# Patient Record
Sex: MALE | Race: WHITE | NOT HISPANIC OR LATINO | Employment: OTHER | RURAL
[De-identification: names, ages, dates, MRNs, and addresses within clinical notes are randomized per-mention and may not be internally consistent; named-entity substitution may affect disease eponyms.]

---

## 2018-07-20 ENCOUNTER — HISTORICAL (OUTPATIENT)
Dept: ADMINISTRATIVE | Facility: HOSPITAL | Age: 48
End: 2018-07-20

## 2018-07-23 LAB
LAB AP CLINICAL INFORMATION: NORMAL
LAB AP DIAGNOSIS - HISTORICAL: NORMAL
LAB AP GROSS PATHOLOGY - HISTORICAL: NORMAL
LAB AP SPECIMEN SUBMITTED - HISTORICAL: NORMAL

## 2020-11-17 ENCOUNTER — HISTORICAL (OUTPATIENT)
Dept: ADMINISTRATIVE | Facility: HOSPITAL | Age: 50
End: 2020-11-17

## 2020-11-17 LAB — SARS-COV+SARS-COV-2 AG RESP QL IA.RAPID: NEGATIVE

## 2020-11-20 ENCOUNTER — HISTORICAL (OUTPATIENT)
Dept: ADMINISTRATIVE | Facility: HOSPITAL | Age: 50
End: 2020-11-20

## 2020-11-20 LAB — SARS-COV+SARS-COV-2 AG RESP QL IA.RAPID: NEGATIVE

## 2020-11-24 ENCOUNTER — HISTORICAL (OUTPATIENT)
Dept: ADMINISTRATIVE | Facility: HOSPITAL | Age: 50
End: 2020-11-24

## 2020-11-24 LAB — SARS-COV+SARS-COV-2 AG RESP QL IA.RAPID: NEGATIVE

## 2020-11-27 ENCOUNTER — HISTORICAL (OUTPATIENT)
Dept: ADMINISTRATIVE | Facility: HOSPITAL | Age: 50
End: 2020-11-27

## 2020-11-27 LAB — SARS-COV+SARS-COV-2 AG RESP QL IA.RAPID: NEGATIVE

## 2020-12-01 ENCOUNTER — HISTORICAL (OUTPATIENT)
Dept: ADMINISTRATIVE | Facility: HOSPITAL | Age: 50
End: 2020-12-01

## 2020-12-01 LAB — SARS-COV+SARS-COV-2 AG RESP QL IA.RAPID: NEGATIVE

## 2020-12-04 ENCOUNTER — HISTORICAL (OUTPATIENT)
Dept: ADMINISTRATIVE | Facility: HOSPITAL | Age: 50
End: 2020-12-04

## 2020-12-04 LAB — SARS-COV+SARS-COV-2 AG RESP QL IA.RAPID: NEGATIVE

## 2020-12-08 ENCOUNTER — HISTORICAL (OUTPATIENT)
Dept: ADMINISTRATIVE | Facility: HOSPITAL | Age: 50
End: 2020-12-08

## 2020-12-08 LAB — SARS-COV+SARS-COV-2 AG RESP QL IA.RAPID: NEGATIVE

## 2020-12-11 ENCOUNTER — HISTORICAL (OUTPATIENT)
Dept: ADMINISTRATIVE | Facility: HOSPITAL | Age: 50
End: 2020-12-11

## 2020-12-11 LAB — SARS-COV+SARS-COV-2 AG RESP QL IA.RAPID: NEGATIVE

## 2020-12-15 ENCOUNTER — HISTORICAL (OUTPATIENT)
Dept: ADMINISTRATIVE | Facility: HOSPITAL | Age: 50
End: 2020-12-15

## 2020-12-15 LAB — SARS-COV+SARS-COV-2 AG RESP QL IA.RAPID: NEGATIVE

## 2020-12-18 ENCOUNTER — HISTORICAL (OUTPATIENT)
Dept: ADMINISTRATIVE | Facility: HOSPITAL | Age: 50
End: 2020-12-18

## 2020-12-18 LAB — SARS-COV+SARS-COV-2 AG RESP QL IA.RAPID: NEGATIVE

## 2020-12-22 ENCOUNTER — HISTORICAL (OUTPATIENT)
Dept: ADMINISTRATIVE | Facility: HOSPITAL | Age: 50
End: 2020-12-22

## 2020-12-22 LAB — SARS-COV+SARS-COV-2 AG RESP QL IA.RAPID: NEGATIVE

## 2020-12-24 ENCOUNTER — HISTORICAL (OUTPATIENT)
Dept: ADMINISTRATIVE | Facility: HOSPITAL | Age: 50
End: 2020-12-24

## 2020-12-24 LAB — SARS-COV+SARS-COV-2 AG RESP QL IA.RAPID: NEGATIVE

## 2020-12-29 ENCOUNTER — HISTORICAL (OUTPATIENT)
Dept: ADMINISTRATIVE | Facility: HOSPITAL | Age: 50
End: 2020-12-29

## 2020-12-29 LAB — SARS-COV+SARS-COV-2 AG RESP QL IA.RAPID: NEGATIVE

## 2020-12-31 ENCOUNTER — HISTORICAL (OUTPATIENT)
Dept: ADMINISTRATIVE | Facility: HOSPITAL | Age: 50
End: 2020-12-31

## 2020-12-31 LAB — SARS-COV+SARS-COV-2 AG RESP QL IA.RAPID: NEGATIVE

## 2021-01-05 ENCOUNTER — HISTORICAL (OUTPATIENT)
Dept: ADMINISTRATIVE | Facility: HOSPITAL | Age: 51
End: 2021-01-05

## 2021-01-05 LAB — SARS-COV+SARS-COV-2 AG RESP QL IA.RAPID: NEGATIVE

## 2021-01-07 ENCOUNTER — HISTORICAL (OUTPATIENT)
Dept: ADMINISTRATIVE | Facility: HOSPITAL | Age: 51
End: 2021-01-07

## 2021-01-07 LAB — SARS-COV+SARS-COV-2 AG RESP QL IA.RAPID: NEGATIVE

## 2021-01-12 ENCOUNTER — HISTORICAL (OUTPATIENT)
Dept: ADMINISTRATIVE | Facility: HOSPITAL | Age: 51
End: 2021-01-12

## 2021-01-12 LAB — SARS-COV+SARS-COV-2 AG RESP QL IA.RAPID: NEGATIVE

## 2021-01-14 ENCOUNTER — HISTORICAL (OUTPATIENT)
Dept: ADMINISTRATIVE | Facility: HOSPITAL | Age: 51
End: 2021-01-14

## 2021-01-14 LAB — SARS-COV+SARS-COV-2 AG RESP QL IA.RAPID: NEGATIVE

## 2021-01-19 ENCOUNTER — HISTORICAL (OUTPATIENT)
Dept: ADMINISTRATIVE | Facility: HOSPITAL | Age: 51
End: 2021-01-19

## 2021-01-19 LAB — SARS-COV+SARS-COV-2 AG RESP QL IA.RAPID: NEGATIVE

## 2021-01-21 ENCOUNTER — HISTORICAL (OUTPATIENT)
Dept: ADMINISTRATIVE | Facility: HOSPITAL | Age: 51
End: 2021-01-21

## 2021-01-21 LAB — SARS-COV+SARS-COV-2 AG RESP QL IA.RAPID: NEGATIVE

## 2021-01-26 ENCOUNTER — HISTORICAL (OUTPATIENT)
Dept: ADMINISTRATIVE | Facility: HOSPITAL | Age: 51
End: 2021-01-26

## 2021-01-26 LAB — SARS-COV+SARS-COV-2 AG RESP QL IA.RAPID: NEGATIVE

## 2021-01-28 ENCOUNTER — HISTORICAL (OUTPATIENT)
Dept: ADMINISTRATIVE | Facility: HOSPITAL | Age: 51
End: 2021-01-28

## 2021-01-28 LAB — SARS-COV+SARS-COV-2 AG RESP QL IA.RAPID: NEGATIVE

## 2021-02-02 ENCOUNTER — HISTORICAL (OUTPATIENT)
Dept: ADMINISTRATIVE | Facility: HOSPITAL | Age: 51
End: 2021-02-02

## 2021-02-02 LAB — SARS-COV+SARS-COV-2 AG RESP QL IA.RAPID: NEGATIVE

## 2021-02-04 ENCOUNTER — HISTORICAL (OUTPATIENT)
Dept: ADMINISTRATIVE | Facility: HOSPITAL | Age: 51
End: 2021-02-04

## 2021-02-04 LAB — SARS-COV+SARS-COV-2 AG RESP QL IA.RAPID: NEGATIVE

## 2021-02-09 ENCOUNTER — HISTORICAL (OUTPATIENT)
Dept: ADMINISTRATIVE | Facility: HOSPITAL | Age: 51
End: 2021-02-09

## 2021-02-09 LAB — SARS-COV+SARS-COV-2 AG RESP QL IA.RAPID: NEGATIVE

## 2021-02-11 ENCOUNTER — HISTORICAL (OUTPATIENT)
Dept: ADMINISTRATIVE | Facility: HOSPITAL | Age: 51
End: 2021-02-11

## 2021-02-12 LAB — SARS-COV+SARS-COV-2 AG RESP QL IA.RAPID: NEGATIVE

## 2021-02-17 ENCOUNTER — HISTORICAL (OUTPATIENT)
Dept: ADMINISTRATIVE | Facility: HOSPITAL | Age: 51
End: 2021-02-17

## 2021-02-17 LAB — SARS-COV+SARS-COV-2 AG RESP QL IA.RAPID: NEGATIVE

## 2021-02-19 ENCOUNTER — HISTORICAL (OUTPATIENT)
Dept: ADMINISTRATIVE | Facility: HOSPITAL | Age: 51
End: 2021-02-19

## 2021-02-19 LAB — SARS-COV+SARS-COV-2 AG RESP QL IA.RAPID: NEGATIVE

## 2021-02-23 ENCOUNTER — HISTORICAL (OUTPATIENT)
Dept: ADMINISTRATIVE | Facility: HOSPITAL | Age: 51
End: 2021-02-23

## 2021-02-23 LAB — SARS-COV+SARS-COV-2 AG RESP QL IA.RAPID: NEGATIVE

## 2021-02-25 ENCOUNTER — HISTORICAL (OUTPATIENT)
Dept: ADMINISTRATIVE | Facility: HOSPITAL | Age: 51
End: 2021-02-25

## 2021-02-26 LAB — SARS-COV+SARS-COV-2 AG RESP QL IA.RAPID: NEGATIVE

## 2021-06-14 ENCOUNTER — OFFICE VISIT (OUTPATIENT)
Dept: INTERNAL MEDICINE | Facility: CLINIC | Age: 51
End: 2021-06-14
Payer: MEDICAID

## 2021-06-14 VITALS
HEIGHT: 72 IN | OXYGEN SATURATION: 99 % | WEIGHT: 208 LBS | BODY MASS INDEX: 28.17 KG/M2 | RESPIRATION RATE: 18 BRPM | HEART RATE: 87 BPM | SYSTOLIC BLOOD PRESSURE: 152 MMHG | DIASTOLIC BLOOD PRESSURE: 94 MMHG

## 2021-06-14 DIAGNOSIS — I10 HYPERTENSION, UNSPECIFIED TYPE: Primary | Chronic | ICD-10-CM

## 2021-06-14 DIAGNOSIS — K21.9 GASTROESOPHAGEAL REFLUX DISEASE, UNSPECIFIED WHETHER ESOPHAGITIS PRESENT: Chronic | ICD-10-CM

## 2021-06-14 DIAGNOSIS — Z86.73 HISTORY OF CVA (CEREBROVASCULAR ACCIDENT): Chronic | ICD-10-CM

## 2021-06-14 DIAGNOSIS — Z95.1 HX OF CABG: Chronic | ICD-10-CM

## 2021-06-14 PROCEDURE — 99214 OFFICE O/P EST MOD 30 MIN: CPT | Mod: PBBFAC | Performed by: INTERNAL MEDICINE

## 2021-06-14 PROCEDURE — 99214 PR OFFICE/OUTPT VISIT, EST, LEVL IV, 30-39 MIN: ICD-10-PCS | Mod: S$PBB,,, | Performed by: INTERNAL MEDICINE

## 2021-06-14 PROCEDURE — 99214 OFFICE O/P EST MOD 30 MIN: CPT | Mod: S$PBB,,, | Performed by: INTERNAL MEDICINE

## 2021-06-14 RX ORDER — ASPIRIN 81 MG/1
81 TABLET ORAL DAILY
Qty: 30 TABLET | Refills: 2 | Status: SHIPPED | OUTPATIENT
Start: 2021-06-14 | End: 2021-10-27 | Stop reason: SDUPTHER

## 2021-06-14 RX ORDER — PANTOPRAZOLE SODIUM 40 MG/1
40 TABLET, DELAYED RELEASE ORAL DAILY
Qty: 30 TABLET | Refills: 3 | Status: SHIPPED | OUTPATIENT
Start: 2021-06-14 | End: 2021-10-04 | Stop reason: SDUPTHER

## 2021-06-14 RX ORDER — AMLODIPINE BESYLATE 10 MG/1
10 TABLET ORAL DAILY
Qty: 30 TABLET | Refills: 2 | Status: SHIPPED | OUTPATIENT
Start: 2021-06-14 | End: 2021-10-04 | Stop reason: SDUPTHER

## 2021-06-14 RX ORDER — ATORVASTATIN CALCIUM 40 MG/1
40 TABLET, FILM COATED ORAL NIGHTLY
Qty: 30 TABLET | Refills: 2 | Status: SHIPPED | OUTPATIENT
Start: 2021-06-14 | End: 2021-10-04 | Stop reason: SDUPTHER

## 2021-06-16 DIAGNOSIS — I10 HYPERTENSION, UNSPECIFIED TYPE: Primary | ICD-10-CM

## 2021-07-26 ENCOUNTER — OFFICE VISIT (OUTPATIENT)
Dept: FAMILY MEDICINE | Facility: CLINIC | Age: 51
End: 2021-07-26
Payer: MEDICAID

## 2021-07-26 VITALS
HEART RATE: 92 BPM | RESPIRATION RATE: 19 BRPM | OXYGEN SATURATION: 98 % | HEIGHT: 73 IN | BODY MASS INDEX: 27.3 KG/M2 | SYSTOLIC BLOOD PRESSURE: 137 MMHG | DIASTOLIC BLOOD PRESSURE: 78 MMHG | WEIGHT: 206 LBS

## 2021-07-26 DIAGNOSIS — I63.9 CEREBROVASCULAR ACCIDENT (CVA), UNSPECIFIED MECHANISM: Primary | ICD-10-CM

## 2021-07-26 DIAGNOSIS — I10 ESSENTIAL HYPERTENSION: ICD-10-CM

## 2021-07-26 PROCEDURE — 99212 PR OFFICE/OUTPT VISIT, EST, LEVL II, 10-19 MIN: ICD-10-PCS | Mod: ,,, | Performed by: INTERNAL MEDICINE

## 2021-07-26 PROCEDURE — 99212 OFFICE O/P EST SF 10 MIN: CPT | Mod: ,,, | Performed by: INTERNAL MEDICINE

## 2021-09-17 ENCOUNTER — HOSPITAL ENCOUNTER (EMERGENCY)
Facility: HOSPITAL | Age: 51
Discharge: HOME | End: 2021-09-17
Attending: EMERGENCY MEDICINE
Payer: MEDICAID

## 2021-09-17 VITALS
OXYGEN SATURATION: 100 % | WEIGHT: 206.56 LBS | HEART RATE: 111 BPM | DIASTOLIC BLOOD PRESSURE: 88 MMHG | SYSTOLIC BLOOD PRESSURE: 158 MMHG | RESPIRATION RATE: 15 BRPM | BODY MASS INDEX: 27.37 KG/M2 | HEIGHT: 73 IN | TEMPERATURE: 98 F

## 2021-09-17 DIAGNOSIS — R07.9 CHEST PAIN: ICD-10-CM

## 2021-09-17 DIAGNOSIS — F10.20 ALCOHOLISM: ICD-10-CM

## 2021-09-17 DIAGNOSIS — E87.6 HYPOKALEMIA: ICD-10-CM

## 2021-09-17 DIAGNOSIS — M79.89 LEG SWELLING: ICD-10-CM

## 2021-09-17 DIAGNOSIS — R07.89 CHEST WALL PAIN: Primary | ICD-10-CM

## 2021-09-17 LAB
ALBUMIN SERPL BCP-MCNC: 3.6 G/DL (ref 3.5–5)
ALBUMIN/GLOB SERPL: 0.9 {RATIO}
ALP SERPL-CCNC: 226 U/L (ref 45–115)
ALT SERPL W P-5'-P-CCNC: 34 U/L (ref 16–61)
AMPHET UR QL SCN: NEGATIVE
ANION GAP SERPL CALCULATED.3IONS-SCNC: 14 MMOL/L (ref 7–16)
ANISOCYTOSIS BLD QL SMEAR: NORMAL
APTT PPP: 30.1 SECONDS (ref 25.2–37.3)
AST SERPL W P-5'-P-CCNC: 23 U/L (ref 15–37)
BARBITURATES UR QL SCN: NEGATIVE
BASOPHILS # BLD AUTO: 0.06 K/UL (ref 0–0.2)
BASOPHILS NFR BLD AUTO: 1 % (ref 0–1)
BENZODIAZ METAB UR QL SCN: NEGATIVE
BILIRUB SERPL-MCNC: 0.6 MG/DL (ref 0–1.2)
BUN SERPL-MCNC: 10 MG/DL (ref 7–18)
BUN/CREAT SERPL: 14 (ref 6–20)
CALCIUM SERPL-MCNC: 8.3 MG/DL (ref 8.5–10.1)
CANNABINOIDS UR QL SCN: NEGATIVE
CHLORIDE SERPL-SCNC: 100 MMOL/L (ref 98–107)
CO2 SERPL-SCNC: 27 MMOL/L (ref 21–32)
COCAINE UR QL SCN: NEGATIVE
CREAT SERPL-MCNC: 0.74 MG/DL (ref 0.7–1.3)
D DIMER PPP FEU-MCNC: 0.76 ΜG/ML (ref 0–0.47)
DIFFERENTIAL METHOD BLD: ABNORMAL
EOSINOPHIL # BLD AUTO: 0.05 K/UL (ref 0–0.5)
EOSINOPHIL NFR BLD AUTO: 0.9 % (ref 1–4)
ERYTHROCYTE [DISTWIDTH] IN BLOOD BY AUTOMATED COUNT: 17.6 % (ref 11.5–14.5)
ETHANOL SERPL-MCNC: 131 MG/DL
GLOBULIN SER-MCNC: 4.2 G/DL (ref 2–4)
GLUCOSE SERPL-MCNC: 160 MG/DL (ref 74–106)
HCT VFR BLD AUTO: 39.9 % (ref 40–54)
HGB BLD-MCNC: 12.5 G/DL (ref 13.5–18)
IMM GRANULOCYTES # BLD AUTO: 0.02 K/UL (ref 0–0.04)
IMM GRANULOCYTES NFR BLD: 0.3 % (ref 0–0.4)
INR BLD: 1.09 (ref 0.9–1.1)
LIPASE SERPL-CCNC: 69 U/L (ref 73–393)
LYMPHOCYTES # BLD AUTO: 1.87 K/UL (ref 1–4.8)
LYMPHOCYTES NFR BLD AUTO: 32.5 % (ref 27–41)
MAGNESIUM SERPL-MCNC: 1.8 MG/DL (ref 1.7–2.3)
MCH RBC QN AUTO: 23.2 PG (ref 27–31)
MCHC RBC AUTO-ENTMCNC: 31.3 G/DL (ref 32–36)
MCV RBC AUTO: 74.2 FL (ref 80–96)
MICROCYTES BLD QL SMEAR: NORMAL
MONOCYTES # BLD AUTO: 0.41 K/UL (ref 0–0.8)
MONOCYTES NFR BLD AUTO: 7.1 % (ref 2–6)
MPC BLD CALC-MCNC: 8.7 FL (ref 9.4–12.4)
NEUTROPHILS # BLD AUTO: 3.35 K/UL (ref 1.8–7.7)
NEUTROPHILS NFR BLD AUTO: 58.2 % (ref 53–65)
NRBC # BLD AUTO: 0 X10E3/UL
NRBC, AUTO (.00): 0 %
NT-PROBNP SERPL-MCNC: 303 PG/ML (ref 1–125)
OPIATES UR QL SCN: POSITIVE
OVALOCYTES BLD QL SMEAR: NORMAL
PCP UR QL SCN: NEGATIVE
PLATELET # BLD AUTO: 304 K/UL (ref 150–400)
PLATELET MORPHOLOGY: NORMAL
POLYCHROMASIA BLD QL SMEAR: NORMAL
POTASSIUM SERPL-SCNC: 2.8 MMOL/L (ref 3.5–5.1)
PROT SERPL-MCNC: 7.8 G/DL (ref 6.4–8.2)
PROTHROMBIN TIME: 14.1 SECONDS (ref 11.7–14.7)
RBC # BLD AUTO: 5.38 M/UL (ref 4.6–6.2)
SODIUM SERPL-SCNC: 138 MMOL/L (ref 136–145)
TROPONIN I SERPL-MCNC: <0.017 NG/ML
TROPONIN I SERPL-MCNC: <0.017 NG/ML
WBC # BLD AUTO: 5.76 K/UL (ref 4.5–11)

## 2021-09-17 PROCEDURE — 93010 ELECTROCARDIOGRAM REPORT: CPT | Mod: ,,, | Performed by: STUDENT IN AN ORGANIZED HEALTH CARE EDUCATION/TRAINING PROGRAM

## 2021-09-17 PROCEDURE — 25000003 PHARM REV CODE 250: Performed by: EMERGENCY MEDICINE

## 2021-09-17 PROCEDURE — 96376 TX/PRO/DX INJ SAME DRUG ADON: CPT

## 2021-09-17 PROCEDURE — 96361 HYDRATE IV INFUSION ADD-ON: CPT

## 2021-09-17 PROCEDURE — 84484 ASSAY OF TROPONIN QUANT: CPT | Performed by: EMERGENCY MEDICINE

## 2021-09-17 PROCEDURE — 93005 ELECTROCARDIOGRAM TRACING: CPT

## 2021-09-17 PROCEDURE — 93010 EKG 12-LEAD: ICD-10-PCS | Mod: ,,, | Performed by: STUDENT IN AN ORGANIZED HEALTH CARE EDUCATION/TRAINING PROGRAM

## 2021-09-17 PROCEDURE — 83880 ASSAY OF NATRIURETIC PEPTIDE: CPT | Performed by: EMERGENCY MEDICINE

## 2021-09-17 PROCEDURE — 80053 COMPREHEN METABOLIC PANEL: CPT | Performed by: EMERGENCY MEDICINE

## 2021-09-17 PROCEDURE — 99284 PR EMERGENCY DEPT VISIT,LEVEL IV: ICD-10-PCS | Mod: ,,, | Performed by: EMERGENCY MEDICINE

## 2021-09-17 PROCEDURE — 25000242 PHARM REV CODE 250 ALT 637 W/ HCPCS: Performed by: EMERGENCY MEDICINE

## 2021-09-17 PROCEDURE — 99285 EMERGENCY DEPT VISIT HI MDM: CPT | Mod: 25

## 2021-09-17 PROCEDURE — 82077 ASSAY SPEC XCP UR&BREATH IA: CPT | Performed by: EMERGENCY MEDICINE

## 2021-09-17 PROCEDURE — 99284 EMERGENCY DEPT VISIT MOD MDM: CPT | Mod: ,,, | Performed by: EMERGENCY MEDICINE

## 2021-09-17 PROCEDURE — 85378 FIBRIN DEGRADE SEMIQUANT: CPT | Performed by: EMERGENCY MEDICINE

## 2021-09-17 PROCEDURE — 85610 PROTHROMBIN TIME: CPT | Performed by: EMERGENCY MEDICINE

## 2021-09-17 PROCEDURE — 96366 THER/PROPH/DIAG IV INF ADDON: CPT

## 2021-09-17 PROCEDURE — 25500020 PHARM REV CODE 255: Performed by: EMERGENCY MEDICINE

## 2021-09-17 PROCEDURE — 80307 DRUG TEST PRSMV CHEM ANLYZR: CPT | Performed by: EMERGENCY MEDICINE

## 2021-09-17 PROCEDURE — 85730 THROMBOPLASTIN TIME PARTIAL: CPT | Performed by: EMERGENCY MEDICINE

## 2021-09-17 PROCEDURE — 83690 ASSAY OF LIPASE: CPT | Performed by: EMERGENCY MEDICINE

## 2021-09-17 PROCEDURE — 63600175 PHARM REV CODE 636 W HCPCS: Performed by: EMERGENCY MEDICINE

## 2021-09-17 PROCEDURE — 36415 COLL VENOUS BLD VENIPUNCTURE: CPT | Performed by: EMERGENCY MEDICINE

## 2021-09-17 PROCEDURE — 96375 TX/PRO/DX INJ NEW DRUG ADDON: CPT | Mod: 59

## 2021-09-17 PROCEDURE — 96365 THER/PROPH/DIAG IV INF INIT: CPT

## 2021-09-17 PROCEDURE — 83735 ASSAY OF MAGNESIUM: CPT | Performed by: EMERGENCY MEDICINE

## 2021-09-17 PROCEDURE — 85025 COMPLETE CBC W/AUTO DIFF WBC: CPT | Performed by: EMERGENCY MEDICINE

## 2021-09-17 RX ORDER — POTASSIUM CHLORIDE 20 MEQ/1
40 TABLET, EXTENDED RELEASE ORAL ONCE
Status: COMPLETED | OUTPATIENT
Start: 2021-09-17 | End: 2021-09-17

## 2021-09-17 RX ORDER — MORPHINE SULFATE 4 MG/ML
4 INJECTION, SOLUTION INTRAMUSCULAR; INTRAVENOUS
Status: COMPLETED | OUTPATIENT
Start: 2021-09-17 | End: 2021-09-17

## 2021-09-17 RX ORDER — POTASSIUM CHLORIDE 7.45 MG/ML
10 INJECTION INTRAVENOUS ONCE
Status: COMPLETED | OUTPATIENT
Start: 2021-09-17 | End: 2021-09-17

## 2021-09-17 RX ORDER — POTASSIUM CHLORIDE 20 MEQ/1
20 TABLET, EXTENDED RELEASE ORAL 2 TIMES DAILY
Qty: 20 TABLET | Refills: 0 | Status: SHIPPED | OUTPATIENT
Start: 2021-09-17 | End: 2023-01-26 | Stop reason: SDUPTHER

## 2021-09-17 RX ORDER — ONDANSETRON 4 MG/1
4 TABLET, ORALLY DISINTEGRATING ORAL EVERY 6 HOURS PRN
Qty: 20 TABLET | Refills: 0 | Status: ON HOLD | OUTPATIENT
Start: 2021-09-17 | End: 2022-01-19 | Stop reason: HOSPADM

## 2021-09-17 RX ORDER — NITROGLYCERIN 0.4 MG/1
0.4 TABLET SUBLINGUAL EVERY 5 MIN PRN
Status: DISCONTINUED | OUTPATIENT
Start: 2021-09-17 | End: 2021-09-17 | Stop reason: HOSPADM

## 2021-09-17 RX ORDER — ONDANSETRON 2 MG/ML
4 INJECTION INTRAMUSCULAR; INTRAVENOUS
Status: COMPLETED | OUTPATIENT
Start: 2021-09-17 | End: 2021-09-17

## 2021-09-17 RX ADMIN — NITROGLYCERIN 0.4 MG: 0.4 TABLET SUBLINGUAL at 09:09

## 2021-09-17 RX ADMIN — MORPHINE SULFATE 4 MG: 4 INJECTION INTRAVENOUS at 11:09

## 2021-09-17 RX ADMIN — SODIUM CHLORIDE 1000 ML: 9 INJECTION, SOLUTION INTRAVENOUS at 09:09

## 2021-09-17 RX ADMIN — POTASSIUM CHLORIDE 40 MEQ: 1500 TABLET, EXTENDED RELEASE ORAL at 11:09

## 2021-09-17 RX ADMIN — IOPAMIDOL 100 ML: 755 INJECTION, SOLUTION INTRAVENOUS at 11:09

## 2021-09-17 RX ADMIN — POTASSIUM CHLORIDE 10 MEQ: 7.46 INJECTION, SOLUTION INTRAVENOUS at 11:09

## 2021-09-17 RX ADMIN — ONDANSETRON 4 MG: 2 INJECTION INTRAMUSCULAR; INTRAVENOUS at 09:09

## 2021-09-17 RX ADMIN — SODIUM CHLORIDE 1000 ML: 9 INJECTION, SOLUTION INTRAVENOUS at 12:09

## 2021-09-17 RX ADMIN — MORPHINE SULFATE 4 MG: 4 INJECTION INTRAVENOUS at 09:09

## 2021-10-04 RX ORDER — ATORVASTATIN CALCIUM 40 MG/1
40 TABLET, FILM COATED ORAL NIGHTLY
Qty: 30 TABLET | Refills: 0 | Status: SHIPPED | OUTPATIENT
Start: 2021-10-04 | End: 2021-10-27 | Stop reason: SDUPTHER

## 2021-10-04 RX ORDER — AMLODIPINE BESYLATE 10 MG/1
10 TABLET ORAL DAILY
Qty: 30 TABLET | Refills: 0 | Status: SHIPPED | OUTPATIENT
Start: 2021-10-04 | End: 2021-10-04 | Stop reason: SDUPTHER

## 2021-10-05 RX ORDER — PANTOPRAZOLE SODIUM 40 MG/1
40 TABLET, DELAYED RELEASE ORAL DAILY
Qty: 30 TABLET | Refills: 0 | Status: SHIPPED | OUTPATIENT
Start: 2021-10-05 | End: 2021-10-27 | Stop reason: SDUPTHER

## 2021-10-05 RX ORDER — AMLODIPINE BESYLATE 10 MG/1
10 TABLET ORAL DAILY
Qty: 30 TABLET | Refills: 0 | Status: SHIPPED | OUTPATIENT
Start: 2021-10-05 | End: 2021-10-27 | Stop reason: SDUPTHER

## 2021-10-27 ENCOUNTER — OFFICE VISIT (OUTPATIENT)
Dept: FAMILY MEDICINE | Facility: CLINIC | Age: 51
End: 2021-10-27
Payer: MEDICAID

## 2021-10-27 VITALS
HEIGHT: 73 IN | SYSTOLIC BLOOD PRESSURE: 150 MMHG | HEART RATE: 108 BPM | RESPIRATION RATE: 18 BRPM | DIASTOLIC BLOOD PRESSURE: 88 MMHG | OXYGEN SATURATION: 100 % | WEIGHT: 202.63 LBS | BODY MASS INDEX: 26.85 KG/M2

## 2021-10-27 DIAGNOSIS — R53.1 RIGHT SIDED WEAKNESS: ICD-10-CM

## 2021-10-27 DIAGNOSIS — I10 PRIMARY HYPERTENSION: Chronic | ICD-10-CM

## 2021-10-27 DIAGNOSIS — G47.00 INSOMNIA, UNSPECIFIED TYPE: Primary | ICD-10-CM

## 2021-10-27 DIAGNOSIS — K21.9 GASTROESOPHAGEAL REFLUX DISEASE, UNSPECIFIED WHETHER ESOPHAGITIS PRESENT: Chronic | ICD-10-CM

## 2021-10-27 DIAGNOSIS — Z86.73 HISTORY OF CVA (CEREBROVASCULAR ACCIDENT): Chronic | ICD-10-CM

## 2021-10-27 PROCEDURE — 99214 OFFICE O/P EST MOD 30 MIN: CPT | Mod: ,,, | Performed by: INTERNAL MEDICINE

## 2021-10-27 PROCEDURE — 99214 PR OFFICE/OUTPT VISIT, EST, LEVL IV, 30-39 MIN: ICD-10-PCS | Mod: ,,, | Performed by: INTERNAL MEDICINE

## 2021-10-27 RX ORDER — ASPIRIN 81 MG/1
81 TABLET ORAL DAILY
Qty: 90 TABLET | Refills: 0 | Status: SHIPPED | OUTPATIENT
Start: 2021-10-27 | End: 2021-12-07 | Stop reason: SDUPTHER

## 2021-10-27 RX ORDER — PANTOPRAZOLE SODIUM 40 MG/1
40 TABLET, DELAYED RELEASE ORAL DAILY
Qty: 90 TABLET | Refills: 0 | Status: SHIPPED | OUTPATIENT
Start: 2021-10-27 | End: 2021-12-07 | Stop reason: SDUPTHER

## 2021-10-27 RX ORDER — AMLODIPINE BESYLATE 10 MG/1
10 TABLET ORAL DAILY
Qty: 90 TABLET | Refills: 0 | Status: SHIPPED | OUTPATIENT
Start: 2021-10-27 | End: 2021-12-07 | Stop reason: SDUPTHER

## 2021-10-27 RX ORDER — ATORVASTATIN CALCIUM 40 MG/1
40 TABLET, FILM COATED ORAL NIGHTLY
Qty: 90 TABLET | Refills: 0 | Status: SHIPPED | OUTPATIENT
Start: 2021-10-27 | End: 2021-12-07 | Stop reason: SDUPTHER

## 2021-10-27 RX ORDER — AMITRIPTYLINE HYDROCHLORIDE 25 MG/1
25 TABLET, FILM COATED ORAL NIGHTLY PRN
Qty: 20 TABLET | Refills: 2 | Status: SHIPPED | OUTPATIENT
Start: 2021-10-27 | End: 2021-12-07 | Stop reason: SDUPTHER

## 2021-11-02 ENCOUNTER — CLINICAL SUPPORT (OUTPATIENT)
Dept: REHABILITATION | Facility: HOSPITAL | Age: 51
End: 2021-11-02
Payer: MEDICAID

## 2021-11-02 DIAGNOSIS — R53.1 RIGHT SIDED WEAKNESS: ICD-10-CM

## 2021-11-02 PROCEDURE — 97161 PT EVAL LOW COMPLEX 20 MIN: CPT

## 2021-11-17 ENCOUNTER — CLINICAL SUPPORT (OUTPATIENT)
Dept: REHABILITATION | Facility: HOSPITAL | Age: 51
End: 2021-11-17
Payer: MEDICAID

## 2021-11-17 DIAGNOSIS — R53.1 RIGHT SIDED WEAKNESS: ICD-10-CM

## 2021-11-17 PROCEDURE — 97110 THERAPEUTIC EXERCISES: CPT

## 2021-12-02 ENCOUNTER — CLINICAL SUPPORT (OUTPATIENT)
Dept: REHABILITATION | Facility: HOSPITAL | Age: 51
End: 2021-12-02
Payer: MEDICAID

## 2021-12-02 DIAGNOSIS — R53.1 RIGHT SIDED WEAKNESS: Primary | ICD-10-CM

## 2021-12-02 PROCEDURE — 97110 THERAPEUTIC EXERCISES: CPT

## 2021-12-07 ENCOUNTER — OFFICE VISIT (OUTPATIENT)
Dept: FAMILY MEDICINE | Facility: CLINIC | Age: 51
End: 2021-12-07
Payer: MEDICAID

## 2021-12-07 ENCOUNTER — CLINICAL SUPPORT (OUTPATIENT)
Dept: REHABILITATION | Facility: HOSPITAL | Age: 51
End: 2021-12-07
Payer: MEDICAID

## 2021-12-07 VITALS
HEIGHT: 73 IN | RESPIRATION RATE: 18 BRPM | OXYGEN SATURATION: 99 % | WEIGHT: 202.81 LBS | SYSTOLIC BLOOD PRESSURE: 144 MMHG | DIASTOLIC BLOOD PRESSURE: 83 MMHG | HEART RATE: 86 BPM | BODY MASS INDEX: 26.88 KG/M2

## 2021-12-07 DIAGNOSIS — Z86.73 HISTORY OF CVA (CEREBROVASCULAR ACCIDENT): Primary | Chronic | ICD-10-CM

## 2021-12-07 DIAGNOSIS — I10 PRIMARY HYPERTENSION: Chronic | ICD-10-CM

## 2021-12-07 DIAGNOSIS — K21.9 GASTROESOPHAGEAL REFLUX DISEASE, UNSPECIFIED WHETHER ESOPHAGITIS PRESENT: Chronic | ICD-10-CM

## 2021-12-07 DIAGNOSIS — R53.1 RIGHT SIDED WEAKNESS: Primary | ICD-10-CM

## 2021-12-07 PROCEDURE — 97110 THERAPEUTIC EXERCISES: CPT

## 2021-12-07 PROCEDURE — 99214 PR OFFICE/OUTPT VISIT, EST, LEVL IV, 30-39 MIN: ICD-10-PCS | Mod: ,,, | Performed by: INTERNAL MEDICINE

## 2021-12-07 PROCEDURE — 99214 OFFICE O/P EST MOD 30 MIN: CPT | Mod: ,,, | Performed by: INTERNAL MEDICINE

## 2021-12-07 RX ORDER — AMLODIPINE BESYLATE 10 MG/1
10 TABLET ORAL DAILY
Qty: 90 TABLET | Refills: 0 | Status: ON HOLD | OUTPATIENT
Start: 2021-12-07 | End: 2022-01-19 | Stop reason: HOSPADM

## 2021-12-07 RX ORDER — PANTOPRAZOLE SODIUM 40 MG/1
40 TABLET, DELAYED RELEASE ORAL DAILY
Qty: 90 TABLET | Refills: 0 | Status: SHIPPED | OUTPATIENT
Start: 2021-12-07 | End: 2022-09-19 | Stop reason: SDUPTHER

## 2021-12-07 RX ORDER — ASPIRIN 81 MG/1
81 TABLET ORAL DAILY
Qty: 90 TABLET | Refills: 0 | Status: SHIPPED | OUTPATIENT
Start: 2021-12-07 | End: 2022-09-19 | Stop reason: SDUPTHER

## 2021-12-07 RX ORDER — ATORVASTATIN CALCIUM 40 MG/1
40 TABLET, FILM COATED ORAL NIGHTLY
Qty: 90 TABLET | Refills: 0 | Status: SHIPPED | OUTPATIENT
Start: 2021-12-07 | End: 2022-09-19 | Stop reason: SDUPTHER

## 2021-12-07 RX ORDER — AMITRIPTYLINE HYDROCHLORIDE 25 MG/1
25 TABLET, FILM COATED ORAL NIGHTLY PRN
Qty: 20 TABLET | Refills: 2 | Status: SHIPPED | OUTPATIENT
Start: 2021-12-07 | End: 2022-09-19 | Stop reason: SDUPTHER

## 2021-12-09 ENCOUNTER — CLINICAL SUPPORT (OUTPATIENT)
Dept: REHABILITATION | Facility: HOSPITAL | Age: 51
End: 2021-12-09
Payer: MEDICAID

## 2021-12-09 DIAGNOSIS — R53.1 RIGHT SIDED WEAKNESS: Primary | ICD-10-CM

## 2021-12-09 PROCEDURE — 97110 THERAPEUTIC EXERCISES: CPT

## 2021-12-15 ENCOUNTER — CLINICAL SUPPORT (OUTPATIENT)
Dept: REHABILITATION | Facility: HOSPITAL | Age: 51
End: 2021-12-15
Payer: MEDICAID

## 2021-12-15 DIAGNOSIS — R53.1 RIGHT SIDED WEAKNESS: Primary | ICD-10-CM

## 2021-12-15 PROCEDURE — 97110 THERAPEUTIC EXERCISES: CPT

## 2021-12-17 ENCOUNTER — CLINICAL SUPPORT (OUTPATIENT)
Dept: REHABILITATION | Facility: HOSPITAL | Age: 51
End: 2021-12-17
Payer: MEDICAID

## 2021-12-17 DIAGNOSIS — R53.1 RIGHT SIDED WEAKNESS: ICD-10-CM

## 2021-12-17 PROCEDURE — 97110 THERAPEUTIC EXERCISES: CPT

## 2021-12-27 ENCOUNTER — CLINICAL SUPPORT (OUTPATIENT)
Dept: REHABILITATION | Facility: HOSPITAL | Age: 51
End: 2021-12-27
Payer: MEDICAID

## 2021-12-27 DIAGNOSIS — R53.1 RIGHT SIDED WEAKNESS: ICD-10-CM

## 2021-12-27 PROCEDURE — 97110 THERAPEUTIC EXERCISES: CPT

## 2021-12-29 ENCOUNTER — CLINICAL SUPPORT (OUTPATIENT)
Dept: REHABILITATION | Facility: HOSPITAL | Age: 51
End: 2021-12-29
Payer: MEDICAID

## 2021-12-29 DIAGNOSIS — R53.1 RIGHT SIDED WEAKNESS: Primary | ICD-10-CM

## 2021-12-29 PROCEDURE — 97110 THERAPEUTIC EXERCISES: CPT

## 2022-01-01 ENCOUNTER — HOSPITAL ENCOUNTER (INPATIENT)
Facility: HOSPITAL | Age: 52
LOS: 18 days | Discharge: HOME | End: 2022-01-19
Attending: HOSPITALIST | Admitting: FAMILY MEDICINE
Payer: MEDICAID

## 2022-01-01 DIAGNOSIS — S72.001A CLOSED FRACTURE OF RIGHT HIP: ICD-10-CM

## 2022-01-01 DIAGNOSIS — K21.9 GERD (GASTROESOPHAGEAL REFLUX DISEASE): ICD-10-CM

## 2022-01-01 DIAGNOSIS — S72.009A HIP FRACTURE: ICD-10-CM

## 2022-01-01 DIAGNOSIS — R07.9 CHEST PAIN, UNSPECIFIED TYPE: ICD-10-CM

## 2022-01-01 DIAGNOSIS — Z95.1 HX OF CABG: ICD-10-CM

## 2022-01-01 DIAGNOSIS — R07.9 CHEST PAIN: ICD-10-CM

## 2022-01-01 DIAGNOSIS — F10.10 ETOH ABUSE: ICD-10-CM
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
## 2022-01-01 DIAGNOSIS — Z01.818 PRE-OPERATIVE CLEARANCE: ICD-10-CM

## 2022-01-01 DIAGNOSIS — S72.001A CLOSED FRACTURE OF RIGHT HIP, INITIAL ENCOUNTER: ICD-10-CM

## 2022-01-01 DIAGNOSIS — R53.1 RIGHT SIDED WEAKNESS: ICD-10-CM

## 2022-01-01 DIAGNOSIS — I10 HYPERTENSION: ICD-10-CM

## 2022-01-01 DIAGNOSIS — R94.31 EKG ABNORMALITIES: ICD-10-CM

## 2022-01-01 DIAGNOSIS — S72.001A CLOSED DISPLACED FRACTURE OF RIGHT FEMORAL NECK: Primary | ICD-10-CM

## 2022-01-01 LAB
ALBUMIN SERPL BCP-MCNC: 3.9 G/DL (ref 3.5–5)
ALBUMIN/GLOB SERPL: 0.9 {RATIO}
ALP SERPL-CCNC: 162 U/L (ref 45–115)
ALT SERPL W P-5'-P-CCNC: 25 U/L (ref 16–61)
AMPHET UR QL SCN: NEGATIVE
ANION GAP SERPL CALCULATED.3IONS-SCNC: 16 MMOL/L (ref 7–16)
APTT PPP: 25.6 SECONDS (ref 25.2–37.3)
AST SERPL W P-5'-P-CCNC: 28 U/L (ref 15–37)
BARBITURATES UR QL SCN: NEGATIVE
BASOPHILS # BLD AUTO: 0.04 K/UL (ref 0–0.2)
BASOPHILS NFR BLD AUTO: 0.4 % (ref 0–1)
BENZODIAZ METAB UR QL SCN: NEGATIVE
BILIRUB SERPL-MCNC: 0.2 MG/DL (ref 0–1.2)
BILIRUB UR QL STRIP: NEGATIVE
BUN SERPL-MCNC: 8 MG/DL (ref 7–18)
BUN/CREAT SERPL: 9 (ref 6–20)
CALCIUM SERPL-MCNC: 8.5 MG/DL (ref 8.5–10.1)
CANNABINOIDS UR QL SCN: NEGATIVE
CHLORIDE SERPL-SCNC: 104 MMOL/L (ref 98–107)
CLARITY UR: CLEAR
CO2 SERPL-SCNC: 24 MMOL/L (ref 21–32)
COCAINE UR QL SCN: NEGATIVE
COLOR UR: ABNORMAL
CREAT SERPL-MCNC: 0.86 MG/DL (ref 0.7–1.3)
DIFFERENTIAL METHOD BLD: ABNORMAL
EOSINOPHIL # BLD AUTO: 0.05 K/UL (ref 0–0.5)
EOSINOPHIL NFR BLD AUTO: 0.5 % (ref 1–4)
ERYTHROCYTE [DISTWIDTH] IN BLOOD BY AUTOMATED COUNT: 17.7 % (ref 11.5–14.5)
ETHANOL SERPL-MCNC: 188 MG/DL
GLOBULIN SER-MCNC: 4.3 G/DL (ref 2–4)
GLUCOSE SERPL-MCNC: 157 MG/DL (ref 74–106)
GLUCOSE UR STRIP-MCNC: 100 MG/DL
HCT VFR BLD AUTO: 38 % (ref 40–54)
HGB BLD-MCNC: 11.9 G/DL (ref 13.5–18)
IMM GRANULOCYTES # BLD AUTO: 0.04 K/UL (ref 0–0.04)
IMM GRANULOCYTES NFR BLD: 0.4 % (ref 0–0.4)
INR BLD: 1.04 (ref 0.9–1.1)
KETONES UR STRIP-SCNC: ABNORMAL MG/DL
LEUKOCYTE ESTERASE UR QL STRIP: NEGATIVE
LYMPHOCYTES # BLD AUTO: 1.43 K/UL (ref 1–4.8)
LYMPHOCYTES NFR BLD AUTO: 14.4 % (ref 27–41)
MAGNESIUM SERPL-MCNC: 2.2 MG/DL (ref 1.7–2.3)
MCH RBC QN AUTO: 23.6 PG (ref 27–31)
MCHC RBC AUTO-ENTMCNC: 31.3 G/DL (ref 32–36)
MCV RBC AUTO: 75.2 FL (ref 80–96)
MONOCYTES # BLD AUTO: 0.46 K/UL (ref 0–0.8)
MONOCYTES NFR BLD AUTO: 4.6 % (ref 2–6)
MPC BLD CALC-MCNC: 9.2 FL (ref 9.4–12.4)
NEUTROPHILS # BLD AUTO: 7.88 K/UL (ref 1.8–7.7)
NEUTROPHILS NFR BLD AUTO: 79.7 % (ref 53–65)
NITRITE UR QL STRIP: NEGATIVE
NRBC # BLD AUTO: 0 X10E3/UL
NRBC, AUTO (.00): 0 %
OPIATES UR QL SCN: POSITIVE
PCP UR QL SCN: NEGATIVE
PH UR STRIP: 6 PH UNITS
PLATELET # BLD AUTO: 283 K/UL (ref 150–400)
POTASSIUM SERPL-SCNC: 4.3 MMOL/L (ref 3.5–5.1)
PROT SERPL-MCNC: 8.2 G/DL (ref 6.4–8.2)
PROT UR QL STRIP: NEGATIVE
PROTHROMBIN TIME: 13.6 SECONDS (ref 11.7–14.7)
RBC # BLD AUTO: 5.05 M/UL (ref 4.6–6.2)
RBC # UR STRIP: NEGATIVE /UL
SARS-COV-2 RDRP RESP QL NAA+PROBE: NEGATIVE
SODIUM SERPL-SCNC: 140 MMOL/L (ref 136–145)
SP GR UR STRIP: >=1.03
UROBILINOGEN UR STRIP-ACNC: 0.2 MG/DL
WBC # BLD AUTO: 9.9 K/UL (ref 4.5–11)

## 2022-01-01 PROCEDURE — 93010 ELECTROCARDIOGRAM REPORT: CPT | Mod: ,,, | Performed by: HOSPITALIST

## 2022-01-01 PROCEDURE — 96372 THER/PROPH/DIAG INJ SC/IM: CPT

## 2022-01-01 PROCEDURE — 36415 COLL VENOUS BLD VENIPUNCTURE: CPT | Performed by: NURSE PRACTITIONER

## 2022-01-01 PROCEDURE — 93010 EKG 12-LEAD: ICD-10-PCS | Mod: ,,, | Performed by: HOSPITALIST

## 2022-01-01 PROCEDURE — 93005 ELECTROCARDIOGRAM TRACING: CPT

## 2022-01-01 PROCEDURE — 80053 COMPREHEN METABOLIC PANEL: CPT | Performed by: NURSE PRACTITIONER

## 2022-01-01 PROCEDURE — 99223 1ST HOSP IP/OBS HIGH 75: CPT | Mod: ,,, | Performed by: HOSPITALIST

## 2022-01-01 PROCEDURE — 85610 PROTHROMBIN TIME: CPT | Performed by: NURSE PRACTITIONER

## 2022-01-01 PROCEDURE — 99223 PR INITIAL HOSPITAL CARE,LEVL III: ICD-10-PCS | Mod: ,,, | Performed by: HOSPITALIST

## 2022-01-01 PROCEDURE — 99285 EMERGENCY DEPT VISIT HI MDM: CPT | Mod: ,,, | Performed by: NURSE PRACTITIONER

## 2022-01-01 PROCEDURE — 87635 SARS-COV-2 COVID-19 AMP PRB: CPT | Performed by: HOSPITALIST

## 2022-01-01 PROCEDURE — 63600175 PHARM REV CODE 636 W HCPCS: Performed by: NURSE PRACTITIONER

## 2022-01-01 PROCEDURE — 25000003 PHARM REV CODE 250: Performed by: NURSE PRACTITIONER

## 2022-01-01 PROCEDURE — 96361 HYDRATE IV INFUSION ADD-ON: CPT

## 2022-01-01 PROCEDURE — 85730 THROMBOPLASTIN TIME PARTIAL: CPT | Performed by: NURSE PRACTITIONER

## 2022-01-01 PROCEDURE — 96374 THER/PROPH/DIAG INJ IV PUSH: CPT

## 2022-01-01 PROCEDURE — 99285 EMERGENCY DEPT VISIT HI MDM: CPT | Mod: 25

## 2022-01-01 PROCEDURE — 96375 TX/PRO/DX INJ NEW DRUG ADDON: CPT

## 2022-01-01 PROCEDURE — 81003 URINALYSIS AUTO W/O SCOPE: CPT | Mod: 59 | Performed by: NURSE PRACTITIONER

## 2022-01-01 PROCEDURE — 82077 ASSAY SPEC XCP UR&BREATH IA: CPT | Performed by: NURSE PRACTITIONER

## 2022-01-01 PROCEDURE — 83735 ASSAY OF MAGNESIUM: CPT | Performed by: NURSE PRACTITIONER

## 2022-01-01 PROCEDURE — 99285 PR EMERGENCY DEPT VISIT,LEVEL V: ICD-10-PCS | Mod: ,,, | Performed by: NURSE PRACTITIONER

## 2022-01-01 PROCEDURE — 85025 COMPLETE CBC W/AUTO DIFF WBC: CPT | Performed by: NURSE PRACTITIONER

## 2022-01-01 PROCEDURE — 11000001 HC ACUTE MED/SURG PRIVATE ROOM

## 2022-01-01 PROCEDURE — 80307 DRUG TEST PRSMV CHEM ANLYZR: CPT | Performed by: NURSE PRACTITIONER

## 2022-01-01 RX ORDER — ONDANSETRON 2 MG/ML
4 INJECTION INTRAMUSCULAR; INTRAVENOUS
Status: COMPLETED | OUTPATIENT
Start: 2022-01-01 | End: 2022-01-01

## 2022-01-01 RX ORDER — NALOXONE HCL 0.4 MG/ML
0.02 VIAL (ML) INJECTION
Status: DISCONTINUED | OUTPATIENT
Start: 2022-01-01 | End: 2022-01-19 | Stop reason: HOSPADM

## 2022-01-01 RX ORDER — AMLODIPINE BESYLATE 10 MG/1
10 TABLET ORAL DAILY
Status: DISCONTINUED | OUTPATIENT
Start: 2022-01-02 | End: 2022-01-07

## 2022-01-01 RX ORDER — ASPIRIN 81 MG/1
81 TABLET ORAL DAILY
Status: DISCONTINUED | OUTPATIENT
Start: 2022-01-02 | End: 2022-01-19 | Stop reason: HOSPADM

## 2022-01-01 RX ORDER — SIMETHICONE 80 MG
1 TABLET,CHEWABLE ORAL 4 TIMES DAILY PRN
Status: DISCONTINUED | OUTPATIENT
Start: 2022-01-01 | End: 2022-01-19 | Stop reason: HOSPADM

## 2022-01-01 RX ORDER — MORPHINE SULFATE 4 MG/ML
4 INJECTION, SOLUTION INTRAMUSCULAR; INTRAVENOUS EVERY 4 HOURS PRN
Status: DISCONTINUED | OUTPATIENT
Start: 2022-01-01 | End: 2022-01-19 | Stop reason: HOSPADM

## 2022-01-01 RX ORDER — ACETAMINOPHEN 325 MG/1
650 TABLET ORAL EVERY 4 HOURS PRN
Status: DISCONTINUED | OUTPATIENT
Start: 2022-01-01 | End: 2022-01-03

## 2022-01-01 RX ORDER — ONDANSETRON 2 MG/ML
4 INJECTION INTRAMUSCULAR; INTRAVENOUS EVERY 8 HOURS PRN
Status: DISCONTINUED | OUTPATIENT
Start: 2022-01-01 | End: 2022-01-11 | Stop reason: SDUPTHER

## 2022-01-01 RX ORDER — MORPHINE SULFATE 4 MG/ML
4 INJECTION, SOLUTION INTRAMUSCULAR; INTRAVENOUS
Status: COMPLETED | OUTPATIENT
Start: 2022-01-01 | End: 2022-01-01

## 2022-01-01 RX ORDER — GLUCAGON 1 MG
1 KIT INJECTION
Status: DISCONTINUED | OUTPATIENT
Start: 2022-01-01 | End: 2022-01-19 | Stop reason: HOSPADM

## 2022-01-01 RX ORDER — ATORVASTATIN CALCIUM 40 MG/1
40 TABLET, FILM COATED ORAL NIGHTLY
Status: DISCONTINUED | OUTPATIENT
Start: 2022-01-01 | End: 2022-01-19 | Stop reason: HOSPADM

## 2022-01-01 RX ORDER — TALC
6 POWDER (GRAM) TOPICAL NIGHTLY PRN
Status: DISCONTINUED | OUTPATIENT
Start: 2022-01-01 | End: 2022-01-19 | Stop reason: HOSPADM

## 2022-01-01 RX ORDER — LORAZEPAM 0.5 MG/1
0.5 TABLET ORAL 3 TIMES DAILY
Status: DISCONTINUED | OUTPATIENT
Start: 2022-01-01 | End: 2022-01-12

## 2022-01-01 RX ORDER — HYDROCODONE BITARTRATE AND ACETAMINOPHEN 5; 325 MG/1; MG/1
1 TABLET ORAL EVERY 6 HOURS PRN
Status: DISCONTINUED | OUTPATIENT
Start: 2022-01-01 | End: 2022-01-19 | Stop reason: HOSPADM

## 2022-01-01 RX ORDER — SODIUM CHLORIDE 9 MG/ML
INJECTION, SOLUTION INTRAVENOUS CONTINUOUS
Status: DISCONTINUED | OUTPATIENT
Start: 2022-01-01 | End: 2022-01-01

## 2022-01-01 RX ORDER — SODIUM CHLORIDE 0.9 % (FLUSH) 0.9 %
10 SYRINGE (ML) INJECTION EVERY 12 HOURS PRN
Status: DISCONTINUED | OUTPATIENT
Start: 2022-01-01 | End: 2022-01-19 | Stop reason: HOSPADM

## 2022-01-01 RX ORDER — MORPHINE SULFATE 2 MG/ML
2 INJECTION, SOLUTION INTRAMUSCULAR; INTRAVENOUS EVERY 6 HOURS PRN
Status: DISCONTINUED | OUTPATIENT
Start: 2022-01-01 | End: 2022-01-01

## 2022-01-01 RX ORDER — POLYETHYLENE GLYCOL 3350 17 G/17G
17 POWDER, FOR SOLUTION ORAL DAILY
Status: DISCONTINUED | OUTPATIENT
Start: 2022-01-02 | End: 2022-01-19 | Stop reason: HOSPADM

## 2022-01-01 RX ORDER — MAG HYDROX/ALUMINUM HYD/SIMETH 200-200-20
30 SUSPENSION, ORAL (FINAL DOSE FORM) ORAL 4 TIMES DAILY PRN
Status: DISCONTINUED | OUTPATIENT
Start: 2022-01-01 | End: 2022-01-19 | Stop reason: HOSPADM

## 2022-01-01 RX ORDER — HEPARIN SODIUM 5000 [USP'U]/ML
5000 INJECTION, SOLUTION INTRAVENOUS; SUBCUTANEOUS ONCE
Status: COMPLETED | OUTPATIENT
Start: 2022-01-01 | End: 2022-01-01

## 2022-01-01 RX ORDER — LORAZEPAM 2 MG/ML
1 INJECTION INTRAMUSCULAR EVERY 4 HOURS PRN
Status: DISCONTINUED | OUTPATIENT
Start: 2022-01-01 | End: 2022-01-19 | Stop reason: HOSPADM

## 2022-01-01 RX ORDER — PANTOPRAZOLE SODIUM 40 MG/1
40 TABLET, DELAYED RELEASE ORAL DAILY
Status: DISCONTINUED | OUTPATIENT
Start: 2022-01-02 | End: 2022-01-19 | Stop reason: HOSPADM

## 2022-01-01 RX ADMIN — THIAMINE HYDROCHLORIDE: 100 INJECTION, SOLUTION INTRAMUSCULAR; INTRAVENOUS at 05:01

## 2022-01-01 RX ADMIN — HYDROCODONE BITARTRATE AND ACETAMINOPHEN 1 TABLET: 5; 325 TABLET ORAL at 06:01

## 2022-01-01 RX ADMIN — LORAZEPAM 0.5 MG: 0.5 TABLET ORAL at 10:01

## 2022-01-01 RX ADMIN — HEPARIN SODIUM 5000 UNITS: 5000 INJECTION INTRAVENOUS; SUBCUTANEOUS at 08:01

## 2022-01-01 RX ADMIN — SODIUM CHLORIDE 1000 ML: 9 INJECTION, SOLUTION INTRAVENOUS at 04:01

## 2022-01-01 RX ADMIN — ONDANSETRON 4 MG: 2 INJECTION INTRAMUSCULAR; INTRAVENOUS at 04:01

## 2022-01-01 RX ADMIN — ATORVASTATIN CALCIUM 40 MG: 20 TABLET, FILM COATED ORAL at 10:01

## 2022-01-01 RX ADMIN — MORPHINE SULFATE 4 MG: 4 INJECTION INTRAVENOUS at 04:01

## 2022-01-01 NOTE — ED PROVIDER NOTES
Encounter Date: 1/1/2022       History     Chief Complaint   Patient presents with    Fall     Patient presents to ER via EMS with complaint of right hip pain after fall.  Patient was picked up from vehicle he is living in outside of friends home.  Patient was noted to have to liquor bottles at side.  He states fell PTA of ambulance.  Patient has history of CVA with right side deficits and aphasia since that time.  He has difficult time communicating other than yes/ no answer.  He is not cooperative with interview. He is able to use verbal profanity with staff.  He complains of pain to right hip and admits alcohol is reason for fall.  No other injury and denies LOC.     The history is provided by the patient and the EMS personnel. The history is limited by the condition of the patient. No  was used.     Review of patient's allergies indicates:  No Known Allergies  Past Medical History:   Diagnosis Date    GERD (gastroesophageal reflux disease)     Hypertension     Stroke      Past Surgical History:   Procedure Laterality Date    CARDIAC SURGERY       Family History   Problem Relation Age of Onset    Hypertension Mother      Social History     Tobacco Use    Smoking status: Current Every Day Smoker     Packs/day: 0.50     Types: Cigarettes    Smokeless tobacco: Current User     Types: Chew   Substance Use Topics    Alcohol use: Yes     Comment: 1 quart    Drug use: Never     Review of Systems   Musculoskeletal: Positive for arthralgias (right hip pain after fall) and myalgias.   Psychiatric/Behavioral: Positive for agitation.   All other systems reviewed and are negative.      Physical Exam     Initial Vitals [01/01/22 1451]   BP Pulse Resp Temp SpO2   132/69 98 20 97.9 °F (36.6 °C) 99 %      MAP       --         Physical Exam    Nursing note and vitals reviewed.  Constitutional: He appears well-developed and well-nourished.   HENT:   Head: Normocephalic.   Right Ear: External ear  normal.   Left Ear: External ear normal.   Nose: Nose normal.   Mouth/Throat: Oropharynx is clear and moist.   Eyes: Conjunctivae and EOM are normal. Pupils are equal, round, and reactive to light.   Neck: Neck supple.   Normal range of motion.  Cardiovascular: Normal rate, regular rhythm, normal heart sounds and intact distal pulses.   Pulmonary/Chest: Breath sounds normal.   Abdominal: Abdomen is soft. Bowel sounds are normal.   Musculoskeletal:         General: Tenderness (right pelvic tenderness upon exam) present.      Cervical back: Normal range of motion and neck supple.      Comments: right ankle externally rotated.      Neurological: He is alert and oriented to person, place, and time. He has normal strength. He displays atrophy. Gait abnormal. GCS eye subscore is 4. GCS verbal subscore is 5. GCS motor subscore is 6.   Patient has right side paralysis and aphasia from previous CVA.    Skin: Skin is warm and dry. Capillary refill takes less than 2 seconds.   Psychiatric: Judgment normal. His speech is slurred. Cognition and memory are normal.         Medical Screening Exam   See Full Note    ED Course   Procedures  Labs Reviewed   CBC WITH DIFFERENTIAL - Abnormal; Notable for the following components:       Result Value    Hemoglobin 11.9 (*)     Hematocrit 38.0 (*)     MCV 75.2 (*)     MCH 23.6 (*)     MCHC 31.3 (*)     RDW 17.7 (*)     MPV 9.2 (*)     Neutrophils % 79.7 (*)     Lymphocytes % 14.4 (*)     Eosinophils % 0.5 (*)     Neutrophils, Abs 7.88 (*)     All other components within normal limits   CBC W/ AUTO DIFFERENTIAL    Narrative:     The following orders were created for panel order CBC auto differential.  Procedure                               Abnormality         Status                     ---------                               -----------         ------                     CBC with Differential[[id number]]        Abnormal            Final result                 Please view results for these  tests on the individual orders.   COMPREHENSIVE METABOLIC PANEL   URINALYSIS, REFLEX TO URINE CULTURE   DRUG SCREEN, URINE (BEAKER)   APTT   PROTIME-INR   EXTRA TUBES    Narrative:     The following orders were created for panel order EXTRA TUBES.  Procedure                               Abnormality         Status                     ---------                               -----------         ------                     Light Blue Top Hold[[id number]]                                                         Light Green Top Hold[[id number]]                             In process                 Gold Top Hold[[id number]]                                    In process                   Please view results for these tests on the individual orders.   LIGHT BLUE TOP HOLD   LIGHT GREEN TOP HOLD   GOLD TOP HOLD   MAGNESIUM          Imaging Results          X-Ray Chest AP Portable (In process)  Result time 01/01/22 16:26:45               X-Ray Hip with Pelvis when performed, 2 or 3 views Right (Final result)  Result time 01/01/22 15:56:27    Final result by Karin Freitas MD (01/01/22 15:56:27)                 Impression:      Right femoral neck fracture superimposed on chronic changes      Electronically signed by: Karin Freitas  Date:    01/01/2022  Time:    15:56             Narrative:    EXAMINATION:  XR HIP WITH PELVIS WHEN PERFORMED, 2 OR 3  VIEWS RIGHT    CLINICAL HISTORY:  Unspecified fall, initial encounter    FINDINGS:  There is diffuse demineralization and vascular calcifications.  There are mild degenerative changes in both hips    There is a right hip fracture with superolateral displacement of the distal fragment.                                 Medications   sodium chloride 0.9% bolus 1,000 mL (has no administration in time range)   morphine injection 4 mg (has no administration in time range)   ondansetron injection 4 mg (has no administration in time range)           APC / Resident Notes:   1600 Spoke with Dr Chua  regarding hip fracture, recommend admission to medicine and he will consult.   1605 Spoke with Dr Anaya in consult for admission, will admit to service of Dr Mckeon.               Clinical Impression:   Final diagnoses:  [W19.XXXA] Fall  [S72.001A] Closed displaced fracture of right femoral neck (Primary)          ED Disposition Condition    Admit               ISADORA Gomez  01/01/22 5882

## 2022-01-01 NOTE — ED TRIAGE NOTES
EMS reports pt fell at home & c/o right hip pain. Pt is paralyzed on the right side s/p old CVA. Pt will not answer questions but will cuss at staff.

## 2022-01-02 ENCOUNTER — ANESTHESIA (OUTPATIENT)
Dept: SURGERY | Facility: HOSPITAL | Age: 52
End: 2022-01-02
Payer: MEDICAID

## 2022-01-02 ENCOUNTER — ANESTHESIA EVENT (OUTPATIENT)
Dept: SURGERY | Facility: HOSPITAL | Age: 52
End: 2022-01-02
Payer: MEDICAID

## 2022-01-02 LAB
ALBUMIN SERPL BCP-MCNC: 2.9 G/DL (ref 3.5–5)
ALBUMIN SERPL BCP-MCNC: 3.2 G/DL (ref 3.5–5)
ALBUMIN/GLOB SERPL: 0.9 {RATIO}
ALP SERPL-CCNC: 133 U/L (ref 45–115)
ALP SERPL-CCNC: 140 U/L (ref 45–115)
ALT SERPL W P-5'-P-CCNC: 14 U/L (ref 16–61)
ALT SERPL W P-5'-P-CCNC: 14 U/L (ref 16–61)
ANION GAP SERPL CALCULATED.3IONS-SCNC: 14 MMOL/L (ref 7–16)
ANION GAP SERPL CALCULATED.3IONS-SCNC: 15 MMOL/L (ref 7–16)
ANISOCYTOSIS BLD QL SMEAR: ABNORMAL
AST SERPL W P-5'-P-CCNC: 17 U/L (ref 15–37)
AST SERPL W P-5'-P-CCNC: 30 U/L (ref 15–37)
BASOPHILS # BLD AUTO: 0.02 K/UL (ref 0–0.2)
BASOPHILS # BLD AUTO: 0.02 K/UL (ref 0–0.2)
BASOPHILS NFR BLD AUTO: 0.2 % (ref 0–1)
BASOPHILS NFR BLD AUTO: 0.3 % (ref 0–1)
BILIRUB DIRECT SERPL-MCNC: 0.1 MG/DL (ref 0–0.2)
BILIRUB SERPL-MCNC: 0.6 MG/DL (ref 0–1.2)
BILIRUB SERPL-MCNC: 0.8 MG/DL (ref 0–1.2)
BUN SERPL-MCNC: 10 MG/DL (ref 7–18)
BUN SERPL-MCNC: 9 MG/DL (ref 7–18)
BUN/CREAT SERPL: 12 (ref 6–20)
BUN/CREAT SERPL: 16 (ref 6–20)
CALCIUM SERPL-MCNC: 8.2 MG/DL (ref 8.5–10.1)
CALCIUM SERPL-MCNC: 8.8 MG/DL (ref 8.5–10.1)
CHLORIDE SERPL-SCNC: 106 MMOL/L (ref 98–107)
CHLORIDE SERPL-SCNC: 106 MMOL/L (ref 98–107)
CO2 SERPL-SCNC: 22 MMOL/L (ref 21–32)
CO2 SERPL-SCNC: 22 MMOL/L (ref 21–32)
CREAT SERPL-MCNC: 0.62 MG/DL (ref 0.7–1.3)
CREAT SERPL-MCNC: 0.75 MG/DL (ref 0.7–1.3)
DIFFERENTIAL METHOD BLD: ABNORMAL
DIFFERENTIAL METHOD BLD: ABNORMAL
EOSINOPHIL # BLD AUTO: 0.04 K/UL (ref 0–0.5)
EOSINOPHIL # BLD AUTO: 0.07 K/UL (ref 0–0.5)
EOSINOPHIL NFR BLD AUTO: 0.6 % (ref 1–4)
EOSINOPHIL NFR BLD AUTO: 0.8 % (ref 1–4)
ERYTHROCYTE [DISTWIDTH] IN BLOOD BY AUTOMATED COUNT: 17.2 % (ref 11.5–14.5)
ERYTHROCYTE [DISTWIDTH] IN BLOOD BY AUTOMATED COUNT: 17.7 % (ref 11.5–14.5)
EST. AVERAGE GLUCOSE BLD GHB EST-MCNC: 134 MG/DL
FERRITIN SERPL-MCNC: 15 NG/ML (ref 26–388)
FOLATE SERPL-MCNC: >20 NG/ML (ref 3.1–17.5)
GLOBULIN SER-MCNC: 3.7 G/DL (ref 2–4)
GLUCOSE SERPL-MCNC: 122 MG/DL (ref 74–106)
GLUCOSE SERPL-MCNC: 127 MG/DL (ref 74–106)
HBA1C MFR BLD HPLC: 6.6 % (ref 4.5–6.6)
HCT VFR BLD AUTO: 31.7 % (ref 40–54)
HCT VFR BLD AUTO: 34.2 % (ref 40–54)
HGB BLD-MCNC: 10.5 G/DL (ref 13.5–18)
HGB BLD-MCNC: 10.6 G/DL (ref 13.5–18)
IMM GRANULOCYTES # BLD AUTO: 0.02 K/UL (ref 0–0.04)
IMM GRANULOCYTES # BLD AUTO: 0.03 K/UL (ref 0–0.04)
IMM GRANULOCYTES NFR BLD: 0.3 % (ref 0–0.4)
IMM GRANULOCYTES NFR BLD: 0.3 % (ref 0–0.4)
IRON SATN MFR SERPL: 10 % (ref 14–50)
IRON SERPL-MCNC: 50 ΜG/DL (ref 65–175)
LYMPHOCYTES # BLD AUTO: 1.19 K/UL (ref 1–4.8)
LYMPHOCYTES # BLD AUTO: 1.86 K/UL (ref 1–4.8)
LYMPHOCYTES NFR BLD AUTO: 13.8 % (ref 27–41)
LYMPHOCYTES NFR BLD AUTO: 26.1 % (ref 27–41)
MAGNESIUM SERPL-MCNC: 2.1 MG/DL (ref 1.7–2.3)
MCH RBC QN AUTO: 23.5 PG (ref 27–31)
MCH RBC QN AUTO: 23.7 PG (ref 27–31)
MCHC RBC AUTO-ENTMCNC: 31 G/DL (ref 32–36)
MCHC RBC AUTO-ENTMCNC: 33.1 G/DL (ref 32–36)
MCV RBC AUTO: 71.1 FL (ref 80–96)
MCV RBC AUTO: 76.3 FL (ref 80–96)
MICROCYTES BLD QL SMEAR: ABNORMAL
MONOCYTES # BLD AUTO: 0.58 K/UL (ref 0–0.8)
MONOCYTES # BLD AUTO: 0.65 K/UL (ref 0–0.8)
MONOCYTES NFR BLD AUTO: 7.5 % (ref 2–6)
MONOCYTES NFR BLD AUTO: 8.1 % (ref 2–6)
MPC BLD CALC-MCNC: 9.5 FL (ref 9.4–12.4)
MPC BLD CALC-MCNC: 9.9 FL (ref 9.4–12.4)
NEUTROPHILS # BLD AUTO: 4.62 K/UL (ref 1.8–7.7)
NEUTROPHILS # BLD AUTO: 6.65 K/UL (ref 1.8–7.7)
NEUTROPHILS NFR BLD AUTO: 64.6 % (ref 53–65)
NEUTROPHILS NFR BLD AUTO: 77.4 % (ref 53–65)
NRBC # BLD AUTO: 0 X10E3/UL
NRBC # BLD AUTO: 0 X10E3/UL
NRBC, AUTO (.00): 0 %
NRBC, AUTO (.00): 0 %
OVALOCYTES BLD QL SMEAR: ABNORMAL
PLATELET # BLD AUTO: 216 K/UL (ref 150–400)
PLATELET # BLD AUTO: 224 K/UL (ref 150–400)
PLATELET MORPHOLOGY: ABNORMAL
POLYCHROMASIA BLD QL SMEAR: ABNORMAL
POTASSIUM SERPL-SCNC: 3.5 MMOL/L (ref 3.5–5.1)
POTASSIUM SERPL-SCNC: 3.9 MMOL/L (ref 3.5–5.1)
PROT SERPL-MCNC: 6.7 G/DL (ref 6.4–8.2)
PROT SERPL-MCNC: 6.9 G/DL (ref 6.4–8.2)
RBC # BLD AUTO: 4.46 M/UL (ref 4.6–6.2)
RBC # BLD AUTO: 4.48 M/UL (ref 4.6–6.2)
SODIUM SERPL-SCNC: 138 MMOL/L (ref 136–145)
SODIUM SERPL-SCNC: 139 MMOL/L (ref 136–145)
T4 SERPL-MCNC: 9.7 ΜG/DL (ref 4.5–12.1)
TIBC SERPL-MCNC: 490 ΜG/DL (ref 250–450)
TSH SERPL DL<=0.005 MIU/L-ACNC: 1.05 UIU/ML (ref 0.36–3.74)
VIT B12 SERPL-MCNC: 392 PG/ML (ref 193–986)
WBC # BLD AUTO: 7.14 K/UL (ref 4.5–11)
WBC # BLD AUTO: 8.61 K/UL (ref 4.5–11)

## 2022-01-02 PROCEDURE — 85025 COMPLETE CBC W/AUTO DIFF WBC: CPT | Performed by: NURSE PRACTITIONER

## 2022-01-02 PROCEDURE — 36000710: Performed by: ORTHOPAEDIC SURGERY

## 2022-01-02 PROCEDURE — C1776 JOINT DEVICE (IMPLANTABLE): HCPCS | Performed by: ORTHOPAEDIC SURGERY

## 2022-01-02 PROCEDURE — 82728 ASSAY OF FERRITIN: CPT | Performed by: HOSPITALIST

## 2022-01-02 PROCEDURE — 80048 BASIC METABOLIC PNL TOTAL CA: CPT | Mod: XB | Performed by: ORTHOPAEDIC SURGERY

## 2022-01-02 PROCEDURE — 25000003 PHARM REV CODE 250: Performed by: NURSE ANESTHETIST, CERTIFIED REGISTERED

## 2022-01-02 PROCEDURE — D9220A PRA ANESTHESIA: ICD-10-PCS | Mod: ANES,,, | Performed by: ANESTHESIOLOGY

## 2022-01-02 PROCEDURE — 99232 SBSQ HOSP IP/OBS MODERATE 35: CPT | Mod: ,,, | Performed by: HOSPITALIST

## 2022-01-02 PROCEDURE — D9220A PRA ANESTHESIA: ICD-10-PCS | Mod: CRNA,,, | Performed by: NURSE ANESTHETIST, CERTIFIED REGISTERED

## 2022-01-02 PROCEDURE — 82310 ASSAY OF CALCIUM: CPT | Performed by: ORTHOPAEDIC SURGERY

## 2022-01-02 PROCEDURE — 37000009 HC ANESTHESIA EA ADD 15 MINS: Performed by: ORTHOPAEDIC SURGERY

## 2022-01-02 PROCEDURE — 25000003 PHARM REV CODE 250: Performed by: ORTHOPAEDIC SURGERY

## 2022-01-02 PROCEDURE — 63600175 PHARM REV CODE 636 W HCPCS: Performed by: NURSE ANESTHETIST, CERTIFIED REGISTERED

## 2022-01-02 PROCEDURE — 88305 TISSUE EXAM BY PATHOLOGIST: CPT | Mod: 26,,, | Performed by: PATHOLOGY

## 2022-01-02 PROCEDURE — 25000003 PHARM REV CODE 250: Performed by: HOSPITALIST

## 2022-01-02 PROCEDURE — 82746 ASSAY OF FOLIC ACID SERUM: CPT | Performed by: HOSPITALIST

## 2022-01-02 PROCEDURE — 36415 COLL VENOUS BLD VENIPUNCTURE: CPT | Performed by: ORTHOPAEDIC SURGERY

## 2022-01-02 PROCEDURE — 84436 ASSAY OF TOTAL THYROXINE: CPT | Performed by: HOSPITALIST

## 2022-01-02 PROCEDURE — 27000165 HC TUBE, ETT CUFFED: Performed by: ANESTHESIOLOGY

## 2022-01-02 PROCEDURE — 94761 N-INVAS EAR/PLS OXIMETRY MLT: CPT

## 2022-01-02 PROCEDURE — 88311 SURGICAL PATHOLOGY: ICD-10-PCS | Mod: 26,,, | Performed by: PATHOLOGY

## 2022-01-02 PROCEDURE — 88305 TISSUE EXAM BY PATHOLOGIST: CPT | Mod: SUR | Performed by: ORTHOPAEDIC SURGERY

## 2022-01-02 PROCEDURE — 27000260 *HC AIRWAY ORAL: Performed by: ANESTHESIOLOGY

## 2022-01-02 PROCEDURE — 63600175 PHARM REV CODE 636 W HCPCS: Performed by: ORTHOPAEDIC SURGERY

## 2022-01-02 PROCEDURE — 63600175 PHARM REV CODE 636 W HCPCS: Performed by: HOSPITALIST

## 2022-01-02 PROCEDURE — 25000003 PHARM REV CODE 250: Performed by: NURSE PRACTITIONER

## 2022-01-02 PROCEDURE — 82248 BILIRUBIN DIRECT: CPT | Performed by: HOSPITALIST

## 2022-01-02 PROCEDURE — 27000655: Performed by: ANESTHESIOLOGY

## 2022-01-02 PROCEDURE — 84443 ASSAY THYROID STIM HORMONE: CPT | Performed by: HOSPITALIST

## 2022-01-02 PROCEDURE — 11000001 HC ACUTE MED/SURG PRIVATE ROOM

## 2022-01-02 PROCEDURE — 83540 ASSAY OF IRON: CPT | Performed by: HOSPITALIST

## 2022-01-02 PROCEDURE — 99232 PR SUBSEQUENT HOSPITAL CARE,LEVL II: ICD-10-PCS | Mod: ,,, | Performed by: HOSPITALIST

## 2022-01-02 PROCEDURE — 80053 COMPREHEN METABOLIC PANEL: CPT | Performed by: NURSE PRACTITIONER

## 2022-01-02 PROCEDURE — 88311 DECALCIFY TISSUE: CPT | Mod: 26,,, | Performed by: PATHOLOGY

## 2022-01-02 PROCEDURE — 88305 SURGICAL PATHOLOGY: ICD-10-PCS | Mod: 26,,, | Performed by: PATHOLOGY

## 2022-01-02 PROCEDURE — D9220A PRA ANESTHESIA: Mod: CRNA,,, | Performed by: NURSE ANESTHETIST, CERTIFIED REGISTERED

## 2022-01-02 PROCEDURE — 36415 COLL VENOUS BLD VENIPUNCTURE: CPT | Performed by: NURSE PRACTITIONER

## 2022-01-02 PROCEDURE — 83036 HEMOGLOBIN GLYCOSYLATED A1C: CPT | Performed by: HOSPITALIST

## 2022-01-02 PROCEDURE — 36000711: Performed by: ORTHOPAEDIC SURGERY

## 2022-01-02 PROCEDURE — 37000008 HC ANESTHESIA 1ST 15 MINUTES: Performed by: ORTHOPAEDIC SURGERY

## 2022-01-02 PROCEDURE — 71000033 HC RECOVERY, INTIAL HOUR: Performed by: ORTHOPAEDIC SURGERY

## 2022-01-02 PROCEDURE — D9220A PRA ANESTHESIA: Mod: ANES,,, | Performed by: ANESTHESIOLOGY

## 2022-01-02 PROCEDURE — 27201423 OPTIME MED/SURG SUP & DEVICES STERILE SUPPLY: Performed by: ORTHOPAEDIC SURGERY

## 2022-01-02 PROCEDURE — 83735 ASSAY OF MAGNESIUM: CPT | Performed by: NURSE PRACTITIONER

## 2022-01-02 PROCEDURE — 85025 COMPLETE CBC W/AUTO DIFF WBC: CPT | Performed by: ORTHOPAEDIC SURGERY

## 2022-01-02 DEVICE — IMPLANTABLE DEVICE: Type: IMPLANTABLE DEVICE | Site: HIP | Status: FUNCTIONAL

## 2022-01-02 RX ORDER — PHENYLEPHRINE HYDROCHLORIDE 10 MG/ML
INJECTION INTRAVENOUS
Status: DISCONTINUED | OUTPATIENT
Start: 2022-01-02 | End: 2022-01-02

## 2022-01-02 RX ORDER — MIDAZOLAM HYDROCHLORIDE 1 MG/ML
INJECTION INTRAMUSCULAR; INTRAVENOUS
Status: DISCONTINUED | OUTPATIENT
Start: 2022-01-02 | End: 2022-01-02

## 2022-01-02 RX ORDER — ONDANSETRON 2 MG/ML
4 INJECTION INTRAMUSCULAR; INTRAVENOUS EVERY 8 HOURS PRN
Status: DISCONTINUED | OUTPATIENT
Start: 2022-01-02 | End: 2022-01-19 | Stop reason: HOSPADM

## 2022-01-02 RX ORDER — ROCURONIUM BROMIDE 10 MG/ML
INJECTION, SOLUTION INTRAVENOUS
Status: DISCONTINUED | OUTPATIENT
Start: 2022-01-02 | End: 2022-01-02

## 2022-01-02 RX ORDER — BISACODYL 10 MG
10 SUPPOSITORY, RECTAL RECTAL DAILY PRN
Status: DISCONTINUED | OUTPATIENT
Start: 2022-01-02 | End: 2022-01-19 | Stop reason: HOSPADM

## 2022-01-02 RX ORDER — CEFAZOLIN SODIUM 2 G/50ML
2 SOLUTION INTRAVENOUS
Status: COMPLETED | OUTPATIENT
Start: 2022-01-02 | End: 2022-01-03

## 2022-01-02 RX ORDER — DIPHENHYDRAMINE HYDROCHLORIDE 50 MG/ML
25 INJECTION INTRAMUSCULAR; INTRAVENOUS EVERY 6 HOURS PRN
Status: DISCONTINUED | OUTPATIENT
Start: 2022-01-02 | End: 2022-01-02 | Stop reason: HOSPADM

## 2022-01-02 RX ORDER — HYDROCODONE BITARTRATE AND ACETAMINOPHEN 5; 325 MG/1; MG/1
1 TABLET ORAL EVERY 4 HOURS PRN
Status: DISCONTINUED | OUTPATIENT
Start: 2022-01-02 | End: 2022-01-19 | Stop reason: HOSPADM

## 2022-01-02 RX ORDER — PROPOFOL 10 MG/ML
VIAL (ML) INTRAVENOUS
Status: DISCONTINUED | OUTPATIENT
Start: 2022-01-02 | End: 2022-01-02

## 2022-01-02 RX ORDER — FENTANYL CITRATE 50 UG/ML
INJECTION, SOLUTION INTRAMUSCULAR; INTRAVENOUS
Status: DISCONTINUED | OUTPATIENT
Start: 2022-01-02 | End: 2022-01-02

## 2022-01-02 RX ORDER — LIDOCAINE HYDROCHLORIDE 20 MG/ML
INJECTION, SOLUTION EPIDURAL; INFILTRATION; INTRACAUDAL; PERINEURAL
Status: DISCONTINUED | OUTPATIENT
Start: 2022-01-02 | End: 2022-01-02

## 2022-01-02 RX ORDER — MORPHINE SULFATE 4 MG/ML
4 INJECTION, SOLUTION INTRAMUSCULAR; INTRAVENOUS EVERY 4 HOURS PRN
Status: DISCONTINUED | OUTPATIENT
Start: 2022-01-02 | End: 2022-01-19 | Stop reason: HOSPADM

## 2022-01-02 RX ORDER — MEPERIDINE HYDROCHLORIDE 25 MG/ML
25 INJECTION INTRAMUSCULAR; INTRAVENOUS; SUBCUTANEOUS EVERY 10 MIN PRN
Status: DISCONTINUED | OUTPATIENT
Start: 2022-01-02 | End: 2022-01-02 | Stop reason: HOSPADM

## 2022-01-02 RX ORDER — MUPIROCIN 20 MG/G
1 OINTMENT TOPICAL 2 TIMES DAILY
Status: DISPENSED | OUTPATIENT
Start: 2022-01-02 | End: 2022-01-07

## 2022-01-02 RX ORDER — TRANEXAMIC ACID 100 MG/ML
INJECTION, SOLUTION INTRAVENOUS
Status: DISCONTINUED | OUTPATIENT
Start: 2022-01-02 | End: 2022-01-02

## 2022-01-02 RX ORDER — HYDROMORPHONE HYDROCHLORIDE 2 MG/ML
0.5 INJECTION, SOLUTION INTRAMUSCULAR; INTRAVENOUS; SUBCUTANEOUS EVERY 5 MIN PRN
Status: DISCONTINUED | OUTPATIENT
Start: 2022-01-02 | End: 2022-01-02 | Stop reason: HOSPADM

## 2022-01-02 RX ORDER — ONDANSETRON 2 MG/ML
4 INJECTION INTRAMUSCULAR; INTRAVENOUS DAILY PRN
Status: DISCONTINUED | OUTPATIENT
Start: 2022-01-02 | End: 2022-01-02 | Stop reason: HOSPADM

## 2022-01-02 RX ORDER — CALCIUM CHLORIDE INJECTION 100 MG/ML
INJECTION, SOLUTION INTRAVENOUS
Status: DISCONTINUED | OUTPATIENT
Start: 2022-01-02 | End: 2022-01-02

## 2022-01-02 RX ORDER — NEOSTIGMINE METHYLSULFATE 1 MG/ML
INJECTION, SOLUTION INTRAVENOUS
Status: DISCONTINUED | OUTPATIENT
Start: 2022-01-02 | End: 2022-01-02

## 2022-01-02 RX ORDER — MORPHINE SULFATE 10 MG/ML
4 INJECTION INTRAMUSCULAR; INTRAVENOUS; SUBCUTANEOUS EVERY 5 MIN PRN
Status: DISCONTINUED | OUTPATIENT
Start: 2022-01-02 | End: 2022-01-02 | Stop reason: HOSPADM

## 2022-01-02 RX ORDER — GLYCOPYRROLATE 0.2 MG/ML
INJECTION INTRAMUSCULAR; INTRAVENOUS
Status: DISCONTINUED | OUTPATIENT
Start: 2022-01-02 | End: 2022-01-02

## 2022-01-02 RX ORDER — CEFAZOLIN SODIUM 1 G/3ML
INJECTION, POWDER, FOR SOLUTION INTRAMUSCULAR; INTRAVENOUS
Status: DISCONTINUED | OUTPATIENT
Start: 2022-01-02 | End: 2022-01-02

## 2022-01-02 RX ORDER — CHOLECALCIFEROL (VITAMIN D3) 25 MCG
1000 TABLET ORAL DAILY
Status: DISCONTINUED | OUTPATIENT
Start: 2022-01-02 | End: 2022-01-19 | Stop reason: HOSPADM

## 2022-01-02 RX ORDER — MEPERIDINE HYDROCHLORIDE 25 MG/ML
INJECTION INTRAMUSCULAR; INTRAVENOUS; SUBCUTANEOUS
Status: DISCONTINUED | OUTPATIENT
Start: 2022-01-02 | End: 2022-01-02

## 2022-01-02 RX ORDER — SODIUM CHLORIDE 9 MG/ML
75 INJECTION, SOLUTION INTRAVENOUS CONTINUOUS
Status: DISCONTINUED | OUTPATIENT
Start: 2022-01-02 | End: 2022-01-05

## 2022-01-02 RX ADMIN — ROCURONIUM BROMIDE 30 MG: 10 INJECTION, SOLUTION INTRAVENOUS at 10:01

## 2022-01-02 RX ADMIN — ROCURONIUM BROMIDE 50 MG: 10 INJECTION, SOLUTION INTRAVENOUS at 09:01

## 2022-01-02 RX ADMIN — MORPHINE SULFATE 4 MG: 4 INJECTION, SOLUTION INTRAMUSCULAR; INTRAVENOUS at 12:01

## 2022-01-02 RX ADMIN — MORPHINE SULFATE 4 MG: 4 INJECTION, SOLUTION INTRAMUSCULAR; INTRAVENOUS at 07:01

## 2022-01-02 RX ADMIN — FENTANYL CITRATE 100 MCG: 50 INJECTION INTRAMUSCULAR; INTRAVENOUS at 09:01

## 2022-01-02 RX ADMIN — CEFAZOLIN 2 G: 1 INJECTION, POWDER, FOR SOLUTION INTRAMUSCULAR; INTRAVENOUS; PARENTERAL at 09:01

## 2022-01-02 RX ADMIN — VANCOMYCIN HYDROCHLORIDE 1000 MG: 1 INJECTION, POWDER, LYOPHILIZED, FOR SOLUTION INTRAVENOUS at 10:01

## 2022-01-02 RX ADMIN — GLYCOPYRROLATE 0.4 MG: 0.2 INJECTION INTRAMUSCULAR; INTRAVENOUS at 11:01

## 2022-01-02 RX ADMIN — LORAZEPAM 0.5 MG: 0.5 TABLET ORAL at 04:01

## 2022-01-02 RX ADMIN — ATORVASTATIN CALCIUM 40 MG: 20 TABLET, FILM COATED ORAL at 08:01

## 2022-01-02 RX ADMIN — THERA TABS 1 TABLET: TAB at 04:01

## 2022-01-02 RX ADMIN — SODIUM CHLORIDE 75 ML/HR: 9 INJECTION, SOLUTION INTRAVENOUS at 12:01

## 2022-01-02 RX ADMIN — PHENYLEPHRINE HYDROCHLORIDE 200 MCG: 10 INJECTION INTRAVENOUS at 09:01

## 2022-01-02 RX ADMIN — MEPERIDINE HYDROCHLORIDE 25 MG: 25 INJECTION INTRAMUSCULAR; INTRAVENOUS; SUBCUTANEOUS at 11:01

## 2022-01-02 RX ADMIN — NEOSTIGMINE METHYLSULFATE 3 MG: 1 INJECTION INTRAVENOUS at 11:01

## 2022-01-02 RX ADMIN — LIDOCAINE HYDROCHLORIDE 50 MG: 20 INJECTION, SOLUTION INTRAVENOUS at 09:01

## 2022-01-02 RX ADMIN — MIDAZOLAM 2 MG: 1 INJECTION INTRAMUSCULAR; INTRAVENOUS at 09:01

## 2022-01-02 RX ADMIN — PHENYLEPHRINE HYDROCHLORIDE 100 MCG: 10 INJECTION INTRAVENOUS at 11:01

## 2022-01-02 RX ADMIN — LORAZEPAM 0.5 MG: 0.5 TABLET ORAL at 08:01

## 2022-01-02 RX ADMIN — SODIUM CHLORIDE 75 ML/HR: 9 INJECTION, SOLUTION INTRAVENOUS at 08:01

## 2022-01-02 RX ADMIN — MORPHINE SULFATE 4 MG: 4 INJECTION, SOLUTION INTRAMUSCULAR; INTRAVENOUS at 06:01

## 2022-01-02 RX ADMIN — PHENYLEPHRINE HYDROCHLORIDE 200 MCG: 10 INJECTION INTRAVENOUS at 10:01

## 2022-01-02 RX ADMIN — CALCIUM CHLORIDE 0.5 G: 100 INJECTION INTRAVENOUS; INTRAVENTRICULAR at 10:01

## 2022-01-02 RX ADMIN — TRANEXAMIC ACID 1000 MG: 100 INJECTION, SOLUTION INTRAVENOUS at 10:01

## 2022-01-02 RX ADMIN — SODIUM CHLORIDE, POTASSIUM CHLORIDE, SODIUM LACTATE AND CALCIUM CHLORIDE: 600; 310; 30; 20 INJECTION, SOLUTION INTRAVENOUS at 09:01

## 2022-01-02 RX ADMIN — Medication 1000 UNITS: at 04:01

## 2022-01-02 RX ADMIN — CEFAZOLIN SODIUM 2 G: 10 INJECTION, POWDER, FOR SOLUTION INTRAVENOUS at 08:01

## 2022-01-02 RX ADMIN — PROPOFOL 175 MG: 10 INJECTION, EMULSION INTRAVENOUS at 09:01

## 2022-01-02 NOTE — HPI
Patient is a 51-year-old male has a history of a stroke affecting his right side with paralysis right lower extremity sustained a fall yesterday with a displaced femoral neck fracture on right needing endoprosthesis of the right hip.  Right lower extremity he does have palpable dorsalis pedis pulse he says he has some sensation proximal right leg he does not have active motor function of the foot with the lower extremity.  Does have some spasticity on attempted motion of the foot.  Tender palpation of the greater trochanter the hip pain on attempted motion of the hip.  Right lower extremity shortened externally rotated compared to left.  X-rays show displaced femoral neck fracture on the right.  Impression displaced femoral neck fracture on the right  Planned endoprosthesis right hip.

## 2022-01-02 NOTE — ANESTHESIA PREPROCEDURE EVALUATION
"                                                                                                             01/02/2022  Karin Carrera is a 51 y.o., male.    Anesthesia Evaluation    I have reviewed the Patient Summary Reports.   I have reviewed the NPO Status.   I have reviewed the Medications.     Review of Systems         Anesthesia Plan  Type of Anesthesia, risks & benefits discussed:  Anesthesia Type:  general    Patient's Preference:   Plan Factors:          Intra-op Monitoring Plan: standard ASA monitors  Intra-op Monitoring Plan Comments:   Post Op Pain Control Plan: IV/PO Opioids PRN  Post Op Pain Control Plan Comments:     Induction:   IV  Beta Blocker:         Informed Consent: Patient understands risks and agrees with Anesthesia plan.  Questions answered. Anesthesia consent signed with patient.  ASA Score: 3     Day of Surgery Review of History & Physical:            Ready For Surgery From Anesthesia Perspective.     NPO greater than 8 hours  No known anesthetic complications  NKDA    Hct 32  Ca 8.2  9/17/21 EKG: ; Left anterior fascicular block   Anteroseptal infarct - age undetermined     Hypertension GERD (gastroesophageal reflux disease)   Stroke    CAD  H/o CABG  ETOH abuse  Pt has communication difficulties secondary to his CVA . . . Can answer some questions orally but does better writing answers on paper  Anemia  Hypocalcemia  Surgical clearance from Dr. Mamadou Mckeon is on the chart: "When etoh clears OK with me to proceed in am as suri in see no medical benefit in delaying this urgent surgery. Should be reasonable surgery candidate."    Airway exam deferred (COVID precautions)        "

## 2022-01-02 NOTE — PLAN OF CARE
Problem: Adult Inpatient Plan of Care  Goal: Plan of Care Review  Outcome: Ongoing, Progressing  Goal: Patient-Specific Goal (Individualized)  Outcome: Ongoing, Progressing  Goal: Absence of Hospital-Acquired Illness or Injury  Outcome: Ongoing, Progressing  Goal: Optimal Comfort and Wellbeing  Outcome: Ongoing, Progressing  Goal: Readiness for Transition of Care  Outcome: Ongoing, Progressing     Problem: Skin Injury Risk Increased  Goal: Skin Health and Integrity  Outcome: Ongoing, Progressing

## 2022-01-02 NOTE — ASSESSMENT & PLAN NOTE
- chronic, stable  - hx of CVA with R sided hemiparesis   -patient is right-handed  - continue statin

## 2022-01-02 NOTE — CONSULTS
[facility] - Orthopedic  Orthopedics  Consult Note    Patient Name: Karin Carrera  MRN: [id number]  Admission Date: 1/1/2022  Hospital Length of Stay: 1 days  Attending Provider: Mamadou Mckeon MD  Primary Care Provider: Walker Smith MD    Patient information was obtained from patient, past medical records and ER records.     Consults  Subjective:     Principal Problem:Closed fracture of right hip    Chief Complaint:   Chief Complaint   Patient presents with    Fall        HPI: Patient is a 51-year-old male has a history of a stroke affecting his right side with paralysis right lower extremity sustained a fall yesterday with a displaced femoral neck fracture on right needing endoprosthesis of the right hip.  Right lower extremity he does have palpable dorsalis pedis pulse he says he has some sensation proximal right leg he does not have active motor function of the foot with the lower extremity.  Does have some spasticity on attempted motion of the foot.  Tender palpation of the greater trochanter the hip pain on attempted motion of the hip.  Right lower extremity shortened externally rotated compared to left.  X-rays show displaced femoral neck fracture on the right.  Impression displaced femoral neck fracture on the right  Planned endoprosthesis right hip.      No new subjective & objective note has been filed under this hospital service since the last note was generated.    Assessment/Plan:     No notes have been filed under this hospital service.  Service: Orthopedic Surgery      Thank you for your consult. Endoprosthesis right hip    Ramses Chua MD  Orthopedics  [facility] - Orthopedic

## 2022-01-02 NOTE — HPI
Mr Carrera is a 50 yo male who presented to the ED via EMS for c/o right leg pain after a fall. Per EMS and ED staff, he was picked up from his vehicle outside of a friend's home. He states that he lives in his vehicle. It was noted that there were numerous liquor bottles in the vehicle and around him. He was combative with EMS and has been uncooperative with much of his interview during his stay in the ED. He uses profanity easily but is aphasic during much of the ROS and interview. He will use his cell phone to type out some answers. He is a poor historian due to this and his obvious drunken state. He denies any chest pain, shortness of breath, abdominal pain, F/C, N/V/D, or changes in vision. He has right sided hemiparesis as a results of his previous CVA- however still drives. Spoke with his brother, Silver, who provided no new information- he has not seen his brother since the day before yesterday. He does state that his brother does not live with him because of the alcohol use and other private reasons. Upon workup, it was noted that he had a right femoral neck fracture. His ETOH level was 188. He is being admitted to hospitalist services with a consult to orthopaedics for surgical intervention.     He has a PMH of CABG, CVA, HTN, GERD, ETOH abuse, and nicotine abuse. It is unclear when his CABG or CVA was; unable to find dates in previous records. It is unknown if he has had covid or been vaccinated. He follows with Dr Smith for primary care. He does not follow with anyone for cardiology. Per patient, he wishes to be a full code.     Brother- Silver- [phone]

## 2022-01-02 NOTE — ASSESSMENT & PLAN NOTE
- chronic, stable  - continue current meds  - cardiac monitoring   - EKG with SR with incomplete RBBB

## 2022-01-02 NOTE — SUBJECTIVE & OBJECTIVE
Past Medical History:   Diagnosis Date    GERD (gastroesophageal reflux disease)     Hypertension     Stroke        Past Surgical History:   Procedure Laterality Date    CARDIAC SURGERY         Review of patient's allergies indicates:  No Known Allergies    No current facility-administered medications on file prior to encounter.     Current Outpatient Medications on File Prior to Encounter   Medication Sig    amitriptyline (ELAVIL) 25 MG tablet Take 1 tablet (25 mg total) by mouth nightly as needed for Insomnia.    amLODIPine (NORVASC) 10 MG tablet Take 1 tablet (10 mg total) by mouth once daily.    aspirin (ECOTRIN) 81 MG EC tablet Take 1 tablet (81 mg total) by mouth once daily.    atorvastatin (LIPITOR) 40 MG tablet Take 1 tablet (40 mg total) by mouth every evening.    ondansetron (ZOFRAN-ODT) 4 MG TbDL Take 1 tablet (4 mg total) by mouth every 6 (six) hours as needed (nausea).    pantoprazole (PROTONIX) 40 MG tablet Take 1 tablet (40 mg total) by mouth once daily.    potassium chloride SA (K-DUR,KLOR-CON) 20 MEQ tablet Take 1 tablet (20 mEq total) by mouth 2 (two) times daily.     Family History     Problem Relation (Age of Onset)    Hypertension Mother        Tobacco Use    Smoking status: Current Every Day Smoker     Packs/day: 0.50     Types: Cigarettes    Smokeless tobacco: Current User     Types: Chew   Substance and Sexual Activity    Alcohol use: Yes     Comment: 1 quart    Drug use: Never    Sexual activity: Not on file     Review of Systems   Unable to perform ROS: Other (unable to obtain thorough ROS d/t hx of CVA and ETOH level )   Respiratory: Negative for cough, shortness of breath and wheezing.    Cardiovascular: Negative for chest pain.   Gastrointestinal: Negative for abdominal pain, constipation, diarrhea, nausea and vomiting.   Musculoskeletal: Positive for arthralgias, gait problem and myalgias.     Objective:     Vital Signs (Most Recent):  Temp: 97.9 °F (36.6 °C) (01/01/22  1451)  Pulse: 96 (01/01/22 1721)  Resp: 17 (01/01/22 1721)  BP: 134/70 (01/01/22 1721)  SpO2: 97 % (01/01/22 1721) Vital Signs (24h Range):  Temp:  [97.9 °F (36.6 °C)] 97.9 °F (36.6 °C)  Pulse:  [92-99] 96  Resp:  [15-20] 17  SpO2:  [95 %-99 %] 97 %  BP: (124-134)/(65-72) 134/70     Weight: 92 kg (202 lb 12.8 oz)  Body mass index is 26.76 kg/m².    Physical Exam  Vitals and nursing note reviewed.   Constitutional:       General: He is awake.      Appearance: He is normal weight.      Comments: Appears intoxicated; disheveled appearance     HENT:      Head: Normocephalic.      Mouth/Throat:      Mouth: Mucous membranes are moist.   Eyes:      General: Lids are normal.   Cardiovascular:      Rate and Rhythm: Normal rate and regular rhythm.      Pulses: Normal pulses.           Radial pulses are 2+ on the right side and 2+ on the left side.        Popliteal pulses are 2+ on the right side and 2+ on the left side.      Heart sounds: Normal heart sounds.      Comments: EKG with SR with incomplete RBBB  Pulmonary:      Effort: No respiratory distress.      Breath sounds: Normal breath sounds. No wheezing.   Abdominal:      General: Bowel sounds are normal. There is no distension.      Palpations: Abdomen is soft.      Tenderness: There is no abdominal tenderness.   Musculoskeletal:         General: Tenderness, deformity and signs of injury present. Normal range of motion.      Cervical back: Neck supple.      Right lower leg: No edema.      Left lower leg: No edema.        Legs:       Comments: R leg noted to be shortened and slightly rotated; tender to palpation  R sided hemiparesis noted    Skin:     General: Skin is warm and dry.      Capillary Refill: Capillary refill takes 2 to 3 seconds.      Comments: midsternal healed CABG scar    Neurological:      Mental Status: He is alert.      GCS: GCS eye subscore is 4. GCS verbal subscore is 2. GCS motor subscore is 5.      Sensory: Sensation is intact.      Motor: Motor  function is intact.      Comments: R sided hemiparesis   Intoxicated   Psychiatric:         Speech: Speech normal.             Significant Labs:   All pertinent labs within the past 24 hours have been reviewed.  Recent Lab Results       01/01/22  1614        Albumin/Globulin Ratio 0.9       Albumin 3.9       Alcohol, Serum 188       Alkaline Phosphatase 162       ALT 25       Anion Gap 16       aPTT 25.6       AST 28       Baso # 0.04       Basophil % 0.4       BILIRUBIN TOTAL 0.2       BUN 8       BUN/CREAT RATIO 9       Calcium 8.5       Chloride 104       CO2 24       Creatinine 0.86       Differential Type Auto       eGFR if non African American 100       Eos # 0.05       Eosinophil % 0.5       Globulin, Total 4.3       Glucose 157       Hematocrit 38.0       Hemoglobin 11.9       Immature Grans (Abs) 0.04       Immature Granulocytes 0.4       INR 1.04       Lymph # 1.43       Lymph % 14.4       Magnesium 2.2       MCH 23.6       MCHC 31.3       MCV 75.2       Mono # 0.46       Mono % 4.6       MPV 9.2       Neutrophils, Abs 7.88       Neutrophils Relative 79.7       nRBC 0.0       NUCLEATED RBC ABSOLUTE 0.00       Platelets 283       Potassium 4.3       PROTEIN TOTAL 8.2       Protime 13.6       RBC 5.05       RDW 17.7       Sodium 140       WBC 9.90             Significant Imaging: I have reviewed all pertinent imaging results/findings within the past 24 hours.   Imaging Results          X-Ray Chest AP Portable (Final result)  Result time 01/01/22 16:26:42    Final result by Karin Freitas MD (01/01/22 16:26:42)                 Impression:      Cardiomegaly and chronic vascular congestion      Electronically signed by: Karin Freitas  Date:    01/01/2022  Time:    16:26             Narrative:    EXAMINATION:  XR CHEST AP PORTABLE    CLINICAL HISTORY:  right hip fracture;    FINDINGS:  Heart is mildly enlarged with mild chronic upper lobe vascular prominence    Lung markings unchanged without consolidation                                X-Ray Hip with Pelvis when performed, 2 or 3 views Right (Final result)  Result time 01/01/22 15:56:27    Final result by Karin Freitas MD (01/01/22 15:56:27)                 Impression:      Right femoral neck fracture superimposed on chronic changes      Electronically signed by: Karin Freitas  Date:    01/01/2022  Time:    15:56             Narrative:    EXAMINATION:  XR HIP WITH PELVIS WHEN PERFORMED, 2 OR 3  VIEWS RIGHT    CLINICAL HISTORY:  Unspecified fall, initial encounter    FINDINGS:  There is diffuse demineralization and vascular calcifications.  There are mild degenerative changes in both hips    There is a right hip fracture with superolateral displacement of the distal fragment.

## 2022-01-02 NOTE — ANESTHESIA POSTPROCEDURE EVALUATION
Anesthesia Post Evaluation    Patient: Karin Carrera    Procedure(s) Performed: Procedure(s) (LRB):  HEMIARTHROPLASTY, HIP (Right)    Final Anesthesia Type: general      Patient location during evaluation: PACU  Post-procedure vital signs: reviewed and stable  Pain management: adequate  Airway patency: patent  ROSEMARIE mitigation strategies: Extubation and recovery carried out in lateral, semiupright, or other nonsupine position  PONV status at discharge: No PONV  Anesthetic complications: no      Cardiovascular status: hemodynamically stable  Respiratory status: unassisted  Hydration status: euvolemic  Follow-up not needed.          Vitals Value Taken Time   /68 01/02/22 1220   Temp 37 °C (98.6 °F) 01/02/22 1245   Pulse 94 01/02/22 1245   Resp 18 01/02/22 1245   SpO2 97 % 01/02/22 1245         Event Time   Out of Recovery 01/02/2022 12:21:10         Pain/Dane Score: Pain Rating Prior to Med Admin: 10 (1/2/2022  6:27 AM)  Pain Rating Post Med Admin: 2 (1/2/2022  6:57 AM)  Dane Score: 9 (1/2/2022 12:20 PM)

## 2022-01-02 NOTE — HOSPITAL COURSE
01/02 - patient seems in less pain but obviously still sore.  He is calm and cooperative.  No new issues reported.  Seen preoperatively.  As previous addressed okay with me to proceed as planned; no medical reason to delay.  Watch for alcohol withdrawals postoperatively even though patient denies daily alcohol use.  Look into placement needs.  Dr. Goodman to assume care patient for hospitalist in the a.m..  1/3: NAEO; some pain at R hip; PT/OT to see; SS to assist with placement; continue on DT prevention- pt did initially state he drank daily and is now saying he drinks every other day  1/4: NAEO; no s/s of withdrawals; asking for snuff; PT following- having some issues with spasticity-- baclofen started; continue DT prevention; SS following for SWB   1/6: Echo EF 20%. Awaiting placement. Pt refuse NH.  1/7:Norvasc stopped.Lisinopril 2.5 mg.Lopressor 12.5 mg bid.Cardiologist appt new pt at [facility].  1/11: 1/11 po day #8. Continues to work with PT- pt agreeable to snf for and rehab.SS following    1/12: post op day #9; continue to work well with PT- SS with referrals to numerous places- awaiting placement  1/13: NAEO; post op day 10; continues to work with DARSHAN garber to evaluate for placement   1/14: DANIELEO; post day 11; awaiting placement-- numerous referrals made by SS; continues with PT   1/15: pt refusing labs and VS; caden [facility] for [facility] yesterday-- denied; SS making other referrals-- placement is an ongoing issue   1/16: NAEO; continue to attempt to find placement; continue working with PT   1/17: continue working with PT; continue attempting to find placement   1/18: NAEO; attempted acute to acute transfer to [facility]-- pt refused to go-- states he just wants to go home/ back to his van; continue therapy-- as pt is not to the point of d/c without assistance

## 2022-01-02 NOTE — TRANSFER OF CARE
"Anesthesia Transfer of Care Note    Patient: Karin Carrera    Procedure(s) Performed: Procedure(s) (LRB):  HEMIARTHROPLASTY, HIP (Right)    Patient location: PACU    Transport from OR: Transported from OR on room air with adequate spontaneous ventilation    Post pain: adequate analgesia    Post assessment: no apparent anesthetic complications    Post vital signs: stable    Level of consciousness: responds to stimulation    Nausea/Vomiting: no nausea/vomiting    Complications: none    Transfer of care protocol was followed      Last vitals:   Visit Vitals  /65 (BP Location: Right arm, Patient Position: Lying)   Pulse 86   Temp 37.4 °C (99.3 °F) (Oral)   Resp (!) 32   Ht 6' 1" (1.854 m)   Wt 89.9 kg (198 lb 3.1 oz)   SpO2 96%   BMI 26.15 kg/m²     "

## 2022-01-02 NOTE — SUBJECTIVE & OBJECTIVE
Interval History:     Review of Systems   Constitutional: Negative for appetite change, fatigue and fever.   HENT: Negative for congestion, hearing loss and trouble swallowing.    Respiratory: Negative for chest tightness, shortness of breath and wheezing.    Cardiovascular: Negative for chest pain and palpitations.   Gastrointestinal: Negative for abdominal pain, constipation and nausea.   Genitourinary: Negative for difficulty urinating and dysuria.   Musculoskeletal: Positive for gait problem. Negative for back pain and neck stiffness.   Skin: Negative for pallor and rash.   Neurological: Positive for speech difficulty and weakness. Negative for dizziness and headaches.        Patient has right-sided hemiparesis and expressive aphasia from previous stroke  He is right-handed   Psychiatric/Behavioral: Negative for confusion and suicidal ideas.     Objective:     Vital Signs (Most Recent):  Temp: 99.3 °F (37.4 °C) (01/02/22 1204)  Pulse: 92 (01/02/22 1220)  Resp: 19 (01/02/22 1205)  BP: (!) 141/68 (01/02/22 1220)  SpO2: 98 % (01/02/22 1220) Vital Signs (24h Range):  Temp:  [97.8 °F (36.6 °C)-101.4 °F (38.6 °C)] 99.3 °F (37.4 °C)  Pulse:  [] 92  Resp:  [15-32] 19  SpO2:  [89 %-99 %] 98 %  BP: (105-147)/(49-75) 141/68     Weight: 89.9 kg (198 lb 3.1 oz)  Body mass index is 26.15 kg/m².    Intake/Output Summary (Last 24 hours) at 1/2/2022 1408  Last data filed at 1/2/2022 1108  Gross per 24 hour   Intake 650 ml   Output --   Net 650 ml      Physical Exam  Vitals reviewed.   Constitutional:       General: He is not in acute distress.  Eyes:      Pupils: Pupils are equal, round, and reactive to light.   Cardiovascular:      Rate and Rhythm: Normal rate and regular rhythm.      Pulses: Normal pulses.   Pulmonary:      Effort: Pulmonary effort is normal. No respiratory distress.      Breath sounds: Normal breath sounds. No wheezing.   Abdominal:      General: Bowel sounds are normal. There is no distension.       Tenderness: There is no abdominal tenderness.   Skin:     General: Skin is warm.   Neurological:      Mental Status: He is alert, oriented to person, place, and time and easily aroused. Mental status is at baseline.      Motor: Weakness present.      Comments: Dense right-sided hemiparesis and expressive aphasia in this right-handed patient with old CVA   Psychiatric:         Mood and Affect: Mood normal.         Behavior: Behavior normal.         Significant Labs:   All pertinent labs within the past 24 hours have been reviewed.  BMP:   Recent Labs   Lab 01/02/22  0337 01/02/22  0337 01/02/22  1217   *   < > 127*      < > 138   K 3.5   < > 3.9      < > 106   CO2 22   < > 22   BUN 10   < > 9   CREATININE 0.62*   < > 0.75   CALCIUM 8.2*   < > 8.8   MG 2.1  --   --     < > = values in this interval not displayed.     CBC:   Recent Labs   Lab 01/01/22  1614 01/02/22  0337 01/02/22  1326   WBC 9.90 7.14 8.61   HGB 11.9* 10.5* 10.6*   HCT 38.0* 31.7* 34.2*    224 216     CMP:   Recent Labs   Lab 01/01/22  1614 01/02/22  0337 01/02/22  1217    139 138   K 4.3 3.5 3.9    106 106   CO2 24 22 22   * 122* 127*   BUN 8 10 9   CREATININE 0.86 0.62* 0.75   CALCIUM 8.5 8.2* 8.8   PROT 8.2 6.9  --    ALBUMIN 3.9 3.2*  --    BILITOT 0.2 0.6  --    ALKPHOS 162* 140*  --    AST 28 17  --    ALT 25 14*  --    ANIONGAP 16 15 14   EGFRNONAA 100 145 117     Imaging Results          X-Ray Chest AP Portable (Final result)  Result time 01/01/22 16:26:42    Final result by Karin Freitas MD (01/01/22 16:26:42)                 Impression:      Cardiomegaly and chronic vascular congestion      Electronically signed by: Karin Freitas  Date:    01/01/2022  Time:    16:26             Narrative:    EXAMINATION:  XR CHEST AP PORTABLE    CLINICAL HISTORY:  right hip fracture;    FINDINGS:  Heart is mildly enlarged with mild chronic upper lobe vascular prominence    Lung markings unchanged without  consolidation                               X-Ray Hip with Pelvis when performed, 2 or 3 views Right (Final result)  Result time 01/01/22 15:56:27    Final result by Karin Freitas MD (01/01/22 15:56:27)                 Impression:      Right femoral neck fracture superimposed on chronic changes      Electronically signed by: Karin Freitas  Date:    01/01/2022  Time:    15:56             Narrative:    EXAMINATION:  XR HIP WITH PELVIS WHEN PERFORMED, 2 OR 3  VIEWS RIGHT    CLINICAL HISTORY:  Unspecified fall, initial encounter    FINDINGS:  There is diffuse demineralization and vascular calcifications.  There are mild degenerative changes in both hips    There is a right hip fracture with superolateral displacement of the distal fragment.                              Intake/Output - Last 3 Shifts       12/31 0700  01/01 0659 01/01 0700  01/02 0659 01/02 0700  01/03 0659    IV Piggyback   650    Total Intake(mL/kg)   650 (7.2)    Net   +650

## 2022-01-02 NOTE — ASSESSMENT & PLAN NOTE
- acute  - R hip xr with femoral neck fracture superimposed on chronic changes  - ortho consult-- plans for surgical intervention tomorrow  - EKG with SR with incomplete R BBB  - CXR with cardiomegaly and chronic vascular congestion  - plans for surgical intervention tomorrow  - PT/OT/SS consulted for post op assistance  - heparin SQ x 1 dose tonight   - AC after surgery per surgery preference   - ALMEIDA score of 0.9% ALISA risk-- risk of MI or cardiac arrest intra op or up to 30 days post op  - RCRI score of 2 points; class III risk; 10.1% 30 day risk of death, MI, or cardiac arrest     Perioperative Risk Assessment:  Patient is at intermediate risk for perioperative cardiopulmonary complications.  This based on lack of thorough history, hx of CVA and CABG. However risk are stable and see no medical benefit in delaying surgery from medical standpoint.    Surgery 01/02

## 2022-01-02 NOTE — ANESTHESIA PROCEDURE NOTES
Intubation    Date/Time: 1/2/2022 9:38 AM  Performed by: Melissa Starr CRNA  Authorized by: Papa Dawn MD     Intubation:     Induction:  Intravenous    Intubated:  Postinduction    Mask Ventilation:  Easy with oral airway    Attempts:  1    Attempted By:  CRNA    Method of Intubation:  Direct    Blade:  Dane 3    Laryngeal View Grade: Grade I - full view of cords      Difficult Airway Encountered?: No      Complications:  None    Airway Device:  Oral endotracheal tube    Airway Device Size:  7.0    Style/Cuff Inflation:  Cuffed (inflated to minimal occlusive pressure)    Inflation Amount (mL):  6    Tube secured:  21    Secured at:  The lips    Placement Verified By:  Capnometry    Complicating Factors:  None    Findings Post-Intubation:  BS equal bilateral and atraumatic/condition of teeth unchanged

## 2022-01-02 NOTE — OP NOTE
[facility] - Orthopedic Periop Services  General Surgery  Operative Note    SUMMARY     Date of Procedure: 1/2/2022     Procedure: Procedure(s) (LRB):  HEMIARTHROPLASTY, HIP (Right)       Surgeon(s) and Role:     * Ramses Chua MD - Primary    Assisting Surgeon: None    Pre-Operative Diagnosis: Closed displaced fracture of right femoral neck [S72.001A]    Post-Operative Diagnosis: Post-Op Diagnosis Codes:     * Closed displaced fracture of right femoral neck [S72.001A]    Anesthesia: Choice    Operative Findings (including complications, if any):  After risks benefits of procedure explained at length patient patient stating understanding risks benefits written informed consent was obtained patient was taken operating room placed in supine position on operative table anesthesia induced per Anesthesia.  Right lower extremity was then positioned in lateral position with the right hip up left lower extremity prominences well padded as well as an axillary roll in place.  At this time right lower extremities prepped draped sterile fashion.  Anterior lateral approach to the hip was performed making a skin incision with 15. Blade for approximately 10-15 cm centered over the greater trochanter.  Dissection down to the fascia marion was performed hemostasis maintained Bovie electrocautery.  The fascia marion was then opened with scissors in line with skin incision.  The gluteus fascia was then taken down off of the greater trochanter anteriorly as well as taking down the vastus lateralis this exposed the femoral neck fracture.  5. Tycron were placed in the capsule which was split in H fashion as well as into the gluteus fascia.  At this time the femoral neck fracture was identified and the edges were freshened with a saw.  The femoral head was removed measured to be 55 mm. No arthritic changes were noted in the hip.  At this time a trial 55 mm bipolar head was chosen to be the correct size after trialing several sizes.  At this  time the canal was broached opening the proximal end of the canal placing hand reamers to size 9. Followed by broach to a size 9. The hip was noted be stable with control of the femur with a size 9 stem.  Trial reduction done with a +5 neck.  This time trial implants removed the size 9 secure fit stem was then impacted into position a +528 mm head with a 55 mm bipolar head was then locked into position and impacted in onto the trunnion of the femoral prosthesis.  Hip was then reduced noted be stable.  Two medium Hemovac drains were placed.  Wound irrigated out copious amounts normal saline with pulsatile lavage.  At this time using 5. Tycron.  The gluteus fascia and the capsule reattached to the greater trochanter.  Fascia marion closed with a running 1. Vicryl.  Vastus lateralis and gluteus fascia were closed prior to this with 1. Running Vicryl.  Subcuticular 2-0 Vicryl followed by skin staples on skin.  Abduction pillow placed.  Patient was awakened taken recovery in good condition.  All counts correct.  No complications.    Description of Technical Procedures:     Significant Surgical Tasks Conducted by the Assistant(s), if Applicable:     Estimated Blood Loss (EBL): * No values recorded between 1/2/2022 10:22 AM and 1/2/2022 11:19 AM *           Implants:   Implant Name Type Inv. Item Serial No.  Lot No. LRB No. Used Action   IMP HEAD UHR UNIV [id number] - [id number] Prosthesis IMP HEAD UHR UNIV [id number]  [employer] ([facility]) [id number] Right 1 Implanted   IMP STEM HIP SECURE  - [id number] Prosthesis IMP STEM HIP SECURE   [employer] ([facility]) [id number] Right 1 Implanted   maykel c-taper lfit head low friction ion treatment  28mm +5 offset     [id number] Right 1 Implanted       Specimens:   Specimen (24h ago, onward)             Start     Ordered    01/02/22 1035  Surgical Pathology  Once        Question Answer Comment   Number of specimens 1    Source(s):  Femoral Head, Right    Clinical History: right femoral neck fracture        01/02/22 1034    01/02/22 1034  Surgical Pathology  RELEASE UPON ORDERING         01/02/22 1034                        Condition: Good    Disposition: PACU - hemodynamically stable.    Attestation: I was present and scrubbed for the entire procedure.

## 2022-01-02 NOTE — H&P
[facility] Emergency Department  Hospital Medicine  History & Physical    Patient Name: Karin Carrera  MRN: [id number]  Patient Class: IP- Inpatient  Admission Date: 1/1/2022  Attending Physician: Mamadou Mckeon MD   Primary Care Provider: Walker Smith MD         Patient information was obtained from patient, past medical records and ER records.     Subjective:     Principal Problem:Closed fracture of right hip    Chief Complaint:   Chief Complaint   Patient presents with    Fall        HPI: Mr Carrera is a 52 yo male who presented to the ED via EMS for c/o right leg pain after a fall. Per EMS and ED staff, he was picked up from his vehicle outside of a friend's home. He states that he lives in his vehicle. It was noted that there were numerous liquor bottles in the vehicle and around him. He was combative with EMS and has been uncooperative with much of his interview during his stay in the ED. He uses profanity easily but is aphasic during much of the ROS and interview. He will use his cell phone to type out some answers. He is a poor historian due to this and his obvious drunken state. He denies any chest pain, shortness of breath, abdominal pain, F/C, N/V/D, or changes in vision. He has right sided hemiparesis as a results of his previous CVA- however still drives. Spoke with his brother, Silver, who provided no new information- he has not seen his brother since the day before yesterday. He does state that his brother does not live with him because of the alcohol use and other private reasons. Upon workup, it was noted that he had a right femoral neck fracture. His ETOH level was 188. He is being admitted to hospitalist services with a consult to orthopaedics for surgical intervention.     He has a PMH of CABG, CVA, HTN, GERD, ETOH abuse, and nicotine abuse. It is unclear when his CABG or CVA was; unable to find dates in previous records. It is unknown if he has had covid or been vaccinated. He  follows with Dr Smith for primary care. He does not follow with anyone for cardiology. Per patient, he wishes to be a full code.     Brother- Silver- [phone]       Past Medical History:   Diagnosis Date    GERD (gastroesophageal reflux disease)     Hypertension     Stroke        Past Surgical History:   Procedure Laterality Date    CARDIAC SURGERY         Review of patient's allergies indicates:  No Known Allergies    No current facility-administered medications on file prior to encounter.     Current Outpatient Medications on File Prior to Encounter   Medication Sig    amitriptyline (ELAVIL) 25 MG tablet Take 1 tablet (25 mg total) by mouth nightly as needed for Insomnia.    amLODIPine (NORVASC) 10 MG tablet Take 1 tablet (10 mg total) by mouth once daily.    aspirin (ECOTRIN) 81 MG EC tablet Take 1 tablet (81 mg total) by mouth once daily.    atorvastatin (LIPITOR) 40 MG tablet Take 1 tablet (40 mg total) by mouth every evening.    ondansetron (ZOFRAN-ODT) 4 MG TbDL Take 1 tablet (4 mg total) by mouth every 6 (six) hours as needed (nausea).    pantoprazole (PROTONIX) 40 MG tablet Take 1 tablet (40 mg total) by mouth once daily.    potassium chloride SA (K-DUR,KLOR-CON) 20 MEQ tablet Take 1 tablet (20 mEq total) by mouth 2 (two) times daily.     Family History     Problem Relation (Age of Onset)    Hypertension Mother        Tobacco Use    Smoking status: Current Every Day Smoker     Packs/day: 0.50     Types: Cigarettes    Smokeless tobacco: Current User     Types: Chew   Substance and Sexual Activity    Alcohol use: Yes     Comment: 1 quart    Drug use: Never    Sexual activity: Not on file     Review of Systems   Unable to perform ROS: Other (unable to obtain thorough ROS d/t hx of CVA and ETOH level )   Respiratory: Negative for cough, shortness of breath and wheezing.    Cardiovascular: Negative for chest pain.   Gastrointestinal: Negative for abdominal pain, constipation, diarrhea, nausea  and vomiting.   Musculoskeletal: Positive for arthralgias, gait problem and myalgias.     Objective:     Vital Signs (Most Recent):  Temp: 97.9 °F (36.6 °C) (01/01/22 1451)  Pulse: 96 (01/01/22 1721)  Resp: 17 (01/01/22 1721)  BP: 134/70 (01/01/22 1721)  SpO2: 97 % (01/01/22 1721) Vital Signs (24h Range):  Temp:  [97.9 °F (36.6 °C)] 97.9 °F (36.6 °C)  Pulse:  [92-99] 96  Resp:  [15-20] 17  SpO2:  [95 %-99 %] 97 %  BP: (124-134)/(65-72) 134/70     Weight: 92 kg (202 lb 12.8 oz)  Body mass index is 26.76 kg/m².    Physical Exam  Vitals and nursing note reviewed.   Constitutional:       General: He is awake.      Appearance: He is normal weight.      Comments: Appears intoxicated; disheveled appearance     HENT:      Head: Normocephalic.      Mouth/Throat:      Mouth: Mucous membranes are moist.   Eyes:      General: Lids are normal.   Cardiovascular:      Rate and Rhythm: Normal rate and regular rhythm.      Pulses: Normal pulses.           Radial pulses are 2+ on the right side and 2+ on the left side.        Popliteal pulses are 2+ on the right side and 2+ on the left side.      Heart sounds: Normal heart sounds.      Comments: EKG with SR with incomplete RBBB  Pulmonary:      Effort: No respiratory distress.      Breath sounds: Normal breath sounds. No wheezing.   Abdominal:      General: Bowel sounds are normal. There is no distension.      Palpations: Abdomen is soft.      Tenderness: There is no abdominal tenderness.   Musculoskeletal:         General: Tenderness, deformity and signs of injury present. Normal range of motion.      Cervical back: Neck supple.      Right lower leg: No edema.      Left lower leg: No edema.        Legs:       Comments: R leg noted to be shortened and slightly rotated; tender to palpation  R sided hemiparesis noted    Skin:     General: Skin is warm and dry.      Capillary Refill: Capillary refill takes 2 to 3 seconds.      Comments: midsternal healed CABG scar    Neurological:       Mental Status: He is alert.      GCS: GCS eye subscore is 4. GCS verbal subscore is 2. GCS motor subscore is 5.      Sensory: Sensation is intact.      Motor: Motor function is intact.      Comments: R sided hemiparesis   Intoxicated   Psychiatric:         Speech: Speech normal.             Significant Labs:   All pertinent labs within the past 24 hours have been reviewed.  Recent Lab Results       01/01/22  1614        Albumin/Globulin Ratio 0.9       Albumin 3.9       Alcohol, Serum 188       Alkaline Phosphatase 162       ALT 25       Anion Gap 16       aPTT 25.6       AST 28       Baso # 0.04       Basophil % 0.4       BILIRUBIN TOTAL 0.2       BUN 8       BUN/CREAT RATIO 9       Calcium 8.5       Chloride 104       CO2 24       Creatinine 0.86       Differential Type Auto       eGFR if non African American 100       Eos # 0.05       Eosinophil % 0.5       Globulin, Total 4.3       Glucose 157       Hematocrit 38.0       Hemoglobin 11.9       Immature Grans (Abs) 0.04       Immature Granulocytes 0.4       INR 1.04       Lymph # 1.43       Lymph % 14.4       Magnesium 2.2       MCH 23.6       MCHC 31.3       MCV 75.2       Mono # 0.46       Mono % 4.6       MPV 9.2       Neutrophils, Abs 7.88       Neutrophils Relative 79.7       nRBC 0.0       NUCLEATED RBC ABSOLUTE 0.00       Platelets 283       Potassium 4.3       PROTEIN TOTAL 8.2       Protime 13.6       RBC 5.05       RDW 17.7       Sodium 140       WBC 9.90             Significant Imaging: I have reviewed all pertinent imaging results/findings within the past 24 hours.   Imaging Results          X-Ray Chest AP Portable (Final result)  Result time 01/01/22 16:26:42    Final result by Karin Freitas MD (01/01/22 16:26:42)                 Impression:      Cardiomegaly and chronic vascular congestion      Electronically signed by: Karin Freitas  Date:    01/01/2022  Time:    16:26             Narrative:    EXAMINATION:  XR CHEST AP PORTABLE    CLINICAL  HISTORY:  right hip fracture;    FINDINGS:  Heart is mildly enlarged with mild chronic upper lobe vascular prominence    Lung markings unchanged without consolidation                               X-Ray Hip with Pelvis when performed, 2 or 3 views Right (Final result)  Result time 01/01/22 15:56:27    Final result by Karin Freitas MD (01/01/22 15:56:27)                 Impression:      Right femoral neck fracture superimposed on chronic changes      Electronically signed by: Karin Freitas  Date:    01/01/2022  Time:    15:56             Narrative:    EXAMINATION:  XR HIP WITH PELVIS WHEN PERFORMED, 2 OR 3  VIEWS RIGHT    CLINICAL HISTORY:  Unspecified fall, initial encounter    FINDINGS:  There is diffuse demineralization and vascular calcifications.  There are mild degenerative changes in both hips    There is a right hip fracture with superolateral displacement of the distal fragment.                                  Assessment/Plan:     * Closed fracture of right hip  - acute  - R hip xr with femoral neck fracture superimposed on chronic changes  - ortho consult-- plans for surgical intervention tomorrow  - EKG with SR with incomplete R BBB  - CXR with cardiomegaly and chronic vascular congestion  - plans for surgical intervention tomorrow  - PT/OT/SS consulted for post op assistance  - heparin SQ x 1 dose tonight   - AC after surgery per surgery preference   - ALMEIDA score of 0.9% ALISA risk-- risk of MI or cardiac arrest intra op or up to 30 days post op  - RCRI score of 2 points; class III risk; 10.1% 30 day risk of death, MI, or cardiac arrest     Perioperative Risk Assessment:  Patient is at intermediate risk for perioperative cardiopulmonary complications.  This based on lack of thorough history, hx of CVA and CABG. However risk are stable and see no medical benefit in delaying surgery from medical standpoint.          ETOH abuse  - chronic  - unclear amount pt does partake in daily   - drinks liquor instead of  beer  - monitor for DTs   - banana bag daily- MVI, thiamine, and folic acid  - ativan PO scheduled to defer DTs   - ativan IV for breakthrough symptoms         Hx of CABG  - chronic, stable  - continue current meds  - cardiac monitoring   - EKG with SR with incomplete RBBB      GERD (gastroesophageal reflux disease)  - chronic, stable  - continue current meds       Hypertension  - chronic, stable  - continue current home meds  - monitor BP trend  - adjust meds as needed       Right sided weakness  - chronic, stable  - hx of CVA with R sided hemiparesis   - continue statin           VTE Risk Mitigation (From admission, onward)         Ordered     heparin (porcine) injection 5,000 Units  Once         01/01/22 1821     IP VTE LOW RISK PATIENT  Once         01/01/22 1707     Place sequential compression device  Until discontinued         01/01/22 1707                   ISADORA Link  Department of Hospital Medicine   [facility] - Emergency Department

## 2022-01-02 NOTE — ASSESSMENT & PLAN NOTE
- chronic  - unclear amount pt does partake in daily   - drinks liquor instead of beer  - monitor for DTs   - banana bag daily- MVI, thiamine, and folic acid  - ativan PO scheduled to defer DTs   - ativan IV for breakthrough symptoms

## 2022-01-02 NOTE — ASSESSMENT & PLAN NOTE
- acute  - R hip xr with femoral neck fracture superimposed on chronic changes  - ortho consult-- plans for surgical intervention tomorrow  - EKG with SR with incomplete R BBB  - CXR with cardiomegaly and chronic vascular congestion  - plans for surgical intervention tomorrow  - PT/OT/SS consulted for post op assistance  - heparin SQ x 1 dose tonight   - AC after surgery per surgery preference   - ALMEIDA score of 0.9% ALISA risk-- risk of MI or cardiac arrest intra op or up to 30 days post op  - RCRI score of 2 points; class III risk; 10.1% 30 day risk of death, MI, or cardiac arrest     Perioperative Risk Assessment:  Patient is at intermediate risk for perioperative cardiopulmonary complications.  This based on lack of thorough history, hx of CVA and CABG. However risk are stable and see no medical benefit in delaying surgery from medical standpoint.

## 2022-01-03 LAB
ALBUMIN SERPL BCP-MCNC: 2.5 G/DL (ref 3.5–5)
ALBUMIN/GLOB SERPL: 0.7 {RATIO}
ALP SERPL-CCNC: 120 U/L (ref 45–115)
ALT SERPL W P-5'-P-CCNC: 12 U/L (ref 16–61)
ANION GAP SERPL CALCULATED.3IONS-SCNC: 7 MMOL/L (ref 7–16)
AST SERPL W P-5'-P-CCNC: 19 U/L (ref 15–37)
BASOPHILS # BLD AUTO: 0.02 K/UL (ref 0–0.2)
BASOPHILS NFR BLD AUTO: 0.3 % (ref 0–1)
BILIRUB SERPL-MCNC: 0.6 MG/DL (ref 0–1.2)
BUN SERPL-MCNC: 6 MG/DL (ref 7–18)
BUN/CREAT SERPL: 11 (ref 6–20)
CALCIUM SERPL-MCNC: 8.3 MG/DL (ref 8.5–10.1)
CHLORIDE SERPL-SCNC: 105 MMOL/L (ref 98–107)
CO2 SERPL-SCNC: 26 MMOL/L (ref 21–32)
CREAT SERPL-MCNC: 0.54 MG/DL (ref 0.7–1.3)
DIFFERENTIAL METHOD BLD: ABNORMAL
EOSINOPHIL # BLD AUTO: 0.05 K/UL (ref 0–0.5)
EOSINOPHIL NFR BLD AUTO: 0.8 % (ref 1–4)
ERYTHROCYTE [DISTWIDTH] IN BLOOD BY AUTOMATED COUNT: 17.2 % (ref 11.5–14.5)
GLOBULIN SER-MCNC: 3.8 G/DL (ref 2–4)
GLUCOSE SERPL-MCNC: 148 MG/DL (ref 74–106)
HCT VFR BLD AUTO: 32.7 % (ref 40–54)
HGB BLD-MCNC: 10 G/DL (ref 13.5–18)
IMM GRANULOCYTES # BLD AUTO: 0.02 K/UL (ref 0–0.04)
IMM GRANULOCYTES NFR BLD: 0.3 % (ref 0–0.4)
LYMPHOCYTES # BLD AUTO: 1.12 K/UL (ref 1–4.8)
LYMPHOCYTES NFR BLD AUTO: 17.3 % (ref 27–41)
MAGNESIUM SERPL-MCNC: 1.9 MG/DL (ref 1.7–2.3)
MCH RBC QN AUTO: 23.3 PG (ref 27–31)
MCHC RBC AUTO-ENTMCNC: 30.6 G/DL (ref 32–36)
MCV RBC AUTO: 76 FL (ref 80–96)
MONOCYTES # BLD AUTO: 0.64 K/UL (ref 0–0.8)
MONOCYTES NFR BLD AUTO: 9.9 % (ref 2–6)
MPC BLD CALC-MCNC: 9.6 FL (ref 9.4–12.4)
NEUTROPHILS # BLD AUTO: 4.61 K/UL (ref 1.8–7.7)
NEUTROPHILS NFR BLD AUTO: 71.4 % (ref 53–65)
NRBC # BLD AUTO: 0 X10E3/UL
NRBC, AUTO (.00): 0 %
PLATELET # BLD AUTO: 167 K/UL (ref 150–400)
POTASSIUM SERPL-SCNC: 3.2 MMOL/L (ref 3.5–5.1)
PROT SERPL-MCNC: 6.3 G/DL (ref 6.4–8.2)
RBC # BLD AUTO: 4.3 M/UL (ref 4.6–6.2)
SODIUM SERPL-SCNC: 135 MMOL/L (ref 136–145)
WBC # BLD AUTO: 6.46 K/UL (ref 4.5–11)

## 2022-01-03 PROCEDURE — 80053 COMPREHEN METABOLIC PANEL: CPT | Performed by: NURSE PRACTITIONER

## 2022-01-03 PROCEDURE — 36415 COLL VENOUS BLD VENIPUNCTURE: CPT | Performed by: NURSE PRACTITIONER

## 2022-01-03 PROCEDURE — 99900035 HC TECH TIME PER 15 MIN (STAT)

## 2022-01-03 PROCEDURE — 97530 THERAPEUTIC ACTIVITIES: CPT

## 2022-01-03 PROCEDURE — 99232 PR SUBSEQUENT HOSPITAL CARE,LEVL II: ICD-10-PCS | Mod: ,,, | Performed by: HOSPITALIST

## 2022-01-03 PROCEDURE — 97165 OT EVAL LOW COMPLEX 30 MIN: CPT

## 2022-01-03 PROCEDURE — 63600175 PHARM REV CODE 636 W HCPCS: Performed by: NURSE PRACTITIONER

## 2022-01-03 PROCEDURE — 25000003 PHARM REV CODE 250: Performed by: ORTHOPAEDIC SURGERY

## 2022-01-03 PROCEDURE — S4991 NICOTINE PATCH NONLEGEND: HCPCS | Performed by: HOSPITALIST

## 2022-01-03 PROCEDURE — 25000003 PHARM REV CODE 250: Performed by: HOSPITALIST

## 2022-01-03 PROCEDURE — 83735 ASSAY OF MAGNESIUM: CPT | Performed by: NURSE PRACTITIONER

## 2022-01-03 PROCEDURE — 11000001 HC ACUTE MED/SURG PRIVATE ROOM

## 2022-01-03 PROCEDURE — 25000003 PHARM REV CODE 250: Performed by: NURSE PRACTITIONER

## 2022-01-03 PROCEDURE — 97162 PT EVAL MOD COMPLEX 30 MIN: CPT

## 2022-01-03 PROCEDURE — 99232 SBSQ HOSP IP/OBS MODERATE 35: CPT | Mod: ,,, | Performed by: HOSPITALIST

## 2022-01-03 PROCEDURE — 85025 COMPLETE CBC W/AUTO DIFF WBC: CPT | Performed by: NURSE PRACTITIONER

## 2022-01-03 PROCEDURE — 63600175 PHARM REV CODE 636 W HCPCS: Performed by: HOSPITALIST

## 2022-01-03 PROCEDURE — 63600175 PHARM REV CODE 636 W HCPCS: Performed by: ORTHOPAEDIC SURGERY

## 2022-01-03 RX ORDER — IBUPROFEN 200 MG
1 TABLET ORAL DAILY
Status: DISCONTINUED | OUTPATIENT
Start: 2022-01-03 | End: 2022-01-19 | Stop reason: HOSPADM

## 2022-01-03 RX ORDER — ACETAMINOPHEN 325 MG/1
650 TABLET ORAL EVERY 4 HOURS PRN
Status: DISCONTINUED | OUTPATIENT
Start: 2022-01-03 | End: 2022-01-19 | Stop reason: HOSPADM

## 2022-01-03 RX ADMIN — AMLODIPINE BESYLATE 10 MG: 10 TABLET ORAL at 10:01

## 2022-01-03 RX ADMIN — ACETAMINOPHEN 650 MG: 325 TABLET ORAL at 01:01

## 2022-01-03 RX ADMIN — MORPHINE SULFATE 4 MG: 4 INJECTION, SOLUTION INTRAMUSCULAR; INTRAVENOUS at 01:01

## 2022-01-03 RX ADMIN — ATORVASTATIN CALCIUM 40 MG: 20 TABLET, FILM COATED ORAL at 10:01

## 2022-01-03 RX ADMIN — APIXABAN 2.5 MG: 2.5 TABLET, FILM COATED ORAL at 10:01

## 2022-01-03 RX ADMIN — CEFAZOLIN SODIUM 2 G: 10 INJECTION, POWDER, FOR SOLUTION INTRAVENOUS at 03:01

## 2022-01-03 RX ADMIN — THERA TABS 1 TABLET: TAB at 10:01

## 2022-01-03 RX ADMIN — NICOTINE 1 PATCH: 14 PATCH, EXTENDED RELEASE TRANSDERMAL at 03:01

## 2022-01-03 RX ADMIN — MUPIROCIN 1 G: 20 OINTMENT TOPICAL at 10:01

## 2022-01-03 RX ADMIN — LORAZEPAM 0.5 MG: 0.5 TABLET ORAL at 10:01

## 2022-01-03 RX ADMIN — LORAZEPAM 0.5 MG: 0.5 TABLET ORAL at 03:01

## 2022-01-03 RX ADMIN — PANTOPRAZOLE SODIUM 40 MG: 40 TABLET, DELAYED RELEASE ORAL at 10:01

## 2022-01-03 RX ADMIN — HYDROCODONE BITARTRATE AND ACETAMINOPHEN 1 TABLET: 5; 325 TABLET ORAL at 10:01

## 2022-01-03 RX ADMIN — SODIUM CHLORIDE 75 ML/HR: 9 INJECTION, SOLUTION INTRAVENOUS at 07:01

## 2022-01-03 RX ADMIN — POLYETHYLENE GLYCOL 3350 17 G: 17 POWDER, FOR SOLUTION ORAL at 10:01

## 2022-01-03 RX ADMIN — THIAMINE HYDROCHLORIDE: 100 INJECTION, SOLUTION INTRAMUSCULAR; INTRAVENOUS at 10:01

## 2022-01-03 RX ADMIN — Medication 1000 UNITS: at 10:01

## 2022-01-03 RX ADMIN — ASPIRIN 81 MG: 81 TABLET, COATED ORAL at 10:01

## 2022-01-03 NOTE — ASSESSMENT & PLAN NOTE
- acute  - R hip xr with femoral neck fracture superimposed on chronic changes  - ortho consult-- plans for surgical intervention tomorrow  - EKG with SR with incomplete R BBB  - CXR with cardiomegaly and chronic vascular congestion  - plans for surgical intervention tomorrow  - PT/OT/SS consulted for post op assistance  - heparin SQ x 1 dose tonight   - AC after surgery per surgery preference   - ALMEIDA score of 0.9% ALISA risk-- risk of MI or cardiac arrest intra op or up to 30 days post op  - RCRI score of 2 points; class III risk; 10.1% 30 day risk of death, MI, or cardiac arrest     Perioperative Risk Assessment:  Patient is at intermediate risk for perioperative cardiopulmonary complications.  This based on lack of thorough history, hx of CVA and CABG. However risk are stable and see no medical benefit in delaying surgery from medical standpoint.    Surgery 01/02    1/3  - stable post op  - hemovac in place  - PT/OT to see  - SS following for SWB assistance

## 2022-01-03 NOTE — PLAN OF CARE
Problem: Adult Inpatient Plan of Care  Goal: Plan of Care Review  Outcome: Ongoing, Progressing  Goal: Patient-Specific Goal (Individualized)  Outcome: Ongoing, Progressing  Goal: Absence of Hospital-Acquired Illness or Injury  Outcome: Ongoing, Progressing  Goal: Optimal Comfort and Wellbeing  Outcome: Ongoing, Progressing  Goal: Readiness for Transition of Care  Outcome: Ongoing, Progressing     Problem: Skin Injury Risk Increased  Goal: Skin Health and Integrity  Outcome: Ongoing, Progressing     Problem: Infection  Goal: Absence of Infection Signs and Symptoms  Outcome: Ongoing, Progressing

## 2022-01-03 NOTE — PROGRESS NOTES
[facility]  [facility] Medicine  Progress Note    Patient Name: Karin Carrera  MRN: [id number]  Patient Class: IP- Inpatient   Admission Date: 1/1/2022  Length of Stay: 2 days  Attending Physician: Sarahi Goodman MD  Primary Care Provider: Walker Smith MD        Subjective:     Principal Problem:Closed fracture of right hip        HPI:  Mr Carrera is a 52 yo male who presented to the ED via EMS for c/o right leg pain after a fall. Per EMS and ED staff, he was picked up from his vehicle outside of a friend's home. He states that he lives in his vehicle. It was noted that there were numerous liquor bottles in the vehicle and around him. He was combative with EMS and has been uncooperative with much of his interview during his stay in the ED. He uses profanity easily but is aphasic during much of the ROS and interview. He will use his cell phone to type out some answers. He is a poor historian due to this and his obvious drunken state. He denies any chest pain, shortness of breath, abdominal pain, F/C, N/V/D, or changes in vision. He has right sided hemiparesis as a results of his previous CVA- however still drives. Spoke with his brother, Silver, who provided no new information- he has not seen his brother since the day before yesterday. He does state that his brother does not live with him because of the alcohol use and other private reasons. Upon workup, it was noted that he had a right femoral neck fracture. His ETOH level was 188. He is being admitted to hospitalist services with a consult to orthopaedics for surgical intervention.     He has a PMH of CABG, CVA, HTN, GERD, ETOH abuse, and nicotine abuse. It is unclear when his CABG or CVA was; unable to find dates in previous records. It is unknown if he has had covid or been vaccinated. He follows with Dr Smith for primary care. He does not follow with anyone for cardiology. Per patient, he wishes to be a full code.     Brother-  [facility]- [phone]       Overview/Hospital Course:  01/02 - patient seems in less pain but obviously still sore.  He is calm and cooperative.  No new issues reported.  Seen preoperatively.  As previous addressed okay with me to proceed as planned; no medical reason to delay.  Watch for alcohol withdrawals postoperatively even though patient denies daily alcohol use.  Look into placement needs.  Dr. Goodman to assume care patient for hospitalist in the a.m..    1/3: NAEO; some pain at R hip; PT/OT to see; SS to assist with placement; continue on DT prevention- pt did initially state he drank daily and is now saying he drinks every other day      Interval History: Pt resting in bed; eating breakfast. Discussed the need for SWB placement with pt- verbalizes understanding. Does endorse some pain to hip; PT to see today. SS following for placement assistance.     Review of Systems   Respiratory: Negative for chest tightness, shortness of breath and wheezing.    Cardiovascular: Negative for chest pain and palpitations.   Gastrointestinal: Negative for abdominal pain, constipation and nausea.   Musculoskeletal: Positive for gait problem. Negative for back pain and neck stiffness.   Neurological: Positive for speech difficulty and weakness. Negative for dizziness and headaches.        Patient has right-sided hemiparesis and expressive aphasia from previous stroke  He is right-handed   All other systems reviewed and are negative.    Objective:     Vital Signs (Most Recent):  Temp: 98.3 °F (36.8 °C) (01/03/22 0730)  Pulse: (!) 114 (01/03/22 0730)  Resp: 18 (01/03/22 1014)  BP: (!) 155/73 (01/03/22 0730)  SpO2: 99 % (01/03/22 0730) Vital Signs (24h Range):  Temp:  [98 °F (36.7 °C)-101 °F (38.3 °C)] 98.3 °F (36.8 °C)  Pulse:  [] 114  Resp:  [18-32] 18  SpO2:  [91 %-99 %] 99 %  BP: (128-157)/(61-86) 155/73     Weight: 89.9 kg (198 lb 3.1 oz)  Body mass index is 26.15 kg/m².    Intake/Output Summary (Last 24 hours) at  1/3/2022 1154  Last data filed at 1/3/2022 0730  Gross per 24 hour   Intake 1313.75 ml   Output 1920 ml   Net -606.25 ml      Physical Exam  Vitals and nursing note reviewed.   Constitutional:       General: He is not in acute distress.  Eyes:      Pupils: Pupils are equal, round, and reactive to light.   Cardiovascular:      Rate and Rhythm: Normal rate and regular rhythm.      Pulses: Normal pulses.   Pulmonary:      Effort: Pulmonary effort is normal. No respiratory distress.      Breath sounds: Normal breath sounds. No wheezing.   Abdominal:      General: Bowel sounds are normal. There is no distension.      Tenderness: There is no abdominal tenderness.   Musculoskeletal:        Legs:       Comments: hemovac noted to R hip with surgical dressing covering    Skin:     General: Skin is warm.   Neurological:      Mental Status: He is alert, oriented to person, place, and time and easily aroused. Mental status is at baseline.      Motor: Weakness present.      Comments: Dense right-sided hemiparesis and expressive aphasia in this right-handed patient with old CVA   Psychiatric:         Mood and Affect: Mood normal.         Behavior: Behavior normal.      Comments: Baseline mood and behavior for pt; able to communicate some- will use cell phone to text out some things; easily agitated          Significant Labs:   All pertinent labs within the past 24 hours have been reviewed.  Recent Lab Results       01/03/22  0804   01/03/22  0635   01/02/22  1439   01/02/22  1326   01/02/22  1217        Albumin/Globulin Ratio   0.7             Albumin   2.5   2.9           Alkaline Phosphatase   120   133           ALT   12   14           Anion Gap   7       14       AST   19   30           Baso # 0.02       0.02         Basophil % 0.3       0.2         Bilirubin, Direct     0.1           BILIRUBIN TOTAL   0.6   0.8           BUN   6       9       BUN/CREAT RATIO   11       12       Calcium   8.3       8.8       Chloride   105        106       CO2   26       22       Creatinine   0.54       0.75       Differential Type Auto       Auto         eGFR if non    170       117       Eos # 0.05       0.07         Eosinophil % 0.8       0.8         Globulin, Total   3.8             Glucose   148       127       Hematocrit 32.7       34.2         Hemoglobin 10.0       10.6         Immature Grans (Abs) 0.02       0.03         Immature Granulocytes 0.3       0.3         Lymph # 1.12       1.19         Lymph % 17.3       13.8         Magnesium   1.9             MCH 23.3       23.7         MCHC 30.6       31.0         MCV 76.0       76.3         Mono # 0.64       0.65         Mono % 9.9       7.5         MPV 9.6       9.5         Neutrophils, Abs 4.61       6.65         Neutrophils Relative 71.4       77.4         nRBC 0.0       0.0         NUCLEATED RBC ABSOLUTE 0.00       0.00         Platelets 167       216         Potassium   3.2       3.9       PROTEIN TOTAL   6.3   6.7           RBC 4.30       4.48         RDW 17.2       17.7         Sodium   135       138       WBC 6.46       8.61                              Significant Imaging: I have reviewed all pertinent imaging results/findings within the past 24 hours.      Assessment/Plan:      * Closed fracture of right hip  - acute  - R hip xr with femoral neck fracture superimposed on chronic changes  - ortho consult-- plans for surgical intervention tomorrow  - EKG with SR with incomplete R BBB  - CXR with cardiomegaly and chronic vascular congestion  - plans for surgical intervention tomorrow  - PT/OT/SS consulted for post op assistance  - heparin SQ x 1 dose tonight   - AC after surgery per surgery preference   - ALMEIDA score of 0.9% ALISA risk-- risk of MI or cardiac arrest intra op or up to 30 days post op  - RCRI score of 2 points; class III risk; 10.1% 30 day risk of death, MI, or cardiac arrest     Perioperative Risk Assessment:  Patient is at intermediate risk for perioperative  cardiopulmonary complications.  This based on lack of thorough history, hx of CVA and CABG. However risk are stable and see no medical benefit in delaying surgery from medical standpoint.    Surgery 01/02    1/3  - stable post op  - hemovac in place  - PT/OT to see  - SS following for SWB assistance       ETOH abuse  - chronic  - unclear amount pt does partake in daily   - drinks liquor instead of beer  - monitor for DTs   - banana bag daily- MVI, thiamine, and folic acid  - ativan PO scheduled to defer DTs   - ativan IV for breakthrough symptoms         Hx of CABG  - chronic, stable  - continue current meds  - cardiac monitoring   - EKG with SR with incomplete RBBB      GERD (gastroesophageal reflux disease)  - chronic, stable  - continue current meds       Hypertension  - chronic, stable  - continue current home meds  - monitor BP trend  - adjust meds as needed       Right sided weakness  - chronic, stable  - hx of CVA with R sided hemiparesis   -patient is right-handed  - continue statin           VTE Risk Mitigation (From admission, onward)         Ordered     apixaban tablet 2.5 mg  2 times daily         01/02/22 1916     IP VTE HIGH RISK PATIENT  Once         01/02/22 1916     Place SAGE hose  Until discontinued         01/02/22 1916     Place sequential compression device  Until discontinued         01/02/22 1916     Place sequential compression device  Until discontinued         01/01/22 1707                Discharge Planning   DEANNA:      Code Status: Full Code   Is the patient medically ready for discharge?:     Reason for patient still in hospital (select all that apply): Treatment  Discharge Plan A: Rehab ([facility])          ISADORA Link  Department of Hospital Medicine   [facility] - [facility]

## 2022-01-03 NOTE — PLAN OF CARE
Problem: Adult Inpatient Plan of Care  Goal: Plan of Care Review  Outcome: Ongoing, Progressing  Goal: Patient-Specific Goal (Individualized)  Outcome: Ongoing, Progressing  Flowsheets (Taken 1/3/2022 1422)  Anxieties, Fears or Concerns: None  Individualized Care Needs: PT, pain management  Patient-Specific Goals (Include Timeframe): PT, pain control  Goal: Absence of Hospital-Acquired Illness or Injury  Outcome: Ongoing, Progressing  Intervention: Prevent and Manage VTE (Venous Thromboembolism) Risk  Flowsheets (Taken 1/3/2022 1422)  Range of Motion: active ROM (range of motion) encouraged  Intervention: Prevent Infection  Flowsheets (Taken 1/3/2022 1422)  Infection Prevention:   hand hygiene promoted   rest/sleep promoted  Goal: Optimal Comfort and Wellbeing  Outcome: Ongoing, Progressing  Intervention: Monitor Pain and Promote Comfort  Flowsheets (Taken 1/3/2022 1422)  Pain Management Interventions:   relaxation techniques promoted   quiet environment facilitated  Intervention: Provide Person-Centered Care  Flowsheets (Taken 1/3/2022 1422)  Trust Relationship/Rapport:   care explained   choices provided   emotional support provided   empathic listening provided   thoughts/feelings acknowledged   reassurance provided   questions encouraged   questions answered  Goal: Readiness for Transition of Care  Outcome: Ongoing, Progressing     Problem: Skin Injury Risk Increased  Goal: Skin Health and Integrity  Outcome: Ongoing, Progressing  Intervention: Optimize Skin Protection  Flowsheets (Taken 1/3/2022 1422)  Pressure Reduction Devices:   feet on footrest/footstool   heel offloading device utilized  Intervention: Promote and Optimize Oral Intake  Flowsheets (Taken 1/3/2022 1422)  Oral Nutrition Promotion:   calorie-dense foods provided   physical activity promoted     Problem: Infection  Goal: Absence of Infection Signs and Symptoms  Outcome: Ongoing, Progressing  Intervention: Prevent or Manage  Infection  Flowsheets (Taken 1/3/2022 1422)  Fever Reduction/Comfort Measures:   lightweight clothing   lightweight bedding  Infection Management: aseptic technique maintained     Problem: Fall Injury Risk  Goal: Absence of Fall and Fall-Related Injury  Outcome: Ongoing, Progressing  Intervention: Identify and Manage Contributors  Flowsheets (Taken 1/3/2022 1422)  Self-Care Promotion: independence encouraged  Medication Review/Management: medications reviewed     Plan of care reviewed. Patient progressing

## 2022-01-03 NOTE — CARE UPDATE
Patient awake alert is following commands.  Discussed with the patient the fact that he will need some rehab.  He is homeless lives in a car.  Will try to get rehab placement arranged.  Right lower extremities had a stroke he has no motor function right lower extremity.  Drains are in place will DC drain tomorrow.  Continue PT partial weight-bearing right lower extremity.  Has a palpable dorsalis pedis pulse right lower extremity.

## 2022-01-03 NOTE — PLAN OF CARE
[facility]  Initial Discharge Assessment       Primary Care Provider: Walker Smith MD    Admission Diagnosis: Pre-operative clearance [Z01.818]  Fall [W19.XXXA]  Chest pain [R07.9]  Closed fracture of right hip, initial encounter [S72.001A]  Closed displaced fracture of right femoral neck [S72.001A]    Admission Date: 1/1/2022  Expected Discharge Date:     Discharge Barriers Identified: None    Payor: MEDICAID [place] / Plan: MEDICA[place] [employer] / Product Type: Managed Medicaid /     Extended Emergency Contact Information  Primary Emergency Contact: Zeyad Whipple  Mobile Phone: [phone]  Relation: Relative  Preferred language: English   needed? No    Discharge Plan A: Rehab ([facility])  Discharge Plan B: Home      [facility] - [place], [place] - [address]  [id number]  [place] [id number]  Phone: [phone] Fax: [phone]      Initial Assessment (most recent)     Adult Discharge Assessment - 01/03/22 1123        Discharge Assessment    Assessment Type Discharge Planning Assessment     Source of Information patient     Lives With sibling(s)     Do you expect to return to your current living situation? Yes     Do you have help at home or someone to help you manage your care at home? No     Current cognitive status: Alert/Oriented     Equipment Currently Used at Home cane, straight     Do you currently have service(s) that help you manage your care at home? No     Discharge Plan A Rehab   [facility]    Discharge Plan B Home     DME Needed Upon Discharge  walker, rolling;3-in-1 commode     Discharge Plan discussed with: Patient     Discharge Barriers Identified None               Pt lives at home with brotherSilver. Pt not current with  and uses a cane. MARCY consulted for gagandeep, choice obtained for [facility]. SW informed pt that [facility] is the only facility that accepts Medicaid. Pt voiced understanding. MARCY  faxed referral and will fax therapy notes once available. SW will cont to follow for dc needs.

## 2022-01-03 NOTE — SUBJECTIVE & OBJECTIVE
Interval History: Pt resting in bed; eating breakfast. Discussed the need for SWB placement with pt- verbalizes understanding. Does endorse some pain to hip; PT to see today. SS following for placement assistance.     Review of Systems   Respiratory: Negative for chest tightness, shortness of breath and wheezing.    Cardiovascular: Negative for chest pain and palpitations.   Gastrointestinal: Negative for abdominal pain, constipation and nausea.   Musculoskeletal: Positive for gait problem. Negative for back pain and neck stiffness.   Neurological: Positive for speech difficulty and weakness. Negative for dizziness and headaches.        Patient has right-sided hemiparesis and expressive aphasia from previous stroke  He is right-handed   All other systems reviewed and are negative.    Objective:     Vital Signs (Most Recent):  Temp: 98.3 °F (36.8 °C) (01/03/22 0730)  Pulse: (!) 114 (01/03/22 0730)  Resp: 18 (01/03/22 1014)  BP: (!) 155/73 (01/03/22 0730)  SpO2: 99 % (01/03/22 0730) Vital Signs (24h Range):  Temp:  [98 °F (36.7 °C)-101 °F (38.3 °C)] 98.3 °F (36.8 °C)  Pulse:  [] 114  Resp:  [18-32] 18  SpO2:  [91 %-99 %] 99 %  BP: (128-157)/(61-86) 155/73     Weight: 89.9 kg (198 lb 3.1 oz)  Body mass index is 26.15 kg/m².    Intake/Output Summary (Last 24 hours) at 1/3/2022 1154  Last data filed at 1/3/2022 0730  Gross per 24 hour   Intake 1313.75 ml   Output 1920 ml   Net -606.25 ml      Physical Exam  Vitals and nursing note reviewed.   Constitutional:       General: He is not in acute distress.  Eyes:      Pupils: Pupils are equal, round, and reactive to light.   Cardiovascular:      Rate and Rhythm: Normal rate and regular rhythm.      Pulses: Normal pulses.   Pulmonary:      Effort: Pulmonary effort is normal. No respiratory distress.      Breath sounds: Normal breath sounds. No wheezing.   Abdominal:      General: Bowel sounds are normal. There is no distension.      Tenderness: There is no abdominal  tenderness.   Musculoskeletal:        Legs:       Comments: hemovac noted to R hip with surgical dressing covering    Skin:     General: Skin is warm.   Neurological:      Mental Status: He is alert, oriented to person, place, and time and easily aroused. Mental status is at baseline.      Motor: Weakness present.      Comments: Dense right-sided hemiparesis and expressive aphasia in this right-handed patient with old CVA   Psychiatric:         Mood and Affect: Mood normal.         Behavior: Behavior normal.      Comments: Baseline mood and behavior for pt; able to communicate some- will use cell phone to text out some things; easily agitated          Significant Labs:   All pertinent labs within the past 24 hours have been reviewed.  Recent Lab Results       01/03/22  0804   01/03/22  0635   01/02/22  1439   01/02/22  1326   01/02/22  1217        Albumin/Globulin Ratio   0.7             Albumin   2.5   2.9           Alkaline Phosphatase   120   133           ALT   12   14           Anion Gap   7       14       AST   19   30           Baso # 0.02       0.02         Basophil % 0.3       0.2         Bilirubin, Direct     0.1           BILIRUBIN TOTAL   0.6   0.8           BUN   6       9       BUN/CREAT RATIO   11       12       Calcium   8.3       8.8       Chloride   105       106       CO2   26       22       Creatinine   0.54       0.75       Differential Type Auto       Auto         eGFR if non    170       117       Eos # 0.05       0.07         Eosinophil % 0.8       0.8         Globulin, Total   3.8             Glucose   148       127       Hematocrit 32.7       34.2         Hemoglobin 10.0       10.6         Immature Grans (Abs) 0.02       0.03         Immature Granulocytes 0.3       0.3         Lymph # 1.12       1.19         Lymph % 17.3       13.8         Magnesium   1.9             MCH 23.3       23.7         MCHC 30.6       31.0         MCV 76.0       76.3         Mono # 0.64       0.65          Mono % 9.9       7.5         MPV 9.6       9.5         Neutrophils, Abs 4.61       6.65         Neutrophils Relative 71.4       77.4         nRBC 0.0       0.0         NUCLEATED RBC ABSOLUTE 0.00       0.00         Platelets 167       216         Potassium   3.2       3.9       PROTEIN TOTAL   6.3   6.7           RBC 4.30       4.48         RDW 17.2       17.7         Sodium   135       138       WBC 6.46       8.61                              Significant Imaging: I have reviewed all pertinent imaging results/findings within the past 24 hours.

## 2022-01-03 NOTE — PT/OT/SLP EVAL
Physical Therapy Evaluation    Patient Name:  Karin Carrera   MRN:  [id number]    Recommendations:     Discharge Recommendations:  rehabilitation facility   Discharge Equipment Recommendations: walker, jasbir   Barriers to discharge: Decreased caregiver support    Assessment:     Karin Carrera is a 51 y.o. male admitted with a medical diagnosis of Closed fracture of right hip.  He presents with the following impairments/functional limitations:  impaired functional mobilty,gait instability,impaired balance,decreased coordination,pain,abnormal tone,decreased ROM,orthopedic precautions Patient with cva 5 years ago with residual expressive aphasia and right side weakness. Patient is ambulatory with cane and follows all commands prior to this admission. Patient with right LE pwb status that will difficult to maintain with right side weakness. Will look to see if physician will clear wbat. Participates with therapy. Will work on transfers and strengthening until cleared for full weight bearing for gait. .    Rehab Prognosis: Good; patient would benefit from acute skilled PT services to address these deficits and reach maximum level of function.    Recent Surgery: Procedure(s) (LRB):  HEMIARTHROPLASTY, HIP (Right) 1 Day Post-Op    Plan:     During this hospitalization, patient to be seen 5 x/week to address the identified rehab impairments via gait training,therapeutic activities,therapeutic exercises and progress toward the following goals:    · Plan of Care Expires:  02/03/22    Subjective     Chief Complaint: post op pain  Patient/Family Comments/goals: Plans is for sb  Pain/Comfort:  · Pain Rating 1: 4/10  · Location - Side 1: Right  · Location 1: hip  · Pain Addressed 1: Pre-medicate for activity    Patients cultural, spiritual, Pentec[employer]alism conflicts given the current situation: no    Living Environment:  Lives in car, friend assist in care at times.   Prior to admission, patients level of function was  modified independent.  Equipment used at home: cane, quad.  DME owned (not currently used): single point cane.  Upon discharge, patient will have assistance from friend if willing.    Objective:     Communicated with nurse prior to session.  Patient found supine with    upon PT entry to room.    General Precautions: Standard, aphasia,fall   Orthopedic Precautions:RLE partial weight bearing   Braces:    Respiratory Status: Room air    Exams:  · expressive aphasia, can point and mimick to want he wants. Able to say a few currse words but this is nomral since his cva  · RLE ROM: WFL  · RLE Strength: 2/5  · LLE ROM: WFL  · LLE Strength: WFL    Functional Mobility:  · Bed Mobility:     · Supine to Sit: moderate assistance  · Transfers:     · Sit to Stand:  minimum assistance with hand-held assist  · Gait: unable    Therapeutic Activities and Exercises:   Reviewed pwb status and hip precautions    AM-PAC 6 CLICK MOBILITY  Total Score:14     Patient left up in chair with call button in reach and nurse notified.    GOALS:   Multidisciplinary Problems     Physical Therapy Goals        Problem: Physical Therapy Goal    Goal Priority Disciplines Outcome Goal Variances Interventions   Physical Therapy Goal     PT, PT/OT Ongoing, Progressing     Description: Short Term Goals  Independent with HEP  Independent with cane x 100 feet PWB/WBAT: right lower extremity  Cga supine-sit-stand    Long term goals  Needed equipment for home.   Independent with hip precautsion                       History:     Past Medical History:   Diagnosis Date    GERD (gastroesophageal reflux disease)     Hypertension     Stroke        Past Surgical History:   Procedure Laterality Date    CARDIAC SURGERY      HEMIARTHROPLASTY OF HIP Right 1/2/2022    Procedure: HEMIARTHROPLASTY, HIP;  Surgeon: Ramses Chua MD;  [facility];  Service: Orthopedics;  Laterality: Right;       Time Tracking:     PT Received On: 01/03/22  PT Start  Time: 1130     PT Stop Time: 1155  PT Total Time (min): 25 min     Billable Minutes: Evaluation 25 01/03/2022

## 2022-01-03 NOTE — PT/OT/SLP PROGRESS
Physical Therapy Treatment    Patient Name:  Karin Carrera   MRN:  [id number]    Recommendations:     Discharge Recommendations:  rehabilitation facility   Discharge Equipment Recommendations: walker, jasbir   Barriers to discharge: Decreased caregiver support    Assessment:     Karin Carrera is a 51 y.o. male admitted with a medical diagnosis of Closed fracture of right hip.  He presents with the following impairments/functional limitations:  impaired functional mobilty,gait instability,impaired balance,decreased coordination,pain,abnormal tone,decreased ROM,orthopedic precautions Patient with moderate pain with movement. Participates with therapy. Awaiting rehab.    Rehab Prognosis: Fair; patient would benefit from acute skilled PT services to address these deficits and reach maximum level of function.    Recent Surgery: Procedure(s) (LRB):  HEMIARTHROPLASTY, HIP (Right) 1 Day Post-Op    Plan:     During this hospitalization, patient to be seen 5 x/week to address the identified rehab impairments via gait training,therapeutic activities,therapeutic exercises and progress toward the following goals:    · Plan of Care Expires:  02/03/22    Subjective     Chief Complaint: right hip pain  Patient/Family Comments/goals: Agrees to return to bed and perform there ex  Pain/Comfort:  Pain Rating 1: 6/10  Location - Side 1: Right  Location 1: hip  Pain Addressed 1: Cessation of Activity      Objective:     Communicated with nurse prior to session.  Patient found supine with   upon PT entry to room.     General Precautions: Standard, aphasia,fall   Orthopedic Precautions:RLE partial weight bearing   Braces:    Respiratory Status: Room air     Functional Mobility:  · Bed Mobility:     · Supine to Sit: moderate assistance  · Sit to Supine: moderate assistance  · Transfers:     · Sit to Stand:  moderate assistance with hand-held assist      AM-PAC 6 CLICK MOBILITY  Turning over in bed (including adjusting bedclothes,  sheets and blankets)?: 3  Sitting down on and standing up from a chair with arms (e.g., wheelchair, bedside commode, etc.): 3  Moving from lying on back to sitting on the side of the bed?: 3  Moving to and from a bed to a chair (including a wheelchair)?: 3  Need to walk in hospital room?: 1  Climbing 3-5 steps with a railing?: 1  Basic Mobility Total Score: 14       Therapeutic Activities and Exercises:   Transferred to bed to wheelchair. Cues for pwb right le.   RLE: aps, hs, saq, abd-add 3x10 aarom    Patient left supine with call button in reach and bed alarm on..    GOALS:   Multidisciplinary Problems     Physical Therapy Goals        Problem: Physical Therapy Goal    Goal Priority Disciplines Outcome Goal Variances Interventions   Physical Therapy Goal     PT, PT/OT Ongoing, Progressing     Description: Short Term Goals  Independent with HEP  Independent with cane x 100 feet PWB/WBAT: right lower extremity  Cga supine-sit-stand    Long term goals  Needed equipment for home.   Independent with hip precautsion                       Time Tracking:     PT Received On: 01/03/22  PT Start Time: 1300     PT Stop Time: 1325  PT Total Time (min): 25 min     Billable Minutes: Therapeutic Activity 25    Treatment Type: Treatment  PT/PTA: PT     PTA Visit Number: 0     01/03/2022

## 2022-01-03 NOTE — PLAN OF CARE
Problem: Occupational Therapy Goal  Goal: Occupational Therapy Goal  Description: ST.Pt will perform bathing with Ofelia with setup at EOB  2.Pt will perform UE dressing with Ofelia  3.Pt will perform LE dressing with Ofelia with adaptive equipment with maintaining hip precautions  4.Pt will transfer bed/chair/bsc with CGA with AD  5.Pt will perform standing task x 3 min with CGA with maintaining weightbearing status  6.Tolerate 15 min of tx without fatigue.      LTG:   Restore to max I with selfcare and mobility.      Outcome: Ongoing, Progressing

## 2022-01-03 NOTE — PLAN OF CARE
Problem: Physical Therapy Goal  Goal: Physical Therapy Goal  Description: Short Term Goals  Independent with HEP  Independent with cane x 100 feet PWB/WBAT: right lower extremity  Cga supine-sit-stand    Long term goals  Needed equipment for home.   Independent with hip precautsion      Outcome: Ongoing, Progressing

## 2022-01-03 NOTE — PT/OT/SLP EVAL
Occupational Therapy   Evaluation    Name: Karin Carrera  MRN: [id number]  Admitting Diagnosis:  Closed fracture of right hip  Recent Surgery: Procedure(s) (LRB):  HEMIARTHROPLASTY, HIP (Right) 1 Day Post-Op    Recommendations:     Discharge Recommendations: nursing facility, skilled  Discharge Equipment Recommendations:  walker, jasbir  Barriers to discharge:  Decreased caregiver support,Inaccessible home environment    Assessment:     Karin Carrera is a 51 y.o. male with a medical diagnosis of Closed fracture of right hip.  He presents with s/p R hip hemiarthroplasty on 1/2/22. Performance deficits affecting function: impaired self care skills,impaired functional mobilty,gait instability,impaired balance,decreased upper extremity function,decreased lower extremity function,decreased safety awareness,orthopedic precautions.  Pt with history of CVA with R sided weakness. Pt unable to use RUE to grasp walker but able to bear weight through RLE to perform functional mobility prior to fall.     Rehab Prognosis: Fair; patient would benefit from acute skilled OT services to address these deficits and reach maximum level of function.       Plan:     Patient to be seen 5 x/week to address the above listed problems via self-care/home management,therapeutic activities,therapeutic exercises  · Plan of Care Expires: 01/31/22  · Plan of Care Reviewed with: patient    Subjective     Chief Complaint: R hip hemiarthroplasty   Patient/Family Comments/goals: pt agreeable to OT/PT eval    Occupational Profile:  Living Environment: pt lives in car at this time  Previous level of function: independent with all ADL tasks and utilized cane for functional mobility   Roles and Routines: perform self care  Equipment Used at Home:  cane, straight  Assistance upon Discharge: swing bed    Pain/Comfort:  Pain Rating 1:  (pt unable to rate pain; pt expressing pain with movement)  Location - Side 1: Right  Location 1: hip  Pain Addressed  1: Reposition    Patients cultural, spiritual, Mandaen conflicts given the current situation: no    Objective:     Communicated with: COLTON Hall prior to session.  Patient found supine with hemovac,hip abduction pillow,peripheral IV upon OT entry to room.    General Precautions: Standard, fall,aphasia   Orthopedic Precautions:RLE posterior precautions,RLE partial weight bearing   Braces: N/A  Respiratory Status: Room air    Occupational Performance:    Bed Mobility:    · Patient completed Supine to Sit with minimum assistance    Functional Mobility/Transfers:  · Patient completed Sit <> Stand Transfer with contact guard assistance  with  hand-held assist   · Patient completed Bed <> Chair Transfer using Stand Pivot technique with contact guard assistance with hand-held assist  · Functional Mobility: not performed    Activities of Daily Living:  · Lower Body Dressing: maximal assistance to av socks  · Toileting: modified independence to perform toileting with urinal     Cognitive/Visual Perceptual:  Cognitive/Psychosocial Skills:     -       Follows Commands/attention:Follows two-step commands  -       Communication: expressive aphasia  -       Mood/Affect/Coping skills/emotional control: Cooperative    Physical Exam:  Balance: -       decreased standing balance d/t s/p R hip hemiarthroplasty  Upper Extremity Range of Motion:     -       Right Upper Extremity: decreased ROM s/t previous CVA with hemiparesis   -       Left Upper Extremity: WFL  Upper Extremity Strength:    -       Right Upper Extremity: unable to test d/t previous CVA  -       Left Upper Extremity: WNL  Gross motor coordination:   Right hemiplegia/paresis    AMPAC 6 Click ADL:  AMPAC Total Score: 20    Treatment & Education:  · Pt educated on OT role/POC.   · Importance of OOB activity with staff assistance.  · Importance of sitting up in the chair throughout the day as tolerated, especially for meals   · Importance of assisting with self-care  activities   · All questions/concerns answered within OT scope of practice    Education:    Patient left up in chair with all lines intact, call button in reach and COLTON [facility] notified    GOALS:   Multidisciplinary Problems     Occupational Therapy Goals        Problem: Occupational Therapy Goal    Goal Priority Disciplines Outcome Interventions   Occupational Therapy Goal     OT, PT/OT Ongoing, Progressing    Description: ST.Pt will perform bathing with Ofelia with setup at EOB  2.Pt will perform UE dressing with Ofelia  3.Pt will perform LE dressing with Ofelia with adaptive equipment with maintaining hip precautions  4.Pt will transfer bed/chair/bsc with CGA with AD  5.Pt will perform standing task x 3 min with CGA with maintaining weightbearing status  6.Tolerate 15 min of tx without fatigue.      LTG:   Restore to max I with selfcare and mobility.                       History:     Past Medical History:   Diagnosis Date    GERD (gastroesophageal reflux disease)     Hypertension     Stroke          Past Surgical History:   Procedure Laterality Date    CARDIAC SURGERY      HEMIARTHROPLASTY OF HIP Right 2022    Procedure: HEMIARTHROPLASTY, HIP;  Surgeon: Ramses Chua MD;  Location: [facility];  Service: Orthopedics;  Laterality: Right;       Time Tracking:     OT Date of Treatment: 22  OT Start Time: 1135  OT Stop Time: 1148  OT Total Time (min): 13 min    Billable Minutes:Evaluation OT evaluation     1/3/2022

## 2022-01-04 LAB
ANION GAP SERPL CALCULATED.3IONS-SCNC: 14 MMOL/L (ref 7–16)
BUN SERPL-MCNC: 8 MG/DL (ref 7–18)
BUN/CREAT SERPL: 11 (ref 6–20)
CALCIUM SERPL-MCNC: 8.1 MG/DL (ref 8.5–10.1)
CHLORIDE SERPL-SCNC: 104 MMOL/L (ref 98–107)
CO2 SERPL-SCNC: 25 MMOL/L (ref 21–32)
CREAT SERPL-MCNC: 0.72 MG/DL (ref 0.7–1.3)
GLUCOSE SERPL-MCNC: 165 MG/DL (ref 74–106)
MAGNESIUM SERPL-MCNC: 2 MG/DL (ref 1.7–2.3)
POTASSIUM SERPL-SCNC: 3 MMOL/L (ref 3.5–5.1)
SODIUM SERPL-SCNC: 140 MMOL/L (ref 136–145)
TROPONIN I SERPL HS-MCNC: 22.8 PG/ML

## 2022-01-04 PROCEDURE — 25000003 PHARM REV CODE 250: Performed by: ORTHOPAEDIC SURGERY

## 2022-01-04 PROCEDURE — 63600175 PHARM REV CODE 636 W HCPCS: Performed by: NURSE PRACTITIONER

## 2022-01-04 PROCEDURE — 25000003 PHARM REV CODE 250: Performed by: NURSE PRACTITIONER

## 2022-01-04 PROCEDURE — S4991 NICOTINE PATCH NONLEGEND: HCPCS | Performed by: HOSPITALIST

## 2022-01-04 PROCEDURE — 25000003 PHARM REV CODE 250: Performed by: HOSPITALIST

## 2022-01-04 PROCEDURE — 97535 SELF CARE MNGMENT TRAINING: CPT

## 2022-01-04 PROCEDURE — 83735 ASSAY OF MAGNESIUM: CPT | Performed by: INTERNAL MEDICINE

## 2022-01-04 PROCEDURE — 97530 THERAPEUTIC ACTIVITIES: CPT

## 2022-01-04 PROCEDURE — 84484 ASSAY OF TROPONIN QUANT: CPT | Performed by: INTERNAL MEDICINE

## 2022-01-04 PROCEDURE — 80048 BASIC METABOLIC PNL TOTAL CA: CPT | Performed by: INTERNAL MEDICINE

## 2022-01-04 PROCEDURE — 11000001 HC ACUTE MED/SURG PRIVATE ROOM

## 2022-01-04 PROCEDURE — 99232 PR SUBSEQUENT HOSPITAL CARE,LEVL II: ICD-10-PCS | Mod: ,,, | Performed by: HOSPITALIST

## 2022-01-04 PROCEDURE — 36415 COLL VENOUS BLD VENIPUNCTURE: CPT | Performed by: INTERNAL MEDICINE

## 2022-01-04 PROCEDURE — 99232 SBSQ HOSP IP/OBS MODERATE 35: CPT | Mod: ,,, | Performed by: HOSPITALIST

## 2022-01-04 RX ORDER — CYCLOBENZAPRINE HCL 10 MG
10 TABLET ORAL 3 TIMES DAILY PRN
Status: DISCONTINUED | OUTPATIENT
Start: 2022-01-04 | End: 2022-01-06

## 2022-01-04 RX ORDER — BACLOFEN 5 MG/1
5 TABLET ORAL 3 TIMES DAILY
Status: DISCONTINUED | OUTPATIENT
Start: 2022-01-04 | End: 2022-01-12

## 2022-01-04 RX ADMIN — HYDROCODONE BITARTRATE AND ACETAMINOPHEN 1 TABLET: 5; 325 TABLET ORAL at 09:01

## 2022-01-04 RX ADMIN — ASPIRIN 81 MG: 81 TABLET, COATED ORAL at 09:01

## 2022-01-04 RX ADMIN — LORAZEPAM 0.5 MG: 0.5 TABLET ORAL at 04:01

## 2022-01-04 RX ADMIN — APIXABAN 2.5 MG: 2.5 TABLET, FILM COATED ORAL at 09:01

## 2022-01-04 RX ADMIN — THIAMINE HYDROCHLORIDE: 100 INJECTION, SOLUTION INTRAMUSCULAR; INTRAVENOUS at 09:01

## 2022-01-04 RX ADMIN — ATORVASTATIN CALCIUM 40 MG: 20 TABLET, FILM COATED ORAL at 09:01

## 2022-01-04 RX ADMIN — BACLOFEN 5 MG: 5 TABLET ORAL at 09:01

## 2022-01-04 RX ADMIN — Medication 1000 UNITS: at 09:01

## 2022-01-04 RX ADMIN — LORAZEPAM 0.5 MG: 0.5 TABLET ORAL at 09:01

## 2022-01-04 RX ADMIN — PANTOPRAZOLE SODIUM 40 MG: 40 TABLET, DELAYED RELEASE ORAL at 09:01

## 2022-01-04 RX ADMIN — BACLOFEN 5 MG: 5 TABLET ORAL at 04:01

## 2022-01-04 RX ADMIN — POLYETHYLENE GLYCOL 3350 17 G: 17 POWDER, FOR SOLUTION ORAL at 09:01

## 2022-01-04 RX ADMIN — MUPIROCIN 1 G: 20 OINTMENT TOPICAL at 09:01

## 2022-01-04 RX ADMIN — NICOTINE 1 PATCH: 14 PATCH, EXTENDED RELEASE TRANSDERMAL at 09:01

## 2022-01-04 RX ADMIN — THERA TABS 1 TABLET: TAB at 09:01

## 2022-01-04 RX ADMIN — AMLODIPINE BESYLATE 10 MG: 10 TABLET ORAL at 09:01

## 2022-01-04 NOTE — CARE UPDATE
Patient awake alert following commands.  Right upper and lower extremity have involved from previous stroke.  Will DC drains today.  Wound shows no signs of infection.  Awaiting rehab placement.  Continue partial weight-bearing right lower extremity.

## 2022-01-04 NOTE — PT/OT/SLP PROGRESS
Occupational Therapy   Treatment    Name: Karin Carrera  MRN: [id number]  Admitting Diagnosis:  Closed fracture of right hip  2 Days Post-Op    Recommendations:     Discharge Recommendations: nursing facility, skilled  Discharge Equipment Recommendations:  walker, jasbir  Barriers to discharge:  Decreased caregiver support,Inaccessible home environment    Assessment:     Karin Carrera is a 51 y.o. male with a medical diagnosis of Closed fracture of right hip.  He presents with s/p R hip hemiarthroplasty on 1/3/22. Performance deficits affecting function are impaired self care skills,impaired functional mobilty,gait instability,impaired balance,decreased upper extremity function,orthopedic precautions. Pt agreeable to participate in OT tx. Pt performed LB dressing utilizing adaptive equipment. Pt cleared by physician for WBAT on RLE.     Rehab Prognosis:  Fair; patient would benefit from acute skilled OT services to address these deficits and reach maximum level of function.       Plan:     Patient to be seen 5 x/week to address the above listed problems via self-care/home management,therapeutic activities,therapeutic exercises  · Plan of Care Expires: 01/31/22  · Plan of Care Reviewed with: patient    Subjective     Pain/Comfort:  · Pain Rating 1: 5/10  · Location - Side 1: Right  · Location 1: hip    Objective:     Communicated with: COLTON Hall prior to session.  Patient found up in chair with peripheral IV upon OT entry to room.    General Precautions: Standard, fall,aphasia   Orthopedic Precautions:RLE posterior precautions,RLE partial weight bearing   Braces:    Respiratory Status: Room air     Occupational Performance:     Bed Mobility:    · Not performed    Functional Mobility/Transfers:  · Not performed    Activities of Daily Living:  · Lower Body Dressing: minimum assistance to av socks utilizing sock aide; Maryjo to av tennis shoes utilizing shoe horn      [facility] Click ADL:      Treatment &  Education:  Pt completed ADL training with performing LB dressing utilizing hip kit adaptive equipment. Pt with Maryjo to av socks and shoes with adaptive equipment with maintaining hip precautions.     Patient left up in chair with all lines intact, call button in reach and COLTON Hall notifiedEducation:      GOALS:   Multidisciplinary Problems     Occupational Therapy Goals        Problem: Occupational Therapy Goal    Goal Priority Disciplines Outcome Interventions   Occupational Therapy Goal     OT, PT/OT Ongoing, Progressing    Description: ST.Pt will perform bathing with Maryjo with setup at EOB  2.Pt will perform UE dressing with Maryjo  3.Pt will perform LE dressing with Maryjo with adaptive equipment with maintaining hip precautions  4.Pt will transfer bed/chair/bsc with CGA with AD  5.Pt will perform standing task x 3 min with CGA with maintaining weightbearing status  6.Tolerate 15 min of tx without fatigue.      LTG:   Restore to max I with selfcare and mobility.                       Time Tracking:     OT Date of Treatment: 22  OT Start Time: 1308  OT Stop Time: 1319  OT Total Time (min): 11 min    Billable Minutes:Self Care/Home Management 11 minutes    OT/ANGELI: OT          2022

## 2022-01-04 NOTE — ASSESSMENT & PLAN NOTE
- chronic, stable  - continue current home meds  - monitor BP trend  - adjust meds as needed   1/4  - stable

## 2022-01-04 NOTE — PLAN OF CARE
Problem: Skin Injury Risk Increased  Goal: Skin Health and Integrity  Outcome: Ongoing, Progressing  Intervention: Optimize Skin Protection  Flowsheets (Taken 1/4/2022 0449)  Pressure Reduction Techniques:   frequent weight shift encouraged   weight shift assistance provided  Pressure Reduction Devices:   specialty bed utilized   pressure-redistributing mattress utilized   positioning supports utilized  Head of Bed (HOB) Positioning: HOB at 20 degrees     Problem: Infection  Goal: Absence of Infection Signs and Symptoms  Outcome: Ongoing, Progressing  Intervention: Prevent or Manage Infection  Flowsheets (Taken 1/4/2022 0449)  Fever Reduction/Comfort Measures:   lightweight bedding   lightweight clothing  Infection Management: aseptic technique maintained  Isolation Precautions: precautions initiated     Problem: Fall Injury Risk  Goal: Absence of Fall and Fall-Related Injury  Outcome: Ongoing, Progressing

## 2022-01-04 NOTE — PT/OT/SLP PROGRESS
Physical Therapy Treatment    Patient Name:  Karin Carrera   MRN:  [id number]    Recommendations:     Discharge Recommendations:  rehabilitation facility   Discharge Equipment Recommendations: walker, jasbri   Barriers to discharge: Decreased caregiver support    Assessment:     Karin Carrera is a 51 y.o. male admitted with a medical diagnosis of Closed fracture of right hip.  He presents with the following impairments/functional limitations:  impaired functional mobilty,gait instability,impaired balance,decreased coordination,pain,abnormal tone,decreased ROM,orthopedic precautions Patient seems to have pain controlled at rest. Movement causing some spacticity in right le increasing pain. Walking difficult today due to spacticity and pain. Working with therapy. Seems very agreeable. Physician cleared patient for weight bearing as tolerated.     Rehab Prognosis: Good; patient would benefit from acute skilled PT services to address these deficits and reach maximum level of function.    Recent Surgery: Procedure(s) (LRB):  HEMIARTHROPLASTY, HIP (Right) 2 Days Post-Op    Plan:     During this hospitalization, patient to be seen 5 x/week to address the identified rehab impairments via gait training,therapeutic activities,therapeutic exercises and progress toward the following goals:    · Plan of Care Expires:  02/03/22    Subjective     Chief Complaint: Spacticity right le with movement  Patient/Family Comments/goals: Awaiting rehab placement per ortho nurse  Pain/Comfort:  · Pain Rating 1: 7/10  · Location - Side 1: Right  · Location 1: hip  · Pain Addressed 1: Pre-medicate for activity,Cessation of Activity      Objective:     Communicated with nurse prior to session.  Patient found supine with   upon PT entry to room.     General Precautions: Standard, fall   Orthopedic Precautions: RLE wbat  Braces:    Respiratory Status: Room air     Functional Mobility:  · Bed Mobility:     · Supine to Sit: minimum  assistance  · Transfers:     · Sit to Stand:  minimum assistance with hand-held assist  · Bed to Chair: moderate assistance with  hand-held assist  using  Stand Pivot  · Gait: 2 steps bed to chair. Limited by pain and spactisity       AM-PAC 6 CLICK MOBILITY  Turning over in bed (including adjusting bedclothes, sheets and blankets)?: 3  Sitting down on and standing up from a chair with arms (e.g., wheelchair, bedside commode, etc.): 3  Moving from lying on back to sitting on the side of the bed?: 3  Moving to and from a bed to a chair (including a wheelchair)?: 3  Need to walk in hospital room?: 2  Climbing 3-5 steps with a railing?: 1  Basic Mobility Total Score: 15       Therapeutic Activities and Exercises:   RLE: abd-add, saq, slr, aps 3x10 aarom    Patient left up in chair with call button in reach..    GOALS:   Multidisciplinary Problems     Physical Therapy Goals        Problem: Physical Therapy Goal    Goal Priority Disciplines Outcome Goal Variances Interventions   Physical Therapy Goal     PT, PT/OT Ongoing, Progressing     Description: Short Term Goals  Independent with HEP  Independent with cane x 100 feet PWB/WBAT: right lower extremity  Cga supine-sit-stand    Long term goals  Needed equipment for home.   Independent with hip precautsion                       Time Tracking:     PT Received On: 01/04/22  PT Start Time: 1010     PT Stop Time: 1030  PT Total Time (min): 20 min     Billable Minutes: Therapeutic Activity 25    Treatment Type: Treatment  PT/PTA: PT     PTA Visit Number: 0     01/04/2022

## 2022-01-04 NOTE — ASSESSMENT & PLAN NOTE
- acute  - R hip xr with femoral neck fracture superimposed on chronic changes  - ortho consult-- plans for surgical intervention tomorrow  - EKG with SR with incomplete R BBB  - CXR with cardiomegaly and chronic vascular congestion  - plans for surgical intervention tomorrow  - PT/OT/SS consulted for post op assistance  - heparin SQ x 1 dose tonight   - AC after surgery per surgery preference   - ALMEIDA score of 0.9% ALISA risk-- risk of MI or cardiac arrest intra op or up to 30 days post op  - RCRI score of 2 points; class III risk; 10.1% 30 day risk of death, MI, or cardiac arrest     Perioperative Risk Assessment:  Patient is at intermediate risk for perioperative cardiopulmonary complications.  This based on lack of thorough history, hx of CVA and CABG. However risk are stable and see no medical benefit in delaying surgery from medical standpoint.    Surgery 01/02    1/3  - stable post op  - hemovac in place  - PT/OT to see  - SS following for SWB assistance   1/4  - hemovac to be pulled today  - PT following  - baclofen added to spasticity

## 2022-01-04 NOTE — SUBJECTIVE & OBJECTIVE
Interval History: Pt resting in bed. Didn't sleep well overnight. Does have some pain to R hip- does endorse that it is better from yesterday. Asking to go smoke- informed that he couldn't do that but he does have a nicotine patch ordered- ok with that. PT to work with pt. SS following for placement. VSS, afebrile.     Review of Systems   Respiratory: Negative for chest tightness, shortness of breath and wheezing.    Cardiovascular: Negative for chest pain and palpitations.   Gastrointestinal: Negative for abdominal pain, constipation and nausea.   Musculoskeletal: Positive for gait problem and myalgias. Negative for back pain and neck stiffness.   Neurological: Positive for speech difficulty and weakness. Negative for dizziness and headaches.        Patient has right-sided hemiparesis and expressive aphasia from previous stroke  He is right-handed   Psychiatric/Behavioral: Positive for agitation.   All other systems reviewed and are negative.    Objective:     Vital Signs (Most Recent):  Temp: 98.5 °F (36.9 °C) (01/04/22 0742)  Pulse: 107 (01/04/22 0742)  Resp: 18 (01/04/22 0947)  BP: (!) 151/74 (01/04/22 0742)  SpO2: 98 % (01/04/22 0742) Vital Signs (24h Range):  Temp:  [98.2 °F (36.8 °C)-99.6 °F (37.6 °C)] 98.5 °F (36.9 °C)  Pulse:  [107-116] 107  Resp:  [18-20] 18  SpO2:  [96 %-98 %] 98 %  BP: (113-151)/(70-77) 151/74     Weight: 89.9 kg (198 lb 3.1 oz)  Body mass index is 26.15 kg/m².    Intake/Output Summary (Last 24 hours) at 1/4/2022 1102  Last data filed at 1/4/2022 0951  Gross per 24 hour   Intake --   Output 1640 ml   Net -1640 ml      Physical Exam  Vitals and nursing note reviewed.   Constitutional:       General: He is not in acute distress.  Eyes:      Pupils: Pupils are equal, round, and reactive to light.   Cardiovascular:      Rate and Rhythm: Normal rate and regular rhythm.      Pulses: Normal pulses.   Pulmonary:      Effort: Pulmonary effort is normal. No respiratory distress.      Breath  sounds: Normal breath sounds. No wheezing.   Abdominal:      General: Bowel sounds are normal. There is no distension.      Tenderness: There is no abdominal tenderness.   Musculoskeletal:        Legs:       Comments: hemovac noted to R hip with surgical dressing covering    Skin:     General: Skin is warm.   Neurological:      Mental Status: He is alert, oriented to person, place, and time and easily aroused. Mental status is at baseline.      Motor: Weakness present.      Comments: Dense right-sided hemiparesis and expressive aphasia in this right-handed patient with old CVA   Psychiatric:         Mood and Affect: Mood normal.         Behavior: Behavior normal.      Comments: Baseline mood and behavior for pt; able to communicate some- will use cell phone to text out some things; easily agitated          Significant Labs:   All pertinent labs within the past 24 hours have been reviewed.  Recent Lab Results     None          Significant Imaging: I have reviewed all pertinent imaging results/findings within the past 24 hours.

## 2022-01-04 NOTE — ASSESSMENT & PLAN NOTE
- chronic  - unclear amount pt does partake in daily   - drinks liquor instead of beer  - monitor for DTs   - banana bag daily- MVI, thiamine, and folic acid  - ativan PO scheduled to defer DTs   - ativan IV for breakthrough symptoms   1/4  - no s/s of withdrawals

## 2022-01-04 NOTE — PROGRESS NOTES
[facility] [place] Orthopedic  [facility] Medicine  Progress Note    Patient Name: Karin Carrera  MRN: [id number]  Patient Class: IP- Inpatient   Admission Date: 1/1/2022  Length of Stay: 3 days  Attending Physician: Sarahi Goodman MD  Primary Care Provider: Walker Smith MD        Subjective:     Principal Problem:Closed fracture of right hip        HPI:  Mr Carrera is a 52 yo male who presented to the ED via EMS for c/o right leg pain after a fall. Per EMS and ED staff, he was picked up from his vehicle outside of a friend's home. He states that he lives in his vehicle. It was noted that there were numerous liquor bottles in the vehicle and around him. He was combative with EMS and has been uncooperative with much of his interview during his stay in the ED. He uses profanity easily but is aphasic during much of the ROS and interview. He will use his cell phone to type out some answers. He is a poor historian due to this and his obvious drunken state. He denies any chest pain, shortness of breath, abdominal pain, F/C, N/V/D, or changes in vision. He has right sided hemiparesis as a results of his previous CVA- however still drives. Spoke with his brother, Silver, who provided no new information- he has not seen his brother since the day before yesterday. He does state that his brother does not live with him because of the alcohol use and other private reasons. Upon workup, it was noted that he had a right femoral neck fracture. His ETOH level was 188. He is being admitted to hospitalist services with a consult to orthopaedics for surgical intervention.     He has a PMH of CABG, CVA, HTN, GERD, ETOH abuse, and nicotine abuse. It is unclear when his CABG or CVA was; unable to find dates in previous records. It is unknown if he has had covid or been vaccinated. He follows with Dr Smith for primary care. He does not follow with anyone for cardiology. Per patient, he wishes to be a full code.     Brother-  [facility] [place]- [phone]       Overview/Hospital Course:  01/02 - patient seems in less pain but obviously still sore.  He is calm and cooperative.  No new issues reported.  Seen preoperatively.  As previous addressed okay with me to proceed as planned; no medical reason to delay.  Watch for alcohol withdrawals postoperatively even though patient denies daily alcohol use.  Look into placement needs.  Dr. Goodman to assume care patient for hospitalist in the a.m..    1/3: NAEO; some pain at R hip; PT/OT to see; SS to assist with placement; continue on DT prevention- pt did initially state he drank daily and is now saying he drinks every other day      Interval History: Pt resting in bed. Didn't sleep well overnight. Does have some pain to R hip- does endorse that it is better from yesterday. Asking to go smoke- informed that he couldn't do that but he does have a nicotine patch ordered- ok with that. PT to work with pt. SS following for placement. VSS, afebrile.     Review of Systems   Respiratory: Negative for chest tightness, shortness of breath and wheezing.    Cardiovascular: Negative for chest pain and palpitations.   Gastrointestinal: Negative for abdominal pain, constipation and nausea.   Musculoskeletal: Positive for gait problem and myalgias. Negative for back pain and neck stiffness.   Neurological: Positive for speech difficulty and weakness. Negative for dizziness and headaches.        Patient has right-sided hemiparesis and expressive aphasia from previous stroke  He is right-handed   Psychiatric/Behavioral: Positive for agitation.   All other systems reviewed and are negative.    Objective:     Vital Signs (Most Recent):  Temp: 98.5 °F (36.9 °C) (01/04/22 0742)  Pulse: 107 (01/04/22 0742)  Resp: 18 (01/04/22 0947)  BP: (!) 151/74 (01/04/22 0742)  SpO2: 98 % (01/04/22 0742) Vital Signs (24h Range):  Temp:  [98.2 °F (36.8 °C)-99.6 °F (37.6 °C)] 98.5 °F (36.9 °C)  Pulse:  [107-116] 107  Resp:  [18-20] 18  SpO2:   [96 %-98 %] 98 %  BP: (113-151)/(70-77) 151/74     Weight: 89.9 kg (198 lb 3.1 oz)  Body mass index is 26.15 kg/m².    Intake/Output Summary (Last 24 hours) at 1/4/2022 1102  Last data filed at 1/4/2022 0951  Gross per 24 hour   Intake --   Output 1640 ml   Net -1640 ml      Physical Exam  Vitals and nursing note reviewed.   Constitutional:       General: He is not in acute distress.  Eyes:      Pupils: Pupils are equal, round, and reactive to light.   Cardiovascular:      Rate and Rhythm: Normal rate and regular rhythm.      Pulses: Normal pulses.   Pulmonary:      Effort: Pulmonary effort is normal. No respiratory distress.      Breath sounds: Normal breath sounds. No wheezing.   Abdominal:      General: Bowel sounds are normal. There is no distension.      Tenderness: There is no abdominal tenderness.   Musculoskeletal:        Legs:       Comments: hemovac noted to R hip with surgical dressing covering    Skin:     General: Skin is warm.   Neurological:      Mental Status: He is alert, oriented to person, place, and time and easily aroused. Mental status is at baseline.      Motor: Weakness present.      Comments: Dense right-sided hemiparesis and expressive aphasia in this right-handed patient with old CVA   Psychiatric:         Mood and Affect: Mood normal.         Behavior: Behavior normal.      Comments: Baseline mood and behavior for pt; able to communicate some- will use cell phone to text out some things; easily agitated          Significant Labs:   All pertinent labs within the past 24 hours have been reviewed.  Recent Lab Results     None          Significant Imaging: I have reviewed all pertinent imaging results/findings within the past 24 hours.      Assessment/Plan:      * Closed fracture of right hip  - acute  - R hip xr with femoral neck fracture superimposed on chronic changes  - ortho consult-- plans for surgical intervention tomorrow  - EKG with SR with incomplete R BBB  - CXR with cardiomegaly  and chronic vascular congestion  - plans for surgical intervention tomorrow  - PT/OT/SS consulted for post op assistance  - heparin SQ x 1 dose tonight   - AC after surgery per surgery preference   - ALMEIDA score of 0.9% ALISA risk-- risk of MI or cardiac arrest intra op or up to 30 days post op  - RCRI score of 2 points; class III risk; 10.1% 30 day risk of death, MI, or cardiac arrest     Perioperative Risk Assessment:  Patient is at intermediate risk for perioperative cardiopulmonary complications.  This based on lack of thorough history, hx of CVA and CABG. However risk are stable and see no medical benefit in delaying surgery from medical standpoint.    Surgery 01/02    1/3  - stable post op  - hemovac in place  - PT/OT to see  - SS following for SWB assistance   1/4  - hemovac to be pulled today  - PT following  - baclofen added to spasticity       ETOH abuse  - chronic  - unclear amount pt does partake in daily   - drinks liquor instead of beer  - monitor for DTs   - banana bag daily- MVI, thiamine, and folic acid  - ativan PO scheduled to defer DTs   - ativan IV for breakthrough symptoms   1/4  - no s/s of withdrawals         Hx of CABG  - chronic, stable  - continue current meds  - cardiac monitoring   - EKG with SR with incomplete RBBB      GERD (gastroesophageal reflux disease)  - chronic, stable  - continue current meds       Hypertension  - chronic, stable  - continue current home meds  - monitor BP trend  - adjust meds as needed   1/4  - stable     Right sided weakness  - chronic, stable  - hx of CVA with R sided hemiparesis   -patient is right-handed  - continue statin   1/4  - PT following  - plans for SWB       VTE Risk Mitigation (From admission, onward)         Ordered     apixaban tablet 2.5 mg  2 times daily         01/02/22 1916     IP VTE HIGH RISK PATIENT  Once         01/02/22 1916     Place SAGE hose  Until discontinued         01/02/22 1916     Place sequential compression device  Until  discontinued         01/02/22 1916     Place sequential compression device  Until discontinued         01/01/22 1707                Discharge Planning   DEANNA:      Code Status: Full Code   Is the patient medically ready for discharge?:     Reason for patient still in hospital (select all that apply): Treatment  Discharge Plan A: Rehab ([facility])          ISADORA Link  Department of Hospital Medicine   [facility] - Orthopedic

## 2022-01-04 NOTE — ASSESSMENT & PLAN NOTE
- chronic, stable  - hx of CVA with R sided hemiparesis   -patient is right-handed  - continue statin   1/4  - PT following  - plans for SWB

## 2022-01-04 NOTE — PLAN OF CARE
Problem: Adult Inpatient Plan of Care  Goal: Plan of Care Review  Outcome: Ongoing, Progressing  Goal: Patient-Specific Goal (Individualized)  Outcome: Ongoing, Progressing  Goal: Absence of Hospital-Acquired Illness or Injury  Outcome: Ongoing, Progressing  Intervention: Prevent Skin Injury  Flowsheets (Taken 1/4/2022 1741)  Skin Protection:   adhesive use limited   incontinence pads utilized  Intervention: Prevent and Manage VTE (Venous Thromboembolism) Risk  Flowsheets (Taken 1/4/2022 1741)  Range of Motion: active ROM (range of motion) encouraged  Intervention: Prevent Infection  Flowsheets (Taken 1/4/2022 1741)  Infection Prevention:   hand hygiene promoted   single patient room provided   rest/sleep promoted  Goal: Optimal Comfort and Wellbeing  Outcome: Ongoing, Progressing  Intervention: Monitor Pain and Promote Comfort  Flowsheets (Taken 1/4/2022 1741)  Pain Management Interventions:   relaxation techniques promoted   quiet environment facilitated  Intervention: Provide Person-Centered Care  Flowsheets (Taken 1/4/2022 1741)  Trust Relationship/Rapport:   care explained   choices provided   emotional support provided   empathic listening provided   thoughts/feelings acknowledged   reassurance provided   questions encouraged   questions answered  Goal: Readiness for Transition of Care  Outcome: Ongoing, Progressing     Problem: Skin Injury Risk Increased  Goal: Skin Health and Integrity  Outcome: Ongoing, Progressing  Intervention: Optimize Skin Protection  Flowsheets (Taken 1/4/2022 1741)  Skin Protection:   adhesive use limited   incontinence pads utilized  Intervention: Promote and Optimize Oral Intake  Flowsheets (Taken 1/4/2022 1741)  Oral Nutrition Promotion: physical activity promoted     Problem: Infection  Goal: Absence of Infection Signs and Symptoms  Outcome: Ongoing, Progressing     Problem: Fall Injury Risk  Goal: Absence of Fall and Fall-Related Injury  Outcome: Ongoing,  Progressing  Intervention: Identify and Manage Contributors  Flowsheets (Taken 1/4/2022 1646)  Self-Care Promotion: independence encouraged  Medication Review/Management: medications reviewed   Plan of care reviewed. Patient progressing

## 2022-01-05 PROBLEM — E87.6 HYPOKALEMIA: Status: ACTIVE | Noted: 2022-01-05

## 2022-01-05 LAB
ALBUMIN SERPL BCP-MCNC: 2.5 G/DL (ref 3.5–5)
ALBUMIN/GLOB SERPL: 0.6 {RATIO}
ALP SERPL-CCNC: 114 U/L (ref 45–115)
ALT SERPL W P-5'-P-CCNC: 13 U/L (ref 16–61)
ANION GAP SERPL CALCULATED.3IONS-SCNC: 12 MMOL/L (ref 7–16)
ANISOCYTOSIS BLD QL SMEAR: ABNORMAL
AST SERPL W P-5'-P-CCNC: 25 U/L (ref 15–37)
BASOPHILS # BLD AUTO: 0.03 K/UL (ref 0–0.2)
BASOPHILS NFR BLD AUTO: 0.4 % (ref 0–1)
BILIRUB SERPL-MCNC: 0.7 MG/DL (ref 0–1.2)
BUN SERPL-MCNC: 5 MG/DL (ref 7–18)
BUN/CREAT SERPL: 9 (ref 6–20)
CALCIUM SERPL-MCNC: 8 MG/DL (ref 8.5–10.1)
CHLORIDE SERPL-SCNC: 102 MMOL/L (ref 98–107)
CO2 SERPL-SCNC: 25 MMOL/L (ref 21–32)
CREAT SERPL-MCNC: 0.56 MG/DL (ref 0.7–1.3)
DIFFERENTIAL METHOD BLD: ABNORMAL
EOSINOPHIL # BLD AUTO: 0.16 K/UL (ref 0–0.5)
EOSINOPHIL NFR BLD AUTO: 2.1 % (ref 1–4)
ERYTHROCYTE [DISTWIDTH] IN BLOOD BY AUTOMATED COUNT: 17.2 % (ref 11.5–14.5)
GLOBULIN SER-MCNC: 4.4 G/DL (ref 2–4)
GLUCOSE SERPL-MCNC: 163 MG/DL (ref 74–106)
HCT VFR BLD AUTO: 35.5 % (ref 40–54)
HGB BLD-MCNC: 11.4 G/DL (ref 13.5–18)
HYPOCHROMIA BLD QL SMEAR: ABNORMAL
IMM GRANULOCYTES # BLD AUTO: 0.03 K/UL (ref 0–0.04)
IMM GRANULOCYTES NFR BLD: 0.4 % (ref 0–0.4)
LYMPHOCYTES # BLD AUTO: 1.46 K/UL (ref 1–4.8)
LYMPHOCYTES NFR BLD AUTO: 19.1 % (ref 27–41)
MAGNESIUM SERPL-MCNC: 2.1 MG/DL (ref 1.7–2.3)
MCH RBC QN AUTO: 23.1 PG (ref 27–31)
MCHC RBC AUTO-ENTMCNC: 32.1 G/DL (ref 32–36)
MCV RBC AUTO: 72 FL (ref 80–96)
MICROCYTES BLD QL SMEAR: ABNORMAL
MONOCYTES # BLD AUTO: 0.76 K/UL (ref 0–0.8)
MONOCYTES NFR BLD AUTO: 10 % (ref 2–6)
MPC BLD CALC-MCNC: 9.3 FL (ref 9.4–12.4)
NEUTROPHILS # BLD AUTO: 5.19 K/UL (ref 1.8–7.7)
NEUTROPHILS NFR BLD AUTO: 68 % (ref 53–65)
NRBC # BLD AUTO: 0 X10E3/UL
NRBC, AUTO (.00): 0 %
OVALOCYTES BLD QL SMEAR: ABNORMAL
PLATELET # BLD AUTO: 215 K/UL (ref 150–400)
PLATELET MORPHOLOGY: ABNORMAL
POLYCHROMASIA BLD QL SMEAR: ABNORMAL
POTASSIUM SERPL-SCNC: 3.2 MMOL/L (ref 3.5–5.1)
PROT SERPL-MCNC: 6.9 G/DL (ref 6.4–8.2)
RBC # BLD AUTO: 4.93 M/UL (ref 4.6–6.2)
SODIUM SERPL-SCNC: 136 MMOL/L (ref 136–145)
WBC # BLD AUTO: 7.63 K/UL (ref 4.5–11)

## 2022-01-05 PROCEDURE — 97110 THERAPEUTIC EXERCISES: CPT

## 2022-01-05 PROCEDURE — 25000003 PHARM REV CODE 250: Performed by: HOSPITALIST

## 2022-01-05 PROCEDURE — 25000003 PHARM REV CODE 250: Performed by: ORTHOPAEDIC SURGERY

## 2022-01-05 PROCEDURE — 99232 SBSQ HOSP IP/OBS MODERATE 35: CPT | Mod: ,,, | Performed by: HOSPITALIST

## 2022-01-05 PROCEDURE — 83735 ASSAY OF MAGNESIUM: CPT | Performed by: NURSE PRACTITIONER

## 2022-01-05 PROCEDURE — 63600175 PHARM REV CODE 636 W HCPCS: Performed by: NURSE PRACTITIONER

## 2022-01-05 PROCEDURE — 80053 COMPREHEN METABOLIC PANEL: CPT | Performed by: NURSE PRACTITIONER

## 2022-01-05 PROCEDURE — 99232 PR SUBSEQUENT HOSPITAL CARE,LEVL II: ICD-10-PCS | Mod: ,,, | Performed by: HOSPITALIST

## 2022-01-05 PROCEDURE — 36415 COLL VENOUS BLD VENIPUNCTURE: CPT | Performed by: NURSE PRACTITIONER

## 2022-01-05 PROCEDURE — 97530 THERAPEUTIC ACTIVITIES: CPT

## 2022-01-05 PROCEDURE — 85025 COMPLETE CBC W/AUTO DIFF WBC: CPT | Performed by: NURSE PRACTITIONER

## 2022-01-05 PROCEDURE — 11000001 HC ACUTE MED/SURG PRIVATE ROOM

## 2022-01-05 PROCEDURE — 25000003 PHARM REV CODE 250: Performed by: NURSE PRACTITIONER

## 2022-01-05 PROCEDURE — S4991 NICOTINE PATCH NONLEGEND: HCPCS | Performed by: HOSPITALIST

## 2022-01-05 PROCEDURE — 97116 GAIT TRAINING THERAPY: CPT

## 2022-01-05 RX ORDER — POTASSIUM CHLORIDE 20 MEQ/1
40 TABLET, EXTENDED RELEASE ORAL 2 TIMES DAILY
Status: DISCONTINUED | OUTPATIENT
Start: 2022-01-05 | End: 2022-01-12

## 2022-01-05 RX ADMIN — BACLOFEN 5 MG: 5 TABLET ORAL at 04:01

## 2022-01-05 RX ADMIN — APIXABAN 2.5 MG: 2.5 TABLET, FILM COATED ORAL at 09:01

## 2022-01-05 RX ADMIN — BACLOFEN 5 MG: 5 TABLET ORAL at 09:01

## 2022-01-05 RX ADMIN — POLYETHYLENE GLYCOL 3350 17 G: 17 POWDER, FOR SOLUTION ORAL at 10:01

## 2022-01-05 RX ADMIN — APIXABAN 2.5 MG: 2.5 TABLET, FILM COATED ORAL at 10:01

## 2022-01-05 RX ADMIN — ASPIRIN 81 MG: 81 TABLET, COATED ORAL at 10:01

## 2022-01-05 RX ADMIN — POTASSIUM CHLORIDE 40 MEQ: 20 TABLET, EXTENDED RELEASE ORAL at 09:01

## 2022-01-05 RX ADMIN — HYDROCODONE BITARTRATE AND ACETAMINOPHEN 1 TABLET: 5; 325 TABLET ORAL at 10:01

## 2022-01-05 RX ADMIN — BACLOFEN 5 MG: 5 TABLET ORAL at 10:01

## 2022-01-05 RX ADMIN — THERA TABS 1 TABLET: TAB at 10:01

## 2022-01-05 RX ADMIN — POTASSIUM CHLORIDE 40 MEQ: 20 TABLET, EXTENDED RELEASE ORAL at 10:01

## 2022-01-05 RX ADMIN — MUPIROCIN 1 G: 20 OINTMENT TOPICAL at 10:01

## 2022-01-05 RX ADMIN — NICOTINE 1 PATCH: 14 PATCH, EXTENDED RELEASE TRANSDERMAL at 10:01

## 2022-01-05 RX ADMIN — PANTOPRAZOLE SODIUM 40 MG: 40 TABLET, DELAYED RELEASE ORAL at 10:01

## 2022-01-05 RX ADMIN — LORAZEPAM 0.5 MG: 0.5 TABLET ORAL at 04:01

## 2022-01-05 RX ADMIN — AMLODIPINE BESYLATE 10 MG: 10 TABLET ORAL at 10:01

## 2022-01-05 RX ADMIN — MUPIROCIN 1 G: 20 OINTMENT TOPICAL at 09:01

## 2022-01-05 RX ADMIN — LORAZEPAM 0.5 MG: 0.5 TABLET ORAL at 09:01

## 2022-01-05 RX ADMIN — Medication 1000 UNITS: at 10:01

## 2022-01-05 RX ADMIN — ATORVASTATIN CALCIUM 40 MG: 20 TABLET, FILM COATED ORAL at 09:01

## 2022-01-05 NOTE — PT/OT/SLP PROGRESS
Occupational Therapy   Treatment    Name: Karin Carrera  MRN: [id number]  Admitting Diagnosis:  Closed fracture of right hip  3 Days Post-Op    Recommendations:     Discharge Recommendations: nursing facility, skilled  Discharge Equipment Recommendations:  walker, jasbir  Barriers to discharge:  Decreased caregiver support,Inaccessible home environment    Assessment:     Karin Carrera is a 51 y.o. male with a medical diagnosis of Closed fracture of right hip.  He presents with s/p R hip hemiarthroplasty. Performance deficits affecting function are impaired self care skills,impaired functional mobilty,decreased upper extremity function,decreased lower extremity function,gait instability,impaired balance. Pt requesting sitting up in bed with nurse present upon arrival. Pt requesting to find wallet. Pt agreeable to perform ADL t/f to chair while therapist looked for wallet.     Rehab Prognosis:  Fair; patient would benefit from acute skilled OT services to address these deficits and reach maximum level of function.       Plan:     Patient to be seen 5 x/week to address the above listed problems via self-care/home management,therapeutic activities,therapeutic exercises  · Plan of Care Expires: 01/31/22  · Plan of Care Reviewed with: patient    Subjective     Pain/Comfort:  · Pain Rating 1: 5/10  · Location - Side 1: Right  · Location 1: hip    Objective:     Communicated with: COLTON Jennings prior to session.  Patient found sitting up in bed with peripheral IV,telemetry upon OT entry to room.    General Precautions: Standard, fall   Orthopedic Precautions:RLE posterior precautions,RLE partial weight bearing   Braces:    Respiratory Status: Room air     Occupational Performance:     Bed Mobility:    · Not performed     Functional Mobility/Transfers:  · Patient completed Sit <> Stand Transfer with contact guard assistance  with  hand-held assist   · Patient completed Bed <> Chair Transfer using Stand Pivot technique  with contact guard assistance with hand-held assist  · Functional Mobility: not performed    Activities of Daily Living:  · Not performed      AMPAC 6 Click ADL:      Treatment & Education:  Pt completed ADL t/f to bedside chair with hand held assist. Pt with verbal cueing needed for safety and to maintain precautions.     Patient left up in chair with all lines intact, call button in reach and Anuj, PT presentEducation:      GOALS:   Multidisciplinary Problems     Occupational Therapy Goals        Problem: Occupational Therapy Goal    Goal Priority Disciplines Outcome Interventions   Occupational Therapy Goal     OT, PT/OT Ongoing, Progressing    Description: ST.Pt will perform bathing with Ofelia with setup at EOB  2.Pt will perform UE dressing with Ofelia  3.Pt will perform LE dressing with Ofelia with adaptive equipment with maintaining hip precautions  4.Pt will transfer bed/chair/bsc with CGA with AD  5.Pt will perform standing task x 3 min with CGA with maintaining weightbearing status  6.Tolerate 15 min of tx without fatigue.      LTG:   Restore to max I with selfcare and mobility.                       Time Tracking:     OT Date of Treatment: 22  OT Start Time: 1320  OT Stop Time: 1330  OT Total Time (min): 10 min    Billable Minutes:Therapeutic Activity 10 minutes    OT/ANGELI: OT          2022

## 2022-01-05 NOTE — PROGRESS NOTES
[facility] Orthopedic  [facility] Medicine  Progress Note    Patient Name: Karin Carrera  MRN: [id number]  Patient Class: IP- Inpatient   Admission Date: 1/1/2022  Length of Stay: 4 days  Attending Physician: Sarahi Goodman MD  Primary Care Provider: Walker Smith MD        Subjective:     Principal Problem:Closed fracture of right hip        HPI:  Mr Carrera is a 52 yo male who presented to the ED via EMS for c/o right leg pain after a fall. Per EMS and ED staff, he was picked up from his vehicle outside of a friend's home. He states that he lives in his vehicle. It was noted that there were numerous liquor bottles in the vehicle and around him. He was combative with EMS and has been uncooperative with much of his interview during his stay in the ED. He uses profanity easily but is aphasic during much of the ROS and interview. He will use his cell phone to type out some answers. He is a poor historian due to this and his obvious drunken state. He denies any chest pain, shortness of breath, abdominal pain, F/C, N/V/D, or changes in vision. He has right sided hemiparesis as a results of his previous CVA- however still drives. Spoke with his brother, Silver, who provided no new information- he has not seen his brother since the day before yesterday. He does state that his brother does not live with him because of the alcohol use and other private reasons. Upon workup, it was noted that he had a right femoral neck fracture. His ETOH level was 188. He is being admitted to hospitalist services with a consult to orthopaedics for surgical intervention.     He has a PMH of CABG, CVA, HTN, GERD, ETOH abuse, and nicotine abuse. It is unclear when his CABG or CVA was; unable to find dates in previous records. It is unknown if he has had covid or been vaccinated. He follows with Dr Smith for primary care. He does not follow with anyone for cardiology. Per patient, he wishes to be a full code.     Brother-  [facility]- [phone]       Overview/Hospital Course:  01/02 - patient seems in less pain but obviously still sore.  He is calm and cooperative.  No new issues reported.  Seen preoperatively.  As previous addressed okay with me to proceed as planned; no medical reason to delay.  Watch for alcohol withdrawals postoperatively even though patient denies daily alcohol use.  Look into placement needs.  Dr. Goodman to assume care patient for hospitalist in the a.m..    1/3: NAEO; some pain at R hip; PT/OT to see; SS to assist with placement; continue on DT prevention- pt did initially state he drank daily and is now saying he drinks every other day    1/4: NAEO; no s/s of withdrawals; asking for snuff; PT following- having some issues with spasticity-- baclofen started; continue DT prevention; SS following for SWB       Interval History: NAEO.Pt is agitated today wants tobacco.    Review of Systems   Constitutional: Positive for activity change.   Respiratory: Negative for chest tightness, shortness of breath and wheezing.    Cardiovascular: Negative for chest pain and palpitations.   Gastrointestinal: Negative for abdominal pain, constipation and nausea.   Musculoskeletal: Positive for gait problem and myalgias. Negative for back pain and neck stiffness.   Neurological: Positive for speech difficulty and weakness. Negative for dizziness and headaches.        Patient has right-sided hemiparesis and expressive aphasia from previous stroke  He is right-handed   Psychiatric/Behavioral: Positive for agitation.   All other systems reviewed and are negative.    Objective:     Vital Signs (Most Recent):  Temp: 99.2 °F (37.3 °C) (01/05/22 0400)  Pulse: 108 (01/05/22 0400)  Resp: 18 (01/05/22 0400)  BP: (!) 150/71 (01/05/22 0400)  SpO2: 97 % (01/05/22 0400) Vital Signs (24h Range):  Temp:  [98 °F (36.7 °C)-99.2 °F (37.3 °C)] 99.2 °F (37.3 °C)  Pulse:  [108-116] 108  Resp:  [17-18] 18  SpO2:  [97 %-100 %] 97 %  BP: (126-150)/(66-74)  150/71     Weight: 89.9 kg (198 lb 3.1 oz)  Body mass index is 26.15 kg/m².    Intake/Output Summary (Last 24 hours) at 1/5/2022 0957  Last data filed at 1/5/2022 0600  Gross per 24 hour   Intake --   Output 1000 ml   Net -1000 ml      Physical Exam  Vitals and nursing note reviewed.   Constitutional:       General: He is not in acute distress.  Eyes:      Pupils: Pupils are equal, round, and reactive to light.   Cardiovascular:      Rate and Rhythm: Normal rate and regular rhythm.      Pulses: Normal pulses.   Pulmonary:      Effort: Pulmonary effort is normal. No respiratory distress.      Breath sounds: Normal breath sounds. No wheezing.   Abdominal:      General: Bowel sounds are normal. There is no distension.      Tenderness: There is no abdominal tenderness.   Musculoskeletal:        Legs:       Comments: hemovac noted to R hip with surgical dressing covering    Skin:     General: Skin is warm.   Neurological:      Mental Status: He is alert, oriented to person, place, and time and easily aroused. Mental status is at baseline.      Motor: Weakness present.      Comments: Dense right-sided hemiparesis and expressive aphasia in this right-handed patient with old CVA   Psychiatric:         Mood and Affect: Mood normal.      Comments: Baseline mood and behavior for pt; able to communicate some- will use cell phone to text out some things; easily agitated          Significant Labs: All pertinent labs within the past 24 hours have been reviewed.    Significant Imaging: I have reviewed all pertinent imaging results/findings within the past 24 hours.      Assessment/Plan:      * Closed fracture of right hip  - acute  - R hip xr with femoral neck fracture superimposed on chronic changes  - ortho consult-- plans for surgical intervention tomorrow  - EKG with SR with incomplete R BBB  - CXR with cardiomegaly and chronic vascular congestion  - plans for surgical intervention tomorrow  - PT/OT/SS consulted for post op  assistance  - heparin SQ x 1 dose tonight   - AC after surgery per surgery preference   - ALMEIDA score of 0.9% ALISA risk-- risk of MI or cardiac arrest intra op or up to 30 days post op  - RCRI score of 2 points; class III risk; 10.1% 30 day risk of death, MI, or cardiac arrest     Perioperative Risk Assessment:  Patient is at intermediate risk for perioperative cardiopulmonary complications.  This based on lack of thorough history, hx of CVA and CABG. However risk are stable and see no medical benefit in delaying surgery from medical standpoint.    Surgery 01/02    1/3  - stable post op  - hemovac in place  - PT/OT to see  - SS following for SWB assistance   1/4  - hemovac to be pulled today  - PT following  - baclofen added to spasticity       Hypokalemia  1/5/21  40 MEQ Potassium bid  Daily Bmp      ETOH abuse  - chronic  - unclear amount pt does partake in daily   - drinks liquor instead of beer  - monitor for DTs   - banana bag daily- MVI, thiamine, and folic acid  - ativan PO scheduled to defer DTs   - ativan IV for breakthrough symptoms   1/4  - no s/s of withdrawals   1/5  - - no s/s of DT'S            Hx of CABG  - chronic, stable  - continue current meds  - cardiac monitoring   - EKG with SR with incomplete RBBB      GERD (gastroesophageal reflux disease)  - chronic, stable  - continue current meds       Hypertension  - chronic, stable  - continue current home meds  - monitor BP trend  - adjust meds as needed   1/4  - stable     Right sided weakness  - chronic, stable  - hx of CVA with R sided hemiparesis   -patient is right-handed  - continue statin   1/4  - PT following  - plans for SWB         VTE Risk Mitigation (From admission, onward)         Ordered     apixaban tablet 2.5 mg  2 times daily         01/02/22 1916     IP VTE HIGH RISK PATIENT  Once         01/02/22 1916     Place SAGE hose  Until discontinued         01/02/22 1916     Place sequential compression device  Until discontinued          01/02/22 1916     Place sequential compression device  Until discontinued         01/01/22 1707                Discharge Planning   DEANNA:      Code Status: Full Code   Is the patient medically ready for discharge?:     Reason for patient still in hospital (select all that apply): Treatment  Discharge Plan A: Rehab ([facility])                  ALINA Kuhn  Department of Hospital Medicine   [facility] - Orthopedic

## 2022-01-05 NOTE — PLAN OF CARE
SS was consulted on pt for help finding pt's vehicle. Pt does not know where his van is located at but per History and Physical pt's van is in friends front yard. Unable to reach any contacts listed.

## 2022-01-05 NOTE — SUBJECTIVE & OBJECTIVE
Interval History: NAEO.Pt is agitated today wants tobacco.    Review of Systems   Constitutional: Positive for activity change.   Respiratory: Negative for chest tightness, shortness of breath and wheezing.    Cardiovascular: Negative for chest pain and palpitations.   Gastrointestinal: Negative for abdominal pain, constipation and nausea.   Musculoskeletal: Positive for gait problem and myalgias. Negative for back pain and neck stiffness.   Neurological: Positive for speech difficulty and weakness. Negative for dizziness and headaches.        Patient has right-sided hemiparesis and expressive aphasia from previous stroke  He is right-handed   Psychiatric/Behavioral: Positive for agitation.   All other systems reviewed and are negative.    Objective:     Vital Signs (Most Recent):  Temp: 99.2 °F (37.3 °C) (01/05/22 0400)  Pulse: 108 (01/05/22 0400)  Resp: 18 (01/05/22 0400)  BP: (!) 150/71 (01/05/22 0400)  SpO2: 97 % (01/05/22 0400) Vital Signs (24h Range):  Temp:  [98 °F (36.7 °C)-99.2 °F (37.3 °C)] 99.2 °F (37.3 °C)  Pulse:  [108-116] 108  Resp:  [17-18] 18  SpO2:  [97 %-100 %] 97 %  BP: (126-150)/(66-74) 150/71     Weight: 89.9 kg (198 lb 3.1 oz)  Body mass index is 26.15 kg/m².    Intake/Output Summary (Last 24 hours) at 1/5/2022 0957  Last data filed at 1/5/2022 0600  Gross per 24 hour   Intake --   Output 1000 ml   Net -1000 ml      Physical Exam  Vitals and nursing note reviewed.   Constitutional:       General: He is not in acute distress.  Eyes:      Pupils: Pupils are equal, round, and reactive to light.   Cardiovascular:      Rate and Rhythm: Normal rate and regular rhythm.      Pulses: Normal pulses.   Pulmonary:      Effort: Pulmonary effort is normal. No respiratory distress.      Breath sounds: Normal breath sounds. No wheezing.   Abdominal:      General: Bowel sounds are normal. There is no distension.      Tenderness: There is no abdominal tenderness.   Musculoskeletal:        Legs:       Comments:  hemovac noted to R hip with surgical dressing covering    Skin:     General: Skin is warm.   Neurological:      Mental Status: He is alert, oriented to person, place, and time and easily aroused. Mental status is at baseline.      Motor: Weakness present.      Comments: Dense right-sided hemiparesis and expressive aphasia in this right-handed patient with old CVA   Psychiatric:         Mood and Affect: Mood normal.      Comments: Baseline mood and behavior for pt; able to communicate some- will use cell phone to text out some things; easily agitated          Significant Labs: All pertinent labs within the past 24 hours have been reviewed.    Significant Imaging: I have reviewed all pertinent imaging results/findings within the past 24 hours.

## 2022-01-05 NOTE — ASSESSMENT & PLAN NOTE
- chronic  - unclear amount pt does partake in daily   - drinks liquor instead of beer  - monitor for DTs   - banana bag daily- MVI, thiamine, and folic acid  - ativan PO scheduled to defer DTs   - ativan IV for breakthrough symptoms   1/4  - no s/s of withdrawals   1/5  - - no s/s of DT'S

## 2022-01-05 NOTE — CARE UPDATE
Patient awake and alert.  Does have the aphasia.  Right lower extremity neurovascularly unchanged.  He has spasticity is right lower extremity.  He has had a stroke with right-sided weakness.  He can bear weight to transfer.  Continue with therapy.  Hopefully can get to swing bed if not once he is stable can go home with he has someone to help care for him.  Dressing change today.  Drain is DC.

## 2022-01-06 PROBLEM — I50.43 ACUTE ON CHRONIC COMBINED SYSTOLIC AND DIASTOLIC HEART FAILURE: Status: ACTIVE | Noted: 2022-01-06

## 2022-01-06 LAB
ALBUMIN SERPL BCP-MCNC: 2.4 G/DL (ref 3.5–5)
ALBUMIN/GLOB SERPL: 0.6 {RATIO}
ALP SERPL-CCNC: 109 U/L (ref 45–115)
ALT SERPL W P-5'-P-CCNC: 25 U/L (ref 16–61)
ANION GAP SERPL CALCULATED.3IONS-SCNC: 15 MMOL/L (ref 7–16)
ANISOCYTOSIS BLD QL SMEAR: NORMAL
AORTIC ROOT ANNULUS: 2.8 CM
AORTIC VALVE CUSP SEPERATION: 21.3 CM
AST SERPL W P-5'-P-CCNC: 22 U/L (ref 15–37)
AV INDEX (PROSTH): 0.79
AV MEAN GRADIENT: 2 MMHG
AV PEAK GRADIENT: 3 MMHG
AV VALVE AREA: 2.99 CM2
AV VELOCITY RATIO: 0.89
BASOPHILS # BLD AUTO: 0.03 K/UL (ref 0–0.2)
BASOPHILS NFR BLD AUTO: 0.5 % (ref 0–1)
BILIRUB SERPL-MCNC: 0.4 MG/DL (ref 0–1.2)
BSA FOR ECHO PROCEDURE: 2.15 M2
BUN SERPL-MCNC: 10 MG/DL (ref 7–18)
BUN/CREAT SERPL: 14 (ref 6–20)
CALCIUM SERPL-MCNC: 8 MG/DL (ref 8.5–10.1)
CHLORIDE SERPL-SCNC: 104 MMOL/L (ref 98–107)
CO2 SERPL-SCNC: 24 MMOL/L (ref 21–32)
CREAT SERPL-MCNC: 0.74 MG/DL (ref 0.7–1.3)
CV ECHO LV RWT: 0.31 CM
DIFFERENTIAL METHOD BLD: ABNORMAL
DOP CALC AO PEAK VEL: 0.9 M/S
DOP CALC AO VTI: 14 CM
DOP CALC LVOT AREA: 3.8 CM2
DOP CALC LVOT DIAMETER: 2.2 CM
DOP CALC LVOT PEAK VEL: 0.8 M/S
DOP CALC LVOT STROKE VOLUME: 41.79 CM3
DOP CALCLVOT PEAK VEL VTI: 11 CM
E WAVE DECELERATION TIME: 158 MSEC
ECHO EF ESTIMATED: 20 %
ECHO LV POSTERIOR WALL: 1.02 CM (ref 0.6–1.1)
EJECTION FRACTION: 20 %
EOSINOPHIL # BLD AUTO: 0.24 K/UL (ref 0–0.5)
EOSINOPHIL NFR BLD AUTO: 3.9 % (ref 1–4)
ERYTHROCYTE [DISTWIDTH] IN BLOOD BY AUTOMATED COUNT: 17.2 % (ref 11.5–14.5)
ESTROGEN SERPL-MCNC: NORMAL PG/ML
FRACTIONAL SHORTENING: 8 % (ref 28–44)
GLOBULIN SER-MCNC: 4.1 G/DL (ref 2–4)
GLUCOSE SERPL-MCNC: 250 MG/DL (ref 74–106)
HCT VFR BLD AUTO: 31.4 % (ref 40–54)
HGB BLD-MCNC: 10.3 G/DL (ref 13.5–18)
HYPOCHROMIA BLD QL SMEAR: NORMAL
IMM GRANULOCYTES # BLD AUTO: 0.02 K/UL (ref 0–0.04)
IMM GRANULOCYTES NFR BLD: 0.3 % (ref 0–0.4)
INTERVENTRICULAR SEPTUM: 0.98 CM (ref 0.6–1.1)
IVC OSTIUM: 0.7 CM
LAB AP CLINICAL INFORMATION: NORMAL
LAB AP GROSS DESCRIPTION: NORMAL
LAB AP LABORATORY NOTES: NORMAL
LEFT ATRIUM SIZE: 3.5 CM
LEFT INTERNAL DIMENSION IN SYSTOLE: 6 CM (ref 2.1–4)
LEFT VENTRICLE DIASTOLIC VOLUME INDEX: 101.64 ML/M2
LEFT VENTRICLE DIASTOLIC VOLUME: 217.5 ML
LEFT VENTRICLE MASS INDEX: 133 G/M2
LEFT VENTRICLE SYSTOLIC VOLUME INDEX: 84.1 ML/M2
LEFT VENTRICLE SYSTOLIC VOLUME: 180 ML
LEFT VENTRICULAR INTERNAL DIMENSION IN DIASTOLE: 6.52 CM (ref 3.5–6)
LEFT VENTRICULAR MASS: 284.56 G
LVOT MG: 1 MMHG
LYMPHOCYTES # BLD AUTO: 1.49 K/UL (ref 1–4.8)
LYMPHOCYTES NFR BLD AUTO: 24.5 % (ref 27–41)
MAGNESIUM SERPL-MCNC: 2.1 MG/DL (ref 1.7–2.3)
MCH RBC QN AUTO: 23.4 PG (ref 27–31)
MCHC RBC AUTO-ENTMCNC: 32.8 G/DL (ref 32–36)
MCV RBC AUTO: 71.4 FL (ref 80–96)
MICROCYTES BLD QL SMEAR: NORMAL
MONOCYTES # BLD AUTO: 0.69 K/UL (ref 0–0.8)
MONOCYTES NFR BLD AUTO: 11.3 % (ref 2–6)
MPC BLD CALC-MCNC: 9.3 FL (ref 9.4–12.4)
MV PEAK E VEL: 0.56 M/S
NEUTROPHILS # BLD AUTO: 3.61 K/UL (ref 1.8–7.7)
NEUTROPHILS NFR BLD AUTO: 59.5 % (ref 53–65)
NRBC # BLD AUTO: 0 X10E3/UL
NRBC, AUTO (.00): 0 %
OVALOCYTES BLD QL SMEAR: NORMAL
PLATELET # BLD AUTO: 245 K/UL (ref 150–400)
PLATELET MORPHOLOGY: NORMAL
POLYCHROMASIA BLD QL SMEAR: NORMAL
POTASSIUM SERPL-SCNC: 3.7 MMOL/L (ref 3.5–5.1)
PROT SERPL-MCNC: 6.5 G/DL (ref 6.4–8.2)
RA MAJOR: 3.1 CM
RA PRESSURE: 3 MMHG
RBC # BLD AUTO: 4.4 M/UL (ref 4.6–6.2)
RIGHT VENTRICULAR END-DIASTOLIC DIMENSION: 2.5 CM
SODIUM SERPL-SCNC: 139 MMOL/L (ref 136–145)
T3RU NFR SERPL: NORMAL %
TRICUSPID ANNULAR PLANE SYSTOLIC EXCURSION: 1.7 CM
WBC # BLD AUTO: 6.08 K/UL (ref 4.5–11)

## 2022-01-06 PROCEDURE — 97110 THERAPEUTIC EXERCISES: CPT

## 2022-01-06 PROCEDURE — 63600175 PHARM REV CODE 636 W HCPCS: Performed by: NURSE PRACTITIONER

## 2022-01-06 PROCEDURE — 25000003 PHARM REV CODE 250: Performed by: HOSPITALIST

## 2022-01-06 PROCEDURE — 85025 COMPLETE CBC W/AUTO DIFF WBC: CPT | Performed by: NURSE PRACTITIONER

## 2022-01-06 PROCEDURE — 80053 COMPREHEN METABOLIC PANEL: CPT | Performed by: NURSE PRACTITIONER

## 2022-01-06 PROCEDURE — 36415 COLL VENOUS BLD VENIPUNCTURE: CPT | Performed by: NURSE PRACTITIONER

## 2022-01-06 PROCEDURE — S4991 NICOTINE PATCH NONLEGEND: HCPCS | Performed by: HOSPITALIST

## 2022-01-06 PROCEDURE — 83735 ASSAY OF MAGNESIUM: CPT | Performed by: NURSE PRACTITIONER

## 2022-01-06 PROCEDURE — 11000001 HC ACUTE MED/SURG PRIVATE ROOM

## 2022-01-06 PROCEDURE — 99232 PR SUBSEQUENT HOSPITAL CARE,LEVL II: ICD-10-PCS | Mod: ,,, | Performed by: HOSPITALIST

## 2022-01-06 PROCEDURE — 25000003 PHARM REV CODE 250: Performed by: NURSE PRACTITIONER

## 2022-01-06 PROCEDURE — 97116 GAIT TRAINING THERAPY: CPT

## 2022-01-06 PROCEDURE — 25000003 PHARM REV CODE 250: Performed by: ORTHOPAEDIC SURGERY

## 2022-01-06 PROCEDURE — 99232 SBSQ HOSP IP/OBS MODERATE 35: CPT | Mod: ,,, | Performed by: HOSPITALIST

## 2022-01-06 RX ADMIN — ATORVASTATIN CALCIUM 40 MG: 20 TABLET, FILM COATED ORAL at 08:01

## 2022-01-06 RX ADMIN — THIAMINE HYDROCHLORIDE: 100 INJECTION, SOLUTION INTRAMUSCULAR; INTRAVENOUS at 10:01

## 2022-01-06 RX ADMIN — POTASSIUM CHLORIDE 40 MEQ: 20 TABLET, EXTENDED RELEASE ORAL at 08:01

## 2022-01-06 RX ADMIN — MUPIROCIN 1 G: 20 OINTMENT TOPICAL at 08:01

## 2022-01-06 RX ADMIN — APIXABAN 2.5 MG: 2.5 TABLET, FILM COATED ORAL at 08:01

## 2022-01-06 RX ADMIN — MUPIROCIN 1 G: 20 OINTMENT TOPICAL at 10:01

## 2022-01-06 RX ADMIN — NICOTINE 1 PATCH: 14 PATCH, EXTENDED RELEASE TRANSDERMAL at 10:01

## 2022-01-06 RX ADMIN — AMLODIPINE BESYLATE 10 MG: 10 TABLET ORAL at 10:01

## 2022-01-06 RX ADMIN — BACLOFEN 5 MG: 5 TABLET ORAL at 10:01

## 2022-01-06 RX ADMIN — POLYETHYLENE GLYCOL 3350 17 G: 17 POWDER, FOR SOLUTION ORAL at 10:01

## 2022-01-06 RX ADMIN — HYDROCODONE BITARTRATE AND ACETAMINOPHEN 1 TABLET: 5; 325 TABLET ORAL at 12:01

## 2022-01-06 RX ADMIN — BACLOFEN 5 MG: 5 TABLET ORAL at 08:01

## 2022-01-06 RX ADMIN — PANTOPRAZOLE SODIUM 40 MG: 40 TABLET, DELAYED RELEASE ORAL at 10:01

## 2022-01-06 RX ADMIN — Medication 1000 UNITS: at 10:01

## 2022-01-06 RX ADMIN — LORAZEPAM 0.5 MG: 0.5 TABLET ORAL at 10:01

## 2022-01-06 RX ADMIN — ASPIRIN 81 MG: 81 TABLET, COATED ORAL at 10:01

## 2022-01-06 RX ADMIN — LORAZEPAM 0.5 MG: 0.5 TABLET ORAL at 08:01

## 2022-01-06 RX ADMIN — HYDROCODONE BITARTRATE AND ACETAMINOPHEN 1 TABLET: 5; 325 TABLET ORAL at 10:01

## 2022-01-06 RX ADMIN — POTASSIUM CHLORIDE 40 MEQ: 20 TABLET, EXTENDED RELEASE ORAL at 10:01

## 2022-01-06 RX ADMIN — APIXABAN 2.5 MG: 2.5 TABLET, FILM COATED ORAL at 10:01

## 2022-01-06 RX ADMIN — THERA TABS 1 TABLET: TAB at 10:01

## 2022-01-06 NOTE — PT/OT/SLP PROGRESS
Physical Therapy Treatment    Patient Name:  Karin Carrera   MRN:  [id number]    Recommendations:     Discharge Recommendations:  rehabilitation facility   Discharge Equipment Recommendations: walker, jasbir   Barriers to discharge: Decreased caregiver support    Assessment:     Karin Carrera is a 51 y.o. male admitted with a medical diagnosis of Closed fracture of right hip.  He presents with the following impairments/functional limitations:  impaired functional mobilty,gait instability,impaired balance,decreased coordination,pain,abnormal tone,decreased ROM,orthopedic precautions Patient with less pain and spacticity today. Standing improved. Still has difficulty weight bearing right le due to pain .    Rehab Prognosis: Good; patient would benefit from acute skilled PT services to address these deficits and reach maximum level of function.    Recent Surgery: Procedure(s) (LRB):  HEMIARTHROPLASTY, HIP (Right) 3 Days Post-Op    Plan:     During this hospitalization, patient to be seen 5 x/week to address the identified rehab impairments via gait training,therapeutic activities,therapeutic exercises and progress toward the following goals:    · Plan of Care Expires:  02/03/22    Subjective     Chief Complaint: post op pain  Patient/Family Comments/goals: Denied by swingbed. Awaiting plan for rehab  Pain/Comfort:  · Pain Rating 1: 4/10  · Location - Side 1: Right  · Location 1: hip  · Pain Addressed 1: Cessation of Activity      Objective:     Communicated with nurse prior to session.  Patient found up in chair with peripheral IV upon PT entry to room.     General Precautions: Standard, fall   Orthopedic Precautions:RLE partial weight bearing   Braces:    Respiratory Status: Room air     Functional Mobility:  · Bed Mobility:     · Supine to Sit: minimum assistance  · Sit to Supine: minimum assistance  · Transfers:     · Sit to Stand:  minimum assistance with quad cane  · Gait: ambulated 2 feet with quad cane,  mod assist. limited by pain  · Balance: fair      AM-PAC 6 CLICK MOBILITY  Turning over in bed (including adjusting bedclothes, sheets and blankets)?: 3  Sitting down on and standing up from a chair with arms (e.g., wheelchair, bedside commode, etc.): 3  Moving from lying on back to sitting on the side of the bed?: 3  Moving to and from a bed to a chair (including a wheelchair)?: 3  Need to walk in hospital room?: 2  Climbing 3-5 steps with a railing?: 1  Basic Mobility Total Score: 15       Therapeutic Activities and Exercises:   RLE: hs, saq, abd-add, aps 3x10    Patient left supine with call button in reach and bed alarm on..    GOALS:   Multidisciplinary Problems     Physical Therapy Goals        Problem: Physical Therapy Goal    Goal Priority Disciplines Outcome Goal Variances Interventions   Physical Therapy Goal     PT, PT/OT Ongoing, Progressing     Description: Short Term Goals  Independent with HEP  Independent with cane x 100 feet PWB/WBAT: right lower extremity  Cga supine-sit-stand    Long term goals  Needed equipment for home.   Independent with hip precautsion                       Time Tracking:     PT Received On: 01/05/22  PT Start Time: 1420     PT Stop Time: 1445  PT Total Time (min): 25 min     Billable Minutes: Gait Training 10 and Therapeutic Exercise 15    Treatment Type: Evaluation  PT/PTA: PT     PTA Visit Number: 0     01/05/2022

## 2022-01-06 NOTE — PLAN OF CARE
Pt was very irritable and irate when SW went to speak with pt re: dc planning. SW called contacts listed by nurse. No once answered besides Velma. Per Velma pt cannot dc to her home. SW asked pt if he wanted to look into NH placement and pt cursed SW out. Dir of SS to follow up with pt tomorrow re: dc.

## 2022-01-06 NOTE — NURSING
This is a list of contacts this patient has provided  Velma [phone]  Jessica [phone]  Janee [phone]  Oscar [phone]

## 2022-01-06 NOTE — PT/OT/SLP PROGRESS
Occupational Therapy      Patient Name:  Karin Carrera   MRN:  [id number]    Patient not seen today secondary to Patient unwilling to participate. Will follow-up 1/7/22.    1/6/2022

## 2022-01-06 NOTE — CARE UPDATE
Patient awake alert without any changes neurovascularly.  His drains are out.  Wound shows no signs of infection.  Continue PT.  hopefully awaiting rehab placement.  If not DC wound patient is stable with physical therapy.  Continue transfers on the right lower extremity.  He had the stroke on the right side.  He can put weight on the right lower extremity for transfers.

## 2022-01-06 NOTE — PLAN OF CARE
Problem: Adult Inpatient Plan of Care  Goal: Plan of Care Review  Outcome: Ongoing, Progressing  Goal: Patient-Specific Goal (Individualized)  Outcome: Ongoing, Progressing  Goal: Absence of Hospital-Acquired Illness or Injury  Outcome: Ongoing, Progressing  Goal: Optimal Comfort and Wellbeing  Outcome: Ongoing, Progressing  Goal: Readiness for Transition of Care  Outcome: Ongoing, Progressing     Problem: Skin Injury Risk Increased  Goal: Skin Health and Integrity  Outcome: Ongoing, Progressing     Problem: Infection  Goal: Absence of Infection Signs and Symptoms  Outcome: Ongoing, Progressing     Problem: Fall Injury Risk  Goal: Absence of Fall and Fall-Related Injury  Outcome: Ongoing, Progressing

## 2022-01-06 NOTE — PROGRESS NOTES
[facility] Orthopedic  [facility] Medicine  Progress Note    Patient Name: Karin Carrera  MRN: [id number]  Patient Class: IP- Inpatient   Admission Date: 1/1/2022  Length of Stay: 5 days  Attending Physician: Sarahi Goodman MD  Primary Care Provider: Walker Smith MD        Subjective:     Principal Problem:Closed fracture of right hip        HPI:  Mr Carrera is a 50 yo male who presented to the ED via EMS for c/o right leg pain after a fall. Per EMS and ED staff, he was picked up from his vehicle outside of a friend's home. He states that he lives in his vehicle. It was noted that there were numerous liquor bottles in the vehicle and around him. He was combative with EMS and has been uncooperative with much of his interview during his stay in the ED. He uses profanity easily but is aphasic during much of the ROS and interview. He will use his cell phone to type out some answers. He is a poor historian due to this and his obvious drunken state. He denies any chest pain, shortness of breath, abdominal pain, F/C, N/V/D, or changes in vision. He has right sided hemiparesis as a results of his previous CVA- however still drives. Spoke with his brother, Silver, who provided no new information- he has not seen his brother since the day before yesterday. He does state that his brother does not live with him because of the alcohol use and other private reasons. Upon workup, it was noted that he had a right femoral neck fracture. His ETOH level was 188. He is being admitted to hospitalist services with a consult to orthopaedics for surgical intervention.     He has a PMH of CABG, CVA, HTN, GERD, ETOH abuse, and nicotine abuse. It is unclear when his CABG or CVA was; unable to find dates in previous records. It is unknown if he has had covid or been vaccinated. He follows with Dr Smith for primary care. He does not follow with anyone for cardiology. Per patient, he wishes to be a full code.     Brother-  [facility]- [phone]       Overview/Hospital Course:  01/02 - patient seems in less pain but obviously still sore.  He is calm and cooperative.  No new issues reported.  Seen preoperatively.  As previous addressed okay with me to proceed as planned; no medical reason to delay.  Watch for alcohol withdrawals postoperatively even though patient denies daily alcohol use.  Look into placement needs.  Dr. Goodman to assume care patient for hospitalist in the a.m..    1/3: NAEO; some pain at R hip; PT/OT to see; SS to assist with placement; continue on DT prevention- pt did initially state he drank daily and is now saying he drinks every other day    1/4: NAEO; no s/s of withdrawals; asking for snuff; PT following- having some issues with spasticity-- baclofen started; continue DT prevention; SS following for SWB       Interval History:AAO upon exam. Pt has aphasia. NAEO.    Review of Systems   Constitutional: Positive for activity change.   Respiratory: Negative for chest tightness, shortness of breath and wheezing.    Cardiovascular: Negative for chest pain and palpitations.   Gastrointestinal: Negative for abdominal pain, constipation and nausea.   Musculoskeletal: Positive for gait problem and myalgias. Negative for back pain and neck stiffness.   Neurological: Positive for speech difficulty and weakness. Negative for dizziness and headaches.        Patient has right-sided hemiparesis and expressive aphasia from previous stroke  He is right-handed   Psychiatric/Behavioral: Positive for agitation.   All other systems reviewed and are negative.    Objective:     Vital Signs (Most Recent):  Temp: 98.1 °F (36.7 °C) (01/06/22 0400)  Pulse: 89 (01/06/22 0400)  Resp: 17 (01/06/22 1008)  BP: 122/66 (01/06/22 1008)  SpO2: 99 % (01/06/22 0400) Vital Signs (24h Range):  Temp:  [98.1 °F (36.7 °C)-98.8 °F (37.1 °C)] 98.1 °F (36.7 °C)  Pulse:  [] 89  Resp:  [16-20] 17  SpO2:  [98 %-100 %] 99 %  BP: (113-145)/(61-79) 122/66      Weight: 89.9 kg (198 lb 3.1 oz)  Body mass index is 26.15 kg/m².    Intake/Output Summary (Last 24 hours) at 1/6/2022 1019  Last data filed at 1/6/2022 0400  Gross per 24 hour   Intake --   Output 200 ml   Net -200 ml      Physical Exam  Vitals and nursing note reviewed.   Constitutional:       General: He is not in acute distress.  HENT:      Head: Normocephalic and atraumatic.      Mouth/Throat:      Mouth: Mucous membranes are moist.   Eyes:      Extraocular Movements: Extraocular movements intact.      Pupils: Pupils are equal, round, and reactive to light.   Cardiovascular:      Rate and Rhythm: Normal rate and regular rhythm.      Pulses: Normal pulses.   Pulmonary:      Effort: Pulmonary effort is normal. No respiratory distress.      Breath sounds: Normal breath sounds. No wheezing.   Abdominal:      General: Bowel sounds are normal. There is no distension.      Tenderness: There is no abdominal tenderness.   Musculoskeletal:         General: Tenderness present.      Cervical back: Normal range of motion and neck supple.        Legs:       Comments:  R hip with surgical dressing covering    Skin:     General: Skin is warm.      Capillary Refill: Capillary refill takes less than 2 seconds.   Neurological:      Mental Status: He is alert, oriented to person, place, and time and easily aroused. Mental status is at baseline.      GCS: GCS eye subscore is 4. GCS verbal subscore is 4. GCS motor subscore is 6.      Cranial Nerves: Cranial nerves are intact.      Sensory: Sensation is intact.      Motor: Weakness present.      Comments: Dense right-sided hemiparesis and expressive aphasia in this right-handed patient with old CVA   Psychiatric:         Mood and Affect: Mood normal.         Significant Labs: All pertinent labs within the past 24 hours have been reviewed.    Significant Imaging: I have reviewed all pertinent imaging results/findings within the past 24 hours.      Assessment/Plan:      * Closed  fracture of right hip  - acute  - R hip xr with femoral neck fracture superimposed on chronic changes  - ortho consult-- plans for surgical intervention tomorrow  - EKG with SR with incomplete R BBB  - CXR with cardiomegaly and chronic vascular congestion  - plans for surgical intervention tomorrow  - PT/OT/SS consulted for post op assistance  - heparin SQ x 1 dose tonight   - AC after surgery per surgery preference   - ALMEIDA score of 0.9% ALISA risk-- risk of MI or cardiac arrest intra op or up to 30 days post op  - RCRI score of 2 points; class III risk; 10.1% 30 day risk of death, MI, or cardiac arrest     Perioperative Risk Assessment:  Patient is at intermediate risk for perioperative cardiopulmonary complications.  This based on lack of thorough history, hx of CVA and CABG. However risk are stable and see no medical benefit in delaying surgery from medical standpoint.    Surgery 01/02    1/3  - stable post op  - hemovac in place  - PT/OT to see  - SS following for SWB assistance   1/4  - hemovac to be pulled today  - PT following  - baclofen added to spasticity   1/6  - PO day#4  - PT/OT  - SWB placement per SS        Acute on chronic combined systolic and diastolic heart failure  1/6/22  · The left ventricle is severely enlarged with mild eccentric hypertrophy and severely decreased systolic function.  · The estimated ejection fraction is 20%.  · There are segmental left ventricular wall motion abnormalities.  · Left ventricular diastolic dysfunction.  · Atrial fibrillation not observed.  · Normal right ventricular size.  · Mild mitral regurgitation.  · Normal central venous pressure (3 mmHg).          Hypokalemia  1/5/22  40 MEQ Potassium bid  Daily Bmp  1/6/22  No labs reported.      ETOH abuse  - chronic  - unclear amount pt does partake in daily   - drinks liquor instead of beer  - monitor for DTs   - banana bag daily- MVI, thiamine, and folic acid  - ativan PO scheduled to defer DTs   - ativan IV for  breakthrough symptoms   1/4  - no s/s of withdrawals   1/5  - - no s/s of DT'S            Hx of CABG  - chronic, stable  - continue current meds  - cardiac monitoring   - EKG with SR with incomplete RBBB      GERD (gastroesophageal reflux disease)  - chronic, stable  - continue current meds       Hypertension  - chronic, stable  - continue current home meds  - monitor BP trend  - adjust meds as needed   1/4  - stable     Right sided weakness  - chronic, stable  - hx of CVA with R sided hemiparesis   -patient is right-handed  - continue statin   1/4  - PT following  - plans for SWB         VTE Risk Mitigation (From admission, onward)         Ordered     apixaban tablet 2.5 mg  2 times daily         01/02/22 1916     IP VTE HIGH RISK PATIENT  Once         01/02/22 1916     Place SAGE hose  Until discontinued         01/02/22 1916     Place sequential compression device  Until discontinued         01/02/22 1916     Place sequential compression device  Until discontinued         01/01/22 1707                Discharge Planning   DEANNA:      Code Status: Full Code   Is the patient medically ready for discharge?:     Reason for patient still in hospital (select all that apply): Treatment  Discharge Plan A: Rehab ([facility] Faustin)                  ALINA Kuhn  Department of Hospital Medicine   [facility] [place] - Orthopedic

## 2022-01-06 NOTE — SUBJECTIVE & OBJECTIVE
Interval History:AAO upon exam. Pt has aphasia. NAEO.    Review of Systems   Constitutional: Positive for activity change.   Respiratory: Negative for chest tightness, shortness of breath and wheezing.    Cardiovascular: Negative for chest pain and palpitations.   Gastrointestinal: Negative for abdominal pain, constipation and nausea.   Musculoskeletal: Positive for gait problem and myalgias. Negative for back pain and neck stiffness.   Neurological: Positive for speech difficulty and weakness. Negative for dizziness and headaches.        Patient has right-sided hemiparesis and expressive aphasia from previous stroke  He is right-handed   Psychiatric/Behavioral: Positive for agitation.   All other systems reviewed and are negative.    Objective:     Vital Signs (Most Recent):  Temp: 98.1 °F (36.7 °C) (01/06/22 0400)  Pulse: 89 (01/06/22 0400)  Resp: 17 (01/06/22 1008)  BP: 122/66 (01/06/22 1008)  SpO2: 99 % (01/06/22 0400) Vital Signs (24h Range):  Temp:  [98.1 °F (36.7 °C)-98.8 °F (37.1 °C)] 98.1 °F (36.7 °C)  Pulse:  [] 89  Resp:  [16-20] 17  SpO2:  [98 %-100 %] 99 %  BP: (113-145)/(61-79) 122/66     Weight: 89.9 kg (198 lb 3.1 oz)  Body mass index is 26.15 kg/m².    Intake/Output Summary (Last 24 hours) at 1/6/2022 1019  Last data filed at 1/6/2022 0400  Gross per 24 hour   Intake --   Output 200 ml   Net -200 ml      Physical Exam  Vitals and nursing note reviewed.   Constitutional:       General: He is not in acute distress.  HENT:      Head: Normocephalic and atraumatic.      Mouth/Throat:      Mouth: Mucous membranes are moist.   Eyes:      Extraocular Movements: Extraocular movements intact.      Pupils: Pupils are equal, round, and reactive to light.   Cardiovascular:      Rate and Rhythm: Normal rate and regular rhythm.      Pulses: Normal pulses.   Pulmonary:      Effort: Pulmonary effort is normal. No respiratory distress.      Breath sounds: Normal breath sounds. No wheezing.   Abdominal:       General: Bowel sounds are normal. There is no distension.      Tenderness: There is no abdominal tenderness.   Musculoskeletal:         General: Tenderness present.      Cervical back: Normal range of motion and neck supple.        Legs:       Comments:  R hip with surgical dressing covering    Skin:     General: Skin is warm.      Capillary Refill: Capillary refill takes less than 2 seconds.   Neurological:      Mental Status: He is alert, oriented to person, place, and time and easily aroused. Mental status is at baseline.      GCS: GCS eye subscore is 4. GCS verbal subscore is 4. GCS motor subscore is 6.      Cranial Nerves: Cranial nerves are intact.      Sensory: Sensation is intact.      Motor: Weakness present.      Comments: Dense right-sided hemiparesis and expressive aphasia in this right-handed patient with old CVA   Psychiatric:         Mood and Affect: Mood normal.         Significant Labs: All pertinent labs within the past 24 hours have been reviewed.    Significant Imaging: I have reviewed all pertinent imaging results/findings within the past 24 hours.

## 2022-01-06 NOTE — ASSESSMENT & PLAN NOTE
1/6/22  · The left ventricle is severely enlarged with mild eccentric hypertrophy and severely decreased systolic function.  · The estimated ejection fraction is 20%.  · There are segmental left ventricular wall motion abnormalities.  · Left ventricular diastolic dysfunction.  · Atrial fibrillation not observed.  · Normal right ventricular size.  · Mild mitral regurgitation.  · Normal central venous pressure (3 mmHg).

## 2022-01-06 NOTE — ASSESSMENT & PLAN NOTE
- acute  - R hip xr with femoral neck fracture superimposed on chronic changes  - ortho consult-- plans for surgical intervention tomorrow  - EKG with SR with incomplete R BBB  - CXR with cardiomegaly and chronic vascular congestion  - plans for surgical intervention tomorrow  - PT/OT/SS consulted for post op assistance  - heparin SQ x 1 dose tonight   - AC after surgery per surgery preference   - ALMEIDA score of 0.9% ALISA risk-- risk of MI or cardiac arrest intra op or up to 30 days post op  - RCRI score of 2 points; class III risk; 10.1% 30 day risk of death, MI, or cardiac arrest     Perioperative Risk Assessment:  Patient is at intermediate risk for perioperative cardiopulmonary complications.  This based on lack of thorough history, hx of CVA and CABG. However risk are stable and see no medical benefit in delaying surgery from medical standpoint.    Surgery 01/02    1/3  - stable post op  - hemovac in place  - PT/OT to see  - SS following for SWB assistance   1/4  - hemovac to be pulled today  - PT following  - baclofen added to spasticity   1/6  - PO day#4  - PT/OT  - SWB placement per SS

## 2022-01-07 LAB
ALBUMIN SERPL BCP-MCNC: 2.5 G/DL (ref 3.5–5)
ALBUMIN/GLOB SERPL: 0.5 {RATIO}
ALP SERPL-CCNC: 117 U/L (ref 45–115)
ALT SERPL W P-5'-P-CCNC: 38 U/L (ref 16–61)
ANION GAP SERPL CALCULATED.3IONS-SCNC: 12 MMOL/L (ref 7–16)
AST SERPL W P-5'-P-CCNC: 26 U/L (ref 15–37)
BASOPHILS # BLD AUTO: 0.03 K/UL (ref 0–0.2)
BASOPHILS NFR BLD AUTO: 0.5 % (ref 0–1)
BILIRUB SERPL-MCNC: 0.4 MG/DL (ref 0–1.2)
BUN SERPL-MCNC: 10 MG/DL (ref 7–18)
BUN/CREAT SERPL: 16 (ref 6–20)
CALCIUM SERPL-MCNC: 8.2 MG/DL (ref 8.5–10.1)
CHLORIDE SERPL-SCNC: 103 MMOL/L (ref 98–107)
CO2 SERPL-SCNC: 27 MMOL/L (ref 21–32)
CREAT SERPL-MCNC: 0.64 MG/DL (ref 0.7–1.3)
DIFFERENTIAL METHOD BLD: ABNORMAL
EOSINOPHIL # BLD AUTO: 0.22 K/UL (ref 0–0.5)
EOSINOPHIL NFR BLD AUTO: 3.5 % (ref 1–4)
ERYTHROCYTE [DISTWIDTH] IN BLOOD BY AUTOMATED COUNT: 16.9 % (ref 11.5–14.5)
GLOBULIN SER-MCNC: 4.6 G/DL (ref 2–4)
GLUCOSE SERPL-MCNC: 197 MG/DL (ref 74–106)
HCT VFR BLD AUTO: 34.6 % (ref 40–54)
HGB BLD-MCNC: 10.6 G/DL (ref 13.5–18)
IMM GRANULOCYTES # BLD AUTO: 0.02 K/UL (ref 0–0.04)
IMM GRANULOCYTES NFR BLD: 0.3 % (ref 0–0.4)
LYMPHOCYTES # BLD AUTO: 1.75 K/UL (ref 1–4.8)
LYMPHOCYTES NFR BLD AUTO: 28.2 % (ref 27–41)
MAGNESIUM SERPL-MCNC: 2 MG/DL (ref 1.7–2.3)
MCH RBC QN AUTO: 23.3 PG (ref 27–31)
MCHC RBC AUTO-ENTMCNC: 30.6 G/DL (ref 32–36)
MCV RBC AUTO: 76 FL (ref 80–96)
MONOCYTES # BLD AUTO: 0.63 K/UL (ref 0–0.8)
MONOCYTES NFR BLD AUTO: 10.1 % (ref 2–6)
MPC BLD CALC-MCNC: 9.4 FL (ref 9.4–12.4)
NEUTROPHILS # BLD AUTO: 3.56 K/UL (ref 1.8–7.7)
NEUTROPHILS NFR BLD AUTO: 57.4 % (ref 53–65)
NRBC # BLD AUTO: 0 X10E3/UL
NRBC, AUTO (.00): 0 %
PLATELET # BLD AUTO: 276 K/UL (ref 150–400)
POTASSIUM SERPL-SCNC: 4.2 MMOL/L (ref 3.5–5.1)
PROT SERPL-MCNC: 7.1 G/DL (ref 6.4–8.2)
RBC # BLD AUTO: 4.55 M/UL (ref 4.6–6.2)
SODIUM SERPL-SCNC: 138 MMOL/L (ref 136–145)
WBC # BLD AUTO: 6.21 K/UL (ref 4.5–11)

## 2022-01-07 PROCEDURE — 85025 COMPLETE CBC W/AUTO DIFF WBC: CPT | Performed by: NURSE PRACTITIONER

## 2022-01-07 PROCEDURE — 63600175 PHARM REV CODE 636 W HCPCS: Performed by: ORTHOPAEDIC SURGERY

## 2022-01-07 PROCEDURE — 36415 COLL VENOUS BLD VENIPUNCTURE: CPT | Performed by: NURSE PRACTITIONER

## 2022-01-07 PROCEDURE — 25000003 PHARM REV CODE 250: Performed by: HOSPITALIST

## 2022-01-07 PROCEDURE — 97530 THERAPEUTIC ACTIVITIES: CPT

## 2022-01-07 PROCEDURE — 11000001 HC ACUTE MED/SURG PRIVATE ROOM

## 2022-01-07 PROCEDURE — 25000003 PHARM REV CODE 250: Performed by: NURSE PRACTITIONER

## 2022-01-07 PROCEDURE — 99232 SBSQ HOSP IP/OBS MODERATE 35: CPT | Mod: ,,, | Performed by: HOSPITALIST

## 2022-01-07 PROCEDURE — 25000003 PHARM REV CODE 250: Performed by: ORTHOPAEDIC SURGERY

## 2022-01-07 PROCEDURE — 63600175 PHARM REV CODE 636 W HCPCS: Performed by: NURSE PRACTITIONER

## 2022-01-07 PROCEDURE — 25000003 PHARM REV CODE 250: Performed by: REGISTERED NURSE

## 2022-01-07 PROCEDURE — 97110 THERAPEUTIC EXERCISES: CPT

## 2022-01-07 PROCEDURE — 99232 PR SUBSEQUENT HOSPITAL CARE,LEVL II: ICD-10-PCS | Mod: ,,, | Performed by: HOSPITALIST

## 2022-01-07 PROCEDURE — S4991 NICOTINE PATCH NONLEGEND: HCPCS | Performed by: HOSPITALIST

## 2022-01-07 PROCEDURE — 97116 GAIT TRAINING THERAPY: CPT

## 2022-01-07 PROCEDURE — 83735 ASSAY OF MAGNESIUM: CPT | Performed by: NURSE PRACTITIONER

## 2022-01-07 PROCEDURE — 80053 COMPREHEN METABOLIC PANEL: CPT | Performed by: NURSE PRACTITIONER

## 2022-01-07 RX ORDER — LISINOPRIL 2.5 MG/1
2.5 TABLET ORAL DAILY
Status: DISCONTINUED | OUTPATIENT
Start: 2022-01-07 | End: 2022-01-09

## 2022-01-07 RX ORDER — METOPROLOL TARTRATE 25 MG/1
12.5 TABLET ORAL 2 TIMES DAILY
Status: DISCONTINUED | OUTPATIENT
Start: 2022-01-07 | End: 2022-01-19 | Stop reason: HOSPADM

## 2022-01-07 RX ADMIN — POTASSIUM CHLORIDE 40 MEQ: 20 TABLET, EXTENDED RELEASE ORAL at 08:01

## 2022-01-07 RX ADMIN — BACLOFEN 5 MG: 5 TABLET ORAL at 04:01

## 2022-01-07 RX ADMIN — HYDROCODONE BITARTRATE AND ACETAMINOPHEN 1 TABLET: 5; 325 TABLET ORAL at 10:01

## 2022-01-07 RX ADMIN — LORAZEPAM 0.5 MG: 0.5 TABLET ORAL at 08:01

## 2022-01-07 RX ADMIN — BACLOFEN 5 MG: 5 TABLET ORAL at 08:01

## 2022-01-07 RX ADMIN — Medication 1000 UNITS: at 10:01

## 2022-01-07 RX ADMIN — POTASSIUM CHLORIDE 40 MEQ: 20 TABLET, EXTENDED RELEASE ORAL at 10:01

## 2022-01-07 RX ADMIN — APIXABAN 2.5 MG: 2.5 TABLET, FILM COATED ORAL at 08:01

## 2022-01-07 RX ADMIN — NICOTINE 1 PATCH: 14 PATCH, EXTENDED RELEASE TRANSDERMAL at 10:01

## 2022-01-07 RX ADMIN — METOPROLOL TARTRATE 12.5 MG: 25 TABLET, FILM COATED ORAL at 08:01

## 2022-01-07 RX ADMIN — MORPHINE SULFATE 4 MG: 4 INJECTION INTRAVENOUS at 08:01

## 2022-01-07 RX ADMIN — ATORVASTATIN CALCIUM 40 MG: 20 TABLET, FILM COATED ORAL at 08:01

## 2022-01-07 RX ADMIN — THERA TABS 1 TABLET: TAB at 10:01

## 2022-01-07 RX ADMIN — PANTOPRAZOLE SODIUM 40 MG: 40 TABLET, DELAYED RELEASE ORAL at 10:01

## 2022-01-07 RX ADMIN — THIAMINE HYDROCHLORIDE: 100 INJECTION, SOLUTION INTRAMUSCULAR; INTRAVENOUS at 10:01

## 2022-01-07 RX ADMIN — AMLODIPINE BESYLATE 10 MG: 10 TABLET ORAL at 10:01

## 2022-01-07 RX ADMIN — POLYETHYLENE GLYCOL 3350 17 G: 17 POWDER, FOR SOLUTION ORAL at 10:01

## 2022-01-07 RX ADMIN — LORAZEPAM 0.5 MG: 0.5 TABLET ORAL at 04:01

## 2022-01-07 RX ADMIN — ASPIRIN 81 MG: 81 TABLET, COATED ORAL at 10:01

## 2022-01-07 RX ADMIN — APIXABAN 2.5 MG: 2.5 TABLET, FILM COATED ORAL at 10:01

## 2022-01-07 RX ADMIN — LORAZEPAM 0.5 MG: 0.5 TABLET ORAL at 10:01

## 2022-01-07 NOTE — PT/OT/SLP PROGRESS
Physical Therapy Treatment    Patient Name:  Karin Carrera   MRN:  [id number]    Recommendations:     Discharge Recommendations:  rehabilitation facility,nursing facility, skilled   Discharge Equipment Recommendations: walker, jasbir   Barriers to discharge: Decreased caregiver support    Assessment:     Karin Carrera is a 51 y.o. male admitted with a medical diagnosis of Closed fracture of right hip.  He presents with the following impairments/functional limitations:  impaired functional mobilty,gait instability,impaired balance,decreased coordination,pain,abnormal tone,decreased ROM,orthopedic precautions Patient getting very frustrated with trying to ambulate. Pain seems to be a limiting factor. Aphasia makes it difficult to understand what all is affecting patient during treatment..    Rehab Prognosis: Good; patient would benefit from acute skilled PT services to address these deficits and reach maximum level of function.    Recent Surgery: Procedure(s) (LRB):  HEMIARTHROPLASTY, HIP (Right) 4 Days Post-Op    Plan:     During this hospitalization, patient to be seen 5 x/week to address the identified rehab impairments via gait training,therapeutic activities,therapeutic exercises and progress toward the following goals:    · Plan of Care Expires:  02/03/22    Subjective     Chief Complaint: Post op pain  Patient/Family Comments/goals: Patient wants tobacco. Still waiting on placement per ortho nurse.  Pain/Comfort:  Pain Rating 1: 4/10  Location - Side 1: Right  Location 1: hip  Pain Addressed 1: Pre-medicate for activity      Objective:     Communicated with nurse prior to session.  Patient found supine with peripheral IV upon PT entry to room.     General Precautions: Standard, fall   Orthopedic Precautions:RLE weight bearing as tolerated   Braces:    Respiratory Status: Room air     Functional Mobility:  · Bed Mobility:     · Supine to Sit: minimum assistance  · Transfers:     · Sit to Stand:  minimum  assistance with quad cane  · Gait: ambulated 2 steps x 4 reps. Pain limiting further ambulation      AM-PAC 6 CLICK MOBILITY  Turning over in bed (including adjusting bedclothes, sheets and blankets)?: 3  Sitting down on and standing up from a chair with arms (e.g., wheelchair, bedside commode, etc.): 3  Moving from lying on back to sitting on the side of the bed?: 3  Moving to and from a bed to a chair (including a wheelchair)?: 3  Need to walk in hospital room?: 2  Climbing 3-5 steps with a railing?: 1  Basic Mobility Total Score: 15       Therapeutic Activities and Exercises:   RLE: hs, abd-add, saq, aps 3x10 aarom    Patient left up in chair with call button in reach..    GOALS:   Multidisciplinary Problems     Physical Therapy Goals        Problem: Physical Therapy Goal    Goal Priority Disciplines Outcome Goal Variances Interventions   Physical Therapy Goal     PT, PT/OT Ongoing, Progressing     Description: Short Term Goals  Independent with HEP  Independent with cane x 100 feet PWB/WBAT: right lower extremity  Cga supine-sit-stand    Long term goals  Needed equipment for home.   Independent with hip precautsion                       Time Tracking:     PT Received On: 01/06/22  PT Start Time: 1715     PT Stop Time: 1740  PT Total Time (min): 25 min     Billable Minutes: Gait Training 10 and Therapeutic Exercise 15    Treatment Type: Evaluation  PT/PTA: PT     PTA Visit Number: 0     01/06/2022

## 2022-01-07 NOTE — ASSESSMENT & PLAN NOTE
- chronic, stable  - continue current home meds  - monitor BP trend  - adjust meds as needed   1/4  - stable   1/7  - Norvasc stopped  - Lisinopril 2.5 mg  - Lopressor 12.5 mg bid

## 2022-01-07 NOTE — ASSESSMENT & PLAN NOTE
- acute  - R hip xr with femoral neck fracture superimposed on chronic changes  - ortho consult-- plans for surgical intervention tomorrow  - EKG with SR with incomplete R BBB  - CXR with cardiomegaly and chronic vascular congestion  - plans for surgical intervention tomorrow  - PT/OT/SS consulted for post op assistance  - heparin SQ x 1 dose tonight   - AC after surgery per surgery preference   - ALMEIDA score of 0.9% ALISA risk-- risk of MI or cardiac arrest intra op or up to 30 days post op  - RCRI score of 2 points; class III risk; 10.1% 30 day risk of death, MI, or cardiac arrest     Perioperative Risk Assessment:  Patient is at intermediate risk for perioperative cardiopulmonary complications.  This based on lack of thorough history, hx of CVA and CABG. However risk are stable and see no medical benefit in delaying surgery from medical standpoint.    Surgery 01/02    1/3  - stable post op  - hemovac in place  - PT/OT to see  - SS following for SWB assistance   1/4  - hemovac to be pulled today  - PT following  - baclofen added to spasticity   1/6  - PO day#4  - PT/OT  - SWB placement per SS  1/7  - po day#5  - dsg d/i

## 2022-01-07 NOTE — NURSING
Patient was able to find address on eyeQ [address] [place] [address] ms. Also gave me contact number for his friend lizandro [phone] but said yao phone is dead. Attmepted to contact ryder twice unable to reach her.

## 2022-01-07 NOTE — NURSING
Patient asking for tobacco and van to be brought to [facility]. Patient gave me phone numbers to attempt to reach his friends Tony Lopez Mike. Was able to contact keli he said he was not in good health and could not help patient at this time. Was able to contact Silver rasmussen brother, he said he was not in good health and cannot help patient at this time but agreed to speak to  tomorrow about patients current living situation.

## 2022-01-07 NOTE — PT/OT/SLP PROGRESS
OT attempted tx this AM. Pt declined LB dressing to av pants. OT assisted RN with bed mobility for dressing change. Pt requested to use bed pan and declined ADL t/f to [facility]; OT assisted with positioning on bed pan; pt unable to have BM and pt was assisted off bed pan. Pt with verbal cueing only to perform all bed mobility. No charge for OT tx.

## 2022-01-07 NOTE — SUBJECTIVE & OBJECTIVE
Interval History: NAEO: Pt denies sob,cp. SWB placement vs DC home.    Review of Systems   Constitutional: Positive for activity change. Negative for appetite change, chills and fever.   Respiratory: Negative for cough, chest tightness and shortness of breath.    Cardiovascular: Negative for chest pain and palpitations.   Neurological: Negative for light-headedness.   All other systems reviewed and are negative.    Objective:     Vital Signs (Most Recent):  Temp: 97.4 °F (36.3 °C) (01/07/22 1100)  Pulse: (!) 112 (01/07/22 1100)  Resp: 18 (01/07/22 1100)  BP: 125/77 (01/07/22 1100)  SpO2: 100 % (01/07/22 1100) Vital Signs (24h Range):  Temp:  [97.4 °F (36.3 °C)-98.5 °F (36.9 °C)] 97.4 °F (36.3 °C)  Pulse:  [] 112  Resp:  [16-18] 18  SpO2:  [97 %-100 %] 100 %  BP: (125-153)/(57-85) 125/77     Weight: 89.9 kg (198 lb 3.1 oz)  Body mass index is 26.15 kg/m².    Intake/Output Summary (Last 24 hours) at 1/7/2022 1254  Last data filed at 1/7/2022 1100  Gross per 24 hour   Intake 240 ml   Output --   Net 240 ml      Physical Exam  Vitals and nursing note reviewed.   Constitutional:       General: He is not in acute distress.  HENT:      Head: Normocephalic and atraumatic.      Mouth/Throat:      Mouth: Mucous membranes are moist.   Eyes:      Extraocular Movements: Extraocular movements intact.      Pupils: Pupils are equal, round, and reactive to light.   Cardiovascular:      Rate and Rhythm: Normal rate and regular rhythm.      Pulses: Normal pulses.   Pulmonary:      Effort: Pulmonary effort is normal. No respiratory distress.      Breath sounds: Normal breath sounds. No wheezing.   Abdominal:      General: Bowel sounds are normal. There is no distension.      Tenderness: There is no abdominal tenderness.   Musculoskeletal:         General: Tenderness present.      Cervical back: Normal range of motion and neck supple.        Legs:       Comments:  R hip with surgical dressing covering    Skin:     General: Skin is  warm.      Capillary Refill: Capillary refill takes less than 2 seconds.   Neurological:      Mental Status: He is alert, oriented to person, place, and time and easily aroused. Mental status is at baseline.      GCS: GCS eye subscore is 4. GCS verbal subscore is 4. GCS motor subscore is 6.      Cranial Nerves: Cranial nerves are intact.      Sensory: Sensation is intact.      Motor: Weakness present.      Comments: Dense right-sided hemiparesis and expressive aphasia in this right-handed patient with old CVA   Psychiatric:         Mood and Affect: Mood normal.         Significant Labs: All pertinent labs within the past 24 hours have been reviewed.    Significant Imaging: I have reviewed all pertinent imaging results/findings within the past 24 hours.

## 2022-01-07 NOTE — PT/OT/SLP PROGRESS
Physical Therapy Treatment    Patient Name:  Karin Carrera   MRN:  [id number]    Recommendations:     Discharge Recommendations:  rehabilitation facility,nursing facility, skilled   Discharge Equipment Recommendations: walker, jasbir   Barriers to discharge: Decreased caregiver support    Assessment:     Karin Carrera is a 51 y.o. male admitted with a medical diagnosis of Closed fracture of right hip.  He presents with the following impairments/functional limitations:    fall risk, impaired strength, increased pain with movement, expressive aphasia, impaired LE function, impaired ambulation ability.    Rehab Prognosis: Good; patient would benefit from acute skilled PT services to address these deficits and reach maximum level of function.    Recent Surgery: Procedure(s) (LRB):  HEMIARTHROPLASTY, HIP (Right) 5 Days Post-Op    Plan:     During this hospitalization, patient to be seen 5 x/week to address the identified rehab impairments via gait training,therapeutic activities,therapeutic exercises and progress toward the following goals:    · Plan of Care Expires:  02/03/22    Subjective     Chief Complaint: wanting to smoke  Patient/Family Comments/goals: pt agreeable to get OOB with PT  Pain/Comfort: demo pain with movement of R hip but unable to rate on pain scale  ·        Objective:     Communicated with COLTON Lozada prior to session.  Patient found HOB elevated with   upon PT entry to room.     General Precautions: Standard, fall   Orthopedic Precautions:RLE weight bearing as tolerated   Braces:    Respiratory Status: Room air     Functional Mobility:  · Bed Mobility:     · Scooting: stand by assistance  · Supine to Sit: stand by assistance  · Transfers:     · Sit to Stand:  contact guard assistance with hand-held assist and quad cane  · Gait: approx 5 steps with pt abandoning QC to use therapist for support on L side, exagerrated R hip flexion during swing and stepping  · Balance: in stance Mod A with  BUE support on therapists; pt demo LOB during gait with BLE becoming intertwined. pt required Max A for saf recovery      AM-PAC 6 CLICK MOBILITY          Therapeutic Activities and Exercises:   mobility as stated above. BLE ex x 10 reps consisting of AP, LAQ, add.    Patient left up in chair with all lines intact, call button in reach and nurse present..    GOALS:   Multidisciplinary Problems     Physical Therapy Goals        Problem: Physical Therapy Goal    Goal Priority Disciplines Outcome Goal Variances Interventions   Physical Therapy Goal     PT, PT/OT Ongoing, Progressing     Description: Short Term Goals  Independent with HEP  Independent with cane x 100 feet PWB/WBAT: right lower extremity  Cga supine-sit-stand    Long term goals  Needed equipment for home.   Independent with hip precautsion                       Time Tracking:     PT Received On: 01/07/22  PT Start Time: 1152     PT Stop Time: 1217  PT Total Time (min): 25 min     Billable Minutes: Gait Training 12 and Therapeutic Exercise 12             PTA Visit Number: 0     01/07/2022

## 2022-01-07 NOTE — PROGRESS NOTES
ORTHOPEDIC PROGRESS NOTE           Post-Operative  right femoral neck fracture status post endoprosthesis 01/02/2022    Subjective:   Postop day 5 he is alert oriented        Objective:   Vital signs in last 24 hours:  Temp:  [98 °F (36.7 °C)-98.5 °F (36.9 °C)] 98.4 °F (36.9 °C)  Pulse:  [] 98  Resp:  [17-18] 18  SpO2:  [97 %-100 %] 98 %  BP: (122-153)/(57-85) 150/80      PE:  Mild serous drainage from the drain sites and the incision but no sign of infection thigh and calf were soft      Data Review  CBC:   Lab Results   Component Value Date    WBC 6.08 01/06/2022    RBC 4.40 (L) 01/06/2022    HGB 10.3 (L) 01/06/2022    HCT 31.4 (L) 01/06/2022     01/06/2022     Lab Results   Component Value Date    INR 1.04 01/01/2022    INR 1.09 09/17/2021        Assessment/Plan:    Working on placement he only has Medicaid and has been difficult.         LOS: 6 days           Mart Schafer III

## 2022-01-07 NOTE — PLAN OF CARE
MARCY spoke with Dir of SS and SW needs to fax to NH. [facility] [place] due to pt not only ambulating 2ft and not having anywhere to stay and no family or friends willing to help assist with dc. Pt lives in his van and will not be able to get food, water, or have any type of care. Per Physician pt needs to dc to NH where pt can taken care of. MARCY will follow up in a. m.

## 2022-01-07 NOTE — ASSESSMENT & PLAN NOTE
1/6/22  · The left ventricle is severely enlarged with mild eccentric hypertrophy and severely decreased systolic function.  · The estimated ejection fraction is 20%.  · There are segmental left ventricular wall motion abnormalities.  · Left ventricular diastolic dysfunction.  · Atrial fibrillation not observed.  · Normal right ventricular size.  · Mild mitral regurgitation.  · Normal central venous pressure (3 mmHg).    1/7/22 1/7  - Norvasc stopped  - Lisinopril 2.5 mg  - Lopressor 12.5 mg bid  - Cardiologist appt new pt at dc.

## 2022-01-07 NOTE — PROGRESS NOTES
[facility]  [facility] Medicine  Progress Note    Patient Name: Karin Carrera  MRN: [id number]  Patient Class: IP- Inpatient   Admission Date: 1/1/2022  Length of Stay: 6 days  Attending Physician: Sarahi Goodman MD  Primary Care Provider: Walker Smith MD        Subjective:     Principal Problem:Closed fracture of right hip        HPI:  Mr Carrera is a 50 yo male who presented to the ED via EMS for c/o right leg pain after a fall. Per EMS and ED staff, he was picked up from his vehicle outside of a friend's home. He states that he lives in his vehicle. It was noted that there were numerous liquor bottles in the vehicle and around him. He was combative with EMS and has been uncooperative with much of his interview during his stay in the ED. He uses profanity easily but is aphasic during much of the ROS and interview. He will use his cell phone to type out some answers. He is a poor historian due to this and his obvious drunken state. He denies any chest pain, shortness of breath, abdominal pain, F/C, N/V/D, or changes in vision. He has right sided hemiparesis as a results of his previous CVA- however still drives. Spoke with his brother, Silver, who provided no new information- he has not seen his brother since the day before yesterday. He does state that his brother does not live with him because of the alcohol use and other private reasons. Upon workup, it was noted that he had a right femoral neck fracture. His ETOH level was 188. He is being admitted to hospitalist services with a consult to orthopaedics for surgical intervention.     He has a PMH of CABG, CVA, HTN, GERD, ETOH abuse, and nicotine abuse. It is unclear when his CABG or CVA was; unable to find dates in previous records. It is unknown if he has had covid or been vaccinated. He follows with Dr Smith for primary care. He does not follow with anyone for cardiology. Per patient, he wishes to be a full code.     Brother-  [facility] [phone]       Overview/Hospital Course:  01/02 - patient seems in less pain but obviously still sore.  He is calm and cooperative.  No new issues reported.  Seen preoperatively.  As previous addressed okay with me to proceed as planned; no medical reason to delay.  Watch for alcohol withdrawals postoperatively even though patient denies daily alcohol use.  Look into placement needs.  Dr. Goodman to assume care patient for hospitalist in the a.m..  1/3: NAEO; some pain at R hip; PT/OT to see; SS to assist with placement; continue on DT prevention- pt did initially state he drank daily and is now saying he drinks every other day  1/4: NAEO; no s/s of withdrawals; asking for snuff; PT following- having some issues with spasticity-- baclofen started; continue DT prevention; SS following for SWB   1/6: Echo EF 20%. Awaiting placement. Pt refuse NH.  1/7:Norvasc stopped.Lisinopril 2.5 mg.Lopressor 12.5 mg bid.Cardiologist appt new pt at dc.          Interval History: NAEO: Pt denies sob,cp. SWB placement vs DC home.    Review of Systems   Constitutional: Positive for activity change. Negative for appetite change, chills and fever.   Respiratory: Negative for cough, chest tightness and shortness of breath.    Cardiovascular: Negative for chest pain and palpitations.   Neurological: Negative for light-headedness.   All other systems reviewed and are negative.    Objective:     Vital Signs (Most Recent):  Temp: 97.4 °F (36.3 °C) (01/07/22 1100)  Pulse: (!) 112 (01/07/22 1100)  Resp: 18 (01/07/22 1100)  BP: 125/77 (01/07/22 1100)  SpO2: 100 % (01/07/22 1100) Vital Signs (24h Range):  Temp:  [97.4 °F (36.3 °C)-98.5 °F (36.9 °C)] 97.4 °F (36.3 °C)  Pulse:  [] 112  Resp:  [16-18] 18  SpO2:  [97 %-100 %] 100 %  BP: (125-153)/(57-85) 125/77     Weight: 89.9 kg (198 lb 3.1 oz)  Body mass index is 26.15 kg/m².    Intake/Output Summary (Last 24 hours) at 1/7/2022 1254  Last data filed at 1/7/2022 1100  Gross per 24  hour   Intake 240 ml   Output --   Net 240 ml      Physical Exam  Vitals and nursing note reviewed.   Constitutional:       General: He is not in acute distress.  HENT:      Head: Normocephalic and atraumatic.      Mouth/Throat:      Mouth: Mucous membranes are moist.   Eyes:      Extraocular Movements: Extraocular movements intact.      Pupils: Pupils are equal, round, and reactive to light.   Cardiovascular:      Rate and Rhythm: Normal rate and regular rhythm.      Pulses: Normal pulses.   Pulmonary:      Effort: Pulmonary effort is normal. No respiratory distress.      Breath sounds: Normal breath sounds. No wheezing.   Abdominal:      General: Bowel sounds are normal. There is no distension.      Tenderness: There is no abdominal tenderness.   Musculoskeletal:         General: Tenderness present.      Cervical back: Normal range of motion and neck supple.        Legs:       Comments:  R hip with surgical dressing covering    Skin:     General: Skin is warm.      Capillary Refill: Capillary refill takes less than 2 seconds.   Neurological:      Mental Status: He is alert, oriented to person, place, and time and easily aroused. Mental status is at baseline.      GCS: GCS eye subscore is 4. GCS verbal subscore is 4. GCS motor subscore is 6.      Cranial Nerves: Cranial nerves are intact.      Sensory: Sensation is intact.      Motor: Weakness present.      Comments: Dense right-sided hemiparesis and expressive aphasia in this right-handed patient with old CVA   Psychiatric:         Mood and Affect: Mood normal.         Significant Labs: All pertinent labs within the past 24 hours have been reviewed.    Significant Imaging: I have reviewed all pertinent imaging results/findings within the past 24 hours.      Assessment/Plan:      * Closed fracture of right hip  - acute  - R hip xr with femoral neck fracture superimposed on chronic changes  - ortho consult-- plans for surgical intervention tomorrow  - EKG with SR  with incomplete R BBB  - CXR with cardiomegaly and chronic vascular congestion  - plans for surgical intervention tomorrow  - PT/OT/SS consulted for post op assistance  - heparin SQ x 1 dose tonight   - AC after surgery per surgery preference   - ALMEIDA score of 0.9% ALISA risk-- risk of MI or cardiac arrest intra op or up to 30 days post op  - RCRI score of 2 points; class III risk; 10.1% 30 day risk of death, MI, or cardiac arrest     Perioperative Risk Assessment:  Patient is at intermediate risk for perioperative cardiopulmonary complications.  This based on lack of thorough history, hx of CVA and CABG. However risk are stable and see no medical benefit in delaying surgery from medical standpoint.    Surgery 01/02    1/3  - stable post op  - hemovac in place  - PT/OT to see  - SS following for SWB assistance   1/4  - hemovac to be pulled today  - PT following  - baclofen added to spasticity   1/6  - PO day#4  - PT/OT  - SWB placement per SS  1/7  - po day#5  - dsg d/i        Acute on chronic combined systolic and diastolic heart failure        1/6/22  · The left ventricle is severely enlarged with mild eccentric hypertrophy and severely decreased systolic function.  · The estimated ejection fraction is 20%.  · There are segmental left ventricular wall motion abnormalities.  · Left ventricular diastolic dysfunction.  · Atrial fibrillation not observed.  · Normal right ventricular size.  · Mild mitral regurgitation.  · Normal central venous pressure (3 mmHg).    1/7/22 1/7  - Norvasc stopped  - Lisinopril 2.5 mg  - Lopressor 12.5 mg bid  - Cardiologist appt new pt at dc.        Hypokalemia  1/5/22  40 MEQ Potassium bid  Daily Bmp  1/6/22  No labs reported.      ETOH abuse  - chronic  - unclear amount pt does partake in daily   - drinks liquor instead of beer  - monitor for DTs   - banana bag daily- MVI, thiamine, and folic acid  - ativan PO scheduled to defer DTs   - ativan IV for breakthrough symptoms   1/4  -  no s/s of withdrawals   1/5  - - no s/s of DT'S            Hx of CABG  - chronic, stable  - continue current meds  - cardiac monitoring   - EKG with SR with incomplete RBBB      GERD (gastroesophageal reflux disease)  - chronic, stable  - continue current meds       Hypertension  - chronic, stable  - continue current home meds  - monitor BP trend  - adjust meds as needed   1/4  - stable   1/7  - Norvasc stopped  - Lisinopril 2.5 mg  - Lopressor 12.5 mg bid    Right sided weakness  - chronic, stable  - hx of CVA with R sided hemiparesis   -patient is right-handed  - continue statin   1/4  - PT following  - plans for SWB         VTE Risk Mitigation (From admission, onward)         Ordered     apixaban tablet 2.5 mg  2 times daily         01/02/22 1916     IP VTE HIGH RISK PATIENT  Once         01/02/22 1916     Place SAGE hose  Until discontinued         01/02/22 1916     Place sequential compression device  Until discontinued         01/02/22 1916     Place sequential compression device  Until discontinued         01/01/22 1707                Discharge Planning   DEANNA:      Code Status: Full Code   Is the patient medically ready for discharge?:     Reason for patient still in hospital (select all that apply): Treatment  Discharge Plan A: Rehab ([facility]ust[facility])                  ALINA Kuhn  Department of Hospital Medicine   [facility]

## 2022-01-07 NOTE — PT/OT/SLP PROGRESS
Physical Therapy      Patient Name:  Karin Carrera   MRN:  [id number]    Patient not seen this am due to nursing trying to restart iv on attempt.

## 2022-01-07 NOTE — PLAN OF CARE
"Nurse gave SW a number to a friend of pt and the number is not a working number. SW spoke with Dr. Goodman and pt needs NH placement. SW informed MD that pt declined NH placement and cursed SW out saying " F You" multiple times after asking pt if NH placement would be an option for pt at [place]. SW following.   "

## 2022-01-07 NOTE — PT/OT/SLP PROGRESS
Physical Therapy Treatment    Patient Name:  Karin Carrera   MRN:  [id number]    Recommendations:     Discharge Recommendations:  rehabilitation facility,nursing facility, skilled   Discharge Equipment Recommendations: walker, jasbir   Barriers to discharge: Decreased caregiver support    Assessment:     Karin Carrera is a 51 y.o. male admitted with a medical diagnosis of Closed fracture of right hip.  He presents with the following impairments/functional limitations:  impaired functional mobilty,gait instability,impaired balance,decreased coordination,pain,abnormal tone,decreased ROM,orthopedic precautions Patient getting very frustrated with trying to ambulate. Pain seems to be a limiting factor. Aphasia makes it difficult to understand what all is affecting patient during treatment..    Rehab Prognosis: Good; patient would benefit from acute skilled PT services to address these deficits and reach maximum level of function.    Recent Surgery: Procedure(s) (LRB):  HEMIARTHROPLASTY, HIP (Right) 4 Days Post-Op    Plan:     During this hospitalization, patient to be seen 5 x/week to address the identified rehab impairments via gait training,therapeutic activities,therapeutic exercises and progress toward the following goals:    · Plan of Care Expires:  02/03/22    Subjective     Chief Complaint: Post op pain  Patient/Family Comments/goals: Patient wants tobacco. Still waiting on placement per ortho nurse.  Pain/Comfort:  · Pain Rating 1: 4/10  · Location - Side 1: Right  · Location 1: hip  · Pain Addressed 1: Pre-medicate for activity      Objective:     Communicated with nurse prior to session.  Patient found supine with peripheral IV upon PT entry to room.     General Precautions: Standard, fall   Orthopedic Precautions:RLE weight bearing as tolerated   Braces:    Respiratory Status: Room air     Functional Mobility:  · Bed Mobility:     · Supine to Sit: minimum assistance  · Transfers:     · Sit to Stand:   minimum assistance with quad cane  · Gait: ambulated 2 steps x 4 reps. Pain limiting further ambulation      AM-PAC 6 CLICK MOBILITY  Turning over in bed (including adjusting bedclothes, sheets and blankets)?: 3  Sitting down on and standing up from a chair with arms (e.g., wheelchair, bedside commode, etc.): 3  Moving from lying on back to sitting on the side of the bed?: 3  Moving to and from a bed to a chair (including a wheelchair)?: 3  Need to walk in hospital room?: 2  Climbing 3-5 steps with a railing?: 1  Basic Mobility Total Score: 15       Therapeutic Activities and Exercises:   RLE: hs, abd-add, saq, aps 3x10 aarom    Patient left up in chair with call button in reach..    GOALS:   Multidisciplinary Problems     Physical Therapy Goals        Problem: Physical Therapy Goal    Goal Priority Disciplines Outcome Goal Variances Interventions   Physical Therapy Goal     PT, PT/OT Ongoing, Progressing     Description: Short Term Goals  Independent with HEP  Independent with cane x 100 feet PWB/WBAT: right lower extremity  Cga supine-sit-stand    Long term goals  Needed equipment for home.   Independent with hip precautsion                       Time Tracking:     PT Received On: 01/06/22  PT Start Time: 1045     PT Stop Time: 1110  PT Total Time (min): 25 min     Billable Minutes: Gait Training 10 and Therapeutic Exercise 15    Treatment Type: Evaluation  PT/PTA: PT     PTA Visit Number: 0     01/06/2022

## 2022-01-07 NOTE — DISCHARGE INSTRUCTIONS
*Keep dressing dry and intact, do not remove dressing, if dressing becomes wet or bloody notify doctors office [phone]  *Continue incentive spirometry at least every 2 hours while awake.  *Continue white stockings remove 2 times a day for 1 hour and replace.   *Continue ice pack to hip  *Take laxative of choice to have a bowel movement at least by tomorrow and then every other day.  *Increase fluids by mouth.  *Staples will be removed follow up appointment with clinic in 2 weeks from surgery    *Continue pillows between legs for abduction  *Notify office staff if any concerns.

## 2022-01-08 LAB
ALBUMIN SERPL BCP-MCNC: 2.5 G/DL (ref 3.5–5)
ALBUMIN/GLOB SERPL: 0.6 {RATIO}
ALP SERPL-CCNC: 118 U/L (ref 45–115)
ALT SERPL W P-5'-P-CCNC: 37 U/L (ref 16–61)
ANION GAP SERPL CALCULATED.3IONS-SCNC: 14 MMOL/L (ref 7–16)
ANISOCYTOSIS BLD QL SMEAR: ABNORMAL
AST SERPL W P-5'-P-CCNC: 22 U/L (ref 15–37)
ATYPICAL LYMPHOCYTES: ABNORMAL
BASOPHILS # BLD AUTO: 0.04 K/UL (ref 0–0.2)
BASOPHILS NFR BLD AUTO: 0.6 % (ref 0–1)
BILIRUB SERPL-MCNC: 0.3 MG/DL (ref 0–1.2)
BUN SERPL-MCNC: 10 MG/DL (ref 7–18)
BUN/CREAT SERPL: 15 (ref 6–20)
CALCIUM SERPL-MCNC: 8.1 MG/DL (ref 8.5–10.1)
CHLORIDE SERPL-SCNC: 101 MMOL/L (ref 98–107)
CO2 SERPL-SCNC: 26 MMOL/L (ref 21–32)
CREAT SERPL-MCNC: 0.65 MG/DL (ref 0.7–1.3)
DIFFERENTIAL METHOD BLD: ABNORMAL
EOSINOPHIL # BLD AUTO: 0.27 K/UL (ref 0–0.5)
EOSINOPHIL NFR BLD AUTO: 4.3 % (ref 1–4)
EOSINOPHIL NFR BLD MANUAL: 3 % (ref 1–4)
ERYTHROCYTE [DISTWIDTH] IN BLOOD BY AUTOMATED COUNT: 16.6 % (ref 11.5–14.5)
GLOBULIN SER-MCNC: 4.2 G/DL (ref 2–4)
GLUCOSE SERPL-MCNC: 172 MG/DL (ref 74–106)
HCT VFR BLD AUTO: 31.2 % (ref 40–54)
HGB BLD-MCNC: 10 G/DL (ref 13.5–18)
HYPOCHROMIA BLD QL SMEAR: ABNORMAL
IMM GRANULOCYTES # BLD AUTO: 0.01 K/UL (ref 0–0.04)
IMM GRANULOCYTES NFR BLD: 0.2 % (ref 0–0.4)
LYMPHOCYTES # BLD AUTO: 2.13 K/UL (ref 1–4.8)
LYMPHOCYTES NFR BLD AUTO: 33.6 % (ref 27–41)
LYMPHOCYTES NFR BLD MANUAL: 38 % (ref 27–41)
MAGNESIUM SERPL-MCNC: 2 MG/DL (ref 1.7–2.3)
MCH RBC QN AUTO: 22.9 PG (ref 27–31)
MCHC RBC AUTO-ENTMCNC: 32.1 G/DL (ref 32–36)
MCV RBC AUTO: 71.6 FL (ref 80–96)
MICROCYTES BLD QL SMEAR: ABNORMAL
MONOCYTES # BLD AUTO: 0.67 K/UL (ref 0–0.8)
MONOCYTES NFR BLD AUTO: 10.6 % (ref 2–6)
MONOCYTES NFR BLD MANUAL: 6 % (ref 2–6)
MPC BLD CALC-MCNC: 9.4 FL (ref 9.4–12.4)
NEUTROPHILS # BLD AUTO: 3.22 K/UL (ref 1.8–7.7)
NEUTROPHILS NFR BLD AUTO: 50.7 % (ref 53–65)
NEUTS SEG NFR BLD MANUAL: 53 % (ref 50–62)
NRBC # BLD AUTO: 0 X10E3/UL
NRBC, AUTO (.00): 0 %
OVALOCYTES BLD QL SMEAR: ABNORMAL
PLATELET # BLD AUTO: 308 K/UL (ref 150–400)
PLATELET MORPHOLOGY: ABNORMAL
POLYCHROMASIA BLD QL SMEAR: ABNORMAL
POTASSIUM SERPL-SCNC: 4.6 MMOL/L (ref 3.5–5.1)
PROT SERPL-MCNC: 6.7 G/DL (ref 6.4–8.2)
RBC # BLD AUTO: 4.36 M/UL (ref 4.6–6.2)
SODIUM SERPL-SCNC: 136 MMOL/L (ref 136–145)
TARGETS BLD QL SMEAR: ABNORMAL
WBC # BLD AUTO: 6.34 K/UL (ref 4.5–11)

## 2022-01-08 PROCEDURE — 83735 ASSAY OF MAGNESIUM: CPT | Performed by: NURSE PRACTITIONER

## 2022-01-08 PROCEDURE — 25000003 PHARM REV CODE 250: Performed by: NURSE PRACTITIONER

## 2022-01-08 PROCEDURE — 97116 GAIT TRAINING THERAPY: CPT

## 2022-01-08 PROCEDURE — 63600175 PHARM REV CODE 636 W HCPCS: Performed by: HOSPITALIST

## 2022-01-08 PROCEDURE — 63600175 PHARM REV CODE 636 W HCPCS: Performed by: ORTHOPAEDIC SURGERY

## 2022-01-08 PROCEDURE — 99231 SBSQ HOSP IP/OBS SF/LOW 25: CPT | Mod: ,,, | Performed by: HOSPITALIST

## 2022-01-08 PROCEDURE — 25000003 PHARM REV CODE 250: Performed by: HOSPITALIST

## 2022-01-08 PROCEDURE — 80053 COMPREHEN METABOLIC PANEL: CPT | Performed by: NURSE PRACTITIONER

## 2022-01-08 PROCEDURE — 85025 COMPLETE CBC W/AUTO DIFF WBC: CPT | Performed by: NURSE PRACTITIONER

## 2022-01-08 PROCEDURE — 25000003 PHARM REV CODE 250: Performed by: ORTHOPAEDIC SURGERY

## 2022-01-08 PROCEDURE — 25000003 PHARM REV CODE 250: Performed by: REGISTERED NURSE

## 2022-01-08 PROCEDURE — 36415 COLL VENOUS BLD VENIPUNCTURE: CPT | Performed by: NURSE PRACTITIONER

## 2022-01-08 PROCEDURE — 97110 THERAPEUTIC EXERCISES: CPT

## 2022-01-08 PROCEDURE — 63600175 PHARM REV CODE 636 W HCPCS: Performed by: NURSE PRACTITIONER

## 2022-01-08 PROCEDURE — S4991 NICOTINE PATCH NONLEGEND: HCPCS | Performed by: HOSPITALIST

## 2022-01-08 PROCEDURE — 11000001 HC ACUTE MED/SURG PRIVATE ROOM

## 2022-01-08 PROCEDURE — 99231 PR SUBSEQUENT HOSPITAL CARE,LEVL I: ICD-10-PCS | Mod: ,,, | Performed by: HOSPITALIST

## 2022-01-08 RX ADMIN — ASPIRIN 81 MG: 81 TABLET, COATED ORAL at 09:01

## 2022-01-08 RX ADMIN — POTASSIUM CHLORIDE 40 MEQ: 20 TABLET, EXTENDED RELEASE ORAL at 10:01

## 2022-01-08 RX ADMIN — BACLOFEN 5 MG: 5 TABLET ORAL at 10:01

## 2022-01-08 RX ADMIN — HYDROCODONE BITARTRATE AND ACETAMINOPHEN 1 TABLET: 5; 325 TABLET ORAL at 10:01

## 2022-01-08 RX ADMIN — HYDROCODONE BITARTRATE AND ACETAMINOPHEN 1 TABLET: 5; 325 TABLET ORAL at 05:01

## 2022-01-08 RX ADMIN — LISINOPRIL 2.5 MG: 2.5 TABLET ORAL at 09:01

## 2022-01-08 RX ADMIN — METOPROLOL TARTRATE 12.5 MG: 25 TABLET, FILM COATED ORAL at 10:01

## 2022-01-08 RX ADMIN — LORAZEPAM 0.5 MG: 0.5 TABLET ORAL at 09:01

## 2022-01-08 RX ADMIN — METOPROLOL TARTRATE 12.5 MG: 25 TABLET, FILM COATED ORAL at 09:01

## 2022-01-08 RX ADMIN — Medication 1000 UNITS: at 09:01

## 2022-01-08 RX ADMIN — MORPHINE SULFATE 4 MG: 4 INJECTION, SOLUTION INTRAMUSCULAR; INTRAVENOUS at 09:01

## 2022-01-08 RX ADMIN — ONDANSETRON 4 MG: 2 INJECTION INTRAMUSCULAR; INTRAVENOUS at 09:01

## 2022-01-08 RX ADMIN — APIXABAN 2.5 MG: 2.5 TABLET, FILM COATED ORAL at 10:01

## 2022-01-08 RX ADMIN — HYDROCODONE BITARTRATE AND ACETAMINOPHEN 1 TABLET: 5; 325 TABLET ORAL at 07:01

## 2022-01-08 RX ADMIN — PANTOPRAZOLE SODIUM 40 MG: 40 TABLET, DELAYED RELEASE ORAL at 09:01

## 2022-01-08 RX ADMIN — BACLOFEN 5 MG: 5 TABLET ORAL at 02:01

## 2022-01-08 RX ADMIN — LORAZEPAM 0.5 MG: 0.5 TABLET ORAL at 02:01

## 2022-01-08 RX ADMIN — THERA TABS 1 TABLET: TAB at 09:01

## 2022-01-08 RX ADMIN — NICOTINE 1 PATCH: 14 PATCH, EXTENDED RELEASE TRANSDERMAL at 09:01

## 2022-01-08 RX ADMIN — ONDANSETRON 4 MG: 2 INJECTION INTRAMUSCULAR; INTRAVENOUS at 08:01

## 2022-01-08 RX ADMIN — ATORVASTATIN CALCIUM 40 MG: 20 TABLET, FILM COATED ORAL at 10:01

## 2022-01-08 RX ADMIN — HYDROCODONE BITARTRATE AND ACETAMINOPHEN 1 TABLET: 5; 325 TABLET ORAL at 11:01

## 2022-01-08 RX ADMIN — APIXABAN 2.5 MG: 2.5 TABLET, FILM COATED ORAL at 09:01

## 2022-01-08 RX ADMIN — BACLOFEN 5 MG: 5 TABLET ORAL at 09:01

## 2022-01-08 RX ADMIN — LORAZEPAM 0.5 MG: 0.5 TABLET ORAL at 10:01

## 2022-01-08 NOTE — NURSING
Patient requested I call his friend Angel [phone]. I was able to get contact with him, he said he will be here tmrw around 1030 to see patient and help with his needs.

## 2022-01-08 NOTE — PROGRESS NOTES
[facility]  [facility]  Progress Note    Patient Name: Karin Carrera  MRN: [id number]  Patient Class: IP- Inpatient   Admission Date: 1/1/2022  Length of Stay: 7 days  Attending Physician: Sarahi Goodman MD  Primary Care Provider: Walker Smith MD        Subjective:     Principal Problem:Closed fracture of right hip        HPI:  Mr Carrera is a 52 yo male who presented to the ED via EMS for c/o right leg pain after a fall. Per EMS and ED staff, he was picked up from his vehicle outside of a friend's home. He states that he lives in his vehicle. It was noted that there were numerous liquor bottles in the vehicle and around him. He was combative with EMS and has been uncooperative with much of his interview during his stay in the ED. He uses profanity easily but is aphasic during much of the ROS and interview. He will use his cell phone to type out some answers. He is a poor historian due to this and his obvious drunken state. He denies any chest pain, shortness of breath, abdominal pain, F/C, N/V/D, or changes in vision. He has right sided hemiparesis as a results of his previous CVA- however still drives. Spoke with his brother, Silver, who provided no new information- he has not seen his brother since the day before yesterday. He does state that his brother does not live with him because of the alcohol use and other private reasons. Upon workup, it was noted that he had a right femoral neck fracture. His ETOH level was 188. He is being admitted to hospitalist services with a consult to orthopaedics for surgical intervention.     He has a PMH of CABG, CVA, HTN, GERD, ETOH abuse, and nicotine abuse. It is unclear when his CABG or CVA was; unable to find dates in previous records. It is unknown if he has had covid or been vaccinated. He follows with Dr Smith for primary care. He does not follow with anyone for cardiology. Per patient, he wishes to be a full code.     Brother-  [facility] [phone]       Overview/Hospital Course:  01/02 - patient seems in less pain but obviously still sore.  He is calm and cooperative.  No new issues reported.  Seen preoperatively.  As previous addressed okay with me to proceed as planned; no medical reason to delay.  Watch for alcohol withdrawals postoperatively even though patient denies daily alcohol use.  Look into placement needs.  Dr. Goodman to assume care patient for hospitalist in the a.m..  1/3: NAEO; some pain at R hip; PT/OT to see; SS to assist with placement; continue on DT prevention- pt did initially state he drank daily and is now saying he drinks every other day  1/4: NAEO; no s/s of withdrawals; asking for snuff; PT following- having some issues with spasticity-- baclofen started; continue DT prevention; SS following for SWB   1/6: Echo EF 20%. Awaiting placement. Pt refuse NH.  1/7:Norvasc stopped.Lisinopril 2.5 mg.Lopressor 12.5 mg bid.Cardiologist appt new pt at dc.          Interval History: NAEO: Pt denies sob,cp. SWB placement vs DC home. Ss also looking for equipment for home use. Pt refused NH PLACEMENT.    Review of Systems   Constitutional: Positive for activity change. Negative for appetite change, chills and fever.   Respiratory: Negative for cough, chest tightness and shortness of breath.    Cardiovascular: Negative for chest pain and palpitations.   Neurological: Negative for light-headedness.   All other systems reviewed and are negative.    Objective:     Vital Signs (Most Recent):  Temp: 98.8 °F (37.1 °C) (01/08/22 0757)  Pulse: 95 (01/08/22 0757)  Resp: 16 (01/08/22 1126)  BP: 135/72 (01/08/22 0757)  SpO2: 99 % (01/08/22 0757) Vital Signs (24h Range):  Temp:  [98.5 °F (36.9 °C)-99.2 °F (37.3 °C)] 98.8 °F (37.1 °C)  Pulse:  [] 95  Resp:  [16-22] 16  SpO2:  [96 %-100 %] 99 %  BP: (126-147)/(65-81) 135/72     Weight: 89.9 kg (198 lb 3.1 oz)  Body mass index is 26.15 kg/m².    Intake/Output Summary (Last 24 hours) at  1/8/2022 1139  Last data filed at 1/7/2022 2049  Gross per 24 hour   Intake --   Output 300 ml   Net -300 ml      Physical Exam  Vitals and nursing note reviewed.   Constitutional:       General: He is not in acute distress.  HENT:      Head: Normocephalic and atraumatic.      Mouth/Throat:      Mouth: Mucous membranes are moist.   Eyes:      Extraocular Movements: Extraocular movements intact.      Pupils: Pupils are equal, round, and reactive to light.   Cardiovascular:      Rate and Rhythm: Normal rate and regular rhythm.      Pulses: Normal pulses.   Pulmonary:      Effort: Pulmonary effort is normal. No respiratory distress.      Breath sounds: Normal breath sounds. No wheezing.   Abdominal:      General: Bowel sounds are normal. There is no distension.      Tenderness: There is no abdominal tenderness.   Musculoskeletal:         General: Tenderness present.      Cervical back: Normal range of motion and neck supple.        Legs:       Comments:  R hip with surgical dressing covering    Skin:     General: Skin is warm.      Capillary Refill: Capillary refill takes less than 2 seconds.   Neurological:      Mental Status: He is alert, oriented to person, place, and time and easily aroused. Mental status is at baseline.      GCS: GCS eye subscore is 4. GCS verbal subscore is 4. GCS motor subscore is 6.      Cranial Nerves: Cranial nerves are intact.      Sensory: Sensation is intact.      Motor: Weakness present.      Comments: Dense right-sided hemiparesis and expressive aphasia in this right-handed patient with old CVA   Psychiatric:         Mood and Affect: Mood normal.         Significant Labs: All pertinent labs within the past 24 hours have been reviewed.    Significant Imaging: I have reviewed all pertinent imaging results/findings within the past 24 hours.      Assessment/Plan:      * Closed fracture of right hip  - acute  - R hip xr with femoral neck fracture superimposed on chronic changes  - ortho  consult-- plans for surgical intervention tomorrow  - EKG with SR with incomplete R BBB  - CXR with cardiomegaly and chronic vascular congestion  - plans for surgical intervention tomorrow  - PT/OT/SS consulted for post op assistance  - heparin SQ x 1 dose tonight   - AC after surgery per surgery preference   - ALMEIDA score of 0.9% ALISA risk-- risk of MI or cardiac arrest intra op or up to 30 days post op  - RCRI score of 2 points; class III risk; 10.1% 30 day risk of death, MI, or cardiac arrest     Perioperative Risk Assessment:  Patient is at intermediate risk for perioperative cardiopulmonary complications.  This based on lack of thorough history, hx of CVA and CABG. However risk are stable and see no medical benefit in delaying surgery from medical standpoint.    Surgery 01/02    1/3  - stable post op  - hemovac in place  - PT/OT to see  - SS following for SWB assistance   1/4  - hemovac to be pulled today  - PT following  - baclofen added to spasticity   1/6  - PO day#4  - PT/OT  - SWB placement per SS  1/7  - po day#5  - dsg d/i  1/8  - po DAY#6        Acute on chronic combined systolic and diastolic heart failure        1/6/22  · The left ventricle is severely enlarged with mild eccentric hypertrophy and severely decreased systolic function.  · The estimated ejection fraction is 20%.  · There are segmental left ventricular wall motion abnormalities.  · Left ventricular diastolic dysfunction.  · Atrial fibrillation not observed.  · Normal right ventricular size.  · Mild mitral regurgitation.  · Normal central venous pressure (3 mmHg).    1/7/22 1/7  - Norvasc stopped  - Lisinopril 2.5 mg  - Lopressor 12.5 mg bid  - Cardiologist appt new pt at dc.        Hypokalemia  1/5/22  40 MEQ Potassium bid  Daily Bmp  1/6/22  No labs reported.      ETOH abuse  - chronic  - unclear amount pt does partake in daily   - drinks liquor instead of beer  - monitor for DTs   - banana bag daily- MVI, thiamine, and folic acid  -  ativan PO scheduled to defer DTs   - ativan IV for breakthrough symptoms   1/4  - no s/s of withdrawals   1/5  - - no s/s of DT'S            Hx of CABG  - chronic, stable  - continue current meds  - cardiac monitoring   - EKG with SR with incomplete RBBB      GERD (gastroesophageal reflux disease)  - chronic, stable  - continue current meds       Hypertension  - chronic, stable  - continue current home meds  - monitor BP trend  - adjust meds as needed   1/4  - stable   1/7  - Norvasc stopped  - Lisinopril 2.5 mg  - Lopressor 12.5 mg bid    Right sided weakness  - chronic, stable  - hx of CVA with R sided hemiparesis   -patient is right-handed  - continue statin   1/4  - PT following  - plans for SWB   1/8  - Pt has mobility limitation that significantly impairs his MRADL'S including his ability for toileting and bathing at home.Limitations cannot be resolved with use of a cane or walker due to increased risk of falling. A wheelchair will improve the pt's ability to perform MRADL'S. Pt is willing to and able to propel in wheelchair.        VTE Risk Mitigation (From admission, onward)         Ordered     apixaban tablet 2.5 mg  2 times daily         01/02/22 1916     IP VTE HIGH RISK PATIENT  Once         01/02/22 1916     Place SAGE hose  Until discontinued         01/02/22 1916     Place sequential compression device  Until discontinued         01/02/22 1916     Place sequential compression device  Until discontinued         01/01/22 1707                Discharge Planning   DEANNA:      Code Status: Full Code   Is the patient medically ready for discharge?:     Reason for patient still in hospital (select all that apply): Treatment  Discharge Plan A: Rehab (Jaden Faustin)                  ALINA Kuhn  Department of Hospital Medicine   [facility] - Orthopedic

## 2022-01-08 NOTE — PT/OT/SLP PROGRESS
Physical Therapy Treatment    Patient Name:  Karin Carrera   MRN:  [id number]    Recommendations:     Discharge Recommendations:  rehabilitation facility,nursing facility, skilled   Discharge Equipment Recommendations: walker, jasbir   Barriers to discharge: Decreased caregiver support and no discharge environment    Assessment:     Karin Carrera is a 51 y.o. male admitted with a medical diagnosis of Closed fracture of right hip.  He presents with the following impairments/functional limitations:    decreased strength, increased pain, impaired ambulation ability.    Rehab Prognosis: Good and Fair; patient would benefit from acute skilled PT services to address these deficits and reach maximum level of function.    Recent Surgery: Procedure(s) (LRB):  HEMIARTHROPLASTY, HIP (Right) 5 Days Post-Op    Plan:     During this hospitalization, patient to be seen 5 x/week to address the identified rehab impairments via gait training,therapeutic activities,therapeutic exercises and progress toward the following goals:    · Plan of Care Expires:  02/03/22    Subjective     Chief Complaint: pt wants to smoke or have chewing tobacco  Patient/Family Comments/goals: pt desires to leave to smoke if he can reach anyone by phone  Pain/Comfort:  ·        Objective:     Communicated with COLTON Jennings prior to session.  Patient found up in chair with   upon PT entry to room.     General Precautions: Standard, fall   Orthopedic Precautions:RLE weight bearing as tolerated   Braces:    Respiratory Status: Room air     Functional Mobility:  · Transfers:     · Sit to Stand:  contact guard assistance with quad cane      AM-PAC 6 CLICK MOBILITY          Therapeutic Activities and Exercises:   weight shifting while completing sit<>stand transfers at recliner to facilitate improved WB on RLE. Pt resistant to WB on RLE.     Patient left up in chair with all lines intact, call button in reach and COLTON Jennings present..    GOALS:    Multidisciplinary Problems     Physical Therapy Goals        Problem: Physical Therapy Goal    Goal Priority Disciplines Outcome Goal Variances Interventions   Physical Therapy Goal     PT, PT/OT Ongoing, Progressing     Description: Short Term Goals  Independent with HEP  Independent with cane x 100 feet PWB/WBAT: right lower extremity  Cga supine-sit-stand    Long term goals  Needed equipment for home.   Independent with hip precautsion                       Time Tracking:     PT Received On: 01/07/22  PT Start Time: 1547     PT Stop Time: 1615  PT Total Time (min): 28 min     Billable Minutes: Therapeutic Activity 15             PTA Visit Number: 0     01/07/2022

## 2022-01-08 NOTE — ASSESSMENT & PLAN NOTE
- acute  - R hip xr with femoral neck fracture superimposed on chronic changes  - ortho consult-- plans for surgical intervention tomorrow  - EKG with SR with incomplete R BBB  - CXR with cardiomegaly and chronic vascular congestion  - plans for surgical intervention tomorrow  - PT/OT/SS consulted for post op assistance  - heparin SQ x 1 dose tonight   - AC after surgery per surgery preference   - ALMEIDA score of 0.9% ALISA risk-- risk of MI or cardiac arrest intra op or up to 30 days post op  - RCRI score of 2 points; class III risk; 10.1% 30 day risk of death, MI, or cardiac arrest     Perioperative Risk Assessment:  Patient is at intermediate risk for perioperative cardiopulmonary complications.  This based on lack of thorough history, hx of CVA and CABG. However risk are stable and see no medical benefit in delaying surgery from medical standpoint.    Surgery 01/02    1/3  - stable post op  - hemovac in place  - PT/OT to see  - SS following for SWB assistance   1/4  - hemovac to be pulled today  - PT following  - baclofen added to spasticity   1/6  - PO day#4  - PT/OT  - SWB placement per SS  1/7  - po day#5  - dsg d/i  1/8  - po DAY#6

## 2022-01-08 NOTE — PT/OT/SLP PROGRESS
Physical Therapy Treatment    Patient Name:  Karin Carrera   MRN:  [id number]    Recommendations:     Discharge Recommendations:  rehabilitation facility,nursing facility, skilled   Discharge Equipment Recommendations: walker, jasbir   Barriers to discharge: Decreased caregiver support    Assessment:     Karin Carrera is a 51 y.o. male admitted with a medical diagnosis of Closed fracture of right hip.  He presents with the following impairments/functional limitations:  impaired functional mobilty,gait instability,impaired balance,decreased coordination,pain,abnormal tone,decreased ROM,orthopedic precautions Patient able to progress gait a little today. Pain limiting ability to weight bear right le. Slowly getting better. Awaiting placement.    Rehab Prognosis: Good; patient would benefit from acute skilled PT services to address these deficits and reach maximum level of function.    Recent Surgery: Procedure(s) (LRB):  HEMIARTHROPLASTY, HIP (Right) 6 Days Post-Op    Plan:     During this hospitalization, patient to be seen 5 x/week to address the identified rehab impairments via gait training,therapeutic activities,therapeutic exercises and progress toward the following goals:    · Plan of Care Expires:  02/03/22    Subjective     Chief Complaint: post op pain  Patient/Family Comments/goals: Patient complains of pain limiting ability to weight bear. Patient anxious about where his van is at.  Pain/Comfort:  · Pain Rating 1: 4/10  · Location - Side 1: Left  · Location 1: hip  · Pain Addressed 1: Cessation of Activity      Objective:     Communicated with nurse prior to session.  Patient found supine with peripheral IV upon PT entry to room.     General Precautions: Standard, fall   Orthopedic Precautions:RLE weight bearing as tolerated   Braces:    Respiratory Status: Room air     Functional Mobility:  · Bed Mobility:     · Supine to Sit: minimum assistance  · Transfers:     · Sit to Stand:  minimum  assistance with no AD  · Bed to Chair: minimum assistance with  no AD  using  Squat Pivot  · Gait: ambulated 5 feet with quad cane mod assist x 2, antalgic gait. some corordination difficulty right le.      AM-PAC 6 CLICK MOBILITY  Turning over in bed (including adjusting bedclothes, sheets and blankets)?: 3  Sitting down on and standing up from a chair with arms (e.g., wheelchair, bedside commode, etc.): 3  Moving from lying on back to sitting on the side of the bed?: 3  Moving to and from a bed to a chair (including a wheelchair)?: 3  Need to walk in hospital room?: 2  Climbing 3-5 steps with a railing?: 1  Basic Mobility Total Score: 15       Therapeutic Activities and Exercises:   RLE: aps, hs, saq, abd-add slr 3x10    Patient left up in chair with call button in reach..    GOALS:   Multidisciplinary Problems     Physical Therapy Goals        Problem: Physical Therapy Goal    Goal Priority Disciplines Outcome Goal Variances Interventions   Physical Therapy Goal     PT, PT/OT Ongoing, Progressing     Description: Short Term Goals  Independent with HEP  Independent with cane x 100 feet PWB/WBAT: right lower extremity  Cga supine-sit-stand    Long term goals  Needed equipment for home.   Independent with hip precautsion                       Time Tracking:     PT Received On: 01/08/22  PT Start Time: 1230     PT Stop Time: 1255  PT Total Time (min): 25 min     Billable Minutes: Gait Training 10 and Therapeutic Exercise 15    Treatment Type: Treatment  PT/PTA: PT     PTA Visit Number: 0     01/08/2022

## 2022-01-08 NOTE — SUBJECTIVE & OBJECTIVE
Interval History: NAEO: Pt denies sob,cp. SWB placement vs DC home. Ss also looking for equipment for home use. Pt refused NH PLACEMENT.    Review of Systems   Constitutional: Positive for activity change. Negative for appetite change, chills and fever.   Respiratory: Negative for cough, chest tightness and shortness of breath.    Cardiovascular: Negative for chest pain and palpitations.   Neurological: Negative for light-headedness.   All other systems reviewed and are negative.    Objective:     Vital Signs (Most Recent):  Temp: 98.8 °F (37.1 °C) (01/08/22 0757)  Pulse: 95 (01/08/22 0757)  Resp: 16 (01/08/22 1126)  BP: 135/72 (01/08/22 0757)  SpO2: 99 % (01/08/22 0757) Vital Signs (24h Range):  Temp:  [98.5 °F (36.9 °C)-99.2 °F (37.3 °C)] 98.8 °F (37.1 °C)  Pulse:  [] 95  Resp:  [16-22] 16  SpO2:  [96 %-100 %] 99 %  BP: (126-147)/(65-81) 135/72     Weight: 89.9 kg (198 lb 3.1 oz)  Body mass index is 26.15 kg/m².    Intake/Output Summary (Last 24 hours) at 1/8/2022 1139  Last data filed at 1/7/2022 2049  Gross per 24 hour   Intake --   Output 300 ml   Net -300 ml      Physical Exam  Vitals and nursing note reviewed.   Constitutional:       General: He is not in acute distress.  HENT:      Head: Normocephalic and atraumatic.      Mouth/Throat:      Mouth: Mucous membranes are moist.   Eyes:      Extraocular Movements: Extraocular movements intact.      Pupils: Pupils are equal, round, and reactive to light.   Cardiovascular:      Rate and Rhythm: Normal rate and regular rhythm.      Pulses: Normal pulses.   Pulmonary:      Effort: Pulmonary effort is normal. No respiratory distress.      Breath sounds: Normal breath sounds. No wheezing.   Abdominal:      General: Bowel sounds are normal. There is no distension.      Tenderness: There is no abdominal tenderness.   Musculoskeletal:         General: Tenderness present.      Cervical back: Normal range of motion and neck supple.        Legs:       Comments:  R hip  with surgical dressing covering    Skin:     General: Skin is warm.      Capillary Refill: Capillary refill takes less than 2 seconds.   Neurological:      Mental Status: He is alert, oriented to person, place, and time and easily aroused. Mental status is at baseline.      GCS: GCS eye subscore is 4. GCS verbal subscore is 4. GCS motor subscore is 6.      Cranial Nerves: Cranial nerves are intact.      Sensory: Sensation is intact.      Motor: Weakness present.      Comments: Dense right-sided hemiparesis and expressive aphasia in this right-handed patient with old CVA   Psychiatric:         Mood and Affect: Mood normal.         Significant Labs: All pertinent labs within the past 24 hours have been reviewed.    Significant Imaging: I have reviewed all pertinent imaging results/findings within the past 24 hours.

## 2022-01-08 NOTE — ASSESSMENT & PLAN NOTE
- chronic, stable  - hx of CVA with R sided hemiparesis   -patient is right-handed  - continue statin   1/4  - PT following  - plans for SWB   1/8  - Pt has mobility limitation that significantly impairs his MRADL'S including his ability for toileting and bathing at home.Limitations cannot be resolved with use of a cane or walker due to increased risk of falling. A wheelchair will improve the pt's ability to perform MRADL'S. Pt is willing to and able to propel in wheelchair.

## 2022-01-09 PROBLEM — E87.6 HYPOKALEMIA: Status: RESOLVED | Noted: 2022-01-05 | Resolved: 2022-01-09

## 2022-01-09 LAB
ALBUMIN SERPL BCP-MCNC: 2.5 G/DL (ref 3.5–5)
ALBUMIN/GLOB SERPL: 0.6 {RATIO}
ALP SERPL-CCNC: 118 U/L (ref 45–115)
ALT SERPL W P-5'-P-CCNC: 34 U/L (ref 16–61)
ANION GAP SERPL CALCULATED.3IONS-SCNC: 12 MMOL/L (ref 7–16)
ANISOCYTOSIS BLD QL SMEAR: ABNORMAL
AST SERPL W P-5'-P-CCNC: 20 U/L (ref 15–37)
BASOPHILS # BLD AUTO: 0.05 K/UL (ref 0–0.2)
BASOPHILS NFR BLD AUTO: 0.8 % (ref 0–1)
BASOPHILS NFR BLD MANUAL: 1 % (ref 0–1)
BILIRUB SERPL-MCNC: 0.3 MG/DL (ref 0–1.2)
BUN SERPL-MCNC: 15 MG/DL (ref 7–18)
BUN/CREAT SERPL: 21 (ref 6–20)
CALCIUM SERPL-MCNC: 8.3 MG/DL (ref 8.5–10.1)
CHLORIDE SERPL-SCNC: 102 MMOL/L (ref 98–107)
CO2 SERPL-SCNC: 26 MMOL/L (ref 21–32)
CREAT SERPL-MCNC: 0.72 MG/DL (ref 0.7–1.3)
DIFFERENTIAL METHOD BLD: ABNORMAL
EOSINOPHIL # BLD AUTO: 0.28 K/UL (ref 0–0.5)
EOSINOPHIL NFR BLD AUTO: 4.5 % (ref 1–4)
EOSINOPHIL NFR BLD MANUAL: 5 % (ref 1–4)
ERYTHROCYTE [DISTWIDTH] IN BLOOD BY AUTOMATED COUNT: 17 % (ref 11.5–14.5)
GLOBULIN SER-MCNC: 4.2 G/DL (ref 2–4)
GLUCOSE SERPL-MCNC: 203 MG/DL (ref 74–106)
HCT VFR BLD AUTO: 31.1 % (ref 40–54)
HGB BLD-MCNC: 10 G/DL (ref 13.5–18)
HYPOCHROMIA BLD QL SMEAR: ABNORMAL
IMM GRANULOCYTES # BLD AUTO: 0.03 K/UL (ref 0–0.04)
IMM GRANULOCYTES NFR BLD: 0.5 % (ref 0–0.4)
LYMPHOCYTES # BLD AUTO: 2.31 K/UL (ref 1–4.8)
LYMPHOCYTES NFR BLD AUTO: 36.8 % (ref 27–41)
LYMPHOCYTES NFR BLD MANUAL: 33 % (ref 27–41)
MAGNESIUM SERPL-MCNC: 2.2 MG/DL (ref 1.7–2.3)
MCH RBC QN AUTO: 23.4 PG (ref 27–31)
MCHC RBC AUTO-ENTMCNC: 32.2 G/DL (ref 32–36)
MCV RBC AUTO: 72.7 FL (ref 80–96)
MICROCYTES BLD QL SMEAR: ABNORMAL
MONOCYTES # BLD AUTO: 0.56 K/UL (ref 0–0.8)
MONOCYTES NFR BLD AUTO: 8.9 % (ref 2–6)
MONOCYTES NFR BLD MANUAL: 9 % (ref 2–6)
MPC BLD CALC-MCNC: 9.2 FL (ref 9.4–12.4)
NEUTROPHILS # BLD AUTO: 3.04 K/UL (ref 1.8–7.7)
NEUTROPHILS NFR BLD AUTO: 48.5 % (ref 53–65)
NEUTS BAND NFR BLD MANUAL: 1 % (ref 1–5)
NEUTS SEG NFR BLD MANUAL: 51 % (ref 50–62)
NRBC # BLD AUTO: 0 X10E3/UL
NRBC, AUTO (.00): 0 %
OVALOCYTES BLD QL SMEAR: ABNORMAL
PLATELET # BLD AUTO: 353 K/UL (ref 150–400)
PLATELET MORPHOLOGY: NORMAL
POLYCHROMASIA BLD QL SMEAR: ABNORMAL
POTASSIUM SERPL-SCNC: 4.7 MMOL/L (ref 3.5–5.1)
PROT SERPL-MCNC: 6.7 G/DL (ref 6.4–8.2)
RBC # BLD AUTO: 4.28 M/UL (ref 4.6–6.2)
SODIUM SERPL-SCNC: 135 MMOL/L (ref 136–145)
WBC # BLD AUTO: 6.27 K/UL (ref 4.5–11)

## 2022-01-09 PROCEDURE — 85025 COMPLETE CBC W/AUTO DIFF WBC: CPT | Performed by: NURSE PRACTITIONER

## 2022-01-09 PROCEDURE — 25000003 PHARM REV CODE 250: Performed by: REGISTERED NURSE

## 2022-01-09 PROCEDURE — 97116 GAIT TRAINING THERAPY: CPT

## 2022-01-09 PROCEDURE — 63600175 PHARM REV CODE 636 W HCPCS: Performed by: NURSE PRACTITIONER

## 2022-01-09 PROCEDURE — 99231 PR SUBSEQUENT HOSPITAL CARE,LEVL I: ICD-10-PCS | Mod: ,,, | Performed by: HOSPITALIST

## 2022-01-09 PROCEDURE — 11000001 HC ACUTE MED/SURG PRIVATE ROOM

## 2022-01-09 PROCEDURE — 25000003 PHARM REV CODE 250: Performed by: NURSE PRACTITIONER

## 2022-01-09 PROCEDURE — 25000003 PHARM REV CODE 250: Performed by: HOSPITALIST

## 2022-01-09 PROCEDURE — S4991 NICOTINE PATCH NONLEGEND: HCPCS | Performed by: HOSPITALIST

## 2022-01-09 PROCEDURE — 97110 THERAPEUTIC EXERCISES: CPT

## 2022-01-09 PROCEDURE — 83735 ASSAY OF MAGNESIUM: CPT | Performed by: NURSE PRACTITIONER

## 2022-01-09 PROCEDURE — 25000003 PHARM REV CODE 250: Performed by: ORTHOPAEDIC SURGERY

## 2022-01-09 PROCEDURE — 80053 COMPREHEN METABOLIC PANEL: CPT | Performed by: NURSE PRACTITIONER

## 2022-01-09 PROCEDURE — 99231 SBSQ HOSP IP/OBS SF/LOW 25: CPT | Mod: ,,, | Performed by: HOSPITALIST

## 2022-01-09 PROCEDURE — 36415 COLL VENOUS BLD VENIPUNCTURE: CPT | Performed by: NURSE PRACTITIONER

## 2022-01-09 PROCEDURE — 63600175 PHARM REV CODE 636 W HCPCS: Performed by: ORTHOPAEDIC SURGERY

## 2022-01-09 RX ORDER — LISINOPRIL 5 MG/1
5 TABLET ORAL DAILY
Status: DISCONTINUED | OUTPATIENT
Start: 2022-01-10 | End: 2022-01-19 | Stop reason: HOSPADM

## 2022-01-09 RX ADMIN — THERA TABS 1 TABLET: TAB at 08:01

## 2022-01-09 RX ADMIN — LORAZEPAM 0.5 MG: 0.5 TABLET ORAL at 08:01

## 2022-01-09 RX ADMIN — BACLOFEN 5 MG: 5 TABLET ORAL at 04:01

## 2022-01-09 RX ADMIN — HYDROCODONE BITARTRATE AND ACETAMINOPHEN 1 TABLET: 5; 325 TABLET ORAL at 07:01

## 2022-01-09 RX ADMIN — HYDROCODONE BITARTRATE AND ACETAMINOPHEN 1 TABLET: 5; 325 TABLET ORAL at 02:01

## 2022-01-09 RX ADMIN — HYDROCODONE BITARTRATE AND ACETAMINOPHEN 1 TABLET: 5; 325 TABLET ORAL at 05:01

## 2022-01-09 RX ADMIN — ASPIRIN 81 MG: 81 TABLET, COATED ORAL at 08:01

## 2022-01-09 RX ADMIN — LORAZEPAM 0.5 MG: 0.5 TABLET ORAL at 04:01

## 2022-01-09 RX ADMIN — HYDROCODONE BITARTRATE AND ACETAMINOPHEN 1 TABLET: 5; 325 TABLET ORAL at 11:01

## 2022-01-09 RX ADMIN — BACLOFEN 5 MG: 5 TABLET ORAL at 08:01

## 2022-01-09 RX ADMIN — LORAZEPAM 0.5 MG: 0.5 TABLET ORAL at 09:01

## 2022-01-09 RX ADMIN — POTASSIUM CHLORIDE 40 MEQ: 20 TABLET, EXTENDED RELEASE ORAL at 09:01

## 2022-01-09 RX ADMIN — LISINOPRIL 2.5 MG: 2.5 TABLET ORAL at 08:01

## 2022-01-09 RX ADMIN — THIAMINE HYDROCHLORIDE: 100 INJECTION, SOLUTION INTRAMUSCULAR; INTRAVENOUS at 11:01

## 2022-01-09 RX ADMIN — METOPROLOL TARTRATE 12.5 MG: 25 TABLET, FILM COATED ORAL at 09:01

## 2022-01-09 RX ADMIN — APIXABAN 2.5 MG: 2.5 TABLET, FILM COATED ORAL at 08:01

## 2022-01-09 RX ADMIN — BACLOFEN 5 MG: 5 TABLET ORAL at 09:01

## 2022-01-09 RX ADMIN — MORPHINE SULFATE 4 MG: 4 INJECTION INTRAVENOUS at 12:01

## 2022-01-09 RX ADMIN — NICOTINE 1 PATCH: 14 PATCH, EXTENDED RELEASE TRANSDERMAL at 09:01

## 2022-01-09 RX ADMIN — ATORVASTATIN CALCIUM 40 MG: 20 TABLET, FILM COATED ORAL at 09:01

## 2022-01-09 RX ADMIN — METOPROLOL TARTRATE 12.5 MG: 25 TABLET, FILM COATED ORAL at 08:01

## 2022-01-09 RX ADMIN — PANTOPRAZOLE SODIUM 40 MG: 40 TABLET, DELAYED RELEASE ORAL at 08:01

## 2022-01-09 RX ADMIN — APIXABAN 2.5 MG: 2.5 TABLET, FILM COATED ORAL at 09:01

## 2022-01-09 RX ADMIN — HYDROCODONE BITARTRATE AND ACETAMINOPHEN 1 TABLET: 5; 325 TABLET ORAL at 09:01

## 2022-01-09 RX ADMIN — Medication 1000 UNITS: at 08:01

## 2022-01-09 NOTE — ASSESSMENT & PLAN NOTE
- acute  - R hip xr with femoral neck fracture superimposed on chronic changes  - ortho consult-- plans for surgical intervention tomorrow  - EKG with SR with incomplete R BBB  - CXR with cardiomegaly and chronic vascular congestion  - plans for surgical intervention tomorrow  - PT/OT/SS consulted for post op assistance  - heparin SQ x 1 dose tonight   - AC after surgery per surgery preference   - ALMEIDA score of 0.9% ALISA risk-- risk of MI or cardiac arrest intra op or up to 30 days post op  - RCRI score of 2 points; class III risk; 10.1% 30 day risk of death, MI, or cardiac arrest     Perioperative Risk Assessment:  Patient is at intermediate risk for perioperative cardiopulmonary complications.  This based on lack of thorough history, hx of CVA and CABG. However risk are stable and see no medical benefit in delaying surgery from medical standpoint.    Surgery 01/02    1/3  - stable post op  - hemovac in place  - PT/OT to see  - SS following for SWB assistance   1/4  - hemovac to be pulled today  - PT following  - baclofen added to spasticity   1/6  - PO day#4  - PT/OT  - SWB placement per SS  1/7  - po day#5  - dsg d/i  1/8  - po DAY#6  1/9  - po DAY#7  - Continue to attempt placement. PT continues in Hospital. SS consulted for WC and any medical devices needed.

## 2022-01-09 NOTE — PLAN OF CARE
SW consulted for Wheelchair  Spoke with pt and obtained [facility]. Wheelchair ordered and clinicals faxed.  SW following for discharge needs.

## 2022-01-09 NOTE — PROGRESS NOTES
[facility]  [facility]  Progress Note    Patient Name: Karin Carrera  MRN: [id number]  Patient Class: IP- Inpatient   Admission Date: 1/1/2022  Length of Stay: 8 days  Attending Physician: Sarahi Goodman MD  Primary Care Provider: Walker Smith MD        Subjective:     Principal Problem:Closed fracture of right hip        HPI:  Mr Carrera is a 52 yo male who presented to the ED via EMS for c/o right leg pain after a fall. Per EMS and ED staff, he was picked up from his vehicle outside of a friend's home. He states that he lives in his vehicle. It was noted that there were numerous liquor bottles in the vehicle and around him. He was combative with EMS and has been uncooperative with much of his interview during his stay in the ED. He uses profanity easily but is aphasic during much of the ROS and interview. He will use his cell phone to type out some answers. He is a poor historian due to this and his obvious drunken state. He denies any chest pain, shortness of breath, abdominal pain, F/C, N/V/D, or changes in vision. He has right sided hemiparesis as a results of his previous CVA- however still drives. Spoke with his brother, Silver, who provided no new information- he has not seen his brother since the day before yesterday. He does state that his brother does not live with him because of the alcohol use and other private reasons. Upon workup, it was noted that he had a right femoral neck fracture. His ETOH level was 188. He is being admitted to hospitalist services with a consult to orthopaedics for surgical intervention.     He has a PMH of CABG, CVA, HTN, GERD, ETOH abuse, and nicotine abuse. It is unclear when his CABG or CVA was; unable to find dates in previous records. It is unknown if he has had covid or been vaccinated. He follows with Dr Smith for primary care. He does not follow with anyone for cardiology. Per patient, he wishes to be a full code.     Brother-  [facility] [phone]       Overview/Hospital Course:  01/02 - patient seems in less pain but obviously still sore.  He is calm and cooperative.  No new issues reported.  Seen preoperatively.  As previous addressed okay with me to proceed as planned; no medical reason to delay.  Watch for alcohol withdrawals postoperatively even though patient denies daily alcohol use.  Look into placement needs.  Dr. Goodman to assume care patient for hospitalist in the a.m..  1/3: NAEO; some pain at R hip; PT/OT to see; SS to assist with placement; continue on DT prevention- pt did initially state he drank daily and is now saying he drinks every other day  1/4: NAEO; no s/s of withdrawals; asking for snuff; PT following- having some issues with spasticity-- baclofen started; continue DT prevention; SS following for SWB   1/6: Echo EF 20%. Awaiting placement. Pt refuse NH.  1/7:Norvasc stopped.Lisinopril 2.5 mg.Lopressor 12.5 mg bid.Cardiologist appt new pt at OR.          Interval History: NAEO.Pt awake denies SOB,CP.    Review of Systems   Constitutional: Positive for activity change. Negative for appetite change, chills and fever.   Respiratory: Negative for cough, chest tightness and shortness of breath.    Cardiovascular: Negative for chest pain and palpitations.   Neurological: Negative for light-headedness.   All other systems reviewed and are negative.    Objective:     Vital Signs (Most Recent):  Temp: 98.4 °F (36.9 °C) (01/09/22 0400)  Pulse: 77 (01/09/22 0400)  Resp: 18 (01/09/22 0717)  BP: 123/74 (01/09/22 0400)  SpO2: 98 % (01/09/22 0400) Vital Signs (24h Range):  Temp:  [97.7 °F (36.5 °C)-98.4 °F (36.9 °C)] 98.4 °F (36.9 °C)  Pulse:  [77-86] 77  Resp:  [16-18] 18  SpO2:  [96 %-98 %] 98 %  BP: (114-134)/(60-81) 123/74     Weight: 89.9 kg (198 lb 3.1 oz)  Body mass index is 26.15 kg/m².  No intake or output data in the 24 hours ending 01/09/22 1031   Physical Exam  Vitals and nursing note reviewed.   Constitutional:        General: He is not in acute distress.  HENT:      Head: Normocephalic and atraumatic.      Mouth/Throat:      Mouth: Mucous membranes are moist.   Eyes:      Extraocular Movements: Extraocular movements intact.      Pupils: Pupils are equal, round, and reactive to light.   Cardiovascular:      Rate and Rhythm: Normal rate and regular rhythm.      Pulses: Normal pulses.   Pulmonary:      Effort: Pulmonary effort is normal. No respiratory distress.      Breath sounds: Normal breath sounds. No wheezing.   Abdominal:      General: Bowel sounds are normal. There is no distension.      Tenderness: There is no abdominal tenderness.   Musculoskeletal:         General: Tenderness present.      Cervical back: Normal range of motion and neck supple.        Legs:       Comments:  R hip with surgical dressing covering    Skin:     General: Skin is warm.      Capillary Refill: Capillary refill takes less than 2 seconds.   Neurological:      Mental Status: He is alert, oriented to person, place, and time and easily aroused. Mental status is at baseline.      GCS: GCS eye subscore is 4. GCS verbal subscore is 4. GCS motor subscore is 6.      Cranial Nerves: Cranial nerves are intact.      Sensory: Sensation is intact.      Motor: Weakness present.      Comments: Dense right-sided hemiparesis and expressive aphasia in this right-handed patient with old CVA   Psychiatric:         Mood and Affect: Mood normal.         Significant Labs: All pertinent labs within the past 24 hours have been reviewed.    Significant Imaging: I have reviewed all pertinent imaging results/findings within the past 24 hours.      Assessment/Plan:      * Closed fracture of right hip  - acute  - R hip xr with femoral neck fracture superimposed on chronic changes  - ortho consult-- plans for surgical intervention tomorrow  - EKG with SR with incomplete R BBB  - CXR with cardiomegaly and chronic vascular congestion  - plans for surgical intervention tomorrow  -  PT/OT/SS consulted for post op assistance  - heparin SQ x 1 dose tonight   - AC after surgery per surgery preference   - ALMEIDA score of 0.9% ALISA risk-- risk of MI or cardiac arrest intra op or up to 30 days post op  - RCRI score of 2 points; class III risk; 10.1% 30 day risk of death, MI, or cardiac arrest     Perioperative Risk Assessment:  Patient is at intermediate risk for perioperative cardiopulmonary complications.  This based on lack of thorough history, hx of CVA and CABG. However risk are stable and see no medical benefit in delaying surgery from medical standpoint.    Surgery 01/02    1/3  - stable post op  - hemovac in place  - PT/OT to see  - SS following for SWB assistance   1/4  - hemovac to be pulled today  - PT following  - baclofen added to spasticity   1/6  - PO day#4  - PT/OT  - SWB placement per SS  1/7  - po day#5  - dsg d/i  1/8  - po DAY#6  1/9  - po DAY#7  - Continue to attempt placement. PT continues in Hospital. SS consulted for WC and any medical devices needed.        Acute on chronic combined systolic and diastolic heart failure        1/6/22  · The left ventricle is severely enlarged with mild eccentric hypertrophy and severely decreased systolic function.  · The estimated ejection fraction is 20%.  · There are segmental left ventricular wall motion abnormalities.  · Left ventricular diastolic dysfunction.  · Atrial fibrillation not observed.  · Normal right ventricular size.  · Mild mitral regurgitation.  · Normal central venous pressure (3 mmHg).  1/7   Lisinopril 2.5 mg  - Lopressor 12.5 mg bid  1/7  - Norvasc stopped  - Lisinopril 2.5 mg  - Lopressor 12.5 mg bid  - Cardiologist appt new pt at dc.  1/8  - Lisinopril increased 5 mg        ETOH abuse  - chronic  - unclear amount pt does partake in daily   - drinks liquor instead of beer  - monitor for DTs   - banana bag daily- MVI, thiamine, and folic acid  - ativan PO scheduled to defer DTs   - ativan IV for breakthrough symptoms    1/4  - no s/s of withdrawals   1/5  - - no s/s of DT'S            Hx of CABG  - chronic, stable  - continue current meds  - cardiac monitoring   - EKG with SR with incomplete RBBB      GERD (gastroesophageal reflux disease)  - chronic, stable  - continue current meds       Hypertension  - chronic, stable  - continue current home meds  - monitor BP trend  - adjust meds as needed   1/4  - stable   1/7  - Norvasc stopped  - Lisinopril 2.5 mg  - Lopressor 12.5 mg bid  1/9  - BP stable  - Lisinopril increased to 5 mg daily    Right sided weakness  - chronic, stable  - hx of CVA with R sided hemiparesis   -patient is right-handed  - continue statin   1/4  - PT following  - plans for SWB   1/8  - Pt has mobility limitation that significantly impairs his MRADL'S including his ability for toileting and bathing at home.Limitations cannot be resolved with use of a cane or walker due to increased risk of falling. A wheelchair will improve the pt's ability to perform MRADL'S. Pt is willing to and able to propel in wheelchair.        VTE Risk Mitigation (From admission, onward)         Ordered     apixaban tablet 2.5 mg  2 times daily         01/02/22 1916     IP VTE HIGH RISK PATIENT  Once         01/02/22 1916     Place SAGE hose  Until discontinued         01/02/22 1916     Place sequential compression device  Until discontinued         01/02/22 1916     Place sequential compression device  Until discontinued         01/01/22 1707                Discharge Planning   DEANNA:      Code Status: Full Code   Is the patient medically ready for discharge?:     Reason for patient still in hospital (select all that apply): Treatment  Discharge Plan A: Rehab (J[facility] Faustin)                  ALINA Kuhn  Department of Hospital Medicine   [facility]

## 2022-01-09 NOTE — ASSESSMENT & PLAN NOTE
1/6/22  · The left ventricle is severely enlarged with mild eccentric hypertrophy and severely decreased systolic function.  · The estimated ejection fraction is 20%.  · There are segmental left ventricular wall motion abnormalities.  · Left ventricular diastolic dysfunction.  · Atrial fibrillation not observed.  · Normal right ventricular size.  · Mild mitral regurgitation.  · Normal central venous pressure (3 mmHg).  1/7   Lisinopril 2.5 mg  - Lopressor 12.5 mg bid  1/7  - Norvasc stopped  - Lisinopril 2.5 mg  - Lopressor 12.5 mg bid  - Cardiologist appt new pt at dc.  1/8  - Lisinopril increased 5 mg

## 2022-01-09 NOTE — PT/OT/SLP PROGRESS
Physical Therapy Treatment    Patient Name:  Karin Carrera   MRN:  [id number]    Recommendations:     Discharge Recommendations:  rehabilitation facility,nursing facility, skilled   Discharge Equipment Recommendations: walker, jasbir   Barriers to discharge: Decreased caregiver support    Assessment:     Karin Carrera is a 51 y.o. male admitted with a medical diagnosis of Closed fracture of right hip.  He presents with the following impairments/functional limitations:  impaired functional mobilty,gait instability,impaired balance,decreased coordination,pain,abnormal tone,decreased ROM,orthopedic precautions Patient able to progress gait a little today. Pain limiting ability to weight bear right le. Slowly getting better. Awaiting placement.    Rehab Prognosis: Good; patient would benefit from acute skilled PT services to address these deficits and reach maximum level of function.    Recent Surgery: Procedure(s) (LRB):  HEMIARTHROPLASTY, HIP (Right) 7 Days Post-Op    Plan:     During this hospitalization, patient to be seen 5 x/week to address the identified rehab impairments via gait training,therapeutic activities,therapeutic exercises and progress toward the following goals:    · Plan of Care Expires:  02/03/22    Subjective     Chief Complaint: post op pain  Patient/Family Comments/goals: Patient complains of pain limiting ability to weight bear. Patient anxious about where his van is at.  Pain/Comfort:  Pain Rating 1: 4/10  Location - Side 1: Left  Location 1: hip  Pain Addressed 1: Pre-medicate for activity      Objective:     Communicated with nurse prior to session.  Patient found supine with peripheral IV upon PT entry to room.     General Precautions: Standard, fall   Orthopedic Precautions:RLE weight bearing as tolerated   Braces:    Respiratory Status: Room air     Functional Mobility:  · Bed Mobility:     · Supine to Sit: minimum assistance  · Transfers:     · Sit to Stand:  minimum assistance  with no AD  · Bed to Chair: minimum assistance with  no AD  using  Squat Pivot  · Gait: ambulated 7 feet with quad cane mod assist x 2, antalgic gait. some corordination difficulty right le.      AM-PAC 6 CLICK MOBILITY  Turning over in bed (including adjusting bedclothes, sheets and blankets)?: 3  Sitting down on and standing up from a chair with arms (e.g., wheelchair, bedside commode, etc.): 3  Moving from lying on back to sitting on the side of the bed?: 3  Moving to and from a bed to a chair (including a wheelchair)?: 3  Need to walk in hospital room?: 2  Climbing 3-5 steps with a railing?: 1  Basic Mobility Total Score: 15       Therapeutic Activities and Exercises:   RLE: aps, hs, saq, abd-add slr 3x10    Patient left up in chair with call button in reach..    GOALS:   Multidisciplinary Problems     Physical Therapy Goals        Problem: Physical Therapy Goal    Goal Priority Disciplines Outcome Goal Variances Interventions   Physical Therapy Goal     PT, PT/OT Ongoing, Progressing     Description: Short Term Goals  Independent with HEP  Independent with cane x 100 feet PWB/WBAT: right lower extremity  Cga supine-sit-stand    Long term goals  Needed equipment for home.   Independent with hip precautsion                       Time Tracking:     PT Received On: 01/13/22  PT Start Time: 1305     PT Stop Time: 1330  PT Total Time (min): 25 min     Billable Minutes: Gait Training 10 and Therapeutic Exercise 15    Treatment Type: Treatment  PT/PTA: PT     PTA Visit Number: 0     01/09/2022

## 2022-01-09 NOTE — SUBJECTIVE & OBJECTIVE
Interval History: NAEO.Pt awake denies SOB,CP.    Review of Systems   Constitutional: Positive for activity change. Negative for appetite change, chills and fever.   Respiratory: Negative for cough, chest tightness and shortness of breath.    Cardiovascular: Negative for chest pain and palpitations.   Neurological: Negative for light-headedness.   All other systems reviewed and are negative.    Objective:     Vital Signs (Most Recent):  Temp: 98.4 °F (36.9 °C) (01/09/22 0400)  Pulse: 77 (01/09/22 0400)  Resp: 18 (01/09/22 0717)  BP: 123/74 (01/09/22 0400)  SpO2: 98 % (01/09/22 0400) Vital Signs (24h Range):  Temp:  [97.7 °F (36.5 °C)-98.4 °F (36.9 °C)] 98.4 °F (36.9 °C)  Pulse:  [77-86] 77  Resp:  [16-18] 18  SpO2:  [96 %-98 %] 98 %  BP: (114-134)/(60-81) 123/74     Weight: 89.9 kg (198 lb 3.1 oz)  Body mass index is 26.15 kg/m².  No intake or output data in the 24 hours ending 01/09/22 1031   Physical Exam  Vitals and nursing note reviewed.   Constitutional:       General: He is not in acute distress.  HENT:      Head: Normocephalic and atraumatic.      Mouth/Throat:      Mouth: Mucous membranes are moist.   Eyes:      Extraocular Movements: Extraocular movements intact.      Pupils: Pupils are equal, round, and reactive to light.   Cardiovascular:      Rate and Rhythm: Normal rate and regular rhythm.      Pulses: Normal pulses.   Pulmonary:      Effort: Pulmonary effort is normal. No respiratory distress.      Breath sounds: Normal breath sounds. No wheezing.   Abdominal:      General: Bowel sounds are normal. There is no distension.      Tenderness: There is no abdominal tenderness.   Musculoskeletal:         General: Tenderness present.      Cervical back: Normal range of motion and neck supple.        Legs:       Comments:  R hip with surgical dressing covering    Skin:     General: Skin is warm.      Capillary Refill: Capillary refill takes less than 2 seconds.   Neurological:      Mental Status: He is alert,  oriented to person, place, and time and easily aroused. Mental status is at baseline.      GCS: GCS eye subscore is 4. GCS verbal subscore is 4. GCS motor subscore is 6.      Cranial Nerves: Cranial nerves are intact.      Sensory: Sensation is intact.      Motor: Weakness present.      Comments: Dense right-sided hemiparesis and expressive aphasia in this right-handed patient with old CVA   Psychiatric:         Mood and Affect: Mood normal.         Significant Labs: All pertinent labs within the past 24 hours have been reviewed.    Significant Imaging: I have reviewed all pertinent imaging results/findings within the past 24 hours.

## 2022-01-09 NOTE — ASSESSMENT & PLAN NOTE
- chronic, stable  - continue current home meds  - monitor BP trend  - adjust meds as needed   1/4  - stable   1/7  - Norvasc stopped  - Lisinopril 2.5 mg  - Lopressor 12.5 mg bid  1/9  - BP stable  - Lisinopril increased to 5 mg daily

## 2022-01-10 LAB
ALBUMIN SERPL BCP-MCNC: 2.6 G/DL (ref 3.5–5)
ALBUMIN/GLOB SERPL: 0.6 {RATIO}
ALP SERPL-CCNC: 125 U/L (ref 45–115)
ALT SERPL W P-5'-P-CCNC: 33 U/L (ref 16–61)
ANION GAP SERPL CALCULATED.3IONS-SCNC: 12 MMOL/L (ref 7–16)
ANISOCYTOSIS BLD QL SMEAR: ABNORMAL
AST SERPL W P-5'-P-CCNC: 17 U/L (ref 15–37)
ATYPICAL LYMPHOCYTES: ABNORMAL
BASOPHILS # BLD AUTO: 0.04 K/UL (ref 0–0.2)
BASOPHILS NFR BLD AUTO: 0.7 % (ref 0–1)
BILIRUB SERPL-MCNC: 0.2 MG/DL (ref 0–1.2)
BUN SERPL-MCNC: 11 MG/DL (ref 7–18)
BUN/CREAT SERPL: 17 (ref 6–20)
CALCIUM SERPL-MCNC: 8.5 MG/DL (ref 8.5–10.1)
CHLORIDE SERPL-SCNC: 102 MMOL/L (ref 98–107)
CO2 SERPL-SCNC: 26 MMOL/L (ref 21–32)
CREAT SERPL-MCNC: 0.65 MG/DL (ref 0.7–1.3)
DIFFERENTIAL METHOD BLD: ABNORMAL
EOSINOPHIL # BLD AUTO: 0.22 K/UL (ref 0–0.5)
EOSINOPHIL NFR BLD AUTO: 3.9 % (ref 1–4)
EOSINOPHIL NFR BLD MANUAL: 2 % (ref 1–4)
ERYTHROCYTE [DISTWIDTH] IN BLOOD BY AUTOMATED COUNT: 16.7 % (ref 11.5–14.5)
GLOBULIN SER-MCNC: 4.4 G/DL (ref 2–4)
GLUCOSE SERPL-MCNC: 200 MG/DL (ref 74–106)
HCT VFR BLD AUTO: 32 % (ref 40–54)
HGB BLD-MCNC: 10.2 G/DL (ref 13.5–18)
HYPOCHROMIA BLD QL SMEAR: ABNORMAL
IMM GRANULOCYTES # BLD AUTO: 0.04 K/UL (ref 0–0.04)
IMM GRANULOCYTES NFR BLD: 0.7 % (ref 0–0.4)
LYMPHOCYTES # BLD AUTO: 1.99 K/UL (ref 1–4.8)
LYMPHOCYTES NFR BLD AUTO: 35 % (ref 27–41)
LYMPHOCYTES NFR BLD MANUAL: 40 % (ref 27–41)
MAGNESIUM SERPL-MCNC: 2.2 MG/DL (ref 1.7–2.3)
MCH RBC QN AUTO: 23.1 PG (ref 27–31)
MCHC RBC AUTO-ENTMCNC: 31.9 G/DL (ref 32–36)
MCV RBC AUTO: 72.6 FL (ref 80–96)
MICROCYTES BLD QL SMEAR: ABNORMAL
MONOCYTES # BLD AUTO: 0.53 K/UL (ref 0–0.8)
MONOCYTES NFR BLD AUTO: 9.3 % (ref 2–6)
MONOCYTES NFR BLD MANUAL: 8 % (ref 2–6)
MPC BLD CALC-MCNC: 9.2 FL (ref 9.4–12.4)
NEUTROPHILS # BLD AUTO: 2.86 K/UL (ref 1.8–7.7)
NEUTROPHILS NFR BLD AUTO: 50.4 % (ref 53–65)
NEUTS BAND NFR BLD MANUAL: 3 % (ref 1–5)
NEUTS SEG NFR BLD MANUAL: 47 % (ref 50–62)
NRBC # BLD AUTO: 0.02 X10E3/UL
NRBC, AUTO (.00): 0.4 %
OVALOCYTES BLD QL SMEAR: ABNORMAL
PLATELET # BLD AUTO: 396 K/UL (ref 150–400)
PLATELET MORPHOLOGY: NORMAL
POLYCHROMASIA BLD QL SMEAR: ABNORMAL
POTASSIUM SERPL-SCNC: 4.5 MMOL/L (ref 3.5–5.1)
PROT SERPL-MCNC: 7 G/DL (ref 6.4–8.2)
RBC # BLD AUTO: 4.41 M/UL (ref 4.6–6.2)
SODIUM SERPL-SCNC: 135 MMOL/L (ref 136–145)
WBC # BLD AUTO: 5.68 K/UL (ref 4.5–11)

## 2022-01-10 PROCEDURE — 97116 GAIT TRAINING THERAPY: CPT

## 2022-01-10 PROCEDURE — 99232 PR SUBSEQUENT HOSPITAL CARE,LEVL II: ICD-10-PCS | Mod: ,,, | Performed by: FAMILY MEDICINE

## 2022-01-10 PROCEDURE — 25000003 PHARM REV CODE 250: Performed by: HOSPITALIST

## 2022-01-10 PROCEDURE — 25000003 PHARM REV CODE 250: Performed by: REGISTERED NURSE

## 2022-01-10 PROCEDURE — 11000001 HC ACUTE MED/SURG PRIVATE ROOM

## 2022-01-10 PROCEDURE — 97110 THERAPEUTIC EXERCISES: CPT

## 2022-01-10 PROCEDURE — 97535 SELF CARE MNGMENT TRAINING: CPT

## 2022-01-10 PROCEDURE — 63600175 PHARM REV CODE 636 W HCPCS: Performed by: NURSE PRACTITIONER

## 2022-01-10 PROCEDURE — 25000003 PHARM REV CODE 250: Performed by: ORTHOPAEDIC SURGERY

## 2022-01-10 PROCEDURE — 36415 COLL VENOUS BLD VENIPUNCTURE: CPT | Performed by: NURSE PRACTITIONER

## 2022-01-10 PROCEDURE — 85025 COMPLETE CBC W/AUTO DIFF WBC: CPT | Performed by: NURSE PRACTITIONER

## 2022-01-10 PROCEDURE — 80053 COMPREHEN METABOLIC PANEL: CPT | Performed by: NURSE PRACTITIONER

## 2022-01-10 PROCEDURE — 99232 SBSQ HOSP IP/OBS MODERATE 35: CPT | Mod: ,,, | Performed by: FAMILY MEDICINE

## 2022-01-10 PROCEDURE — 83735 ASSAY OF MAGNESIUM: CPT | Performed by: NURSE PRACTITIONER

## 2022-01-10 PROCEDURE — 25000003 PHARM REV CODE 250: Performed by: NURSE PRACTITIONER

## 2022-01-10 RX ADMIN — HYDROCODONE BITARTRATE AND ACETAMINOPHEN 1 TABLET: 5; 325 TABLET ORAL at 06:01

## 2022-01-10 RX ADMIN — PANTOPRAZOLE SODIUM 40 MG: 40 TABLET, DELAYED RELEASE ORAL at 08:01

## 2022-01-10 RX ADMIN — LORAZEPAM 0.5 MG: 0.5 TABLET ORAL at 09:01

## 2022-01-10 RX ADMIN — APIXABAN 2.5 MG: 2.5 TABLET, FILM COATED ORAL at 08:01

## 2022-01-10 RX ADMIN — HYDROCODONE BITARTRATE AND ACETAMINOPHEN 1 TABLET: 5; 325 TABLET ORAL at 11:01

## 2022-01-10 RX ADMIN — LORAZEPAM 0.5 MG: 0.5 TABLET ORAL at 03:01

## 2022-01-10 RX ADMIN — BACLOFEN 5 MG: 5 TABLET ORAL at 08:01

## 2022-01-10 RX ADMIN — METOPROLOL TARTRATE 12.5 MG: 25 TABLET, FILM COATED ORAL at 08:01

## 2022-01-10 RX ADMIN — Medication 1000 UNITS: at 08:01

## 2022-01-10 RX ADMIN — ATORVASTATIN CALCIUM 40 MG: 20 TABLET, FILM COATED ORAL at 08:01

## 2022-01-10 RX ADMIN — ASPIRIN 81 MG: 81 TABLET, COATED ORAL at 08:01

## 2022-01-10 RX ADMIN — THERA TABS 1 TABLET: TAB at 08:01

## 2022-01-10 RX ADMIN — POLYETHYLENE GLYCOL 3350 17 G: 17 POWDER, FOR SOLUTION ORAL at 08:01

## 2022-01-10 RX ADMIN — POTASSIUM CHLORIDE 40 MEQ: 20 TABLET, EXTENDED RELEASE ORAL at 08:01

## 2022-01-10 RX ADMIN — LISINOPRIL 5 MG: 5 TABLET ORAL at 08:01

## 2022-01-10 RX ADMIN — HYDROCODONE BITARTRATE AND ACETAMINOPHEN 1 TABLET: 5; 325 TABLET ORAL at 12:01

## 2022-01-10 RX ADMIN — BACLOFEN 5 MG: 5 TABLET ORAL at 03:01

## 2022-01-10 RX ADMIN — LORAZEPAM 0.5 MG: 0.5 TABLET ORAL at 08:01

## 2022-01-10 RX ADMIN — THIAMINE HYDROCHLORIDE: 100 INJECTION, SOLUTION INTRAMUSCULAR; INTRAVENOUS at 08:01

## 2022-01-10 NOTE — SUBJECTIVE & OBJECTIVE
Interval History: SWB placement pending. Pt denies sob,cp.    Review of Systems   Constitutional: Positive for activity change. Negative for appetite change, chills and fever.   Respiratory: Negative for cough, chest tightness and shortness of breath.    Cardiovascular: Negative for chest pain and palpitations.   Neurological: Negative for light-headedness.   All other systems reviewed and are negative.    Objective:     Vital Signs (Most Recent):  Temp: 98.2 °F (36.8 °C) (01/10/22 1537)  Pulse: 83 (01/10/22 1537)  Resp: 16 (01/10/22 1537)  BP: 127/60 (01/10/22 1537)  SpO2: 100 % (01/10/22 1537) Vital Signs (24h Range):  Temp:  [98 °F (36.7 °C)-98.4 °F (36.9 °C)] 98.2 °F (36.8 °C)  Pulse:  [70-98] 83  Resp:  [16-20] 16  SpO2:  [96 %-100 %] 100 %  BP: (116-151)/(58-83) 127/60     Weight: 89.9 kg (198 lb 3.1 oz)  Body mass index is 26.15 kg/m².    Intake/Output Summary (Last 24 hours) at 1/10/2022 1733  Last data filed at 1/10/2022 1500  Gross per 24 hour   Intake 960 ml   Output 2400 ml   Net -1440 ml      Physical Exam  Vitals and nursing note reviewed.   Constitutional:       General: He is not in acute distress.  HENT:      Head: Normocephalic and atraumatic.      Mouth/Throat:      Mouth: Mucous membranes are moist.   Eyes:      Extraocular Movements: Extraocular movements intact.      Pupils: Pupils are equal, round, and reactive to light.   Cardiovascular:      Rate and Rhythm: Normal rate and regular rhythm.      Pulses: Normal pulses.   Pulmonary:      Effort: Pulmonary effort is normal. No respiratory distress.      Breath sounds: Normal breath sounds. No wheezing.   Abdominal:      General: Bowel sounds are normal. There is no distension.      Tenderness: There is no abdominal tenderness.   Musculoskeletal:         General: Tenderness present.      Cervical back: Normal range of motion and neck supple.        Legs:       Comments:  R hip with surgical dressing covering    Skin:     General: Skin is warm.       Capillary Refill: Capillary refill takes less than 2 seconds.   Neurological:      Mental Status: He is alert, oriented to person, place, and time and easily aroused. Mental status is at baseline.      GCS: GCS eye subscore is 4. GCS verbal subscore is 4. GCS motor subscore is 6.      Cranial Nerves: Cranial nerves are intact.      Sensory: Sensation is intact.      Motor: Weakness present.      Comments: Dense right-sided hemiparesis and expressive aphasia in this right-handed patient with old CVA   Psychiatric:         Mood and Affect: Mood normal.         Significant Labs: All pertinent labs within the past 24 hours have been reviewed.    Significant Imaging: I have reviewed all pertinent imaging results/findings within the past 24 hours.

## 2022-01-10 NOTE — NURSING
Called [facility] Billing department the address he was picked up at [address] [place] [address]. Dr Chua suggest we notify police department to go to that address to see if his van is at that address. Will notify SS.   Statement Selected

## 2022-01-10 NOTE — PT/OT/SLP PROGRESS
Physical Therapy Treatment    Patient Name:  Karin Carrera   MRN:  [id number]    Recommendations:     Discharge Recommendations:  rehabilitation facility,nursing facility, skilled   Discharge Equipment Recommendations: walker, jasbir   Barriers to discharge: homeless    Assessment:     Karin Carrera is a 51 y.o. male admitted with a medical diagnosis of Closed fracture of right hip.  He presents with the following impairments/functional limitations:  impaired functional mobilty,gait instability,impaired balance,decreased coordination,pain,abnormal tone,decreased ROM,orthopedic precautions Patient with progressing of gait today. Able to tolerate pain better. Social work still working on placement.    Rehab Prognosis: Good; patient would benefit from acute skilled PT services to address these deficits and reach maximum level of function.    Recent Surgery: Procedure(s) (LRB):  HEMIARTHROPLASTY, HIP (Right) 8 Days Post-Op    Plan:     During this hospitalization, patient to be seen 5 x/week to address the identified rehab impairments via gait training,therapeutic activities,therapeutic exercises and progress toward the following goals:    · Plan of Care Expires:  02/03/22    Subjective     Chief Complaint: right hip soreness  Patient/Family Comments/goals: Social work trying to find van he lives in  Pain/Comfort:  Pain Rating 1: 2/10  Location - Side 1: Right  Location 1: hip      Objective:     Communicated with nurse prior to session.  Patient found supine with peripheral IV upon PT entry to room.     General Precautions: Standard, fall   Orthopedic Precautions:RLE weight bearing as tolerated   Braces:    Respiratory Status: Room air     Functional Mobility:  · Bed Mobility:     · Supine to Sit: contact guard assistance  · Transfers:     · Sit to Stand:  minimum assistance with quad cane  · Gait: ambulated 40 feet mod assist with quad cane      AM-PAC 6 CLICK MOBILITY  Turning over in bed (including adjusting  bedclothes, sheets and blankets)?: 3  Sitting down on and standing up from a chair with arms (e.g., wheelchair, bedside commode, etc.): 3  Moving from lying on back to sitting on the side of the bed?: 3  Moving to and from a bed to a chair (including a wheelchair)?: 3  Need to walk in hospital room?: 3  Climbing 3-5 steps with a railing?: 3  Basic Mobility Total Score: 18       Therapeutic Activities and Exercises:   RLE: aps, hs, saq, abd-add, qs 3x10    Patient left up in chair with call button in reach..    GOALS:   Multidisciplinary Problems     Physical Therapy Goals        Problem: Physical Therapy Goal    Goal Priority Disciplines Outcome Goal Variances Interventions   Physical Therapy Goal     PT, PT/OT Ongoing, Progressing     Description: Short Term Goals  Independent with HEP  Independent with cane x 100 feet PWB/WBAT: right lower extremity  Cga supine-sit-stand    Long term goals  Needed equipment for home.   Independent with hip precautsion                       Time Tracking:     PT Received On: 01/10/22  PT Start Time: 1700     PT Stop Time: 1730  PT Total Time (min): 30 min     Billable Minutes: Gait Training 10 and Therapeutic Exercise 15    Treatment Type: Treatment  PT/PTA: PT     PTA Visit Number: 0     01/10/2022

## 2022-01-10 NOTE — PLAN OF CARE
Ss spoke with the police dept to confirm placement of pt's van. The police confirmed that the van is parked at [address] in [place]. Ss spoke with pt via phone and he was agreeable to snf that accepted his ins and agreealbe to having to give up check for a month for care. Choice obtained for snf in [place]. [facility], [facility], [facility], and [facility] are unable to accept pt at this time. Ss spoke with kaushik at [facility] and shashi at [facility] and they are still reviewing referral at this time. Ss left message for toya at [facility] re referral. Ss following.    Ss attempted to contact pt's listed emergency contacts with no answer.

## 2022-01-10 NOTE — ASSESSMENT & PLAN NOTE
- acute  - R hip xr with femoral neck fracture superimposed on chronic changes  - ortho consult-- plans for surgical intervention tomorrow  - EKG with SR with incomplete R BBB  - CXR with cardiomegaly and chronic vascular congestion  - plans for surgical intervention tomorrow  - PT/OT/SS consulted for post op assistance  - heparin SQ x 1 dose tonight   - AC after surgery per surgery preference   - ALMEIDA score of 0.9% ALISA risk-- risk of MI or cardiac arrest intra op or up to 30 days post op  - RCRI score of 2 points; class III risk; 10.1% 30 day risk of death, MI, or cardiac arrest     Perioperative Risk Assessment:  Patient is at intermediate risk for perioperative cardiopulmonary complications.  This based on lack of thorough history, hx of CVA and CABG. However risk are stable and see no medical benefit in delaying surgery from medical standpoint.    Surgery 01/02    1/3  - stable post op  - hemovac in place  - PT/OT to see  - SS following for SWB assistance   1/4  - hemovac to be pulled today  - PT following  - baclofen added to spasticity   1/6  - PO day#4  - PT/OT  - SWB placement per SS  1/7  - po day#5  - dsg d/i  1/8  - po DAY#6  1/9  - po DAY#7  - Continue to attempt placement. PT continues in Hospital. SS consulted for WC and any medical devices needed.  1/10  1/9  - po DAY#8  - pt agreeable to snf for pt and rehab.SS following

## 2022-01-10 NOTE — PLAN OF CARE
[facility] called and Medicaid will have to be precerted for wc and there also has to be a home assessment. Physician will also need to chart the notes for the Medicaid guidelines for wc to be approved. However, since pt lives in [place] insurance will more than likely not approve per [facility] due to Medicaid requiring a home assessement. SW following.

## 2022-01-10 NOTE — PLAN OF CARE
Problem: Adult Inpatient Plan of Care  Goal: Plan of Care Review  Outcome: Ongoing, Progressing  Flowsheets (Taken 1/10/2022 1409)  Plan of Care Reviewed With: patient  Goal: Patient-Specific Goal (Individualized)  Outcome: Ongoing, Progressing  Flowsheets (Taken 1/10/2022 1409)  Anxieties, Fears or Concerns: Getting home  Individualized Care Needs: PT, nicotine dependence  Patient-Specific Goals (Include Timeframe): therapy, discharge plans  Goal: Absence of Hospital-Acquired Illness or Injury  Outcome: Ongoing, Progressing  Intervention: Prevent Skin Injury  Flowsheets (Taken 1/10/2022 1409)  Skin Protection:   adhesive use limited   incontinence pads utilized  Intervention: Prevent and Manage VTE (Venous Thromboembolism) Risk  Flowsheets (Taken 1/10/2022 1409)  Range of Motion: active ROM (range of motion) encouraged  Intervention: Prevent Infection  Flowsheets (Taken 1/10/2022 1409)  Infection Prevention:   rest/sleep promoted   hand hygiene promoted  Goal: Optimal Comfort and Wellbeing  Outcome: Ongoing, Progressing  Intervention: Monitor Pain and Promote Comfort  Flowsheets (Taken 1/10/2022 1409)  Pain Management Interventions:   relaxation techniques promoted   quiet environment facilitated  Intervention: Provide Person-Centered Care  Flowsheets (Taken 1/10/2022 1409)  Trust Relationship/Rapport:   care explained   choices provided   emotional support provided   empathic listening provided   thoughts/feelings acknowledged   questions answered   reassurance provided   questions encouraged  Goal: Readiness for Transition of Care  Outcome: Ongoing, Progressing     Problem: Skin Injury Risk Increased  Goal: Skin Health and Integrity  Outcome: Ongoing, Progressing  Intervention: Optimize Skin Protection  Flowsheets (Taken 1/10/2022 1409)  Pressure Reduction Devices: foam padding utilized  Skin Protection:   adhesive use limited   incontinence pads utilized  Intervention: Promote and Optimize Oral  Intake  Flowsheets (Taken 1/10/2022 1401)  Oral Nutrition Promotion:   calorie-dense foods provided   physical activity promoted     Problem: Infection  Goal: Absence of Infection Signs and Symptoms  Outcome: Ongoing, Progressing  Intervention: Prevent or Manage Infection  Flowsheets (Taken 1/10/2022 1403)  Fever Reduction/Comfort Measures:   lightweight clothing   lightweight bedding  Infection Management: aseptic technique maintained  Isolation Precautions: precautions maintained     Problem: Fall Injury Risk  Goal: Absence of Fall and Fall-Related Injury  Outcome: Ongoing, Progressing  Intervention: Identify and Manage Contributors  Flowsheets (Taken 1/10/2022 1408)  Self-Care Promotion: independence encouraged  Medication Review/Management: medications reviewed   Plan of care reviewed. Patient progressing

## 2022-01-10 NOTE — PT/OT/SLP PROGRESS
Physical Therapy Treatment    Patient Name:  Karin Carrera   MRN:  [id number]    Recommendations:     Discharge Recommendations:  rehabilitation facility,nursing facility, skilled   Discharge Equipment Recommendations: walker, jasbir   Barriers to discharge: homeless    Assessment:     Karin Carrera is a 51 y.o. male admitted with a medical diagnosis of Closed fracture of right hip.  He presents with the following impairments/functional limitations:  impaired functional mobilty,gait instability,impaired balance,decreased coordination,pain,abnormal tone,decreased ROM,orthopedic precautions Patient with progressing of gait today. Able to tolerate pain better. Social work still working on placement.    Rehab Prognosis: Good; patient would benefit from acute skilled PT services to address these deficits and reach maximum level of function.    Recent Surgery: Procedure(s) (LRB):  HEMIARTHROPLASTY, HIP (Right) 8 Days Post-Op    Plan:     During this hospitalization, patient to be seen 5 x/week to address the identified rehab impairments via gait training,therapeutic activities,therapeutic exercises and progress toward the following goals:    · Plan of Care Expires:  02/03/22    Subjective     Chief Complaint: right hip soreness  Patient/Family Comments/goals: Social work trying to find van he lives in  Pain/Comfort:  · Pain Rating 1: 2/10  · Location - Side 1: Right  · Location 1: hip      Objective:     Communicated with nurse prior to session.  Patient found supine with peripheral IV upon PT entry to room.     General Precautions: Standard, fall   Orthopedic Precautions:RLE weight bearing as tolerated   Braces:    Respiratory Status: Room air     Functional Mobility:  · Bed Mobility:     · Supine to Sit: contact guard assistance  · Transfers:     · Sit to Stand:  minimum assistance with quad cane  · Gait: ambulated 40 feet mod assist with quad cane      AM-PAC 6 CLICK MOBILITY  Turning over in bed (including  adjusting bedclothes, sheets and blankets)?: 3  Sitting down on and standing up from a chair with arms (e.g., wheelchair, bedside commode, etc.): 3  Moving from lying on back to sitting on the side of the bed?: 3  Moving to and from a bed to a chair (including a wheelchair)?: 3  Need to walk in hospital room?: 3  Climbing 3-5 steps with a railing?: 3  Basic Mobility Total Score: 18       Therapeutic Activities and Exercises:   RLE: aps, hs, saq, abd-add, qs 3x10    Patient left up in chair with call button in reach..    GOALS:   Multidisciplinary Problems     Physical Therapy Goals        Problem: Physical Therapy Goal    Goal Priority Disciplines Outcome Goal Variances Interventions   Physical Therapy Goal     PT, PT/OT Ongoing, Progressing     Description: Short Term Goals  Independent with HEP  Independent with cane x 100 feet PWB/WBAT: right lower extremity  Cga supine-sit-stand    Long term goals  Needed equipment for home.   Independent with hip precautsion                       Time Tracking:     PT Received On: 01/10/22  PT Start Time: 0900     PT Stop Time: 0925  PT Total Time (min): 25 min     Billable Minutes: Gait Training 10 and Therapeutic Exercise 15    Treatment Type: Treatment  PT/PTA: PT     PTA Visit Number: 0     01/10/2022

## 2022-01-10 NOTE — PROGRESS NOTES
[facility]  [facility] Medicine  Progress Note    Patient Name: Karin Carrera  MRN: [id number]  Patient Class: IP- Inpatient   Admission Date: 1/1/2022  Length of Stay: 9 days  Attending Physician: Walker Anaya Jr., MD  Primary Care Provider: Walker Smith MD        Subjective:     Principal Problem:Closed fracture of right hip        HPI:  Mr Carrera is a 50 yo male who presented to the ED via EMS for c/o right leg pain after a fall. Per EMS and ED staff, he was picked up from his vehicle outside of a friend's home. He states that he lives in his vehicle. It was noted that there were numerous liquor bottles in the vehicle and around him. He was combative with EMS and has been uncooperative with much of his interview during his stay in the ED. He uses profanity easily but is aphasic during much of the ROS and interview. He will use his cell phone to type out some answers. He is a poor historian due to this and his obvious drunken state. He denies any chest pain, shortness of breath, abdominal pain, F/C, N/V/D, or changes in vision. He has right sided hemiparesis as a results of his previous CVA- however still drives. Spoke with his brother, Silver, who provided no new information- he has not seen his brother since the day before yesterday. He does state that his brother does not live with him because of the alcohol use and other private reasons. Upon workup, it was noted that he had a right femoral neck fracture. His ETOH level was 188. He is being admitted to hospitalist services with a consult to orthopaedics for surgical intervention.     He has a PMH of CABG, CVA, HTN, GERD, ETOH abuse, and nicotine abuse. It is unclear when his CABG or CVA was; unable to find dates in previous records. It is unknown if he has had covid or been vaccinated. He follows with Dr Smith for primary care. He does not follow with anyone for cardiology. Per patient, he wishes to be a full code.     Brother-  [facility], [place] [phone]       Overview/Hospital Course:  01/02 - patient seems in less pain but obviously still sore.  He is calm and cooperative.  No new issues reported.  Seen preoperatively.  As previous addressed okay with me to proceed as planned; no medical reason to delay.  Watch for alcohol withdrawals postoperatively even though patient denies daily alcohol use.  Look into placement needs.  Dr. Goodman to assume care patient for hospitalist in the a.m..  1/3: NAEO; some pain at R hip; PT/OT to see; SS to assist with placement; continue on DT prevention- pt did initially state he drank daily and is now saying he drinks every other day  1/4: NAEO; no s/s of withdrawals; asking for snuff; PT following- having some issues with spasticity-- baclofen started; continue DT prevention; SS following for SWB   1/6: Echo EF 20%. Awaiting placement. Pt refuse NH.  1/7:Norvasc stopped.Lisinopril 2.5 mg.Lopressor 12.5 mg bid.Cardiologist appt new pt at [facility].          Interval History: SWB placement pending. Pt denies sob,cp.    Review of Systems   Constitutional: Positive for activity change. Negative for appetite change, chills and fever.   Respiratory: Negative for cough, chest tightness and shortness of breath.    Cardiovascular: Negative for chest pain and palpitations.   Neurological: Negative for light-headedness.   All other systems reviewed and are negative.    Objective:     Vital Signs (Most Recent):  Temp: 98.2 °F (36.8 °C) (01/10/22 1537)  Pulse: 83 (01/10/22 1537)  Resp: 16 (01/10/22 1537)  BP: 127/60 (01/10/22 1537)  SpO2: 100 % (01/10/22 1537) Vital Signs (24h Range):  Temp:  [98 °F (36.7 °C)-98.4 °F (36.9 °C)] 98.2 °F (36.8 °C)  Pulse:  [70-98] 83  Resp:  [16-20] 16  SpO2:  [96 %-100 %] 100 %  BP: (116-151)/(58-83) 127/60     Weight: 89.9 kg (198 lb 3.1 oz)  Body mass index is 26.15 kg/m².    Intake/Output Summary (Last 24 hours) at 1/10/2022 1741  Last data filed at 1/10/2022 1500  Gross per 24 hour   Intake 960  ml   Output 2400 ml   Net -1440 ml      Physical Exam  Vitals and nursing note reviewed.   Constitutional:       General: He is not in acute distress.  HENT:      Head: Normocephalic and atraumatic.      Mouth/Throat:      Mouth: Mucous membranes are moist.   Eyes:      Extraocular Movements: Extraocular movements intact.      Pupils: Pupils are equal, round, and reactive to light.   Cardiovascular:      Rate and Rhythm: Normal rate and regular rhythm.      Pulses: Normal pulses.   Pulmonary:      Effort: Pulmonary effort is normal. No respiratory distress.      Breath sounds: Normal breath sounds. No wheezing.   Abdominal:      General: Bowel sounds are normal. There is no distension.      Tenderness: There is no abdominal tenderness.   Musculoskeletal:         General: Tenderness present.      Cervical back: Normal range of motion and neck supple.        Legs:       Comments:  R hip with surgical dressing covering    Skin:     General: Skin is warm.      Capillary Refill: Capillary refill takes less than 2 seconds.   Neurological:      Mental Status: He is alert, oriented to person, place, and time and easily aroused. Mental status is at baseline.      GCS: GCS eye subscore is 4. GCS verbal subscore is 4. GCS motor subscore is 6.      Cranial Nerves: Cranial nerves are intact.      Sensory: Sensation is intact.      Motor: Weakness present.      Comments: Dense right-sided hemiparesis and expressive aphasia in this right-handed patient with old CVA   Psychiatric:         Mood and Affect: Mood normal.         Significant Labs: All pertinent labs within the past 24 hours have been reviewed.    Significant Imaging: I have reviewed all pertinent imaging results/findings within the past 24 hours.      Assessment/Plan:      * Closed fracture of right hip  - acute  - R hip xr with femoral neck fracture superimposed on chronic changes  - ortho consult-- plans for surgical intervention tomorrow  - EKG with SR with  incomplete R BBB  - CXR with cardiomegaly and chronic vascular congestion  - plans for surgical intervention tomorrow  - PT/OT/SS consulted for post op assistance  - heparin SQ x 1 dose tonight   - AC after surgery per surgery preference   - ALMEIDA score of 0.9% ALISA risk-- risk of MI or cardiac arrest intra op or up to 30 days post op  - RCRI score of 2 points; class III risk; 10.1% 30 day risk of death, MI, or cardiac arrest     Perioperative Risk Assessment:  Patient is at intermediate risk for perioperative cardiopulmonary complications.  This based on lack of thorough history, hx of CVA and CABG. However risk are stable and see no medical benefit in delaying surgery from medical standpoint.    Surgery 01/02    1/3  - stable post op  - hemovac in place  - PT/OT to see  - SS following for SWB assistance   1/4  - hemovac to be pulled today  - PT following  - baclofen added to spasticity   1/6  - PO day#4  - PT/OT  - SWB placement per SS  1/7  - po day#5  - dsg d/i  1/8  - po DAY#6  1/9  - po DAY#7  - Continue to attempt placement. PT continues in Hospital. SS consulted for WC and any medical devices needed.  1/10  1/9  - po DAY#8  - pt agreeable to snf for pt and rehab.SS following    Acute on chronic combined systolic and diastolic heart failure        1/6/22  · The left ventricle is severely enlarged with mild eccentric hypertrophy and severely decreased systolic function.  · The estimated ejection fraction is 20%.  · There are segmental left ventricular wall motion abnormalities.  · Left ventricular diastolic dysfunction.  · Atrial fibrillation not observed.  · Normal right ventricular size.  · Mild mitral regurgitation.  · Normal central venous pressure (3 mmHg).  1/7   Lisinopril 2.5 mg  - Lopressor 12.5 mg bid  1/7  - Norvasc stopped  - Lisinopril 2.5 mg  - Lopressor 12.5 mg bid  - Cardiologist appt new pt at [facility].  1/8  - Lisinopril increased 5 mg        ETOH abuse  - chronic  - unclear amount pt does partake  in daily   - drinks liquor instead of beer  - monitor for DTs   - banana bag daily- MVI, thiamine, and folic acid  - ativan PO scheduled to defer DTs   - ativan IV for breakthrough symptoms   1/4  - no s/s of withdrawals   1/5  - - no s/s of DT'S            Hx of CABG  - chronic, stable  - continue current meds  - cardiac monitoring   - EKG with SR with incomplete RBBB      GERD (gastroesophageal reflux disease)  - chronic, stable  - continue current meds       Hypertension  - chronic, stable  - continue current home meds  - monitor BP trend  - adjust meds as needed   1/4  - stable   1/7  - Norvasc stopped  - Lisinopril 2.5 mg  - Lopressor 12.5 mg bid  1/9  - BP stable  - Lisinopril increased to 5 mg daily    Right sided weakness  - chronic, stable  - hx of CVA with R sided hemiparesis   -patient is right-handed  - continue statin   1/4  - PT following  - plans for SWB   1/8  - Pt has mobility limitation that significantly impairs his MRADL'S including his ability for toileting and bathing at home.Limitations cannot be resolved with use of a cane or walker due to increased risk of falling. A wheelchair will improve the pt's ability to perform MRADL'S. Pt is willing to and able to propel in wheelchair.        VTE Risk Mitigation (From admission, onward)         Ordered     apixaban tablet 2.5 mg  2 times daily         01/02/22 1916     IP VTE HIGH RISK PATIENT  Once         01/02/22 1916     Place SAGE hose  Until discontinued         01/02/22 1916     Place sequential compression device  Until discontinued         01/02/22 1916     Place sequential compression device  Until discontinued         01/01/22 1707                Discharge Planning   DEANNA:      Code Status: Full Code   Is the patient medically ready for discharge?:     Reason for patient still in hospital (select all that apply): Treatment  Discharge Plan A: Rehab (Jaden Faustin)                  ALINA Kuhn  Department of Hospital Medicine   [facility]

## 2022-01-10 NOTE — PT/OT/SLP PROGRESS
Occupational Therapy   Treatment    Name: Karin Carrera  MRN: [id number]  Admitting Diagnosis:  Closed fracture of right hip  8 Days Post-Op    Recommendations:     Discharge Recommendations: nursing facility, skilled  Discharge Equipment Recommendations:  walker, jasbir  Barriers to discharge:  Decreased caregiver support,Inaccessible home environment    Assessment:     Karin Carrera is a 51 y.o. male with a medical diagnosis of Closed fracture of right hip.  He presents with s/p R hip hemiarthroplasty. Performance deficits affecting function are impaired functional mobilty,impaired self care skills,orthopedic precautions. Pt agreeable to participate in ADL training with LB dressing with hip kit. Pt continues to require verbal cueing to perform LB ADL tasks with maintaining hip precautions.     Rehab Prognosis:  Fair; patient would benefit from acute skilled OT services to address these deficits and reach maximum level of function.       Plan:     Patient to be seen 5 x/week to address the above listed problems via self-care/home management,therapeutic activities,therapeutic exercises  · Plan of Care Expires: 01/31/22  · Plan of Care Reviewed with: patient    Subjective     Pain/Comfort:  · Pain Rating 1: 3/10  · Location - Side 1: Right  · Location 1: hip    Objective:     Communicated with: COLTON Hall prior to session.  Patient found HOB elevated with peripheral IV upon OT entry to room.    General Precautions: Standard, fall   Orthopedic Precautions:RLE posterior precautions,RLE partial weight bearing   Braces:    Respiratory Status: Room air     Occupational Performance:     Bed Mobility:    · Not performed    Functional Mobility/Transfers:  · Not performed    Activities of Daily Living:  · Lower Body Dressing: minimum assistance to av socks with sock aide; supervision to utilize reacher to grasp item off floor  · Toileting: modified independence to perform toileting and hand hygiene      [facility] 6  Click ADL: 22    Treatment & Education:  Pt completed ADL training with LB dressing utilizing hip kit. Pt requires verbal cueing to maintain hip precautions with ADL tasks and ADL t/f.     Patient left HOB elevated with all lines intact and call button in reachEducation:      GOALS:   Multidisciplinary Problems     Occupational Therapy Goals        Problem: Occupational Therapy Goal    Goal Priority Disciplines Outcome Interventions   Occupational Therapy Goal     OT, PT/OT Ongoing, Progressing    Description: ST.Pt will perform bathing with Ofelia with setup at EOB  2.Pt will perform UE dressing with Ofelia  3.Pt will perform LE dressing with Ofelia with adaptive equipment with maintaining hip precautions  4.Pt will transfer bed/chair/bsc with CGA with AD  5.Pt will perform standing task x 3 min with CGA with maintaining weightbearing status  6.Tolerate 15 min of tx without fatigue.      LTG:   Restore to max I with selfcare and mobility.                       Time Tracking:     OT Date of Treatment: 01/10/22  OT Start Time: 1115  OT Stop Time: 1129  OT Total Time (min): 14 min    Billable Minutes:Self Care/Home Management 10 minutes    OT/ANGELI: OT          1/10/2022

## 2022-01-11 LAB
ALBUMIN SERPL BCP-MCNC: 2.7 G/DL (ref 3.5–5)
ALBUMIN/GLOB SERPL: 0.6 {RATIO}
ALP SERPL-CCNC: 133 U/L (ref 45–115)
ALT SERPL W P-5'-P-CCNC: 31 U/L (ref 16–61)
ANION GAP SERPL CALCULATED.3IONS-SCNC: 11 MMOL/L (ref 7–16)
ANISOCYTOSIS BLD QL SMEAR: ABNORMAL
AST SERPL W P-5'-P-CCNC: 17 U/L (ref 15–37)
BASOPHILS # BLD AUTO: 0.04 K/UL (ref 0–0.2)
BASOPHILS NFR BLD AUTO: 0.6 % (ref 0–1)
BILIRUB SERPL-MCNC: 0.2 MG/DL (ref 0–1.2)
BUN SERPL-MCNC: 10 MG/DL (ref 7–18)
BUN/CREAT SERPL: 14 (ref 6–20)
CALCIUM SERPL-MCNC: 8.4 MG/DL (ref 8.5–10.1)
CHLORIDE SERPL-SCNC: 101 MMOL/L (ref 98–107)
CO2 SERPL-SCNC: 28 MMOL/L (ref 21–32)
CREAT SERPL-MCNC: 0.72 MG/DL (ref 0.7–1.3)
DIFFERENTIAL METHOD BLD: ABNORMAL
EOSINOPHIL # BLD AUTO: 0.18 K/UL (ref 0–0.5)
EOSINOPHIL NFR BLD AUTO: 2.8 % (ref 1–4)
EOSINOPHIL NFR BLD MANUAL: 3 % (ref 1–4)
ERYTHROCYTE [DISTWIDTH] IN BLOOD BY AUTOMATED COUNT: 16.9 % (ref 11.5–14.5)
GLOBULIN SER-MCNC: 4.3 G/DL (ref 2–4)
GLUCOSE SERPL-MCNC: 212 MG/DL (ref 74–106)
HCT VFR BLD AUTO: 32.6 % (ref 40–54)
HGB BLD-MCNC: 10.3 G/DL (ref 13.5–18)
HYPOCHROMIA BLD QL SMEAR: ABNORMAL
IMM GRANULOCYTES # BLD AUTO: 0.03 K/UL (ref 0–0.04)
IMM GRANULOCYTES NFR BLD: 0.5 % (ref 0–0.4)
LYMPHOCYTES # BLD AUTO: 2.13 K/UL (ref 1–4.8)
LYMPHOCYTES NFR BLD AUTO: 33.6 % (ref 27–41)
LYMPHOCYTES NFR BLD MANUAL: 27 % (ref 27–41)
MAGNESIUM SERPL-MCNC: 2.2 MG/DL (ref 1.7–2.3)
MCH RBC QN AUTO: 23.1 PG (ref 27–31)
MCHC RBC AUTO-ENTMCNC: 31.6 G/DL (ref 32–36)
MCV RBC AUTO: 73.1 FL (ref 80–96)
MICROCYTES BLD QL SMEAR: ABNORMAL
MONOCYTES # BLD AUTO: 0.64 K/UL (ref 0–0.8)
MONOCYTES NFR BLD AUTO: 10.1 % (ref 2–6)
MONOCYTES NFR BLD MANUAL: 8 % (ref 2–6)
MPC BLD CALC-MCNC: 9.1 FL (ref 9.4–12.4)
NEUTROPHILS # BLD AUTO: 3.32 K/UL (ref 1.8–7.7)
NEUTROPHILS NFR BLD AUTO: 52.4 % (ref 53–65)
NEUTS BAND NFR BLD MANUAL: 2 % (ref 1–5)
NEUTS SEG NFR BLD MANUAL: 60 % (ref 50–62)
NRBC # BLD AUTO: 0 X10E3/UL
NRBC, AUTO (.00): 0 %
OVALOCYTES BLD QL SMEAR: ABNORMAL
PLATELET # BLD AUTO: 411 K/UL (ref 150–400)
PLATELET MORPHOLOGY: ABNORMAL
POLYCHROMASIA BLD QL SMEAR: ABNORMAL
POTASSIUM SERPL-SCNC: 4.7 MMOL/L (ref 3.5–5.1)
PROT SERPL-MCNC: 7 G/DL (ref 6.4–8.2)
RBC # BLD AUTO: 4.46 M/UL (ref 4.6–6.2)
SODIUM SERPL-SCNC: 135 MMOL/L (ref 136–145)
WBC # BLD AUTO: 6.34 K/UL (ref 4.5–11)

## 2022-01-11 PROCEDURE — 36415 COLL VENOUS BLD VENIPUNCTURE: CPT | Performed by: NURSE PRACTITIONER

## 2022-01-11 PROCEDURE — 80053 COMPREHEN METABOLIC PANEL: CPT | Performed by: NURSE PRACTITIONER

## 2022-01-11 PROCEDURE — 99232 PR SUBSEQUENT HOSPITAL CARE,LEVL II: ICD-10-PCS | Mod: ,,, | Performed by: FAMILY MEDICINE

## 2022-01-11 PROCEDURE — 11000001 HC ACUTE MED/SURG PRIVATE ROOM

## 2022-01-11 PROCEDURE — 97110 THERAPEUTIC EXERCISES: CPT

## 2022-01-11 PROCEDURE — 85025 COMPLETE CBC W/AUTO DIFF WBC: CPT | Performed by: NURSE PRACTITIONER

## 2022-01-11 PROCEDURE — 83735 ASSAY OF MAGNESIUM: CPT | Performed by: NURSE PRACTITIONER

## 2022-01-11 PROCEDURE — 25000003 PHARM REV CODE 250: Performed by: HOSPITALIST

## 2022-01-11 PROCEDURE — 25000003 PHARM REV CODE 250: Performed by: ORTHOPAEDIC SURGERY

## 2022-01-11 PROCEDURE — 25000003 PHARM REV CODE 250: Performed by: REGISTERED NURSE

## 2022-01-11 PROCEDURE — 97116 GAIT TRAINING THERAPY: CPT

## 2022-01-11 PROCEDURE — 99232 SBSQ HOSP IP/OBS MODERATE 35: CPT | Mod: ,,, | Performed by: FAMILY MEDICINE

## 2022-01-11 PROCEDURE — 63600175 PHARM REV CODE 636 W HCPCS: Performed by: NURSE PRACTITIONER

## 2022-01-11 PROCEDURE — 25000003 PHARM REV CODE 250: Performed by: NURSE PRACTITIONER

## 2022-01-11 PROCEDURE — 63600175 PHARM REV CODE 636 W HCPCS: Performed by: ORTHOPAEDIC SURGERY

## 2022-01-11 RX ADMIN — MORPHINE SULFATE 4 MG: 4 INJECTION INTRAVENOUS at 02:01

## 2022-01-11 RX ADMIN — ONDANSETRON 4 MG: 2 INJECTION INTRAMUSCULAR; INTRAVENOUS at 02:01

## 2022-01-11 RX ADMIN — LORAZEPAM 0.5 MG: 0.5 TABLET ORAL at 03:01

## 2022-01-11 RX ADMIN — BACLOFEN 5 MG: 5 TABLET ORAL at 08:01

## 2022-01-11 RX ADMIN — BACLOFEN 5 MG: 5 TABLET ORAL at 09:01

## 2022-01-11 RX ADMIN — APIXABAN 2.5 MG: 2.5 TABLET, FILM COATED ORAL at 08:01

## 2022-01-11 RX ADMIN — APIXABAN 2.5 MG: 2.5 TABLET, FILM COATED ORAL at 09:01

## 2022-01-11 RX ADMIN — HYDROCODONE BITARTRATE AND ACETAMINOPHEN 1 TABLET: 5; 325 TABLET ORAL at 04:01

## 2022-01-11 RX ADMIN — THIAMINE HYDROCHLORIDE: 100 INJECTION, SOLUTION INTRAMUSCULAR; INTRAVENOUS at 09:01

## 2022-01-11 RX ADMIN — POTASSIUM CHLORIDE 40 MEQ: 20 TABLET, EXTENDED RELEASE ORAL at 09:01

## 2022-01-11 RX ADMIN — ASPIRIN 81 MG: 81 TABLET, COATED ORAL at 09:01

## 2022-01-11 RX ADMIN — HYDROCODONE BITARTRATE AND ACETAMINOPHEN 1 TABLET: 5; 325 TABLET ORAL at 05:01

## 2022-01-11 RX ADMIN — HYDROCODONE BITARTRATE AND ACETAMINOPHEN 1 TABLET: 5; 325 TABLET ORAL at 08:01

## 2022-01-11 RX ADMIN — HYDROCODONE BITARTRATE AND ACETAMINOPHEN 1 TABLET: 5; 325 TABLET ORAL at 09:01

## 2022-01-11 RX ADMIN — POTASSIUM CHLORIDE 40 MEQ: 20 TABLET, EXTENDED RELEASE ORAL at 08:01

## 2022-01-11 RX ADMIN — METOPROLOL TARTRATE 12.5 MG: 25 TABLET, FILM COATED ORAL at 08:01

## 2022-01-11 RX ADMIN — LORAZEPAM 0.5 MG: 0.5 TABLET ORAL at 08:01

## 2022-01-11 RX ADMIN — METOPROLOL TARTRATE 12.5 MG: 25 TABLET, FILM COATED ORAL at 09:01

## 2022-01-11 RX ADMIN — ATORVASTATIN CALCIUM 40 MG: 20 TABLET, FILM COATED ORAL at 08:01

## 2022-01-11 RX ADMIN — PANTOPRAZOLE SODIUM 40 MG: 40 TABLET, DELAYED RELEASE ORAL at 09:01

## 2022-01-11 RX ADMIN — THERA TABS 1 TABLET: TAB at 09:01

## 2022-01-11 RX ADMIN — LISINOPRIL 5 MG: 5 TABLET ORAL at 09:01

## 2022-01-11 RX ADMIN — LORAZEPAM 0.5 MG: 0.5 TABLET ORAL at 09:01

## 2022-01-11 RX ADMIN — POLYETHYLENE GLYCOL 3350 17 G: 17 POWDER, FOR SOLUTION ORAL at 09:01

## 2022-01-11 RX ADMIN — Medication 1000 UNITS: at 09:01

## 2022-01-11 RX ADMIN — BACLOFEN 5 MG: 5 TABLET ORAL at 02:01

## 2022-01-11 NOTE — PT/OT/SLP PROGRESS
Physical Therapy Treatment    Patient Name:  Karin Carrera   MRN:  [id number]    Recommendations:     Discharge Recommendations:  rehabilitation facility,nursing facility, skilled   Discharge Equipment Recommendations: walker, jasbir   Barriers to discharge: Decreased caregiver support    Assessment:     Karin Carrera is a 51 y.o. male admitted with a medical diagnosis of Closed fracture of right hip.  He presents with the following impairments/functional limitations:  impaired functional mobilty,gait instability,impaired balance,decreased coordination,pain,abnormal tone,decreased ROM,orthopedic precautions Patient in good spirits today. Still waiting on placement. .    Rehab Prognosis: Good; patient would benefit from acute skilled PT services to address these deficits and reach maximum level of function.    Recent Surgery: Procedure(s) (LRB):  HEMIARTHROPLASTY, HIP (Right) 9 Days Post-Op    Plan:     During this hospitalization, patient to be seen 5 x/week to address the identified rehab impairments via gait training,therapeutic activities,therapeutic exercises and progress toward the following goals:    · Plan of Care Expires:  02/03/22    Subjective     Chief Complaint: Post op pain  Patient/Family Comments/goals: Patient agreeable to therapy.   Pain/Comfort:  Pain Rating 1: 4/10  Location - Side 1: Right  Location 1: hip  Pain Rating Post-Intervention 1: 4/10  Location - Side 2: Right  Location 2: hip      Objective:     Communicated with nurse prior to session.  Patient found supine with peripheral IV upon PT entry to room.     General Precautions: Standard, fall   Orthopedic Precautions:RLE weight bearing as tolerated   Braces:    Respiratory Status: Room air     Functional Mobility:  · Bed Mobility:     · Supine to Sit: contact guard assistance  · Sit to Supine: minimum assistance  · Transfers:     · Sit to Stand:  minimum assistance with hand-held assist  · Gait: ambulated 80 feet with HHA x 2. left  step to gait      AM-PAC 6 CLICK MOBILITY  Turning over in bed (including adjusting bedclothes, sheets and blankets)?: 3  Sitting down on and standing up from a chair with arms (e.g., wheelchair, bedside commode, etc.): 3  Moving from lying on back to sitting on the side of the bed?: 3  Moving to and from a bed to a chair (including a wheelchair)?: 3  Need to walk in hospital room?: 3  Climbing 3-5 steps with a railing?: 1  Basic Mobility Total Score: 16       Therapeutic Activities and Exercises:   rle: aps, hs, saq, abd-add 3x10      Patient left up in chair with call button in reach..    GOALS:   Multidisciplinary Problems     Physical Therapy Goals        Problem: Physical Therapy Goal    Goal Priority Disciplines Outcome Goal Variances Interventions   Physical Therapy Goal     PT, PT/OT Ongoing, Progressing     Description: Short Term Goals  Independent with HEP  Independent with cane x 100 feet PWB/WBAT: right lower extremity  Cga supine-sit-stand    Long term goals  Needed equipment for home.   Independent with hip precautsion                       Time Tracking:     PT Received On: 01/11/22  PT Start Time: 1330     PT Stop Time: 1400  PT Total Time (min): 30 min     Billable Minutes: Gait Training 10 and Therapeutic Exercise 15    Treatment Type: Treatment  PT/PTA: PT     PTA Visit Number: 0     01/11/2022

## 2022-01-11 NOTE — PT/OT/SLP DISCHARGE
Occupational Therapy Discharge Summary    Karin Carrera  MRN: [id number]   Principal Problem: Closed fracture of right hip      Patient Discharged from acute Occupational Therapy on 22.  Please refer to prior OT note dated 1/10/22 for functional status.    Assessment:      Patient appropriate for care in another setting. Pt continues to require verbal cueing to perform LB ADL tasks and independent with all other ADL tasks with setup    Objective:     GOALS:   Multidisciplinary Problems     Occupational Therapy Goals     Not on file          Multidisciplinary Problems (Resolved)        Problem: Occupational Therapy Goal    Goal Priority Disciplines Outcome Interventions   Occupational Therapy Goal   (Resolved)     OT, PT/OT Met    Description: ST.Pt will perform bathing with Ofelia with setup at EOB  2.Pt will perform UE dressing with Ofelia  3.Pt will perform LE dressing with Ofelia with adaptive equipment with maintaining hip precautions  4.Pt will transfer bed/chair/bsc with CGA with AD  5.Pt will perform standing task x 3 min with CGA with maintaining weightbearing status  6.Tolerate 15 min of tx without fatigue.      LTG:   Restore to max I with selfcare and mobility.                       Reasons for Discontinuation of Therapy Services  Satisfactory goal achievement.      Plan:     Patient Discharged to: Skilled Nursing Facility    2022

## 2022-01-11 NOTE — SUBJECTIVE & OBJECTIVE
Interval History: NAEO. Kai CP and SOB.Tolerating oral intake    Review of Systems   Constitutional: Positive for activity change. Negative for appetite change, chills and fever.   Respiratory: Negative for cough, chest tightness and shortness of breath.    Cardiovascular: Negative for chest pain and palpitations.   Neurological: Negative for light-headedness.   All other systems reviewed and are negative.    Objective:     Vital Signs (Most Recent):  Temp: 98.4 °F (36.9 °C) (01/11/22 1100)  Pulse: 77 (01/11/22 1100)  Resp: 18 (01/11/22 1100)  BP: 119/66 (01/11/22 1100)  SpO2: 98 % (01/11/22 1100) Vital Signs (24h Range):  Temp:  [97 °F (36.1 °C)-98.4 °F (36.9 °C)] 98.4 °F (36.9 °C)  Pulse:  [77-85] 77  Resp:  [16-18] 18  SpO2:  [95 %-100 %] 98 %  BP: (111-133)/(59-72) 119/66     Weight: 89.9 kg (198 lb 3.1 oz)  Body mass index is 26.15 kg/m².    Intake/Output Summary (Last 24 hours) at 1/11/2022 1457  Last data filed at 1/11/2022 1200  Gross per 24 hour   Intake --   Output 2375 ml   Net -2375 ml      Physical Exam  Vitals and nursing note reviewed.   Constitutional:       General: He is not in acute distress.  HENT:      Head: Normocephalic and atraumatic.      Mouth/Throat:      Mouth: Mucous membranes are moist.   Eyes:      Extraocular Movements: Extraocular movements intact.      Pupils: Pupils are equal, round, and reactive to light.   Cardiovascular:      Rate and Rhythm: Normal rate and regular rhythm.      Pulses: Normal pulses.   Pulmonary:      Effort: Pulmonary effort is normal. No respiratory distress.      Breath sounds: Normal breath sounds. No wheezing.   Abdominal:      General: Bowel sounds are normal. There is no distension.      Tenderness: There is no abdominal tenderness.   Musculoskeletal:         General: Tenderness present.      Cervical back: Normal range of motion and neck supple.        Legs:       Comments:  R hip with surgical dressing covering    Skin:     General: Skin is warm.       Capillary Refill: Capillary refill takes less than 2 seconds.   Neurological:      Mental Status: He is alert, oriented to person, place, and time and easily aroused. Mental status is at baseline.      GCS: GCS eye subscore is 4. GCS verbal subscore is 4. GCS motor subscore is 6.      Cranial Nerves: Cranial nerves are intact.      Sensory: Sensation is intact.      Motor: Weakness present.      Comments: Dense right-sided hemiparesis and expressive aphasia in this right-handed patient with old CVA   Psychiatric:         Mood and Affect: Mood normal.         Significant Labs: All pertinent labs within the past 24 hours have been reviewed.    Significant Imaging: I have reviewed all pertinent imaging results/findings within the past 24 hours.

## 2022-01-11 NOTE — PLAN OF CARE
Problem: Occupational Therapy Goal  Goal: Occupational Therapy Goal  Description: ST.Pt will perform bathing with Ofelia with setup at EOB  2.Pt will perform UE dressing with Ofelia  3.Pt will perform LE dressing with Ofelia with adaptive equipment with maintaining hip precautions  4.Pt will transfer bed/chair/bsc with CGA with AD  5.Pt will perform standing task x 3 min with CGA with maintaining weightbearing status  6.Tolerate 15 min of tx without fatigue.      LTG:   Restore to max I with selfcare and mobility.      Outcome: Met

## 2022-01-11 NOTE — PT/OT/SLP PROGRESS
Physical Therapy Treatment    Patient Name:  Karin Carrera   MRN:  [id number]    Recommendations:     Discharge Recommendations:  rehabilitation facility,nursing facility, skilled   Discharge Equipment Recommendations: walker, jasbir   Barriers to discharge: Decreased caregiver support    Assessment:     Karin Carrera is a 51 y.o. male admitted with a medical diagnosis of Closed fracture of right hip.  He presents with the following impairments/functional limitations:  impaired functional mobilty,gait instability,impaired balance,decreased coordination,pain,abnormal tone,decreased ROM,orthopedic precautions Patient in good spirits today. Still waiting on placement. .    Rehab Prognosis: Good; patient would benefit from acute skilled PT services to address these deficits and reach maximum level of function.    Recent Surgery: Procedure(s) (LRB):  HEMIARTHROPLASTY, HIP (Right) 9 Days Post-Op    Plan:     During this hospitalization, patient to be seen 5 x/week to address the identified rehab impairments via gait training,therapeutic activities,therapeutic exercises and progress toward the following goals:    · Plan of Care Expires:  02/03/22    Subjective     Chief Complaint: Post op pain  Patient/Family Comments/goals: Patient agreeable to therapy.   Pain/Comfort:  · Pain Rating 1: 4/10  · Location - Side 1: Right  · Location 1: hip      Objective:     Communicated with nurse prior to session.  Patient found supine with peripheral IV upon PT entry to room.     General Precautions: Standard, fall   Orthopedic Precautions:RLE weight bearing as tolerated   Braces:    Respiratory Status: Room air     Functional Mobility:  · Bed Mobility:     · Supine to Sit: contact guard assistance  · Sit to Supine: minimum assistance  · Transfers:     · Sit to Stand:  minimum assistance with hand-held assist  · Gait: ambulated 80 feet with HHA x 2. left step to gait      AM-PAC 6 CLICK MOBILITY  Turning over in bed (including  adjusting bedclothes, sheets and blankets)?: 3  Sitting down on and standing up from a chair with arms (e.g., wheelchair, bedside commode, etc.): 3  Moving from lying on back to sitting on the side of the bed?: 3  Moving to and from a bed to a chair (including a wheelchair)?: 3  Need to walk in hospital room?: 2  Climbing 3-5 steps with a railing?: 1  Basic Mobility Total Score: 15       Therapeutic Activities and Exercises:   rle: aps, hs, saq, abd-add 3x10      Patient left up in chair with call button in reach..    GOALS:   Multidisciplinary Problems     Physical Therapy Goals        Problem: Physical Therapy Goal    Goal Priority Disciplines Outcome Goal Variances Interventions   Physical Therapy Goal     PT, PT/OT Ongoing, Progressing     Description: Short Term Goals  Independent with HEP  Independent with cane x 100 feet PWB/WBAT: right lower extremity  Cga supine-sit-stand    Long term goals  Needed equipment for home.   Independent with hip precautsion                       Time Tracking:     PT Received On: 01/11/22  PT Start Time: 0900     PT Stop Time: 0930  PT Total Time (min): 30 min     Billable Minutes: Gait Training 10 and Therapeutic Exercise 15    Treatment Type: Treatment  PT/PTA: PT     PTA Visit Number: 0     01/11/2022

## 2022-01-11 NOTE — PT/OT/SLP PROGRESS
Pt educated on hip precautions for LE ADL tasks and bed mobility. Pt continues to work with PT in order to improve functional mobility and ADL t/f. Pt independent with all other ADL tasks with setup and no further OT services indicated at this time.

## 2022-01-11 NOTE — CARE UPDATE
Patient awake alert without any new complaints.  Neurovascularly unchanged with the previous weakness on the right side from the stroke.  Wound shows no signs of infection.  Continue to work on placement.  Continue to work with ambulation on right lower extremity.

## 2022-01-11 NOTE — PLAN OF CARE
Pt denied by [facility]. Ss unable to reach anyone at the [place]. Ss spoke with kaushik at [facility] and they are still reviewing referral. Ss following.      Ss spoke with pt and pt agreeable to referrals being made to [facility], and [facility]. Referrals faxed. Following.      [facility] is unable to accept pt at this time.

## 2022-01-11 NOTE — PROGRESS NOTES
[facility]  [facility] Medicine  Progress Note    Patient Name: Karin Carrera  MRN: [id number]  Patient Class: IP- Inpatient   Admission Date: 1/1/2022  Length of Stay: 10 days  Attending Physician: Walker Anaya Jr., MD  Primary Care Provider: Walker Smith MD        Subjective:     Principal Problem:Closed fracture of right hip        HPI:  Mr Carrera is a 50 yo male who presented to the ED via EMS for c/o right leg pain after a fall. Per EMS and ED staff, he was picked up from his vehicle outside of a friend's home. He states that he lives in his vehicle. It was noted that there were numerous liquor bottles in the vehicle and around him. He was combative with EMS and has been uncooperative with much of his interview during his stay in the ED. He uses profanity easily but is aphasic during much of the ROS and interview. He will use his cell phone to type out some answers. He is a poor historian due to this and his obvious drunken state. He denies any chest pain, shortness of breath, abdominal pain, F/C, N/V/D, or changes in vision. He has right sided hemiparesis as a results of his previous CVA- however still drives. Spoke with his brother, Silver, who provided no new information- he has not seen his brother since the day before yesterday. He does state that his brother does not live with him because of the alcohol use and other private reasons. Upon workup, it was noted that he had a right femoral neck fracture. His ETOH level was 188. He is being admitted to hospitalist services with a consult to orthopaedics for surgical intervention.     He has a PMH of CABG, CVA, HTN, GERD, ETOH abuse, and nicotine abuse. It is unclear when his CABG or CVA was; unable to find dates in previous records. It is unknown if he has had covid or been vaccinated. He follows with Dr Smith for primary care. He does not follow with anyone for cardiology. Per patient, he wishes to be a full code.     Brother-  [facility] [phone]       Overview/Hospital Course:  01/02 - patient seems in less pain but obviously still sore.  He is calm and cooperative.  No new issues reported.  Seen preoperatively.  As previous addressed okay with me to proceed as planned; no medical reason to delay.  Watch for alcohol withdrawals postoperatively even though patient denies daily alcohol use.  Look into placement needs.  Dr. Goodman to assume care patient for hospitalist in the a.m..  1/3: NAEO; some pain at R hip; PT/OT to see; SS to assist with placement; continue on DT prevention- pt did initially state he drank daily and is now saying he drinks every other day  1/4: NAEO; no s/s of withdrawals; asking for snuff; PT following- having some issues with spasticity-- baclofen started; continue DT prevention; SS following for SWB   1/6: Echo EF 20%. Awaiting placement. Pt refuse NH.  1/7:Norvasc stopped.Lisinopril 2.5 mg.Lopressor 12.5 mg bid.Cardiologist appt new pt at [facility].  1/11: 1/11 po day #8. Continues to work with PT- pt agreeable to snf for and rehab.SS following            Interval History: NAEO. Kai CP and SOB.Tolerating oral intake    Review of Systems   Constitutional: Positive for activity change. Negative for appetite change, chills and fever.   Respiratory: Negative for cough, chest tightness and shortness of breath.    Cardiovascular: Negative for chest pain and palpitations.   Neurological: Negative for light-headedness.   All other systems reviewed and are negative.    Objective:     Vital Signs (Most Recent):  Temp: 98.4 °F (36.9 °C) (01/11/22 1100)  Pulse: 77 (01/11/22 1100)  Resp: 18 (01/11/22 1100)  BP: 119/66 (01/11/22 1100)  SpO2: 98 % (01/11/22 1100) Vital Signs (24h Range):  Temp:  [97 °F (36.1 °C)-98.4 °F (36.9 °C)] 98.4 °F (36.9 °C)  Pulse:  [77-85] 77  Resp:  [16-18] 18  SpO2:  [95 %-100 %] 98 %  BP: (111-133)/(59-72) 119/66     Weight: 89.9 kg (198 lb 3.1 oz)  Body mass index is 26.15 kg/m².    Intake/Output  Summary (Last 24 hours) at 1/11/2022 1457  Last data filed at 1/11/2022 1200  Gross per 24 hour   Intake --   Output 2375 ml   Net -2375 ml      Physical Exam  Vitals and nursing note reviewed.   Constitutional:       General: He is not in acute distress.  HENT:      Head: Normocephalic and atraumatic.      Mouth/Throat:      Mouth: Mucous membranes are moist.   Eyes:      Extraocular Movements: Extraocular movements intact.      Pupils: Pupils are equal, round, and reactive to light.   Cardiovascular:      Rate and Rhythm: Normal rate and regular rhythm.      Pulses: Normal pulses.   Pulmonary:      Effort: Pulmonary effort is normal. No respiratory distress.      Breath sounds: Normal breath sounds. No wheezing.   Abdominal:      General: Bowel sounds are normal. There is no distension.      Tenderness: There is no abdominal tenderness.   Musculoskeletal:         General: Tenderness present.      Cervical back: Normal range of motion and neck supple.        Legs:       Comments:  R hip with surgical dressing covering    Skin:     General: Skin is warm.      Capillary Refill: Capillary refill takes less than 2 seconds.   Neurological:      Mental Status: He is alert, oriented to person, place, and time and easily aroused. Mental status is at baseline.      GCS: GCS eye subscore is 4. GCS verbal subscore is 4. GCS motor subscore is 6.      Cranial Nerves: Cranial nerves are intact.      Sensory: Sensation is intact.      Motor: Weakness present.      Comments: Dense right-sided hemiparesis and expressive aphasia in this right-handed patient with old CVA   Psychiatric:         Mood and Affect: Mood normal.         Significant Labs: All pertinent labs within the past 24 hours have been reviewed.    Significant Imaging: I have reviewed all pertinent imaging results/findings within the past 24 hours.      Assessment/Plan:      * Closed fracture of right hip  - acute  - R hip xr with femoral neck fracture superimposed on  chronic changes  - ortho consult-- plans for surgical intervention tomorrow  - EKG with SR with incomplete R BBB  - CXR with cardiomegaly and chronic vascular congestion  - plans for surgical intervention tomorrow  - PT/OT/SS consulted for post op assistance  - heparin SQ x 1 dose tonight   - AC after surgery per surgery preference   - ALMEIDA score of 0.9% ALISA risk-- risk of MI or cardiac arrest intra op or up to 30 days post op  - RCRI score of 2 points; class III risk; 10.1% 30 day risk of death, MI, or cardiac arrest     Perioperative Risk Assessment:  Patient is at intermediate risk for perioperative cardiopulmonary complications.  This based on lack of thorough history, hx of CVA and CABG. However risk are stable and see no medical benefit in delaying surgery from medical standpoint.    Surgery 01/02    1/3  - stable post op  - hemovac in place  - PT/OT to see  - SS following for SWB assistance   1/4  - hemovac to be pulled today  - PT following  - baclofen added to spasticity   1/6  - PO day#4  - PT/OT  - SWB placement per SS  1/7  - po day#5  - dsg d/i  1/8  - po DAY#6  1/9  - po DAY#7  - Continue to attempt placement. PT continues in Hospital. SS consulted for WC and any medical devices needed.  1/10  - po DAY#8  - pt agreeable to snf for pt and rehab.SS following  1/11  - po day #8. Continues to work with PT  - pt agreeable to snf for pt and rehab.SS following      Acute on chronic combined systolic and diastolic heart failure        1/6/22  · The left ventricle is severely enlarged with mild eccentric hypertrophy and severely decreased systolic function.  · The estimated ejection fraction is 20%.  · There are segmental left ventricular wall motion abnormalities.  · Left ventricular diastolic dysfunction.  · Atrial fibrillation not observed.  · Normal right ventricular size.  · Mild mitral regurgitation.  · Normal central venous pressure (3 mmHg).  1/7   Lisinopril 2.5 mg  - Lopressor 12.5 mg bid  1/7  -  Norvasc stopped  - Lisinopril 2.5 mg  - Lopressor 12.5 mg bid  - Cardiologist appt new pt at [facility].  1/8  - Lisinopril increased 5 mg        ETOH abuse  - chronic  - unclear amount pt does partake in daily   - drinks liquor instead of beer  - monitor for DTs   - banana bag daily- MVI, thiamine, and folic acid  - ativan PO scheduled to defer DTs   - ativan IV for breakthrough symptoms   1/4  - no s/s of withdrawals   1/5  - - no s/s of DT'S            Hx of CABG  - chronic, stable  - continue current meds  - cardiac monitoring   - EKG with SR with incomplete RBBB      GERD (gastroesophageal reflux disease)  - chronic, stable  - continue current meds       Hypertension  - chronic, stable  - continue current home meds  - monitor BP trend  - adjust meds as needed   1/4  - stable   1/7  - Norvasc stopped  - Lisinopril 2.5 mg  - Lopressor 12.5 mg bid  1/9  - BP stable  - Lisinopril increased to 5 mg daily    Right sided weakness  - chronic, stable  - hx of CVA with R sided hemiparesis   -patient is right-handed  - continue statin   1/4  - PT following  - plans for SWB   1/8  - Pt has mobility limitation that significantly impairs his MRADL'S including his ability for toileting and bathing at home.Limitations cannot be resolved with use of a cane or walker due to increased risk of falling. A wheelchair will improve the pt's ability to perform MRADL'S. Pt is willing to and able to propel in wheelchair.        VTE Risk Mitigation (From admission, onward)         Ordered     apixaban tablet 2.5 mg  2 times daily         01/02/22 1916     IP VTE HIGH RISK PATIENT  Once         01/02/22 1916     Place SAGE hose  Until discontinued         01/02/22 1916     Place sequential compression device  Until discontinued         01/02/22 1916     Place sequential compression device  Until discontinued         01/01/22 1707                Discharge Planning   DEANNA:      Code Status: Full Code   Is the patient medically ready for discharge?:      Reason for patient still in hospital (select all that apply): Treatment  Discharge Plan A: Rehab ([facility])                  ALINA Kuhn  Department of Hospital Medicine   [facility]

## 2022-01-11 NOTE — ASSESSMENT & PLAN NOTE
- acute  - R hip xr with femoral neck fracture superimposed on chronic changes  - ortho consult-- plans for surgical intervention tomorrow  - EKG with SR with incomplete R BBB  - CXR with cardiomegaly and chronic vascular congestion  - plans for surgical intervention tomorrow  - PT/OT/SS consulted for post op assistance  - heparin SQ x 1 dose tonight   - AC after surgery per surgery preference   - ALMEIDA score of 0.9% ALISA risk-- risk of MI or cardiac arrest intra op or up to 30 days post op  - RCRI score of 2 points; class III risk; 10.1% 30 day risk of death, MI, or cardiac arrest     Perioperative Risk Assessment:  Patient is at intermediate risk for perioperative cardiopulmonary complications.  This based on lack of thorough history, hx of CVA and CABG. However risk are stable and see no medical benefit in delaying surgery from medical standpoint.    Surgery 01/02    1/3  - stable post op  - hemovac in place  - PT/OT to see  - SS following for SWB assistance   1/4  - hemovac to be pulled today  - PT following  - baclofen added to spasticity   1/6  - PO day#4  - PT/OT  - SWB placement per SS  1/7  - po day#5  - dsg d/i  1/8  - po DAY#6  1/9  - po DAY#7  - Continue to attempt placement. PT continues in Hospital. SS consulted for WC and any medical devices needed.  1/10  - po DAY#8  - pt agreeable to snf for pt and rehab.SS following  1/11  - po day #8. Continues to work with PT  - pt agreeable to snf for pt and rehab.SS following

## 2022-01-11 NOTE — PLAN OF CARE
Problem: Adult Inpatient Plan of Care  Goal: Plan of Care Review  Outcome: Ongoing, Progressing  Flowsheets (Taken 1/11/2022 1713)  Plan of Care Reviewed With: patient  Goal: Patient-Specific Goal (Individualized)  Outcome: Ongoing, Progressing  Goal: Absence of Hospital-Acquired Illness or Injury  Outcome: Ongoing, Progressing  Intervention: Prevent Skin Injury  Flowsheets (Taken 1/11/2022 1713)  Skin Protection:   adhesive use limited   incontinence pads utilized  Intervention: Prevent and Manage VTE (Venous Thromboembolism) Risk  Flowsheets (Taken 1/11/2022 1713)  Range of Motion: active ROM (range of motion) encouraged  Intervention: Prevent Infection  Flowsheets (Taken 1/11/2022 1713)  Infection Prevention:   rest/sleep promoted   hand hygiene promoted  Goal: Optimal Comfort and Wellbeing  Outcome: Ongoing, Progressing  Intervention: Monitor Pain and Promote Comfort  Flowsheets (Taken 1/11/2022 1713)  Pain Management Interventions:   relaxation techniques promoted   quiet environment facilitated  Intervention: Provide Person-Centered Care  Flowsheets (Taken 1/11/2022 1713)  Trust Relationship/Rapport:   care explained   choices provided   emotional support provided   empathic listening provided   thoughts/feelings acknowledged   reassurance provided   questions encouraged   questions answered  Goal: Readiness for Transition of Care  Outcome: Ongoing, Progressing     Problem: Skin Injury Risk Increased  Goal: Skin Health and Integrity  Outcome: Ongoing, Progressing  Intervention: Optimize Skin Protection  Flowsheets (Taken 1/11/2022 1713)  Pressure Reduction Techniques: frequent weight shift encouraged  Pressure Reduction Devices: heel offloading device utilized  Skin Protection:   adhesive use limited   incontinence pads utilized  Intervention: Promote and Optimize Oral Intake  Flowsheets (Taken 1/11/2022 1713)  Oral Nutrition Promotion:   physical activity promoted   calorie-dense foods provided     Problem:  Infection  Goal: Absence of Infection Signs and Symptoms  Outcome: Ongoing, Progressing  Intervention: Prevent or Manage Infection  Flowsheets (Taken 1/11/2022 1713)  Fever Reduction/Comfort Measures:   lightweight clothing   lightweight bedding  Infection Management: aseptic technique maintained  Isolation Precautions: precautions maintained     Problem: Fall Injury Risk  Goal: Absence of Fall and Fall-Related Injury  Outcome: Ongoing, Progressing  Intervention: Identify and Manage Contributors  Flowsheets (Taken 1/11/2022 1713)  Self-Care Promotion: independence encouraged  Medication Review/Management: medications reviewed   Plan of care reviewed. Patient progressing

## 2022-01-12 PROCEDURE — 11000001 HC ACUTE MED/SURG PRIVATE ROOM

## 2022-01-12 PROCEDURE — 25000003 PHARM REV CODE 250: Performed by: REGISTERED NURSE

## 2022-01-12 PROCEDURE — 97116 GAIT TRAINING THERAPY: CPT

## 2022-01-12 PROCEDURE — 99232 PR SUBSEQUENT HOSPITAL CARE,LEVL II: ICD-10-PCS | Mod: ,,, | Performed by: FAMILY MEDICINE

## 2022-01-12 PROCEDURE — 97110 THERAPEUTIC EXERCISES: CPT

## 2022-01-12 PROCEDURE — 99232 SBSQ HOSP IP/OBS MODERATE 35: CPT | Mod: ,,, | Performed by: FAMILY MEDICINE

## 2022-01-12 PROCEDURE — 25000003 PHARM REV CODE 250: Performed by: ORTHOPAEDIC SURGERY

## 2022-01-12 PROCEDURE — 25000003 PHARM REV CODE 250: Performed by: HOSPITALIST

## 2022-01-12 PROCEDURE — 25000003 PHARM REV CODE 250: Performed by: NURSE PRACTITIONER

## 2022-01-12 PROCEDURE — 63600175 PHARM REV CODE 636 W HCPCS: Performed by: NURSE PRACTITIONER

## 2022-01-12 RX ORDER — LORAZEPAM 0.5 MG/1
0.5 TABLET ORAL 2 TIMES DAILY
Status: DISCONTINUED | OUTPATIENT
Start: 2022-01-12 | End: 2022-01-14

## 2022-01-12 RX ORDER — BACLOFEN 5 MG/1
5 TABLET ORAL 3 TIMES DAILY PRN
Status: DISCONTINUED | OUTPATIENT
Start: 2022-01-12 | End: 2022-01-19 | Stop reason: HOSPADM

## 2022-01-12 RX ORDER — THIAMINE HCL 100 MG
100 TABLET ORAL DAILY
Status: DISCONTINUED | OUTPATIENT
Start: 2022-01-13 | End: 2022-01-19 | Stop reason: HOSPADM

## 2022-01-12 RX ORDER — FOLIC ACID 1 MG/1
1 TABLET ORAL DAILY
Status: DISCONTINUED | OUTPATIENT
Start: 2022-01-13 | End: 2022-01-19 | Stop reason: HOSPADM

## 2022-01-12 RX ADMIN — LISINOPRIL 5 MG: 5 TABLET ORAL at 08:01

## 2022-01-12 RX ADMIN — METOPROLOL TARTRATE 12.5 MG: 25 TABLET, FILM COATED ORAL at 08:01

## 2022-01-12 RX ADMIN — THIAMINE HYDROCHLORIDE: 100 INJECTION, SOLUTION INTRAMUSCULAR; INTRAVENOUS at 08:01

## 2022-01-12 RX ADMIN — Medication 1000 UNITS: at 08:01

## 2022-01-12 RX ADMIN — ATORVASTATIN CALCIUM 40 MG: 20 TABLET, FILM COATED ORAL at 08:01

## 2022-01-12 RX ADMIN — APIXABAN 2.5 MG: 2.5 TABLET, FILM COATED ORAL at 08:01

## 2022-01-12 RX ADMIN — HYDROCODONE BITARTRATE AND ACETAMINOPHEN 1 TABLET: 5; 325 TABLET ORAL at 05:01

## 2022-01-12 RX ADMIN — PANTOPRAZOLE SODIUM 40 MG: 40 TABLET, DELAYED RELEASE ORAL at 08:01

## 2022-01-12 RX ADMIN — LORAZEPAM 0.5 MG: 0.5 TABLET ORAL at 08:01

## 2022-01-12 RX ADMIN — HYDROCODONE BITARTRATE AND ACETAMINOPHEN 1 TABLET: 5; 325 TABLET ORAL at 08:01

## 2022-01-12 RX ADMIN — BACLOFEN 5 MG: 5 TABLET ORAL at 08:01

## 2022-01-12 RX ADMIN — BACLOFEN 5 MG: 5 TABLET ORAL at 04:01

## 2022-01-12 RX ADMIN — ASPIRIN 81 MG: 81 TABLET, COATED ORAL at 08:01

## 2022-01-12 RX ADMIN — HYDROCODONE BITARTRATE AND ACETAMINOPHEN 1 TABLET: 5; 325 TABLET ORAL at 04:01

## 2022-01-12 RX ADMIN — THERA TABS 1 TABLET: TAB at 08:01

## 2022-01-12 NOTE — PT/OT/SLP PROGRESS
Physical Therapy Treatment    Patient Name:  Karin Carrera   MRN:  [id number]    Recommendations:     Discharge Recommendations:  rehabilitation facility,nursing facility, skilled   Discharge Equipment Recommendations: walker, jasbir   Barriers to discharge: Decreased caregiver support    Assessment:     Karin Carrera is a 51 y.o. male admitted with a medical diagnosis of Closed fracture of right hip.  He presents with the following impairments/functional limitations:  impaired functional mobilty,gait instability,impaired balance,decreased coordination,pain,abnormal tone,decreased ROM,orthopedic precautions Patient in good spirits today. Still waiting on placement. .    Rehab Prognosis: Good; patient would benefit from acute skilled PT services to address these deficits and reach maximum level of function.    Recent Surgery: Procedure(s) (LRB):  HEMIARTHROPLASTY, HIP (Right) 10 Days Post-Op    Plan:     During this hospitalization, patient to be seen 5 x/week to address the identified rehab impairments via gait training,therapeutic activities,therapeutic exercises and progress toward the following goals:    · Plan of Care Expires:  02/03/22    Subjective     Chief Complaint: Post op pain  Patient/Family Comments/goals: Patient agreeable to therapy.   Pain/Comfort:  Pain Rating 1: 2/10  Location - Side 1: Right  Location 1: hip  Pain Addressed 1: Cessation of Activity      Objective:     Communicated with nurse prior to session.  Patient found supine with peripheral IV upon PT entry to room.     General Precautions: Standard, fall   Orthopedic Precautions:RLE weight bearing as tolerated   Braces:    Respiratory Status: Room air     Functional Mobility:  · Bed Mobility:     · Supine to Sit: contact guard assistance  · Sit to Supine: minimum assistance  · Transfers:     · Sit to Stand:  minimum assistance with hand-held assist  · Gait: ambulated 100 feet with HHA x 2. left step to gait      AM-PAC 6 CLICK  MOBILITY  Turning over in bed (including adjusting bedclothes, sheets and blankets)?: 3  Sitting down on and standing up from a chair with arms (e.g., wheelchair, bedside commode, etc.): 3  Moving from lying on back to sitting on the side of the bed?: 3  Moving to and from a bed to a chair (including a wheelchair)?: 3  Need to walk in hospital room?: 3  Climbing 3-5 steps with a railing?: 3  Basic Mobility Total Score: 18       Therapeutic Activities and Exercises:   rle: aps, hs, saq, abd-add 3x10      Patient left up in chair with call button in reach..    GOALS:   Multidisciplinary Problems     Physical Therapy Goals        Problem: Physical Therapy Goal    Goal Priority Disciplines Outcome Goal Variances Interventions   Physical Therapy Goal     PT, PT/OT Ongoing, Progressing     Description: Short Term Goals  Independent with HEP  Independent with cane x 100 feet PWB/WBAT: right lower extremity  Cga supine-sit-stand    Long term goals  Needed equipment for home.   Independent with hip precautsion                       Time Tracking:     PT Received On: 01/12/22  PT Start Time: 1530     PT Stop Time: 1555  PT Total Time (min): 25 min     Billable Minutes: Gait Training 10 and Therapeutic Exercise 15    Treatment Type: Treatment  PT/PTA: PT     PTA Visit Number: 0     01/12/2022

## 2022-01-12 NOTE — ASSESSMENT & PLAN NOTE
- chronic, stable  - continue current home meds  - monitor BP trend  - adjust meds as needed   1/4  - stable   1/7  - Norvasc stopped  - Lisinopril 2.5 mg  - Lopressor 12.5 mg bid  1/9  - BP stable  - Lisinopril increased to 5 mg daily  1/12  - stable

## 2022-01-12 NOTE — PT/OT/SLP PROGRESS
Physical Therapy Treatment    Patient Name:  Karin Carrera   MRN:  [id number]    Recommendations:     Discharge Recommendations:  rehabilitation facility,nursing facility, skilled   Discharge Equipment Recommendations: walker, jasbir   Barriers to discharge: Decreased caregiver support    Assessment:     Karin Carrera is a 51 y.o. male admitted with a medical diagnosis of Closed fracture of right hip.  He presents with the following impairments/functional limitations:  impaired functional mobilty,gait instability,impaired balance,decreased coordination,pain,abnormal tone,decreased ROM,orthopedic precautions Patient in good spirits today. Still waiting on placement. .    Rehab Prognosis: Good; patient would benefit from acute skilled PT services to address these deficits and reach maximum level of function.    Recent Surgery: Procedure(s) (LRB):  HEMIARTHROPLASTY, HIP (Right) 10 Days Post-Op    Plan:     During this hospitalization, patient to be seen 5 x/week to address the identified rehab impairments via gait training,therapeutic activities,therapeutic exercises and progress toward the following goals:    · Plan of Care Expires:  02/03/22    Subjective     Chief Complaint: Post op pain  Patient/Family Comments/goals: Patient agreeable to therapy.   Pain/Comfort:  Pain Rating 1: 4/10  Location - Side 1: Right  Location 1: hip      Objective:     Communicated with nurse prior to session.  Patient found supine with peripheral IV upon PT entry to room.     General Precautions: Standard, fall   Orthopedic Precautions:RLE weight bearing as tolerated   Braces:    Respiratory Status: Room air     Functional Mobility:  · Bed Mobility:     · Supine to Sit: contact guard assistance  · Sit to Supine: minimum assistance  · Transfers:     · Sit to Stand:  minimum assistance with hand-held assist  · Gait: ambulated 100 feet with HHA x 2. left step to gait      AM-PAC 6 CLICK MOBILITY  Turning over in bed (including  adjusting bedclothes, sheets and blankets)?: 3  Sitting down on and standing up from a chair with arms (e.g., wheelchair, bedside commode, etc.): 3  Moving from lying on back to sitting on the side of the bed?: 3  Moving to and from a bed to a chair (including a wheelchair)?: 3  Need to walk in hospital room?: 2  Climbing 3-5 steps with a railing?: 1  Basic Mobility Total Score: 15       Therapeutic Activities and Exercises:   rle: aps, hs, saq, abd-add 3x10      Patient left up in chair with call button in reach..    GOALS:   Multidisciplinary Problems     Physical Therapy Goals        Problem: Physical Therapy Goal    Goal Priority Disciplines Outcome Goal Variances Interventions   Physical Therapy Goal     PT, PT/OT Ongoing, Progressing     Description: Short Term Goals  Independent with HEP  Independent with cane x 100 feet PWB/WBAT: right lower extremity  Cga supine-sit-stand    Long term goals  Needed equipment for home.   Independent with hip precautsion                       Time Tracking:     PT Received On: 01/12/22  PT Start Time: 1015     PT Stop Time: 1040  PT Total Time (min): 25 min     Billable Minutes: Gait Training 10 and Therapeutic Exercise 15    Treatment Type: Treatment  PT/PTA: PT     PTA Visit Number: 0     01/12/2022

## 2022-01-12 NOTE — ASSESSMENT & PLAN NOTE
- chronic, stable  - hx of CVA with R sided hemiparesis   -patient is right-handed  - continue statin   1/4  - PT following  - plans for SWB   1/8  - Pt has mobility limitation that significantly impairs his MRADL'S including his ability for toileting and bathing at home.Limitations cannot be resolved with use of a cane or walker due to increased risk of falling. A wheelchair will improve the pt's ability to perform MRADL'S. Pt is willing to and able to propel in wheelchair.  1/12  - stable

## 2022-01-12 NOTE — ASSESSMENT & PLAN NOTE
- acute  - R hip xr with femoral neck fracture superimposed on chronic changes  - ortho consult-- plans for surgical intervention tomorrow  - EKG with SR with incomplete R BBB  - CXR with cardiomegaly and chronic vascular congestion  - plans for surgical intervention tomorrow  - PT/OT/SS consulted for post op assistance  - heparin SQ x 1 dose tonight   - AC after surgery per surgery preference   - ALMEIDA score of 0.9% ALISA risk-- risk of MI or cardiac arrest intra op or up to 30 days post op  - RCRI score of 2 points; class III risk; 10.1% 30 day risk of death, MI, or cardiac arrest     Perioperative Risk Assessment:  Patient is at intermediate risk for perioperative cardiopulmonary complications.  This based on lack of thorough history, hx of CVA and CABG. However risk are stable and see no medical benefit in delaying surgery from medical standpoint.    Surgery 01/02    1/3  - stable post op  - hemovac in place  - PT/OT to see  - SS following for SWB assistance   1/4  - hemovac to be pulled today  - PT following  - baclofen added to spasticity   1/6  - PO day#4  - PT/OT  - SWB placement per SS  1/7  - po day#5  - dsg d/i  1/8  - po DAY#6  1/9  - po DAY#7  - Continue to attempt placement. PT continues in Hospital. SS consulted for WC and any medical devices needed.  1/10  - po DAY#8  - pt agreeable to snf for pt and rehab.SS following  1/11  - po day #8. Continues to work with PT  - pt agreeable to snf for pt and rehab.SS following  1/12  - pending placement  - continue current POC

## 2022-01-12 NOTE — CARE UPDATE
Patient awake alert without any new complaints.  Neurovascularly unchanged.  Wound clean dry no sign infection.  Continue PT.  Awaiting placement.

## 2022-01-12 NOTE — PROGRESS NOTES
[facility] Orthopedic  [facility] Medicine  Progress Note    Patient Name: Karin Carrera  MRN: [id number]  Patient Class: IP- Inpatient   Admission Date: 1/1/2022  Length of Stay: 11 days  Attending Physician: Walker Anaya Jr., MD  Primary Care Provider: Walker Smith MD        Subjective:     Principal Problem:Closed fracture of right hip        HPI:  Mr Carrera is a 50 yo male who presented to the ED via EMS for c/o right leg pain after a fall. Per EMS and ED staff, he was picked up from his vehicle outside of a friend's home. He states that he lives in his vehicle. It was noted that there were numerous liquor bottles in the vehicle and around him. He was combative with EMS and has been uncooperative with much of his interview during his stay in the ED. He uses profanity easily but is aphasic during much of the ROS and interview. He will use his cell phone to type out some answers. He is a poor historian due to this and his obvious drunken state. He denies any chest pain, shortness of breath, abdominal pain, F/C, N/V/D, or changes in vision. He has right sided hemiparesis as a results of his previous CVA- however still drives. Spoke with his brother, Silver, who provided no new information- he has not seen his brother since the day before yesterday. He does state that his brother does not live with him because of the alcohol use and other private reasons. Upon workup, it was noted that he had a right femoral neck fracture. His ETOH level was 188. He is being admitted to hospitalist services with a consult to orthopaedics for surgical intervention.     He has a PMH of CABG, CVA, HTN, GERD, ETOH abuse, and nicotine abuse. It is unclear when his CABG or CVA was; unable to find dates in previous records. It is unknown if he has had covid or been vaccinated. He follows with Dr Smith for primary care. He does not follow with anyone for cardiology. Per patient, he wishes to be a full code.     Brother-  [facility]- [phone]       Overview/Hospital Course:  01/02 - patient seems in less pain but obviously still sore.  He is calm and cooperative.  No new issues reported.  Seen preoperatively.  As previous addressed okay with me to proceed as planned; no medical reason to delay.  Watch for alcohol withdrawals postoperatively even though patient denies daily alcohol use.  Look into placement needs.  Dr. Goodman to assume care patient for hospitalist in the a.m..  1/3: NAEO; some pain at R hip; PT/OT to see; SS to assist with placement; continue on DT prevention- pt did initially state he drank daily and is now saying he drinks every other day  1/4: NAEO; no s/s of withdrawals; asking for snuff; PT following- having some issues with spasticity-- baclofen started; continue DT prevention; SS following for SWB   1/6: Echo EF 20%. Awaiting placement. Pt refuse NH.  1/7:Norvasc stopped.Lisinopril 2.5 mg.Lopressor 12.5 mg bid.Cardiologist appt new pt at [facility].  1/11: 1/11 po day #8. Continues to work with PT- pt agreeable to snf for and rehab.SS following    1/12: post op day #9; continue to work well with PT- SS with referrals to numerous places- awaiting placement        Interval History: Pt resting in bed. Denies any needs or complaints. States he has been working with therapy. Does have some soreness to R hip and thigh. Discussed pending placement- nods head in agreement. VSS, afebrile.     Review of Systems   Constitutional: Positive for activity change. Negative for appetite change, chills and fever.   Respiratory: Negative for cough, chest tightness and shortness of breath.    Cardiovascular: Negative for chest pain and palpitations.   Neurological: Negative for light-headedness.   All other systems reviewed and are negative.    Objective:     Vital Signs (Most Recent):  Temp: 97.9 °F (36.6 °C) (01/12/22 0701)  Pulse: 83 (01/12/22 0701)  Resp: 16 (01/12/22 0852)  BP: 127/71 (01/12/22 0701)  SpO2: 98 % (01/12/22 0701) Vital  Signs (24h Range):  Temp:  [97.3 °F (36.3 °C)-98.2 °F (36.8 °C)] 97.9 °F (36.6 °C)  Pulse:  [69-83] 83  Resp:  [16-18] 16  SpO2:  [97 %-100 %] 98 %  BP: (127-140)/(62-82) 127/71     Weight: 89.9 kg (198 lb 3.1 oz)  Body mass index is 26.15 kg/m².    Intake/Output Summary (Last 24 hours) at 1/12/2022 1150  Last data filed at 1/12/2022 0534  Gross per 24 hour   Intake --   Output 2600 ml   Net -2600 ml      Physical Exam  Vitals and nursing note reviewed.   Constitutional:       General: He is not in acute distress.  HENT:      Head: Normocephalic and atraumatic.      Mouth/Throat:      Mouth: Mucous membranes are moist.   Eyes:      Extraocular Movements: Extraocular movements intact.      Pupils: Pupils are equal, round, and reactive to light.   Cardiovascular:      Rate and Rhythm: Normal rate and regular rhythm.      Pulses: Normal pulses.   Pulmonary:      Effort: Pulmonary effort is normal. No respiratory distress.      Breath sounds: Normal breath sounds. No wheezing.   Abdominal:      General: Bowel sounds are normal. There is no distension.      Tenderness: There is no abdominal tenderness.   Musculoskeletal:         General: Tenderness present.      Cervical back: Normal range of motion and neck supple.        Legs:       Comments:  R hip with surgical dressing covering    Skin:     General: Skin is warm.      Capillary Refill: Capillary refill takes less than 2 seconds.   Neurological:      Mental Status: He is alert, oriented to person, place, and time and easily aroused. Mental status is at baseline.      GCS: GCS eye subscore is 4. GCS verbal subscore is 4. GCS motor subscore is 6.      Cranial Nerves: Cranial nerves are intact.      Sensory: Sensation is intact.      Motor: Weakness present.      Comments: Dense right-sided hemiparesis and expressive aphasia in this right-handed patient with old CVA   Psychiatric:         Mood and Affect: Mood normal.         Significant Labs:   All pertinent labs  within the past 24 hours have been reviewed.  Recent Lab Results     None          Significant Imaging: I have reviewed all pertinent imaging results/findings within the past 24 hours.      Assessment/Plan:      * Closed fracture of right hip  - acute  - R hip xr with femoral neck fracture superimposed on chronic changes  - ortho consult-- plans for surgical intervention tomorrow  - EKG with SR with incomplete R BBB  - CXR with cardiomegaly and chronic vascular congestion  - plans for surgical intervention tomorrow  - PT/OT/SS consulted for post op assistance  - heparin SQ x 1 dose tonight   - AC after surgery per surgery preference   - ALMEIDA score of 0.9% ALISA risk-- risk of MI or cardiac arrest intra op or up to 30 days post op  - RCRI score of 2 points; class III risk; 10.1% 30 day risk of death, MI, or cardiac arrest     Perioperative Risk Assessment:  Patient is at intermediate risk for perioperative cardiopulmonary complications.  This based on lack of thorough history, hx of CVA and CABG. However risk are stable and see no medical benefit in delaying surgery from medical standpoint.    Surgery 01/02    1/3  - stable post op  - hemovac in place  - PT/OT to see  - SS following for SWB assistance   1/4  - hemovac to be pulled today  - PT following  - baclofen added to spasticity   1/6  - PO day#4  - PT/OT  - SWB placement per SS  1/7  - po day#5  - dsg d/i  1/8  - po DAY#6  1/9  - po DAY#7  - Continue to attempt placement. PT continues in Hospital. SS consulted for WC and any medical devices needed.  1/10  - po DAY#8  - pt agreeable to snf for pt and rehab.SS following  1/11  - po day #8. Continues to work with PT  - pt agreeable to snf for pt and rehab.SS following  1/12  - pending placement  - continue current POC       Acute on chronic combined systolic and diastolic heart failure        1/6/22  · The left ventricle is severely enlarged with mild eccentric hypertrophy and severely decreased systolic  function.  · The estimated ejection fraction is 20%.  · There are segmental left ventricular wall motion abnormalities.  · Left ventricular diastolic dysfunction.  · Atrial fibrillation not observed.  · Normal right ventricular size.  · Mild mitral regurgitation.  · Normal central venous pressure (3 mmHg).  1/7   Lisinopril 2.5 mg  - Lopressor 12.5 mg bid  1/7  - Norvasc stopped  - Lisinopril 2.5 mg  - Lopressor 12.5 mg bid  - Cardiologist appt [facility] pt at [facility].  1/8  - Lisinopril increased 5 mg        ETOH abuse  - chronic  - unclear amount pt does partake in daily   - drinks liquor instead of beer  - monitor for DTs   - banana bag daily- MVI, thiamine, and folic acid  - ativan PO scheduled to defer DTs   - ativan IV for breakthrough symptoms   1/4  - no s/s of withdrawals   1/5  - - no s/s of DT'S  1/12  - wean ativan scheduled           Hx of CABG  - chronic, stable  - continue current meds  - cardiac monitoring   - EKG with SR with incomplete RBBB      GERD (gastroesophageal reflux disease)  - chronic, stable  - continue current meds       Hypertension  - chronic, stable  - continue current home meds  - monitor BP trend  - adjust meds as needed   1/4  - stable   1/7  - Norvasc stopped  - Lisinopril 2.5 mg  - Lopressor 12.5 mg bid  1/9  - BP stable  - Lisinopril increased to 5 mg daily  1/12  - stable     Right sided weakness  - chronic, stable  - hx of CVA with R sided hemiparesis   -patient is right-handed  - continue statin   1/4  - PT following  - plans for SWB   1/8  - Pt has mobility limitation that significantly impairs his MRADL'S including his ability for toileting and bathing at home.Limitations cannot be resolved with use of a cane or walker due to increased risk of falling. A wheelchair will improve the pt's ability to perform MRADL'S. Pt is willing to and able to propel in wheelchair.  1/12  - stable       VTE Risk Mitigation (From admission, onward)         Ordered     apixaban tablet 2.5 mg  2 times  daily         01/02/22 1916     IP VTE HIGH RISK PATIENT  Once         01/02/22 1916     Place SAGE hose  Until discontinued         01/02/22 1916     Place sequential compression device  Until discontinued         01/02/22 1916     Place sequential compression device  Until discontinued         01/01/22 1707                Discharge Planning   DEANNA:      Code Status: Full Code   Is the patient medically ready for discharge?:     Reason for patient still in hospital (select all that apply): Treatment and Pending disposition  Discharge Plan A: Rehab ([facility] Faustin)        ISADORA Link  Department of Hospital Medicine   [facility] - Orthopedic

## 2022-01-12 NOTE — SUBJECTIVE & OBJECTIVE
Interval History: Pt resting in bed. Denies any needs or complaints. States he has been working with therapy. Does have some soreness to R hip and thigh. Discussed pending placement- nods head in agreement. VSS, afebrile.     Review of Systems   Constitutional: Positive for activity change. Negative for appetite change, chills and fever.   Respiratory: Negative for cough, chest tightness and shortness of breath.    Cardiovascular: Negative for chest pain and palpitations.   Neurological: Negative for light-headedness.   All other systems reviewed and are negative.    Objective:     Vital Signs (Most Recent):  Temp: 97.9 °F (36.6 °C) (01/12/22 0701)  Pulse: 83 (01/12/22 0701)  Resp: 16 (01/12/22 0852)  BP: 127/71 (01/12/22 0701)  SpO2: 98 % (01/12/22 0701) Vital Signs (24h Range):  Temp:  [97.3 °F (36.3 °C)-98.2 °F (36.8 °C)] 97.9 °F (36.6 °C)  Pulse:  [69-83] 83  Resp:  [16-18] 16  SpO2:  [97 %-100 %] 98 %  BP: (127-140)/(62-82) 127/71     Weight: 89.9 kg (198 lb 3.1 oz)  Body mass index is 26.15 kg/m².    Intake/Output Summary (Last 24 hours) at 1/12/2022 1150  Last data filed at 1/12/2022 0534  Gross per 24 hour   Intake --   Output 2600 ml   Net -2600 ml      Physical Exam  Vitals and nursing note reviewed.   Constitutional:       General: He is not in acute distress.  HENT:      Head: Normocephalic and atraumatic.      Mouth/Throat:      Mouth: Mucous membranes are moist.   Eyes:      Extraocular Movements: Extraocular movements intact.      Pupils: Pupils are equal, round, and reactive to light.   Cardiovascular:      Rate and Rhythm: Normal rate and regular rhythm.      Pulses: Normal pulses.   Pulmonary:      Effort: Pulmonary effort is normal. No respiratory distress.      Breath sounds: Normal breath sounds. No wheezing.   Abdominal:      General: Bowel sounds are normal. There is no distension.      Tenderness: There is no abdominal tenderness.   Musculoskeletal:         General: Tenderness present.       Cervical back: Normal range of motion and neck supple.        Legs:       Comments:  R hip with surgical dressing covering    Skin:     General: Skin is warm.      Capillary Refill: Capillary refill takes less than 2 seconds.   Neurological:      Mental Status: He is alert, oriented to person, place, and time and easily aroused. Mental status is at baseline.      GCS: GCS eye subscore is 4. GCS verbal subscore is 4. GCS motor subscore is 6.      Cranial Nerves: Cranial nerves are intact.      Sensory: Sensation is intact.      Motor: Weakness present.      Comments: Dense right-sided hemiparesis and expressive aphasia in this right-handed patient with old CVA   Psychiatric:         Mood and Affect: Mood normal.         Significant Labs:   All pertinent labs within the past 24 hours have been reviewed.  Recent Lab Results     None          Significant Imaging: I have reviewed all pertinent imaging results/findings within the past 24 hours.

## 2022-01-12 NOTE — ASSESSMENT & PLAN NOTE
- chronic  - unclear amount pt does partake in daily   - drinks liquor instead of beer  - monitor for DTs   - banana bag daily- MVI, thiamine, and folic acid  - ativan PO scheduled to defer DTs   - ativan IV for breakthrough symptoms   1/4  - no s/s of withdrawals   1/5  - - no s/s of DT'S  1/12  - wean ativan scheduled

## 2022-01-13 PROCEDURE — 25000003 PHARM REV CODE 250: Performed by: ORTHOPAEDIC SURGERY

## 2022-01-13 PROCEDURE — 25000003 PHARM REV CODE 250: Performed by: REGISTERED NURSE

## 2022-01-13 PROCEDURE — 25000003 PHARM REV CODE 250: Performed by: NURSE PRACTITIONER

## 2022-01-13 PROCEDURE — 97116 GAIT TRAINING THERAPY: CPT

## 2022-01-13 PROCEDURE — 11000001 HC ACUTE MED/SURG PRIVATE ROOM

## 2022-01-13 PROCEDURE — 25000003 PHARM REV CODE 250: Performed by: HOSPITALIST

## 2022-01-13 PROCEDURE — 97110 THERAPEUTIC EXERCISES: CPT

## 2022-01-13 RX ADMIN — METOPROLOL TARTRATE 12.5 MG: 25 TABLET, FILM COATED ORAL at 08:01

## 2022-01-13 RX ADMIN — HYDROCODONE BITARTRATE AND ACETAMINOPHEN 1 TABLET: 5; 325 TABLET ORAL at 04:01

## 2022-01-13 RX ADMIN — HYDROCODONE BITARTRATE AND ACETAMINOPHEN 1 TABLET: 5; 325 TABLET ORAL at 09:01

## 2022-01-13 RX ADMIN — THIAMINE HCL TAB 100 MG 100 MG: 100 TAB at 09:01

## 2022-01-13 RX ADMIN — LISINOPRIL 5 MG: 5 TABLET ORAL at 09:01

## 2022-01-13 RX ADMIN — APIXABAN 2.5 MG: 2.5 TABLET, FILM COATED ORAL at 09:01

## 2022-01-13 RX ADMIN — HYDROCODONE BITARTRATE AND ACETAMINOPHEN 1 TABLET: 5; 325 TABLET ORAL at 12:01

## 2022-01-13 RX ADMIN — ATORVASTATIN CALCIUM 40 MG: 20 TABLET, FILM COATED ORAL at 08:01

## 2022-01-13 RX ADMIN — ASPIRIN 81 MG: 81 TABLET, COATED ORAL at 09:01

## 2022-01-13 RX ADMIN — LORAZEPAM 0.5 MG: 0.5 TABLET ORAL at 08:01

## 2022-01-13 RX ADMIN — HYDROCODONE BITARTRATE AND ACETAMINOPHEN 1 TABLET: 5; 325 TABLET ORAL at 01:01

## 2022-01-13 RX ADMIN — APIXABAN 2.5 MG: 2.5 TABLET, FILM COATED ORAL at 08:01

## 2022-01-13 RX ADMIN — FOLIC ACID 1 MG: 1 TABLET ORAL at 09:01

## 2022-01-13 RX ADMIN — LORAZEPAM 0.5 MG: 0.5 TABLET ORAL at 09:01

## 2022-01-13 RX ADMIN — METOPROLOL TARTRATE 12.5 MG: 25 TABLET, FILM COATED ORAL at 09:01

## 2022-01-13 RX ADMIN — HYDROCODONE BITARTRATE AND ACETAMINOPHEN 1 TABLET: 5; 325 TABLET ORAL at 05:01

## 2022-01-13 RX ADMIN — THERA TABS 1 TABLET: TAB at 09:01

## 2022-01-13 RX ADMIN — Medication 1000 UNITS: at 09:01

## 2022-01-13 RX ADMIN — PANTOPRAZOLE SODIUM 40 MG: 40 TABLET, DELAYED RELEASE ORAL at 09:01

## 2022-01-13 NOTE — PLAN OF CARE
Problem: Adult Inpatient Plan of Care  Goal: Plan of Care Review  Outcome: Ongoing, Progressing  Flowsheets (Taken 1/13/2022 1445)  Plan of Care Reviewed With: patient  Goal: Patient-Specific Goal (Individualized)  Outcome: Ongoing, Progressing  Flowsheets (Taken 1/13/2022 1445)  Anxieties, Fears or Concerns: Discharge plans  Individualized Care Needs: nicotene dependence, PT  Patient-Specific Goals (Include Timeframe): discharge plans, PT  Goal: Absence of Hospital-Acquired Illness or Injury  Outcome: Ongoing, Progressing  Intervention: Prevent Skin Injury  Flowsheets (Taken 1/13/2022 1445)  Skin Protection:   adhesive use limited   incontinence pads utilized  Intervention: Prevent and Manage VTE (Venous Thromboembolism) Risk  Flowsheets (Taken 1/13/2022 1445)  Range of Motion: active ROM (range of motion) encouraged  Intervention: Prevent Infection  Flowsheets (Taken 1/13/2022 1445)  Infection Prevention:   visitors restricted/screened   rest/sleep promoted   single patient room provided   hand hygiene promoted  Goal: Optimal Comfort and Wellbeing  Outcome: Ongoing, Progressing  Intervention: Provide Person-Centered Care  Flowsheets (Taken 1/13/2022 1445)  Trust Relationship/Rapport:   care explained   choices provided   emotional support provided   empathic listening provided   thoughts/feelings acknowledged   reassurance provided   questions encouraged   questions answered  Goal: Readiness for Transition of Care  Outcome: Ongoing, Progressing     Problem: Skin Injury Risk Increased  Goal: Skin Health and Integrity  Outcome: Ongoing, Progressing  Intervention: Optimize Skin Protection  Flowsheets (Taken 1/13/2022 1445)  Pressure Reduction Devices: heel offloading device utilized  Skin Protection:   adhesive use limited   incontinence pads utilized  Intervention: Promote and Optimize Oral Intake  Flowsheets (Taken 1/13/2022 1445)  Oral Nutrition Promotion:   physical activity promoted   calorie-dense foods  provided     Problem: Infection  Goal: Absence of Infection Signs and Symptoms  Outcome: Ongoing, Progressing  Intervention: Prevent or Manage Infection  Flowsheets (Taken 1/13/2022 1445)  Fever Reduction/Comfort Measures:   lightweight clothing   lightweight bedding  Infection Management: aseptic technique maintained  Isolation Precautions: precautions maintained     Problem: Fall Injury Risk  Goal: Absence of Fall and Fall-Related Injury  Outcome: Ongoing, Progressing  Intervention: Identify and Manage Contributors  Flowsheets (Taken 1/13/2022 1445)  Self-Care Promotion: independence encouraged  Medication Review/Management: medications reviewed   Plan of care reviewed. Patient progressing

## 2022-01-13 NOTE — SUBJECTIVE & OBJECTIVE
Interval History: Pt resting in bed. Denies any needs or complaints. Asking about car-- told him he needed to talk with his brother Silver. Working well with PT; pending placement. VSS, afebrile.     Review of Systems   Constitutional: Positive for activity change. Negative for appetite change, chills and fever.   Respiratory: Negative for cough, chest tightness and shortness of breath.    Cardiovascular: Negative for chest pain and palpitations.   Neurological: Negative for light-headedness.   All other systems reviewed and are negative.    Objective:     Vital Signs (Most Recent):  Temp: 96.8 °F (36 °C) (01/13/22 0720)  Pulse: 84 (01/13/22 0720)  Resp: 18 (01/13/22 0910)  BP: 131/74 (01/13/22 0720)  SpO2: 100 % (01/13/22 0720) Vital Signs (24h Range):  Temp:  [96.8 °F (36 °C)-98.7 °F (37.1 °C)] 96.8 °F (36 °C)  Pulse:  [] 84  Resp:  [16-19] 18  SpO2:  [96 %-100 %] 100 %  BP: (116-133)/(64-74) 131/74     Weight: 89.9 kg (198 lb 3.1 oz)  Body mass index is 26.15 kg/m².    Intake/Output Summary (Last 24 hours) at 1/13/2022 1030  Last data filed at 1/13/2022 0900  Gross per 24 hour   Intake --   Output 825 ml   Net -825 ml      Physical Exam  Vitals and nursing note reviewed.   Constitutional:       General: He is not in acute distress.  HENT:      Head: Normocephalic and atraumatic.      Mouth/Throat:      Mouth: Mucous membranes are moist.   Eyes:      Extraocular Movements: Extraocular movements intact.      Pupils: Pupils are equal, round, and reactive to light.   Cardiovascular:      Rate and Rhythm: Normal rate and regular rhythm.      Pulses: Normal pulses.   Pulmonary:      Effort: Pulmonary effort is normal. No respiratory distress.      Breath sounds: Normal breath sounds. No wheezing.   Abdominal:      General: Bowel sounds are normal. There is no distension.      Tenderness: There is no abdominal tenderness.   Musculoskeletal:         General: Tenderness present.      Cervical back: Normal range of  motion and neck supple.        Legs:       Comments:  R hip with surgical dressing covering    Skin:     General: Skin is warm.      Capillary Refill: Capillary refill takes less than 2 seconds.   Neurological:      Mental Status: He is alert, oriented to person, place, and time and easily aroused. Mental status is at baseline.      GCS: GCS eye subscore is 4. GCS verbal subscore is 4. GCS motor subscore is 6.      Cranial Nerves: Cranial nerves are intact.      Sensory: Sensation is intact.      Motor: Weakness present.      Comments: Dense right-sided hemiparesis and expressive aphasia in this right-handed patient with old CVA   Psychiatric:         Mood and Affect: Mood normal.         Significant Labs:   All pertinent labs within the past 24 hours have been reviewed.  Recent Lab Results     None          Significant Imaging: I have reviewed all pertinent imaging results/findings within the past 24 hours.

## 2022-01-13 NOTE — PLAN OF CARE
Dalton called back from [facility]. Pt was there for long term placement and singed himself out a while ago per Satya. If pt would be able to get bank statements from the last 5 Janurary's and the last 3 bank statements from Oct- Dec of 2020. MARCY informed Satya that pt has not family support but will try to find out from pt if any family is able to help. MARCY following.

## 2022-01-14 LAB — GLUCOSE SERPL-MCNC: 161 MG/DL (ref 70–105)

## 2022-01-14 PROCEDURE — 99232 SBSQ HOSP IP/OBS MODERATE 35: CPT | Mod: ,,, | Performed by: FAMILY MEDICINE

## 2022-01-14 PROCEDURE — 86580 TB INTRADERMAL TEST: CPT | Performed by: NURSE PRACTITIONER

## 2022-01-14 PROCEDURE — 11000001 HC ACUTE MED/SURG PRIVATE ROOM

## 2022-01-14 PROCEDURE — 25000003 PHARM REV CODE 250: Performed by: ORTHOPAEDIC SURGERY

## 2022-01-14 PROCEDURE — 97110 THERAPEUTIC EXERCISES: CPT

## 2022-01-14 PROCEDURE — 25000003 PHARM REV CODE 250: Performed by: NURSE PRACTITIONER

## 2022-01-14 PROCEDURE — 25000003 PHARM REV CODE 250: Performed by: REGISTERED NURSE

## 2022-01-14 PROCEDURE — 30200315 PPD INTRADERMAL TEST REV CODE 302: Performed by: NURSE PRACTITIONER

## 2022-01-14 PROCEDURE — 97116 GAIT TRAINING THERAPY: CPT

## 2022-01-14 PROCEDURE — 82962 GLUCOSE BLOOD TEST: CPT

## 2022-01-14 PROCEDURE — 99232 PR SUBSEQUENT HOSPITAL CARE,LEVL II: ICD-10-PCS | Mod: ,,, | Performed by: FAMILY MEDICINE

## 2022-01-14 PROCEDURE — 25000003 PHARM REV CODE 250: Performed by: HOSPITALIST

## 2022-01-14 RX ORDER — LORAZEPAM 0.5 MG/1
0.5 TABLET ORAL DAILY
Status: DISCONTINUED | OUTPATIENT
Start: 2022-01-15 | End: 2022-01-18

## 2022-01-14 RX ADMIN — PANTOPRAZOLE SODIUM 40 MG: 40 TABLET, DELAYED RELEASE ORAL at 09:01

## 2022-01-14 RX ADMIN — FOLIC ACID 1 MG: 1 TABLET ORAL at 09:01

## 2022-01-14 RX ADMIN — HYDROCODONE BITARTRATE AND ACETAMINOPHEN 1 TABLET: 5; 325 TABLET ORAL at 04:01

## 2022-01-14 RX ADMIN — Medication 1000 UNITS: at 09:01

## 2022-01-14 RX ADMIN — APIXABAN 2.5 MG: 2.5 TABLET, FILM COATED ORAL at 09:01

## 2022-01-14 RX ADMIN — ATORVASTATIN CALCIUM 40 MG: 20 TABLET, FILM COATED ORAL at 09:01

## 2022-01-14 RX ADMIN — THERA TABS 1 TABLET: TAB at 09:01

## 2022-01-14 RX ADMIN — METOPROLOL TARTRATE 12.5 MG: 25 TABLET, FILM COATED ORAL at 09:01

## 2022-01-14 RX ADMIN — TUBERCULIN PURIFIED PROTEIN DERIVATIVE 5 UNITS: 5 INJECTION, SOLUTION INTRADERMAL at 05:01

## 2022-01-14 RX ADMIN — HYDROCODONE BITARTRATE AND ACETAMINOPHEN 1 TABLET: 5; 325 TABLET ORAL at 09:01

## 2022-01-14 RX ADMIN — ASPIRIN 81 MG: 81 TABLET, COATED ORAL at 09:01

## 2022-01-14 RX ADMIN — LORAZEPAM 0.5 MG: 0.5 TABLET ORAL at 09:01

## 2022-01-14 RX ADMIN — HYDROCODONE BITARTRATE AND ACETAMINOPHEN 1 TABLET: 5; 325 TABLET ORAL at 03:01

## 2022-01-14 RX ADMIN — LISINOPRIL 5 MG: 5 TABLET ORAL at 09:01

## 2022-01-14 RX ADMIN — THIAMINE HCL TAB 100 MG 100 MG: 100 TAB at 09:01

## 2022-01-14 NOTE — CARE UPDATE
Patient awake without any new complaints right-sided weakness from previous stroke awaiting rehab wound clean and dry will DC staples

## 2022-01-14 NOTE — PT/OT/SLP PROGRESS
Physical Therapy Treatment    Patient Name:  Karin Carrera   MRN:  [id number]    Recommendations:     Discharge Recommendations:  rehabilitation facility,nursing facility, skilled   Discharge Equipment Recommendations: walker, jasbir   Barriers to discharge: Decreased caregiver support    Assessment:     Karin Carrera is a 51 y.o. male admitted with a medical diagnosis of Closed fracture of right hip.  He presents with the following impairments/functional limitations:  impaired functional mobilty,gait instability,impaired balance,decreased coordination,pain,abnormal tone,decreased ROM,orthopedic precautions Patient with continuing to put more weight on right le. More somber today. Needs encouragement with therapy today. .    Rehab Prognosis: Good; patient would benefit from acute skilled PT services to address these deficits and reach maximum level of function.    Recent Surgery: Procedure(s) (LRB):  HEMIARTHROPLASTY, HIP (Right) 11 Days Post-Op    Plan:     During this hospitalization, patient to be seen 5 x/week to address the identified rehab impairments via gait training,therapeutic activities,therapeutic exercises and progress toward the following goals:    · Plan of Care Expires:  02/03/22    Subjective     Chief Complaint: right hip soreness  Patient/Family Comments/goals: Social work trying to find placement  Pain/Comfort:  · Pain Rating 1: 2/10  · Location - Side 1: Right  · Location 1: hip  · Pain Addressed 1: Cessation of Activity      Objective:     Communicated with nurse prior to session.  Patient found supine with peripheral IV upon PT entry to room.     General Precautions: Standard, fall   Orthopedic Precautions:RLE weight bearing as tolerated   Braces:    Respiratory Status: Room air     Functional Mobility:  · Bed Mobility:     · Supine to Sit: contact guard assistance  · Sit to Supine: contact guard assistance  · Transfers:     · Sit to Stand:  contact guard assistance with hand-held  assist  · Gait: ambulated 50 feet with hand hand assist min assist, left step too, narrow base of support      AM-PAC 6 CLICK MOBILITY  Turning over in bed (including adjusting bedclothes, sheets and blankets)?: 3  Sitting down on and standing up from a chair with arms (e.g., wheelchair, bedside commode, etc.): 3  Moving from lying on back to sitting on the side of the bed?: 3  Moving to and from a bed to a chair (including a wheelchair)?: 3  Need to walk in hospital room?: 3  Climbing 3-5 steps with a railing?: 3  Basic Mobility Total Score: 18       Therapeutic Activities and Exercises:   right le: hs, saq, abd-add, slr 3x10    Patient left up in chair with call button in reach..    GOALS:   Multidisciplinary Problems     Physical Therapy Goals        Problem: Physical Therapy Goal    Goal Priority Disciplines Outcome Goal Variances Interventions   Physical Therapy Goal     PT, PT/OT Ongoing, Progressing     Description: Short Term Goals  Independent with HEP  Independent with cane x 100 feet PWB/WBAT: right lower extremity  Cga supine-sit-stand    Long term goals  Needed equipment for home.   Independent with hip precautsion                       Time Tracking:     PT Received On: 01/13/22  PT Start Time: 1125     PT Stop Time: 1155  PT Total Time (min): 30 min     Billable Minutes: gait 10, there ex 15      Treatment Type: Treatment  PT/PTA: PT     PTA Visit Number: 0     01/13/2022

## 2022-01-14 NOTE — PLAN OF CARE
MARCY spoke with Alysa at [facility] and pt has been denied due to being noncompliant and rude to [facility]. MARCY following

## 2022-01-14 NOTE — PLAN OF CARE
MARCY spoke with [facility] and the admissions coordinator is coming out to see pt. She will also need financials. MARCY informed her that pt can say yes and no and write information on paper to communicate. SW following.

## 2022-01-14 NOTE — PT/OT/SLP PROGRESS
Physical Therapy Treatment    Patient Name:  Karin Carrera   MRN:  [id number]    Recommendations:     Discharge Recommendations:  nursing facility, skilled,rehabilitation facility,intermediate care facility/nursing home   Discharge Equipment Recommendations: none   Barriers to discharge: awaiting swing bed placement    Assessment:     Karin Carrera is a 51 y.o. male admitted with a medical diagnosis of Closed fracture of right hip.  He presents with the following impairments/functional limitations:  impaired functional mobilty,gait instability,impaired balance,weakness,impaired endurance,impaired cognition,decreased upper extremity function,decreased lower extremity function,decreased safety awareness,pain,abnormal tone,orthopedic precautions .    Patient declined OOB activities but agreeable to bed exercises. Patient continues to c/o pain right thigh and requires rest breaks to complete exercises.     Rehab Prognosis: Good; patient would benefit from acute skilled PT services to address these deficits and reach maximum level of function.    Recent Surgery: Procedure(s) (LRB):  HEMIARTHROPLASTY, HIP (Right) 12 Days Post-Op    Plan:     During this hospitalization, patient to be seen BID (5x/week; daily 2x/week) to address the identified rehab impairments via gait training,therapeutic activities,therapeutic exercises and progress toward the following goals:    · Plan of Care Expires:  02/03/22    Subjective     Chief Complaint: right THR post fall and fracture  Patient/Family Comments/goals: Pt declined OOB but agreed to bed exercises with encouragement  Pain/Comfort:  · Pain Rating 1: 0/10 (up to 6/10 with exercises)  · Location - Side 1: Right  · Location 1: hip  · Pain Addressed 1: Reposition,Distraction,Cessation of Activity,Nurse notified  · Pain Rating Post-Intervention 1: 2/10      Objective:     Communicated with COLTON Sesay prior to session.  Patient found supine with peripheral IV upon PT  entry to room.     General Precautions: Standard, fall   Orthopedic Precautions:RLE weight bearing as tolerated,RLE anterior precautions   Braces: N/A  Respiratory Status: Room air     Functional Mobility:  · Bed Mobility:     · Supine to Sit: deferred as pt declined OOB this afternoon      AM-PAC 6 CLICK MOBILITY  Turning over in bed (including adjusting bedclothes, sheets and blankets)?: 4  Sitting down on and standing up from a chair with arms (e.g., wheelchair, bedside commode, etc.): 3  Moving from lying on back to sitting on the side of the bed?: 4  Moving to and from a bed to a chair (including a wheelchair)?: 3  Need to walk in hospital room?: 3  Climbing 3-5 steps with a railing?: 3  Basic Mobility Total Score: 20       Therapeutic Activities and Exercises:   Right lower extremity exercise 3 x 10 reps: ankle pumps, Quad sets, heel slides, hip abduction/adduction and straight leg raises with active assist ROM, verbal cues, tactile cues and frequent rest breaks    Patient left supine with all lines intact, call button in reach and RN Violetta Sesay notified..    GOALS:   Multidisciplinary Problems     Physical Therapy Goals        Problem: Physical Therapy Goal    Goal Priority Disciplines Outcome Goal Variances Interventions   Physical Therapy Goal     PT, PT/OT Ongoing, Progressing     Description: Short Term Goals  Independent with HEP  Independent with cane x 100 feet PWB/WBAT: right lower extremity  Cga supine-sit-stand    Long term goals  Needed equipment for home.   Independent with hip precautsion                       Time Tracking:     PT Received On: 01/14/22  PT Start Time: 1431     PT Stop Time: 1449  PT Total Time (min): 18 min     Billable Minutes: Therapeutic Exercise 15 minutes    Treatment Type: Treatment  PT/PTA: PT     PTA Visit Number: 0     01/14/2022

## 2022-01-14 NOTE — PROGRESS NOTES
[facility]opedic  [facility]  Progress Note    Patient Name: Karin Carrera  MRN: [id number]  Patient Class: IP- Inpatient   Admission Date: 1/1/2022  Length of Stay: 13 days  Attending Physician: Walker Anaya Jr., MD  Primary Care Provider: Walker Smith MD        Subjective:     Principal Problem:Closed fracture of right hip        HPI:  Mr Carrera is a 50 yo male who presented to the ED via EMS for c/o right leg pain after a fall. Per EMS and ED staff, he was picked up from his vehicle outside of a friend's home. He states that he lives in his vehicle. It was noted that there were numerous liquor bottles in the vehicle and around him. He was combative with EMS and has been uncooperative with much of his interview during his stay in the ED. He uses profanity easily but is aphasic during much of the ROS and interview. He will use his cell phone to type out some answers. He is a poor historian due to this and his obvious drunken state. He denies any chest pain, shortness of breath, abdominal pain, F/C, N/V/D, or changes in vision. He has right sided hemiparesis as a results of his previous CVA- however still drives. Spoke with his brother, Silver, who provided no new information- he has not seen his brother since the day before yesterday. He does state that his brother does not live with him because of the alcohol use and other private reasons. Upon workup, it was noted that he had a right femoral neck fracture. His ETOH level was 188. He is being admitted to hospitalist services with a consult to orthopaedics for surgical intervention.     He has a PMH of CABG, CVA, HTN, GERD, ETOH abuse, and nicotine abuse. It is unclear when his CABG or CVA was; unable to find dates in previous records. It is unknown if he has had covid or been vaccinated. He follows with Dr Smith for primary care. He does not follow with anyone for cardiology. Per patient, he wishes to be a full code.     Brother-  [facility] [phone]       Overview/Hospital Course:  01/02 - patient seems in less pain but obviously still sore.  He is calm and cooperative.  No new issues reported.  Seen preoperatively.  As previous addressed okay with me to proceed as planned; no medical reason to delay.  Watch for alcohol withdrawals postoperatively even though patient denies daily alcohol use.  Look into placement needs.  Dr. Goodman to assume care patient for hospitalist in the a.m..  1/3: NAEO; some pain at R hip; PT/OT to see; SS to assist with placement; continue on DT prevention- pt did initially state he drank daily and is now saying he drinks every other day  1/4: NAEO; no s/s of withdrawals; asking for snuff; PT following- having some issues with spasticity-- baclofen started; continue DT prevention; SS following for SWB   1/6: Echo EF 20%. Awaiting placement. Pt refuse NH.  1/7:Norvasc stopped.Lisinopril 2.5 mg.Lopressor 12.5 mg bid.Cardiologist appt new pt at [facility].  1/11: 1/11 po day #8. Continues to work with PT- pt agreeable to snf for and rehab.SS following    1/12: post op day #9; continue to work well with PT- SS with referrals to numerous places- awaiting placement  1/13: NAEO; post op day 10; continues to work with PT; [facility] to evaluate for placement   1/14: NAEO; post day 11; awaiting placement-- numerous referrals made by SS; continues with PT       Interval History: Pt resting in bed. Denies any needs or complaints- denies CP, SOB, abd pain. Continues to work with PT. Able to tolerate level of pain. Awaiting placement-- has been issue. VSS, afebrile. Labs pending     Review of Systems   Constitutional: Positive for activity change. Negative for appetite change, chills and fever.   Respiratory: Negative for cough, chest tightness and shortness of breath.    Cardiovascular: Negative for chest pain and palpitations.   Neurological: Negative for light-headedness.   All other systems reviewed and are negative.    Objective:      Vital Signs (Most Recent):  Temp: 97.8 °F (36.6 °C) (01/14/22 0710)  Pulse: 87 (01/14/22 0710)  Resp: 18 (01/14/22 0710)  BP: 127/70 (01/14/22 0710)  SpO2: 100 % (01/14/22 0710) Vital Signs (24h Range):  Temp:  [97.5 °F (36.4 °C)-99.3 °F (37.4 °C)] 97.8 °F (36.6 °C)  Pulse:  [75-90] 87  Resp:  [16-18] 18  SpO2:  [98 %-100 %] 100 %  BP: (114-133)/(66-72) 127/70     Weight: 89.9 kg (198 lb 3.1 oz)  Body mass index is 26.15 kg/m².    Intake/Output Summary (Last 24 hours) at 1/14/2022 0927  Last data filed at 1/14/2022 0100  Gross per 24 hour   Intake --   Output 1225 ml   Net -1225 ml      Physical Exam  Vitals and nursing note reviewed.   Constitutional:       General: He is not in acute distress.  HENT:      Head: Normocephalic and atraumatic.      Mouth/Throat:      Mouth: Mucous membranes are moist.   Eyes:      Extraocular Movements: Extraocular movements intact.      Pupils: Pupils are equal, round, and reactive to light.   Cardiovascular:      Rate and Rhythm: Normal rate and regular rhythm.      Pulses: Normal pulses.   Pulmonary:      Effort: Pulmonary effort is normal. No respiratory distress.      Breath sounds: Normal breath sounds. No wheezing.   Abdominal:      General: Bowel sounds are normal. There is no distension.      Tenderness: There is no abdominal tenderness.   Musculoskeletal:         General: Tenderness present.      Cervical back: Normal range of motion and neck supple.        Legs:       Comments:  R hip with surgical incision    Skin:     General: Skin is warm.      Capillary Refill: Capillary refill takes less than 2 seconds.   Neurological:      Mental Status: He is alert, oriented to person, place, and time and easily aroused. Mental status is at baseline.      GCS: GCS eye subscore is 4. GCS verbal subscore is 4. GCS motor subscore is 6.      Cranial Nerves: Cranial nerves are intact.      Sensory: Sensation is intact.      Motor: Weakness present.      Comments: Dense right-sided  hemiparesis and expressive aphasia in this right-handed patient with old CVA   Psychiatric:         Mood and Affect: Mood normal.         Significant Labs:   All pertinent labs within the past 24 hours have been reviewed.  Recent Lab Results     None          Significant Imaging: I have reviewed all pertinent imaging results/findings within the past 24 hours.      Assessment/Plan:      * Closed fracture of right hip  - acute  - R hip xr with femoral neck fracture superimposed on chronic changes  - ortho consult-- plans for surgical intervention tomorrow  - EKG with SR with incomplete R BBB  - CXR with cardiomegaly and chronic vascular congestion  - plans for surgical intervention tomorrow  - PT/OT/SS consulted for post op assistance  - heparin SQ x 1 dose tonight   - AC after surgery per surgery preference   - ALMEIDA score of 0.9% ALISA risk-- risk of MI or cardiac arrest intra op or up to 30 days post op  - RCRI score of 2 points; class III risk; 10.1% 30 day risk of death, MI, or cardiac arrest     Perioperative Risk Assessment:  Patient is at intermediate risk for perioperative cardiopulmonary complications.  This based on lack of thorough history, hx of CVA and CABG. However risk are stable and see no medical benefit in delaying surgery from medical standpoint.    Surgery 01/02    1/3  - stable post op  - hemovac in place  - PT/OT to see  - SS following for SWB assistance   1/4  - hemovac to be pulled today  - PT following  - baclofen added to spasticity   1/6  - PO day#4  - PT/OT  - SWB placement per SS  1/7  - po day#5  - dsg d/i  1/8  - po DAY#6  1/9  - po DAY#7  - Continue to attempt placement. PT continues in Hospital. SS consulted for WC and any medical devices needed.  1/10  - po DAY#8  - pt agreeable to snf for pt and rehab.SS following  1/11  - po day #8. Continues to work with PT  - pt agreeable to snf for pt and rehab.SS following  1/12  - pending placement  - continue current POC       Acute on chronic  combined systolic and diastolic heart failure        1/6/22  · The left ventricle is severely enlarged with mild eccentric hypertrophy and severely decreased systolic function.  · The estimated ejection fraction is 20%.  · There are segmental left ventricular wall motion abnormalities.  · Left ventricular diastolic dysfunction.  · Atrial fibrillation not observed.  · Normal right ventricular size.  · Mild mitral regurgitation.  · Normal central venous pressure (3 mmHg).  1/7   Lisinopril 2.5 mg  - Lopressor 12.5 mg bid  1/7  - Norvasc stopped  - Lisinopril 2.5 mg  - Lopressor 12.5 mg bid  - Cardiologist appt new pt at dc.  1/8  - Lisinopril increased 5 mg        ETOH abuse  - chronic  - unclear amount pt does partake in daily   - drinks liquor instead of beer  - monitor for DTs   - banana bag daily- MVI, thiamine, and folic acid  - ativan PO scheduled to defer DTs   - ativan IV for breakthrough symptoms   1/4  - no s/s of withdrawals   1/5  - - no s/s of DT'S  1/12  - wean ativan scheduled           Hx of CABG  - chronic, stable  - continue current meds  - cardiac monitoring   - EKG with SR with incomplete RBBB      GERD (gastroesophageal reflux disease)  - chronic, stable  - continue current meds       Hypertension  - chronic, stable  - continue current home meds  - monitor BP trend  - adjust meds as needed   1/4  - stable   1/7  - Norvasc stopped  - Lisinopril 2.5 mg  - Lopressor 12.5 mg bid  1/9  - BP stable  - Lisinopril increased to 5 mg daily  1/12  - stable     Right sided weakness  - chronic, stable  - hx of CVA with R sided hemiparesis   -patient is right-handed  - continue statin   1/4  - PT following  - plans for SWB   1/8  - Pt has mobility limitation that significantly impairs his MRADL'S including his ability for toileting and bathing at home.Limitations cannot be resolved with use of a cane or walker due to increased risk of falling. A wheelchair will improve the pt's ability to perform MRADL'S. Pt is  willing to and able to propel in wheelchair.  1/12  - stable       VTE Risk Mitigation (From admission, onward)         Ordered     apixaban tablet 2.5 mg  2 times daily         01/02/22 1916     IP VTE HIGH RISK PATIENT  Once         01/02/22 1916     Place SAGE hose  Until discontinued         01/02/22 1916     Place sequential compression device  Until discontinued         01/02/22 1916     Place sequential compression device  Until discontinued         01/01/22 1707                Discharge Planning   DEANNA:      Code Status: Full Code   Is the patient medically ready for discharge?:     Reason for patient still in hospital (select all that apply): Treatment  Discharge Plan A: Rehab ([facility] Faustin)            ISADOAR Link  Department of Hospital Medicine   [facility] [place] - Orthopedic

## 2022-01-14 NOTE — SUBJECTIVE & OBJECTIVE
Interval History: Pt resting in bed. Denies any needs or complaints- denies CP, SOB, abd pain. Continues to work with PT. Able to tolerate level of pain. Awaiting placement-- has been issue. VSS, afebrile. Labs pending     Review of Systems   Constitutional: Positive for activity change. Negative for appetite change, chills and fever.   Respiratory: Negative for cough, chest tightness and shortness of breath.    Cardiovascular: Negative for chest pain and palpitations.   Neurological: Negative for light-headedness.   All other systems reviewed and are negative.    Objective:     Vital Signs (Most Recent):  Temp: 97.8 °F (36.6 °C) (01/14/22 0710)  Pulse: 87 (01/14/22 0710)  Resp: 18 (01/14/22 0710)  BP: 127/70 (01/14/22 0710)  SpO2: 100 % (01/14/22 0710) Vital Signs (24h Range):  Temp:  [97.5 °F (36.4 °C)-99.3 °F (37.4 °C)] 97.8 °F (36.6 °C)  Pulse:  [75-90] 87  Resp:  [16-18] 18  SpO2:  [98 %-100 %] 100 %  BP: (114-133)/(66-72) 127/70     Weight: 89.9 kg (198 lb 3.1 oz)  Body mass index is 26.15 kg/m².    Intake/Output Summary (Last 24 hours) at 1/14/2022 0927  Last data filed at 1/14/2022 0100  Gross per 24 hour   Intake --   Output 1225 ml   Net -1225 ml      Physical Exam  Vitals and nursing note reviewed.   Constitutional:       General: He is not in acute distress.  HENT:      Head: Normocephalic and atraumatic.      Mouth/Throat:      Mouth: Mucous membranes are moist.   Eyes:      Extraocular Movements: Extraocular movements intact.      Pupils: Pupils are equal, round, and reactive to light.   Cardiovascular:      Rate and Rhythm: Normal rate and regular rhythm.      Pulses: Normal pulses.   Pulmonary:      Effort: Pulmonary effort is normal. No respiratory distress.      Breath sounds: Normal breath sounds. No wheezing.   Abdominal:      General: Bowel sounds are normal. There is no distension.      Tenderness: There is no abdominal tenderness.   Musculoskeletal:         General: Tenderness present.       Cervical back: Normal range of motion and neck supple.        Legs:       Comments:  R hip with surgical incision    Skin:     General: Skin is warm.      Capillary Refill: Capillary refill takes less than 2 seconds.   Neurological:      Mental Status: He is alert, oriented to person, place, and time and easily aroused. Mental status is at baseline.      GCS: GCS eye subscore is 4. GCS verbal subscore is 4. GCS motor subscore is 6.      Cranial Nerves: Cranial nerves are intact.      Sensory: Sensation is intact.      Motor: Weakness present.      Comments: Dense right-sided hemiparesis and expressive aphasia in this right-handed patient with old CVA   Psychiatric:         Mood and Affect: Mood normal.         Significant Labs:   All pertinent labs within the past 24 hours have been reviewed.  Recent Lab Results     None          Significant Imaging: I have reviewed all pertinent imaging results/findings within the past 24 hours.

## 2022-01-14 NOTE — PT/OT/SLP PROGRESS
Physical Therapy Treatment    Patient Name:  Karin Carrera   MRN:  [id number]    Recommendations:     Discharge Recommendations:  nursing facility, skilled,rehabilitation facility,intermediate care facility/nursing home   Discharge Equipment Recommendations: none   Barriers to discharge: awaiting swing bed placement    Assessment:     Karin Carrera is a 51 y.o. male admitted with a medical diagnosis of Closed fracture of right hip.  He presents with the following impairments/functional limitations:  impaired functional mobilty,gait instability,impaired balance,weakness,impaired endurance,impaired cognition,decreased upper extremity function,decreased lower extremity function,decreased safety awareness,pain,abnormal tone,orthopedic precautions .    Patient participated with gait training using single crutch for increased stability vs quad cane. Patient mildly unstable due to pre-existing right sided deficits from CVA. Patient has been living in his van and will require swing bed at discharge.    Rehab Prognosis: Fair; patient would benefit from acute skilled PT services to address these deficits and reach maximum level of function.    Recent Surgery: Procedure(s) (LRB):  HEMIARTHROPLASTY, HIP (Right) 12 Days Post-Op    Plan:     During this hospitalization, patient to be seen BID (5x/week; daily 2x/week) to address the identified rehab impairments via gait training,therapeutic activities,therapeutic exercises and progress toward the following goals:    · Plan of Care Expires:  02/03/22    Subjective     Chief Complaint: right hip fracture with THR- anterior approach  Patient/Family Comments/goals: patient agreeable to PT treatment with encouragement  Pain/Comfort:  · Pain Rating 1: 2/10 (up to 5/10 with certain movements)  · Location - Side 1: Right  · Location 1: hip  · Pain Addressed 1: Cessation of Activity,Distraction,Reposition  · Pain Rating Post-Intervention 1: 2/10      Objective:     Communicated  with COLTON Sesay prior to session.  Patient found supine with peripheral IV upon PT entry to room.     General Precautions: Standard, fall   Orthopedic Precautions:RLE weight bearing as tolerated,RLE anterior precautions   Braces: N/A  Respiratory Status: Room air     Functional Mobility:  · Bed Mobility:     · Supine to Sit: modified independence  · Transfers:     · Sit to Stand:  minimum assistance with single crutch  · Bed to Chair: minimum assistance with  single crutch  using  Step Transfer  · Gait: 80' with single crutch, single UE support; CGA to min A; step to pattern, increased effort to advance right LE, slow oniel, mild path deviation but no acute LOB; impulsive at times      AM-PAC 6 CLICK MOBILITY  Turning over in bed (including adjusting bedclothes, sheets and blankets)?: 4  Sitting down on and standing up from a chair with arms (e.g., wheelchair, bedside commode, etc.): 3  Moving from lying on back to sitting on the side of the bed?: 4  Moving to and from a bed to a chair (including a wheelchair)?: 3  Need to walk in hospital room?: 3  Climbing 3-5 steps with a railing?: 3  Basic Mobility Total Score: 20       Therapeutic Activities and Exercises:   Bilateral LE exercises 3 x 10 reps: Ankle pumps, quad sets, long arc quad, heel slides, hip abduction/adduction, straight leg raise    Gait per above      Patient left up in chair with all lines intact, call button in reach, COLTON Sesay notified and staff from outside facility present..    GOALS:   Multidisciplinary Problems     Physical Therapy Goals        Problem: Physical Therapy Goal    Goal Priority Disciplines Outcome Goal Variances Interventions   Physical Therapy Goal     PT, PT/OT Ongoing, Progressing     Description: Short Term Goals  Independent with HEP  Independent with cane x 100 feet PWB/WBAT: right lower extremity  Cga supine-sit-stand    Long term goals  Needed equipment for home.   Independent with hip precautsion                        Time Tracking:     PT Received On: 01/14/22  PT Start Time: 0937     PT Stop Time: 1003  PT Total Time (min): 26 min     Billable Minutes: Gait Training 10 minutes and Therapeutic Exercise 15 minutes    Treatment Type: Treatment  PT/PTA: PT     PTA Visit Number: 0     01/14/2022

## 2022-01-15 PROCEDURE — 25000003 PHARM REV CODE 250: Performed by: ORTHOPAEDIC SURGERY

## 2022-01-15 PROCEDURE — 25000003 PHARM REV CODE 250: Performed by: HOSPITALIST

## 2022-01-15 PROCEDURE — 99232 SBSQ HOSP IP/OBS MODERATE 35: CPT | Mod: ,,, | Performed by: FAMILY MEDICINE

## 2022-01-15 PROCEDURE — 11000001 HC ACUTE MED/SURG PRIVATE ROOM

## 2022-01-15 PROCEDURE — 97116 GAIT TRAINING THERAPY: CPT

## 2022-01-15 PROCEDURE — 99232 PR SUBSEQUENT HOSPITAL CARE,LEVL II: ICD-10-PCS | Mod: ,,, | Performed by: FAMILY MEDICINE

## 2022-01-15 PROCEDURE — 25000003 PHARM REV CODE 250: Performed by: REGISTERED NURSE

## 2022-01-15 PROCEDURE — 25000003 PHARM REV CODE 250: Performed by: NURSE PRACTITIONER

## 2022-01-15 PROCEDURE — 97110 THERAPEUTIC EXERCISES: CPT

## 2022-01-15 RX ADMIN — ASPIRIN 81 MG: 81 TABLET, COATED ORAL at 09:01

## 2022-01-15 RX ADMIN — HYDROCODONE BITARTRATE AND ACETAMINOPHEN 1 TABLET: 5; 325 TABLET ORAL at 09:01

## 2022-01-15 RX ADMIN — THERA TABS 1 TABLET: TAB at 09:01

## 2022-01-15 RX ADMIN — HYDROCODONE BITARTRATE AND ACETAMINOPHEN 1 TABLET: 5; 325 TABLET ORAL at 01:01

## 2022-01-15 RX ADMIN — LISINOPRIL 5 MG: 5 TABLET ORAL at 09:01

## 2022-01-15 RX ADMIN — PANTOPRAZOLE SODIUM 40 MG: 40 TABLET, DELAYED RELEASE ORAL at 09:01

## 2022-01-15 RX ADMIN — METOPROLOL TARTRATE 12.5 MG: 25 TABLET, FILM COATED ORAL at 09:01

## 2022-01-15 RX ADMIN — HYDROCODONE BITARTRATE AND ACETAMINOPHEN 1 TABLET: 5; 325 TABLET ORAL at 05:01

## 2022-01-15 RX ADMIN — APIXABAN 2.5 MG: 2.5 TABLET, FILM COATED ORAL at 09:01

## 2022-01-15 RX ADMIN — THIAMINE HCL TAB 100 MG 100 MG: 100 TAB at 09:01

## 2022-01-15 RX ADMIN — LORAZEPAM 0.5 MG: 0.5 TABLET ORAL at 09:01

## 2022-01-15 RX ADMIN — ATORVASTATIN CALCIUM 40 MG: 20 TABLET, FILM COATED ORAL at 09:01

## 2022-01-15 RX ADMIN — Medication 1000 UNITS: at 09:01

## 2022-01-15 RX ADMIN — FOLIC ACID 1 MG: 1 TABLET ORAL at 09:01

## 2022-01-15 NOTE — PT/OT/SLP PROGRESS
Physical Therapy Treatment    Patient Name:  Karin Carrera   MRN:  [id number]    Recommendations:     Discharge Recommendations:  intermediate care facility/nursing home,rehabilitation facility,nursing facility, skilled   Discharge Equipment Recommendations: none (axillary crutch issued for pt use)   Barriers to discharge: awaiting swing bed vs 1 month nursing home placement    Assessment:     Karin Carrera is a 51 y.o. male admitted with a medical diagnosis of Closed fracture of right hip.  He presents with the following impairments/functional limitations:  impaired functional mobilty,gait instability,impaired balance,weakness,impaired endurance,impaired cognition,decreased upper extremity function,decreased lower extremity function,decreased safety awareness,pain,abnormal tone,orthopedic precautions .    Patient improving with gait quality and independence using a single axillary crutch. Patient has been living in his van and will require swing bed until rehab completed    Rehab Prognosis: Good; patient would benefit from acute skilled PT services to address these deficits and reach maximum level of function.    Recent Surgery: Procedure(s) (LRB):  HEMIARTHROPLASTY, HIP (Right) 13 Days Post-Op    Plan:     During this hospitalization, patient to be seen BID (5x/week; daily 2x/week) to address the identified rehab impairments via gait training,therapeutic activities,therapeutic exercises and progress toward the following goals:    · Plan of Care Expires:  02/03/22    Subjective     Chief Complaint: right hip fracture with THR   Patient/Family Comments/goals: Pt agreeable to PT interventions.  Pain/Comfort:  · Pain Rating 1: 10/10 (per pt report but pt found sitting EOB with no visible signs of pain)  · Location - Side 1: Right  · Location 1: hip  · Pain Addressed 1: Distraction  · Pain Rating Post-Intervention 1: 10/10 (per pt report. 2/10 Juanito Caldwell)      Objective:     Communicated with COLTON [address] prior to session.  Patient found sitting on the side of the bed with peripheral IV upon PT entry to room.     General Precautions: Standard, fall   Orthopedic Precautions:RLE anterior precautions,RLE weight bearing as tolerated   Braces: N/A  Respiratory Status: Room air     Functional Mobility:  · Transfers:     · Sit to Stand:  contact guard assistance with single axillary crutch  · Bed to Chair: contact guard assistance with  single axillary crutch  using  Step Transfer  · Gait: 100' x 2 trials with standing rest break, WBAT R LE, min A decreasing to CGA, increased effort to advance right LE, slow oniel, moderate SOB/INFANTE and c/o fatigue with longer gait trial; verbal cues for safety      AM-PAC 6 CLICK MOBILITY          Therapeutic Activities and Exercises:   gait and t/f as above  LAQ x 30 AAROM    Patient left up in chair with all lines intact, call button in reach and RN Violetta Sesay notified..    GOALS:   Multidisciplinary Problems     Physical Therapy Goals        Problem: Physical Therapy Goal    Goal Priority Disciplines Outcome Goal Variances Interventions   Physical Therapy Goal     PT, PT/OT Ongoing, Progressing     Description: Short Term Goals  Independent with HEP  Independent with cane x 100 feet PWB/WBAT: right lower extremity  Cga supine-sit-stand    Long term goals  Needed equipment for home.   Independent with hip precautsion                       Time Tracking:     PT Received On: 01/15/22  PT Start Time: 0946     PT Stop Time: 1011  PT Total Time (min): 25 min     Billable Minutes: Gait Training 15 minutes    Treatment Type: Treatment  PT/PTA: PT     PTA Visit Number: 0     01/15/2022

## 2022-01-15 NOTE — PROGRESS NOTES
[facility]  [facility] Medicine  Progress Note    Patient Name: Karin Carrera  MRN: [id number]  Patient Class: IP- Inpatient   Admission Date: 1/1/2022  Length of Stay: 14 days  Attending Physician: Walker Anaya Jr., MD  Primary Care Provider: Walker Smith MD        Subjective:     Principal Problem:Closed fracture of right hip        HPI:  Mr Carrera is a 52 yo male who presented to the ED via EMS for c/o right leg pain after a fall. Per EMS and ED staff, he was picked up from his vehicle outside of a friend's home. He states that he lives in his vehicle. It was noted that there were numerous liquor bottles in the vehicle and around him. He was combative with EMS and has been uncooperative with much of his interview during his stay in the ED. He uses profanity easily but is aphasic during much of the ROS and interview. He will use his cell phone to type out some answers. He is a poor historian due to this and his obvious drunken state. He denies any chest pain, shortness of breath, abdominal pain, F/C, N/V/D, or changes in vision. He has right sided hemiparesis as a results of his previous CVA- however still drives. Spoke with his brother, Silver, who provided no new information- he has not seen his brother since the day before yesterday. He does state that his brother does not live with him because of the alcohol use and other private reasons. Upon workup, it was noted that he had a right femoral neck fracture. His ETOH level was 188. He is being admitted to hospitalist services with a consult to orthopaedics for surgical intervention.     He has a PMH of CABG, CVA, HTN, GERD, ETOH abuse, and nicotine abuse. It is unclear when his CABG or CVA was; unable to find dates in previous records. It is unknown if he has had covid or been vaccinated. He follows with Dr Smith for primary care. He does not follow with anyone for cardiology. Per patient, he wishes to be a full code.     Brother-  [facility] [phone]       Overview/Hospital Course:  01/02 - patient seems in less pain but obviously still sore.  He is calm and cooperative.  No new issues reported.  Seen preoperatively.  As previous addressed okay with me to proceed as planned; no medical reason to delay.  Watch for alcohol withdrawals postoperatively even though patient denies daily alcohol use.  Look into placement needs.  Dr. Goodman to assume care patient for hospitalist in the a.m..  1/3: NAEO; some pain at R hip; PT/OT to see; SS to assist with placement; continue on DT prevention- pt did initially state he drank daily and is now saying he drinks every other day  1/4: NAEO; no s/s of withdrawals; asking for snuff; PT following- having some issues with spasticity-- baclofen started; continue DT prevention; SS following for SWB   1/6: Echo EF 20%. Awaiting placement. Pt refuse NH.  1/7:Norvasc stopped.Lisinopril 2.5 mg.Lopressor 12.5 mg bid.Cardiologist appt new pt at [facility].  1/11: 1/11 po day #8. Continues to work with PT- pt agreeable to snf for and rehab.SS following    1/12: post op day #9; continue to work well with PT- SS with referrals to numerous places- awaiting placement  1/13: NAEO; post op day 10; continues to work with ; shirlene to evaluate for placement   1/14: NAEO; post day 11; awaiting placement-- numerous referrals made by SS; continues with PT   1/15: pt refusing labs and VS; caden to [facility] yesterday-- denied; SS making other referrals-- placement is an ongoing issue       Interval History: Pt resting at side of bed. C/o pain to R hip. Denies any CP, SOB, abd pain. Asking for snuff as he is out. Discussed his refusal for labs and VS- states he will think about it.     Review of Systems   Constitutional: Positive for activity change. Negative for appetite change, chills and fever.   Respiratory: Negative for cough, chest tightness and shortness of breath.    Cardiovascular: Negative for chest pain  and palpitations.   Neurological: Negative for light-headedness.   All other systems reviewed and are negative.    Objective:     Vital Signs (Most Recent):  Temp:  (pt refuse mn vitals) (01/15/22 0000)  Pulse: 89 (01/14/22 1930)  Resp: 18 (01/15/22 0944)  BP:  (patiet refused) (01/15/22 0400)  SpO2: 96 % (01/14/22 1930) Vital Signs (24h Range):  Temp:  [98.2 °F (36.8 °C)-98.8 °F (37.1 °C)] 98.2 °F (36.8 °C)  Pulse:  [86-91] 89  Resp:  [18] 18  SpO2:  [96 %-99 %] 96 %  BP: (114-125)/(62-70) 125/68     Weight: 89.9 kg (198 lb 3.1 oz)  Body mass index is 26.15 kg/m².    Intake/Output Summary (Last 24 hours) at 1/15/2022 1015  Last data filed at 1/15/2022 0944  Gross per 24 hour   Intake --   Output 1970 ml   Net -1970 ml      Physical Exam  Vitals and nursing note reviewed.   Constitutional:       General: He is not in acute distress.  HENT:      Head: Normocephalic and atraumatic.      Mouth/Throat:      Mouth: Mucous membranes are moist.   Eyes:      Extraocular Movements: Extraocular movements intact.      Pupils: Pupils are equal, round, and reactive to light.   Cardiovascular:      Rate and Rhythm: Normal rate and regular rhythm.      Pulses: Normal pulses.   Pulmonary:      Effort: Pulmonary effort is normal. No respiratory distress.      Breath sounds: Normal breath sounds. No wheezing.   Abdominal:      General: Bowel sounds are normal. There is no distension.      Tenderness: There is no abdominal tenderness.   Musculoskeletal:         General: Tenderness present.      Cervical back: Normal range of motion and neck supple.        Legs:       Comments:  R hip with surgical incision    Skin:     General: Skin is warm.      Capillary Refill: Capillary refill takes less than 2 seconds.   Neurological:      Mental Status: He is alert and easily aroused. Mental status is at baseline.      GCS: GCS eye subscore is 4. GCS verbal subscore is 4. GCS motor subscore is 6.      Cranial Nerves: Cranial nerves are intact.       Sensory: Sensation is intact.      Motor: Weakness present.      Comments: Dense right-sided hemiparesis and expressive aphasia in this right-handed patient with old CVA   Psychiatric:         Mood and Affect: Mood normal.         Behavior: Behavior is uncooperative and agitated.         Significant Labs:   All pertinent labs within the past 24 hours have been reviewed.  Recent Lab Results       01/14/22  1650        POC Glucose 161             Significant Imaging: I have reviewed all pertinent imaging results/findings within the past 24 hours.      Assessment/Plan:      * Closed fracture of right hip  - acute  - R hip xr with femoral neck fracture superimposed on chronic changes  - ortho consult-- plans for surgical intervention tomorrow  - EKG with SR with incomplete R BBB  - CXR with cardiomegaly and chronic vascular congestion  - plans for surgical intervention tomorrow  - PT/OT/SS consulted for post op assistance  - heparin SQ x 1 dose tonight   - AC after surgery per surgery preference   - ALMEIDA score of 0.9% ALISA risk-- risk of MI or cardiac arrest intra op or up to 30 days post op  - RCRI score of 2 points; class III risk; 10.1% 30 day risk of death, MI, or cardiac arrest     Perioperative Risk Assessment:  Patient is at intermediate risk for perioperative cardiopulmonary complications.  This based on lack of thorough history, hx of CVA and CABG. However risk are stable and see no medical benefit in delaying surgery from medical standpoint.    Surgery 01/02    1/3  - stable post op  - hemovac in place  - PT/OT to see  - SS following for SWB assistance   1/4  - hemovac to be pulled today  - PT following  - baclofen added to spasticity   1/6  - PO day#4  - PT/OT  - SWB placement per SS  1/7  - po day#5  - dsg d/i  1/8  - po DAY#6  1/9  - po DAY#7  - Continue to attempt placement. PT continues in Hospital. SS consulted for WC and any medical devices needed.  1/10  - po DAY#8  - pt agreeable to snf for pt  and rehab.SS following  1/11  - po day #8. Continues to work with PT  - pt agreeable to snf for pt and rehab.SS following  1/12  - pending placement  - continue current POC   1/15  - continue current POC  - continue working on placement       Acute on chronic combined systolic and diastolic heart failure        1/6/22  · The left ventricle is severely enlarged with mild eccentric hypertrophy and severely decreased systolic function.  · The estimated ejection fraction is 20%.  · There are segmental left ventricular wall motion abnormalities.  · Left ventricular diastolic dysfunction.  · Atrial fibrillation not observed.  · Normal right ventricular size.  · Mild mitral regurgitation.  · Normal central venous pressure (3 mmHg).  1/7   Lisinopril 2.5 mg  - Lopressor 12.5 mg bid  1/7  - Norvasc stopped  - Lisinopril 2.5 mg  - Lopressor 12.5 mg bid  - Cardiologist appt new pt at [facility].  1/8  - Lisinopril increased 5 mg        ETOH abuse  - chronic  - unclear amount pt does partake in daily   - drinks liquor instead of beer  - monitor for DTs   - banana bag daily- MVI, thiamine, and folic acid  - ativan PO scheduled to defer DTs   - ativan IV for breakthrough symptoms   1/4  - no s/s of withdrawals   1/5  - - no s/s of DT'S  1/12  - wean ativan scheduled           Hx of CABG  - chronic, stable  - continue current meds  - cardiac monitoring   - EKG with SR with incomplete RBBB      GERD (gastroesophageal reflux disease)  - chronic, stable  - continue current meds       Hypertension  - chronic, stable  - continue current home meds  - monitor BP trend  - adjust meds as needed   1/4  - stable   1/7  - Norvasc stopped  - Lisinopril 2.5 mg  - Lopressor 12.5 mg bid  1/9  - BP stable  - Lisinopril increased to 5 mg daily  1/12  - stable     Right sided weakness  - chronic, stable  - hx of CVA with R sided hemiparesis   -patient is right-handed  - continue statin   1/4  - PT following  - plans for SWB   1/8  - Pt has mobility  limitation that significantly impairs his MRADL'S including his ability for toileting and bathing at home.Limitations cannot be resolved with use of a cane or walker due to increased risk of falling. A wheelchair will improve the pt's ability to perform MRADL'S. Pt is willing to and able to propel in wheelchair.  1/12  - stable       VTE Risk Mitigation (From admission, onward)         Ordered     apixaban tablet 2.5 mg  2 times daily         01/02/22 1916     IP VTE HIGH RISK PATIENT  Once         01/02/22 1916     Place SAGE hose  Until discontinued         01/02/22 1916     Place sequential compression device  Until discontinued         01/02/22 1916     Place sequential compression device  Until discontinued         01/01/22 1707                Discharge Planning   DEANNA:      Code Status: Full Code   Is the patient medically ready for discharge?:     Reason for patient still in hospital (select all that apply): Treatment  Discharge Plan A: Rehab (Jaden Faustin)          ISADORA Link  Department of Hospital Medicine   [facility] - Orthopedic

## 2022-01-15 NOTE — PLAN OF CARE
Problem: Adult Inpatient Plan of Care  Goal: Plan of Care Review  Outcome: Ongoing, Progressing  Goal: Patient-Specific Goal (Individualized)  Outcome: Ongoing, Progressing  Goal: Absence of Hospital-Acquired Illness or Injury  Outcome: Ongoing, Progressing  Intervention: Prevent Skin Injury  Flowsheets (Taken 1/14/2022 1803)  Skin Protection: skin-to-skin areas padded  Intervention: Prevent and Manage VTE (Venous Thromboembolism) Risk  Flowsheets (Taken 1/14/2022 1803)  Range of Motion: active ROM (range of motion) encouraged  Goal: Optimal Comfort and Wellbeing  Outcome: Ongoing, Progressing  Intervention: Provide Person-Centered Care  Flowsheets (Taken 1/14/2022 1803)  Trust Relationship/Rapport:   choices provided   emotional support provided   empathic listening provided   questions answered   reassurance provided   questions encouraged   care explained   thoughts/feelings acknowledged  Goal: Readiness for Transition of Care  Outcome: Ongoing, Progressing     Problem: Skin Injury Risk Increased  Goal: Skin Health and Integrity  Outcome: Ongoing, Progressing  Intervention: Optimize Skin Protection  Flowsheets (Taken 1/14/2022 1803)  Pressure Reduction Devices: pressure-redistributing mattress utilized  Skin Protection: skin-to-skin areas padded  Intervention: Promote and Optimize Oral Intake  Flowsheets (Taken 1/14/2022 1803)  Oral Nutrition Promotion:   safe use of adaptive equipment encouraged   calorie-dense foods provided   rest periods promoted   medicated     Problem: Infection  Goal: Absence of Infection Signs and Symptoms  Outcome: Ongoing, Progressing  Intervention: Prevent or Manage Infection  Flowsheets (Taken 1/14/2022 1803)  Fever Reduction/Comfort Measures: fluid intake increased  Infection Management: aseptic technique maintained     Problem: Fall Injury Risk  Goal: Absence of Fall and Fall-Related Injury  Outcome: Ongoing, Progressing  Intervention: Identify and Manage Contributors  Flowsheets  (Taken 1/14/2022 1803)  Self-Care Promotion:   independence encouraged   BADL personal objects within reach  Medication Review/Management: medications reviewed

## 2022-01-15 NOTE — SUBJECTIVE & OBJECTIVE
Interval History: Pt resting at side of bed. C/o pain to R hip. Denies any CP, SOB, abd pain. Asking for snuff as he is out. Discussed his refusal for labs and VS- states he will think about it.     Review of Systems   Constitutional: Positive for activity change. Negative for appetite change, chills and fever.   Respiratory: Negative for cough, chest tightness and shortness of breath.    Cardiovascular: Negative for chest pain and palpitations.   Neurological: Negative for light-headedness.   All other systems reviewed and are negative.    Objective:     Vital Signs (Most Recent):  Temp:  (pt refuse mn vitals) (01/15/22 0000)  Pulse: 89 (01/14/22 1930)  Resp: 18 (01/15/22 0944)  BP:  (patiet refused) (01/15/22 0400)  SpO2: 96 % (01/14/22 1930) Vital Signs (24h Range):  Temp:  [98.2 °F (36.8 °C)-98.8 °F (37.1 °C)] 98.2 °F (36.8 °C)  Pulse:  [86-91] 89  Resp:  [18] 18  SpO2:  [96 %-99 %] 96 %  BP: (114-125)/(62-70) 125/68     Weight: 89.9 kg (198 lb 3.1 oz)  Body mass index is 26.15 kg/m².    Intake/Output Summary (Last 24 hours) at 1/15/2022 1015  Last data filed at 1/15/2022 0944  Gross per 24 hour   Intake --   Output 1970 ml   Net -1970 ml      Physical Exam  Vitals and nursing note reviewed.   Constitutional:       General: He is not in acute distress.  HENT:      Head: Normocephalic and atraumatic.      Mouth/Throat:      Mouth: Mucous membranes are moist.   Eyes:      Extraocular Movements: Extraocular movements intact.      Pupils: Pupils are equal, round, and reactive to light.   Cardiovascular:      Rate and Rhythm: Normal rate and regular rhythm.      Pulses: Normal pulses.   Pulmonary:      Effort: Pulmonary effort is normal. No respiratory distress.      Breath sounds: Normal breath sounds. No wheezing.   Abdominal:      General: Bowel sounds are normal. There is no distension.      Tenderness: There is no abdominal tenderness.   Musculoskeletal:         General: Tenderness present.      Cervical back:  Normal range of motion and neck supple.        Legs:       Comments:  R hip with surgical incision    Skin:     General: Skin is warm.      Capillary Refill: Capillary refill takes less than 2 seconds.   Neurological:      Mental Status: He is alert and easily aroused. Mental status is at baseline.      GCS: GCS eye subscore is 4. GCS verbal subscore is 4. GCS motor subscore is 6.      Cranial Nerves: Cranial nerves are intact.      Sensory: Sensation is intact.      Motor: Weakness present.      Comments: Dense right-sided hemiparesis and expressive aphasia in this right-handed patient with old CVA   Psychiatric:         Mood and Affect: Mood normal.         Behavior: Behavior is uncooperative and agitated.         Significant Labs:   All pertinent labs within the past 24 hours have been reviewed.  Recent Lab Results       01/14/22  1650        POC Glucose 161             Significant Imaging: I have reviewed all pertinent imaging results/findings within the past 24 hours.

## 2022-01-15 NOTE — ASSESSMENT & PLAN NOTE
- acute  - R hip xr with femoral neck fracture superimposed on chronic changes  - ortho consult-- plans for surgical intervention tomorrow  - EKG with SR with incomplete R BBB  - CXR with cardiomegaly and chronic vascular congestion  - plans for surgical intervention tomorrow  - PT/OT/SS consulted for post op assistance  - heparin SQ x 1 dose tonight   - AC after surgery per surgery preference   - ALMEIDA score of 0.9% ALISA risk-- risk of MI or cardiac arrest intra op or up to 30 days post op  - RCRI score of 2 points; class III risk; 10.1% 30 day risk of death, MI, or cardiac arrest     Perioperative Risk Assessment:  Patient is at intermediate risk for perioperative cardiopulmonary complications.  This based on lack of thorough history, hx of CVA and CABG. However risk are stable and see no medical benefit in delaying surgery from medical standpoint.    Surgery 01/02    1/3  - stable post op  - hemovac in place  - PT/OT to see  - SS following for SWB assistance   1/4  - hemovac to be pulled today  - PT following  - baclofen added to spasticity   1/6  - PO day#4  - PT/OT  - SWB placement per SS  1/7  - po day#5  - dsg d/i  1/8  - po DAY#6  1/9  - po DAY#7  - Continue to attempt placement. PT continues in Hospital. SS consulted for WC and any medical devices needed.  1/10  - po DAY#8  - pt agreeable to snf for pt and rehab.SS following  1/11  - po day #8. Continues to work with PT  - pt agreeable to snf for pt and rehab.SS following  1/12  - pending placement  - continue current POC   1/15  - continue current POC  - continue working on placement

## 2022-01-16 PROCEDURE — 25000003 PHARM REV CODE 250: Performed by: ORTHOPAEDIC SURGERY

## 2022-01-16 PROCEDURE — 97116 GAIT TRAINING THERAPY: CPT

## 2022-01-16 PROCEDURE — 97110 THERAPEUTIC EXERCISES: CPT

## 2022-01-16 PROCEDURE — 25000003 PHARM REV CODE 250: Performed by: HOSPITALIST

## 2022-01-16 PROCEDURE — 25000003 PHARM REV CODE 250: Performed by: REGISTERED NURSE

## 2022-01-16 PROCEDURE — 11000001 HC ACUTE MED/SURG PRIVATE ROOM

## 2022-01-16 PROCEDURE — 25000003 PHARM REV CODE 250: Performed by: NURSE PRACTITIONER

## 2022-01-16 PROCEDURE — 99232 SBSQ HOSP IP/OBS MODERATE 35: CPT | Mod: ,,, | Performed by: FAMILY MEDICINE

## 2022-01-16 PROCEDURE — 99232 PR SUBSEQUENT HOSPITAL CARE,LEVL II: ICD-10-PCS | Mod: ,,, | Performed by: FAMILY MEDICINE

## 2022-01-16 RX ADMIN — THERA TABS 1 TABLET: TAB at 10:01

## 2022-01-16 RX ADMIN — APIXABAN 2.5 MG: 2.5 TABLET, FILM COATED ORAL at 09:01

## 2022-01-16 RX ADMIN — ATORVASTATIN CALCIUM 40 MG: 20 TABLET, FILM COATED ORAL at 09:01

## 2022-01-16 RX ADMIN — PANTOPRAZOLE SODIUM 40 MG: 40 TABLET, DELAYED RELEASE ORAL at 10:01

## 2022-01-16 RX ADMIN — Medication 1000 UNITS: at 10:01

## 2022-01-16 RX ADMIN — FOLIC ACID 1 MG: 1 TABLET ORAL at 10:01

## 2022-01-16 RX ADMIN — METOPROLOL TARTRATE 12.5 MG: 25 TABLET, FILM COATED ORAL at 10:01

## 2022-01-16 RX ADMIN — HYDROCODONE BITARTRATE AND ACETAMINOPHEN 1 TABLET: 5; 325 TABLET ORAL at 06:01

## 2022-01-16 RX ADMIN — HYDROCODONE BITARTRATE AND ACETAMINOPHEN 1 TABLET: 5; 325 TABLET ORAL at 09:01

## 2022-01-16 RX ADMIN — THIAMINE HCL TAB 100 MG 100 MG: 100 TAB at 10:01

## 2022-01-16 RX ADMIN — ASPIRIN 81 MG: 81 TABLET, COATED ORAL at 10:01

## 2022-01-16 RX ADMIN — LISINOPRIL 5 MG: 5 TABLET ORAL at 10:01

## 2022-01-16 RX ADMIN — HYDROCODONE BITARTRATE AND ACETAMINOPHEN 1 TABLET: 5; 325 TABLET ORAL at 12:01

## 2022-01-16 RX ADMIN — APIXABAN 2.5 MG: 2.5 TABLET, FILM COATED ORAL at 10:01

## 2022-01-16 RX ADMIN — METOPROLOL TARTRATE 12.5 MG: 25 TABLET, FILM COATED ORAL at 09:01

## 2022-01-16 RX ADMIN — HYDROCODONE BITARTRATE AND ACETAMINOPHEN 1 TABLET: 5; 325 TABLET ORAL at 05:01

## 2022-01-16 RX ADMIN — LORAZEPAM 0.5 MG: 0.5 TABLET ORAL at 10:01

## 2022-01-16 NOTE — PROGRESS NOTES
[facility]  [facility]  Progress Note    Patient Name: Karin Carrera  MRN: [id number]  Patient Class: IP- Inpatient   Admission Date: 1/1/2022  Length of Stay: 15 days  Attending Physician: Walker Anaya Jr., MD  Primary Care Provider: Walker Smith MD        Subjective:     Principal Problem:Closed fracture of right hip        HPI:  Mr Carrera is a 50 yo male who presented to the ED via EMS for c/o right leg pain after a fall. Per EMS and ED staff, he was picked up from his vehicle outside of a friend's home. He states that he lives in his vehicle. It was noted that there were numerous liquor bottles in the vehicle and around him. He was combative with EMS and has been uncooperative with much of his interview during his stay in the ED. He uses profanity easily but is aphasic during much of the ROS and interview. He will use his cell phone to type out some answers. He is a poor historian due to this and his obvious drunken state. He denies any chest pain, shortness of breath, abdominal pain, F/C, N/V/D, or changes in vision. He has right sided hemiparesis as a results of his previous CVA- however still drives. Spoke with his brother, Silver, who provided no new information- he has not seen his brother since the day before yesterday. He does state that his brother does not live with him because of the alcohol use and other private reasons. Upon workup, it was noted that he had a right femoral neck fracture. His ETOH level was 188. He is being admitted to hospitalist services with a consult to orthopaedics for surgical intervention.     He has a PMH of CABG, CVA, HTN, GERD, ETOH abuse, and nicotine abuse. It is unclear when his CABG or CVA was; unable to find dates in previous records. It is unknown if he has had covid or been vaccinated. He follows with Dr Smith for primary care. He does not follow with anyone for cardiology. Per patient, he wishes to be a full code.     Brother-  [facility] [phone]       Overview/Hospital Course:  01/02 - patient seems in less pain but obviously still sore.  He is calm and cooperative.  No new issues reported.  Seen preoperatively.  As previous addressed okay with me to proceed as planned; no medical reason to delay.  Watch for alcohol withdrawals postoperatively even though patient denies daily alcohol use.  Look into placement needs.  Dr. Goodman to assume care patient for hospitalist in the a.m..  1/3: NAEO; some pain at R hip; PT/OT to see; SS to assist with placement; continue on DT prevention- pt did initially state he drank daily and is now saying he drinks every other day  1/4: NAEO; no s/s of withdrawals; asking for snuff; PT following- having some issues with spasticity-- baclofen started; continue DT prevention; SS following for SWB   1/6: Echo EF 20%. Awaiting placement. Pt refuse NH.  1/7:Norvasc stopped.Lisinopril 2.5 mg.Lopressor 12.5 mg bid.Cardiologist appt new pt at [place].  1/11: 1/11 po day #8. Continues to work with PT- pt agreeable to snf for and rehab.SS following    1/12: post op day #9; continue to work well with PT- SS with referrals to numerous places- awaiting placement  1/13: NAEO; post op day 10; continues to work with BRITNEY; shirlene to evaluate for placement   1/14: NAEO; post day 11; awaiting placement-- numerous referrals made by SS; continues with PT   1/15: pt refusing labs and VS; caden to [facility] for [facility] yesterday-- denied; SS making other referrals-- placement is an ongoing issue   1/16: NAEO; continue to attempt to find placement; continue working with PT       Interval History: Pt resting on side of bed. Denies any needs or complaints. Denies any CP, SOB, abd pain. Does have some soreness to R hip- as expected. VSS, afebrile.     Review of Systems   Constitutional: Positive for activity change. Negative for appetite change, chills and fever.   Respiratory: Negative for cough, chest tightness and shortness  of breath.    Cardiovascular: Negative for chest pain and palpitations.   Neurological: Negative for light-headedness.   All other systems reviewed and are negative.    Objective:     Vital Signs (Most Recent):  Temp: 98.5 °F (36.9 °C) (01/16/22 1200)  Pulse: 97 (01/16/22 1200)  Resp: 18 (01/16/22 1233)  BP: (!) 110/54 (01/16/22 1200)  SpO2: 99 % (01/16/22 1200) Vital Signs (24h Range):  Temp:  [97.2 °F (36.2 °C)-98.5 °F (36.9 °C)] 98.5 °F (36.9 °C)  Pulse:  [78-97] 97  Resp:  [16-18] 18  SpO2:  [98 %-99 %] 99 %  BP: (110-140)/(54-69) 110/54     Weight: 89.9 kg (198 lb 3.1 oz)  Body mass index is 26.15 kg/m².    Intake/Output Summary (Last 24 hours) at 1/16/2022 1313  Last data filed at 1/16/2022 1000  Gross per 24 hour   Intake --   Output 1150 ml   Net -1150 ml      Physical Exam  Vitals and nursing note reviewed.   Constitutional:       General: He is not in acute distress.  HENT:      Head: Normocephalic and atraumatic.      Mouth/Throat:      Mouth: Mucous membranes are moist.   Eyes:      Pupils: Pupils are equal, round, and reactive to light.   Cardiovascular:      Rate and Rhythm: Normal rate and regular rhythm.      Pulses: Normal pulses.   Pulmonary:      Effort: Pulmonary effort is normal. No respiratory distress.      Breath sounds: Normal breath sounds. No wheezing.   Abdominal:      General: Bowel sounds are normal. There is no distension.      Tenderness: There is no abdominal tenderness.   Musculoskeletal:         General: Tenderness present.      Cervical back: Normal range of motion and neck supple.        Legs:       Comments:  R hip with surgical incision    Skin:     General: Skin is warm.      Capillary Refill: Capillary refill takes less than 2 seconds.   Neurological:      Mental Status: He is alert and easily aroused. Mental status is at baseline.      GCS: GCS eye subscore is 4. GCS verbal subscore is 4. GCS motor subscore is 6.      Cranial Nerves: Cranial nerves are intact.      Sensory:  Sensation is intact.      Motor: Weakness present.      Comments: Dense right-sided hemiparesis and expressive aphasia in this right-handed patient with old CVA   Psychiatric:         Mood and Affect: Mood normal.      Comments: Remains easily agitated-- baseline          Significant Labs:   All pertinent labs within the past 24 hours have been reviewed.  Recent Lab Results     None          Significant Imaging: I have reviewed all pertinent imaging results/findings within the past 24 hours.      Assessment/Plan:      * Closed fracture of right hip  - acute  - R hip xr with femoral neck fracture superimposed on chronic changes  - ortho consult-- plans for surgical intervention tomorrow  - EKG with SR with incomplete R BBB  - CXR with cardiomegaly and chronic vascular congestion  - plans for surgical intervention tomorrow  - PT/OT/SS consulted for post op assistance  - heparin SQ x 1 dose tonight   - AC after surgery per surgery preference   - ALEMIDA score of 0.9% ALISA risk-- risk of MI or cardiac arrest intra op or up to 30 days post op  - RCRI score of 2 points; class III risk; 10.1% 30 day risk of death, MI, or cardiac arrest     Perioperative Risk Assessment:  Patient is at intermediate risk for perioperative cardiopulmonary complications.  This based on lack of thorough history, hx of CVA and CABG. However risk are stable and see no medical benefit in delaying surgery from medical standpoint.    Surgery 01/02    1/3  - stable post op  - hemovac in place  - PT/OT to see  - SS following for SWB assistance   1/4  - hemovac to be pulled today  - PT following  - baclofen added to spasticity   1/6  - PO day#4  - PT/OT  - SWB placement per SS  1/7  - po day#5  - dsg d/i  1/8  - po DAY#6  1/9  - po DAY#7  - Continue to attempt placement. PT continues in Hospital. SS consulted for WC and any medical devices needed.  1/10  - po DAY#8  - pt agreeable to snf for pt and rehab.SS following  1/11  - po day #8. Continues to work  with PT  - pt agreeable to snf for pt and rehab.SS following  1/12  - pending placement  - continue current POC   1/15  - continue current POC  - continue working on placement       Acute on chronic combined systolic and diastolic heart failure        1/6/22  · The left ventricle is severely enlarged with mild eccentric hypertrophy and severely decreased systolic function.  · The estimated ejection fraction is 20%.  · There are segmental left ventricular wall motion abnormalities.  · Left ventricular diastolic dysfunction.  · Atrial fibrillation not observed.  · Normal right ventricular size.  · Mild mitral regurgitation.  · Normal central venous pressure (3 mmHg).  1/7   Lisinopril 2.5 mg  - Lopressor 12.5 mg bid  1/7  - Norvasc stopped  - Lisinopril 2.5 mg  - Lopressor 12.5 mg bid  - Cardiologist appt new pt at [place].  1/8  - Lisinopril increased 5 mg        ETOH abuse  - chronic  - unclear amount pt does partake in daily   - drinks liquor instead of beer  - monitor for DTs   - banana bag daily- MVI, thiamine, and folic acid  - ativan PO scheduled to defer DTs   - ativan IV for breakthrough symptoms   1/4  - no s/s of withdrawals   1/5  - - no s/s of DT'S  1/12  - wean ativan scheduled           Hx of CABG  - chronic, stable  - continue current meds  - cardiac monitoring   - EKG with SR with incomplete RBBB      GERD (gastroesophageal reflux disease)  - chronic, stable  - continue current meds       Hypertension  - chronic, stable  - continue current home meds  - monitor BP trend  - adjust meds as needed   1/4  - stable   1/7  - Norvasc stopped  - Lisinopril 2.5 mg  - Lopressor 12.5 mg bid  1/9  - BP stable  - Lisinopril increased to 5 mg daily  1/12  - stable     Right sided weakness  - chronic, stable  - hx of CVA with R sided hemiparesis   -patient is right-handed  - continue statin   1/4  - PT following  - plans for SWB   1/8  - Pt has mobility limitation that significantly impairs his MRADL'S including his  ability for toileting and bathing at home.Limitations cannot be resolved with use of a cane or walker due to increased risk of falling. A wheelchair will improve the pt's ability to perform MRADL'S. Pt is willing to and able to propel in wheelchair.  1/12  - stable     VTE Risk Mitigation (From admission, onward)         Ordered     apixaban tablet 2.5 mg  2 times daily         01/02/22 1916     IP VTE HIGH RISK PATIENT  Once         01/02/22 1916     Place SAGE hose  Until discontinued         01/02/22 1916     Place sequential compression device  Until discontinued         01/02/22 1916     Place sequential compression device  Until discontinued         01/01/22 1707                Discharge Planning   DEANNA:      Code Status: Full Code   Is the patient medically ready for discharge?:     Reason for patient still in hospital (select all that apply): Treatment  Discharge Plan A: Rehab (Jaden Faustin)        ISADORA Link  Department of Hospital Medicine   [facility] - Orthopedic

## 2022-01-16 NOTE — PT/OT/SLP PROGRESS
Physical Therapy Treatment    Patient Name:  Karin Carrera   MRN:  [id number]    Recommendations:     Discharge Recommendations:  intermediate care facility/nursing home,rehabilitation facility,nursing facility, skilled   Discharge Equipment Recommendations: none (axillary crutch issued for pt use)   Barriers to discharge: awaiting swing bed placement    Assessment:     Karin Carrera is a 51 y.o. male admitted with a medical diagnosis of Closed fracture of right hip.  He presents with the following impairments/functional limitations:  weakness,impaired endurance,impaired self care skills,impaired functional mobilty,gait instability,impaired balance,impaired cognition,decreased lower extremity function,decreased upper extremity function,decreased safety awareness,pain,decreased ROM,abnormal tone,orthopedic precautions,impaired cardiopulmonary response to activity .    Patient improving with functional mobility but continues to c./o significant pain in right hip during exercises and mobility. No LOB during gait trials however mild SOB/INFANTE and standing rest breaks required to complete same.    Rehab Prognosis: Good; patient would benefit from acute skilled PT services to address these deficits and reach maximum level of function.    Recent Surgery: Procedure(s) (LRB):  HEMIARTHROPLASTY, HIP (Right) 14 Days Post-Op    Plan:     During this hospitalization, patient to be seen BID (5x/week; daily 2x/week) to address the identified rehab impairments via gait training,therapeutic activities,therapeutic exercises and progress toward the following goals:    · Plan of Care Expires:  02/03/22    Subjective     Chief Complaint: right hip fracture, THR  Patient/Family Comments/goals: Pt agreeable to PT treatment. Patient jokingly indicating with his car keys that he would like to just drive away.  Pain/Comfort:  · Pain Rating 1: 8/10  · Location - Side 1: Right  · Location 1: hip  · Pain Addressed 1: Pre-medicate for  activity,Reposition,Distraction,Cessation of Activity  · Pain Rating Post-Intervention 1: 8/10      Objective:     Communicated with COLTON Sesay prior to session.  Patient found sitting on the side of the bed with peripheral IV upon PT entry to room.     General Precautions: Standard, fall   Orthopedic Precautions:RLE anterior precautions,RLE weight bearing as tolerated   Braces: N/A  Respiratory Status: Room air     Functional Mobility:  · Bed Mobility:     · Supine to Sit: modified independence  · Sit to Supine: minimum assistance  · Transfers:     · Sit to Stand:  stand by assistance with single axillary crutch, increased time and effort  · Bed to Chair: stand by assistance with  single axillary crutch  using  Step Transfer  · Gait: 100', 50', 50' with standing rest breaks due to fatigue, SBA. Slow oniel, increased effort to advance R LE due to abN tone and pain, mild SOB/INFANTE and pt indicating fatigue post trial      AM-PAC 6 CLICK MOBILITY  Turning over in bed (including adjusting bedclothes, sheets and blankets)?: 4  Sitting down on and standing up from a chair with arms (e.g., wheelchair, bedside commode, etc.): 4  Moving from lying on back to sitting on the side of the bed?: 4  Moving to and from a bed to a chair (including a wheelchair)?: 3  Need to walk in hospital room?: 3  Climbing 3-5 steps with a railing?: 3  Basic Mobility Total Score: 21       Therapeutic Activities and Exercises:   Right lower extremity exercise 3 x 10 reps: ankle pumps, Quad sets, glut sets, heel slides, hip abduction/adduction and straight leg raises with active assist ROM and frequent rest breaks due to pain     Gait and t/f as above    Patient left up in chair with all lines intact, call button in reach and COLTON Sesay notified..    GOALS:   Multidisciplinary Problems     Physical Therapy Goals        Problem: Physical Therapy Goal    Goal Priority Disciplines Outcome Goal Variances Interventions   Physical  Therapy Goal     PT, PT/OT Ongoing, Progressing     Description: Short Term Goals  Independent with HEP  Independent with cane x 100 feet PWB/WBAT: right lower extremity  Cga supine-sit-stand    Long term goals  Needed equipment for home.   Independent with hip precautsion                       Time Tracking:     PT Received On: 01/16/22  PT Start Time: 0952     PT Stop Time: 1017  PT Total Time (min): 25 min     Billable Minutes: Gait Training 15 minutes and Therapeutic Exercise 10 minutes    Treatment Type: Treatment  PT/PTA: PT     PTA Visit Number: 0     01/16/2022

## 2022-01-16 NOTE — SUBJECTIVE & OBJECTIVE
Interval History: Pt resting on side of bed. Denies any needs or complaints. Denies any CP, SOB, abd pain. Does have some soreness to R hip- as expected. VSS, afebrile.     Review of Systems   Constitutional: Positive for activity change. Negative for appetite change, chills and fever.   Respiratory: Negative for cough, chest tightness and shortness of breath.    Cardiovascular: Negative for chest pain and palpitations.   Neurological: Negative for light-headedness.   All other systems reviewed and are negative.    Objective:     Vital Signs (Most Recent):  Temp: 98.5 °F (36.9 °C) (01/16/22 1200)  Pulse: 97 (01/16/22 1200)  Resp: 18 (01/16/22 1233)  BP: (!) 110/54 (01/16/22 1200)  SpO2: 99 % (01/16/22 1200) Vital Signs (24h Range):  Temp:  [97.2 °F (36.2 °C)-98.5 °F (36.9 °C)] 98.5 °F (36.9 °C)  Pulse:  [78-97] 97  Resp:  [16-18] 18  SpO2:  [98 %-99 %] 99 %  BP: (110-140)/(54-69) 110/54     Weight: 89.9 kg (198 lb 3.1 oz)  Body mass index is 26.15 kg/m².    Intake/Output Summary (Last 24 hours) at 1/16/2022 1313  Last data filed at 1/16/2022 1000  Gross per 24 hour   Intake --   Output 1150 ml   Net -1150 ml      Physical Exam  Vitals and nursing note reviewed.   Constitutional:       General: He is not in acute distress.  HENT:      Head: Normocephalic and atraumatic.      Mouth/Throat:      Mouth: Mucous membranes are moist.   Eyes:      Pupils: Pupils are equal, round, and reactive to light.   Cardiovascular:      Rate and Rhythm: Normal rate and regular rhythm.      Pulses: Normal pulses.   Pulmonary:      Effort: Pulmonary effort is normal. No respiratory distress.      Breath sounds: Normal breath sounds. No wheezing.   Abdominal:      General: Bowel sounds are normal. There is no distension.      Tenderness: There is no abdominal tenderness.   Musculoskeletal:         General: Tenderness present.      Cervical back: Normal range of motion and neck supple.        Legs:       Comments:  R hip with surgical  incision    Skin:     General: Skin is warm.      Capillary Refill: Capillary refill takes less than 2 seconds.   Neurological:      Mental Status: He is alert and easily aroused. Mental status is at baseline.      GCS: GCS eye subscore is 4. GCS verbal subscore is 4. GCS motor subscore is 6.      Cranial Nerves: Cranial nerves are intact.      Sensory: Sensation is intact.      Motor: Weakness present.      Comments: Dense right-sided hemiparesis and expressive aphasia in this right-handed patient with old CVA   Psychiatric:         Mood and Affect: Mood normal.      Comments: Remains easily agitated-- baseline          Significant Labs:   All pertinent labs within the past 24 hours have been reviewed.  Recent Lab Results     None          Significant Imaging: I have reviewed all pertinent imaging results/findings within the past 24 hours.

## 2022-01-16 NOTE — ASSESSMENT & PLAN NOTE
- acute  - R hip xr with femoral neck fracture superimposed on chronic changes  - ortho consult-- plans for surgical intervention tomorrow  - EKG with SR with incomplete R BBB  - CXR with cardiomegaly and chronic vascular congestion  - plans for surgical intervention tomorrow  - PT/OT/SS consulted for post op assistance  - heparin SQ x 1 dose tonight   - AC after surgery per surgery preference   - ALMEIDA score of 0.9% AILSA risk-- risk of MI or cardiac arrest intra op or up to 30 days post op  - RCRI score of 2 points; class III risk; 10.1% 30 day risk of death, MI, or cardiac arrest     Perioperative Risk Assessment:  Patient is at intermediate risk for perioperative cardiopulmonary complications.  This based on lack of thorough history, hx of CVA and CABG. However risk are stable and see no medical benefit in delaying surgery from medical standpoint.    Surgery 01/02    1/3  - stable post op  - hemovac in place  - PT/OT to see  - SS following for SWB assistance   1/4  - hemovac to be pulled today  - PT following  - baclofen added to spasticity   1/6  - PO day#4  - PT/OT  - SWB placement per SS  1/7  - po day#5  - dsg d/i  1/8  - po DAY#6  1/9  - po DAY#7  - Continue to attempt placement. PT continues in Hospital. SS consulted for WC and any medical devices needed.  1/10  - po DAY#8  - pt agreeable to snf for pt and rehab.SS following  1/11  - po day #8. Continues to work with PT  - pt agreeable to snf for pt and rehab.SS following  1/12  - pending placement  - continue current POC   1/15  - continue current POC  - continue working on placement

## 2022-01-17 LAB — TB INDURATION 48 - 72 HR READ: 0 0 MM

## 2022-01-17 PROCEDURE — 99232 SBSQ HOSP IP/OBS MODERATE 35: CPT | Mod: ,,, | Performed by: FAMILY MEDICINE

## 2022-01-17 PROCEDURE — 25000003 PHARM REV CODE 250: Performed by: ORTHOPAEDIC SURGERY

## 2022-01-17 PROCEDURE — 97116 GAIT TRAINING THERAPY: CPT

## 2022-01-17 PROCEDURE — 97110 THERAPEUTIC EXERCISES: CPT

## 2022-01-17 PROCEDURE — 11000001 HC ACUTE MED/SURG PRIVATE ROOM

## 2022-01-17 PROCEDURE — 25000003 PHARM REV CODE 250: Performed by: HOSPITALIST

## 2022-01-17 PROCEDURE — 99232 PR SUBSEQUENT HOSPITAL CARE,LEVL II: ICD-10-PCS | Mod: ,,, | Performed by: FAMILY MEDICINE

## 2022-01-17 PROCEDURE — 25000003 PHARM REV CODE 250: Performed by: REGISTERED NURSE

## 2022-01-17 PROCEDURE — 25000003 PHARM REV CODE 250: Performed by: NURSE PRACTITIONER

## 2022-01-17 RX ADMIN — PANTOPRAZOLE SODIUM 40 MG: 40 TABLET, DELAYED RELEASE ORAL at 09:01

## 2022-01-17 RX ADMIN — FOLIC ACID 1 MG: 1 TABLET ORAL at 09:01

## 2022-01-17 RX ADMIN — METOPROLOL TARTRATE 12.5 MG: 25 TABLET, FILM COATED ORAL at 08:01

## 2022-01-17 RX ADMIN — HYDROCODONE BITARTRATE AND ACETAMINOPHEN 1 TABLET: 5; 325 TABLET ORAL at 03:01

## 2022-01-17 RX ADMIN — METOPROLOL TARTRATE 12.5 MG: 25 TABLET, FILM COATED ORAL at 09:01

## 2022-01-17 RX ADMIN — APIXABAN 2.5 MG: 2.5 TABLET, FILM COATED ORAL at 08:01

## 2022-01-17 RX ADMIN — ASPIRIN 81 MG: 81 TABLET, COATED ORAL at 09:01

## 2022-01-17 RX ADMIN — Medication 1000 UNITS: at 09:01

## 2022-01-17 RX ADMIN — LISINOPRIL 5 MG: 5 TABLET ORAL at 09:01

## 2022-01-17 RX ADMIN — HYDROCODONE BITARTRATE AND ACETAMINOPHEN 1 TABLET: 5; 325 TABLET ORAL at 08:01

## 2022-01-17 RX ADMIN — HYDROCODONE BITARTRATE AND ACETAMINOPHEN 1 TABLET: 5; 325 TABLET ORAL at 05:01

## 2022-01-17 RX ADMIN — HYDROCODONE BITARTRATE AND ACETAMINOPHEN 1 TABLET: 5; 325 TABLET ORAL at 01:01

## 2022-01-17 RX ADMIN — APIXABAN 2.5 MG: 2.5 TABLET, FILM COATED ORAL at 09:01

## 2022-01-17 RX ADMIN — HYDROCODONE BITARTRATE AND ACETAMINOPHEN 1 TABLET: 5; 325 TABLET ORAL at 09:01

## 2022-01-17 RX ADMIN — THERA TABS 1 TABLET: TAB at 09:01

## 2022-01-17 RX ADMIN — ATORVASTATIN CALCIUM 40 MG: 20 TABLET, FILM COATED ORAL at 08:01

## 2022-01-17 RX ADMIN — THIAMINE HCL TAB 100 MG 100 MG: 100 TAB at 09:01

## 2022-01-17 RX ADMIN — LORAZEPAM 0.5 MG: 0.5 TABLET ORAL at 09:01

## 2022-01-17 NOTE — PLAN OF CARE
Problem: Adult Inpatient Plan of Care  Goal: Plan of Care Review  Outcome: Ongoing, Progressing  Goal: Patient-Specific Goal (Individualized)  Outcome: Ongoing, Progressing  Flowsheets (Taken 1/17/2022 1637)  Anxieties, Fears or Concerns: DC plans  Individualized Care Needs: Pain management  Patient-Specific Goals (Include Timeframe): Discharge  Goal: Absence of Hospital-Acquired Illness or Injury  Outcome: Ongoing, Progressing  Intervention: Prevent Skin Injury  Flowsheets (Taken 1/17/2022 1637)  Skin Protection:   adhesive use limited   incontinence pads utilized  Intervention: Prevent and Manage VTE (Venous Thromboembolism) Risk  Flowsheets (Taken 1/17/2022 1637)  Range of Motion: active ROM (range of motion) encouraged  Intervention: Prevent Infection  Flowsheets (Taken 1/17/2022 1637)  Infection Prevention:   single patient room provided   hand hygiene promoted   rest/sleep promoted  Goal: Optimal Comfort and Wellbeing  Outcome: Ongoing, Progressing  Intervention: Provide Person-Centered Care  Flowsheets (Taken 1/17/2022 1637)  Trust Relationship/Rapport:   care explained   choices provided   emotional support provided   empathic listening provided   thoughts/feelings acknowledged   reassurance provided   questions encouraged   questions answered  Goal: Readiness for Transition of Care  Outcome: Ongoing, Progressing     Problem: Skin Injury Risk Increased  Goal: Skin Health and Integrity  Outcome: Ongoing, Progressing  Intervention: Optimize Skin Protection  Flowsheets (Taken 1/17/2022 1637)  Pressure Reduction Techniques:   frequent weight shift encouraged   weight shift assistance provided  Skin Protection:   adhesive use limited   incontinence pads utilized  Intervention: Promote and Optimize Oral Intake  Flowsheets (Taken 1/17/2022 1637)  Oral Nutrition Promotion:   calorie-dense foods provided   physical activity promoted     Problem: Infection  Goal: Absence of Infection Signs and Symptoms  Outcome:  Ongoing, Progressing  Intervention: Prevent or Manage Infection  Flowsheets (Taken 1/17/2022 1637)  Fever Reduction/Comfort Measures:   lightweight clothing   lightweight bedding  Infection Management: aseptic technique maintained  Isolation Precautions: precautions maintained     Problem: Fall Injury Risk  Goal: Absence of Fall and Fall-Related Injury  Outcome: Ongoing, Progressing  Intervention: Identify and Manage Contributors  Flowsheets (Taken 1/17/2022 1637)  Self-Care Promotion: independence encouraged  Medication Review/Management: medications reviewed   Plan of care reviewed. Patient progressing

## 2022-01-17 NOTE — PROGRESS NOTES
[facility]  [facility] Medicine  Progress Note    Patient Name: Karin Carrera  MRN: [id number]  Patient Class: IP- Inpatient   Admission Date: 1/1/2022  Length of Stay: 16 days  Attending Physician: Walker Anaya Jr., MD  Primary Care Provider: Walker Smith MD        Subjective:     Principal Problem:Closed fracture of right hip        HPI:  Mr Carrera is a 52 yo male who presented to the ED via EMS for c/o right leg pain after a fall. Per EMS and ED staff, he was picked up from his vehicle outside of a friend's home. He states that he lives in his vehicle. It was noted that there were numerous liquor bottles in the vehicle and around him. He was combative with EMS and has been uncooperative with much of his interview during his stay in the ED. He uses profanity easily but is aphasic during much of the ROS and interview. He will use his cell phone to type out some answers. He is a poor historian due to this and his obvious drunken state. He denies any chest pain, shortness of breath, abdominal pain, F/C, N/V/D, or changes in vision. He has right sided hemiparesis as a results of his previous CVA- however still drives. Spoke with his brother, Silver, who provided no new information- he has not seen his brother since the day before yesterday. He does state that his brother does not live with him because of the alcohol use and other private reasons. Upon workup, it was noted that he had a right femoral neck fracture. His ETOH level was 188. He is being admitted to hospitalist services with a consult to orthopaedics for surgical intervention.     He has a PMH of CABG, CVA, HTN, GERD, ETOH abuse, and nicotine abuse. It is unclear when his CABG or CVA was; unable to find dates in previous records. It is unknown if he has had covid or been vaccinated. He follows with Dr Smith for primary care. He does not follow with anyone for cardiology. Per patient, he wishes to be a full code.     Brother-  [facility] [phone]       Overview/Hospital Course:  01/02 - patient seems in less pain but obviously still sore.  He is calm and cooperative.  No new issues reported.  Seen preoperatively.  As previous addressed okay with me to proceed as planned; no medical reason to delay.  Watch for alcohol withdrawals postoperatively even though patient denies daily alcohol use.  Look into placement needs.  Dr. Goodman to assume care patient for hospitalist in the a.m..  1/3: NAEO; some pain at R hip; PT/OT to see; SS to assist with placement; continue on DT prevention- pt did initially state he drank daily and is now saying he drinks every other day  1/4: NAEO; no s/s of withdrawals; asking for snuff; PT following- having some issues with spasticity-- baclofen started; continue DT prevention; SS following for SWB   1/6: Echo EF 20%. Awaiting placement. Pt refuse NH.  1/7:Norvasc stopped.Lisinopril 2.5 mg.Lopressor 12.5 mg bid.Cardiologist appt new [place].  1/11: 1/11 po day #8. Continues to work with PT- pt agreeable to snf for and rehab.SS following    1/12: post op day #9; continue to work well with PT- SS with referrals to numerous places- awaiting placement  1/13: NAEO; post op day 10; continues to work with BRITNEY; shirlene to evaluate for placement   1/14: NAEO; post day 11; awaiting placement-- numerous referrals made by SS; continues with PT   1/15: pt refusing labs and VS; caden to [facility] for [facility] yesterday-- denied; SS making other referrals-- placement is an ongoing issue   1/16: NAEO; continue to attempt to find placement; continue working with PT   1/17: continue working with PT; continue attempting to find placement       Interval History: Pt resting in chair. Denies any CP, SOB, abd pain. Working well with PT; still no placement options available. VSS, afebrile.      Review of Systems   Constitutional: Positive for activity change. Negative for appetite change, chills and fever.   Respiratory:  Negative for cough, chest tightness and shortness of breath.    Cardiovascular: Negative for chest pain and palpitations.   Neurological: Negative for light-headedness.   All other systems reviewed and are negative.    Objective:     Vital Signs (Most Recent):  Temp: 97.7 °F (36.5 °C) (01/17/22 0725)  Pulse: 76 (01/17/22 0725)  Resp: 18 (01/17/22 0946)  BP: 126/68 (01/17/22 0725)  SpO2: 99 % (01/17/22 0725) Vital Signs (24h Range):  Temp:  [97.7 °F (36.5 °C)-98.5 °F (36.9 °C)] 97.7 °F (36.5 °C)  Pulse:  [76-97] 76  Resp:  [16-18] 18  SpO2:  [99 %-100 %] 99 %  BP: (110-141)/(54-76) 126/68     Weight: 89.9 kg (198 lb 3.1 oz)  Body mass index is 26.15 kg/m².    Intake/Output Summary (Last 24 hours) at 1/17/2022 1046  Last data filed at 1/17/2022 0800  Gross per 24 hour   Intake --   Output 800 ml   Net -800 ml      Physical Exam  Vitals and nursing note reviewed.   Constitutional:       General: He is not in acute distress.  HENT:      Head: Normocephalic and atraumatic.      Mouth/Throat:      Mouth: Mucous membranes are moist.   Eyes:      Pupils: Pupils are equal, round, and reactive to light.   Cardiovascular:      Rate and Rhythm: Normal rate and regular rhythm.      Pulses: Normal pulses.   Pulmonary:      Effort: Pulmonary effort is normal. No respiratory distress.      Breath sounds: Normal breath sounds. No wheezing.   Abdominal:      General: Bowel sounds are normal. There is no distension.      Tenderness: There is no abdominal tenderness.   Musculoskeletal:         General: Tenderness present.      Cervical back: Normal range of motion and neck supple.        Legs:       Comments:  R hip with surgical incision    Skin:     General: Skin is warm.      Capillary Refill: Capillary refill takes less than 2 seconds.   Neurological:      Mental Status: He is alert and easily aroused. Mental status is at baseline.      GCS: GCS eye subscore is 4. GCS verbal subscore is 4. GCS motor subscore is 6.      Cranial  Nerves: Cranial nerves are intact.      Sensory: Sensation is intact.      Motor: Weakness present.      Comments: Dense right-sided hemiparesis and expressive aphasia in this right-handed patient with old CVA   Psychiatric:         Mood and Affect: Mood normal.      Comments: Remains easily agitated-- baseline          Significant Labs:   All pertinent labs within the past 24 hours have been reviewed.  Recent Lab Results     None          Significant Imaging: I have reviewed all pertinent imaging results/findings within the past 24 hours.      Assessment/Plan:      * Closed fracture of right hip  - acute  - R hip xr with femoral neck fracture superimposed on chronic changes  - ortho consult-- plans for surgical intervention tomorrow  - EKG with SR with incomplete R BBB  - CXR with cardiomegaly and chronic vascular congestion  - plans for surgical intervention tomorrow  - PT/OT/SS consulted for post op assistance  - heparin SQ x 1 dose tonight   - AC after surgery per surgery preference   - ALMEIDA score of 0.9% ALISA risk-- risk of MI or cardiac arrest intra op or up to 30 days post op  - RCRI score of 2 points; class III risk; 10.1% 30 day risk of death, MI, or cardiac arrest     Perioperative Risk Assessment:  Patient is at intermediate risk for perioperative cardiopulmonary complications.  This based on lack of thorough history, hx of CVA and CABG. However risk are stable and see no medical benefit in delaying surgery from medical standpoint.    Surgery 01/02    1/3  - stable post op  - hemovac in place  - PT/OT to see  - SS following for SWB assistance   1/4  - hemovac to be pulled today  - PT following  - baclofen added to spasticity   1/6  - PO day#4  - PT/OT  - SWB placement per SS  1/7  - po day#5  - dsg d/i  1/8  - po DAY#6  1/9  - po DAY#7  - Continue to attempt placement. PT continues in Hospital. SS consulted for WC and any medical devices needed.  1/10  - po DAY#8  - pt agreeable to snf for pt and  rehab.SS following  1/11  - po day #8. Continues to work with PT  - pt agreeable to snf for pt and rehab.SS following  1/12  - pending placement  - continue current POC   1/15  - continue current POC  - continue working on placement       Acute on chronic combined systolic and diastolic heart failure        1/6/22  · The left ventricle is severely enlarged with mild eccentric hypertrophy and severely decreased systolic function.  · The estimated ejection fraction is 20%.  · There are segmental left ventricular wall motion abnormalities.  · Left ventricular diastolic dysfunction.  · Atrial fibrillation not observed.  · Normal right ventricular size.  · Mild mitral regurgitation.  · Normal central venous pressure (3 mmHg).  1/7   Lisinopril 2.5 mg  - Lopressor 12.5 mg bid  1/7  - Norvasc stopped  - Lisinopril 2.5 mg  - Lopressor 12.5 mg bid  - Cardiologist appt new pt at [place].  1/8  - Lisinopril increased 5 mg        ETOH abuse  - chronic  - unclear amount pt does partake in daily   - drinks liquor instead of beer  - monitor for DTs   - banana bag daily- MVI, thiamine, and folic acid  - ativan PO scheduled to defer DTs   - ativan IV for breakthrough symptoms   1/4  - no s/s of withdrawals   1/5  - - no s/s of DT'S  1/12  - wean ativan scheduled           Hx of CABG  - chronic, stable  - continue current meds  - cardiac monitoring   - EKG with SR with incomplete RBBB      GERD (gastroesophageal reflux disease)  - chronic, stable  - continue current meds       Hypertension  - chronic, stable  - continue current home meds  - monitor BP trend  - adjust meds as needed   1/4  - stable   1/7  - Norvasc stopped  - Lisinopril 2.5 mg  - Lopressor 12.5 mg bid  1/9  - BP stable  - Lisinopril increased to 5 mg daily  1/12  - stable     Right sided weakness  - chronic, stable  - hx of CVA with R sided hemiparesis   -patient is right-handed  - continue statin   1/4  - PT following  - plans for SWB   1/8  - Pt has mobility limitation  that significantly impairs his MRADL'S including his ability for toileting and bathing at home.Limitations cannot be resolved with use of a cane or walker due to increased risk of falling. A wheelchair will improve the pt's ability to perform MRADL'S. Pt is willing to and able to propel in wheelchair.  1/12  - stable       VTE Risk Mitigation (From admission, onward)         Ordered     apixaban tablet 2.5 mg  2 times daily         01/02/22 1916     IP VTE HIGH RISK PATIENT  Once         01/02/22 1916     Place SAGE hose  Until discontinued         01/02/22 1916     Place sequential compression device  Until discontinued         01/02/22 1916     Place sequential compression device  Until discontinued         01/01/22 1707                Discharge Planning   DEANNA:      Code Status: Full Code   Is the patient medically ready for discharge?:     Reason for patient still in hospital (select all that apply): Treatment  Discharge Plan A: Rehab ([facility] Faustin)        ISADORA Link  Department of Hospital Medicine   [facility]

## 2022-01-17 NOTE — PLAN OF CARE
Spoke to Berna Feliciano at [facility]. She is going to review for acute to acute transfer tomorrow for 3-4 more days of PT/OT prior to patient returning to home([place]). Follow up in am

## 2022-01-17 NOTE — SUBJECTIVE & OBJECTIVE
Interval History: Pt resting in chair. Denies any CP, SOB, abd pain. Working well with PT; still no placement options available. VSS, afebrile.      Review of Systems   Constitutional: Positive for activity change. Negative for appetite change, chills and fever.   Respiratory: Negative for cough, chest tightness and shortness of breath.    Cardiovascular: Negative for chest pain and palpitations.   Neurological: Negative for light-headedness.   All other systems reviewed and are negative.    Objective:     Vital Signs (Most Recent):  Temp: 97.7 °F (36.5 °C) (01/17/22 0725)  Pulse: 76 (01/17/22 0725)  Resp: 18 (01/17/22 0946)  BP: 126/68 (01/17/22 0725)  SpO2: 99 % (01/17/22 0725) Vital Signs (24h Range):  Temp:  [97.7 °F (36.5 °C)-98.5 °F (36.9 °C)] 97.7 °F (36.5 °C)  Pulse:  [76-97] 76  Resp:  [16-18] 18  SpO2:  [99 %-100 %] 99 %  BP: (110-141)/(54-76) 126/68     Weight: 89.9 kg (198 lb 3.1 oz)  Body mass index is 26.15 kg/m².    Intake/Output Summary (Last 24 hours) at 1/17/2022 1046  Last data filed at 1/17/2022 0800  Gross per 24 hour   Intake --   Output 800 ml   Net -800 ml      Physical Exam  Vitals and nursing note reviewed.   Constitutional:       General: He is not in acute distress.  HENT:      Head: Normocephalic and atraumatic.      Mouth/Throat:      Mouth: Mucous membranes are moist.   Eyes:      Pupils: Pupils are equal, round, and reactive to light.   Cardiovascular:      Rate and Rhythm: Normal rate and regular rhythm.      Pulses: Normal pulses.   Pulmonary:      Effort: Pulmonary effort is normal. No respiratory distress.      Breath sounds: Normal breath sounds. No wheezing.   Abdominal:      General: Bowel sounds are normal. There is no distension.      Tenderness: There is no abdominal tenderness.   Musculoskeletal:         General: Tenderness present.      Cervical back: Normal range of motion and neck supple.        Legs:       Comments:  R hip with surgical incision    Skin:     General:  Skin is warm.      Capillary Refill: Capillary refill takes less than 2 seconds.   Neurological:      Mental Status: He is alert and easily aroused. Mental status is at baseline.      GCS: GCS eye subscore is 4. GCS verbal subscore is 4. GCS motor subscore is 6.      Cranial Nerves: Cranial nerves are intact.      Sensory: Sensation is intact.      Motor: Weakness present.      Comments: Dense right-sided hemiparesis and expressive aphasia in this right-handed patient with old CVA   Psychiatric:         Mood and Affect: Mood normal.      Comments: Remains easily agitated-- baseline          Significant Labs:   All pertinent labs within the past 24 hours have been reviewed.  Recent Lab Results     None          Significant Imaging: I have reviewed all pertinent imaging results/findings within the past 24 hours.

## 2022-01-17 NOTE — PLAN OF CARE
Problem: Adult Inpatient Plan of Care  Goal: Plan of Care Review  Outcome: Ongoing, Progressing  Goal: Patient-Specific Goal (Individualized)  Outcome: Ongoing, Progressing  Goal: Absence of Hospital-Acquired Illness or Injury  Outcome: Ongoing, Progressing  Intervention: Prevent Skin Injury  Flowsheets (Taken 1/16/2022 1839)  Skin Protection: adhesive use limited  Intervention: Prevent Infection  Flowsheets (Taken 1/16/2022 1839)  Infection Prevention:   visitors restricted/screened   hand hygiene promoted   equipment surfaces disinfected   rest/sleep promoted   single patient room provided  Goal: Optimal Comfort and Wellbeing  Outcome: Ongoing, Progressing  Intervention: Provide Person-Centered Care  Flowsheets (Taken 1/16/2022 1839)  Trust Relationship/Rapport:   care explained   choices provided   emotional support provided   questions answered   empathic listening provided   thoughts/feelings acknowledged   reassurance provided   questions encouraged  Goal: Readiness for Transition of Care  Outcome: Ongoing, Progressing     Problem: Skin Injury Risk Increased  Goal: Skin Health and Integrity  Outcome: Ongoing, Progressing  Intervention: Optimize Skin Protection  Flowsheets (Taken 1/16/2022 1839)  Pressure Reduction Techniques:   frequent weight shift encouraged   weight shift assistance provided  Pressure Reduction Devices: positioning supports utilized  Skin Protection: adhesive use limited  Intervention: Promote and Optimize Oral Intake  Flowsheets (Taken 1/16/2022 1839)  Oral Nutrition Promotion:   calorie-dense foods provided   rest periods promoted     Problem: Infection  Goal: Absence of Infection Signs and Symptoms  Outcome: Ongoing, Progressing  Intervention: Prevent or Manage Infection  Flowsheets (Taken 1/16/2022 1839)  Fever Reduction/Comfort Measures: essential oils applied  Infection Management: aseptic technique maintained  Isolation Precautions: precautions initiated     Problem: Fall Injury  Risk  Goal: Absence of Fall and Fall-Related Injury  Outcome: Ongoing, Progressing  Intervention: Identify and Manage Contributors  Flowsheets (Taken 1/16/2022 3538)  Self-Care Promotion: independence encouraged  Medication Review/Management: medications reviewed

## 2022-01-17 NOTE — PT/OT/SLP PROGRESS
Physical Therapy Treatment    Patient Name:  Karin Carrera   MRN:  [id number]    Recommendations:     Discharge Recommendations:  home   Discharge Equipment Recommendations: crutches, axillary   Barriers to discharge: Decreased caregiver support    Assessment:     Karin Carrera is a 51 y.o. male admitted with a medical diagnosis of Closed fracture of right hip.  He presents with the following impairments/functional limitations:  impaired functional mobilty,gait instability,impaired balance,decreased coordination,pain,abnormal tone,decreased ROM,orthopedic precautions Patient making huge strides with therapy. Transfers in and out of bed independently. Able to walk down the [place] with left crutch with CGA. Close to being at previous functional baseline.    Rehab Prognosis: Good; patient would benefit from acute skilled PT services to address these deficits and reach maximum level of function.    Recent Surgery: Procedure(s) (LRB):  HEMIARTHROPLASTY, HIP (Right) 15 Days Post-Op    Plan:     During this hospitalization, patient to be seen BID to address the identified rehab impairments via gait training,therapeutic activities and progress toward the following goals:    · Plan of Care Expires:  02/03/22    Subjective     Chief Complaint: right hip soreness  Patient/Family Comments/goals: Social work trying to find placement  Pain/Comfort:  Pain Rating 1: 2/10  Location - Side 1: Right  Location 1: hip  Pain Addressed 1: Cessation of Activity      Objective:     Communicated with nurse prior to session.  Patient found supine with peripheral IV upon PT entry to room.     General Precautions: Standard, fall   Orthopedic Precautions:RLE anterior precautions,RLE weight bearing as tolerated   Braces:    Respiratory Status: Room air     Functional Mobility:  · Bed Mobility:     · Supine to Sit: contact guard assistance  · Sit to Supine: contact guard assistance  · Transfers:     · Sit to Stand:  contact guard  assistance with hand-held assist  · Gait: ambulated 180 feet with cga using left crutchleft step too,       AM-PAC 6 CLICK MOBILITY  Turning over in bed (including adjusting bedclothes, sheets and blankets)?: 4  Sitting down on and standing up from a chair with arms (e.g., wheelchair, bedside commode, etc.): 4  Moving from lying on back to sitting on the side of the bed?: 4  Moving to and from a bed to a chair (including a wheelchair)?: 3  Need to walk in hospital room?: 3  Climbing 3-5 steps with a railing?: 1  Basic Mobility Total Score: 19       Therapeutic Activities and Exercises:   right le: hs, saq, abd-add, slr 3x10    Patient left up in chair with call button in reach..    GOALS:   Multidisciplinary Problems     Physical Therapy Goals        Problem: Physical Therapy Goal    Goal Priority Disciplines Outcome Goal Variances Interventions   Physical Therapy Goal     PT, PT/OT Ongoing, Progressing     Description: Short Term Goals  Independent with HEP  Independent with cane x 100 feet PWB/WBAT: right lower extremity  Cga supine-sit-stand    Long term goals  Needed equipment for home.   Independent with hip precautsion                       Time Tracking:     PT Received On: 01/17/22  PT Start Time: 1145     PT Stop Time: 1215  PT Total Time (min): 30 min     Billable Minutes: gait 10, there ex 15      Treatment Type: Treatment  PT/PTA: PT     PTA Visit Number: 0     01/17/2022

## 2022-01-18 LAB
ANION GAP SERPL CALCULATED.3IONS-SCNC: 17 MMOL/L (ref 7–16)
ANISOCYTOSIS BLD QL SMEAR: ABNORMAL
ATYPICAL LYMPHOCYTES: ABNORMAL
BASOPHILS # BLD AUTO: 0.05 K/UL (ref 0–0.2)
BASOPHILS NFR BLD AUTO: 0.7 % (ref 0–1)
BUN SERPL-MCNC: 12 MG/DL (ref 7–18)
BUN/CREAT SERPL: 17 (ref 6–20)
CALCIUM SERPL-MCNC: 9 MG/DL (ref 8.5–10.1)
CHLORIDE SERPL-SCNC: 99 MMOL/L (ref 98–107)
CO2 SERPL-SCNC: 25 MMOL/L (ref 21–32)
CREAT SERPL-MCNC: 0.7 MG/DL (ref 0.7–1.3)
DIFFERENTIAL METHOD BLD: ABNORMAL
EOSINOPHIL # BLD AUTO: 0.18 K/UL (ref 0–0.5)
EOSINOPHIL NFR BLD AUTO: 2.5 % (ref 1–4)
EOSINOPHIL NFR BLD MANUAL: 3 % (ref 1–4)
ERYTHROCYTE [DISTWIDTH] IN BLOOD BY AUTOMATED COUNT: 16.4 % (ref 11.5–14.5)
GLUCOSE SERPL-MCNC: 140 MG/DL (ref 74–106)
HCT VFR BLD AUTO: 38.2 % (ref 40–54)
HGB BLD-MCNC: 11.9 G/DL (ref 13.5–18)
HYPOCHROMIA BLD QL SMEAR: ABNORMAL
IMM GRANULOCYTES # BLD AUTO: 0.03 K/UL (ref 0–0.04)
IMM GRANULOCYTES NFR BLD: 0.4 % (ref 0–0.4)
LYMPHOCYTES # BLD AUTO: 2.44 K/UL (ref 1–4.8)
LYMPHOCYTES NFR BLD AUTO: 33.6 % (ref 27–41)
LYMPHOCYTES NFR BLD MANUAL: 31 % (ref 27–41)
MCH RBC QN AUTO: 23.3 PG (ref 27–31)
MCHC RBC AUTO-ENTMCNC: 31.2 G/DL (ref 32–36)
MCV RBC AUTO: 74.8 FL (ref 80–96)
MICROCYTES BLD QL SMEAR: ABNORMAL
MONOCYTES # BLD AUTO: 0.43 K/UL (ref 0–0.8)
MONOCYTES NFR BLD AUTO: 5.9 % (ref 2–6)
MONOCYTES NFR BLD MANUAL: 4 % (ref 2–6)
MPC BLD CALC-MCNC: 9 FL (ref 9.4–12.4)
MYELOCYTES NFR BLD MANUAL: 1 %
NEUTROPHILS # BLD AUTO: 4.13 K/UL (ref 1.8–7.7)
NEUTROPHILS NFR BLD AUTO: 56.9 % (ref 53–65)
NEUTS SEG NFR BLD MANUAL: 61 % (ref 50–62)
NRBC # BLD AUTO: 0 X10E3/UL
NRBC, AUTO (.00): 0 %
OVALOCYTES BLD QL SMEAR: ABNORMAL
PLATELET # BLD AUTO: 490 K/UL (ref 150–400)
PLATELET MORPHOLOGY: ABNORMAL
POLYCHROMASIA BLD QL SMEAR: ABNORMAL
POTASSIUM SERPL-SCNC: 4.1 MMOL/L (ref 3.5–5.1)
RBC # BLD AUTO: 5.11 M/UL (ref 4.6–6.2)
SODIUM SERPL-SCNC: 137 MMOL/L (ref 136–145)
WBC # BLD AUTO: 7.26 K/UL (ref 4.5–11)

## 2022-01-18 PROCEDURE — 25000003 PHARM REV CODE 250: Performed by: HOSPITALIST

## 2022-01-18 PROCEDURE — 25000003 PHARM REV CODE 250: Performed by: REGISTERED NURSE

## 2022-01-18 PROCEDURE — 97110 THERAPEUTIC EXERCISES: CPT

## 2022-01-18 PROCEDURE — 85025 COMPLETE CBC W/AUTO DIFF WBC: CPT | Performed by: NURSE PRACTITIONER

## 2022-01-18 PROCEDURE — 99232 SBSQ HOSP IP/OBS MODERATE 35: CPT | Mod: ,,, | Performed by: FAMILY MEDICINE

## 2022-01-18 PROCEDURE — 36415 COLL VENOUS BLD VENIPUNCTURE: CPT | Performed by: NURSE PRACTITIONER

## 2022-01-18 PROCEDURE — 25000003 PHARM REV CODE 250: Performed by: ORTHOPAEDIC SURGERY

## 2022-01-18 PROCEDURE — 11000001 HC ACUTE MED/SURG PRIVATE ROOM

## 2022-01-18 PROCEDURE — 80048 BASIC METABOLIC PNL TOTAL CA: CPT | Performed by: NURSE PRACTITIONER

## 2022-01-18 PROCEDURE — 97116 GAIT TRAINING THERAPY: CPT

## 2022-01-18 PROCEDURE — 25000003 PHARM REV CODE 250: Performed by: NURSE PRACTITIONER

## 2022-01-18 PROCEDURE — 99232 PR SUBSEQUENT HOSPITAL CARE,LEVL II: ICD-10-PCS | Mod: ,,, | Performed by: FAMILY MEDICINE

## 2022-01-18 RX ADMIN — LISINOPRIL 5 MG: 5 TABLET ORAL at 09:01

## 2022-01-18 RX ADMIN — HYDROCODONE BITARTRATE AND ACETAMINOPHEN 1 TABLET: 5; 325 TABLET ORAL at 11:01

## 2022-01-18 RX ADMIN — METOPROLOL TARTRATE 12.5 MG: 25 TABLET, FILM COATED ORAL at 08:01

## 2022-01-18 RX ADMIN — HYDROCODONE BITARTRATE AND ACETAMINOPHEN 1 TABLET: 5; 325 TABLET ORAL at 08:01

## 2022-01-18 RX ADMIN — Medication 1000 UNITS: at 09:01

## 2022-01-18 RX ADMIN — ASPIRIN 81 MG: 81 TABLET, COATED ORAL at 09:01

## 2022-01-18 RX ADMIN — LORAZEPAM 0.5 MG: 0.5 TABLET ORAL at 09:01

## 2022-01-18 RX ADMIN — FOLIC ACID 1 MG: 1 TABLET ORAL at 09:01

## 2022-01-18 RX ADMIN — HYDROCODONE BITARTRATE AND ACETAMINOPHEN 1 TABLET: 5; 325 TABLET ORAL at 05:01

## 2022-01-18 RX ADMIN — THIAMINE HCL TAB 100 MG 100 MG: 100 TAB at 09:01

## 2022-01-18 RX ADMIN — APIXABAN 2.5 MG: 2.5 TABLET, FILM COATED ORAL at 09:01

## 2022-01-18 RX ADMIN — HYDROCODONE BITARTRATE AND ACETAMINOPHEN 1 TABLET: 5; 325 TABLET ORAL at 01:01

## 2022-01-18 RX ADMIN — ATORVASTATIN CALCIUM 40 MG: 20 TABLET, FILM COATED ORAL at 08:01

## 2022-01-18 RX ADMIN — METOPROLOL TARTRATE 12.5 MG: 25 TABLET, FILM COATED ORAL at 09:01

## 2022-01-18 RX ADMIN — APIXABAN 2.5 MG: 2.5 TABLET, FILM COATED ORAL at 08:01

## 2022-01-18 RX ADMIN — THERA TABS 1 TABLET: TAB at 09:01

## 2022-01-18 RX ADMIN — HYDROCODONE BITARTRATE AND ACETAMINOPHEN 1 TABLET: 5; 325 TABLET ORAL at 04:01

## 2022-01-18 RX ADMIN — PANTOPRAZOLE SODIUM 40 MG: 40 TABLET, DELAYED RELEASE ORAL at 09:01

## 2022-01-18 NOTE — PLAN OF CARE
SW spoke with pt and pt stated that he was ready to dc home. Pt states that he will move in his friends duplex and just needs a way home. Pt states that he feels as if he is in [facility] at hospital. MARCY informed, NP. SW will cont to follow.

## 2022-01-18 NOTE — PT/OT/SLP PROGRESS
Physical Therapy Treatment    Patient Name:  Karin Carrera   MRN:  [id number]    Recommendations:     Discharge Recommendations:  home   Discharge Equipment Recommendations: crutches, axillary   Barriers to discharge: Decreased caregiver support    Assessment:     Karin Carrera is a 51 y.o. male admitted with a medical diagnosis of Closed fracture of right hip.  He presents with the following impairments/functional limitations:  impaired functional mobilty,gait instability,impaired balance,decreased coordination,pain,abnormal tone,decreased ROM,orthopedic precautions Patient making huge strides with therapy. Transfers in and out of bed independently. Able to walk down the [place] with left crutch with CGA. Close to being at previous functional baseline.    Rehab Prognosis: Good; patient would benefit from acute skilled PT services to address these deficits and reach maximum level of function.    Recent Surgery: Procedure(s) (LRB):  HEMIARTHROPLASTY, HIP (Right) 15 Days Post-Op    Plan:     During this hospitalization, patient to be seen BID to address the identified rehab impairments via gait training,therapeutic activities and progress toward the following goals:    · Plan of Care Expires:  02/03/22    Subjective     Chief Complaint: right hip soreness  Patient/Family Comments/goals: Social work trying to find placement  Pain/Comfort:  Pain Rating 1: 2/10  Location - Side 1: Right  Location 1: hip  Pain Addressed 1: Cessation of Activity      Objective:     Communicated with nurse prior to session.  Patient found supine with peripheral IV upon PT entry to room.     General Precautions: Standard, fall   Orthopedic Precautions:RLE anterior precautions,RLE weight bearing as tolerated   Braces:    Respiratory Status: Room air     Functional Mobility:  · Bed Mobility:     · Supine to Sit: contact guard assistance  · Sit to Supine: contact guard assistance  · Transfers:     · Sit to Stand:  contact guard  assistance with hand-held assist  · Gait: ambulated 180 feet with cga using left crutchleft step too,       AM-PAC 6 CLICK MOBILITY  Turning over in bed (including adjusting bedclothes, sheets and blankets)?: 4  Sitting down on and standing up from a chair with arms (e.g., wheelchair, bedside commode, etc.): 4  Moving from lying on back to sitting on the side of the bed?: 4  Moving to and from a bed to a chair (including a wheelchair)?: 3  Need to walk in hospital room?: 3  Climbing 3-5 steps with a railing?: 1  Basic Mobility Total Score: 19       Therapeutic Activities and Exercises:   right le: hs, saq, abd-add, slr 3x10    Patient left up in chair with call button in reach..    GOALS:   Multidisciplinary Problems     Physical Therapy Goals        Problem: Physical Therapy Goal    Goal Priority Disciplines Outcome Goal Variances Interventions   Physical Therapy Goal     PT, PT/OT Ongoing, Progressing     Description: Short Term Goals  Independent with HEP  Independent with cane x 100 feet PWB/WBAT: right lower extremity  Cga supine-sit-stand    Long term goals  Needed equipment for home.   Independent with hip precautsion                       Time Tracking:     PT Received On: 01/17/22  PT Start Time: 1530     PT Stop Time: 1600  PT Total Time (min): 30 min     Billable Minutes: gait 10, there ex 15      Treatment Type: Treatment  PT/PTA: PT     PTA Visit Number: 0     01/17/2022

## 2022-01-18 NOTE — PLAN OF CARE
Problem: Adult Inpatient Plan of Care  Goal: Plan of Care Review  Outcome: Ongoing, Progressing  Flowsheets (Taken 1/18/2022 0441)  Plan of Care Reviewed With: patient  Goal: Patient-Specific Goal (Individualized)  Outcome: Ongoing, Progressing  Flowsheets (Taken 1/18/2022 0441)  Anxieties, Fears or Concerns: DC plans, pain management  Individualized Care Needs: Pain management  Patient-Specific Goals (Include Timeframe): Discharge, improve ambulation/mobility  Goal: Absence of Hospital-Acquired Illness or Injury  Outcome: Ongoing, Progressing  Goal: Optimal Comfort and Wellbeing  Outcome: Ongoing, Progressing     Problem: Infection  Goal: Absence of Infection Signs and Symptoms  Outcome: Ongoing, Progressing     Problem: Fall Injury Risk  Goal: Absence of Fall and Fall-Related Injury  Outcome: Ongoing, Progressing  Pt cooperative with ongoing POC.

## 2022-01-18 NOTE — ASSESSMENT & PLAN NOTE
- chronic  - unclear amount pt does partake in daily   - drinks liquor instead of beer  - monitor for DTs   - banana bag daily- MVI, thiamine, and folic acid  - ativan PO scheduled to defer DTs   - ativan IV for breakthrough symptoms   1/4  - no s/s of withdrawals   1/5  - - no s/s of DT'S  1/12  - wean ativan scheduled   1/18  - has been wean off scheduled ativan completely  - no s/s of DTs

## 2022-01-18 NOTE — SUBJECTIVE & OBJECTIVE
Interval History: Pt resting in bed. Denies any chest pain, abd pain, SOB. He still has some soreness to his R hip. Attempting to find placement- pt lives in a van and is not quite to the point of being safe to return to his van. VSS, afebrile.     Review of Systems   Constitutional: Positive for activity change. Negative for appetite change, chills and fever.   Respiratory: Negative for cough, chest tightness and shortness of breath.    Cardiovascular: Negative for chest pain and palpitations.   Neurological: Negative for light-headedness.   All other systems reviewed and are negative.    Objective:     Vital Signs (Most Recent):  Temp: 98.5 °F (36.9 °C) (01/18/22 1125)  Pulse: 84 (01/18/22 1125)  Resp: 19 (01/18/22 1125)  BP: (!) 141/78 (01/18/22 1125)  SpO2: 99 % (01/18/22 1125) Vital Signs (24h Range):  Temp:  [97 °F (36.1 °C)-98.5 °F (36.9 °C)] 98.5 °F (36.9 °C)  Pulse:  [75-90] 84  Resp:  [17-20] 19  SpO2:  [99 %-100 %] 99 %  BP: (119-141)/(66-78) 141/78     Weight: 89.9 kg (198 lb 3.1 oz)  Body mass index is 26.15 kg/m².    Intake/Output Summary (Last 24 hours) at 1/18/2022 1254  Last data filed at 1/18/2022 0726  Gross per 24 hour   Intake 420 ml   Output 1625 ml   Net -1205 ml      Physical Exam  Vitals and nursing note reviewed.   Constitutional:       General: He is not in acute distress.  HENT:      Head: Normocephalic and atraumatic.      Mouth/Throat:      Mouth: Mucous membranes are moist.   Eyes:      Pupils: Pupils are equal, round, and reactive to light.   Cardiovascular:      Rate and Rhythm: Normal rate and regular rhythm.      Pulses: Normal pulses.   Pulmonary:      Effort: Pulmonary effort is normal. No respiratory distress.      Breath sounds: Normal breath sounds. No wheezing.   Abdominal:      General: Bowel sounds are normal. There is no distension.      Tenderness: There is no abdominal tenderness.   Musculoskeletal:         General: Tenderness present.      Cervical back: Normal range of  motion and neck supple.        Legs:       Comments:  R hip with surgical incision    Skin:     General: Skin is warm.      Capillary Refill: Capillary refill takes less than 2 seconds.   Neurological:      Mental Status: He is alert and easily aroused. Mental status is at baseline.      GCS: GCS eye subscore is 4. GCS verbal subscore is 4. GCS motor subscore is 6.      Cranial Nerves: Cranial nerves are intact.      Sensory: Sensation is intact.      Motor: Weakness present.      Comments: Dense right-sided hemiparesis and expressive aphasia in this right-handed patient with old CVA   Psychiatric:         Mood and Affect: Mood normal.      Comments: Remains easily agitated-- baseline          Significant Labs:   All pertinent labs within the past 24 hours have been reviewed.  Recent Lab Results       01/18/22  0804        Anion Gap 17       Aniso 1+       Atypical Lymphocytes Few       Baso # 0.05       Basophil % 0.7       BUN 12       BUN/CREAT RATIO 17       Calcium 9.0       Chloride 99       CO2 25       Creatinine 0.70       Differential Type Manual       eGFR if non  126       Eos # 0.18       Eosinophil % 2.5        3       Glucose 140       Hematocrit 38.2       Hemoglobin 11.9       Hypo Few       Immature Grans (Abs) 0.03       Immature Granulocytes 0.4       Lymph # 2.44       Lymph % 33.6        31       MCH 23.3       MCHC 31.2       MCV 74.8       Microcytes 1+       Mono # 0.43       Mono % 5.9        4       MPV 9.0       Myelocytes 1       Neutrophils, Abs 4.13       Neutrophils Relative 56.9       nRBC 0.0       NUCLEATED RBC ABSOLUTE 0.00       Ovalocytes Few       PLATELET MORPHOLOGY Platelet Clumping  Comment: FEW LARGE PLATELETS       Platelets 490       Poly Few       Potassium 4.1       RBC 5.11       RDW 16.4       Segmented Neutrophils, Man % 61       Sodium 137       WBC 7.26             Significant Imaging: I have reviewed all pertinent imaging results/findings within  the past 24 hours.

## 2022-01-18 NOTE — ASSESSMENT & PLAN NOTE
1/6/22  · The left ventricle is severely enlarged with mild eccentric hypertrophy and severely decreased systolic function.  · The estimated ejection fraction is 20%.  · There are segmental left ventricular wall motion abnormalities.  · Left ventricular diastolic dysfunction.  · Atrial fibrillation not observed.  · Normal right ventricular size.  · Mild mitral regurgitation.  · Normal central venous pressure (3 mmHg).  1/7   Lisinopril 2.5 mg  - Lopressor 12.5 mg bid  1/7  - Norvasc stopped  - Lisinopril 2.5 mg  - Lopressor 12.5 mg bid  - Cardiologist appt new pt at dc.  1/8  - Lisinopril increased 5 mg  1/18  - stable

## 2022-01-18 NOTE — ASSESSMENT & PLAN NOTE
- chronic, stable  - hx of CVA with R sided hemiparesis   -patient is right-handed  - continue statin   1/4  - PT following  - plans for SWB   1/8  - Pt has mobility limitation that significantly impairs his MRADL'S including his ability for toileting and bathing at home.Limitations cannot be resolved with use of a cane or walker due to increased risk of falling. A wheelchair will improve the pt's ability to perform MRADL'S. Pt is willing to and able to propel in wheelchair.  1/12  - stable   1/18  - pt at baseline with his R sided weakness  - placement continues to be an issue

## 2022-01-18 NOTE — PLAN OF CARE
Problem: Adult Inpatient Plan of Care  Goal: Plan of Care Review  Outcome: Ongoing, Progressing  Goal: Patient-Specific Goal (Individualized)  Outcome: Ongoing, Progressing  Goal: Absence of Hospital-Acquired Illness or Injury  Outcome: Ongoing, Progressing  Intervention: Prevent Skin Injury  Flowsheets (Taken 1/18/2022 1501)  Skin Protection: adhesive use limited  Intervention: Prevent and Manage VTE (Venous Thromboembolism) Risk  Flowsheets (Taken 1/18/2022 1501)  Range of Motion: active ROM (range of motion) encouraged  Intervention: Prevent Infection  Flowsheets (Taken 1/18/2022 1501)  Infection Prevention:   rest/sleep promoted   single patient room provided   hand hygiene promoted  Goal: Optimal Comfort and Wellbeing  Outcome: Ongoing, Progressing  Intervention: Provide Person-Centered Care  Flowsheets (Taken 1/18/2022 1501)  Trust Relationship/Rapport:   care explained   choices provided   emotional support provided   empathic listening provided   thoughts/feelings acknowledged   reassurance provided   questions encouraged   questions answered  Goal: Readiness for Transition of Care  Outcome: Ongoing, Progressing     Problem: Skin Injury Risk Increased  Goal: Skin Health and Integrity  Outcome: Ongoing, Progressing  Intervention: Optimize Skin Protection  Flowsheets (Taken 1/18/2022 1501)  Pressure Reduction Devices: heel offloading device utilized  Skin Protection: adhesive use limited  Intervention: Promote and Optimize Oral Intake  Flowsheets (Taken 1/18/2022 1501)  Oral Nutrition Promotion:   physical activity promoted   calorie-dense foods provided     Problem: Infection  Goal: Absence of Infection Signs and Symptoms  Outcome: Ongoing, Progressing  Intervention: Prevent or Manage Infection  Flowsheets (Taken 1/18/2022 1501)  Fever Reduction/Comfort Measures:   lightweight clothing   lightweight bedding  Infection Management: aseptic technique maintained  Isolation Precautions: precautions maintained      Problem: Fall Injury Risk  Goal: Absence of Fall and Fall-Related Injury  Outcome: Ongoing, Progressing  Intervention: Identify and Manage Contributors  Flowsheets (Taken 1/18/2022 1501)  Self-Care Promotion: independence encouraged  Medication Review/Management: medications reviewed   Plan of care reviewed. Patient progressing

## 2022-01-18 NOTE — PROGRESS NOTES
[facility] [place] Orthopedic  [facility] Medicine  Progress Note    Patient Name: Karin Carrera  MRN: [id number]  Patient Class: IP- Inpatient   Admission Date: 1/1/2022  Length of Stay: 17 days  Attending Physician: Walker Anaya Jr., MD  Primary Care Provider: Walker Smiht MD        Subjective:     Principal Problem:Closed fracture of right hip        HPI:  Mr Carrera is a 52 yo male who presented to the ED via EMS for c/o right leg pain after a fall. Per EMS and ED staff, he was picked up from his vehicle outside of a friend's home. He states that he lives in his vehicle. It was noted that there were numerous liquor bottles in the vehicle and around him. He was combative with EMS and has been uncooperative with much of his interview during his stay in the ED. He uses profanity easily but is aphasic during much of the ROS and interview. He will use his cell phone to type out some answers. He is a poor historian due to this and his obvious drunken state. He denies any chest pain, shortness of breath, abdominal pain, F/C, N/V/D, or changes in vision. He has right sided hemiparesis as a results of his previous CVA- however still drives. Spoke with his brother, Silver, who provided no new information- he has not seen his brother since the day before yesterday. He does state that his brother does not live with him because of the alcohol use and other private reasons. Upon workup, it was noted that he had a right femoral neck fracture. His ETOH level was 188. He is being admitted to hospitalist services with a consult to orthopaedics for surgical intervention.     He has a PMH of CABG, CVA, HTN, GERD, ETOH abuse, and nicotine abuse. It is unclear when his CABG or CVA was; unable to find dates in previous records. It is unknown if he has had covid or been vaccinated. He follows with Dr Smith for primary care. He does not follow with anyone for cardiology. Per patient, he wishes to be a full code.     Brother-  [facility] [phone]       Overview/Hospital Course:  01/02 - patient seems in less pain but obviously still sore.  He is calm and cooperative.  No new issues reported.  Seen preoperatively.  As previous addressed okay with me to proceed as planned; no medical reason to delay.  Watch for alcohol withdrawals postoperatively even though patient denies daily alcohol use.  Look into placement needs.  Dr. Goodman to assume care patient for hospitalist in the a.m..  1/3: NAEO; some pain at R hip; PT/OT to see; SS to assist with placement; continue on DT prevention- pt did initially state he drank daily and is now saying he drinks every other day  1/4: NAEO; no s/s of withdrawals; asking for snuff; PT following- having some issues with spasticity-- baclofen started; continue DT prevention; SS following for SWB   1/6: Echo EF 20%. Awaiting placement. Pt refuse NH.  1/7:Norvasc stopped.Lisinopril 2.5 mg.Lopressor 12.5 mg bid.Cardiologist appt new pt at [place].  1/11: 1/11 po day #8. Continues to work with PT- pt agreeable to snf for and rehab.SS following    1/12: post op day #9; continue to work well with PT- SS with referrals to numerous places- awaiting placement  1/13: NAEO; post op day 10; continues to work with ; [place] to evaluate for placement   1/14: NAEO; post day 11; awaiting placement-- numerous referrals made by SS; continues with PT   1/15: pt refusing labs and VS; acden to [facility] yesterday-- denied; SS making other referrals-- placement is an ongoing issue   1/16: NAEO; continue to attempt to find placement; continue working with PT   1/17: continue working with PT; continue attempting to find placement   1/18: NAEO; attempted acute to acute transfer to [facility]-- pt refused to go-- states he just wants to go home/ back to his van; continue therapy-- as pt is not to the point of d/c without assistance       Interval History: Pt resting in bed. Denies any chest pain, abd pain, SOB. He  still has some soreness to his R hip. Attempting to find placement- pt lives in a van and is not quite to the point of being safe to return to his van. VSS, afebrile.     Review of Systems   Constitutional: Positive for activity change. Negative for appetite change, chills and fever.   Respiratory: Negative for cough, chest tightness and shortness of breath.    Cardiovascular: Negative for chest pain and palpitations.   Neurological: Negative for light-headedness.   All other systems reviewed and are negative.    Objective:     Vital Signs (Most Recent):  Temp: 98.5 °F (36.9 °C) (01/18/22 1125)  Pulse: 84 (01/18/22 1125)  Resp: 19 (01/18/22 1125)  BP: (!) 141/78 (01/18/22 1125)  SpO2: 99 % (01/18/22 1125) Vital Signs (24h Range):  Temp:  [97 °F (36.1 °C)-98.5 °F (36.9 °C)] 98.5 °F (36.9 °C)  Pulse:  [75-90] 84  Resp:  [17-20] 19  SpO2:  [99 %-100 %] 99 %  BP: (119-141)/(66-78) 141/78     Weight: 89.9 kg (198 lb 3.1 oz)  Body mass index is 26.15 kg/m².    Intake/Output Summary (Last 24 hours) at 1/18/2022 1254  Last data filed at 1/18/2022 0726  Gross per 24 hour   Intake 420 ml   Output 1625 ml   Net -1205 ml      Physical Exam  Vitals and nursing note reviewed.   Constitutional:       General: He is not in acute distress.  HENT:      Head: Normocephalic and atraumatic.      Mouth/Throat:      Mouth: Mucous membranes are moist.   Eyes:      Pupils: Pupils are equal, round, and reactive to light.   Cardiovascular:      Rate and Rhythm: Normal rate and regular rhythm.      Pulses: Normal pulses.   Pulmonary:      Effort: Pulmonary effort is normal. No respiratory distress.      Breath sounds: Normal breath sounds. No wheezing.   Abdominal:      General: Bowel sounds are normal. There is no distension.      Tenderness: There is no abdominal tenderness.   Musculoskeletal:         General: Tenderness present.      Cervical back: Normal range of motion and neck supple.        Legs:       Comments:  R hip with surgical  incision    Skin:     General: Skin is warm.      Capillary Refill: Capillary refill takes less than 2 seconds.   Neurological:      Mental Status: He is alert and easily aroused. Mental status is at baseline.      GCS: GCS eye subscore is 4. GCS verbal subscore is 4. GCS motor subscore is 6.      Cranial Nerves: Cranial nerves are intact.      Sensory: Sensation is intact.      Motor: Weakness present.      Comments: Dense right-sided hemiparesis and expressive aphasia in this right-handed patient with old CVA   Psychiatric:         Mood and Affect: Mood normal.      Comments: Remains easily agitated-- baseline          Significant Labs:   All pertinent labs within the past 24 hours have been reviewed.  Recent Lab Results       01/18/22  0804        Anion Gap 17       Aniso 1+       Atypical Lymphocytes Few       Baso # 0.05       Basophil % 0.7       BUN 12       BUN/CREAT RATIO 17       Calcium 9.0       Chloride 99       CO2 25       Creatinine 0.70       Differential Type Manual       eGFR if non  126       Eos # 0.18       Eosinophil % 2.5        3       Glucose 140       Hematocrit 38.2       Hemoglobin 11.9       Hypo Few       Immature Grans (Abs) 0.03       Immature Granulocytes 0.4       Lymph # 2.44       Lymph % 33.6        31       MCH 23.3       MCHC 31.2       MCV 74.8       Microcytes 1+       Mono # 0.43       Mono % 5.9        4       MPV 9.0       Myelocytes 1       Neutrophils, Abs 4.13       Neutrophils Relative 56.9       nRBC 0.0       NUCLEATED RBC ABSOLUTE 0.00       Ovalocytes Few       PLATELET MORPHOLOGY Platelet Clumping  Comment: FEW LARGE PLATELETS       Platelets 490       Poly Few       Potassium 4.1       RBC 5.11       RDW 16.4       Segmented Neutrophils, Man % 61       Sodium 137       WBC 7.26             Significant Imaging: I have reviewed all pertinent imaging results/findings within the past 24 hours.      Assessment/Plan:      * Closed fracture of right  hip  - acute  - R hip xr with femoral neck fracture superimposed on chronic changes  - ortho consult-- plans for surgical intervention tomorrow  - EKG with SR with incomplete R BBB  - CXR with cardiomegaly and chronic vascular congestion  - plans for surgical intervention tomorrow  - PT/OT/SS consulted for post op assistance  - heparin SQ x 1 dose tonight   - AC after surgery per surgery preference   - ALMEIDA score of 0.9% ALISA risk-- risk of MI or cardiac arrest intra op or up to 30 days post op  - RCRI score of 2 points; class III risk; 10.1% 30 day risk of death, MI, or cardiac arrest     Perioperative Risk Assessment:  Patient is at intermediate risk for perioperative cardiopulmonary complications.  This based on lack of thorough history, hx of CVA and CABG. However risk are stable and see no medical benefit in delaying surgery from medical standpoint.    Surgery 01/02    1/3  - stable post op  - hemovac in place  - PT/OT to see  - SS following for SWB assistance   1/4  - hemovac to be pulled today  - PT following  - baclofen added to spasticity   1/6  - PO day#4  - PT/OT  - SWB placement per SS  1/7  - po day#5  - dsg d/i  1/8  - po DAY#6  1/9  - po DAY#7  - Continue to attempt placement. PT continues in Hospital. SS consulted for WC and any medical devices needed.  1/10  - po DAY#8  - pt agreeable to snf for pt and rehab.SS following  1/11  - po day #8. Continues to work with PT  - pt agreeable to snf for pt and rehab.SS following  1/12  - pending placement  - continue current POC   1/15  - continue current POC  - continue working on placement       Acute on chronic combined systolic and diastolic heart failure        1/6/22  · The left ventricle is severely enlarged with mild eccentric hypertrophy and severely decreased systolic function.  · The estimated ejection fraction is 20%.  · There are segmental left ventricular wall motion abnormalities.  · Left ventricular diastolic dysfunction.  · Atrial  fibrillation not observed.  · Normal right ventricular size.  · Mild mitral regurgitation.  · Normal central venous pressure (3 mmHg).  1/7   Lisinopril 2.5 mg  - Lopressor 12.5 mg bid  1/7  - Norvasc stopped  - Lisinopril 2.5 mg  - Lopressor 12.5 mg bid  - Cardiologist appt new pt at [facility].  1/8  - Lisinopril increased 5 mg  1/18  - stable         ETOH abuse  - chronic  - unclear amount pt does partake in daily   - drinks liquor instead of beer  - monitor for DTs   - banana bag daily- MVI, thiamine, and folic acid  - ativan PO scheduled to defer DTs   - ativan IV for breakthrough symptoms   1/4  - no s/s of withdrawals   1/5  - - no s/s of DT'S  1/12  - wean ativan scheduled   1/18  - has been wean off scheduled ativan completely  - no s/s of DTs         Hx of CABG  - chronic, stable  - continue current meds  - cardiac monitoring   - EKG with SR with incomplete RBBB      GERD (gastroesophageal reflux disease)  - chronic, stable  - continue current meds       Hypertension  - chronic, stable  - continue current home meds  - monitor BP trend  - adjust meds as needed   1/4  - stable   1/7  - Norvasc stopped  - Lisinopril 2.5 mg  - Lopressor 12.5 mg bid  1/9  - BP stable  - Lisinopril increased to 5 mg daily  1/12  - stable     Right sided weakness  - chronic, stable  - hx of CVA with R sided hemiparesis   -patient is right-handed  - continue statin   1/4  - PT following  - plans for SWB   1/8  - Pt has mobility limitation that significantly impairs his MRADL'S including his ability for toileting and bathing at home.Limitations cannot be resolved with use of a cane or walker due to increased risk of falling. A wheelchair will improve the pt's ability to perform MRADL'S. Pt is willing to and able to propel in wheelchair.  1/12  - stable   1/18  - pt at baseline with his R sided weakness  - placement continues to be an issue       VTE Risk Mitigation (From admission, onward)         Ordered     apixaban tablet 2.5 mg  2  times daily         01/02/22 1916     IP VTE HIGH RISK PATIENT  Once         01/02/22 1916     Place SAGE hose  Until discontinued         01/02/22 1916     Place sequential compression device  Until discontinued         01/02/22 1916     Place sequential compression device  Until discontinued         01/01/22 1707                Discharge Planning   DEANNA:      Code Status: Full Code   Is the patient medically ready for discharge?:     Reason for patient still in hospital (select all that apply): Treatment and Pending disposition  Discharge Plan A: Rehab (Jaden Faustin)        ISADORA Link  Department of Hospital Medicine   [facility] - Orthopedic

## 2022-01-19 VITALS
SYSTOLIC BLOOD PRESSURE: 125 MMHG | OXYGEN SATURATION: 100 % | BODY MASS INDEX: 26.27 KG/M2 | HEART RATE: 89 BPM | DIASTOLIC BLOOD PRESSURE: 69 MMHG | TEMPERATURE: 98 F | WEIGHT: 198.19 LBS | HEIGHT: 73 IN | RESPIRATION RATE: 17 BRPM

## 2022-01-19 PROCEDURE — 25000003 PHARM REV CODE 250: Performed by: REGISTERED NURSE

## 2022-01-19 PROCEDURE — 97116 GAIT TRAINING THERAPY: CPT

## 2022-01-19 PROCEDURE — 25000003 PHARM REV CODE 250: Performed by: HOSPITALIST

## 2022-01-19 PROCEDURE — 97110 THERAPEUTIC EXERCISES: CPT

## 2022-01-19 PROCEDURE — 25000003 PHARM REV CODE 250: Performed by: NURSE PRACTITIONER

## 2022-01-19 PROCEDURE — 25000003 PHARM REV CODE 250: Performed by: ORTHOPAEDIC SURGERY

## 2022-01-19 RX ORDER — LISINOPRIL 5 MG/1
5 TABLET ORAL DAILY
Qty: 90 TABLET | Refills: 3 | Status: SHIPPED | OUTPATIENT
Start: 2022-01-20 | End: 2022-09-19 | Stop reason: SDUPTHER

## 2022-01-19 RX ORDER — METOPROLOL TARTRATE 25 MG/1
12.5 TABLET, FILM COATED ORAL 2 TIMES DAILY
Qty: 30 TABLET | Refills: 11 | Status: SHIPPED | OUTPATIENT
Start: 2022-01-19 | End: 2022-09-19 | Stop reason: SDUPTHER

## 2022-01-19 RX ADMIN — LISINOPRIL 5 MG: 5 TABLET ORAL at 09:01

## 2022-01-19 RX ADMIN — HYDROCODONE BITARTRATE AND ACETAMINOPHEN 1 TABLET: 5; 325 TABLET ORAL at 05:01

## 2022-01-19 RX ADMIN — ASPIRIN 81 MG: 81 TABLET, COATED ORAL at 09:01

## 2022-01-19 RX ADMIN — Medication 1000 UNITS: at 09:01

## 2022-01-19 RX ADMIN — METOPROLOL TARTRATE 12.5 MG: 25 TABLET, FILM COATED ORAL at 09:01

## 2022-01-19 RX ADMIN — PANTOPRAZOLE SODIUM 40 MG: 40 TABLET, DELAYED RELEASE ORAL at 09:01

## 2022-01-19 RX ADMIN — APIXABAN 2.5 MG: 2.5 TABLET, FILM COATED ORAL at 09:01

## 2022-01-19 RX ADMIN — THIAMINE HCL TAB 100 MG 100 MG: 100 TAB at 09:01

## 2022-01-19 RX ADMIN — HYDROCODONE BITARTRATE AND ACETAMINOPHEN 1 TABLET: 5; 325 TABLET ORAL at 12:01

## 2022-01-19 RX ADMIN — THERA TABS 1 TABLET: TAB at 09:01

## 2022-01-19 RX ADMIN — HYDROCODONE BITARTRATE AND ACETAMINOPHEN 1 TABLET: 5; 325 TABLET ORAL at 09:01

## 2022-01-19 RX ADMIN — HYDROCODONE BITARTRATE AND ACETAMINOPHEN 1 TABLET: 5; 325 TABLET ORAL at 02:01

## 2022-01-19 RX ADMIN — FOLIC ACID 1 MG: 1 TABLET ORAL at 09:01

## 2022-01-19 NOTE — DISCHARGE SUMMARY
[facility] Orthopedic  [facility] Medicine  Discharge Summary      Patient Name: Karin Carrera  MRN: [id number]  Patient Class: IP- Inpatient  Admission Date: 1/1/2022  Hospital Length of Stay: 18 days  Discharge Date and Time:  01/19/2022 11:40 AM  Attending Physician: Walker Anaya Jr., MD   Discharging Provider: ALINA Kuhn  Primary Care Provider: Walker Smith MD      HPI:   Mr Carrera is a 50 yo male who presented to the ED via EMS for c/o right leg pain after a fall. Per EMS and ED staff, he was picked up from his vehicle outside of a friend's home. He states that he lives in his vehicle. It was noted that there were numerous liquor bottles in the vehicle and around him. He was combative with EMS and has been uncooperative with much of his interview during his stay in the ED. He uses profanity easily but is aphasic during much of the ROS and interview. He will use his cell phone to type out some answers. He is a poor historian due to this and his obvious drunken state. He denies any chest pain, shortness of breath, abdominal pain, F/C, N/V/D, or changes in vision. He has right sided hemiparesis as a results of his previous CVA- however still drives. Spoke with his brother, Silver, who provided no new information- he has not seen his brother since the day before yesterday. He does state that his brother does not live with him because of the alcohol use and other private reasons. Upon workup, it was noted that he had a right femoral neck fracture. His ETOH level was 188. He is being admitted to hospitalist services with a consult to orthopaedics for surgical intervention.     He has a PMH of CABG, CVA, HTN, GERD, ETOH abuse, and nicotine abuse. It is unclear when his CABG or CVA was; unable to find dates in previous records. It is unknown if he has had covid or been vaccinated. He follows with Dr Smith for primary care. He does not follow with anyone for cardiology. Per patient, he  wishes to be a full code.     Brother- Silver- [phone]       Procedure(s) (LRB):  HEMIARTHROPLASTY, HIP (Right)      Hospital Course:   01/02 - patient seems in less pain but obviously still sore.  He is calm and cooperative.  No new issues reported.  Seen preoperatively.  As previous addressed okay with me to proceed as planned; no medical reason to delay.  Watch for alcohol withdrawals postoperatively even though patient denies daily alcohol use.  Look into placement needs.  Dr. Goodman to assume care patient for hospitalist in the a.m..  1/3: NAEO; some pain at R hip; PT/OT to see; SS to assist with placement; continue on DT prevention- pt did initially state he drank daily and is now saying he drinks every other day  1/4: NAEO; no s/s of withdrawals; asking for snuff; PT following- having some issues with spasticity-- baclofen started; continue DT prevention; SS following for SWB   1/6: Echo EF 20%. Awaiting placement. Pt refuse NH.  1/7:Norvasc stopped.Lisinopril 2.5 mg.Lopressor 12.5 mg bid.Cardiologist appt new pt at [place].  1/11: 1/11 po day #8. Continues to work with PT- pt agreeable to snf for and rehab.SS following    1/12: post op day #9; continue to work well with PT- SS with referrals to numerous places- awaiting placement  1/13: NAEO; post op day 10; continues to work with [facility]; [place] to evaluate for placement   1/14: NAEO; post day 11; awaiting placement-- numerous referrals made by SS; continues with PT   1/15: pt refusing labs and VS; caden to [facility] yesterday-- denied; SS making other referrals-- placement is an ongoing issue   1/16: NAEO; continue to attempt to find placement; continue working with PT   1/17: continue working with PT; continue attempting to find placement   1/18: NAEO; attempted acute to acute transfer to [facility]-- pt refused to go-- states he just wants to go home/ back to his van; continue therapy-- as pt is not to the point of d/c without assistance         Goals of Care Treatment Preferences:  Code Status: Full Code      Consults:   Consults (From admission, onward)        Status Ordering Provider     Inpatient consult to Social Work  Once        Provider:  (Not yet assigned)    Completed MOSHE ANTHONY     Inpatient consult to Social Work  Once        Provider:  (Not yet assigned)    Completed ALAINA PATEL     Inpatient consult to Social Work  Once        Provider:  (Not yet assigned)    Completed PAIGE MIX     IP consult case management/social work  Once        Provider:  (Not yet assigned)    Completed ALAINA PATEL     Inpatient consult to Orthopedic Surgery  Once        Provider:  Alaina Patel MD    Acknowledged PAIGE MIX          No new Assessment & Plan notes have been filed under this hospital service since the last note was generated.  Service: Hospital Medicine    Final Active Diagnoses:    Diagnosis Date Noted POA    PRINCIPAL PROBLEM:  Closed fracture of right hip [S72.001A] 01/01/2022 Yes    Acute on chronic combined systolic and diastolic heart failure [I50.43] 01/06/2022 Yes    Hypertension [I10] 01/01/2022 Yes    GERD (gastroesophageal reflux disease) [K21.9] 01/01/2022 Yes    Hx of CABG [Z95.1] 01/01/2022 Not Applicable    ETOH abuse [F10.10] 01/01/2022 Yes    Right sided weakness [R53.1] 11/02/2021 Yes      Problems Resolved During this Admission:    Diagnosis Date Noted Date Resolved POA    Chest pain [R07.9]  01/19/2022 Unknown    Hypokalemia [E87.6] 01/05/2022 01/09/2022 No       Discharged Condition: fair    Disposition: Home or Self Care    Follow Up:   Follow-up Information     Alaina Patel MD.    Specialty: Orthopedic Surgery  Why: For wound re-check please follow up on Feb 9th 3:30  Contact information:  [address] [facility] [address]  Alaina Patel Md, Orthopedic & Sports Medicine, [facility] [address]  [phone]             Walker Smith MD In 1 week.    Specialties: Internal Medicine, Family  Medicine  Contact information:  [address] [facility] [address]  [phone]             [facility] Schedule an appointment as soon as possible for a visit in 3 weeks.    Specialty: Cardiology  Contact information:  [address] [facility] [address] [phone]  Additional information:  2nd Floor                     Patient Instructions:      Diet Cardiac     Notify your health care provider if you experience any of the following:  temperature >100.4     Notify your health care provider if you experience any of the following:  persistent nausea and vomiting or diarrhea     Notify your health care provider if you experience any of the following:  severe uncontrolled pain     Notify your health care provider if you experience any of the following:  redness, tenderness, or signs of infection (pain, swelling, redness, odor or green/yellow discharge around incision site)     Notify your health care provider if you experience any of the following:  difficulty breathing or increased cough     Notify your health care provider if you experience any of the following:  severe persistent headache     Notify your health care provider if you experience any of the following:  worsening rash     Notify your health care provider if you experience any of the following:  persistent dizziness, light-headedness, or visual disturbances     Notify your health care provider if you experience any of the following:  increased confusion or weakness     Activity as tolerated       Significant Diagnostic Studies: Labs: All labs within the past 24 hours have been reviewed    Pending Diagnostic Studies:     Procedure Component Value Units Date/Time    EXTRA TUBES [[id number]] Collected: 01/01/22 1614    Order Status: Sent Lab Status: In process Updated: 01/03/22 0622    Specimen: Blood, Venous     Narrative:      The following orders were created for panel order EXTRA TUBES.  Procedure                                Abnormality         Status                     ---------                               -----------         ------                     Light Blue Top Hold[[id number]]                                                         Light Green Top Hold[[id number]]                             In process                 Gold Top Hold[[id number]]                                    In process                   Please view results for these tests on the individual orders.    Surgical Pathology [[id number]]     Order Status: Sent Lab Status: No result     Specimen: Bone          Medications:  Reconciled Home Medications:      Medication List      START taking these medications    apixaban 2.5 mg Tab  Commonly known as: ELIQUIS  Take 1 tablet (2.5 mg total) by mouth 2 (two) times daily. for 14 days     lisinopriL 5 MG tablet  Commonly known as: PRINIVIL,ZESTRIL  Take 1 tablet (5 mg total) by mouth once daily.  Start taking on: January 20, 2022     metoprolol tartrate 25 MG tablet  Commonly known as: LOPRESSOR  Take 0.5 tablets (12.5 mg total) by mouth 2 (two) times daily.     multivitamin Tab  Take 1 tablet by mouth once daily.  Start taking on: January 20, 2022        CONTINUE taking these medications    amitriptyline 25 MG tablet  Commonly known as: ELAVIL  Take 1 tablet (25 mg total) by mouth nightly as needed for Insomnia.     aspirin 81 MG EC tablet  Commonly known as: ECOTRIN  Take 1 tablet (81 mg total) by mouth once daily.     atorvastatin 40 MG tablet  Commonly known as: LIPITOR  Take 1 tablet (40 mg total) by mouth every evening.     pantoprazole 40 MG tablet  Commonly known as: PROTONIX  Take 1 tablet (40 mg total) by mouth once daily.     potassium chloride SA 20 MEQ tablet  Commonly known as: K-DUR,KLOR-CON  Take 1 tablet (20 mEq total) by mouth 2 (two) times daily.        STOP taking these medications    amLODIPine 10 MG tablet  Commonly known as: NORVASC     ondansetron 4 MG Tbdl  Commonly known as:  ZOFRAN-ODT            Indwelling Lines/Drains at time of discharge:   Lines/Drains/Airways     None                 Time spent on the discharge of patient: 30 minutes         ALINA Kuhn  Department of Hospital Medicine  [facility] - Orthopedic

## 2022-01-19 NOTE — NURSING
Called [facility] all 4 meds will be 2.00.  Nurse is telling family to go by and  meds.    1500 spoke with sister in law she will carry him to get all meds.

## 2022-01-19 NOTE — CARE UPDATE
Patient awake alert neurovascularly unchanged distally.  Wounds are clean and dry staples are out.  Patient ambulating 180 ft.  They have found his van.  Patient wishes to leave the facility.  He feels like he is a prisoner.  At this point he is doing well status post his surgery.  Keep him on anticoagulation for 1 month postoperatively.  Can be on aspirin 325 q.day if cannot get the Eliquis.  Patient needs to follow up in approximately 3-4 weeks with Dr. Chua.  Can be sent to a care facility.  Or discharged if he has any body to help him with care.

## 2022-01-19 NOTE — PLAN OF CARE
[facility] - Orthopedic  Discharge Final Note    Primary Care Provider: Walker Smith MD    Expected Discharge Date: 1/19/2022    Final Discharge Note (most recent)     Final Note - 01/19/22 1458        Final Note    Assessment Type Final Discharge Note     Anticipated Discharge Disposition Home or Self Care     What phone number can be called within the next 1-3 days to see how you are doing after discharge? [phone]        Post-Acute Status    Discharge Delays None known at this time                 Important Message from Medicare             Contact Info     Ramses Chua MD   Specialty: Orthopedic Surgery    [address] [place] [address] [address]  Ramses Chua Md, Orthopedic & Sports Medicine, [facility] [address]   Phone: [phone]       Next Steps: Follow up    Instructions: For wound re-check please follow up on Feb 9th 3:30    [facility] - Cardiology   Specialty: Cardiology    [address] [place] [id number]   Phone: [phone]       Next Steps: Schedule an appointment as soon as possible for a visit in 3 week(s)        Pt to dc home today. MARCY spoke with pts sister in law Aspen at [phone] and Aspen and pts brother Silver are coming to pick pt up for dc. Pt and nurse informed, no other needs.

## 2022-01-19 NOTE — PT/OT/SLP PROGRESS
Physical Therapy Treatment    Patient Name:  Karin Carrera   MRN:  [id number]    Recommendations:     Discharge Recommendations:  home   Discharge Equipment Recommendations: crutches, axillary   Barriers to discharge: Decreased caregiver support    Assessment:     Karin Carrera is a 51 y.o. male admitted with a medical diagnosis of Closed fracture of right hip.  He presents with the following impairments/functional limitations:  impaired functional mobilty,gait instability,impaired balance,decreased coordination,pain,abnormal tone,decreased ROM,orthopedic precautions Patient making huge strides with therapy. Transfers in and out of bed independently. Able to walk down the [place] with left crutch with CGA. Close to being at previous functional baseline.    Rehab Prognosis: Good; patient would benefit from acute skilled PT services to address these deficits and reach maximum level of function.    Recent Surgery: Procedure(s) (LRB):  HEMIARTHROPLASTY, HIP (Right) 17 Days Post-Op    Plan:     During this hospitalization, patient to be seen BID to address the identified rehab impairments via gait training,therapeutic activities and progress toward the following goals:    · Plan of Care Expires:  02/03/22    Subjective     Chief Complaint: right hip soreness  Patient/Family Comments/goals: Social work trying to find placement  Pain/Comfort:  Pain Rating 1: 0/10      Objective:     Communicated with nurse prior to session.  Patient found supine with peripheral IV upon PT entry to room.     General Precautions: Standard, fall   Orthopedic Precautions:RLE anterior precautions,RLE weight bearing as tolerated   Braces:    Respiratory Status: Room air     Functional Mobility:  · Bed Mobility:     · Supine to Sit: contact guard assistance  · Sit to Supine: contact guard assistance  · Transfers:     · Sit to Stand:  contact guard assistance with hand-held assist  · Gait: ambulated 180 feet with cga using left crutchleft  step too,       AM-PAC 6 CLICK MOBILITY  Turning over in bed (including adjusting bedclothes, sheets and blankets)?: 3  Sitting down on and standing up from a chair with arms (e.g., wheelchair, bedside commode, etc.): 3  Moving from lying on back to sitting on the side of the bed?: 3  Moving to and from a bed to a chair (including a wheelchair)?: 3  Need to walk in hospital room?: 3  Climbing 3-5 steps with a railing?: 3  Basic Mobility Total Score: 18       Therapeutic Activities and Exercises:   right le: hs, saq, abd-add, slr 3x10    Patient left up in chair with call button in reach..    GOALS:   Multidisciplinary Problems     Physical Therapy Goals        Problem: Physical Therapy Goal    Goal Priority Disciplines Outcome Goal Variances Interventions   Physical Therapy Goal     PT, PT/OT Ongoing, Progressing     Description: Short Term Goals  Independent with HEP  Independent with cane x 100 feet PWB/WBAT: right lower extremity  Cga supine-sit-stand    Long term goals  Needed equipment for home.   Independent with hip precautsion                       Time Tracking:     PT Received On: 01/19/22  PT Start Time: 1230     PT Stop Time: 1300  PT Total Time (min): 30 min     Billable Minutes: gait 10, there ex 15      Treatment Type: Treatment  PT/PTA: PT     PTA Visit Number: 0     01/19/2022

## 2022-02-09 ENCOUNTER — HOSPITAL ENCOUNTER (OUTPATIENT)
Dept: RADIOLOGY | Facility: HOSPITAL | Age: 52
Discharge: HOME | End: 2022-02-09
Attending: ORTHOPAEDIC SURGERY
Payer: MEDICAID

## 2022-02-09 DIAGNOSIS — S72.001D CLOSED FRACTURE OF RIGHT HIP WITH ROUTINE HEALING, SUBSEQUENT ENCOUNTER: ICD-10-CM

## 2022-02-09 PROCEDURE — 73502 X-RAY EXAM HIP UNI 2-3 VIEWS: CPT | Mod: TC,RT

## 2022-03-21 ENCOUNTER — HOSPITAL ENCOUNTER (OUTPATIENT)
Dept: RADIOLOGY | Facility: HOSPITAL | Age: 52
Discharge: HOME | End: 2022-03-21
Attending: ORTHOPAEDIC SURGERY
Payer: MEDICAID

## 2022-03-21 DIAGNOSIS — S72.001D CLOSED FRACTURE OF RIGHT HIP WITH ROUTINE HEALING, SUBSEQUENT ENCOUNTER: ICD-10-CM

## 2022-03-21 PROCEDURE — 73502 X-RAY EXAM HIP UNI 2-3 VIEWS: CPT | Mod: TC,RT

## 2022-09-19 ENCOUNTER — OFFICE VISIT (OUTPATIENT)
Dept: FAMILY MEDICINE | Facility: CLINIC | Age: 52
End: 2022-09-19
Payer: MEDICAID

## 2022-09-19 VITALS
RESPIRATION RATE: 20 BRPM | WEIGHT: 215 LBS | HEIGHT: 73 IN | SYSTOLIC BLOOD PRESSURE: 132 MMHG | DIASTOLIC BLOOD PRESSURE: 70 MMHG | HEART RATE: 97 BPM | TEMPERATURE: 97 F | OXYGEN SATURATION: 99 % | BODY MASS INDEX: 28.49 KG/M2

## 2022-09-19 DIAGNOSIS — I10 PRIMARY HYPERTENSION: Primary | ICD-10-CM

## 2022-09-19 DIAGNOSIS — K21.9 GASTROESOPHAGEAL REFLUX DISEASE WITHOUT ESOPHAGITIS: ICD-10-CM

## 2022-09-19 PROCEDURE — 3075F PR MOST RECENT SYSTOLIC BLOOD PRESS GE 130-139MM HG: ICD-10-PCS | Mod: CPTII,,, | Performed by: INTERNAL MEDICINE

## 2022-09-19 PROCEDURE — 99214 OFFICE O/P EST MOD 30 MIN: CPT | Mod: ,,, | Performed by: INTERNAL MEDICINE

## 2022-09-19 PROCEDURE — 1160F PR REVIEW ALL MEDS BY PRESCRIBER/CLIN PHARMACIST DOCUMENTED: ICD-10-PCS | Mod: CPTII,,, | Performed by: INTERNAL MEDICINE

## 2022-09-19 PROCEDURE — 4010F PR ACE/ARB THEARPY RXD/TAKEN: ICD-10-PCS | Mod: CPTII,,, | Performed by: INTERNAL MEDICINE

## 2022-09-19 PROCEDURE — 1159F MED LIST DOCD IN RCRD: CPT | Mod: CPTII,,, | Performed by: INTERNAL MEDICINE

## 2022-09-19 PROCEDURE — 3078F PR MOST RECENT DIASTOLIC BLOOD PRESSURE < 80 MM HG: ICD-10-PCS | Mod: CPTII,,, | Performed by: INTERNAL MEDICINE

## 2022-09-19 PROCEDURE — 1160F RVW MEDS BY RX/DR IN RCRD: CPT | Mod: CPTII,,, | Performed by: INTERNAL MEDICINE

## 2022-09-19 PROCEDURE — 3075F SYST BP GE 130 - 139MM HG: CPT | Mod: CPTII,,, | Performed by: INTERNAL MEDICINE

## 2022-09-19 PROCEDURE — 3008F PR BODY MASS INDEX (BMI) DOCUMENTED: ICD-10-PCS | Mod: CPTII,,, | Performed by: INTERNAL MEDICINE

## 2022-09-19 PROCEDURE — 99214 PR OFFICE/OUTPT VISIT, EST, LEVL IV, 30-39 MIN: ICD-10-PCS | Mod: ,,, | Performed by: INTERNAL MEDICINE

## 2022-09-19 PROCEDURE — 3078F DIAST BP <80 MM HG: CPT | Mod: CPTII,,, | Performed by: INTERNAL MEDICINE

## 2022-09-19 PROCEDURE — 1159F PR MEDICATION LIST DOCUMENTED IN MEDICAL RECORD: ICD-10-PCS | Mod: CPTII,,, | Performed by: INTERNAL MEDICINE

## 2022-09-19 PROCEDURE — 4010F ACE/ARB THERAPY RXD/TAKEN: CPT | Mod: CPTII,,, | Performed by: INTERNAL MEDICINE

## 2022-09-19 PROCEDURE — 3008F BODY MASS INDEX DOCD: CPT | Mod: CPTII,,, | Performed by: INTERNAL MEDICINE

## 2022-09-19 PROCEDURE — 3044F PR MOST RECENT HEMOGLOBIN A1C LEVEL <7.0%: ICD-10-PCS | Mod: CPTII,,, | Performed by: INTERNAL MEDICINE

## 2022-09-19 PROCEDURE — 3044F HG A1C LEVEL LT 7.0%: CPT | Mod: CPTII,,, | Performed by: INTERNAL MEDICINE

## 2022-09-19 RX ORDER — METOPROLOL TARTRATE 25 MG/1
12.5 TABLET, FILM COATED ORAL 2 TIMES DAILY
Qty: 90 TABLET | Refills: 1 | Status: SHIPPED | OUTPATIENT
Start: 2022-09-19 | End: 2022-12-19

## 2022-09-19 RX ORDER — AMITRIPTYLINE HYDROCHLORIDE 25 MG/1
25 TABLET, FILM COATED ORAL NIGHTLY
Qty: 90 TABLET | Refills: 1 | Status: SHIPPED | OUTPATIENT
Start: 2022-09-19 | End: 2023-02-07

## 2022-09-19 RX ORDER — ATORVASTATIN CALCIUM 40 MG/1
40 TABLET, FILM COATED ORAL NIGHTLY
Qty: 90 TABLET | Refills: 1 | Status: SHIPPED | OUTPATIENT
Start: 2022-09-19 | End: 2023-03-27 | Stop reason: SDUPTHER

## 2022-09-19 RX ORDER — ASPIRIN 81 MG/1
81 TABLET ORAL DAILY
Qty: 90 TABLET | Refills: 1 | Status: SHIPPED | OUTPATIENT
Start: 2022-09-19 | End: 2023-03-27 | Stop reason: SDUPTHER

## 2022-09-19 RX ORDER — PANTOPRAZOLE SODIUM 40 MG/1
40 TABLET, DELAYED RELEASE ORAL DAILY
Qty: 90 TABLET | Refills: 1 | Status: SHIPPED | OUTPATIENT
Start: 2022-09-19 | End: 2023-03-27 | Stop reason: SDUPTHER

## 2022-09-19 RX ORDER — LISINOPRIL 5 MG/1
5 TABLET ORAL DAILY
Qty: 90 TABLET | Refills: 1 | Status: SHIPPED | OUTPATIENT
Start: 2022-09-19 | End: 2023-09-13 | Stop reason: SDUPTHER

## 2022-09-19 NOTE — PROGRESS NOTES
Subjective:       Patient ID: Karin Carrera is a 51 y.o. male.    Chief Complaint: Hypertension, Hyperlipidemia, Gastroesophageal Reflux, and Medication Refill    Patient is here today for a follow up evaluation. Patient blood pressure is stable today on intake, 132/70. Patient has no new complaints. Patient is requesting medication refills. Will refill today. Will follow in 3 months.     Current Medications:    Current Outpatient Medications:     amitriptyline (ELAVIL) 25 MG tablet, Take 1 tablet (25 mg total) by mouth nightly as needed for Insomnia., Disp: 20 tablet, Rfl: 2    aspirin (ECOTRIN) 81 MG EC tablet, Take 1 tablet (81 mg total) by mouth once daily., Disp: 90 tablet, Rfl: 0    atorvastatin (LIPITOR) 40 MG tablet, Take 1 tablet (40 mg total) by mouth every evening., Disp: 90 tablet, Rfl: 0    lisinopriL (PRINIVIL,ZESTRIL) 5 MG tablet, Take 1 tablet (5 mg total) by mouth once daily., Disp: 90 tablet, Rfl: 3    metoprolol tartrate (LOPRESSOR) 25 MG tablet, Take 0.5 tablets (12.5 mg total) by mouth 2 (two) times daily., Disp: 30 tablet, Rfl: 11    potassium chloride SA (K-DUR,KLOR-CON) 20 MEQ tablet, Take 1 tablet (20 mEq total) by mouth 2 (two) times daily., Disp: 20 tablet, Rfl: 0    pantoprazole (PROTONIX) 40 MG tablet, Take 1 tablet (40 mg total) by mouth once daily., Disp: 90 tablet, Rfl: 0    Last Labs:     No visits with results within 1 Month(s) from this visit.   Latest known visit with results is:   No results displayed because visit has over 200 results.          Last Imaging:  X-Ray Hip 2 or 3 views Right (with Pelvis when performed)  See Procedure Notes for results.     IMPRESSION: Please see Ortho procedure notes for report.      This procedure was auto-finalized by: Virtual Radiologist         Review of Systems   All other systems reviewed and are negative.      Objective:      Physical Exam  Constitutional:       Appearance: Normal appearance. He is normal weight.   Cardiovascular:       Rate and Rhythm: Normal rate and regular rhythm.      Pulses: Normal pulses.      Heart sounds: Normal heart sounds.   Pulmonary:      Effort: Pulmonary effort is normal.      Breath sounds: Normal breath sounds.   Abdominal:      General: Abdomen is flat. Bowel sounds are normal.      Palpations: Abdomen is soft.   Musculoskeletal:         General: Normal range of motion.      Cervical back: Normal range of motion and neck supple.   Skin:     General: Skin is warm and dry.   Neurological:      General: No focal deficit present.      Mental Status: He is alert and oriented to person, place, and time. Mental status is at baseline.       Assessment:       1. Primary hypertension        2. Gastroesophageal reflux disease without esophagitis             Plan:         Karin was seen today for hypertension, hyperlipidemia, gastroesophageal reflux and medication refill.    Diagnoses and all orders for this visit:    Primary hypertension    Gastroesophageal reflux disease without esophagitis    Other orders  The following orders have not been finalized:  -     amitriptyline (ELAVIL) 25 MG tablet  -     aspirin (ECOTRIN) 81 MG EC tablet  -     atorvastatin (LIPITOR) 40 MG tablet  -     lisinopriL (PRINIVIL,ZESTRIL) 5 MG tablet  -     metoprolol tartrate (LOPRESSOR) 25 MG tablet  -     pantoprazole (PROTONIX) 40 MG tablet

## 2022-09-19 NOTE — PATIENT INSTRUCTIONS
Karin was seen today for hypertension, hyperlipidemia, gastroesophageal reflux and medication refill.    Diagnoses and all orders for this visit:    Primary hypertension    Gastroesophageal reflux disease without esophagitis    Other orders  The following orders have not been finalized:  -     amitriptyline (ELAVIL) 25 MG tablet  -     aspirin (ECOTRIN) 81 MG EC tablet  -     atorvastatin (LIPITOR) 40 MG tablet  -     lisinopriL (PRINIVIL,ZESTRIL) 5 MG tablet  -     metoprolol tartrate (LOPRESSOR) 25 MG tablet  -     pantoprazole (PROTONIX) 40 MG tablet

## 2022-11-15 DIAGNOSIS — I63.9 CVA (CEREBRAL VASCULAR ACCIDENT): Primary | ICD-10-CM

## 2022-11-15 DIAGNOSIS — R47.9 DIFFICULTY WITH SPEECH: ICD-10-CM

## 2022-12-05 ENCOUNTER — CLINICAL SUPPORT (OUTPATIENT)
Dept: REHABILITATION | Facility: HOSPITAL | Age: 52
End: 2022-12-05
Payer: MEDICAID

## 2022-12-05 DIAGNOSIS — R47.9 DIFFICULTY WITH SPEECH: ICD-10-CM

## 2022-12-05 DIAGNOSIS — I63.9 CVA (CEREBRAL VASCULAR ACCIDENT): ICD-10-CM

## 2022-12-05 PROCEDURE — 92522 EVALUATE SPEECH PRODUCTION: CPT

## 2022-12-05 NOTE — PROGRESS NOTES
"Outpatient Speech and Language Therapy Evaluation    Date: 12/5/2022     Name: Karin Carrera   MRN: [id number]    Therapy Diagnosis: No diagnosis found. Physician: Referring, Unknown  Physician Orders: Walker Smith MD  Medical Diagnosis from Referral: Stroke    Visit #/Visits authorized: 1/ 1  Date of Evaluation:  12/5/2022   Insurance Authorization Period: 12/12/22 to 2/13/2023  Plan of Care Expiration:  ED@ to 12/12/22 to 2/13/2023     Time In: 1:00 pm  Time Out: 1:40 pm    Procedure Min.   Evaluation of Speech Fluency  40         Precautions:Standard  Subjective   Date of Onset: September 2016 is when patient had stroke. Patient fell January 1, 2022, breaking his hip.  History of Current Condition:   Patient is 52 year old man who presents with good cognition, orientation, and receptive language, but shows severe expressive aphasia. Patient had a stroke 2016, and fell (breaking his hip) at a friend's house in January, 2020.  Past Medical History: Karin Carrera  has a past medical history of GERD (gastroesophageal reflux disease), Hypertension, and Stroke.  Karin Carrera  has a past surgical history that includes Cardiac surgery and Hemiarthroplasty of hip (Right, 1/2/2022).  Medical Hx and Allergies:  Karin has a current medication list which includes the following prescription(s): amitriptyline, aspirin, atorvastatin, lisinopril, metoprolol tartrate, pantoprazole, and potassium chloride sa. Review of patient's allergies indicates:  No Known Allergies  Prior Therapy:  Patient reports he has not had speech therapy in the past year. Patient reports he was here, at [facility], 5 years ago for it.  Social History:  Patient lives at [facility], in his own apartment by himself.  Prior Level of Function: Stroke in 2016 caused his aphasia.   Current Level of Function: Patient has severe expressive aphasia. Patient can say a few words like "damn," "yeah" and "no."  Pain:   " 1/10  Pain Location / Description: Right ankle, the side affected by stroke.  Nutrition:  Patient reports he eats well.  Patient's Therapy Goals:  Patient wants to be able to express himself and talk better.  Objective   Portions of Ross Information Processing Index 2 and Western Aphasia Battery used. Patient has expressive aphasia. Patient has good orientation, recall, and general knowledge. Patient problem solving and cognitive loading is mildly impaired, within functional limits for patient's purposes currently.    Treatment   Treatment Time In: n/a  Treatment Time Out: n/a  Total Treatment Time: n/a  Evaluation    Education: Plan of Care Patient expressed understanding.    Home Program: Patient instructed to practice writing or copying words from a brochure, pamphlet, book, et cetera. Patient instructed to practice humming different pitches and volume. Patient instructed to try singing along to radio or mimicking what he hears on television.  Assessment   Karin is a 52 y.o. male referred to outpatient SpeechTherapy with a medical diagnosis of stroke. Patient presents with expressive aphasia  Demonstrates impairments including limitations as described in the problem list. Positive prognostic factors include patient participation, stimulability. Negative prognostic factors include severity of aphasia. No barriers to therapy noted. Patient will benefit from skilled, outpatient neurological rehabilitation speech therapy.    Rehab Potential: good  Patient's spiritual, cultural and educational needs considered and patient agreeable to plan of care and goals.    Short Term Goals (1 per week, 12 weeks):   Patient will say one word with 75% accuracy/intelligibility. Current: 25%  Patient will write sentences with 75% accuracy/intelligibility. Current: 25%  3.   Patient will repeat at syllable level with 75% accuracy. Current 45%    Long Term Goals (1 per week, 12 weeks):   Patient will improve expressive language ability  commensurate with maximum capabilities.    Plan   Plan of Care Certification: 12/5/2022  to 2/13/2023.  Recommended Treatment Plan:  Patient will participate in the [facility] outpatient rehabilitation program for speech therapy 1 times per week to address his  Communication deficits, to educate patient and their family, and to participate in a home exercise program.    Medicaid requirements:  Plan of Care Dates: 12/5/2022 to 2/13/2023  CPT codes and number of units needed per visit: 06542/1  Last face to face visit with MD: 9/19/2022  Short Term Goals (1 per week, 12 weeks):   Patient will say one word with 75% accuracy/intelligibility. Current: 25%  Patient will write sentences with 75% accuracy/intelligibility. Current: 25%  3.   Patient will repeat at syllable level with 75% accuracy. Current 45%    Long Term Goals (1 per week, 12 weeks):   Patient will improve expressive language ability commensurate with maximum capabilities.    Home exercise program and patient compliance: Patient encouraged to practice writing, repeating, and saying small words.  Discharge Plan: As indicated by therapy progress.      Physician Signature:_________________________________________________________  Date: ____________________     Therapist's Name:   Braden Persaud CCC-SLP     Date: 12/5/2022

## 2022-12-06 NOTE — PLAN OF CARE
"   Outpatient Speech and Language Therapy Evaluation     Date: 12/5/2022      Name: Karin Carrera   MRN: [id number]           Therapy Diagnosis: No diagnosis found. Physician: Referring, Unknown  Physician Orders: Walker Smith MD  Medical Diagnosis from Referral: Stroke     Visit #/Visits authorized: 1/ 1  Date of Evaluation:  12/5/2022   Insurance Authorization Period: 12/12/22 to 2/13/2023  Plan of Care Expiration:  ED@ to 12/12/22 to 2/13/2023      Time In: 1:00 pm  Time Out: 1:40 pm     Procedure Min.   Evaluation of Speech Fluency  40            Precautions:Standard  Subjective   Date of Onset: September 2016 is when patient had stroke. Patient fell January 1, 2022, breaking his hip.  History of Current Condition:   Patient is 52 year old man who presents with good cognition, orientation, and receptive language, but shows severe expressive aphasia. Patient had a stroke 2016, and fell (breaking his hip) at a friend's house in January, 2020.  Past Medical History: Karin Carrera  has a past medical history of GERD (gastroesophageal reflux disease), Hypertension, and Stroke.  Karin Carrera  has a past surgical history that includes Cardiac surgery and Hemiarthroplasty of hip (Right, 1/2/2022).  Medical Hx and Allergies:  Karin has a current medication list which includes the following prescription(s): amitriptyline, aspirin, atorvastatin, lisinopril, metoprolol tartrate, pantoprazole, and potassium chloride sa. Review of patient's allergies indicates:  No Known Allergies  Prior Therapy:  Patient reports he has not had speech therapy in the past year. Patient reports he was here, at [facility], 5 years ago for it.  Social History:  Patient lives at [facility], in his own apartment by himself.  Prior Level of Function: Stroke in 2016 caused his aphasia.   Current Level of Function: Patient has severe expressive aphasia. Patient can say a few words like "damn," "yeah" " "and "no."  Pain:   1/10  Pain Location / Description: Right ankle, the side affected by stroke.  Nutrition:  Patient reports he eats well.  Patient's Therapy Goals:  Patient wants to be able to express himself and talk better.  Objective   Portions of Ross Information Processing Index 2 and Western Aphasia Battery used. Patient has expressive aphasia. Patient has good orientation, recall, and general knowledge. Patient problem solving and cognitive loading is mildly impaired, within functional limits for patient's purposes currently.     Treatment   Treatment Time In: n/a  Treatment Time Out: n/a  Total Treatment Time: n/a  Evaluation     Education: Plan of Care Patient expressed understanding.     Home Program: Patient instructed to practice writing or copying words from a brochure, pamphlet, book, et cetera. Patient instructed to practice humming different pitches and volume. Patient instructed to try singing along to radio or mimicking what he hears on television.  Assessment   Karin is a 52 y.o. male referred to outpatient SpeechTherapy with a medical diagnosis of stroke. Patient presents with expressive aphasia  Demonstrates impairments including limitations as described in the problem list. Positive prognostic factors include patient participation, stimulability. Negative prognostic factors include severity of aphasia. No barriers to therapy noted. Patient will benefit from skilled, outpatient neurological rehabilitation speech therapy.     Rehab Potential: good  Patient's spiritual, cultural and educational needs considered and patient agreeable to plan of care and goals.     Short Term Goals (1 per week, 12 weeks):   Patient will say one word with 75% accuracy/intelligibility. Current: 25%  Patient will write sentences with 75% accuracy/intelligibility. Current: 25%  3.   Patient will repeat at syllable level with 75% accuracy. Current 45%     Long Term Goals (1 per week, 12 weeks):   Patient will improve " expressive language ability commensurate with maximum capabilities.     Plan   Plan of Care Certification: 12/5/2022  to 2/13/2023.  Recommended Treatment Plan:  Patient will participate in the [facility] outpatient rehabilitation program for speech therapy 1 times per week to address his  Communication deficits, to educate patient and their family, and to participate in a home exercise program.     Medicaid requirements:  Plan of Care Dates: 12/5/2022 to 2/13/2023  CPT codes and number of units needed per visit: 38736/1  Last face to face visit with MD: 9/19/2022  Short Term Goals (1 per week, 12 weeks):   Patient will say one word with 75% accuracy/intelligibility. Current: 25%  Patient will write sentences with 75% accuracy/intelligibility. Current: 25%  3.   Patient will repeat at syllable level with 75% accuracy. Current 45%     Long Term Goals (1 per week, 12 weeks):   Patient will improve expressive language ability commensurate with maximum capabilities.    Home exercise program and patient compliance: Patient encouraged to practice writing, repeating, and saying small words.  Discharge Plan: As indicated by therapy progress.        Physician Signature:_________________________________________________________  Date: ____________________      Therapist's Name:   Braden Persaud CCC-SLP      Date: 12/5/2022

## 2022-12-19 ENCOUNTER — HOSPITAL ENCOUNTER (INPATIENT)
Facility: HOSPITAL | Age: 52
LOS: 8 days | Discharge: HOME | End: 2022-12-27
Attending: EMERGENCY MEDICINE | Admitting: INTERNAL MEDICINE
Payer: MEDICAID

## 2022-12-19 DIAGNOSIS — R09.02 HYPOXIA: ICD-10-CM

## 2022-12-19 DIAGNOSIS — I50.9 ACUTE ON CHRONIC CONGESTIVE HEART FAILURE, UNSPECIFIED HEART FAILURE TYPE: Primary | ICD-10-CM

## 2022-12-19 DIAGNOSIS — R06.02 SHORTNESS OF BREATH: ICD-10-CM

## 2022-12-19 DIAGNOSIS — J96.01 ACUTE HYPOXEMIC RESPIRATORY FAILURE: ICD-10-CM

## 2022-12-19 DIAGNOSIS — R07.9 CHEST PAIN: ICD-10-CM

## 2022-12-19 DIAGNOSIS — M79.89 LEG SWELLING: ICD-10-CM

## 2022-12-19 DIAGNOSIS — M79.606 LEG PAIN: ICD-10-CM

## 2022-12-19 PROBLEM — J18.9 PNEUMONIA DUE TO INFECTIOUS ORGANISM: Status: ACTIVE | Noted: 2022-12-19

## 2022-12-19 PROBLEM — Z86.73 HISTORY OF CVA (CEREBROVASCULAR ACCIDENT): Status: ACTIVE | Noted: 2022-12-19

## 2022-12-19 LAB
ALBUMIN SERPL BCP-MCNC: 3.3 G/DL (ref 3.5–5)
ALBUMIN/GLOB SERPL: 0.9 {RATIO}
ALP SERPL-CCNC: 131 U/L (ref 45–115)
ALT SERPL W P-5'-P-CCNC: 14 U/L (ref 16–61)
ANION GAP SERPL CALCULATED.3IONS-SCNC: 18 MMOL/L (ref 7–16)
ANISOCYTOSIS BLD QL SMEAR: ABNORMAL
AST SERPL W P-5'-P-CCNC: 21 U/L (ref 15–37)
BASOPHILS # BLD AUTO: 0.04 K/UL (ref 0–0.2)
BASOPHILS NFR BLD AUTO: 0.5 % (ref 0–1)
BILIRUB SERPL-MCNC: 0.9 MG/DL (ref ?–1.2)
BILIRUB UR QL STRIP: NEGATIVE
BUN SERPL-MCNC: 21 MG/DL (ref 7–18)
BUN/CREAT SERPL: 22 (ref 6–20)
CALCIUM SERPL-MCNC: 8.6 MG/DL (ref 8.5–10.1)
CHLORIDE SERPL-SCNC: 99 MMOL/L (ref 98–107)
CLARITY UR: CLEAR
CO2 SERPL-SCNC: 21 MMOL/L (ref 21–32)
COLOR UR: ABNORMAL
CREAT SERPL-MCNC: 0.96 MG/DL (ref 0.7–1.3)
DIFFERENTIAL METHOD BLD: ABNORMAL
EGFR (NO RACE VARIABLE) (RUSH/TITUS): 95 ML/MIN/1.73M²
EOSINOPHIL # BLD AUTO: 0.08 K/UL (ref 0–0.5)
EOSINOPHIL NFR BLD AUTO: 0.9 % (ref 1–4)
ERYTHROCYTE [DISTWIDTH] IN BLOOD BY AUTOMATED COUNT: 20.1 % (ref 11.5–14.5)
FLUAV AG UPPER RESP QL IA.RAPID: NEGATIVE
FLUBV AG UPPER RESP QL IA.RAPID: NEGATIVE
GLOBULIN SER-MCNC: 3.7 G/DL (ref 2–4)
GLUCOSE SERPL-MCNC: 188 MG/DL (ref 74–106)
GLUCOSE UR STRIP-MCNC: 100 MG/DL
HCT VFR BLD AUTO: 27.9 % (ref 40–54)
HGB BLD-MCNC: 8.2 G/DL (ref 13.5–18)
HYPOCHROMIA BLD QL SMEAR: ABNORMAL
IMM GRANULOCYTES # BLD AUTO: 0.06 K/UL (ref 0–0.04)
IMM GRANULOCYTES NFR BLD: 0.7 % (ref 0–0.4)
INR BLD: 1.35
KETONES UR STRIP-SCNC: ABNORMAL MG/DL
LEUKOCYTE ESTERASE UR QL STRIP: NEGATIVE
LYMPHOCYTES # BLD AUTO: 1.52 K/UL (ref 1–4.8)
LYMPHOCYTES NFR BLD AUTO: 17.2 % (ref 27–41)
MAGNESIUM SERPL-MCNC: 2.1 MG/DL (ref 1.7–2.3)
MCH RBC QN AUTO: 20 PG (ref 27–31)
MCHC RBC AUTO-ENTMCNC: 29.4 G/DL (ref 32–36)
MCV RBC AUTO: 68.2 FL (ref 80–96)
MICROCYTES BLD QL SMEAR: ABNORMAL
MONOCYTES # BLD AUTO: 0.54 K/UL (ref 0–0.8)
MONOCYTES NFR BLD AUTO: 6.1 % (ref 2–6)
MPC BLD CALC-MCNC: 8.6 FL (ref 9.4–12.4)
NEUTROPHILS # BLD AUTO: 6.58 K/UL (ref 1.8–7.7)
NEUTROPHILS NFR BLD AUTO: 74.6 % (ref 53–65)
NITRITE UR QL STRIP: NEGATIVE
NRBC # BLD AUTO: 0 X10E3/UL
NRBC, AUTO (.00): 0 %
NT-PROBNP SERPL-MCNC: 5692 PG/ML (ref 1–125)
OVALOCYTES BLD QL SMEAR: ABNORMAL
PH UR STRIP: 5.5 PH UNITS
PLATELET # BLD AUTO: 398 K/UL (ref 150–400)
PLATELET MORPHOLOGY: ABNORMAL
POC OCCULT BLOOD STOOL: NEGATIVE
POLYCHROMASIA BLD QL SMEAR: ABNORMAL
POTASSIUM SERPL-SCNC: 4.2 MMOL/L (ref 3.5–5.1)
PROT SERPL-MCNC: 7 G/DL (ref 6.4–8.2)
PROT UR QL STRIP: NEGATIVE
PROTHROMBIN TIME: 16.1 SECONDS (ref 11.7–14.7)
RBC # BLD AUTO: 4.09 M/UL (ref 4.6–6.2)
RBC # UR STRIP: NEGATIVE /UL
SARS-COV+SARS-COV-2 AG RESP QL IA.RAPID: NEGATIVE
SODIUM SERPL-SCNC: 134 MMOL/L (ref 136–145)
SP GR UR STRIP: 1.02
TROPONIN I SERPL HS-MCNC: 24.2 PG/ML
TSH SERPL DL<=0.005 MIU/L-ACNC: 1.97 UIU/ML (ref 0.36–3.74)
UROBILINOGEN UR STRIP-ACNC: NORMAL MG/DL
WBC # BLD AUTO: 8.82 K/UL (ref 4.5–11)

## 2022-12-19 PROCEDURE — 93010 ELECTROCARDIOGRAM REPORT: CPT | Mod: ,,, | Performed by: INTERNAL MEDICINE

## 2022-12-19 PROCEDURE — 80053 COMPREHEN METABOLIC PANEL: CPT | Performed by: EMERGENCY MEDICINE

## 2022-12-19 PROCEDURE — 99223 1ST HOSP IP/OBS HIGH 75: CPT | Mod: ,,, | Performed by: STUDENT IN AN ORGANIZED HEALTH CARE EDUCATION/TRAINING PROGRAM

## 2022-12-19 PROCEDURE — 83735 ASSAY OF MAGNESIUM: CPT | Performed by: EMERGENCY MEDICINE

## 2022-12-19 PROCEDURE — 84484 ASSAY OF TROPONIN QUANT: CPT | Performed by: EMERGENCY MEDICINE

## 2022-12-19 PROCEDURE — 93005 ELECTROCARDIOGRAM TRACING: CPT

## 2022-12-19 PROCEDURE — 99285 EMERGENCY DEPT VISIT HI MDM: CPT | Mod: CS,,, | Performed by: EMERGENCY MEDICINE

## 2022-12-19 PROCEDURE — 93010 EKG 12-LEAD: ICD-10-PCS | Mod: ,,, | Performed by: INTERNAL MEDICINE

## 2022-12-19 PROCEDURE — 25000003 PHARM REV CODE 250: Performed by: STUDENT IN AN ORGANIZED HEALTH CARE EDUCATION/TRAINING PROGRAM

## 2022-12-19 PROCEDURE — 87040 BLOOD CULTURE FOR BACTERIA: CPT | Performed by: EMERGENCY MEDICINE

## 2022-12-19 PROCEDURE — 11000001 HC ACUTE MED/SURG PRIVATE ROOM

## 2022-12-19 PROCEDURE — 82272 OCCULT BLD FECES 1-3 TESTS: CPT

## 2022-12-19 PROCEDURE — 85025 COMPLETE CBC W/AUTO DIFF WBC: CPT | Performed by: EMERGENCY MEDICINE

## 2022-12-19 PROCEDURE — 81003 URINALYSIS AUTO W/O SCOPE: CPT | Performed by: EMERGENCY MEDICINE

## 2022-12-19 PROCEDURE — 36415 COLL VENOUS BLD VENIPUNCTURE: CPT | Performed by: EMERGENCY MEDICINE

## 2022-12-19 PROCEDURE — 96374 THER/PROPH/DIAG INJ IV PUSH: CPT

## 2022-12-19 PROCEDURE — 84443 ASSAY THYROID STIM HORMONE: CPT | Performed by: EMERGENCY MEDICINE

## 2022-12-19 PROCEDURE — 99223 PR INITIAL HOSPITAL CARE,LEVL III: ICD-10-PCS | Mod: ,,, | Performed by: STUDENT IN AN ORGANIZED HEALTH CARE EDUCATION/TRAINING PROGRAM

## 2022-12-19 PROCEDURE — 25000242 PHARM REV CODE 250 ALT 637 W/ HCPCS: Performed by: EMERGENCY MEDICINE

## 2022-12-19 PROCEDURE — 63600175 PHARM REV CODE 636 W HCPCS: Performed by: STUDENT IN AN ORGANIZED HEALTH CARE EDUCATION/TRAINING PROGRAM

## 2022-12-19 PROCEDURE — 63600175 PHARM REV CODE 636 W HCPCS: Performed by: EMERGENCY MEDICINE

## 2022-12-19 PROCEDURE — 25500020 PHARM REV CODE 255: Performed by: EMERGENCY MEDICINE

## 2022-12-19 PROCEDURE — 83880 ASSAY OF NATRIURETIC PEPTIDE: CPT | Performed by: EMERGENCY MEDICINE

## 2022-12-19 PROCEDURE — 99285 PR EMERGENCY DEPT VISIT,LEVEL V: ICD-10-PCS | Mod: CS,,, | Performed by: EMERGENCY MEDICINE

## 2022-12-19 PROCEDURE — 85610 PROTHROMBIN TIME: CPT | Performed by: EMERGENCY MEDICINE

## 2022-12-19 PROCEDURE — 99285 EMERGENCY DEPT VISIT HI MDM: CPT | Mod: 25

## 2022-12-19 PROCEDURE — 87428 SARSCOV & INF VIR A&B AG IA: CPT | Performed by: EMERGENCY MEDICINE

## 2022-12-19 PROCEDURE — 25000003 PHARM REV CODE 250: Performed by: EMERGENCY MEDICINE

## 2022-12-19 RX ORDER — IBUPROFEN 200 MG
16 TABLET ORAL
Status: DISCONTINUED | OUTPATIENT
Start: 2022-12-19 | End: 2022-12-27 | Stop reason: HOSPADM

## 2022-12-19 RX ORDER — IBUPROFEN 200 MG
24 TABLET ORAL
Status: DISCONTINUED | OUTPATIENT
Start: 2022-12-19 | End: 2022-12-27 | Stop reason: HOSPADM

## 2022-12-19 RX ORDER — FUROSEMIDE 10 MG/ML
60 INJECTION INTRAMUSCULAR; INTRAVENOUS
Status: COMPLETED | OUTPATIENT
Start: 2022-12-19 | End: 2022-12-19

## 2022-12-19 RX ORDER — GLUCAGON 1 MG
1 KIT INJECTION
Status: DISCONTINUED | OUTPATIENT
Start: 2022-12-19 | End: 2022-12-27 | Stop reason: HOSPADM

## 2022-12-19 RX ORDER — LEVALBUTEROL INHALATION SOLUTION 1.25 MG/3ML
1.25 SOLUTION RESPIRATORY (INHALATION)
Status: COMPLETED | OUTPATIENT
Start: 2022-12-19 | End: 2022-12-19

## 2022-12-19 RX ORDER — AMITRIPTYLINE HYDROCHLORIDE 25 MG/1
25 TABLET, FILM COATED ORAL NIGHTLY
Status: DISCONTINUED | OUTPATIENT
Start: 2022-12-19 | End: 2022-12-27 | Stop reason: HOSPADM

## 2022-12-19 RX ORDER — ASPIRIN 81 MG/1
81 TABLET ORAL DAILY
Status: DISCONTINUED | OUTPATIENT
Start: 2022-12-19 | End: 2022-12-27 | Stop reason: HOSPADM

## 2022-12-19 RX ORDER — ACETAMINOPHEN 325 MG/1
650 TABLET ORAL EVERY 8 HOURS PRN
Status: DISCONTINUED | OUTPATIENT
Start: 2022-12-19 | End: 2022-12-20

## 2022-12-19 RX ORDER — HYDROCODONE BITARTRATE AND ACETAMINOPHEN 7.5; 325 MG/1; MG/1
1 TABLET ORAL EVERY 6 HOURS PRN
Status: DISCONTINUED | OUTPATIENT
Start: 2022-12-19 | End: 2022-12-19

## 2022-12-19 RX ORDER — FUROSEMIDE 10 MG/ML
40 INJECTION INTRAMUSCULAR; INTRAVENOUS 2 TIMES DAILY
Status: DISCONTINUED | OUTPATIENT
Start: 2022-12-19 | End: 2022-12-21

## 2022-12-19 RX ORDER — POTASSIUM CHLORIDE 20 MEQ/1
20 TABLET, EXTENDED RELEASE ORAL 2 TIMES DAILY
Status: DISCONTINUED | OUTPATIENT
Start: 2022-12-19 | End: 2022-12-27 | Stop reason: HOSPADM

## 2022-12-19 RX ORDER — PANTOPRAZOLE SODIUM 40 MG/1
40 TABLET, DELAYED RELEASE ORAL DAILY
Status: DISCONTINUED | OUTPATIENT
Start: 2022-12-19 | End: 2022-12-27 | Stop reason: HOSPADM

## 2022-12-19 RX ORDER — ENOXAPARIN SODIUM 100 MG/ML
40 INJECTION SUBCUTANEOUS EVERY 24 HOURS
Status: DISCONTINUED | OUTPATIENT
Start: 2022-12-19 | End: 2022-12-27 | Stop reason: HOSPADM

## 2022-12-19 RX ORDER — NALOXONE HCL 0.4 MG/ML
0.02 VIAL (ML) INJECTION
Status: DISCONTINUED | OUTPATIENT
Start: 2022-12-19 | End: 2022-12-27 | Stop reason: HOSPADM

## 2022-12-19 RX ORDER — IPRATROPIUM BROMIDE AND ALBUTEROL SULFATE 2.5; .5 MG/3ML; MG/3ML
3 SOLUTION RESPIRATORY (INHALATION) EVERY 6 HOURS PRN
Status: DISCONTINUED | OUTPATIENT
Start: 2022-12-19 | End: 2022-12-27 | Stop reason: HOSPADM

## 2022-12-19 RX ORDER — SODIUM CHLORIDE 0.9 % (FLUSH) 0.9 %
10 SYRINGE (ML) INJECTION EVERY 12 HOURS PRN
Status: DISCONTINUED | OUTPATIENT
Start: 2022-12-19 | End: 2022-12-27 | Stop reason: HOSPADM

## 2022-12-19 RX ORDER — ATORVASTATIN CALCIUM 40 MG/1
40 TABLET, FILM COATED ORAL NIGHTLY
Status: DISCONTINUED | OUTPATIENT
Start: 2022-12-19 | End: 2022-12-27 | Stop reason: HOSPADM

## 2022-12-19 RX ORDER — HYDROCODONE BITARTRATE AND ACETAMINOPHEN 7.5; 325 MG/1; MG/1
1 TABLET ORAL
Status: COMPLETED | OUTPATIENT
Start: 2022-12-19 | End: 2022-12-19

## 2022-12-19 RX ADMIN — ENOXAPARIN SODIUM 40 MG: 40 INJECTION SUBCUTANEOUS at 05:12

## 2022-12-19 RX ADMIN — IOPAMIDOL 100 ML: 755 INJECTION, SOLUTION INTRAVENOUS at 11:12

## 2022-12-19 RX ADMIN — POTASSIUM CHLORIDE 20 MEQ: 1500 TABLET, EXTENDED RELEASE ORAL at 10:12

## 2022-12-19 RX ADMIN — ASPIRIN 81 MG: 81 TABLET, DELAYED RELEASE ORAL at 03:12

## 2022-12-19 RX ADMIN — AZITHROMYCIN MONOHYDRATE 500 MG: 500 INJECTION, POWDER, LYOPHILIZED, FOR SOLUTION INTRAVENOUS at 12:12

## 2022-12-19 RX ADMIN — HYDROCODONE BITARTRATE AND ACETAMINOPHEN 1 TABLET: 7.5; 325 TABLET ORAL at 08:12

## 2022-12-19 RX ADMIN — ATORVASTATIN CALCIUM 40 MG: 40 TABLET, FILM COATED ORAL at 10:12

## 2022-12-19 RX ADMIN — FUROSEMIDE 60 MG: 10 INJECTION, SOLUTION INTRAMUSCULAR; INTRAVENOUS at 10:12

## 2022-12-19 RX ADMIN — AMITRIPTYLINE HYDROCHLORIDE 25 MG: 25 TABLET, FILM COATED ORAL at 10:12

## 2022-12-19 RX ADMIN — CEFTRIAXONE SODIUM 1 G: 1 INJECTION, POWDER, FOR SOLUTION INTRAMUSCULAR; INTRAVENOUS at 12:12

## 2022-12-19 RX ADMIN — FUROSEMIDE 40 MG: 10 INJECTION, SOLUTION INTRAMUSCULAR; INTRAVENOUS at 03:12

## 2022-12-19 RX ADMIN — HYDROCODONE BITARTRATE AND ACETAMINOPHEN 1 TABLET: 7.5; 325 TABLET ORAL at 01:12

## 2022-12-19 RX ADMIN — LEVALBUTEROL HYDROCHLORIDE 1.25 MG: 1.25 SOLUTION RESPIRATORY (INHALATION) at 08:12

## 2022-12-19 RX ADMIN — PANTOPRAZOLE SODIUM 40 MG: 40 TABLET, DELAYED RELEASE ORAL at 03:12

## 2022-12-19 NOTE — ASSESSMENT & PLAN NOTE
Productive cough with fevers at home -ct supports pneumonia  Now with respiratory failure  CAP coverage

## 2022-12-19 NOTE — ED PROVIDER NOTES
Encounter Date: 12/19/2022       History     Chief Complaint   Patient presents with    Shortness of Breath    Cough    Leg Swelling     Complains of shortness of breath for the past several days.  Associated with coughing and pain in his lower extremities.  Has been swelling to his lower extremities.  Arrives by EMS.  Symptoms are severe.  No other associated symptoms or modifying factors    Review of patient's allergies indicates:  No Known Allergies  Past Medical History:   Diagnosis Date    GERD (gastroesophageal reflux disease)     Hypertension     Stroke      Past Surgical History:   Procedure Laterality Date    CARDIAC SURGERY      HEMIARTHROPLASTY OF HIP Right 1/2/2022    Procedure: HEMIARTHROPLASTY, HIP;  Surgeon: Ramses Chua MD;  Location: [facility];  Service: Orthopedics;  Laterality: Right;     Family History   Problem Relation Age of Onset    Hypertension Mother      Social History     Tobacco Use    Smoking status: Every Day     Packs/day: 0.50     Types: Cigarettes    Smokeless tobacco: Current     Types: Chew   Substance Use Topics    Alcohol use: Yes     Comment: 1 quart    Drug use: Never     Review of Systems   Constitutional:  Negative for fever.   HENT:  Negative for sore throat.    Respiratory:  Positive for cough and shortness of breath.    Cardiovascular:  Negative for chest pain.   Gastrointestinal:  Negative for nausea.   Genitourinary:  Negative for dysuria.   Musculoskeletal:  Negative for back pain.   Skin:  Negative for rash.   Neurological:  Negative for weakness.   Hematological:  Does not bruise/bleed easily.     Physical Exam     Initial Vitals [12/19/22 0820]   BP Pulse Resp Temp SpO2   134/69 (!) 119 (!) 33 98.4 °F (36.9 °C) (!) 92 %      MAP       --         Physical Exam    Nursing note and vitals reviewed.  Constitutional: He appears well-developed and well-nourished.   HENT:   Head: Normocephalic and atraumatic.   Eyes: EOM are normal. Pupils are equal, round, and  reactive to light.   Neck: Neck supple. No thyromegaly present. No JVD present.   Normal range of motion.  Cardiovascular:  Regular rhythm, normal heart sounds and intact distal pulses.           No murmur heard.  Tachycardia   Pulmonary/Chest: No stridor. No respiratory distress. He has no wheezes. He has rhonchi.   Abdominal: Abdomen is soft. Bowel sounds are normal. He exhibits no distension. There is no abdominal tenderness.   Musculoskeletal:         General: No tenderness or edema. Normal range of motion.      Cervical back: Normal range of motion and neck supple.      Comments: Swelling bilateral lower extremities left greater than right     Lymphadenopathy:     He has no cervical adenopathy.   Neurological: He is alert and oriented to person, place, and time. He has normal strength. No cranial nerve deficit or sensory deficit. GCS score is 15. GCS eye subscore is 4. GCS verbal subscore is 5. GCS motor subscore is 6.   Chronically aphasic from prior stroke   Skin: Skin is warm and dry. Capillary refill takes less than 2 seconds. No rash noted.   Psychiatric: He has a normal mood and affect.       Medical Screening Exam   See Full Note    ED Course   Procedures  Labs Reviewed   COMPREHENSIVE METABOLIC PANEL - Abnormal; Notable for the following components:       Result Value    Sodium 134 (*)     Anion Gap 18 (*)     Glucose 188 (*)     BUN 21 (*)     BUN/Creatinine Ratio 22 (*)     Albumin 3.3 (*)     Alk Phos 131 (*)     ALT 14 (*)     All other components within normal limits   NT-PRO NATRIURETIC PEPTIDE - Abnormal; Notable for the following components:    ProBNP 5,692 (*)     All other components within normal limits   URINALYSIS, REFLEX TO URINE CULTURE - Abnormal; Notable for the following components:    Glucose,  (*)     All other components within normal limits   PROTIME-INR - Abnormal; Notable for the following components:    PT 16.1 (*)     All other components within normal limits   CBC WITH  DIFFERENTIAL - Abnormal; Notable for the following components:    RBC 4.09 (*)     Hemoglobin 8.2 (*)     Hematocrit 27.9 (*)     MCV 68.2 (*)     MCH 20.0 (*)     MCHC 29.4 (*)     RDW 20.1 (*)     MPV 8.6 (*)     Neutrophils % 74.6 (*)     Lymphocytes % 17.2 (*)     Monocytes % 6.1 (*)     Eosinophils % 0.9 (*)     Immature Granulocytes % 0.7 (*)     Immature Granulocytes, Absolute 0.06 (*)     All other components within normal limits   CBC MORPHOLOGY - Abnormal; Notable for the following components:    Platelet Morphology Few Large Platelets (*)     All other components within normal limits   TSH - Normal   TROPONIN I - Normal   MAGNESIUM - Normal   SARS-COV2 (COVID) W/ FLU ANTIGEN - Normal    Narrative:     Negative SARS-CoV results should not be used as the sole basis for treatment or patient management decisions; negative results should be considered in the context of a patient's recent exposures, history and the presene of clinical signs and symptoms consistent with COVID-19.  Negative results should be treated as presumptive and confirmed by molecular assay, if necessary for patient management.   CULTURE, BLOOD   CULTURE, BLOOD   CBC W/ AUTO DIFFERENTIAL    Narrative:     The following orders were created for panel order CBC auto differential.  Procedure                               Abnormality         Status                     ---------                               -----------         ------                     CBC with Differential[[id number]]        Abnormal            Final result                 Please view results for these tests on the individual orders.   IRON AND TIBC   FERRITIN   POCT OCCULT BLOOD (STOOL)        ECG Results              EKG 12-lead (In process)  Result time 12/19/22 08:40:05      In process by Interface, Lab In [facility] (12/19/22 08:40:05)                   Narrative:    Test Reason : R06.02,    Vent. Rate : 114 BPM     Atrial Rate : 000 BPM     P-R Int : 212 ms          QRS Dur  : 100 ms      QT Int : 334 ms       P-R-T Axes : 080 -30 113 degrees     QTc Int : 429 ms    Sinus tachycardia  with borderline 1st degree A-V block  Possible left atrial abnormality  Left axis deviation  Left ventricular hypertrophy  Septal and lateral ST-T abnormality  may be due to the hypertrophy and/or  ischemia  Abnormal ECG      Referred By: [email or web address]   SELF           Confirmed By:                                   Imaging Results              CTA Chest Non-Coronary (PE Studies) (Final result)  Result time 12/19/22 11:14:25      Final result by Addison Garzon II, MD (12/19/22 11:14:25)                   Impression:      No evidence of pulmonary thromboembolism.  Pulmonary airspace density as described above, likely pneumonia.      Electronically signed by: Addison Garzon  Date:    12/19/2022  Time:    11:14               Narrative:    EXAMINATION:  CTA CHEST NON CORONARY (PE STUDIES)    CLINICAL HISTORY:  Pulmonary embolism (PE) suspected, high prob;    TECHNIQUE:  Axial CT imaging of the chest is performed with intravenous contrast. Contrast dose is 100 cc Isovue 370.    CT dose reduction technique used - Dose Rite and tube current modulation.    COMPARISON:  None available    FINDINGS:  No thrombus or other abnormality is identified in the pulmonary arteries or veins.  The pulmonary vessel caliber is within normal limits.  Cardiac size is enlarged with previous cardiac surgery changes..  Otherwise the heart, mediastinum and great vessels appear within normal limits.    Extensive pulmonary airspace density present in the right lung.  Small amounts present in the left lung.  Remaining parenchyma shows no evidence of airspace disease or abnormal density.  No effusion or pneumothorax is present.                                       US Lower Extremity Arteries Bilateral (Final result)  Result time 12/19/22 10:42:12      Final result by Taye Bolivar DO (12/19/22 10:42:12)                   Impression:       The ankle to brachial index is 0.92 on the right and 0.93 on the left.  No convincing arterial occlusion.  Velocity elevations and monophasic waveforms of the bilateral lower extremity arteries as detailed above.    Suspect cardiac arrhythmia. Correlate with EKG.    Point of Service: [facility]      Electronically signed by: Taye Bolivar  Date:    12/19/2022  Time:    10:42               Narrative:    EXAMINATION:  US LOWER EXTREMITY ARTERIES BILATERAL    CLINICAL HISTORY:  Pain in leg, unspecified    COMPARISON:  None.    FINDINGS:  An arterial Doppler study was performed with interrogation of the bilateral  common femoral artery, deep femoral artery, superficial femoral artery, popliteal artery, and posterior tibial artery. Interrogation includes grayscale, color-flow, and spectral waveform evaluation.    No arterial occlusions demonstrated.  Moderately elevated velocity of the right CFA, left profundal femoral artery, mid right SFA, distal right SFA, and distal left SFA measuring 168, 180, 172, 140, and 145 centimeters/second respectively.  Mildly elevated velocity within the left CFA, proximal left SFA and mid left SFA measuring 131 and 120 centimeters/second respectively.  Severely elevated velocity within the proximal right SFA measuring 212 centimeters/second.  Monophasic waveforms demonstrated throughout the majority of the bilateral lower extremity arteries.  Grayscale imaging demonstrates bilateral atherosclerotic plaque.  Suspect cardiac arrhythmia.  Correlate with EKG.    Bilateral ankle brachial index was separately requested. Segmental blood pressures were obtained and bilateral ABIs calculated. The ankle to brachial index is 0.92 on the right and 0.93 on the left.                                       US Lower Extremity Veins Bilateral (Final result)  Result time 12/19/22 10:33:00      Final result by Taye Bolivar DO (12/19/22 10:33:00)                   Impression:      No evidence  of deep venous thrombosis in either lower extremity.    Point of Service: [facility]      Electronically signed by: Taye Bolivar  Date:    12/19/2022  Time:    10:33               Narrative:    EXAMINATION:  US LOWER EXTREMITY VEINS BILATERAL    CLINICAL HISTORY:  Other specified soft tissue disorders    COMPARISON:  No relevant comparison .    TECHNIQUE:  Grayscale, spectral, and color Doppler interrogation of the bilateral lower extremity veins was performed. Augmentation and compression was performed.    FINDINGS:  Grayscale, color Doppler, and pulsed Doppler evaluation of the veins of the bilateral lower extremity demonstrate no evidence of deep venous thrombosis.                                       X-Ray Chest 1 View (Final result)  Result time 12/19/22 09:02:48      Final result by Addison Garzon II, MD (12/19/22 09:02:48)                   Impression:      Findings suggest cardiac decompensation and / or pneumonia.      Electronically signed by: Addison Garzon  Date:    12/19/2022  Time:    09:02               Narrative:    EXAMINATION:  XR CHEST 1 VIEW    CLINICAL HISTORY:  Shortness of breath    COMPARISON:  None.    1 January 2022    FINDINGS:  The heart and mediastinum are stable in size and configuration.  The pulmonary vascularity is slightly increased with bilateral increased interstitial lung density.  Alveolar density also present mostly in the right lung.  Small right and small to moderate left pleural effusions.  No other lung infiltrates, effusions, pneumothorax or other abnormality is demonstrated.                                       Medications   HYDROcodone-acetaminophen 7.5-325 mg per tablet 1 tablet (1 tablet Oral Given 12/19/22 0853)   cefTRIAXone (ROCEPHIN) 1 g in dextrose 5 % in water (D5W) 5 % 50 mL IVPB (MB+) (has no administration in time range)   azithromycin (ZITHROMAX) 500 mg in dextrose 5 % 250 mL IVPB (has no administration in time range)   levalbuterol  nebulizer solution 1.25 mg (1.25 mg Nebulization Given 12/19/22 0852)   furosemide injection 60 mg (60 mg Intravenous Given 12/19/22 1031)   iopamidoL (ISOVUE-370) injection 100 mL (100 mLs Intravenous Given 12/19/22 1103)                 ED Course as of 12/19/22 1211   Mon Dec 19, 2022   0938 No fever no white count.  Presentation consistent with CHF exacerbation.  Have considered pneumonia but unlikely.  A were blood cultures will defer on antibiotics at this time.  Troponin negative.  Have considered myocardial infarction but unlikely consider no acute ischemic changes on EKG and normal high sensitivity troponin.  Patient with some chronic anemia hemoglobin 8.2 microcytic worse compared to several months ago.  Will add iron studies.  Review chest x-ray findings and reviewed images by ED.  Agree with radiologist diffuse opacities bilateral lung fields. [PK]   1049 US Lower Extremity Arteries Bilateral [PK]   1147 After CT scan, will consider pneumonia although with elevated bnp and probnp most likely acute chf.  Will treat empirically for pneumonia. [PK]   1148 Have treated with Lasix   [PK]      ED Course User Index  [PK] Jose Cruz Bergman MD          Clinical Impression:   Final diagnoses:  [R06.02] Shortness of breath  [M79.89] Leg swelling  [M79.606] Leg pain  [I50.9] Acute on chronic congestive heart failure, unspecified heart failure type (Primary)  [R09.02] Hypoxia        ED Disposition Condition    Admit Stable                Jose Cruz Bergman MD  12/19/22 1211

## 2022-12-19 NOTE — SUBJECTIVE & OBJECTIVE
Past Medical History:   Diagnosis Date    GERD (gastroesophageal reflux disease)     Hypertension     Stroke        Past Surgical History:   Procedure Laterality Date    CARDIAC SURGERY      HEMIARTHROPLASTY OF HIP Right 1/2/2022    Procedure: HEMIARTHROPLASTY, HIP;  Surgeon: Ramses Chua MD;  Location: [facility];  Service: Orthopedics;  Laterality: Right;       Review of patient's allergies indicates:  No Known Allergies    No current facility-administered medications on file prior to encounter.     Current Outpatient Medications on File Prior to Encounter   Medication Sig    amitriptyline (ELAVIL) 25 MG tablet Take 1 tablet (25 mg total) by mouth every evening.    aspirin (ECOTRIN) 81 MG EC tablet Take 1 tablet (81 mg total) by mouth once daily.    atorvastatin (LIPITOR) 40 MG tablet Take 1 tablet (40 mg total) by mouth every evening.    lisinopriL (PRINIVIL,ZESTRIL) 5 MG tablet Take 1 tablet (5 mg total) by mouth once daily.    pantoprazole (PROTONIX) 40 MG tablet Take 1 tablet (40 mg total) by mouth once daily.    potassium chloride SA (K-DUR,KLOR-CON) 20 MEQ tablet Take 1 tablet (20 mEq total) by mouth 2 (two) times daily.    [DISCONTINUED] metoprolol tartrate (LOPRESSOR) 25 MG tablet Take 0.5 tablets (12.5 mg total) by mouth 2 (two) times daily.     Family History       Problem Relation (Age of Onset)    Hypertension Mother          Tobacco Use    Smoking status: Every Day     Packs/day: 0.50     Types: Cigarettes    Smokeless tobacco: Current     Types: Chew   Substance and Sexual Activity    Alcohol use: Yes     Comment: 1 quart    Drug use: Never    Sexual activity: Not on file     Review of Systems   Constitutional:  Positive for chills and fever. Negative for fatigue and unexpected weight change.   HENT:  Negative for congestion, mouth sores and sore throat.    Eyes:  Negative for photophobia and visual disturbance.   Respiratory:  Positive for cough and shortness of breath. Negative for chest  tightness and wheezing.    Cardiovascular:  Positive for chest pain and leg swelling. Negative for palpitations.   Gastrointestinal:  Positive for nausea. Negative for abdominal pain, diarrhea and vomiting.   Endocrine: Negative for cold intolerance and heat intolerance.   Genitourinary:  Negative for difficulty urinating, dysuria, frequency and urgency.   Musculoskeletal:  Negative for arthralgias, back pain and myalgias.   Skin:  Negative for pallor and rash.   Neurological:  Negative for tremors, seizures, syncope, weakness, numbness and headaches.   Hematological:  Does not bruise/bleed easily.   Psychiatric/Behavioral:  Negative for agitation, confusion, hallucinations and suicidal ideas.    Objective:     Vital Signs (Most Recent):  Temp: 98.4 °F (36.9 °C) (12/19/22 0820)  Pulse: (!) 112 (12/19/22 1450)  Resp: (!) 24 (12/19/22 1450)  BP: 120/73 (12/19/22 1450)  SpO2: 97 % (12/19/22 1350)   Vital Signs (24h Range):  Temp:  [98.4 °F (36.9 °C)] 98.4 °F (36.9 °C)  Pulse:  [] 112  Resp:  [16-34] 24  SpO2:  [92 %-97 %] 97 %  BP: (111-142)/(64-74) 120/73     Weight: 102.1 kg (225 lb)  Body mass index is 29.69 kg/m².    Physical Exam  Vitals reviewed.   Constitutional:       Appearance: Normal appearance. He is obese.   HENT:      Head: Normocephalic and atraumatic.      Nose: Nose normal.   Eyes:      Extraocular Movements: Extraocular movements intact.      Conjunctiva/sclera: Conjunctivae normal.   Neck:      Trachea: Trachea normal.   Cardiovascular:      Rate and Rhythm: Regular rhythm. Tachycardia present.      Pulses: Normal pulses.      Heart sounds: Normal heart sounds.   Pulmonary:      Effort: Respiratory distress present.      Breath sounds: Normal breath sounds and air entry.      Comments: Inspiratory crackles in the bases bilaterally.  Abdominal:      General: Bowel sounds are normal.      Palpations: Abdomen is soft.   Musculoskeletal:      Cervical back: Neck supple.      Comments: Flaccid  paralysis rue, moves all other extremities   Skin:     General: Skin is warm and dry.      Capillary Refill: Capillary refill takes less than 2 seconds.   Neurological:      Mental Status: He is alert. Mental status is at baseline.      Cranial Nerves: Cranial nerves 2-12 are intact.      Comments: Expressive aphasia and flaccid RUE weakness from old CVA   Psychiatric:         Mood and Affect: Mood and affect normal.         Behavior: Behavior is cooperative.           Significant Labs: All pertinent labs within the past 24 hours have been reviewed.    Significant Imaging: I have reviewed all pertinent imaging results/findings within the past 24 hours.

## 2022-12-19 NOTE — ASSESSMENT & PLAN NOTE
Patient is identified as having Combined Systolic and Diastolic heart failure that is Acute on chronic. CHF is currently uncontrolled due to Continued edema of extremities, >3 pillow orthopnea and Rales/crackles on pulmonary exam. Latest ECHO performed and demonstrates- Results for orders placed during the hospital encounter of 01/01/22    Echo    Interpretation Summary  · The left ventricle is severely enlarged with mild eccentric hypertrophy and severely decreased systolic function.  · The estimated ejection fraction is 20%.  · There are segmental left ventricular wall motion abnormalities.  · Left ventricular diastolic dysfunction.  · Atrial fibrillation not observed.  · Normal right ventricular size.  · Mild mitral regurgitation.  · Normal central venous pressure (3 mmHg).  . Continue Furosemide and monitor clinical status closely. Monitor on telemetry. Patient is off CHF pathway.  Monitor strict Is&Os and daily weights.  Place on fluid restriction of 1.5 L. Continue to stress to patient importance of self efficacy and  on diet for CHF. Last BNP reviewed- and noted below No results for input(s): BNP, BNPTRIAGEBLO in the last 168 hours..

## 2022-12-19 NOTE — ASSESSMENT & PLAN NOTE
Patient with Hypoxic Respiratory failure which is Acute.  he is not on home oxygen. Supplemental oxygen was provided and noted-  .   Signs/symptoms of respiratory failure include- tachypnea, increased work of breathing and hypoxia. Contributing diagnoses includes - CHF and Pneumonia Labs and images were reviewed. Patient Has not had a recent ABG. Will treat underlying causes and adjust management of respiratory failure as follows-     Suspect multifactorial HFrEF exacerbation +/- pneumonia  Feels better after 3L out in ED. Continue IV lasix 40mg BID  Fluid/salt restrict  Daily weight  Strict I/O    CAP coverage  Pneumonia called on chest CT  Productive cough and fevers at home support- no white count  Continue abx

## 2022-12-19 NOTE — ED TRIAGE NOTES
Presents to ED per [facility] for complaints of Bilateral Lower Extremity Edema, Shortness of Breath and Cough from [facility].  Patient has been coughing for 3 weeks per EMS.

## 2022-12-19 NOTE — HPI
52yowm with PMH of CAD s/p CABG, CVA, HTN, GERD, ETOH abuse, and nicotine abuse who presents to the ED with SOB and bilateral LE edema.  His lower extremity edema was sudden onset and began about 3 days ago.  He has some degree of expressive aphasia presumably from his CVA in the past.  He also complains of SOB, cough with sputum production and chest pain.  Chest pain feels like pressure, intermittent with no identifiable alleviating or aggravating factors.  No radiation. No clear duration given by the patient.  +orthopnea, denies PND.  +Fevers/chills.  ROS otherwise negative.

## 2022-12-19 NOTE — H&P
[facility] - Emergency Department  Hospital Medicine  History & Physical    Patient Name: Karin Carrera  MRN: [id number]  Patient Class: IP- Inpatient  Admission Date: 12/19/2022  Attending Physician: Aleks Whipple DO   Primary Care Provider: Walker Smith MD         Patient information was obtained from patient, past medical records and ER records.     Subjective:     Principal Problem:Acute hypoxemic respiratory failure    Chief Complaint:   Chief Complaint   Patient presents with    Shortness of Breath    Cough    Leg Swelling        HPI: 52yowm with PMH of CAD s/p CABG, CVA, HTN, GERD, ETOH abuse, and nicotine abuse who presents to the ED with SOB and bilateral LE edema.  His lower extremity edema was sudden onset and began about 3 days ago.  He has some degree of expressive aphasia presumably from his CVA in the past.  He also complains of SOB, cough with sputum production and chest pain.  Chest pain feels like pressure, intermittent with no identifiable alleviating or aggravating factors.  No radiation. No clear duration given by the patient.  +orthopnea, denies PND.  +Fevers/chills.  ROS otherwise negative.       Past Medical History:   Diagnosis Date    GERD (gastroesophageal reflux disease)     Hypertension     Stroke        Past Surgical History:   Procedure Laterality Date    CARDIAC SURGERY      HEMIARTHROPLASTY OF HIP Right 1/2/2022    Procedure: HEMIARTHROPLASTY, HIP;  Surgeon: Ramses Chua MD;  [facility];  Service: Orthopedics;  Laterality: Right;       Review of patient's allergies indicates:  No Known Allergies    No current facility-administered medications on file prior to encounter.     Current Outpatient Medications on File Prior to Encounter   Medication Sig    amitriptyline (ELAVIL) 25 MG tablet Take 1 tablet (25 mg total) by mouth every evening.    aspirin (ECOTRIN) 81 MG EC tablet Take 1 tablet (81 mg total) by mouth once daily.     atorvastatin (LIPITOR) 40 MG tablet Take 1 tablet (40 mg total) by mouth every evening.    lisinopriL (PRINIVIL,ZESTRIL) 5 MG tablet Take 1 tablet (5 mg total) by mouth once daily.    pantoprazole (PROTONIX) 40 MG tablet Take 1 tablet (40 mg total) by mouth once daily.    potassium chloride SA (K-DUR,KLOR-CON) 20 MEQ tablet Take 1 tablet (20 mEq total) by mouth 2 (two) times daily.    [DISCONTINUED] metoprolol tartrate (LOPRESSOR) 25 MG tablet Take 0.5 tablets (12.5 mg total) by mouth 2 (two) times daily.     Family History       Problem Relation (Age of Onset)    Hypertension Mother          Tobacco Use    Smoking status: Every Day     Packs/day: 0.50     Types: Cigarettes    Smokeless tobacco: Current     Types: Chew   Substance and Sexual Activity    Alcohol use: Yes     Comment: 1 quart    Drug use: Never    Sexual activity: Not on file     Review of Systems   Constitutional:  Positive for chills and fever. Negative for fatigue and unexpected weight change.   HENT:  Negative for congestion, mouth sores and sore throat.    Eyes:  Negative for photophobia and visual disturbance.   Respiratory:  Positive for cough and shortness of breath. Negative for chest tightness and wheezing.    Cardiovascular:  Positive for chest pain and leg swelling. Negative for palpitations.   Gastrointestinal:  Positive for nausea. Negative for abdominal pain, diarrhea and vomiting.   Endocrine: Negative for cold intolerance and heat intolerance.   Genitourinary:  Negative for difficulty urinating, dysuria, frequency and urgency.   Musculoskeletal:  Negative for arthralgias, back pain and myalgias.   Skin:  Negative for pallor and rash.   Neurological:  Negative for tremors, seizures, syncope, weakness, numbness and headaches.   Hematological:  Does not bruise/bleed easily.   Psychiatric/Behavioral:  Negative for agitation, confusion, hallucinations and suicidal ideas.    Objective:     Vital Signs (Most Recent):  Temp: 98.4 °F  (36.9 °C) (12/19/22 0820)  Pulse: (!) 112 (12/19/22 1450)  Resp: (!) 24 (12/19/22 1450)  BP: 120/73 (12/19/22 1450)  SpO2: 97 % (12/19/22 1350)   Vital Signs (24h Range):  Temp:  [98.4 °F (36.9 °C)] 98.4 °F (36.9 °C)  Pulse:  [] 112  Resp:  [16-34] 24  SpO2:  [92 %-97 %] 97 %  BP: (111-142)/(64-74) 120/73     Weight: 102.1 kg (225 lb)  Body mass index is 29.69 kg/m².    Physical Exam  Vitals reviewed.   Constitutional:       Appearance: Normal appearance. He is obese.   HENT:      Head: Normocephalic and atraumatic.      Nose: Nose normal.   Eyes:      Extraocular Movements: Extraocular movements intact.      Conjunctiva/sclera: Conjunctivae normal.   Neck:      Trachea: Trachea normal.   Cardiovascular:      Rate and Rhythm: Regular rhythm. Tachycardia present.      Pulses: Normal pulses.      Heart sounds: Normal heart sounds.   Pulmonary:      Effort: Respiratory distress present.      Breath sounds: Normal breath sounds and air entry.      Comments: Inspiratory crackles in the bases bilaterally.  Abdominal:      General: Bowel sounds are normal.      Palpations: Abdomen is soft.   Musculoskeletal:      Cervical back: Neck supple.      Comments: Flaccid paralysis rue, moves all other extremities   Skin:     General: Skin is warm and dry.      Capillary Refill: Capillary refill takes less than 2 seconds.   Neurological:      Mental Status: He is alert. Mental status is at baseline.      Cranial Nerves: Cranial nerves 2-12 are intact.      Comments: Expressive aphasia and flaccid RUE weakness from old CVA   Psychiatric:         Mood and Affect: Mood and affect normal.         Behavior: Behavior is cooperative.           Significant Labs: All pertinent labs within the past 24 hours have been reviewed.    Significant Imaging: I have reviewed all pertinent imaging results/findings within the past 24 hours.    Assessment/Plan:     * Acute hypoxemic respiratory failure  Patient with Hypoxic Respiratory failure  which is Acute.  he is not on home oxygen. Supplemental oxygen was provided and noted-  .   Signs/symptoms of respiratory failure include- tachypnea, increased work of breathing and hypoxia. Contributing diagnoses includes - CHF and Pneumonia Labs and images were reviewed. Patient Has not had a recent ABG. Will treat underlying causes and adjust management of respiratory failure as follows-     Suspect multifactorial HFrEF exacerbation +/- pneumonia  Feels better after 3L out in ED. Continue IV lasix 40mg BID  Fluid/salt restrict  Daily weight  Strict I/O    CAP coverage  Pneumonia called on chest CT  Productive cough and fevers at home support- no white count  Continue abx    History of CVA (cerebrovascular accident)  Asa/statin  From assisted living- return on DC      Pneumonia due to infectious organism  Productive cough with fevers at home -ct supports pneumonia  Now with respiratory failure  CAP coverage      Acute on chronic congestive heart failure  Patient is identified as having Combined Systolic and Diastolic heart failure that is Acute on chronic. CHF is currently uncontrolled due to Continued edema of extremities, >3 pillow orthopnea and Rales/crackles on pulmonary exam. Latest ECHO performed and demonstrates- Results for orders placed during the hospital encounter of 01/01/22    Echo    Interpretation Summary  · The left ventricle is severely enlarged with mild eccentric hypertrophy and severely decreased systolic function.  · The estimated ejection fraction is 20%.  · There are segmental left ventricular wall motion abnormalities.  · Left ventricular diastolic dysfunction.  · Atrial fibrillation not observed.  · Normal right ventricular size.  · Mild mitral regurgitation.  · Normal central venous pressure (3 mmHg).  . Continue Furosemide and monitor clinical status closely. Monitor on telemetry. Patient is off CHF pathway.  Monitor strict Is&Os and daily weights.  Place on fluid restriction of 1.5 L.  Continue to stress to patient importance of self efficacy and  on diet for CHF. Last BNP reviewed- and noted below No results for input(s): BNP, BNPTRIAGEBLO in the last 168 hours..      Hx of CABG  Asa/statin/bb      GERD (gastroesophageal reflux disease)  PPI      Hypertension  Adjust medications as needed        VTE Risk Mitigation (From admission, onward)         Ordered     enoxaparin injection 40 mg  Daily         12/19/22 1426     IP VTE HIGH RISK PATIENT  Once         12/19/22 1426     Place sequential compression device  Until discontinued         12/19/22 1426                   Alkes Whipple DO  Department of Hospital Medicine   [facility] - Emergency Department

## 2022-12-20 PROCEDURE — 63600175 PHARM REV CODE 636 W HCPCS: Performed by: STUDENT IN AN ORGANIZED HEALTH CARE EDUCATION/TRAINING PROGRAM

## 2022-12-20 PROCEDURE — 99232 PR SUBSEQUENT HOSPITAL CARE,LEVL II: ICD-10-PCS | Mod: ,,, | Performed by: STUDENT IN AN ORGANIZED HEALTH CARE EDUCATION/TRAINING PROGRAM

## 2022-12-20 PROCEDURE — 11000001 HC ACUTE MED/SURG PRIVATE ROOM

## 2022-12-20 PROCEDURE — 25000242 PHARM REV CODE 250 ALT 637 W/ HCPCS: Performed by: STUDENT IN AN ORGANIZED HEALTH CARE EDUCATION/TRAINING PROGRAM

## 2022-12-20 PROCEDURE — 94640 AIRWAY INHALATION TREATMENT: CPT

## 2022-12-20 PROCEDURE — 99232 SBSQ HOSP IP/OBS MODERATE 35: CPT | Mod: ,,, | Performed by: STUDENT IN AN ORGANIZED HEALTH CARE EDUCATION/TRAINING PROGRAM

## 2022-12-20 PROCEDURE — 99900035 HC TECH TIME PER 15 MIN (STAT)

## 2022-12-20 PROCEDURE — 25000003 PHARM REV CODE 250: Performed by: STUDENT IN AN ORGANIZED HEALTH CARE EDUCATION/TRAINING PROGRAM

## 2022-12-20 PROCEDURE — 94761 N-INVAS EAR/PLS OXIMETRY MLT: CPT

## 2022-12-20 PROCEDURE — 76937 US GUIDE VASCULAR ACCESS: CPT

## 2022-12-20 RX ORDER — OXYCODONE HYDROCHLORIDE 5 MG/1
5 TABLET ORAL EVERY 6 HOURS PRN
Status: DISCONTINUED | OUTPATIENT
Start: 2022-12-20 | End: 2022-12-27 | Stop reason: HOSPADM

## 2022-12-20 RX ORDER — ACETAMINOPHEN 500 MG
1000 TABLET ORAL EVERY 8 HOURS PRN
Status: DISCONTINUED | OUTPATIENT
Start: 2022-12-20 | End: 2022-12-27 | Stop reason: HOSPADM

## 2022-12-20 RX ADMIN — POTASSIUM CHLORIDE 20 MEQ: 1500 TABLET, EXTENDED RELEASE ORAL at 08:12

## 2022-12-20 RX ADMIN — TRAZODONE HYDROCHLORIDE 25 MG: 50 TABLET ORAL at 08:12

## 2022-12-20 RX ADMIN — CEFTRIAXONE SODIUM 2 G: 2 INJECTION, POWDER, FOR SOLUTION INTRAMUSCULAR; INTRAVENOUS at 08:12

## 2022-12-20 RX ADMIN — IPRATROPIUM BROMIDE AND ALBUTEROL SULFATE 3 ML: .5; 3 SOLUTION RESPIRATORY (INHALATION) at 06:12

## 2022-12-20 RX ADMIN — OXYCODONE HYDROCHLORIDE 5 MG: 5 TABLET ORAL at 08:12

## 2022-12-20 RX ADMIN — ENOXAPARIN SODIUM 40 MG: 40 INJECTION SUBCUTANEOUS at 04:12

## 2022-12-20 RX ADMIN — FUROSEMIDE 40 MG: 10 INJECTION, SOLUTION INTRAMUSCULAR; INTRAVENOUS at 08:12

## 2022-12-20 RX ADMIN — AMITRIPTYLINE HYDROCHLORIDE 25 MG: 25 TABLET, FILM COATED ORAL at 08:12

## 2022-12-20 RX ADMIN — ATORVASTATIN CALCIUM 40 MG: 40 TABLET, FILM COATED ORAL at 08:12

## 2022-12-20 RX ADMIN — ACETAMINOPHEN 1000 MG: 500 TABLET ORAL at 04:12

## 2022-12-20 RX ADMIN — ASPIRIN 81 MG: 81 TABLET, DELAYED RELEASE ORAL at 08:12

## 2022-12-20 RX ADMIN — OXYCODONE HYDROCHLORIDE 5 MG: 5 TABLET ORAL at 10:12

## 2022-12-20 RX ADMIN — PANTOPRAZOLE SODIUM 40 MG: 40 TABLET, DELAYED RELEASE ORAL at 08:12

## 2022-12-20 NOTE — PLAN OF CARE
Problem: Respiratory Compromise (Pneumonia)  Goal: Effective Oxygenation and Ventilation  Outcome: Ongoing, Progressing  Intervention: Promote Airway Secretion Clearance  Flowsheets (Taken 12/20/2022 1740)  Breathing Techniques/Airway Clearance:   deep/controlled cough encouraged   pursed-lip breathing encouraged  Cough And Deep Breathing: done independently per patient  Intervention: Optimize Oxygenation and Ventilation  Flowsheets (Taken 12/20/2022 1740)  Airway/Ventilation Management:   airway patency maintained   calming measures promoted  Head of Bed (HOB) Positioning: HOB elevated

## 2022-12-20 NOTE — PLAN OF CARE
Problem: Adult Inpatient Plan of Care  Goal: Plan of Care Review  Outcome: Ongoing, Progressing  Flowsheets (Taken 12/19/2022 2003)  Plan of Care Reviewed With: patient  Goal: Optimal Comfort and Wellbeing  Outcome: Ongoing, Progressing  Intervention: Monitor Pain and Promote Comfort  Flowsheets (Taken 12/19/2022 2003)  Pain Management Interventions:   pain management plan reviewed with patient/caregiver   pillow support provided   position adjusted   relaxation techniques promoted   quiet environment facilitated  Intervention: Provide Person-Centered Care  Flowsheets (Taken 12/19/2022 2003)  Trust Relationship/Rapport:   care explained   questions encouraged   choices provided   reassurance provided   emotional support provided   thoughts/feelings acknowledged   empathic listening provided   questions answered     Problem: Fluid Imbalance (Pneumonia)  Goal: Fluid Balance  Outcome: Ongoing, Progressing  Intervention: Monitor and Manage Fluid Balance  Flowsheets (Taken 12/19/2022 2003)  Fluid/Electrolyte Management: fluids provided     Problem: Infection (Pneumonia)  Goal: Resolution of Infection Signs and Symptoms  Outcome: Ongoing, Progressing  Intervention: Prevent Infection Progression  Flowsheets (Taken 12/19/2022 2003)  Fever Reduction/Comfort Measures:   lightweight bedding   lightweight clothing  Infection Management: aseptic technique maintained  Isolation Precautions: precautions maintained     Problem: Respiratory Compromise (Pneumonia)  Goal: Effective Oxygenation and Ventilation  Outcome: Ongoing, Progressing  Intervention: Promote Airway Secretion Clearance  Flowsheets (Taken 12/19/2022 2003)  Cough And Deep Breathing: done independently per patient     Problem: Skin Injury Risk Increased  Goal: Skin Health and Integrity  Outcome: Ongoing, Progressing  Intervention: Optimize Skin Protection  Flowsheets (Taken 12/19/2022 2003)  Pressure Reduction Techniques:   frequent weight shift encouraged   heels  elevated off bed   positioned off wounds   pressure points protected   weight shift assistance provided   rest period provided between sit times  Pressure Reduction Devices: positioning supports utilized  Skin Protection:   adhesive use limited   incontinence pads utilized   tubing/devices free from skin contact   transparent dressing maintained  Head of Bed (HOB) Positioning: HOB elevated

## 2022-12-20 NOTE — HOSPITAL COURSE
12/20-feels better, refusing IV  12/21-better clinically, increasing lasix due to poor op overnight. Some may have been missed per nursing staff  12/22-exam looks better, no issues overnight. Anticipate discharge soon    12/27 - Summary -       Patient with significant expressive aphasia from old stroke admitted with worsening SOB and leg swelling.  He was treated for CHF exacerbation and also for CAP.  With diuretic therapy and abx he improved considerably and felt well enough to go home (assisted living).   His echo was remarkable for 20% EF .   In addition patient was not previously on medicine for DM but was requiring insulin to control glucoses.  His assisted living facility will be able to provide injections for the patient .   Follow up appointments made for PCP (Dr. Smith) and also an appointment in advanced heart failure clinic.       Farxiga started at discharge for DM and HFrEF.  BP too low for addition of aldactone at discharge. Consider at follow up.

## 2022-12-20 NOTE — ASSESSMENT & PLAN NOTE
Patient with Hypoxic Respiratory failure which is Acute.  he is not on home oxygen. Supplemental oxygen was provided and noted- Oxygen Concentration (%):  [28] 28.   Signs/symptoms of respiratory failure include- tachypnea, increased work of breathing and hypoxia. Contributing diagnoses includes - CHF and Pneumonia Labs and images were reviewed. Patient Has not had a recent ABG. Will treat underlying causes and adjust management of respiratory failure as follows-     Suspect multifactorial HFrEF exacerbation +/- pneumonia  Feels better after 3L out in ED. Continue IV lasix 40mg BID  Fluid/salt restrict  Daily weight  Strict I/O    CAP coverage  Pneumonia called on chest CT  Productive cough and fevers at home support- no white count  Continue abx

## 2022-12-20 NOTE — SUBJECTIVE & OBJECTIVE
Interval History: naeo    Review of Systems   Constitutional:  Positive for chills and fever. Negative for fatigue and unexpected weight change.   HENT:  Negative for congestion, mouth sores and sore throat.    Eyes:  Negative for photophobia and visual disturbance.   Respiratory:  Positive for cough and shortness of breath. Negative for chest tightness and wheezing.    Cardiovascular:  Positive for chest pain and leg swelling. Negative for palpitations.   Gastrointestinal:  Positive for nausea. Negative for abdominal pain, diarrhea and vomiting.   Endocrine: Negative for cold intolerance and heat intolerance.   Genitourinary:  Negative for difficulty urinating, dysuria, frequency and urgency.   Musculoskeletal:  Negative for arthralgias, back pain and myalgias.   Skin:  Negative for pallor and rash.   Neurological:  Negative for tremors, seizures, syncope, weakness, numbness and headaches.   Hematological:  Does not bruise/bleed easily.   Psychiatric/Behavioral:  Negative for agitation, confusion, hallucinations and suicidal ideas.    Objective:     Vital Signs (Most Recent):  Temp: 97.9 °F (36.6 °C) (12/20/22 0641)  Pulse: (!) 111 (12/20/22 0641)  Resp: 18 (12/20/22 1048)  BP: 130/67 (12/20/22 0641)  SpO2: (!) 90 % (12/20/22 0641)   Vital Signs (24h Range):  Temp:  [97.9 °F (36.6 °C)-98.9 °F (37.2 °C)] 97.9 °F (36.6 °C)  Pulse:  [] 111  Resp:  [16-29] 18  SpO2:  [90 %-97 %] 90 %  BP: (106-140)/(58-74) 130/67     Weight: 98 kg (216 lb)  Body mass index is 28.5 kg/m².    Intake/Output Summary (Last 24 hours) at 12/20/2022 1134  Last data filed at 12/19/2022 1452  Gross per 24 hour   Intake 300 ml   Output 1480 ml   Net -1180 ml      Physical Exam  Vitals reviewed.   Constitutional:       Appearance: Normal appearance. He is obese.   HENT:      Head: Normocephalic and atraumatic.      Nose: Nose normal.   Eyes:      Extraocular Movements: Extraocular movements intact.      Conjunctiva/sclera: Conjunctivae  normal.   Neck:      Trachea: Trachea normal.   Cardiovascular:      Rate and Rhythm: Regular rhythm. Tachycardia present.      Pulses: Normal pulses.      Heart sounds: Normal heart sounds.   Pulmonary:      Effort: Respiratory distress present.      Breath sounds: Normal breath sounds and air entry.      Comments: Inspiratory crackles in the bases bilaterally.  Abdominal:      General: Bowel sounds are normal.      Palpations: Abdomen is soft.   Musculoskeletal:      Cervical back: Neck supple.      Comments: Flaccid paralysis rue, moves all other extremities   Skin:     General: Skin is warm and dry.      Capillary Refill: Capillary refill takes less than 2 seconds.   Neurological:      Mental Status: He is alert. Mental status is at baseline.      Cranial Nerves: Cranial nerves 2-12 are intact.      Comments: Expressive aphasia and flaccid RUE weakness from old CVA   Psychiatric:         Mood and Affect: Mood and affect normal.         Behavior: Behavior is cooperative.       Significant Labs: All pertinent labs within the past 24 hours have been reviewed.    Significant Imaging: I have reviewed all pertinent imaging results/findings within the past 24 hours.

## 2022-12-20 NOTE — PLAN OF CARE
Problem: Respiratory Compromise (Pneumonia)  Goal: Effective Oxygenation and Ventilation  Outcome: Ongoing, Progressing     Problem: Gas Exchange Impaired  Goal: Optimal Gas Exchange  Outcome: Ongoing, Progressing

## 2022-12-20 NOTE — PLAN OF CARE
[facility] - [facility] Telemetry  Initial Discharge Assessment       Primary Care Provider: Walker Smith MD    Admission Diagnosis: Shortness of breath [R06.02]  Leg pain [M79.606]  Leg swelling [M79.89]  Hypoxia [R09.02]  Chest pain [R07.9]  Acute hypoxemic respiratory failure [J96.01]  Acute on chronic congestive heart failure, unspecified heart failure type [I50.9]    Admission Date: 12/19/2022  Expected Discharge Date:     Discharge Barriers Identified: None    Pay[place] / Plan[place] / Product Type: Government /     Extended Emergency Contact Information  Primary Emergency Contact: Zeyad Whipple  Mobile Phone: [phone]  Relation: Relative  Preferred language: English   needed? No  Secondary Emergency Contact: zahraa childers  Mobile Phone: [phone]  Relation: Brother    Discharge Plan A: Assisted Living  Discharge Plan B: Assisted Living      [facility] - [place], [place] - [address]  [address] [facility] [address]  Phone: [phone] Fax: [phone]    [facility] [address]  [address] [facility] [address]  Phone: [phone] Fax: [phone]      Initial Assessment (most recent)       Adult Discharge Assessment - 12/20/22 1322          Discharge Assessment    Assessment Type Discharge Planning Assessment     Source of Information patient;other (see comments)     If unable to respond/provide information was family/caregiver contacted? Yes     Contact Name/Number Mele Joan (nurse at [facility]) [phone]     Communicated DEANNA with patient/caregiver Date not available/Unable to determine     People in Home facility resident     Facility Arrived From: [facility] - assisted living     Do you expect to return to your current living situation? Yes     Do you have help at home or someone to help you manage your care at home? Yes     Walking or Climbing Stairs  ambulation difficulty, requires equipment     Mobility Management wheelchair     Dressing/Bathing bathing difficulty, requires equipment     Home Accessibility wheelchair accessible     Home Layout Able to live on 1st floor     Equipment Currently Used at Home walker, rolling     Readmission within 30 days? No     Patient currently being followed by outpatient case management? No     Do you currently have service(s) that help you manage your care at home? No     Do you take prescription medications? Yes     Do you have prescription coverage? Yes     Do you have any problems affording any of your prescribed medications? No     Is the patient taking medications as prescribed? yes     How do you get to doctors appointments? other (see comments)     Are you on dialysis? No     Do you take coumadin? No     Discharge Plan A Assisted Living     Discharge Plan B Assisted Living     DME Needed Upon Discharge  none     Discharge Plan discussed with: Patient   Mele Franco (nurse at [facility]) [phone]    Discharge Barriers Identified None        Physical Activity    On average, how many days per week do you engage in moderate to strenuous exercise (like a brisk walk)? 0 days     On average, how many minutes do you engage in exercise at this level? 0 min        Financial Resource Strain    How hard is it for you to pay for the very basics like food, housing, medical care, and heating? Not hard at all        Housing Stability    In the last 12 months, was there a time when you were not able to pay the mortgage or rent on time? No     In the last 12 months, how many places have you lived? 1     In the last 12 months, was there a time when you did not have a steady place to sleep or slept in a shelter (including now)? No        Transportation Needs    In the past 12 months, has lack of transportation kept you from medical appointments or from getting medications? No     In the past 12 months, has lack of transportation  kept you from meetings, work, or from getting things needed for daily living? No        Food Insecurity    Within the past 12 months, you worried that your food would run out before you got the money to buy more. Never true     Within the past 12 months, the food you bought just didn't last and you didn't have money to get more. Never true        Stress    Do you feel stress - tense, restless, nervous, or anxious, or unable to sleep at night because your mind is troubled all the time - these days? Not at all        Social Connections    In a typical week, how many times do you talk on the phone with family, friends, or neighbors? More than three times a week     How often do you get together with friends or relatives? More than three times a week     How often do you attend Mandaeism or Baptism services? Never     Do you belong to any clubs or organizations such as Mandaeism groups, unions, fraternal or athletic groups, or school groups? No     How often do you attend meetings of the clubs or organizations you belong to? Never     Are you , , , , never , or living with a partner?         Alcohol Use    Q1: How often do you have a drink containing alcohol? Never     Q2: How many drinks containing alcohol do you have on a typical day when you are drinking? Patient does not drink     Q3: How often do you have six or more drinks on one occasion? Never                   SS received consult, no specific needs noted. SS spoke with patient at the bedside. He tells me that he is from [facility] and he will return at discharge. He asks me to speak with Mele Franco (nurse at [facility]) to complete his admission assessment. SS spoke with Mele. Patient is mostly independent. He has a wheelchair for home use. SDOH complete. SS following for discharge needs as arise.

## 2022-12-20 NOTE — PROGRESS NOTES
[facility] Medicine  Progress Note    Patient Name: Karin Carrera  MRN: [id number]  Patient Class: IP- Inpatient   Admission Date: 12/19/2022  Length of Stay: 1 days  Attending Physician: Aleks Whipple DO  Primary Care Provider: Walker Smith MD        Subjective:     Principal Problem:Acute hypoxemic respiratory failure        HPI:  52yowm with PMH of CAD s/p CABG, CVA, HTN, GERD, ETOH abuse, and nicotine abuse who presents to the ED with SOB and bilateral LE edema.  His lower extremity edema was sudden onset and began about 3 days ago.  He has some degree of expressive aphasia presumably from his CVA in the past.  He also complains of SOB, cough with sputum production and chest pain.  Chest pain feels like pressure, intermittent with no identifiable alleviating or aggravating factors.  No radiation. No clear duration given by the patient.  +orthopnea, denies PND.  +Fevers/chills.  ROS otherwise negative.       Overview/Hospital Course:  12/20-feels better, refusing IV      Interval History: naeo    Review of Systems   Constitutional:  Positive for chills and fever. Negative for fatigue and unexpected weight change.   HENT:  Negative for congestion, mouth sores and sore throat.    Eyes:  Negative for photophobia and visual disturbance.   Respiratory:  Positive for cough and shortness of breath. Negative for chest tightness and wheezing.    Cardiovascular:  Positive for chest pain and leg swelling. Negative for palpitations.   Gastrointestinal:  Positive for nausea. Negative for abdominal pain, diarrhea and vomiting.   Endocrine: Negative for cold intolerance and heat intolerance.   Genitourinary:  Negative for difficulty urinating, dysuria, frequency and urgency.   Musculoskeletal:  Negative for arthralgias, back pain and myalgias.   Skin:  Negative for pallor and rash.   Neurological:  Negative for tremors, seizures, syncope, weakness, numbness and  headaches.   Hematological:  Does not bruise/bleed easily.   Psychiatric/Behavioral:  Negative for agitation, confusion, hallucinations and suicidal ideas.    Objective:     Vital Signs (Most Recent):  Temp: 97.9 °F (36.6 °C) (12/20/22 0641)  Pulse: (!) 111 (12/20/22 0641)  Resp: 18 (12/20/22 1048)  BP: 130/67 (12/20/22 0641)  SpO2: (!) 90 % (12/20/22 0641)   Vital Signs (24h Range):  Temp:  [97.9 °F (36.6 °C)-98.9 °F (37.2 °C)] 97.9 °F (36.6 °C)  Pulse:  [] 111  Resp:  [16-29] 18  SpO2:  [90 %-97 %] 90 %  BP: (106-140)/(58-74) 130/67     Weight: 98 kg (216 lb)  Body mass index is 28.5 kg/m².    Intake/Output Summary (Last 24 hours) at 12/20/2022 1134  Last data filed at 12/19/2022 1452  Gross per 24 hour   Intake 300 ml   Output 1480 ml   Net -1180 ml      Physical Exam  Vitals reviewed.   Constitutional:       Appearance: Normal appearance. He is obese.   HENT:      Head: Normocephalic and atraumatic.      Nose: Nose normal.   Eyes:      Extraocular Movements: Extraocular movements intact.      Conjunctiva/sclera: Conjunctivae normal.   Neck:      Trachea: Trachea normal.   Cardiovascular:      Rate and Rhythm: Regular rhythm. Tachycardia present.      Pulses: Normal pulses.      Heart sounds: Normal heart sounds.   Pulmonary:      Effort: Respiratory distress present.      Breath sounds: Normal breath sounds and air entry.      Comments: Inspiratory crackles in the bases bilaterally.  Abdominal:      General: Bowel sounds are normal.      Palpations: Abdomen is soft.   Musculoskeletal:      Cervical back: Neck supple.      Comments: Flaccid paralysis rue, moves all other extremities   Skin:     General: Skin is warm and dry.      Capillary Refill: Capillary refill takes less than 2 seconds.   Neurological:      Mental Status: He is alert. Mental status is at baseline.      Cranial Nerves: Cranial nerves 2-12 are intact.      Comments: Expressive aphasia and flaccid RUE weakness from old CVA   Psychiatric:          Mood and Affect: Mood and affect normal.         Behavior: Behavior is cooperative.       Significant Labs: All pertinent labs within the past 24 hours have been reviewed.    Significant Imaging: I have reviewed all pertinent imaging results/findings within the past 24 hours.      Assessment/Plan:      * Acute hypoxemic respiratory failure  Patient with Hypoxic Respiratory failure which is Acute.  he is not on home oxygen. Supplemental oxygen was provided and noted- Oxygen Concentration (%):  [28] 28.   Signs/symptoms of respiratory failure include- tachypnea, increased work of breathing and hypoxia. Contributing diagnoses includes - CHF and Pneumonia Labs and images were reviewed. Patient Has not had a recent ABG. Will treat underlying causes and adjust management of respiratory failure as follows-     Suspect multifactorial HFrEF exacerbation +/- pneumonia  Feels better after 3L out in ED. Continue IV lasix 40mg BID  Fluid/salt restrict  Daily weight  Strict I/O    CAP coverage  Pneumonia called on chest CT  Productive cough and fevers at home support- no white count  Continue abx    History of CVA (cerebrovascular accident)  Asa/statin  From assisted living- return on DC      Pneumonia due to infectious organism  Productive cough with fevers at home -ct supports pneumonia  Now with respiratory failure  CAP coverage      Acute on chronic congestive heart failure  Patient is identified as having Combined Systolic and Diastolic heart failure that is Acute on chronic. CHF is currently uncontrolled due to Continued edema of extremities, >3 pillow orthopnea and Rales/crackles on pulmonary exam. Latest ECHO performed and demonstrates- Results for orders placed during the hospital encounter of 01/01/22    Echo    Interpretation Summary  · The left ventricle is severely enlarged with mild eccentric hypertrophy and severely decreased systolic function.  · The estimated ejection fraction is 20%.  · There are segmental  left ventricular wall motion abnormalities.  · Left ventricular diastolic dysfunction.  · Atrial fibrillation not observed.  · Normal right ventricular size.  · Mild mitral regurgitation.  · Normal central venous pressure (3 mmHg).  . Continue Furosemide and monitor clinical status closely. Monitor on telemetry. Patient is off CHF pathway.  Monitor strict Is&Os and daily weights.  Place on fluid restriction of 1.5 L. Continue to stress to patient importance of self efficacy and  on diet for CHF. Last BNP reviewed- and noted below No results for input(s): BNP, BNPTRIAGEBLO in the last 168 hours..      Hx of CABG  Asa/statin/bb      GERD (gastroesophageal reflux disease)  PPI      Hypertension  Adjust medications as needed        VTE Risk Mitigation (From admission, onward)         Ordered     enoxaparin injection 40 mg  Daily         12/19/22 1426     IP VTE HIGH RISK PATIENT  Once         12/19/22 1426     Place sequential compression device  Until discontinued         12/19/22 1426                Discharge Planning   DEANNA:      Code Status: Full Code   Is the patient medically ready for discharge?:     Reason for patient still in hospital (select all that apply): Treatment                     Aleks Whipple DO  Department of [place] [facility]   [facility]

## 2022-12-21 LAB
ALBUMIN SERPL BCP-MCNC: 2.8 G/DL (ref 3.5–5)
ALBUMIN/GLOB SERPL: 0.7 {RATIO}
ALP SERPL-CCNC: 106 U/L (ref 45–115)
ALT SERPL W P-5'-P-CCNC: 12 U/L (ref 16–61)
ANION GAP SERPL CALCULATED.3IONS-SCNC: 14 MMOL/L (ref 7–16)
ANISOCYTOSIS BLD QL SMEAR: ABNORMAL
AST SERPL W P-5'-P-CCNC: 12 U/L (ref 15–37)
BASOPHILS # BLD AUTO: 0.05 K/UL (ref 0–0.2)
BASOPHILS NFR BLD AUTO: 0.6 % (ref 0–1)
BILIRUB SERPL-MCNC: 0.8 MG/DL (ref ?–1.2)
BUN SERPL-MCNC: 19 MG/DL (ref 7–18)
BUN/CREAT SERPL: 20 (ref 6–20)
CALCIUM SERPL-MCNC: 8.3 MG/DL (ref 8.5–10.1)
CHLORIDE SERPL-SCNC: 96 MMOL/L (ref 98–107)
CO2 SERPL-SCNC: 24 MMOL/L (ref 21–32)
CREAT SERPL-MCNC: 0.94 MG/DL (ref 0.7–1.3)
DIFFERENTIAL METHOD BLD: ABNORMAL
EGFR (NO RACE VARIABLE) (RUSH/TITUS): 98 ML/MIN/1.73M²
EOSINOPHIL # BLD AUTO: 0.13 K/UL (ref 0–0.5)
EOSINOPHIL NFR BLD AUTO: 1.5 % (ref 1–4)
ERYTHROCYTE [DISTWIDTH] IN BLOOD BY AUTOMATED COUNT: 20.2 % (ref 11.5–14.5)
FERRITIN SERPL-MCNC: 71 NG/ML (ref 26–388)
GLOBULIN SER-MCNC: 4.2 G/DL (ref 2–4)
GLUCOSE SERPL-MCNC: 182 MG/DL (ref 74–106)
HCT VFR BLD AUTO: 26.6 % (ref 40–54)
HGB BLD-MCNC: 7.8 G/DL (ref 13.5–18)
HYPOCHROMIA BLD QL SMEAR: ABNORMAL
IMM GRANULOCYTES # BLD AUTO: 0.04 K/UL (ref 0–0.04)
IMM GRANULOCYTES NFR BLD: 0.4 % (ref 0–0.4)
IRON SATN MFR SERPL: 5 % (ref 14–50)
IRON SERPL-MCNC: 13 ΜG/DL (ref 65–175)
LYMPHOCYTES # BLD AUTO: 1.54 K/UL (ref 1–4.8)
LYMPHOCYTES NFR BLD AUTO: 17.2 % (ref 27–41)
MAGNESIUM SERPL-MCNC: 2.1 MG/DL (ref 1.7–2.3)
MCH RBC QN AUTO: 20.1 PG (ref 27–31)
MCHC RBC AUTO-ENTMCNC: 29.3 G/DL (ref 32–36)
MCV RBC AUTO: 68.6 FL (ref 80–96)
MICROCYTES BLD QL SMEAR: ABNORMAL
MONOCYTES # BLD AUTO: 0.69 K/UL (ref 0–0.8)
MONOCYTES NFR BLD AUTO: 7.7 % (ref 2–6)
MPC BLD CALC-MCNC: 8.8 FL (ref 9.4–12.4)
NEUTROPHILS # BLD AUTO: 6.51 K/UL (ref 1.8–7.7)
NEUTROPHILS NFR BLD AUTO: 72.6 % (ref 53–65)
NRBC # BLD AUTO: 0 X10E3/UL
NRBC, AUTO (.00): 0 %
OVALOCYTES BLD QL SMEAR: ABNORMAL
PHOSPHATE SERPL-MCNC: 3.4 MG/DL (ref 2.5–4.5)
PLATELET # BLD AUTO: 377 K/UL (ref 150–400)
PLATELET MORPHOLOGY: ABNORMAL
POLYCHROMASIA BLD QL SMEAR: ABNORMAL
POTASSIUM SERPL-SCNC: 3.3 MMOL/L (ref 3.5–5.1)
PROT SERPL-MCNC: 7 G/DL (ref 6.4–8.2)
RBC # BLD AUTO: 3.88 M/UL (ref 4.6–6.2)
SODIUM SERPL-SCNC: 131 MMOL/L (ref 136–145)
TIBC SERPL-MCNC: 281 ΜG/DL (ref 250–450)
WBC # BLD AUTO: 8.96 K/UL (ref 4.5–11)

## 2022-12-21 PROCEDURE — 63600175 PHARM REV CODE 636 W HCPCS: Performed by: STUDENT IN AN ORGANIZED HEALTH CARE EDUCATION/TRAINING PROGRAM

## 2022-12-21 PROCEDURE — 83540 ASSAY OF IRON: CPT | Performed by: STUDENT IN AN ORGANIZED HEALTH CARE EDUCATION/TRAINING PROGRAM

## 2022-12-21 PROCEDURE — 11000001 HC ACUTE MED/SURG PRIVATE ROOM

## 2022-12-21 PROCEDURE — 25000003 PHARM REV CODE 250: Performed by: INTERNAL MEDICINE

## 2022-12-21 PROCEDURE — 82728 ASSAY OF FERRITIN: CPT | Performed by: STUDENT IN AN ORGANIZED HEALTH CARE EDUCATION/TRAINING PROGRAM

## 2022-12-21 PROCEDURE — 85025 COMPLETE CBC W/AUTO DIFF WBC: CPT | Performed by: STUDENT IN AN ORGANIZED HEALTH CARE EDUCATION/TRAINING PROGRAM

## 2022-12-21 PROCEDURE — 36415 COLL VENOUS BLD VENIPUNCTURE: CPT | Performed by: STUDENT IN AN ORGANIZED HEALTH CARE EDUCATION/TRAINING PROGRAM

## 2022-12-21 PROCEDURE — 94761 N-INVAS EAR/PLS OXIMETRY MLT: CPT

## 2022-12-21 PROCEDURE — 83735 ASSAY OF MAGNESIUM: CPT | Performed by: STUDENT IN AN ORGANIZED HEALTH CARE EDUCATION/TRAINING PROGRAM

## 2022-12-21 PROCEDURE — 80053 COMPREHEN METABOLIC PANEL: CPT | Performed by: STUDENT IN AN ORGANIZED HEALTH CARE EDUCATION/TRAINING PROGRAM

## 2022-12-21 PROCEDURE — 99232 SBSQ HOSP IP/OBS MODERATE 35: CPT | Mod: ,,, | Performed by: STUDENT IN AN ORGANIZED HEALTH CARE EDUCATION/TRAINING PROGRAM

## 2022-12-21 PROCEDURE — 25000003 PHARM REV CODE 250: Performed by: STUDENT IN AN ORGANIZED HEALTH CARE EDUCATION/TRAINING PROGRAM

## 2022-12-21 PROCEDURE — 27000221 HC OXYGEN, UP TO 24 HOURS

## 2022-12-21 PROCEDURE — 84100 ASSAY OF PHOSPHORUS: CPT | Performed by: STUDENT IN AN ORGANIZED HEALTH CARE EDUCATION/TRAINING PROGRAM

## 2022-12-21 PROCEDURE — 99232 PR SUBSEQUENT HOSPITAL CARE,LEVL II: ICD-10-PCS | Mod: ,,, | Performed by: STUDENT IN AN ORGANIZED HEALTH CARE EDUCATION/TRAINING PROGRAM

## 2022-12-21 RX ORDER — FUROSEMIDE 10 MG/ML
60 INJECTION INTRAMUSCULAR; INTRAVENOUS
Status: DISCONTINUED | OUTPATIENT
Start: 2022-12-21 | End: 2022-12-27 | Stop reason: HOSPADM

## 2022-12-21 RX ORDER — CODEINE PHOSPHATE AND GUAIFENESIN 10; 100 MG/5ML; MG/5ML
5 SOLUTION ORAL EVERY 4 HOURS PRN
Status: DISCONTINUED | OUTPATIENT
Start: 2022-12-21 | End: 2022-12-27 | Stop reason: HOSPADM

## 2022-12-21 RX ADMIN — CEFTRIAXONE SODIUM 2 G: 2 INJECTION, POWDER, FOR SOLUTION INTRAMUSCULAR; INTRAVENOUS at 09:12

## 2022-12-21 RX ADMIN — OXYCODONE HYDROCHLORIDE 5 MG: 5 TABLET ORAL at 03:12

## 2022-12-21 RX ADMIN — GUAIFENESIN AND CODEINE PHOSPHATE 5 ML: 10; 100 LIQUID ORAL at 09:12

## 2022-12-21 RX ADMIN — OXYCODONE HYDROCHLORIDE 5 MG: 5 TABLET ORAL at 09:12

## 2022-12-21 RX ADMIN — FUROSEMIDE 60 MG: 20 INJECTION, SOLUTION INTRAMUSCULAR; INTRAVENOUS at 02:12

## 2022-12-21 RX ADMIN — FUROSEMIDE 40 MG: 10 INJECTION, SOLUTION INTRAMUSCULAR; INTRAVENOUS at 09:12

## 2022-12-21 RX ADMIN — POTASSIUM CHLORIDE 20 MEQ: 1500 TABLET, EXTENDED RELEASE ORAL at 09:12

## 2022-12-21 RX ADMIN — ASPIRIN 81 MG: 81 TABLET, DELAYED RELEASE ORAL at 09:12

## 2022-12-21 RX ADMIN — TRAZODONE HYDROCHLORIDE 25 MG: 50 TABLET ORAL at 09:12

## 2022-12-21 RX ADMIN — ATORVASTATIN CALCIUM 40 MG: 40 TABLET, FILM COATED ORAL at 09:12

## 2022-12-21 RX ADMIN — AMITRIPTYLINE HYDROCHLORIDE 25 MG: 25 TABLET, FILM COATED ORAL at 09:12

## 2022-12-21 RX ADMIN — AZITHROMYCIN MONOHYDRATE 500 MG: 500 INJECTION, POWDER, LYOPHILIZED, FOR SOLUTION INTRAVENOUS at 09:12

## 2022-12-21 RX ADMIN — PANTOPRAZOLE SODIUM 40 MG: 40 TABLET, DELAYED RELEASE ORAL at 09:12

## 2022-12-21 RX ADMIN — ENOXAPARIN SODIUM 40 MG: 40 INJECTION SUBCUTANEOUS at 05:12

## 2022-12-21 NOTE — PROGRESS NOTES
[facility] Medicine  Progress Note    Patient Name: Karin Carrera  MRN: [id number]  Patient Class: IP- Inpatient   Admission Date: 12/19/2022  Length of Stay: 2 days  Attending Physician: Aleks Whipple DO  Primary Care Provider: Walker Smith MD        Subjective:     Principal Problem:Acute hypoxemic respiratory failure        HPI:  52yowm with PMH of CAD s/p CABG, CVA, HTN, GERD, ETOH abuse, and nicotine abuse who presents to the ED with SOB and bilateral LE edema.  His lower extremity edema was sudden onset and began about 3 days ago.  He has some degree of expressive aphasia presumably from his CVA in the past.  He also complains of SOB, cough with sputum production and chest pain.  Chest pain feels like pressure, intermittent with no identifiable alleviating or aggravating factors.  No radiation. No clear duration given by the patient.  +orthopnea, denies PND.  +Fevers/chills.  ROS otherwise negative.       Overview/Hospital Course:  12/20-feels better, refusing IV  12/21-better clinically, increasing lasix due to poor op overnight. Some may have been missed per nursing staff      Interval History: naeo    Review of Systems   Constitutional:  Positive for chills and fever. Negative for fatigue and unexpected weight change.   HENT:  Negative for congestion, mouth sores and sore throat.    Eyes:  Negative for photophobia and visual disturbance.   Respiratory:  Positive for cough and shortness of breath. Negative for chest tightness and wheezing.    Cardiovascular:  Positive for chest pain and leg swelling. Negative for palpitations.   Gastrointestinal:  Positive for nausea. Negative for abdominal pain, diarrhea and vomiting.   Endocrine: Negative for cold intolerance and heat intolerance.   Genitourinary:  Negative for difficulty urinating, dysuria, frequency and urgency.   Musculoskeletal:  Negative for arthralgias, back pain and myalgias.   Skin:   Negative for pallor and rash.   Neurological:  Negative for tremors, seizures, syncope, weakness, numbness and headaches.   Hematological:  Does not bruise/bleed easily.   Psychiatric/Behavioral:  Negative for agitation, confusion, hallucinations and suicidal ideas.    Objective:     Vital Signs (Most Recent):  Temp: 98.1 °F (36.7 °C) (12/21/22 0718)  Pulse: (!) 116 (12/21/22 0718)  Resp: 18 (12/21/22 0915)  BP: 137/71 (12/21/22 0718)  SpO2: (!) 87 % (12/21/22 0718)   Vital Signs (24h Range):  Temp:  [98.1 °F (36.7 °C)-98.3 °F (36.8 °C)] 98.1 °F (36.7 °C)  Pulse:  [111-126] 116  Resp:  [18-22] 18  SpO2:  [80 %-93 %] 87 %  BP: (118-141)/(70-77) 137/71     Weight: 97.5 kg (215 lb)  Body mass index is 28.37 kg/m².    Intake/Output Summary (Last 24 hours) at 12/21/2022 1057  Last data filed at 12/20/2022 1647  Gross per 24 hour   Intake --   Output 300 ml   Net -300 ml        Physical Exam  Vitals reviewed.   Constitutional:       Appearance: Normal appearance. He is obese.   HENT:      Head: Normocephalic and atraumatic.      Nose: Nose normal.   Eyes:      Extraocular Movements: Extraocular movements intact.      Conjunctiva/sclera: Conjunctivae normal.   Neck:      Trachea: Trachea normal.   Cardiovascular:      Rate and Rhythm: Regular rhythm. Tachycardia present.      Pulses: Normal pulses.      Heart sounds: Normal heart sounds.   Pulmonary:      Effort: Respiratory distress present.      Breath sounds: Normal breath sounds and air entry.      Comments: Inspiratory crackles in the bases bilaterally.  Abdominal:      General: Bowel sounds are normal.      Palpations: Abdomen is soft.   Musculoskeletal:      Cervical back: Neck supple.      Comments: Flaccid paralysis rue, moves all other extremities   Skin:     General: Skin is warm and dry.      Capillary Refill: Capillary refill takes less than 2 seconds.   Neurological:      Mental Status: He is alert. Mental status is at baseline.      Cranial Nerves: Cranial  nerves 2-12 are intact.      Comments: Expressive aphasia and flaccid RUE weakness from old CVA   Psychiatric:         Mood and Affect: Mood and affect normal.         Behavior: Behavior is cooperative.       Significant Labs: All pertinent labs within the past 24 hours have been reviewed.    Significant Imaging: I have reviewed all pertinent imaging results/findings within the past 24 hours.      Assessment/Plan:      * Acute hypoxemic respiratory failure  Patient with Hypoxic Respiratory failure which is Acute.  he is not on home oxygen. Supplemental oxygen was provided and noted-  .   Signs/symptoms of respiratory failure include- tachypnea, increased work of breathing and hypoxia. Contributing diagnoses includes - CHF and Pneumonia Labs and images were reviewed. Patient Has not had a recent ABG. Will treat underlying causes and adjust management of respiratory failure as follows-     Suspect multifactorial HFrEF exacerbation +/- pneumonia  Feels better after 3L out in ED. Continue IV lasix 40mg BID  Fluid/salt restrict  Daily weight  Strict I/O    CAP coverage  Pneumonia called on chest CT  Productive cough and fevers at home support- no white count  Continue abx    History of CVA (cerebrovascular accident)  Asa/statin  From assisted living- return on DC      Pneumonia due to infectious organism  Productive cough with fevers at home -ct supports pneumonia  Now with respiratory failure  CAP coverage      Acute on chronic congestive heart failure  Patient is identified as having Combined Systolic and Diastolic heart failure that is Acute on chronic. CHF is currently uncontrolled due to Continued edema of extremities, >3 pillow orthopnea and Rales/crackles on pulmonary exam. Latest ECHO performed and demonstrates- Results for orders placed during the hospital encounter of 01/01/22    Echo    Interpretation Summary  · The left ventricle is severely enlarged with mild eccentric hypertrophy and severely decreased  systolic function.  · The estimated ejection fraction is 20%.  · There are segmental left ventricular wall motion abnormalities.  · Left ventricular diastolic dysfunction.  · Atrial fibrillation not observed.  · Normal right ventricular size.  · Mild mitral regurgitation.  · Normal central venous pressure (3 mmHg).  . Continue Furosemide and monitor clinical status closely. Monitor on telemetry. Patient is off CHF pathway.  Monitor strict Is&Os and daily weights.  Place on fluid restriction of 1.5 L. Continue to stress to patient importance of self efficacy and  on diet for CHF. Last BNP reviewed- and noted below No results for input(s): BNP, BNPTRIAGEBLO in the last 168 hours..      Hx of CABG  Asa/statin/bb      GERD (gastroesophageal reflux disease)  PPI      Hypertension  Adjust medications as needed        VTE Risk Mitigation (From admission, onward)         Ordered     enoxaparin injection 40 mg  Daily         12/19/22 1426     IP VTE HIGH RISK PATIENT  Once         12/19/22 1426     Place sequential compression device  Until discontinued         12/19/22 1426                Discharge Planning   DEANNA:      Code Status: Full Code   Is the patient medically ready for discharge?:     Reason for patient still in hospital (select all that apply): Treatment  Discharge Plan A: Assisted Living                  Aleks Whipple DO  Department of Hospital Medicine   [facility]

## 2022-12-21 NOTE — SUBJECTIVE & OBJECTIVE
Interval History: naeo    Review of Systems   Constitutional:  Positive for chills and fever. Negative for fatigue and unexpected weight change.   HENT:  Negative for congestion, mouth sores and sore throat.    Eyes:  Negative for photophobia and visual disturbance.   Respiratory:  Positive for cough and shortness of breath. Negative for chest tightness and wheezing.    Cardiovascular:  Positive for chest pain and leg swelling. Negative for palpitations.   Gastrointestinal:  Positive for nausea. Negative for abdominal pain, diarrhea and vomiting.   Endocrine: Negative for cold intolerance and heat intolerance.   Genitourinary:  Negative for difficulty urinating, dysuria, frequency and urgency.   Musculoskeletal:  Negative for arthralgias, back pain and myalgias.   Skin:  Negative for pallor and rash.   Neurological:  Negative for tremors, seizures, syncope, weakness, numbness and headaches.   Hematological:  Does not bruise/bleed easily.   Psychiatric/Behavioral:  Negative for agitation, confusion, hallucinations and suicidal ideas.    Objective:     Vital Signs (Most Recent):  Temp: 98.1 °F (36.7 °C) (12/21/22 0718)  Pulse: (!) 116 (12/21/22 0718)  Resp: 18 (12/21/22 0915)  BP: 137/71 (12/21/22 0718)  SpO2: (!) 87 % (12/21/22 0718)   Vital Signs (24h Range):  Temp:  [98.1 °F (36.7 °C)-98.3 °F (36.8 °C)] 98.1 °F (36.7 °C)  Pulse:  [111-126] 116  Resp:  [18-22] 18  SpO2:  [80 %-93 %] 87 %  BP: (118-141)/(70-77) 137/71     Weight: 97.5 kg (215 lb)  Body mass index is 28.37 kg/m².    Intake/Output Summary (Last 24 hours) at 12/21/2022 1057  Last data filed at 12/20/2022 1647  Gross per 24 hour   Intake --   Output 300 ml   Net -300 ml        Physical Exam  Vitals reviewed.   Constitutional:       Appearance: Normal appearance. He is obese.   HENT:      Head: Normocephalic and atraumatic.      Nose: Nose normal.   Eyes:      Extraocular Movements: Extraocular movements intact.      Conjunctiva/sclera: Conjunctivae  normal.   Neck:      Trachea: Trachea normal.   Cardiovascular:      Rate and Rhythm: Regular rhythm. Tachycardia present.      Pulses: Normal pulses.      Heart sounds: Normal heart sounds.   Pulmonary:      Effort: Respiratory distress present.      Breath sounds: Normal breath sounds and air entry.      Comments: Inspiratory crackles in the bases bilaterally.  Abdominal:      General: Bowel sounds are normal.      Palpations: Abdomen is soft.   Musculoskeletal:      Cervical back: Neck supple.      Comments: Flaccid paralysis rue, moves all other extremities   Skin:     General: Skin is warm and dry.      Capillary Refill: Capillary refill takes less than 2 seconds.   Neurological:      Mental Status: He is alert. Mental status is at baseline.      Cranial Nerves: Cranial nerves 2-12 are intact.      Comments: Expressive aphasia and flaccid RUE weakness from old CVA   Psychiatric:         Mood and Affect: Mood and affect normal.         Behavior: Behavior is cooperative.       Significant Labs: All pertinent labs within the past 24 hours have been reviewed.    Significant Imaging: I have reviewed all pertinent imaging results/findings within the past 24 hours.

## 2022-12-22 LAB
ALBUMIN SERPL BCP-MCNC: 2.8 G/DL (ref 3.5–5)
ALBUMIN/GLOB SERPL: 0.7 {RATIO}
ALP SERPL-CCNC: 101 U/L (ref 45–115)
ALT SERPL W P-5'-P-CCNC: 14 U/L (ref 16–61)
ANION GAP SERPL CALCULATED.3IONS-SCNC: 14 MMOL/L (ref 7–16)
ANISOCYTOSIS BLD QL SMEAR: ABNORMAL
AST SERPL W P-5'-P-CCNC: 19 U/L (ref 15–37)
BASOPHILS # BLD AUTO: 0.06 K/UL (ref 0–0.2)
BASOPHILS NFR BLD AUTO: 0.7 % (ref 0–1)
BILIRUB SERPL-MCNC: 0.7 MG/DL (ref ?–1.2)
BUN SERPL-MCNC: 15 MG/DL (ref 7–18)
BUN/CREAT SERPL: 16 (ref 6–20)
CALCIUM SERPL-MCNC: 8.1 MG/DL (ref 8.5–10.1)
CHLORIDE SERPL-SCNC: 93 MMOL/L (ref 98–107)
CO2 SERPL-SCNC: 26 MMOL/L (ref 21–32)
CREAT SERPL-MCNC: 0.96 MG/DL (ref 0.7–1.3)
DIFFERENTIAL METHOD BLD: ABNORMAL
EGFR (NO RACE VARIABLE) (RUSH/TITUS): 95 ML/MIN/1.73M²
EOSINOPHIL # BLD AUTO: 0.2 K/UL (ref 0–0.5)
EOSINOPHIL NFR BLD AUTO: 2.4 % (ref 1–4)
ERYTHROCYTE [DISTWIDTH] IN BLOOD BY AUTOMATED COUNT: 19.7 % (ref 11.5–14.5)
EST. AVERAGE GLUCOSE BLD GHB EST-MCNC: 160 MG/DL
GLOBULIN SER-MCNC: 4.1 G/DL (ref 2–4)
GLUCOSE SERPL-MCNC: 232 MG/DL (ref 74–106)
GLUCOSE SERPL-MCNC: 317 MG/DL (ref 70–105)
GLUCOSE SERPL-MCNC: 384 MG/DL (ref 70–105)
HBA1C MFR BLD HPLC: 7.4 % (ref 4.5–6.6)
HCT VFR BLD AUTO: 26.5 % (ref 40–54)
HGB BLD-MCNC: 7.7 G/DL (ref 13.5–18)
HYPOCHROMIA BLD QL SMEAR: ABNORMAL
IMM GRANULOCYTES # BLD AUTO: 0.05 K/UL (ref 0–0.04)
IMM GRANULOCYTES NFR BLD: 0.6 % (ref 0–0.4)
LYMPHOCYTES # BLD AUTO: 1.8 K/UL (ref 1–4.8)
LYMPHOCYTES NFR BLD AUTO: 21.3 % (ref 27–41)
MAGNESIUM SERPL-MCNC: 2.2 MG/DL (ref 1.7–2.3)
MCH RBC QN AUTO: 19.4 PG (ref 27–31)
MCHC RBC AUTO-ENTMCNC: 29.1 G/DL (ref 32–36)
MCV RBC AUTO: 66.9 FL (ref 80–96)
MICROCYTES BLD QL SMEAR: ABNORMAL
MONOCYTES # BLD AUTO: 0.78 K/UL (ref 0–0.8)
MONOCYTES NFR BLD AUTO: 9.2 % (ref 2–6)
MPC BLD CALC-MCNC: 8.8 FL (ref 9.4–12.4)
NEUTROPHILS # BLD AUTO: 5.55 K/UL (ref 1.8–7.7)
NEUTROPHILS NFR BLD AUTO: 65.8 % (ref 53–65)
NRBC # BLD AUTO: 0 X10E3/UL
NRBC, AUTO (.00): 0 %
OVALOCYTES BLD QL SMEAR: ABNORMAL
PHOSPHATE SERPL-MCNC: 2.8 MG/DL (ref 2.5–4.5)
PLATELET # BLD AUTO: 367 K/UL (ref 150–400)
PLATELET MORPHOLOGY: ABNORMAL
POLYCHROMASIA BLD QL SMEAR: ABNORMAL
POTASSIUM SERPL-SCNC: 3.1 MMOL/L (ref 3.5–5.1)
PROT SERPL-MCNC: 6.9 G/DL (ref 6.4–8.2)
RBC # BLD AUTO: 3.96 M/UL (ref 4.6–6.2)
SODIUM SERPL-SCNC: 130 MMOL/L (ref 136–145)
WBC # BLD AUTO: 8.44 K/UL (ref 4.5–11)

## 2022-12-22 PROCEDURE — 25000003 PHARM REV CODE 250: Performed by: INTERNAL MEDICINE

## 2022-12-22 PROCEDURE — 36415 COLL VENOUS BLD VENIPUNCTURE: CPT | Performed by: STUDENT IN AN ORGANIZED HEALTH CARE EDUCATION/TRAINING PROGRAM

## 2022-12-22 PROCEDURE — 83036 HEMOGLOBIN GLYCOSYLATED A1C: CPT | Performed by: STUDENT IN AN ORGANIZED HEALTH CARE EDUCATION/TRAINING PROGRAM

## 2022-12-22 PROCEDURE — 25000003 PHARM REV CODE 250: Performed by: STUDENT IN AN ORGANIZED HEALTH CARE EDUCATION/TRAINING PROGRAM

## 2022-12-22 PROCEDURE — 80053 COMPREHEN METABOLIC PANEL: CPT | Performed by: STUDENT IN AN ORGANIZED HEALTH CARE EDUCATION/TRAINING PROGRAM

## 2022-12-22 PROCEDURE — 94761 N-INVAS EAR/PLS OXIMETRY MLT: CPT

## 2022-12-22 PROCEDURE — 11000001 HC ACUTE MED/SURG PRIVATE ROOM

## 2022-12-22 PROCEDURE — 99900035 HC TECH TIME PER 15 MIN (STAT)

## 2022-12-22 PROCEDURE — 99232 SBSQ HOSP IP/OBS MODERATE 35: CPT | Mod: ,,, | Performed by: STUDENT IN AN ORGANIZED HEALTH CARE EDUCATION/TRAINING PROGRAM

## 2022-12-22 PROCEDURE — 82962 GLUCOSE BLOOD TEST: CPT

## 2022-12-22 PROCEDURE — 84100 ASSAY OF PHOSPHORUS: CPT | Performed by: STUDENT IN AN ORGANIZED HEALTH CARE EDUCATION/TRAINING PROGRAM

## 2022-12-22 PROCEDURE — 83735 ASSAY OF MAGNESIUM: CPT | Performed by: STUDENT IN AN ORGANIZED HEALTH CARE EDUCATION/TRAINING PROGRAM

## 2022-12-22 PROCEDURE — 85025 COMPLETE CBC W/AUTO DIFF WBC: CPT | Performed by: STUDENT IN AN ORGANIZED HEALTH CARE EDUCATION/TRAINING PROGRAM

## 2022-12-22 PROCEDURE — 27000221 HC OXYGEN, UP TO 24 HOURS

## 2022-12-22 PROCEDURE — 63600175 PHARM REV CODE 636 W HCPCS: Performed by: STUDENT IN AN ORGANIZED HEALTH CARE EDUCATION/TRAINING PROGRAM

## 2022-12-22 PROCEDURE — 99232 PR SUBSEQUENT HOSPITAL CARE,LEVL II: ICD-10-PCS | Mod: ,,, | Performed by: STUDENT IN AN ORGANIZED HEALTH CARE EDUCATION/TRAINING PROGRAM

## 2022-12-22 RX ORDER — POTASSIUM CHLORIDE 20 MEQ/1
40 TABLET, EXTENDED RELEASE ORAL ONCE
Status: COMPLETED | OUTPATIENT
Start: 2022-12-22 | End: 2022-12-22

## 2022-12-22 RX ADMIN — POTASSIUM CHLORIDE 20 MEQ: 1500 TABLET, EXTENDED RELEASE ORAL at 09:12

## 2022-12-22 RX ADMIN — ENOXAPARIN SODIUM 40 MG: 40 INJECTION SUBCUTANEOUS at 05:12

## 2022-12-22 RX ADMIN — POTASSIUM CHLORIDE 40 MEQ: 1500 TABLET, EXTENDED RELEASE ORAL at 01:12

## 2022-12-22 RX ADMIN — ATORVASTATIN CALCIUM 40 MG: 40 TABLET, FILM COATED ORAL at 09:12

## 2022-12-22 RX ADMIN — PANTOPRAZOLE SODIUM 40 MG: 40 TABLET, DELAYED RELEASE ORAL at 10:12

## 2022-12-22 RX ADMIN — FUROSEMIDE 60 MG: 20 INJECTION, SOLUTION INTRAMUSCULAR; INTRAVENOUS at 01:12

## 2022-12-22 RX ADMIN — INSULIN DETEMIR 5 UNITS: 100 INJECTION, SOLUTION SUBCUTANEOUS at 10:12

## 2022-12-22 RX ADMIN — GUAIFENESIN AND CODEINE PHOSPHATE 5 ML: 10; 100 LIQUID ORAL at 01:12

## 2022-12-22 RX ADMIN — TRAZODONE HYDROCHLORIDE 25 MG: 50 TABLET ORAL at 09:12

## 2022-12-22 RX ADMIN — OXYCODONE HYDROCHLORIDE 5 MG: 5 TABLET ORAL at 11:12

## 2022-12-22 RX ADMIN — POTASSIUM CHLORIDE 20 MEQ: 1500 TABLET, EXTENDED RELEASE ORAL at 10:12

## 2022-12-22 RX ADMIN — AMITRIPTYLINE HYDROCHLORIDE 25 MG: 25 TABLET, FILM COATED ORAL at 09:12

## 2022-12-22 RX ADMIN — FUROSEMIDE 60 MG: 20 INJECTION, SOLUTION INTRAMUSCULAR; INTRAVENOUS at 11:12

## 2022-12-22 RX ADMIN — OXYCODONE HYDROCHLORIDE 5 MG: 5 TABLET ORAL at 05:12

## 2022-12-22 RX ADMIN — CEFTRIAXONE SODIUM 2 G: 2 INJECTION, POWDER, FOR SOLUTION INTRAMUSCULAR; INTRAVENOUS at 10:12

## 2022-12-22 RX ADMIN — AZITHROMYCIN MONOHYDRATE 500 MG: 500 INJECTION, POWDER, LYOPHILIZED, FOR SOLUTION INTRAVENOUS at 10:12

## 2022-12-22 RX ADMIN — GUAIFENESIN AND CODEINE PHOSPHATE 5 ML: 10; 100 LIQUID ORAL at 09:12

## 2022-12-22 RX ADMIN — OXYCODONE HYDROCHLORIDE 5 MG: 5 TABLET ORAL at 10:12

## 2022-12-22 RX ADMIN — FUROSEMIDE 60 MG: 20 INJECTION, SOLUTION INTRAMUSCULAR; INTRAVENOUS at 12:12

## 2022-12-22 RX ADMIN — ASPIRIN 81 MG: 81 TABLET, DELAYED RELEASE ORAL at 10:12

## 2022-12-22 NOTE — SUBJECTIVE & OBJECTIVE
Interval History: naeo    Review of Systems   Constitutional:  Positive for chills and fever. Negative for fatigue and unexpected weight change.   HENT:  Negative for congestion, mouth sores and sore throat.    Eyes:  Negative for photophobia and visual disturbance.   Respiratory:  Positive for cough and shortness of breath. Negative for chest tightness and wheezing.    Cardiovascular:  Positive for chest pain and leg swelling. Negative for palpitations.   Gastrointestinal:  Positive for nausea. Negative for abdominal pain, diarrhea and vomiting.   Endocrine: Negative for cold intolerance and heat intolerance.   Genitourinary:  Negative for difficulty urinating, dysuria, frequency and urgency.   Musculoskeletal:  Negative for arthralgias, back pain and myalgias.   Skin:  Negative for pallor and rash.   Neurological:  Negative for tremors, seizures, syncope, weakness, numbness and headaches.   Hematological:  Does not bruise/bleed easily.   Psychiatric/Behavioral:  Negative for agitation, confusion, hallucinations and suicidal ideas.    Objective:     Vital Signs (Most Recent):  Temp: 98.2 °F (36.8 °C) (12/22/22 0630)  Pulse: 103 (12/22/22 0630)  Resp: 18 (12/22/22 1054)  BP: 119/71 (12/22/22 0630)  SpO2: (!) 94 % (12/22/22 0630)   Vital Signs (24h Range):  Temp:  [98.2 °F (36.8 °C)-98.7 °F (37.1 °C)] 98.2 °F (36.8 °C)  Pulse:  [103-118] 103  Resp:  [18-19] 18  SpO2:  [93 %-96 %] 94 %  BP: (115-137)/(65-72) 119/71     Weight: 97.5 kg (214 lb 15.2 oz)  Body mass index is 28.36 kg/m².    Intake/Output Summary (Last 24 hours) at 12/22/2022 1115  Last data filed at 12/21/2022 1729  Gross per 24 hour   Intake --   Output 1300 ml   Net -1300 ml        Physical Exam  Vitals reviewed.   Constitutional:       Appearance: Normal appearance. He is obese.   HENT:      Head: Normocephalic and atraumatic.      Nose: Nose normal.   Eyes:      Extraocular Movements: Extraocular movements intact.      Conjunctiva/sclera: Conjunctivae  normal.   Neck:      Trachea: Trachea normal.   Cardiovascular:      Rate and Rhythm: Regular rhythm. Tachycardia present.      Pulses: Normal pulses.      Heart sounds: Normal heart sounds.   Pulmonary:      Effort: Respiratory distress present.      Breath sounds: Normal breath sounds and air entry.      Comments: Inspiratory crackles in the bases bilaterally.  Abdominal:      General: Bowel sounds are normal.      Palpations: Abdomen is soft.   Musculoskeletal:      Cervical back: Neck supple.      Comments: Flaccid paralysis rue, moves all other extremities   Skin:     General: Skin is warm and dry.      Capillary Refill: Capillary refill takes less than 2 seconds.   Neurological:      Mental Status: He is alert. Mental status is at baseline.      Cranial Nerves: Cranial nerves 2-12 are intact.      Comments: Expressive aphasia and flaccid RUE weakness from old CVA   Psychiatric:         Mood and Affect: Mood and affect normal.         Behavior: Behavior is cooperative.       Significant Labs: All pertinent labs within the past 24 hours have been reviewed.    Significant Imaging: I have reviewed all pertinent imaging results/findings within the past 24 hours.

## 2022-12-22 NOTE — ASSESSMENT & PLAN NOTE
Patient with Hypoxic Respiratory failure which is Acute.  he is not on home oxygen. Supplemental oxygen was provided and noted- Oxygen Concentration (%):  [28] 28.   Signs/symptoms of respiratory failure include- tachypnea, increased work of breathing and hypoxia. Contributing diagnoses includes - CHF and Pneumonia Labs and images were reviewed. Patient Has not had a recent ABG. Will treat underlying causes and adjust management of respiratory failure as follows-     Suspect multifactorial HFrEF exacerbation +/- pneumonia  Feels better after 3L out in ED. Continue IV lasix 60mg BID  Fluid/salt restrict  Daily weight  Strict I/O    CAP coverage  Pneumonia called on chest CT  Productive cough and fevers at home support- no white count  Continue abx

## 2022-12-22 NOTE — PLAN OF CARE
Problem: Adult Inpatient Plan of Care  Goal: Plan of Care Review  Outcome: Ongoing, Progressing  Goal: Patient-Specific Goal (Individualized)  Outcome: Ongoing, Progressing  Goal: Absence of Hospital-Acquired Illness or Injury  Outcome: Ongoing, Progressing  Goal: Optimal Comfort and Wellbeing  Outcome: Ongoing, Progressing  Goal: Readiness for Transition of Care  Outcome: Ongoing, Progressing     Problem: Fluid Imbalance (Pneumonia)  Goal: Fluid Balance  Outcome: Ongoing, Progressing     Problem: Infection (Pneumonia)  Goal: Resolution of Infection Signs and Symptoms  Outcome: Ongoing, Progressing     Problem: Respiratory Compromise (Pneumonia)  Goal: Effective Oxygenation and Ventilation  Outcome: Ongoing, Progressing     Problem: Skin Injury Risk Increased  Goal: Skin Health and Integrity  Outcome: Ongoing, Progressing     Problem: Gas Exchange Impaired  Goal: Optimal Gas Exchange  Outcome: Ongoing, Progressing

## 2022-12-22 NOTE — PROGRESS NOTES
[facility] Hospital Medicine  Progress Note    Patient Name: Karin Carrera  MRN: [id number]  Patient Class: IP- Inpatient   Admission Date: 12/19/2022  Length of Stay: 3 days  Attending Physician: Aleks Whipple DO  Primary Care Provider: Walker Smith MD        Subjective:     Principal Problem:Acute hypoxemic respiratory failure        HPI:  52yowm with PMH of CAD s/p CABG, CVA, HTN, GERD, ETOH abuse, and nicotine abuse who presents to the ED with SOB and bilateral LE edema.  His lower extremity edema was sudden onset and began about 3 days ago.  He has some degree of expressive aphasia presumably from his CVA in the past.  He also complains of SOB, cough with sputum production and chest pain.  Chest pain feels like pressure, intermittent with no identifiable alleviating or aggravating factors.  No radiation. No clear duration given by the patient.  +orthopnea, denies PND.  +Fevers/chills.  ROS otherwise negative.       Overview/Hospital Course:  12/20-feels better, refusing IV  12/21-better clinically, increasing lasix due to poor op overnight. Some may have been missed per nursing staff  12/22-exam looks better, no issues overnight. Anticipate discharge soon      Interval History: naeo    Review of Systems   Constitutional:  Positive for chills and fever. Negative for fatigue and unexpected weight change.   HENT:  Negative for congestion, mouth sores and sore throat.    Eyes:  Negative for photophobia and visual disturbance.   Respiratory:  Positive for cough and shortness of breath. Negative for chest tightness and wheezing.    Cardiovascular:  Positive for chest pain and leg swelling. Negative for palpitations.   Gastrointestinal:  Positive for nausea. Negative for abdominal pain, diarrhea and vomiting.   Endocrine: Negative for cold intolerance and heat intolerance.   Genitourinary:  Negative for difficulty urinating, dysuria, frequency and urgency.    Musculoskeletal:  Negative for arthralgias, back pain and myalgias.   Skin:  Negative for pallor and rash.   Neurological:  Negative for tremors, seizures, syncope, weakness, numbness and headaches.   Hematological:  Does not bruise/bleed easily.   Psychiatric/Behavioral:  Negative for agitation, confusion, hallucinations and suicidal ideas.    Objective:     Vital Signs (Most Recent):  Temp: 98.2 °F (36.8 °C) (12/22/22 0630)  Pulse: 103 (12/22/22 0630)  Resp: 18 (12/22/22 1054)  BP: 119/71 (12/22/22 0630)  SpO2: (!) 94 % (12/22/22 0630)   Vital Signs (24h Range):  Temp:  [98.2 °F (36.8 °C)-98.7 °F (37.1 °C)] 98.2 °F (36.8 °C)  Pulse:  [103-118] 103  Resp:  [18-19] 18  SpO2:  [93 %-96 %] 94 %  BP: (115-137)/(65-72) 119/71     Weight: 97.5 kg (214 lb 15.2 oz)  Body mass index is 28.36 kg/m².    Intake/Output Summary (Last 24 hours) at 12/22/2022 1115  Last data filed at 12/21/2022 1729  Gross per 24 hour   Intake --   Output 1300 ml   Net -1300 ml        Physical Exam  Vitals reviewed.   Constitutional:       Appearance: Normal appearance. He is obese.   HENT:      Head: Normocephalic and atraumatic.      Nose: Nose normal.   Eyes:      Extraocular Movements: Extraocular movements intact.      Conjunctiva/sclera: Conjunctivae normal.   Neck:      Trachea: Trachea normal.   Cardiovascular:      Rate and Rhythm: Regular rhythm. Tachycardia present.      Pulses: Normal pulses.      Heart sounds: Normal heart sounds.   Pulmonary:      Effort: Respiratory distress present.      Breath sounds: Normal breath sounds and air entry.      Comments: Inspiratory crackles in the bases bilaterally.  Abdominal:      General: Bowel sounds are normal.      Palpations: Abdomen is soft.   Musculoskeletal:      Cervical back: Neck supple.      Comments: Flaccid paralysis rue, moves all other extremities   Skin:     General: Skin is warm and dry.      Capillary Refill: Capillary refill takes less than 2 seconds.   Neurological:       Mental Status: He is alert. Mental status is at baseline.      Cranial Nerves: Cranial nerves 2-12 are intact.      Comments: Expressive aphasia and flaccid RUE weakness from old CVA   Psychiatric:         Mood and Affect: Mood and affect normal.         Behavior: Behavior is cooperative.       Significant Labs: All pertinent labs within the past 24 hours have been reviewed.    Significant Imaging: I have reviewed all pertinent imaging results/findings within the past 24 hours.      Assessment/Plan:      * Acute hypoxemic respiratory failure  Patient with Hypoxic Respiratory failure which is Acute.  he is not on home oxygen. Supplemental oxygen was provided and noted- Oxygen Concentration (%):  [28] 28.   Signs/symptoms of respiratory failure include- tachypnea, increased work of breathing and hypoxia. Contributing diagnoses includes - CHF and Pneumonia Labs and images were reviewed. Patient Has not had a recent ABG. Will treat underlying causes and adjust management of respiratory failure as follows-     Suspect multifactorial HFrEF exacerbation +/- pneumonia  Feels better after 3L out in ED. Continue IV lasix 60mg BID  Fluid/salt restrict  Daily weight  Strict I/O    CAP coverage  Pneumonia called on chest CT  Productive cough and fevers at home support- no white count  Continue abx    History of CVA (cerebrovascular accident)  Asa/statin  From assisted living- return on DC      Pneumonia due to infectious organism  Productive cough with fevers at home -ct supports pneumonia  Now with respiratory failure  CAP coverage      Acute on chronic congestive heart failure  Patient is identified as having Combined Systolic and Diastolic heart failure that is Acute on chronic. CHF is currently uncontrolled due to Continued edema of extremities, >3 pillow orthopnea and Rales/crackles on pulmonary exam. Latest ECHO performed and demonstrates- Results for orders placed during the hospital encounter of  01/01/22    Echo    Interpretation Summary  · The left ventricle is severely enlarged with mild eccentric hypertrophy and severely decreased systolic function.  · The estimated ejection fraction is 20%.  · There are segmental left ventricular wall motion abnormalities.  · Left ventricular diastolic dysfunction.  · Atrial fibrillation not observed.  · Normal right ventricular size.  · Mild mitral regurgitation.  · Normal central venous pressure (3 mmHg).  . Continue Furosemide and monitor clinical status closely. Monitor on telemetry. Patient is off CHF pathway.  Monitor strict Is&Os and daily weights.  Place on fluid restriction of 1.5 L. Continue to stress to patient importance of self efficacy and  on diet for CHF. Last BNP reviewed- and noted below No results for input(s): BNP, BNPTRIAGEBLO in the last 168 hours..      Hx of CABG  Asa/statin/bb      GERD (gastroesophageal reflux disease)  PPI      Hypertension  Adjust medications as needed        VTE Risk Mitigation (From admission, onward)         Ordered     enoxaparin injection 40 mg  Daily         12/19/22 1426     IP VTE HIGH RISK PATIENT  Once         12/19/22 1426     Place sequential compression device  Until discontinued         12/19/22 1426                Discharge Planning   DEANNA:      Code Status: Full Code   Is the patient medically ready for discharge?:     Reason for patient still in hospital (select all that apply): Treatment  Discharge Plan A: Assisted Living                  Aleks Whipple DO  Department of Hospital Medicine   [facility]

## 2022-12-23 LAB
GLUCOSE SERPL-MCNC: 283 MG/DL (ref 70–105)
GLUCOSE SERPL-MCNC: 338 MG/DL (ref 70–105)
GLUCOSE SERPL-MCNC: 365 MG/DL (ref 70–105)
GLUCOSE SERPL-MCNC: 370 MG/DL (ref 70–105)

## 2022-12-23 PROCEDURE — 11000001 HC ACUTE MED/SURG PRIVATE ROOM

## 2022-12-23 PROCEDURE — 25000003 PHARM REV CODE 250: Performed by: STUDENT IN AN ORGANIZED HEALTH CARE EDUCATION/TRAINING PROGRAM

## 2022-12-23 PROCEDURE — 82962 GLUCOSE BLOOD TEST: CPT

## 2022-12-23 PROCEDURE — 94761 N-INVAS EAR/PLS OXIMETRY MLT: CPT

## 2022-12-23 PROCEDURE — 27000221 HC OXYGEN, UP TO 24 HOURS

## 2022-12-23 PROCEDURE — 25000003 PHARM REV CODE 250: Performed by: INTERNAL MEDICINE

## 2022-12-23 PROCEDURE — 99222 PR INITIAL HOSPITAL CARE,LEVL II: ICD-10-PCS | Mod: ,,, | Performed by: INTERNAL MEDICINE

## 2022-12-23 PROCEDURE — 99222 1ST HOSP IP/OBS MODERATE 55: CPT | Mod: ,,, | Performed by: INTERNAL MEDICINE

## 2022-12-23 PROCEDURE — 63600175 PHARM REV CODE 636 W HCPCS: Performed by: STUDENT IN AN ORGANIZED HEALTH CARE EDUCATION/TRAINING PROGRAM

## 2022-12-23 RX ORDER — IBUPROFEN 200 MG
1 TABLET ORAL DAILY
Status: DISCONTINUED | OUTPATIENT
Start: 2022-12-24 | End: 2022-12-27 | Stop reason: HOSPADM

## 2022-12-23 RX ADMIN — ASPIRIN 81 MG: 81 TABLET, DELAYED RELEASE ORAL at 09:12

## 2022-12-23 RX ADMIN — ATORVASTATIN CALCIUM 40 MG: 40 TABLET, FILM COATED ORAL at 08:12

## 2022-12-23 RX ADMIN — POTASSIUM CHLORIDE 20 MEQ: 1500 TABLET, EXTENDED RELEASE ORAL at 08:12

## 2022-12-23 RX ADMIN — CEFTRIAXONE SODIUM 2 G: 2 INJECTION, POWDER, FOR SOLUTION INTRAMUSCULAR; INTRAVENOUS at 08:12

## 2022-12-23 RX ADMIN — ENOXAPARIN SODIUM 40 MG: 40 INJECTION SUBCUTANEOUS at 04:12

## 2022-12-23 RX ADMIN — AMITRIPTYLINE HYDROCHLORIDE 25 MG: 25 TABLET, FILM COATED ORAL at 08:12

## 2022-12-23 RX ADMIN — SODIUM CHLORIDE 25 MG: 9 INJECTION, SOLUTION INTRAVENOUS at 02:12

## 2022-12-23 RX ADMIN — INSULIN DETEMIR 5 UNITS: 100 INJECTION, SOLUTION SUBCUTANEOUS at 08:12

## 2022-12-23 RX ADMIN — PANTOPRAZOLE SODIUM 40 MG: 40 TABLET, DELAYED RELEASE ORAL at 08:12

## 2022-12-23 RX ADMIN — FUROSEMIDE 60 MG: 20 INJECTION, SOLUTION INTRAMUSCULAR; INTRAVENOUS at 11:12

## 2022-12-23 RX ADMIN — OXYCODONE HYDROCHLORIDE 5 MG: 5 TABLET ORAL at 09:12

## 2022-12-23 RX ADMIN — SODIUM CHLORIDE 975 MG: 9 INJECTION, SOLUTION INTRAVENOUS at 05:12

## 2022-12-23 RX ADMIN — FUROSEMIDE 60 MG: 20 INJECTION, SOLUTION INTRAMUSCULAR; INTRAVENOUS at 01:12

## 2022-12-23 RX ADMIN — OXYCODONE HYDROCHLORIDE 5 MG: 5 TABLET ORAL at 10:12

## 2022-12-23 RX ADMIN — OXYCODONE HYDROCHLORIDE 5 MG: 5 TABLET ORAL at 04:12

## 2022-12-23 RX ADMIN — TRAZODONE HYDROCHLORIDE 25 MG: 50 TABLET ORAL at 08:12

## 2022-12-23 RX ADMIN — GUAIFENESIN AND CODEINE PHOSPHATE 5 ML: 10; 100 LIQUID ORAL at 08:12

## 2022-12-23 RX ADMIN — AZITHROMYCIN MONOHYDRATE 500 MG: 500 INJECTION, POWDER, LYOPHILIZED, FOR SOLUTION INTRAVENOUS at 09:12

## 2022-12-23 NOTE — PROGRESS NOTES
[facility] Hospital Medicine  Progress Note    Patient Name: Karin Carrera  MRN: [id number]  Patient Class: IP- Inpatient   Admission Date: 12/19/2022  Length of Stay: 4 days  Attending Physician: Walker Smith MD  Primary Care Provider: Walker Smith MD        Subjective:     Principal Problem:Acute hypoxemic respiratory failure        HPI:  52yowm with PMH of CAD s/p CABG, CVA, HTN, GERD, ETOH abuse, and nicotine abuse who presents to the ED with SOB and bilateral LE edema.  His lower extremity edema was sudden onset and began about 3 days ago.  He has some degree of expressive aphasia presumably from his CVA in the past.  He also complains of SOB, cough with sputum production and chest pain.  Chest pain feels like pressure, intermittent with no identifiable alleviating or aggravating factors.  No radiation. No clear duration given by the patient.  +orthopnea, denies PND.  +Fevers/chills.  ROS otherwise negative.       Overview/Hospital Course:  12/20-feels better, refusing IV  12/21-better clinically, increasing lasix due to poor op overnight. Some may have been missed per nursing staff  12/22-exam looks better, no issues overnight. Anticipate discharge soon      No new subjective & objective note has been filed under this hospital service since the last note was generated.    12/23/2022 patient is awake alert he presents with community-acquired pneumonia he stated he is feeling better he is afebrile hemodynamically stable lungs crackles bilaterally cardiovascular S1-S2 regular rate rhythm abdomen is soft positive bowel sounds nontender extremities nonedematous the plan continue broad-spectrum IV antibiotics  Assessment/Plan:      * Acute hypoxemic respiratory failure  Patient with Hypoxic Respiratory failure which is Acute.  he is not on home oxygen. Supplemental oxygen was provided and noted- Oxygen Concentration (%):  [28] 28.   Signs/symptoms of  respiratory failure include- tachypnea, increased work of breathing and hypoxia. Contributing diagnoses includes - CHF and Pneumonia Labs and images were reviewed. Patient Has not had a recent ABG. Will treat underlying causes and adjust management of respiratory failure as follows-     Suspect multifactorial HFrEF exacerbation +/- pneumonia  Feels better after 3L out in ED. Continue IV lasix 60mg BID  Fluid/salt restrict  Daily weight  Strict I/O    CAP coverage  Pneumonia called on chest CT  Productive cough and fevers at home support- no white count  Continue abx    History of CVA (cerebrovascular accident)  Asa/statin  From assisted living- return on DC      Pneumonia due to infectious organism  Productive cough with fevers at home -ct supports pneumonia  Now with respiratory failure  CAP coverage      Acute on chronic congestive heart failure  Patient is identified as having Combined Systolic and Diastolic heart failure that is Acute on chronic. CHF is currently uncontrolled due to Continued edema of extremities, >3 pillow orthopnea and Rales/crackles on pulmonary exam. Latest ECHO performed and demonstrates- Results for orders placed during the hospital encounter of 01/01/22    Echo    Interpretation Summary  · The left ventricle is severely enlarged with mild eccentric hypertrophy and severely decreased systolic function.  · The estimated ejection fraction is 20%.  · There are segmental left ventricular wall motion abnormalities.  · Left ventricular diastolic dysfunction.  · Atrial fibrillation not observed.  · Normal right ventricular size.  · Mild mitral regurgitation.  · Normal central venous pressure (3 mmHg).  . Continue Furosemide and monitor clinical status closely. Monitor on telemetry. Patient is off CHF pathway.  Monitor strict Is&Os and daily weights.  Place on fluid restriction of 1.5 L. Continue to stress to patient importance of self efficacy and  on diet for CHF. Last BNP reviewed- and  noted below No results for input(s): BNP, BNPTRIAGEBLO in the last 168 hours..      Hx of CABG  Asa/statin/bb      GERD (gastroesophageal reflux disease)  PPI      Hypertension  Adjust medications as needed        VTE Risk Mitigation (From admission, onward)           Ordered     enoxaparin injection 40 mg  Daily         12/19/22 1426     IP VTE HIGH RISK PATIENT  Once         12/19/22 1426     Place sequential compression device  Until discontinued         12/19/22 1426                    Discharge Planning   DEANNA:      Code Status: Full Code   Is the patient medically ready for discharge?:     Reason for patient still in hospital (select all that apply): Treatment  Discharge Plan A: Assisted Living                  Walker Smith MD  Department of Hospital Medicine   [facility]

## 2022-12-23 NOTE — PLAN OF CARE
Problem: Adult Inpatient Plan of Care  Goal: Plan of Care Review  Outcome: Ongoing, Progressing  Goal: Patient-Specific Goal (Individualized)  Outcome: Ongoing, Progressing  Goal: Absence of Hospital-Acquired Illness or Injury  Outcome: Ongoing, Progressing  Goal: Optimal Comfort and Wellbeing  Outcome: Ongoing, Progressing  Goal: Readiness for Transition of Care  Outcome: Ongoing, Progressing     Problem: Fluid Imbalance (Pneumonia)  Goal: Fluid Balance  Outcome: Ongoing, Progressing     Problem: Infection (Pneumonia)  Goal: Resolution of Infection Signs and Symptoms  Outcome: Ongoing, Progressing     Problem: Respiratory Compromise (Pneumonia)  Goal: Effective Oxygenation and Ventilation  Outcome: Ongoing, Progressing     Problem: Skin Injury Risk Increased  Goal: Skin Health and Integrity  Outcome: Ongoing, Progressing     Problem: Gas Exchange Impaired  Goal: Optimal Gas Exchange  Outcome: Ongoing, Progressing     Problem: Infection  Goal: Absence of Infection Signs and Symptoms  Outcome: Ongoing, Progressing

## 2022-12-23 NOTE — NURSING
Pt up at bedside since lunch he needed little assistance getting in to chair he also has a bath and linen changed will continue to monitor

## 2022-12-24 LAB
ALBUMIN SERPL BCP-MCNC: 2.5 G/DL (ref 3.5–5)
ALBUMIN/GLOB SERPL: 0.6 {RATIO}
ALP SERPL-CCNC: 103 U/L (ref 45–115)
ALT SERPL W P-5'-P-CCNC: 20 U/L (ref 16–61)
ANION GAP SERPL CALCULATED.3IONS-SCNC: 12 MMOL/L (ref 7–16)
ANISOCYTOSIS BLD QL SMEAR: ABNORMAL
AST SERPL W P-5'-P-CCNC: 17 U/L (ref 15–37)
ATYPICAL LYMPHOCYTES: ABNORMAL
BASOPHILS # BLD AUTO: 0.06 K/UL (ref 0–0.2)
BASOPHILS NFR BLD AUTO: 0.9 % (ref 0–1)
BASOPHILS NFR BLD MANUAL: 1 % (ref 0–1)
BILIRUB SERPL-MCNC: 0.4 MG/DL (ref ?–1.2)
BUN SERPL-MCNC: 12 MG/DL (ref 7–18)
BUN/CREAT SERPL: 13 (ref 6–20)
CALCIUM SERPL-MCNC: 7.9 MG/DL (ref 8.5–10.1)
CHLORIDE SERPL-SCNC: 91 MMOL/L (ref 98–107)
CO2 SERPL-SCNC: 31 MMOL/L (ref 21–32)
CREAT SERPL-MCNC: 0.96 MG/DL (ref 0.7–1.3)
DIFFERENTIAL METHOD BLD: ABNORMAL
EGFR (NO RACE VARIABLE) (RUSH/TITUS): 95 ML/MIN/1.73M²
EOSINOPHIL # BLD AUTO: 0.32 K/UL (ref 0–0.5)
EOSINOPHIL NFR BLD AUTO: 4.7 % (ref 1–4)
EOSINOPHIL NFR BLD MANUAL: 5 % (ref 1–4)
ERYTHROCYTE [DISTWIDTH] IN BLOOD BY AUTOMATED COUNT: 19.1 % (ref 11.5–14.5)
GLOBULIN SER-MCNC: 4.1 G/DL (ref 2–4)
GLUCOSE SERPL-MCNC: 268 MG/DL (ref 74–106)
GLUCOSE SERPL-MCNC: 307 MG/DL (ref 70–105)
GLUCOSE SERPL-MCNC: 344 MG/DL (ref 70–105)
GLUCOSE SERPL-MCNC: 346 MG/DL (ref 70–105)
GLUCOSE SERPL-MCNC: 423 MG/DL (ref 70–105)
HCT VFR BLD AUTO: 24.7 % (ref 40–54)
HGB BLD-MCNC: 7.3 G/DL (ref 13.5–18)
IMM GRANULOCYTES # BLD AUTO: 0.04 K/UL (ref 0–0.04)
IMM GRANULOCYTES NFR BLD: 0.6 % (ref 0–0.4)
LYMPHOCYTES # BLD AUTO: 1.95 K/UL (ref 1–4.8)
LYMPHOCYTES NFR BLD AUTO: 28.7 % (ref 27–41)
LYMPHOCYTES NFR BLD MANUAL: 27 % (ref 27–41)
MAGNESIUM SERPL-MCNC: 2 MG/DL (ref 1.7–2.3)
MCH RBC QN AUTO: 19.8 PG (ref 27–31)
MCHC RBC AUTO-ENTMCNC: 29.6 G/DL (ref 32–36)
MCV RBC AUTO: 66.9 FL (ref 80–96)
MICROCYTES BLD QL SMEAR: ABNORMAL
MONOCYTES # BLD AUTO: 0.6 K/UL (ref 0–0.8)
MONOCYTES NFR BLD AUTO: 8.8 % (ref 2–6)
MONOCYTES NFR BLD MANUAL: 7 % (ref 2–6)
MPC BLD CALC-MCNC: 8.8 FL (ref 9.4–12.4)
NEUTROPHILS # BLD AUTO: 3.82 K/UL (ref 1.8–7.7)
NEUTROPHILS NFR BLD AUTO: 56.3 % (ref 53–65)
NEUTS BAND NFR BLD MANUAL: 2 % (ref 1–5)
NEUTS SEG NFR BLD MANUAL: 58 % (ref 50–62)
NRBC # BLD AUTO: 0 X10E3/UL
NRBC, AUTO (.00): 0 %
OVALOCYTES BLD QL SMEAR: ABNORMAL
PHOSPHATE SERPL-MCNC: 3.2 MG/DL (ref 2.5–4.5)
PLATELET # BLD AUTO: 415 K/UL (ref 150–400)
PLATELET MORPHOLOGY: ABNORMAL
POLYCHROMASIA BLD QL SMEAR: ABNORMAL
POTASSIUM SERPL-SCNC: 3 MMOL/L (ref 3.5–5.1)
PROT SERPL-MCNC: 6.6 G/DL (ref 6.4–8.2)
RBC # BLD AUTO: 3.69 M/UL (ref 4.6–6.2)
SMUDGE CELLS BLD QL SMEAR: ABNORMAL
SODIUM SERPL-SCNC: 131 MMOL/L (ref 136–145)
TARGETS BLD QL SMEAR: ABNORMAL
WBC # BLD AUTO: 6.79 K/UL (ref 4.5–11)

## 2022-12-24 PROCEDURE — 11000001 HC ACUTE MED/SURG PRIVATE ROOM

## 2022-12-24 PROCEDURE — S4991 NICOTINE PATCH NONLEGEND: HCPCS | Performed by: INTERNAL MEDICINE

## 2022-12-24 PROCEDURE — 83735 ASSAY OF MAGNESIUM: CPT | Performed by: STUDENT IN AN ORGANIZED HEALTH CARE EDUCATION/TRAINING PROGRAM

## 2022-12-24 PROCEDURE — 27000221 HC OXYGEN, UP TO 24 HOURS

## 2022-12-24 PROCEDURE — 25000003 PHARM REV CODE 250: Performed by: INTERNAL MEDICINE

## 2022-12-24 PROCEDURE — 25000003 PHARM REV CODE 250: Performed by: STUDENT IN AN ORGANIZED HEALTH CARE EDUCATION/TRAINING PROGRAM

## 2022-12-24 PROCEDURE — 94761 N-INVAS EAR/PLS OXIMETRY MLT: CPT

## 2022-12-24 PROCEDURE — 36415 COLL VENOUS BLD VENIPUNCTURE: CPT | Performed by: STUDENT IN AN ORGANIZED HEALTH CARE EDUCATION/TRAINING PROGRAM

## 2022-12-24 PROCEDURE — 63600175 PHARM REV CODE 636 W HCPCS: Performed by: STUDENT IN AN ORGANIZED HEALTH CARE EDUCATION/TRAINING PROGRAM

## 2022-12-24 PROCEDURE — 84100 ASSAY OF PHOSPHORUS: CPT | Performed by: STUDENT IN AN ORGANIZED HEALTH CARE EDUCATION/TRAINING PROGRAM

## 2022-12-24 PROCEDURE — 82962 GLUCOSE BLOOD TEST: CPT

## 2022-12-24 PROCEDURE — 80053 COMPREHEN METABOLIC PANEL: CPT | Performed by: STUDENT IN AN ORGANIZED HEALTH CARE EDUCATION/TRAINING PROGRAM

## 2022-12-24 PROCEDURE — 85025 COMPLETE CBC W/AUTO DIFF WBC: CPT | Performed by: STUDENT IN AN ORGANIZED HEALTH CARE EDUCATION/TRAINING PROGRAM

## 2022-12-24 RX ORDER — AZITHROMYCIN 500 MG/1
500 TABLET, FILM COATED ORAL DAILY
Qty: 3 TABLET | Refills: 0 | Status: CANCELLED | OUTPATIENT
Start: 2022-12-24 | End: 2022-12-27

## 2022-12-24 RX ORDER — ONDANSETRON 4 MG/1
4 TABLET, FILM COATED ORAL ONCE
Status: COMPLETED | OUTPATIENT
Start: 2022-12-24 | End: 2022-12-24

## 2022-12-24 RX ORDER — INSULIN ASPART 100 [IU]/ML
0-5 INJECTION, SOLUTION INTRAVENOUS; SUBCUTANEOUS
Status: DISCONTINUED | OUTPATIENT
Start: 2022-12-24 | End: 2022-12-27 | Stop reason: HOSPADM

## 2022-12-24 RX ORDER — FUROSEMIDE 40 MG/1
40 TABLET ORAL DAILY
Qty: 7 TABLET | Refills: 0 | Status: CANCELLED | OUTPATIENT
Start: 2022-12-24 | End: 2023-12-24

## 2022-12-24 RX ORDER — ALBUTEROL SULFATE 90 UG/1
2 AEROSOL, METERED RESPIRATORY (INHALATION) EVERY 6 HOURS PRN
Qty: 18 G | Refills: 0 | Status: CANCELLED | OUTPATIENT
Start: 2022-12-24 | End: 2023-12-24

## 2022-12-24 RX ORDER — IBUPROFEN 200 MG
1 TABLET ORAL DAILY
Qty: 14 PATCH | Refills: 0 | Status: CANCELLED | OUTPATIENT
Start: 2022-12-25 | End: 2023-01-08

## 2022-12-24 RX ADMIN — GUAIFENESIN AND CODEINE PHOSPHATE 5 ML: 10; 100 LIQUID ORAL at 09:12

## 2022-12-24 RX ADMIN — ENOXAPARIN SODIUM 40 MG: 40 INJECTION SUBCUTANEOUS at 05:12

## 2022-12-24 RX ADMIN — OXYCODONE HYDROCHLORIDE 5 MG: 5 TABLET ORAL at 09:12

## 2022-12-24 RX ADMIN — OXYCODONE HYDROCHLORIDE 5 MG: 5 TABLET ORAL at 03:12

## 2022-12-24 RX ADMIN — INSULIN DETEMIR 10 UNITS: 100 INJECTION, SOLUTION SUBCUTANEOUS at 09:12

## 2022-12-24 RX ADMIN — ASPIRIN 81 MG: 81 TABLET, DELAYED RELEASE ORAL at 09:12

## 2022-12-24 RX ADMIN — CEFTRIAXONE SODIUM 2 G: 2 INJECTION, POWDER, FOR SOLUTION INTRAMUSCULAR; INTRAVENOUS at 09:12

## 2022-12-24 RX ADMIN — INSULIN ASPART 4 UNITS: 100 INJECTION, SOLUTION INTRAVENOUS; SUBCUTANEOUS at 05:12

## 2022-12-24 RX ADMIN — INSULIN ASPART 2 UNITS: 100 INJECTION, SOLUTION INTRAVENOUS; SUBCUTANEOUS at 09:12

## 2022-12-24 RX ADMIN — NICOTINE 1 PATCH: 14 PATCH, EXTENDED RELEASE TRANSDERMAL at 09:12

## 2022-12-24 RX ADMIN — POTASSIUM CHLORIDE 20 MEQ: 1500 TABLET, EXTENDED RELEASE ORAL at 09:12

## 2022-12-24 RX ADMIN — FUROSEMIDE 60 MG: 20 INJECTION, SOLUTION INTRAMUSCULAR; INTRAVENOUS at 01:12

## 2022-12-24 RX ADMIN — PANTOPRAZOLE SODIUM 40 MG: 40 TABLET, DELAYED RELEASE ORAL at 09:12

## 2022-12-24 RX ADMIN — ATORVASTATIN CALCIUM 40 MG: 40 TABLET, FILM COATED ORAL at 09:12

## 2022-12-24 RX ADMIN — AMITRIPTYLINE HYDROCHLORIDE 25 MG: 25 TABLET, FILM COATED ORAL at 09:12

## 2022-12-24 RX ADMIN — ONDANSETRON HYDROCHLORIDE 4 MG: 4 TABLET, FILM COATED ORAL at 03:12

## 2022-12-24 RX ADMIN — AZITHROMYCIN MONOHYDRATE 500 MG: 500 INJECTION, POWDER, LYOPHILIZED, FOR SOLUTION INTRAVENOUS at 10:12

## 2022-12-25 LAB
ALBUMIN SERPL BCP-MCNC: 2.6 G/DL (ref 3.5–5)
ALBUMIN/GLOB SERPL: 0.6 {RATIO}
ALP SERPL-CCNC: 109 U/L (ref 45–115)
ALT SERPL W P-5'-P-CCNC: 27 U/L (ref 16–61)
ANION GAP SERPL CALCULATED.3IONS-SCNC: 13 MMOL/L (ref 7–16)
ANISOCYTOSIS BLD QL SMEAR: ABNORMAL
AST SERPL W P-5'-P-CCNC: 22 U/L (ref 15–37)
ATYPICAL LYMPHOCYTES: ABNORMAL
BACTERIA BLD CULT: NORMAL
BACTERIA BLD CULT: NORMAL
BASOPHILS # BLD AUTO: 0.07 K/UL (ref 0–0.2)
BASOPHILS NFR BLD AUTO: 1 % (ref 0–1)
BILIRUB SERPL-MCNC: 0.5 MG/DL (ref ?–1.2)
BUN SERPL-MCNC: 13 MG/DL (ref 7–18)
BUN/CREAT SERPL: 12 (ref 6–20)
CALCIUM SERPL-MCNC: 8.8 MG/DL (ref 8.5–10.1)
CHLORIDE SERPL-SCNC: 92 MMOL/L (ref 98–107)
CO2 SERPL-SCNC: 29 MMOL/L (ref 21–32)
CREAT SERPL-MCNC: 1.05 MG/DL (ref 0.7–1.3)
DIFFERENTIAL METHOD BLD: ABNORMAL
EGFR (NO RACE VARIABLE) (RUSH/TITUS): 85 ML/MIN/1.73M²
EOSINOPHIL # BLD AUTO: 0.36 K/UL (ref 0–0.5)
EOSINOPHIL NFR BLD AUTO: 5.3 % (ref 1–4)
EOSINOPHIL NFR BLD MANUAL: 5 % (ref 1–4)
ERYTHROCYTE [DISTWIDTH] IN BLOOD BY AUTOMATED COUNT: 19.7 % (ref 11.5–14.5)
GLOBULIN SER-MCNC: 4.3 G/DL (ref 2–4)
GLUCOSE SERPL-MCNC: 213 MG/DL (ref 70–105)
GLUCOSE SERPL-MCNC: 248 MG/DL (ref 70–105)
GLUCOSE SERPL-MCNC: 286 MG/DL (ref 74–106)
GLUCOSE SERPL-MCNC: 384 MG/DL (ref 70–105)
GLUCOSE SERPL-MCNC: 454 MG/DL (ref 70–105)
HCT VFR BLD AUTO: 26.8 % (ref 40–54)
HGB BLD-MCNC: 7.7 G/DL (ref 13.5–18)
HYPOCHROMIA BLD QL SMEAR: ABNORMAL
IMM GRANULOCYTES # BLD AUTO: 0.05 K/UL (ref 0–0.04)
IMM GRANULOCYTES NFR BLD: 0.7 % (ref 0–0.4)
LYMPHOCYTES # BLD AUTO: 2.52 K/UL (ref 1–4.8)
LYMPHOCYTES NFR BLD AUTO: 37 % (ref 27–41)
LYMPHOCYTES NFR BLD MANUAL: 26 % (ref 27–41)
MAGNESIUM SERPL-MCNC: 2.1 MG/DL (ref 1.7–2.3)
MCH RBC QN AUTO: 19.8 PG (ref 27–31)
MCHC RBC AUTO-ENTMCNC: 28.7 G/DL (ref 32–36)
MCV RBC AUTO: 69.1 FL (ref 80–96)
MICROCYTES BLD QL SMEAR: ABNORMAL
MONOCYTES # BLD AUTO: 0.47 K/UL (ref 0–0.8)
MONOCYTES NFR BLD AUTO: 6.9 % (ref 2–6)
MONOCYTES NFR BLD MANUAL: 4 % (ref 2–6)
MPC BLD CALC-MCNC: 8.8 FL (ref 9.4–12.4)
NEUTROPHILS # BLD AUTO: 3.34 K/UL (ref 1.8–7.7)
NEUTROPHILS NFR BLD AUTO: 49.1 % (ref 53–65)
NEUTS SEG NFR BLD MANUAL: 65 % (ref 50–62)
NRBC # BLD AUTO: 0 X10E3/UL
NRBC, AUTO (.00): 0 %
PHOSPHATE SERPL-MCNC: 4.1 MG/DL (ref 2.5–4.5)
PLATELET # BLD AUTO: 431 K/UL (ref 150–400)
PLATELET MORPHOLOGY: NORMAL
POTASSIUM SERPL-SCNC: 3.2 MMOL/L (ref 3.5–5.1)
PROT SERPL-MCNC: 6.9 G/DL (ref 6.4–8.2)
RBC # BLD AUTO: 3.88 M/UL (ref 4.6–6.2)
SODIUM SERPL-SCNC: 131 MMOL/L (ref 136–145)
WBC # BLD AUTO: 6.81 K/UL (ref 4.5–11)

## 2022-12-25 PROCEDURE — 63600175 PHARM REV CODE 636 W HCPCS: Performed by: STUDENT IN AN ORGANIZED HEALTH CARE EDUCATION/TRAINING PROGRAM

## 2022-12-25 PROCEDURE — 25000003 PHARM REV CODE 250: Performed by: INTERNAL MEDICINE

## 2022-12-25 PROCEDURE — 80053 COMPREHEN METABOLIC PANEL: CPT | Performed by: STUDENT IN AN ORGANIZED HEALTH CARE EDUCATION/TRAINING PROGRAM

## 2022-12-25 PROCEDURE — 11000001 HC ACUTE MED/SURG PRIVATE ROOM

## 2022-12-25 PROCEDURE — 99232 PR SUBSEQUENT HOSPITAL CARE,LEVL II: ICD-10-PCS | Mod: ,,, | Performed by: INTERNAL MEDICINE

## 2022-12-25 PROCEDURE — 84100 ASSAY OF PHOSPHORUS: CPT | Performed by: STUDENT IN AN ORGANIZED HEALTH CARE EDUCATION/TRAINING PROGRAM

## 2022-12-25 PROCEDURE — 36415 COLL VENOUS BLD VENIPUNCTURE: CPT | Performed by: STUDENT IN AN ORGANIZED HEALTH CARE EDUCATION/TRAINING PROGRAM

## 2022-12-25 PROCEDURE — 83735 ASSAY OF MAGNESIUM: CPT | Performed by: STUDENT IN AN ORGANIZED HEALTH CARE EDUCATION/TRAINING PROGRAM

## 2022-12-25 PROCEDURE — 94761 N-INVAS EAR/PLS OXIMETRY MLT: CPT

## 2022-12-25 PROCEDURE — 25000003 PHARM REV CODE 250: Performed by: STUDENT IN AN ORGANIZED HEALTH CARE EDUCATION/TRAINING PROGRAM

## 2022-12-25 PROCEDURE — 85025 COMPLETE CBC W/AUTO DIFF WBC: CPT | Performed by: STUDENT IN AN ORGANIZED HEALTH CARE EDUCATION/TRAINING PROGRAM

## 2022-12-25 PROCEDURE — 99232 SBSQ HOSP IP/OBS MODERATE 35: CPT | Mod: ,,, | Performed by: INTERNAL MEDICINE

## 2022-12-25 PROCEDURE — 82962 GLUCOSE BLOOD TEST: CPT

## 2022-12-25 PROCEDURE — S4991 NICOTINE PATCH NONLEGEND: HCPCS | Performed by: INTERNAL MEDICINE

## 2022-12-25 RX ADMIN — NICOTINE 1 PATCH: 14 PATCH, EXTENDED RELEASE TRANSDERMAL at 08:12

## 2022-12-25 RX ADMIN — POTASSIUM CHLORIDE 20 MEQ: 1500 TABLET, EXTENDED RELEASE ORAL at 08:12

## 2022-12-25 RX ADMIN — ASPIRIN 81 MG: 81 TABLET, DELAYED RELEASE ORAL at 08:12

## 2022-12-25 RX ADMIN — OXYCODONE HYDROCHLORIDE 5 MG: 5 TABLET ORAL at 08:12

## 2022-12-25 RX ADMIN — OXYCODONE HYDROCHLORIDE 5 MG: 5 TABLET ORAL at 04:12

## 2022-12-25 RX ADMIN — FUROSEMIDE 60 MG: 20 INJECTION, SOLUTION INTRAMUSCULAR; INTRAVENOUS at 11:12

## 2022-12-25 RX ADMIN — ATORVASTATIN CALCIUM 40 MG: 40 TABLET, FILM COATED ORAL at 08:12

## 2022-12-25 RX ADMIN — FUROSEMIDE 60 MG: 20 INJECTION, SOLUTION INTRAMUSCULAR; INTRAVENOUS at 12:12

## 2022-12-25 RX ADMIN — ENOXAPARIN SODIUM 40 MG: 40 INJECTION SUBCUTANEOUS at 05:12

## 2022-12-25 RX ADMIN — AMITRIPTYLINE HYDROCHLORIDE 25 MG: 25 TABLET, FILM COATED ORAL at 08:12

## 2022-12-25 RX ADMIN — CEFTRIAXONE SODIUM 2 G: 2 INJECTION, POWDER, FOR SOLUTION INTRAMUSCULAR; INTRAVENOUS at 08:12

## 2022-12-25 RX ADMIN — AZITHROMYCIN MONOHYDRATE 500 MG: 500 INJECTION, POWDER, LYOPHILIZED, FOR SOLUTION INTRAVENOUS at 09:12

## 2022-12-25 RX ADMIN — INSULIN ASPART 5 UNITS: 100 INJECTION, SOLUTION INTRAVENOUS; SUBCUTANEOUS at 11:12

## 2022-12-25 RX ADMIN — INSULIN DETEMIR 10 UNITS: 100 INJECTION, SOLUTION SUBCUTANEOUS at 08:12

## 2022-12-25 RX ADMIN — PANTOPRAZOLE SODIUM 40 MG: 40 TABLET, DELAYED RELEASE ORAL at 08:12

## 2022-12-25 NOTE — PROGRESS NOTES
[facility] Hospital Medicine  Progress Note    Patient Name: Karin Carrera  MRN: [id number]  Patient Class: IP- Inpatient   Admission Date: 12/19/2022  Length of Stay: 6 days  Attending Physician: Walker Smith MD  Primary Care Provider: Walker Smith MD        Subjective:     Principal Problem:Acute hypoxemic respiratory failure        HPI:  52yowm with PMH of CAD s/p CABG, CVA, HTN, GERD, ETOH abuse, and nicotine abuse who presents to the ED with SOB and bilateral LE edema.  His lower extremity edema was sudden onset and began about 3 days ago.  He has some degree of expressive aphasia presumably from his CVA in the past.  He also complains of SOB, cough with sputum production and chest pain.  Chest pain feels like pressure, intermittent with no identifiable alleviating or aggravating factors.  No radiation. No clear duration given by the patient.  +orthopnea, denies PND.  +Fevers/chills.  ROS otherwise negative.       Overview/Hospital Course:  12/20-feels better, refusing IV  12/21-better clinically, increasing lasix due to poor op overnight. Some may have been missed per nursing staff  12/22-exam looks better, no issues overnight. Anticipate discharge soon      No new subjective & objective note has been filed under this hospital service since the last note was generated.    12/25/2022  Patient stated he is feeling better with the exception of an intermittent cough of yellowish sputum.   patient is awake alert he presents with community-acquired pneumonia he stated he is feeling better he is afebrile hemodynamically stable     lungs crackles bilaterally cardiovascular S1-S2 regular rate rhythm abdomen is soft positive bowel sounds nontender extremities nonedematous t    Community-acquired pneumonia, improving    he plan continue broad-spectrum IV antibiotics  DC soon  Assessment/Plan:      * Acute hypoxemic respiratory failure  Patient with Hypoxic  Respiratory failure which is Acute.  he is not on home oxygen. Supplemental oxygen was provided and noted- Oxygen Concentration (%):  [28] 28.   Signs/symptoms of respiratory failure include- tachypnea, increased work of breathing and hypoxia. Contributing diagnoses includes - CHF and Pneumonia Labs and images were reviewed. Patient Has not had a recent ABG. Will treat underlying causes and adjust management of respiratory failure as follows-     Suspect multifactorial HFrEF exacerbation +/- pneumonia  Feels better after 3L out in ED. Continue IV lasix 60mg BID  Fluid/salt restrict  Daily weight  Strict I/O    CAP coverage  Pneumonia called on chest CT  Productive cough and fevers at home support- no white count  Continue abx        History of CVA (cerebrovascular accident)  Asa/statin  From assisted living- return on DC      Pneumonia due to infectious organism  Productive cough with fevers at home -ct supports pneumonia  Now with respiratory failure  CAP coverage          Acute on chronic congestive heart failure  Patient is identified as having Combined Systolic and Diastolic heart failure that is Acute on chronic. CHF is currently uncontrolled due to Continued edema of extremities, >3 pillow orthopnea and Rales/crackles on pulmonary exam. Latest ECHO performed and demonstrates- Results for orders placed during the hospital encounter of 01/01/22    Echo    Interpretation Summary  · The left ventricle is severely enlarged with mild eccentric hypertrophy and severely decreased systolic function.  · The estimated ejection fraction is 20%.  · There are segmental left ventricular wall motion abnormalities.  · Left ventricular diastolic dysfunction.  · Atrial fibrillation not observed.  · Normal right ventricular size.  · Mild mitral regurgitation.  · Normal central venous pressure (3 mmHg).  . Continue Furosemide and monitor clinical status closely. Monitor on telemetry. Patient is off CHF pathway.  Monitor strict  Is&Os and daily weights.  Place on fluid restriction of 1.5 L. Continue to stress to patient importance of self efficacy and  on diet for CHF. Last BNP reviewed- and noted below No results for input(s): BNP, BNPTRIAGEBLO in the last 168 hours..      Hx of CABG  Asa/statin/bb      GERD (gastroesophageal reflux disease)  PPI      Hypertension  Adjust medications as needed            VTE Risk Mitigation (From admission, onward)           Ordered     enoxaparin injection 40 mg  Daily         12/19/22 1426     IP VTE HIGH RISK PATIENT  Once         12/19/22 1426     Place sequential compression device  Until discontinued         12/19/22 1426                    Discharge Planning   DEANNA:      Code Status: Full Code   Is the patient medically ready for discharge?:     Reason for patient still in hospital (select all that apply): Treatment  Discharge Plan A: Assisted Living                  Walker Smith MD  Department of [facility] Hospital Medicine   [facility]

## 2022-12-25 NOTE — PROGRESS NOTES
[facility] - [address] [place] [address] Medicine  Progress Note    Patient Name: Karin Carrera  MRN: [id number]  Patient Class: IP- Inpatient   Admission Date: 12/19/2022  Length of Stay: 6 days  Attending Physician: Walker Smith MD  Primary Care Provider: Walker Smith MD        Subjective:     Principal Problem:Acute hypoxemic respiratory failure        HPI:  52yowm with PMH of CAD s/p CABG, CVA, HTN, GERD, ETOH abuse, and nicotine abuse who presents to the ED with SOB and bilateral LE edema.  His lower extremity edema was sudden onset and began about 3 days ago.  He has some degree of expressive aphasia presumably from his CVA in the past.  He also complains of SOB, cough with sputum production and chest pain.  Chest pain feels like pressure, intermittent with no identifiable alleviating or aggravating factors.  No radiation. No clear duration given by the patient.  +orthopnea, denies PND.  +Fevers/chills.  ROS otherwise negative.       Overview/Hospital Course:  12/20-feels better, refusing IV  12/21-better clinically, increasing lasix due to poor op overnight. Some may have been missed per nursing staff  12/22-exam looks better, no issues overnight. Anticipate discharge soon      No new subjective & objective note has been filed under this hospital service since the last note was generated.    12/24/2022 patient is awake alert he presents with community-acquired pneumonia he stated he is feeling better he is afebrile hemodynamically stable lungs crackles bilaterally cardiovascular S1-S2 regular rate rhythm abdomen is soft positive bowel sounds nontender extremities nonedematous the plan continue broad-spectrum IV antibiotics  DC soon  Assessment/Plan:      * Acute hypoxemic respiratory failure  Patient with Hypoxic Respiratory failure which is Acute.  he is not on home oxygen. Supplemental oxygen was provided and noted- Oxygen Concentration (%):  [28] 28.   Signs/symptoms of  respiratory failure include- tachypnea, increased work of breathing and hypoxia. Contributing diagnoses includes - CHF and Pneumonia Labs and images were reviewed. Patient Has not had a recent ABG. Will treat underlying causes and adjust management of respiratory failure as follows-     Suspect multifactorial HFrEF exacerbation +/- pneumonia  Feels better after 3L out in ED. Continue IV lasix 60mg BID  Fluid/salt restrict  Daily weight  Strict I/O    CAP coverage  Pneumonia called on chest CT  Productive cough and fevers at home support- no white count  Continue abx        History of CVA (cerebrovascular accident)  Asa/statin  From assisted living- return on DC      Pneumonia due to infectious organism  Productive cough with fevers at home -ct supports pneumonia  Now with respiratory failure  CAP coverage          Acute on chronic congestive heart failure  Patient is identified as having Combined Systolic and Diastolic heart failure that is Acute on chronic. CHF is currently uncontrolled due to Continued edema of extremities, >3 pillow orthopnea and Rales/crackles on pulmonary exam. Latest ECHO performed and demonstrates- Results for orders placed during the hospital encounter of 01/01/22    Echo    Interpretation Summary  · The left ventricle is severely enlarged with mild eccentric hypertrophy and severely decreased systolic function.  · The estimated ejection fraction is 20%.  · There are segmental left ventricular wall motion abnormalities.  · Left ventricular diastolic dysfunction.  · Atrial fibrillation not observed.  · Normal right ventricular size.  · Mild mitral regurgitation.  · Normal central venous pressure (3 mmHg).  . Continue Furosemide and monitor clinical status closely. Monitor on telemetry. Patient is off CHF pathway.  Monitor strict Is&Os and daily weights.  Place on fluid restriction of 1.5 L. Continue to stress to patient importance of self efficacy and  on diet for CHF. Last BNP  reviewed- and noted below No results for input(s): BNP, BNPTRIAGEBLO in the last 168 hours..      Hx of CABG  Asa/statin/bb      GERD (gastroesophageal reflux disease)  PPI      Hypertension  Adjust medications as needed            VTE Risk Mitigation (From admission, onward)           Ordered     enoxaparin injection 40 mg  Daily         12/19/22 1426     IP VTE HIGH RISK PATIENT  Once         12/19/22 1426     Place sequential compression device  Until discontinued         12/19/22 1426                    Discharge Planning   DEANNA:      Code Status: Full Code   Is the patient medically ready for discharge?:     Reason for patient still in hospital (select all that apply): Treatment  Discharge Plan A: Assisted Living                  Walker Smith MD  Department of Hospital Medicine   [facility]

## 2022-12-25 NOTE — NURSING
Pt has expressed he is not sure how he will be able to give himself insulin when he is discharged he also would  like a prescription for lasix to take once daily as well .

## 2022-12-26 LAB
ALBUMIN SERPL BCP-MCNC: 2.5 G/DL (ref 3.5–5)
ALBUMIN/GLOB SERPL: 0.6 {RATIO}
ALP SERPL-CCNC: 109 U/L (ref 45–115)
ALT SERPL W P-5'-P-CCNC: 25 U/L (ref 16–61)
ANION GAP SERPL CALCULATED.3IONS-SCNC: 13 MMOL/L (ref 7–16)
ANISOCYTOSIS BLD QL SMEAR: ABNORMAL
AST SERPL W P-5'-P-CCNC: 15 U/L (ref 15–37)
BASOPHILS # BLD AUTO: 0.07 K/UL (ref 0–0.2)
BASOPHILS NFR BLD AUTO: 1 % (ref 0–1)
BILIRUB SERPL-MCNC: 0.4 MG/DL (ref ?–1.2)
BUN SERPL-MCNC: 16 MG/DL (ref 7–18)
BUN/CREAT SERPL: 15 (ref 6–20)
CALCIUM SERPL-MCNC: 8.3 MG/DL (ref 8.5–10.1)
CHLORIDE SERPL-SCNC: 93 MMOL/L (ref 98–107)
CO2 SERPL-SCNC: 28 MMOL/L (ref 21–32)
CREAT SERPL-MCNC: 1.05 MG/DL (ref 0.7–1.3)
DIFFERENTIAL METHOD BLD: ABNORMAL
EGFR (NO RACE VARIABLE) (RUSH/TITUS): 85 ML/MIN/1.73M²
EOSINOPHIL # BLD AUTO: 0.35 K/UL (ref 0–0.5)
EOSINOPHIL NFR BLD AUTO: 5.2 % (ref 1–4)
EOSINOPHIL NFR BLD MANUAL: 1 % (ref 1–4)
ERYTHROCYTE [DISTWIDTH] IN BLOOD BY AUTOMATED COUNT: 20.1 % (ref 11.5–14.5)
GLOBULIN SER-MCNC: 4 G/DL (ref 2–4)
GLUCOSE SERPL-MCNC: 275 MG/DL (ref 70–105)
GLUCOSE SERPL-MCNC: 287 MG/DL (ref 70–105)
GLUCOSE SERPL-MCNC: 308 MG/DL (ref 70–105)
GLUCOSE SERPL-MCNC: 339 MG/DL (ref 74–106)
GLUCOSE SERPL-MCNC: 367 MG/DL (ref 70–105)
HCT VFR BLD AUTO: 26.9 % (ref 40–54)
HGB BLD-MCNC: 7.5 G/DL (ref 13.5–18)
IMM GRANULOCYTES # BLD AUTO: 0.07 K/UL (ref 0–0.04)
IMM GRANULOCYTES NFR BLD: 1 % (ref 0–0.4)
LYMPHOCYTES # BLD AUTO: 2.21 K/UL (ref 1–4.8)
LYMPHOCYTES NFR BLD AUTO: 33.1 % (ref 27–41)
LYMPHOCYTES NFR BLD MANUAL: 25 % (ref 27–41)
MAGNESIUM SERPL-MCNC: 2.2 MG/DL (ref 1.7–2.3)
MCH RBC QN AUTO: 19.9 PG (ref 27–31)
MCHC RBC AUTO-ENTMCNC: 27.9 G/DL (ref 32–36)
MCV RBC AUTO: 71.4 FL (ref 80–96)
MICROCYTES BLD QL SMEAR: ABNORMAL
MONOCYTES # BLD AUTO: 0.44 K/UL (ref 0–0.8)
MONOCYTES NFR BLD AUTO: 6.6 % (ref 2–6)
MONOCYTES NFR BLD MANUAL: 4 % (ref 2–6)
MPC BLD CALC-MCNC: 9.1 FL (ref 9.4–12.4)
NEUTROPHILS # BLD AUTO: 3.53 K/UL (ref 1.8–7.7)
NEUTROPHILS NFR BLD AUTO: 53.1 % (ref 53–65)
NEUTS BAND NFR BLD MANUAL: 1 % (ref 1–5)
NEUTS SEG NFR BLD MANUAL: 69 % (ref 50–62)
NRBC # BLD AUTO: 0 X10E3/UL
NRBC, AUTO (.00): 0 %
OVALOCYTES BLD QL SMEAR: ABNORMAL
PHOSPHATE SERPL-MCNC: 3.4 MG/DL (ref 2.5–4.5)
PLATELET # BLD AUTO: 427 K/UL (ref 150–400)
PLATELET MORPHOLOGY: NORMAL
POTASSIUM SERPL-SCNC: 3.2 MMOL/L (ref 3.5–5.1)
PROT SERPL-MCNC: 6.5 G/DL (ref 6.4–8.2)
RBC # BLD AUTO: 3.77 M/UL (ref 4.6–6.2)
SODIUM SERPL-SCNC: 131 MMOL/L (ref 136–145)
TARGETS BLD QL SMEAR: ABNORMAL
WBC # BLD AUTO: 6.67 K/UL (ref 4.5–11)

## 2022-12-26 PROCEDURE — 85025 COMPLETE CBC W/AUTO DIFF WBC: CPT | Performed by: STUDENT IN AN ORGANIZED HEALTH CARE EDUCATION/TRAINING PROGRAM

## 2022-12-26 PROCEDURE — 63600175 PHARM REV CODE 636 W HCPCS: Performed by: STUDENT IN AN ORGANIZED HEALTH CARE EDUCATION/TRAINING PROGRAM

## 2022-12-26 PROCEDURE — 99232 PR SUBSEQUENT HOSPITAL CARE,LEVL II: ICD-10-PCS | Mod: ,,, | Performed by: FAMILY MEDICINE

## 2022-12-26 PROCEDURE — 11000001 HC ACUTE MED/SURG PRIVATE ROOM

## 2022-12-26 PROCEDURE — 25000003 PHARM REV CODE 250: Performed by: INTERNAL MEDICINE

## 2022-12-26 PROCEDURE — 25000003 PHARM REV CODE 250: Performed by: STUDENT IN AN ORGANIZED HEALTH CARE EDUCATION/TRAINING PROGRAM

## 2022-12-26 PROCEDURE — S4991 NICOTINE PATCH NONLEGEND: HCPCS | Performed by: INTERNAL MEDICINE

## 2022-12-26 PROCEDURE — 99232 SBSQ HOSP IP/OBS MODERATE 35: CPT | Mod: ,,, | Performed by: FAMILY MEDICINE

## 2022-12-26 PROCEDURE — 83735 ASSAY OF MAGNESIUM: CPT | Performed by: STUDENT IN AN ORGANIZED HEALTH CARE EDUCATION/TRAINING PROGRAM

## 2022-12-26 PROCEDURE — 80053 COMPREHEN METABOLIC PANEL: CPT | Performed by: STUDENT IN AN ORGANIZED HEALTH CARE EDUCATION/TRAINING PROGRAM

## 2022-12-26 PROCEDURE — 36415 COLL VENOUS BLD VENIPUNCTURE: CPT | Performed by: STUDENT IN AN ORGANIZED HEALTH CARE EDUCATION/TRAINING PROGRAM

## 2022-12-26 PROCEDURE — 82962 GLUCOSE BLOOD TEST: CPT

## 2022-12-26 PROCEDURE — 84100 ASSAY OF PHOSPHORUS: CPT | Performed by: STUDENT IN AN ORGANIZED HEALTH CARE EDUCATION/TRAINING PROGRAM

## 2022-12-26 RX ADMIN — AMITRIPTYLINE HYDROCHLORIDE 25 MG: 25 TABLET, FILM COATED ORAL at 08:12

## 2022-12-26 RX ADMIN — ENOXAPARIN SODIUM 40 MG: 40 INJECTION SUBCUTANEOUS at 04:12

## 2022-12-26 RX ADMIN — NICOTINE 1 PATCH: 14 PATCH, EXTENDED RELEASE TRANSDERMAL at 08:12

## 2022-12-26 RX ADMIN — INSULIN ASPART 1 UNITS: 100 INJECTION, SOLUTION INTRAVENOUS; SUBCUTANEOUS at 08:12

## 2022-12-26 RX ADMIN — FUROSEMIDE 60 MG: 20 INJECTION, SOLUTION INTRAMUSCULAR; INTRAVENOUS at 11:12

## 2022-12-26 RX ADMIN — AZITHROMYCIN MONOHYDRATE 500 MG: 500 INJECTION, POWDER, LYOPHILIZED, FOR SOLUTION INTRAVENOUS at 08:12

## 2022-12-26 RX ADMIN — POTASSIUM CHLORIDE 20 MEQ: 1500 TABLET, EXTENDED RELEASE ORAL at 08:12

## 2022-12-26 RX ADMIN — ASPIRIN 81 MG: 81 TABLET, DELAYED RELEASE ORAL at 08:12

## 2022-12-26 RX ADMIN — OXYCODONE HYDROCHLORIDE 5 MG: 5 TABLET ORAL at 12:12

## 2022-12-26 RX ADMIN — CEFTRIAXONE SODIUM 2 G: 2 INJECTION, POWDER, FOR SOLUTION INTRAMUSCULAR; INTRAVENOUS at 08:12

## 2022-12-26 RX ADMIN — INSULIN DETEMIR 10 UNITS: 100 INJECTION, SOLUTION SUBCUTANEOUS at 08:12

## 2022-12-26 RX ADMIN — GUAIFENESIN AND CODEINE PHOSPHATE 5 ML: 10; 100 LIQUID ORAL at 12:12

## 2022-12-26 RX ADMIN — INSULIN ASPART 4 UNITS: 100 INJECTION, SOLUTION INTRAVENOUS; SUBCUTANEOUS at 04:12

## 2022-12-26 RX ADMIN — FUROSEMIDE 60 MG: 20 INJECTION, SOLUTION INTRAMUSCULAR; INTRAVENOUS at 12:12

## 2022-12-26 RX ADMIN — OXYCODONE HYDROCHLORIDE 5 MG: 5 TABLET ORAL at 08:12

## 2022-12-26 RX ADMIN — PANTOPRAZOLE SODIUM 40 MG: 40 TABLET, DELAYED RELEASE ORAL at 08:12

## 2022-12-26 RX ADMIN — INSULIN ASPART 3 UNITS: 100 INJECTION, SOLUTION INTRAVENOUS; SUBCUTANEOUS at 11:12

## 2022-12-26 RX ADMIN — ATORVASTATIN CALCIUM 40 MG: 40 TABLET, FILM COATED ORAL at 08:12

## 2022-12-26 NOTE — PROGRESS NOTES
"[facility] - [address] [place] [address]  Adult Nutrition  First Assessment Note         Reason for Assessment  Reason For Assessment: length of stay  Nutrition Risk Screen: no indicators present    RD assessment d/t LOS.    Recommend continue current diet order as medically appropriate and tolerated. Encourage good PO intake. Monitor need for oral nutrition support if PO intake declines.    Patient currently receiving Low Na 2g 1500ml diet, no PO % intake documented. RD secure chat RN to inquire about PO intake. Per RN, pt ate 100% at breakfast and lunch today. Per RN, "he may not have an appetite some days." Encourage good PO intakes. Monitor need for oral nutrition support if PO intake declines.    Per MD note,   "12/20-feels better, refusing IV  12/21-better clinically, increasing lasix due to poor op overnight. Some may have been missed per nursing staff  12/22-exam looks better, no issues overnight. Anticipate discharge soon  12/23/2022 patient is awake alert he presents with community-acquired pneumonia he stated he is feeling better he is afebrile hemodynamically stable lungs crackles bilaterally cardiovascular S1-S2 regular rate rhythm abdomen is soft positive bowel sounds nontender extremities nonedematous the plan continue broad-spectrum IV antibiotics  12/24/2022 patient is awake alert he presents with community-acquired pneumonia he stated he is feeling better he is afebrile hemodynamically stable lungs crackles bilaterally cardiovascular S1-S2 regular rate rhythm abdomen is soft positive bowel sounds nontender extremities nonedematous the plan continue broad-spectrum IV antibiotics  DC soon  12/25/2022  Patient stated he is feeling better with the exception of an intermittent cough of yellowish sputum.   patient is awake alert he presents with community-acquired pneumonia he stated he is feeling better he is afebrile hemodynamically stable      lungs crackles bilaterally cardiovascular S1-S2 " "regular rate rhythm abdomen is soft positive bowel sounds nontender extremities nonedematous t     Community-acquired pneumonia, improving     he plan continue broad-spectrum IV antibiotics  DC soon"      Pt CBW 95.3kg considered within IBW range, BMI considered WNL/overweight.     Last BM 12/23 per flowsheets - monitor need for bowel regimen. Will continue to monitor PO intake, weight trend, meds, labs, updates in patient condition. RD following.    Malnutrition  Is Patient Malnourished: No    Nutrition Diagnosis  Decreased nutrient needs of Na   related to Chronic illness as evidenced by pt with CHF    Nutrition Diagnosis Status: Chronic/ continues    Nutrition Risk  Level of Risk/Frequency of Follow-up: low - moderate   Chewing or Swallowing Difficulty?: No Chewing or swallowing difficulty    Estimated/Assessed Needs    Temp: 97.8 °F (36.6 °C)Oral  Weight Used For Calorie Calculations: 95.3 kg (210 lb 1.6 oz)   Energy Need Method: Kcal/kg (25-30 kcal/kg) Energy Calorie Requirements (kcal): 3935-6879  Weight Used For Protein Calculations: 95.3 kg (210 lb 1.6 oz)  Protein Requirements: 76-96g pro (0.8-1.0g pro/kg)       RDA Method (mL): 2383     Nutrition Prescription / Recommendations  Recommendation/Intervention: Recommend continue current diet order as medically appropriate and tolerated. Encourage good PO intake. Monitor need for oral nutrition support if PO intake declines.  Goals: PO intake >75%, weight maintenance  Nutrition Goal Status: new  Current Diet Order: Low Sodium 2g, 1500ml diet  Nutrition Order Comments: Appropriate  Recommended Diet: Low Sodium ( 2g Sodium) and 1500ml  Recommended Oral Supplement: No Oral Supplements  Is Nutrition Support Recommended: No  Is Education Recommended: No    Monitor and Evaluation  % current Intake: Unknown/ No P.O. intake documented 100% at breakfast and lunch 12/26 per RN  % intake to meet estimated needs: 75 - 100 %  Food and Nutrient Intake: energy intake, food " "and beverage intake  Food and Nutrient Adminstration: diet order  Knowledge/Beliefs/Attitudes: food and nutrition knowledge/skill, beliefs and attitudes  Physical Activity and Function: nutrition-related ADLs and IADLs, factors affecting access to physical activity  Anthropometric Measurements: weight, weight change, body mass index  Biochemical Data, Medical Tests and Procedures: electrolyte and renal panel, gastrointestinal profile, glucose/endocrine profile, inflammatory profile, lipid profile  Nutrition-Focused Physical Findings: overall appearance, extremities, muscles and bones, head and eyes, skin     Current Medical Diagnosis and Past Medical History  Diagnosis: other (see comments) (acute hypoxemic respiratory failure)  Past Medical History:   Diagnosis Date    GERD (gastroesophageal reflux disease)     Hypertension     Stroke      Nutrition/Diet History  Food Allergies: NKFA    Lab/Procedures/Meds  Recent Labs   Lab 12/26/22  0306   *   K 3.2*   BUN 16   CREATININE 1.05   *   CALCIUM 8.3*   ALBUMIN 2.5*   CL 93*   ALT 25   AST 15   PHOS 3.4     Last A1c:   Lab Results   Component Value Date    HGBA1C 7.4 (H) 12/22/2022     Lab Results   Component Value Date    RBC 3.77 (L) 12/26/2022    HGB 7.5 (L) 12/26/2022    HCT 26.9 (L) 12/26/2022    MCV 71.4 (L) 12/26/2022    MCH 19.9 (L) 12/26/2022    MCHC 27.9 (L) 12/26/2022    TIBC 281 12/21/2022     Pertinent Labs Reviewed: reviewed  Na 131 (L), K 3.2 (L), CL 93 (L), Ca 8.3 (L) - replete to WNL as needed,  (H) - pt with no PMH of DM, unsure etiology of elevated GLU level, albumin 2.5 (L)  Pertinent Medications Reviewed: reviewed  Amitriptyline, aspirin, atorvastatin, zithromax, rocephin, enoxaparin, furosemide, insulin, nicotine, pantoprazole, Kcl, guaifenesincodeine PRN, oxycodone PRN      Anthropometrics  Temp: 97.8 °F (36.6 °C)  Height Method: Stated  Height: 6' 1" (185.4 cm)  Height (inches): 73 in  Weight Method: Bed Scale  Weight: 95.3 " kg (210 lb 1.6 oz)  Weight (lb): 210.1 lb  Ideal Body Weight (IBW), Male: 184 lb  % Ideal Body Weight, Male (lb): 117.39 %  BMI (Calculated): 27.7     Nutrition by Nursing  Last Bowel Movement: 12/23/22    Nutrition Follow-Up  RD Follow-up?: Yes

## 2022-12-26 NOTE — PROGRESS NOTES
[facility] - [address] [place] [address] Medicine  Progress Note    Patient Name: Karin Carrera  MRN: [id number]  Patient Class: IP- Inpatient   Admission Date: 12/19/2022  Length of Stay: 7 days  Attending Physician: Walker Anaya Jr., MD  Primary Care Provider: Walker Smith MD        Subjective:     Principal Problem:Acute hypoxemic respiratory failure        HPI:  52yowm with PMH of CAD s/p CABG, CVA, HTN, GERD, ETOH abuse, and nicotine abuse who presents to the ED with SOB and bilateral LE edema.  His lower extremity edema was sudden onset and began about 3 days ago.  He has some degree of expressive aphasia presumably from his CVA in the past.  He also complains of SOB, cough with sputum production and chest pain.  Chest pain feels like pressure, intermittent with no identifiable alleviating or aggravating factors.  No radiation. No clear duration given by the patient.  +orthopnea, denies PND.  +Fevers/chills.  ROS otherwise negative.       Overview/Hospital Course:  12/20-feels better, refusing IV  12/21-better clinically, increasing lasix due to poor op overnight. Some may have been missed per nursing staff  12/22-exam looks better, no issues overnight. Anticipate discharge soon      Interval History: Poorly verbal due to expressive aphasia. Known to me from prior admission.         Review of Systems   Respiratory:  Positive for shortness of breath.    Cardiovascular:  Negative for chest pain.   Gastrointestinal:  Negative for abdominal pain.   Objective:     Vital Signs (Most Recent):  Temp: 97.8 °F (36.6 °C) (12/26/22 1158)  Pulse: 102 (12/26/22 1158)  Resp: 17 (12/26/22 1158)  BP: 113/66 (12/26/22 1158)  SpO2: (!) 94 % (12/26/22 1158)   Vital Signs (24h Range):  Temp:  [96.5 °F (35.8 °C)-98.2 °F (36.8 °C)] 97.8 °F (36.6 °C)  Pulse:  [] 102  Resp:  [17-20] 17  SpO2:  [92 %-97 %] 94 %  BP: (102-120)/(54-66) 113/66     Weight: 95.3 kg (210 lb 1.6 oz)  Body mass index is  27.72 kg/m².  No intake or output data in the 24 hours ending 12/26/22 1359   Physical Exam  Vitals reviewed.   Constitutional:       General: He is not in acute distress.     Appearance: He is not ill-appearing or toxic-appearing.   HENT:      Head: Normocephalic and atraumatic.      Right Ear: External ear normal.      Left Ear: External ear normal.      Nose: Nose normal.      Mouth/Throat:      Mouth: Mucous membranes are moist.      Pharynx: Oropharynx is clear.   Eyes:      General: No scleral icterus.  Cardiovascular:      Rate and Rhythm: Normal rate and regular rhythm.      Heart sounds: Normal heart sounds. No murmur heard.    No gallop.   Pulmonary:      Effort: Pulmonary effort is normal. No respiratory distress.      Breath sounds: Rales present. No wheezing.   Abdominal:      General: Abdomen is flat.      Palpations: Abdomen is soft.   Musculoskeletal:      Right lower leg: Edema present.      Left lower leg: Edema present.   Skin:     General: Skin is warm and dry.   Neurological:      Mental Status: He is alert. Mental status is at baseline.      Motor: Weakness present.      Comments: R UE paresis       Significant Labs: All pertinent labs within the past 24 hours have been reviewed.  BMP:   Recent Labs   Lab 12/26/22  0306   *   *   K 3.2*   CL 93*   CO2 28   BUN 16   CREATININE 1.05   CALCIUM 8.3*   MG 2.2     CBC:   Recent Labs   Lab 12/25/22  0309 12/26/22  0306   WBC 6.81 6.67   HGB 7.7* 7.5*   HCT 26.8* 26.9*   * 427*       Significant Imaging: I have reviewed all pertinent imaging results/findings within the past 24 hours.      Assessment/Plan:      * Acute hypoxemic respiratory failure  Patient with Hypoxic Respiratory failure which is Acute.  he is not on home oxygen. Supplemental oxygen was provided and noted- Oxygen Concentration (%):  [28] 28.   Signs/symptoms of respiratory failure include- tachypnea, increased work of breathing and hypoxia. Contributing diagnoses  includes - CHF and Pneumonia Labs and images were reviewed. Patient Has not had a recent ABG. Will treat underlying causes and adjust management of respiratory failure as follows-     Suspect multifactorial HFrEF exacerbation +/- pneumonia  Feels better after 3L out in ED. Continue IV lasix 60mg BID  Fluid/salt restrict  Daily weight  Strict I/O    CAP coverage  Pneumonia called on chest CT  Productive cough and fevers at home support- no white count  Continue abx        History of CVA (cerebrovascular accident)  Asa/statin  From assisted living- return on DC      Pneumonia due to infectious organism  Productive cough with fevers at home -ct supports pneumonia  Now with respiratory failure  CAP coverage          Acute on chronic congestive heart failure  Patient is identified as having Combined Systolic and Diastolic heart failure that is Acute on chronic. CHF is currently uncontrolled due to Continued edema of extremities, >3 pillow orthopnea and Rales/crackles on pulmonary exam. Latest ECHO performed and demonstrates- Results for orders placed during the hospital encounter of 01/01/22    Echo    Interpretation Summary  · The left ventricle is severely enlarged with mild eccentric hypertrophy and severely decreased systolic function.  · The estimated ejection fraction is 20%.  · There are segmental left ventricular wall motion abnormalities.  · Left ventricular diastolic dysfunction.  · Atrial fibrillation not observed.  · Normal right ventricular size.  · Mild mitral regurgitation.  · Normal central venous pressure (3 mmHg).  . Continue Furosemide and monitor clinical status closely. Monitor on telemetry. Patient is off CHF pathway.  Monitor strict Is&Os and daily weights.  Place on fluid restriction of 1.5 L. Continue to stress to patient importance of self efficacy and  on diet for CHF. Last BNP reviewed- and noted below No results for input(s): BNP, BNPTRIAGEBLO in the last 168 hours..      Hx of  CABG  Asa/statin/bb      GERD (gastroesophageal reflux disease)  PPI      Hypertension  Adjust medications as needed            VTE Risk Mitigation (From admission, onward)         Ordered     enoxaparin injection 40 mg  Daily         12/19/22 1426     IP VTE HIGH RISK PATIENT  Once         12/19/22 1426     Place sequential compression device  Until discontinued         12/19/22 1426                Discharge Planning   DEANNA:      Code Status: Full Code   Is the patient medically ready for discharge?:     Reason for patient still in hospital (select all that apply): Treatment  Discharge Plan A: Assisted Living                  Walker Anaya Jr, MD  Department of [facility] Medicine   [facility] - [address] [place] [address]

## 2022-12-26 NOTE — SUBJECTIVE & OBJECTIVE
Interval History: Poorly verbal due to expressive aphasia. Known to me from prior admission.         Review of Systems   Respiratory:  Positive for shortness of breath.    Cardiovascular:  Negative for chest pain.   Gastrointestinal:  Negative for abdominal pain.   Objective:     Vital Signs (Most Recent):  Temp: 97.8 °F (36.6 °C) (12/26/22 1158)  Pulse: 102 (12/26/22 1158)  Resp: 17 (12/26/22 1158)  BP: 113/66 (12/26/22 1158)  SpO2: (!) 94 % (12/26/22 1158)   Vital Signs (24h Range):  Temp:  [96.5 °F (35.8 °C)-98.2 °F (36.8 °C)] 97.8 °F (36.6 °C)  Pulse:  [] 102  Resp:  [17-20] 17  SpO2:  [92 %-97 %] 94 %  BP: (102-120)/(54-66) 113/66     Weight: 95.3 kg (210 lb 1.6 oz)  Body mass index is 27.72 kg/m².  No intake or output data in the 24 hours ending 12/26/22 1359   Physical Exam  Vitals reviewed.   Constitutional:       General: He is not in acute distress.     Appearance: He is not ill-appearing or toxic-appearing.   HENT:      Head: Normocephalic and atraumatic.      Right Ear: External ear normal.      Left Ear: External ear normal.      Nose: Nose normal.      Mouth/Throat:      Mouth: Mucous membranes are moist.      Pharynx: Oropharynx is clear.   Eyes:      General: No scleral icterus.  Cardiovascular:      Rate and Rhythm: Normal rate and regular rhythm.      Heart sounds: Normal heart sounds. No murmur heard.    No gallop.   Pulmonary:      Effort: Pulmonary effort is normal. No respiratory distress.      Breath sounds: Rales present. No wheezing.   Abdominal:      General: Abdomen is flat.      Palpations: Abdomen is soft.   Musculoskeletal:      Right lower leg: Edema present.      Left lower leg: Edema present.   Skin:     General: Skin is warm and dry.   Neurological:      Mental Status: He is alert. Mental status is at baseline.      Motor: Weakness present.      Comments: R UE paresis       Significant Labs: All pertinent labs within the past 24 hours have been reviewed.  BMP:   Recent Labs    Lab 12/26/22 0306   *   *   K 3.2*   CL 93*   CO2 28   BUN 16   CREATININE 1.05   CALCIUM 8.3*   MG 2.2     CBC:   Recent Labs   Lab 12/25/22 0309 12/26/22 0306   WBC 6.81 6.67   HGB 7.7* 7.5*   HCT 26.8* 26.9*   * 427*       Significant Imaging: I have reviewed all pertinent imaging results/findings within the past 24 hours.

## 2022-12-27 VITALS
HEIGHT: 73 IN | HEART RATE: 104 BPM | OXYGEN SATURATION: 98 % | BODY MASS INDEX: 27.85 KG/M2 | WEIGHT: 210.13 LBS | SYSTOLIC BLOOD PRESSURE: 113 MMHG | RESPIRATION RATE: 18 BRPM | DIASTOLIC BLOOD PRESSURE: 72 MMHG | TEMPERATURE: 98 F

## 2022-12-27 LAB
ALBUMIN SERPL BCP-MCNC: 2.7 G/DL (ref 3.5–5)
ALBUMIN/GLOB SERPL: 0.7 {RATIO}
ALP SERPL-CCNC: 113 U/L (ref 45–115)
ALT SERPL W P-5'-P-CCNC: 24 U/L (ref 16–61)
ANION GAP SERPL CALCULATED.3IONS-SCNC: 14 MMOL/L (ref 7–16)
ANISOCYTOSIS BLD QL SMEAR: ABNORMAL
AST SERPL W P-5'-P-CCNC: 21 U/L (ref 15–37)
ATYPICAL LYMPHOCYTES: ABNORMAL
BASOPHILS # BLD AUTO: 0.07 K/UL (ref 0–0.2)
BASOPHILS NFR BLD AUTO: 1 % (ref 0–1)
BASOPHILS NFR BLD MANUAL: 1 % (ref 0–1)
BILIRUB SERPL-MCNC: 0.4 MG/DL (ref ?–1.2)
BUN SERPL-MCNC: 17 MG/DL (ref 7–18)
BUN/CREAT SERPL: 17 (ref 6–20)
CALCIUM SERPL-MCNC: 8.6 MG/DL (ref 8.5–10.1)
CHLORIDE SERPL-SCNC: 96 MMOL/L (ref 98–107)
CO2 SERPL-SCNC: 27 MMOL/L (ref 21–32)
CREAT SERPL-MCNC: 1.02 MG/DL (ref 0.7–1.3)
DIFFERENTIAL METHOD BLD: ABNORMAL
EGFR (NO RACE VARIABLE) (RUSH/TITUS): 88 ML/MIN/1.73M²
EOSINOPHIL # BLD AUTO: 0.36 K/UL (ref 0–0.5)
EOSINOPHIL NFR BLD AUTO: 5.3 % (ref 1–4)
EOSINOPHIL NFR BLD MANUAL: 3 % (ref 1–4)
ERYTHROCYTE [DISTWIDTH] IN BLOOD BY AUTOMATED COUNT: 21.8 % (ref 11.5–14.5)
GLOBULIN SER-MCNC: 3.9 G/DL (ref 2–4)
GLUCOSE SERPL-MCNC: 218 MG/DL (ref 74–106)
GLUCOSE SERPL-MCNC: 229 MG/DL (ref 70–105)
GLUCOSE SERPL-MCNC: 230 MG/DL (ref 70–105)
GLUCOSE SERPL-MCNC: 237 MG/DL (ref 70–105)
HCT VFR BLD AUTO: 29.7 % (ref 40–54)
HGB BLD-MCNC: 8.3 G/DL (ref 13.5–18)
HYPOCHROMIA BLD QL SMEAR: ABNORMAL
IMM GRANULOCYTES # BLD AUTO: 0.08 K/UL (ref 0–0.04)
IMM GRANULOCYTES NFR BLD: 1.2 % (ref 0–0.4)
LYMPHOCYTES # BLD AUTO: 2.16 K/UL (ref 1–4.8)
LYMPHOCYTES NFR BLD AUTO: 31.7 % (ref 27–41)
LYMPHOCYTES NFR BLD MANUAL: 29 % (ref 27–41)
MAGNESIUM SERPL-MCNC: 2.1 MG/DL (ref 1.7–2.3)
MCH RBC QN AUTO: 20.4 PG (ref 27–31)
MCHC RBC AUTO-ENTMCNC: 27.9 G/DL (ref 32–36)
MCV RBC AUTO: 73 FL (ref 80–96)
MICROCYTES BLD QL SMEAR: ABNORMAL
MONOCYTES # BLD AUTO: 0.37 K/UL (ref 0–0.8)
MONOCYTES NFR BLD AUTO: 5.4 % (ref 2–6)
MONOCYTES NFR BLD MANUAL: 4 % (ref 2–6)
MPC BLD CALC-MCNC: 8.7 FL (ref 9.4–12.4)
NEUTROPHILS # BLD AUTO: 3.77 K/UL (ref 1.8–7.7)
NEUTROPHILS NFR BLD AUTO: 55.4 % (ref 53–65)
NEUTS BAND NFR BLD MANUAL: 1 % (ref 1–5)
NEUTS SEG NFR BLD MANUAL: 62 % (ref 50–62)
NRBC # BLD AUTO: 0 X10E3/UL
NRBC, AUTO (.00): 0 %
OVALOCYTES BLD QL SMEAR: ABNORMAL
PHOSPHATE SERPL-MCNC: 3.8 MG/DL (ref 2.5–4.5)
PLATELET # BLD AUTO: 465 K/UL (ref 150–400)
PLATELET MORPHOLOGY: ABNORMAL
POLYCHROMASIA BLD QL SMEAR: ABNORMAL
POTASSIUM SERPL-SCNC: 3.6 MMOL/L (ref 3.5–5.1)
PROT SERPL-MCNC: 6.6 G/DL (ref 6.4–8.2)
RBC # BLD AUTO: 4.07 M/UL (ref 4.6–6.2)
SODIUM SERPL-SCNC: 133 MMOL/L (ref 136–145)
TARGETS BLD QL SMEAR: ABNORMAL
WBC # BLD AUTO: 6.81 K/UL (ref 4.5–11)

## 2022-12-27 PROCEDURE — 36415 COLL VENOUS BLD VENIPUNCTURE: CPT | Performed by: STUDENT IN AN ORGANIZED HEALTH CARE EDUCATION/TRAINING PROGRAM

## 2022-12-27 PROCEDURE — 83735 ASSAY OF MAGNESIUM: CPT | Performed by: STUDENT IN AN ORGANIZED HEALTH CARE EDUCATION/TRAINING PROGRAM

## 2022-12-27 PROCEDURE — S4991 NICOTINE PATCH NONLEGEND: HCPCS | Performed by: INTERNAL MEDICINE

## 2022-12-27 PROCEDURE — 63600175 PHARM REV CODE 636 W HCPCS: Performed by: STUDENT IN AN ORGANIZED HEALTH CARE EDUCATION/TRAINING PROGRAM

## 2022-12-27 PROCEDURE — 99239 PR HOSPITAL DISCHARGE DAY,>30 MIN: ICD-10-PCS | Mod: ,,, | Performed by: FAMILY MEDICINE

## 2022-12-27 PROCEDURE — 25000003 PHARM REV CODE 250: Performed by: INTERNAL MEDICINE

## 2022-12-27 PROCEDURE — 84100 ASSAY OF PHOSPHORUS: CPT | Performed by: STUDENT IN AN ORGANIZED HEALTH CARE EDUCATION/TRAINING PROGRAM

## 2022-12-27 PROCEDURE — 80053 COMPREHEN METABOLIC PANEL: CPT | Performed by: STUDENT IN AN ORGANIZED HEALTH CARE EDUCATION/TRAINING PROGRAM

## 2022-12-27 PROCEDURE — 94761 N-INVAS EAR/PLS OXIMETRY MLT: CPT

## 2022-12-27 PROCEDURE — 99239 HOSP IP/OBS DSCHRG MGMT >30: CPT | Mod: ,,, | Performed by: FAMILY MEDICINE

## 2022-12-27 PROCEDURE — 25000003 PHARM REV CODE 250: Performed by: STUDENT IN AN ORGANIZED HEALTH CARE EDUCATION/TRAINING PROGRAM

## 2022-12-27 PROCEDURE — 82962 GLUCOSE BLOOD TEST: CPT

## 2022-12-27 PROCEDURE — 85025 COMPLETE CBC W/AUTO DIFF WBC: CPT | Performed by: STUDENT IN AN ORGANIZED HEALTH CARE EDUCATION/TRAINING PROGRAM

## 2022-12-27 RX ORDER — METOPROLOL SUCCINATE 25 MG/1
12.5 TABLET, EXTENDED RELEASE ORAL DAILY
Qty: 15 TABLET | Refills: 11 | Status: SHIPPED | OUTPATIENT
Start: 2022-12-27 | End: 2023-02-07 | Stop reason: SDUPTHER

## 2022-12-27 RX ORDER — INSULIN DETEMIR 100 [IU]/ML
10 INJECTION, SOLUTION SUBCUTANEOUS NIGHTLY
Qty: 36 ML | Refills: 0 | Status: SHIPPED | OUTPATIENT
Start: 2022-12-27 | End: 2023-03-27 | Stop reason: SDUPTHER

## 2022-12-27 RX ORDER — FUROSEMIDE 40 MG/1
40 TABLET ORAL 2 TIMES DAILY
Qty: 60 TABLET | Refills: 11 | Status: SHIPPED | OUTPATIENT
Start: 2022-12-27 | End: 2023-01-26 | Stop reason: SDUPTHER

## 2022-12-27 RX ORDER — INSULIN ASPART 100 [IU]/ML
0-5 INJECTION, SOLUTION INTRAVENOUS; SUBCUTANEOUS
Qty: 10 ML | Refills: 1 | Status: SHIPPED | OUTPATIENT
Start: 2022-12-27 | End: 2023-03-27 | Stop reason: SDUPTHER

## 2022-12-27 RX ADMIN — AZITHROMYCIN MONOHYDRATE 500 MG: 500 INJECTION, POWDER, LYOPHILIZED, FOR SOLUTION INTRAVENOUS at 08:12

## 2022-12-27 RX ADMIN — CEFTRIAXONE SODIUM 2 G: 2 INJECTION, POWDER, FOR SOLUTION INTRAMUSCULAR; INTRAVENOUS at 08:12

## 2022-12-27 RX ADMIN — PANTOPRAZOLE SODIUM 40 MG: 40 TABLET, DELAYED RELEASE ORAL at 08:12

## 2022-12-27 RX ADMIN — INSULIN ASPART 2 UNITS: 100 INJECTION, SOLUTION INTRAVENOUS; SUBCUTANEOUS at 05:12

## 2022-12-27 RX ADMIN — POTASSIUM CHLORIDE 20 MEQ: 1500 TABLET, EXTENDED RELEASE ORAL at 08:12

## 2022-12-27 RX ADMIN — INSULIN ASPART 2 UNITS: 100 INJECTION, SOLUTION INTRAVENOUS; SUBCUTANEOUS at 11:12

## 2022-12-27 RX ADMIN — ASPIRIN 81 MG: 81 TABLET, DELAYED RELEASE ORAL at 08:12

## 2022-12-27 RX ADMIN — NICOTINE 1 PATCH: 14 PATCH, EXTENDED RELEASE TRANSDERMAL at 08:12

## 2022-12-27 RX ADMIN — FUROSEMIDE 60 MG: 20 INJECTION, SOLUTION INTRAMUSCULAR; INTRAVENOUS at 11:12

## 2022-12-27 NOTE — PLAN OF CARE
Problem: Adult Inpatient Plan of Care  Goal: Plan of Care Review  Outcome: Ongoing, Progressing  Goal: Optimal Comfort and Wellbeing  Outcome: Ongoing, Progressing     Problem: Skin Injury Risk Increased  Goal: Skin Health and Integrity  Outcome: Ongoing, Progressing

## 2022-12-27 NOTE — PLAN OF CARE
Problem: Adult Inpatient Plan of Care  Goal: Plan of Care Review  Outcome: Ongoing, Progressing  Goal: Patient-Specific Goal (Individualized)  Outcome: Ongoing, Progressing  Goal: Absence of Hospital-Acquired Illness or Injury  Outcome: Ongoing, Progressing  Goal: Optimal Comfort and Wellbeing  Outcome: Ongoing, Progressing  Goal: Readiness for Transition of Care  Outcome: Ongoing, Progressing     Problem: Fluid Imbalance (Pneumonia)  Goal: Fluid Balance  Outcome: Ongoing, Progressing     Problem: Infection (Pneumonia)  Goal: Resolution of Infection Signs and Symptoms  Outcome: Ongoing, Progressing     Problem: Respiratory Compromise (Pneumonia)  Goal: Effective Oxygenation and Ventilation  Outcome: Ongoing, Progressing     Problem: Skin Injury Risk Increased  Goal: Skin Health and Integrity  Outcome: Ongoing, Progressing     Problem: Gas Exchange Impaired  Goal: Optimal Gas Exchange  Outcome: Ongoing, Progressing     Problem: Infection  Goal: Absence of Infection Signs and Symptoms  Outcome: Ongoing, Progressing      POC reviewed and continues.

## 2023-01-04 ENCOUNTER — APPOINTMENT (OUTPATIENT)
Dept: RADIOLOGY | Facility: CLINIC | Age: 53
End: 2023-01-04
Attending: INTERNAL MEDICINE
Payer: MEDICAID

## 2023-01-04 ENCOUNTER — OFFICE VISIT (OUTPATIENT)
Dept: FAMILY MEDICINE | Facility: CLINIC | Age: 53
End: 2023-01-04
Payer: MEDICAID

## 2023-01-04 VITALS
RESPIRATION RATE: 20 BRPM | BODY MASS INDEX: 27.72 KG/M2 | DIASTOLIC BLOOD PRESSURE: 62 MMHG | TEMPERATURE: 98 F | OXYGEN SATURATION: 98 % | HEART RATE: 108 BPM | HEIGHT: 73 IN | SYSTOLIC BLOOD PRESSURE: 110 MMHG

## 2023-01-04 DIAGNOSIS — L03.115 CELLULITIS OF RIGHT LOWER EXTREMITY: ICD-10-CM

## 2023-01-04 DIAGNOSIS — Z86.73 HISTORY OF CVA (CEREBROVASCULAR ACCIDENT): ICD-10-CM

## 2023-01-04 DIAGNOSIS — R22.41 LOCALIZED SWELLING OF RIGHT LOWER LEG: ICD-10-CM

## 2023-01-04 DIAGNOSIS — I10 PRIMARY HYPERTENSION: Primary | ICD-10-CM

## 2023-01-04 DIAGNOSIS — J18.9 PNEUMONIA DUE TO INFECTIOUS ORGANISM, UNSPECIFIED LATERALITY, UNSPECIFIED PART OF LUNG: ICD-10-CM

## 2023-01-04 LAB
ANION GAP SERPL CALCULATED.3IONS-SCNC: 9 MMOL/L (ref 7–16)
ANISOCYTOSIS BLD QL SMEAR: ABNORMAL
BASOPHILS # BLD AUTO: 0.09 K/UL (ref 0–0.2)
BASOPHILS NFR BLD AUTO: 1.1 % (ref 0–1)
BUN SERPL-MCNC: 17 MG/DL (ref 7–18)
BUN/CREAT SERPL: 17 (ref 6–20)
CALCIUM SERPL-MCNC: 9.2 MG/DL (ref 8.5–10.1)
CHLORIDE SERPL-SCNC: 103 MMOL/L (ref 98–107)
CO2 SERPL-SCNC: 31 MMOL/L (ref 21–32)
CREAT SERPL-MCNC: 1.03 MG/DL (ref 0.7–1.3)
DIFFERENTIAL METHOD BLD: ABNORMAL
EGFR (NO RACE VARIABLE) (RUSH/TITUS): 87 ML/MIN/1.73M²
EOSINOPHIL # BLD AUTO: 0.11 K/UL (ref 0–0.5)
EOSINOPHIL NFR BLD AUTO: 1.3 % (ref 1–4)
ERYTHROCYTE [DISTWIDTH] IN BLOOD BY AUTOMATED COUNT: 26.8 % (ref 11.5–14.5)
GLUCOSE SERPL-MCNC: 136 MG/DL (ref 74–106)
HCT VFR BLD AUTO: 36.9 % (ref 40–54)
HGB BLD-MCNC: 10.6 G/DL (ref 13.5–18)
HYPOCHROMIA BLD QL SMEAR: ABNORMAL
IMM GRANULOCYTES # BLD AUTO: 0.03 K/UL (ref 0–0.04)
IMM GRANULOCYTES NFR BLD: 0.4 % (ref 0–0.4)
LYMPHOCYTES # BLD AUTO: 2.14 K/UL (ref 1–4.8)
LYMPHOCYTES NFR BLD AUTO: 26.2 % (ref 27–41)
MCH RBC QN AUTO: 22 PG (ref 27–31)
MCHC RBC AUTO-ENTMCNC: 28.7 G/DL (ref 32–36)
MCV RBC AUTO: 76.7 FL (ref 80–96)
MONOCYTES # BLD AUTO: 0.49 K/UL (ref 0–0.8)
MONOCYTES NFR BLD AUTO: 6 % (ref 2–6)
MPC BLD CALC-MCNC: 8.8 FL (ref 9.4–12.4)
NEUTROPHILS # BLD AUTO: 5.32 K/UL (ref 1.8–7.7)
NEUTROPHILS NFR BLD AUTO: 65 % (ref 53–65)
NRBC # BLD AUTO: 0 X10E3/UL
NRBC, AUTO (.00): 0 %
OVALOCYTES BLD QL SMEAR: ABNORMAL
PLATELET # BLD AUTO: 514 K/UL (ref 150–400)
PLATELET MORPHOLOGY: ABNORMAL
POLYCHROMASIA BLD QL SMEAR: ABNORMAL
POTASSIUM SERPL-SCNC: 4 MMOL/L (ref 3.5–5.1)
RBC # BLD AUTO: 4.81 M/UL (ref 4.6–6.2)
SODIUM SERPL-SCNC: 139 MMOL/L (ref 136–145)
WBC # BLD AUTO: 8.18 K/UL (ref 4.5–11)

## 2023-01-04 PROCEDURE — 99214 PR OFFICE/OUTPT VISIT, EST, LEVL IV, 30-39 MIN: ICD-10-PCS | Mod: ,,, | Performed by: INTERNAL MEDICINE

## 2023-01-04 PROCEDURE — 83020 HEMOGLOBIN ELECTROPHORESIS: ICD-10-PCS | Mod: ,,, | Performed by: CLINICAL MEDICAL LABORATORY

## 2023-01-04 PROCEDURE — 1159F PR MEDICATION LIST DOCUMENTED IN MEDICAL RECORD: ICD-10-PCS | Mod: CPTII,,, | Performed by: INTERNAL MEDICINE

## 2023-01-04 PROCEDURE — 3078F DIAST BP <80 MM HG: CPT | Mod: CPTII,,, | Performed by: INTERNAL MEDICINE

## 2023-01-04 PROCEDURE — 3074F SYST BP LT 130 MM HG: CPT | Mod: CPTII,,, | Performed by: INTERNAL MEDICINE

## 2023-01-04 PROCEDURE — 83020 HEMOGLOBIN ELECTROPHORESIS: ICD-10-PCS | Mod: 26,XU,, | Performed by: PATHOLOGY

## 2023-01-04 PROCEDURE — 1160F RVW MEDS BY RX/DR IN RCRD: CPT | Mod: CPTII,,, | Performed by: INTERNAL MEDICINE

## 2023-01-04 PROCEDURE — 3074F PR MOST RECENT SYSTOLIC BLOOD PRESSURE < 130 MM HG: ICD-10-PCS | Mod: CPTII,,, | Performed by: INTERNAL MEDICINE

## 2023-01-04 PROCEDURE — 1111F DSCHRG MED/CURRENT MED MERGE: CPT | Mod: CPTII,,, | Performed by: INTERNAL MEDICINE

## 2023-01-04 PROCEDURE — 85025 CBC WITH DIFFERENTIAL: ICD-10-PCS | Mod: ,,, | Performed by: CLINICAL MEDICAL LABORATORY

## 2023-01-04 PROCEDURE — 85025 COMPLETE CBC W/AUTO DIFF WBC: CPT | Mod: ,,, | Performed by: CLINICAL MEDICAL LABORATORY

## 2023-01-04 PROCEDURE — 3008F PR BODY MASS INDEX (BMI) DOCUMENTED: ICD-10-PCS | Mod: CPTII,,, | Performed by: INTERNAL MEDICINE

## 2023-01-04 PROCEDURE — 99214 OFFICE O/P EST MOD 30 MIN: CPT | Mod: ,,, | Performed by: INTERNAL MEDICINE

## 2023-01-04 PROCEDURE — 1160F PR REVIEW ALL MEDS BY PRESCRIBER/CLIN PHARMACIST DOCUMENTED: ICD-10-PCS | Mod: CPTII,,, | Performed by: INTERNAL MEDICINE

## 2023-01-04 PROCEDURE — 4010F ACE/ARB THERAPY RXD/TAKEN: CPT | Mod: CPTII,,, | Performed by: INTERNAL MEDICINE

## 2023-01-04 PROCEDURE — 1159F MED LIST DOCD IN RCRD: CPT | Mod: CPTII,,, | Performed by: INTERNAL MEDICINE

## 2023-01-04 PROCEDURE — 3008F BODY MASS INDEX DOCD: CPT | Mod: CPTII,,, | Performed by: INTERNAL MEDICINE

## 2023-01-04 PROCEDURE — 1111F PR DISCHARGE MEDS RECONCILED W/ CURRENT OUTPATIENT MED LIST: ICD-10-PCS | Mod: CPTII,,, | Performed by: INTERNAL MEDICINE

## 2023-01-04 PROCEDURE — 83020 HEMOGLOBIN ELECTROPHORESIS: CPT | Mod: 26,XU,, | Performed by: PATHOLOGY

## 2023-01-04 PROCEDURE — 83020 HEMOGLOBIN ELECTROPHORESIS: CPT | Mod: ,,, | Performed by: CLINICAL MEDICAL LABORATORY

## 2023-01-04 PROCEDURE — 71045 X-RAY EXAM CHEST 1 VIEW: CPT | Mod: TC,RHCUB | Performed by: INTERNAL MEDICINE

## 2023-01-04 PROCEDURE — 80048 BASIC METABOLIC PANEL: ICD-10-PCS | Mod: ,,, | Performed by: CLINICAL MEDICAL LABORATORY

## 2023-01-04 PROCEDURE — 80048 BASIC METABOLIC PNL TOTAL CA: CPT | Mod: ,,, | Performed by: CLINICAL MEDICAL LABORATORY

## 2023-01-04 PROCEDURE — 3078F PR MOST RECENT DIASTOLIC BLOOD PRESSURE < 80 MM HG: ICD-10-PCS | Mod: CPTII,,, | Performed by: INTERNAL MEDICINE

## 2023-01-04 PROCEDURE — 4010F PR ACE/ARB THEARPY RXD/TAKEN: ICD-10-PCS | Mod: CPTII,,, | Performed by: INTERNAL MEDICINE

## 2023-01-04 RX ORDER — METOPROLOL TARTRATE 25 MG/1
12.5 TABLET, FILM COATED ORAL 2 TIMES DAILY
COMMUNITY
Start: 2023-01-02 | End: 2023-02-07

## 2023-01-04 RX ORDER — ALBUTEROL SULFATE 90 UG/1
AEROSOL, METERED RESPIRATORY (INHALATION)
COMMUNITY
Start: 2022-12-14 | End: 2024-02-08 | Stop reason: SDUPTHER

## 2023-01-04 RX ORDER — LEVOFLOXACIN 750 MG/1
750 TABLET ORAL
COMMUNITY
Start: 2022-12-15 | End: 2023-11-16

## 2023-01-04 NOTE — PROGRESS NOTES
Subjective:       Patient ID: Karin Carrera is a 52 y.o. male.    Chief Complaint: Leg Pain, Leg Swelling, and Fatigue    Patient is here today for check up evaluation. Patient recently was in Hospital for Pneumonia. States feels very weak and fatigued and is in a wheelchair today. Will repeat chest xray. Patient recent labs reviewed and shows he is anemic. Will repeat labs today as well. Other imaging reviewed and shows no evidence of DVTs. Patient does have cellulitis of the right lower leg with visible scabbing. Will order follow up lab work today. Will order WC for debridement to be done at [facility]. Will follow in 2 weeks.     Current Medications:    Current Outpatient Medications:     albuterol (PROVENTIL/VENTOLIN HFA) 90 mcg/actuation inhaler, SMARTSI-2 Puff(s) By Mouth Every 4 Hours PRN, Disp: , Rfl:     amitriptyline (ELAVIL) 25 MG tablet, Take 1 tablet (25 mg total) by mouth every evening., Disp: 90 tablet, Rfl: 1    aspirin (ECOTRIN) 81 MG EC tablet, Take 1 tablet (81 mg total) by mouth once daily., Disp: 90 tablet, Rfl: 1    atorvastatin (LIPITOR) 40 MG tablet, Take 1 tablet (40 mg total) by mouth every evening., Disp: 90 tablet, Rfl: 1    empagliflozin (JARDIANCE) 10 mg tablet, Take 1 tablet (10 mg total) by mouth once daily., Disp: 30 tablet, Rfl: 11    furosemide (LASIX) 40 MG tablet, Take 1 tablet (40 mg total) by mouth 2 (two) times daily., Disp: 60 tablet, Rfl: 11    insulin aspart U-100 (NOVOLOG) 100 unit/mL injection, Inject 0-5 Units into the skin 3 (three) times daily before meals., Disp: 10 mL, Rfl: 1    insulin detemir U-100 (LEVEMIR FLEXTOUCH U-100 INSULN) 100 unit/mL (3 mL) InPn pen, Inject 10 Units into the skin every evening., Disp: 36 mL, Rfl: 0    levoFLOXacin (LEVAQUIN) 750 MG tablet, Take 750 mg by mouth., Disp: , Rfl:     lisinopriL (PRINIVIL,ZESTRIL) 5 MG tablet, Take 1 tablet (5 mg total) by mouth once daily., Disp: 90 tablet, Rfl: 1     metoprolol succinate (TOPROL-XL) 25 MG 24 hr tablet, Take 0.5 tablets (12.5 mg total) by mouth once daily., Disp: 15 tablet, Rfl: 11    metoprolol tartrate (LOPRESSOR) 25 MG tablet, Take 12.5 mg by mouth 2 (two) times daily., Disp: , Rfl:     pantoprazole (PROTONIX) 40 MG tablet, Take 1 tablet (40 mg total) by mouth once daily., Disp: 90 tablet, Rfl: 1    potassium chloride SA (K-DUR,KLOR-CON) 20 MEQ tablet, Take 1 tablet (20 mEq total) by mouth 2 (two) times daily., Disp: 20 tablet, Rfl: 0    Last Labs:     No results displayed because visit has over 200 results.      Lab Requisition on 12/05/2022   Component Date Value    Sodium 12/05/2022 137     Potassium 12/05/2022 4.0     Chloride 12/05/2022 104     CO2 12/05/2022 25     Anion Gap 12/05/2022 12     Glucose 12/05/2022 187 (H)     BUN 12/05/2022 17     Creatinine 12/05/2022 0.80     BUN/Creatinine Ratio 12/05/2022 21 (H)     Calcium 12/05/2022 8.7     eGFR 12/05/2022 106        Last Imaging:  X-Ray Chest 1 View  Narrative: EXAMINATION:  XR CHEST 1 VIEW    CLINICAL HISTORY:  Pneumonia;    TECHNIQUE:  XR CHEST 1 VIEW    COMPARISON:  12/19/2022    FINDINGS:  No lines or tubes.    Diffuse patchy airspace opacities, similar.    Small bilateral pleural effusions, similar    Cardiac silhouette is similar to comparison exam.    No obvious acute bone findings.  Impression: Diffuse patchy airspace opacities, similar.    Small bilateral pleural effusions, similar    Electronically signed by: Pavan Carmichael  Date:    12/26/2022  Time:    08:31         Review of Systems   Cardiovascular:  Positive for leg swelling.   Integumentary:  Positive for wound.   Neurological:  Positive for weakness.   All other systems reviewed and are negative.      Objective:      Physical Exam  Vitals reviewed.   Constitutional:       Appearance: Normal appearance.   Cardiovascular:      Rate and Rhythm: Normal rate and regular rhythm.      Pulses: Normal pulses.      Heart  sounds: Normal heart sounds.   Pulmonary:      Effort: Pulmonary effort is normal.      Breath sounds: Normal breath sounds.   Abdominal:      General: Abdomen is flat. Bowel sounds are normal.      Palpations: Abdomen is soft.   Musculoskeletal:         General: Normal range of motion.      Cervical back: Normal range of motion and neck supple.   Skin:     General: Skin is warm and dry.   Neurological:      General: No focal deficit present.      Mental Status: He is alert and oriented to person, place, and time. Mental status is at baseline.       Assessment:       1. Primary hypertension  Basic Metabolic Panel    CBC Auto Differential    Basic Metabolic Panel    CBC Auto Differential      2. History of CVA (cerebrovascular accident)        3. Pneumonia due to infectious organism, unspecified laterality, unspecified part of lung  Hemglobin Electrophoresis    Hemglobin Electrophoresis    X-Ray Chest 1 View    CANCELED: X-Ray Chest PA And Lateral      4. Localized swelling of right lower leg        5. Cellulitis of right lower extremity  Ambulatory referral/consult to Home Health    Ambulatory referral/consult to Wound Clinic           Plan:         Karin was seen today for leg pain, leg swelling and fatigue.    Diagnoses and all orders for this visit:    Primary hypertension  -     Basic Metabolic Panel; Future  -     CBC Auto Differential; Future  -     Basic Metabolic Panel  -     CBC Auto Differential    History of CVA (cerebrovascular accident)    Pneumonia due to infectious organism, unspecified laterality, unspecified part of lung  -     Cancel: X-Ray Chest PA And Lateral; Future  -     Hemglobin Electrophoresis; Future  -     Hemglobin Electrophoresis  -     X-Ray Chest 1 View; Future    Localized swelling of right lower leg    Cellulitis of right lower extremity  -     Ambulatory referral/consult to Home Health; Future  -     Ambulatory referral/consult to Wound Clinic; Future

## 2023-01-04 NOTE — PATIENT INSTRUCTIONS
Karin was seen today for leg pain, leg swelling and fatigue.    Diagnoses and all orders for this visit:    Primary hypertension  -     Basic Metabolic Panel; Future  -     CBC Auto Differential; Future  -     Basic Metabolic Panel  -     CBC Auto Differential    History of CVA (cerebrovascular accident)    Pneumonia due to infectious organism, unspecified laterality, unspecified part of lung  -     Cancel: X-Ray Chest PA And Lateral; Future  -     Hemglobin Electrophoresis; Future  -     Hemglobin Electrophoresis  -     X-Ray Chest 1 View; Future    Localized swelling of right lower leg    Cellulitis of right lower extremity  -     Ambulatory referral/consult to Home Health; Future  -     Ambulatory referral/consult to Wound Clinic; Future

## 2023-01-05 LAB
ERYTHROCYTE [DISTWIDTH] IN BLOOD BY AUTOMATED COUNT: 27 % (ref 11.5–14.5)
HCT VFR BLD AUTO: 36.9 % (ref 40–54)
HGB A1 MFR BLD ELPH: 97.5 % (ref 95.8–98)
HGB A2 MFR BLD ELPH: 2.5 % (ref 2–3.3)
HGB BLD-MCNC: 10.5 G/DL (ref 13.5–18)
MCV RBC AUTO: 77.4 FL (ref 80–96)
PATH INTERP BLD-IMP: ABNORMAL
RBC # BLD AUTO: 4.77 M/UL (ref 4.6–6.2)

## 2023-01-12 NOTE — DISCHARGE SUMMARY
[facility] - [address] [place] [address] Medicine  Discharge Summary      Patient Name: Karin Carrera  MRN: [id number]  JOON: [id number]  Patient Class: IP- Inpatient  Admission Date: 12/19/2022  Hospital Length of Stay: 8 days  Discharge Date and Time: 12/27/2022  4:20 PM  Attending Physician: No att. providers found   Discharging Provider: Walker Anaya Jr, MD  Primary Care Provider: Walker Smith MD    Primary Care Team: Networked reference to record PCT     HPI:   52yowm with PMH of CAD s/p CABG, CVA, HTN, GERD, ETOH abuse, and nicotine abuse who presents to the ED with SOB and bilateral LE edema.  His lower extremity edema was sudden onset and began about 3 days ago.  He has some degree of expressive aphasia presumably from his CVA in the past.  He also complains of SOB, cough with sputum production and chest pain.  Chest pain feels like pressure, intermittent with no identifiable alleviating or aggravating factors.  No radiation. No clear duration given by the patient.  +orthopnea, denies PND.  +Fevers/chills.  ROS otherwise negative.       * No surgery found *      Hospital Course:   12/20-feels better, refusing IV  12/21-better clinically, increasing lasix due to poor op overnight. Some may have been missed per nursing staff  12/22-exam looks better, no issues overnight. Anticipate discharge soon    12/27 - Summary -       Patient with significant expressive aphasia from old stroke admitted with worsening SOB and leg swelling.  He was treated for CHF exacerbation and also for CAP.  With diuretic therapy and abx he improved considerably and felt well enough to go home (assisted living).   His echo was remarkable for 20% EF .   In addition patient was not previously on medicine for DM but was requiring insulin to control glucoses.  His assisted living facility will be able to provide injections for the patient .   Follow up appointments made for PCP (Dr. Smith) and also an  appointment in advanced heart failure clinic.        Goals of Care Treatment Preferences:  Code Status: Full Code      Consults:   Consults (From admission, onward)        Status Ordering Provider     IP consult to case management  Once        Provider:  (Not yet assigned)    Completed GENO, REHMAT U          * Acute hypoxemic respiratory failure  Resolved with treatment of CHF exacerbation and CAP.      Pneumonia due to infectious organism  Productive cough with fevers at home -ct supports pneumonia  Treated        Hx of CABG  Asa/statin/bb      Final Active Diagnoses:    Diagnosis Date Noted POA    PRINCIPAL PROBLEM:  Acute hypoxemic respiratory failure [J96.01] 12/19/2022 Yes    Acute on chronic congestive heart failure [I50.9] 12/19/2022 Yes    Pneumonia due to infectious organism [J18.9] 12/19/2022 Yes    History of CVA (cerebrovascular accident) [Z86.73] 12/19/2022 Not Applicable    Hypertension [I10] 01/01/2022 Yes    GERD (gastroesophageal reflux disease) [K21.9] 01/01/2022 Yes    Hx of CABG [Z95.1] 01/01/2022 Not Applicable      Problems Resolved During this Admission:       Discharged Condition: fair    Disposition: Home or Self Care    Follow Up:   Follow-up Information     Walker Smith MD Follow up on 1/4/2023.    Specialties: Internal Medicine, Family Medicine  Why: Hospital follow up; 3:30 pm  Contact information:  [address] [facility] [address]  [phone]             AMANDA CANALES MD Follow up on 1/16/2023.    Specialty: Cardiology  Why: Cardiomyopathy; 1:00 pm  Contact information:  [address] [facility] [address]  [phone]                       Patient Instructions:   No discharge procedures on file.    Significant Diagnostic Studies: Labs: All labs within the past 24 hours have been reviewed    Pending Diagnostic Studies:     None         Medications:  Reconciled Home Medications:      Medication List      START taking these medications    empagliflozin 10 mg  tablet  Commonly known as: JARDIANCE  Take 1 tablet (10 mg total) by mouth once daily.     furosemide 40 MG tablet  Commonly known as: LASIX  Take 1 tablet (40 mg total) by mouth 2 (two) times daily.     insulin aspart U-100 100 unit/mL injection  Commonly known as: NovoLOG  Inject 0-5 Units into the skin 3 (three) times daily before meals.     LEVEMIR FLEXTOUCH U-100 INSULN 100 unit/mL (3 mL) Inpn pen  Generic drug: insulin detemir U-100  Inject 10 Units into the skin every evening.     metoprolol succinate 25 MG 24 hr tablet  Commonly known as: TOPROL-XL  Take 0.5 tablets (12.5 mg total) by mouth once daily.        CONTINUE taking these medications    amitriptyline 25 MG tablet  Commonly known as: ELAVIL  Take 1 tablet (25 mg total) by mouth every evening.     aspirin 81 MG EC tablet  Commonly known as: ECOTRIN  Take 1 tablet (81 mg total) by mouth once daily.     atorvastatin 40 MG tablet  Commonly known as: LIPITOR  Take 1 tablet (40 mg total) by mouth every evening.     lisinopriL 5 MG tablet  Commonly known as: PRINIVIL,ZESTRIL  Take 1 tablet (5 mg total) by mouth once daily.     pantoprazole 40 MG tablet  Commonly known as: PROTONIX  Take 1 tablet (40 mg total) by mouth once daily.     potassium chloride SA 20 MEQ tablet  Commonly known as: K-DUR,KLOR-CON  Take 1 tablet (20 mEq total) by mouth 2 (two) times daily.            Indwelling Lines/Drains at time of discharge:   Lines/Drains/Airways     None                 Time spent on the discharge of patient: 35 minutes         Walker Anaya Jr, MD  Department of Hospital Medicine  [facility] - [facility] Telemetry

## 2023-01-17 ENCOUNTER — CLINICAL SUPPORT (OUTPATIENT)
Dept: REHABILITATION | Facility: HOSPITAL | Age: 53
End: 2023-01-17
Payer: MEDICAID

## 2023-01-17 DIAGNOSIS — I63.9 CVA (CEREBRAL VASCULAR ACCIDENT): Primary | ICD-10-CM

## 2023-01-17 DIAGNOSIS — I63.9 CEREBROVASCULAR ACCIDENT (CVA), UNSPECIFIED MECHANISM: ICD-10-CM

## 2023-01-17 DIAGNOSIS — R47.9 DIFFICULTY WITH SPEECH: Primary | ICD-10-CM

## 2023-01-17 PROCEDURE — 92507 TX SP LANG VOICE COMM INDIV: CPT

## 2023-01-17 NOTE — PROGRESS NOTES
Outpatient Speech and Language Therapy Daily Note    Date: 1/17/2023     Name: Karin Carrera   MRN: [id number]    Therapy Diagnosis: No diagnosis found. Physician: Referring, Unknown  Physician Orders: Walker Smith MD  Medical Diagnosis from Referral: Stroke    Visit #/Visits authorized: 1/ 6  Date of Evaluation:  1/17/2023   Insurance Authorization Period: 12/12/22 to 2/13/2023  Plan of Care Expiration:  ED@ to 12/12/22 to 2/13/2023     Time In: 1:00 pm  Time Out: 1:55 pm    Procedure Min.   Evaluation of Speech Fluency  55         Precautions:Standard  Subjective   Date of Onset: September 2016 is when patient had stroke. Patient fell January 1, 2022, breaking his hip.  History of Current Condition:   Patient is 52 year old man who presents with good cognition, orientation, and receptive language, but shows severe expressive aphasia. Patient had a stroke 2016, and fell (breaking his hip) at a friend's house in January, 2020.  Past Medical History: Karin Carrera  has a past medical history of GERD (gastroesophageal reflux disease), Hypertension, and Stroke.  Karin Carrera  has a past surgical history that includes Cardiac surgery and Hemiarthroplasty of hip (Right, 1/2/2022).  Medical Hx and Allergies:  Karin has a current medication list which includes the following prescription(s): albuterol, amitriptyline, aspirin, atorvastatin, empagliflozin, furosemide, insulin aspart u-100, levemir flextouch u-100 insuln, levofloxacin, lisinopril, metoprolol succinate, metoprolol tartrate, pantoprazole, and potassium chloride sa. Review of patient's allergies indicates:  No Known Allergies  Prior Therapy:  Patient reports he has not had speech therapy in the past year prior to December 2022 evaluation. Patient reported during evaluation that he was here, at [facility], 5 years ago for it.  Social History:  Patient lives at [facility], in his own apartment by himself.  Prior  "Level of Function: Stroke in 2016 caused his aphasia.   Current Level of Function: Patient has severe expressive aphasia. Patient can say a few words like "damn," "yeah" and "no."  Pain:   1/10  Pain Location / Description: Right side ankle, shoulder, arms-- kind of "all over."  Nutrition:  Patient reports he eats well.  Patient's Therapy Goals:  Patient wants to be able to express himself and talk better.  Objective   Patient has not been here since evaluation December 12, 2022.    Patient had pneumonia and was in hospital for 9 days, he reports via typing on phone.     Patient participated, with great effort pronouncing short words and practicing vowel sounds, tongue and oral cavity movements.  Treatment   Treatment Time In: 1:00 PM  Treatment Time Out: 1:55 PM  Total Treatment Time: 55 minutes    Education: Plan of Care Patient expressed understanding.    Home Program: Patient instructed to practice writing or copying words from a brochure, pamphlet, book, et cetera. Patient instructed to practice humming different pitches and volume. Patient instructed to try singing along to radio or mimicking what he hears on television.  Assessment   Karin is a 52 y.o. male referred to outpatient SpeechTherapy with a medical diagnosis of stroke. Patient presents with expressive aphasia  Demonstrates impairments including limitations as described in the problem list. Positive prognostic factors include patient participation, stimulability. Negative prognostic factors include severity of aphasia. No barriers to therapy noted. Patient will benefit from skilled, outpatient neurological rehabilitation speech therapy.    Rehab Potential: good  Patient's spiritual, cultural and educational needs considered and patient agreeable to plan of care and goals.    Short Term Goals (1 per week, 12 weeks):   Patient will say one word with 75% accuracy/intelligibility. Current: 25%  Patient will write sentences with 75% " accuracy/intelligibility. Current: 25%  3.   Patient will repeat at syllable level with 75% accuracy. Current 45%    Long Term Goals (1 per week, 12 weeks):   Patient will improve expressive language ability commensurate with maximum capabilities.    Plan   Plan of Care Certification: 1/17/2023  to 2/13/2023.  Recommended Treatment Plan:  Patient will participate in the [facility] rehabilitation program for speech therapy 1 times per week to address his  Communication deficits, to educate patient and their family, and to participate in a home exercise program.      Therapist's Name:   Braden Persaud CCC-SLP     Date: 1/17/2023

## 2023-01-19 ENCOUNTER — CLINICAL SUPPORT (OUTPATIENT)
Dept: REHABILITATION | Facility: HOSPITAL | Age: 53
End: 2023-01-19
Payer: MEDICAID

## 2023-01-19 DIAGNOSIS — I63.9 CEREBROVASCULAR ACCIDENT (CVA), UNSPECIFIED MECHANISM: ICD-10-CM

## 2023-01-19 DIAGNOSIS — R47.9 DIFFICULTY WITH SPEECH: Primary | ICD-10-CM

## 2023-01-19 PROCEDURE — 92507 TX SP LANG VOICE COMM INDIV: CPT

## 2023-01-19 NOTE — PROGRESS NOTES
Outpatient Speech and Language Therapy Daily Note    Date: 1/19/2023     Name: Karin Carrera   MRN: [id number]    Therapy Diagnosis:   Encounter Diagnoses   Name Primary?    Difficulty with speech Yes    Cerebrovascular accident (CVA), unspecified mechanism     Physician: Referring, Unknown  Physician Orders: Walker Smith MD  Medical Diagnosis from Referral: Stroke    Visit #/Visits authorized: 3/ 6  Date of Evaluation:  1/19/2023   Insurance Authorization Period: 12/12/22 to 2/13/2023  Plan of Care Expiration:  ED@ to 12/12/22 to 2/13/2023     Time In: 12:58 pm  Time Out: 1:53 pm    Procedure Min.   Evaluation of Speech Fluency  55         Precautions:Standard  Subjective   Date of Onset: September 2016 is when patient had stroke. Patient fell January 1, 2022, breaking his hip.  History of Current Condition:   Patient is 52 year old man who presents with good cognition, orientation, and receptive language, but shows severe expressive aphasia. Patient had a stroke 2016, and fell (breaking his hip) at a friend's house in January, 2020.  Past Medical History: Karin Carrera  has a past medical history of GERD (gastroesophageal reflux disease), Hypertension, and Stroke.  Karin Carrera  has a past surgical history that includes Cardiac surgery and Hemiarthroplasty of hip (Right, 1/2/2022).  Medical Hx and Allergies:  Karin has a current medication list which includes the following prescription(s): albuterol, amitriptyline, aspirin, atorvastatin, empagliflozin, furosemide, insulin aspart u-100, levemir flextouch u-100 insuln, levofloxacin, lisinopril, metoprolol succinate, metoprolol tartrate, pantoprazole, and potassium chloride sa. Review of patient's allergies indicates:  No Known Allergies  Prior Therapy:  Patient reports he has not had speech therapy in the past year prior to December 2022 evaluation. Patient reported during evaluation that he was here, at [facility], 5 years ago  "for it.  Social History:  Patient lives at [facility], in his own apartment by himself.  Prior Level of Function: Stroke in 2016 caused his aphasia.   Current Level of Function: Patient has severe expressive aphasia. Patient can say a few words like "damn," "yeah" and "no."  Pain:   1/10  Pain Location / Description: Right ankle, right wrist.  Nutrition:  Patient reports he eats well.  Patient's Therapy Goals:  Patient wants to be able to express himself and talk better.  Objective   This is patient's second meeting. Patient indicated through gestures/writing single words. Patient wrote single words neatly but indicated complete sentences were difficult cognitively.    Patient indicated he practiced speech. He writes it down and tries to read it aloud.    Patient participated, with great effort pronouncing short words and practicing vowel sounds, tongue and oral cavity movements.  Treatment   Treatment Time In: 12:58 PM  Treatment Time Out: 1:53 PM  Total Treatment Time: 55 minutes    Education: Plan of Care Patient expressed understanding.    Home Program: Patient instructed to practice writing or copying words from a brochure, pamphlet, book, et cetera. Patient instructed to practice humming different pitches and volume. Patient instructed to try singing along to radio or mimicking what he hears on television.  Assessment   Karin is a 52 y.o. male referred to outpatient SpeechTherapy with a medical diagnosis of stroke. Patient presents with expressive aphasia  Demonstrates impairments including limitations as described in the problem list. Positive prognostic factors include patient participation, stimulability. Negative prognostic factors include severity of aphasia. No barriers to therapy noted. Patient will benefit from skilled, outpatient neurological rehabilitation speech therapy.    Rehab Potential: good  Patient's spiritual, cultural and educational needs considered and patient agreeable to plan " of care and goals.    Short Term Goals (1 per week, 12 weeks):   Patient will say one word with 75% accuracy/intelligibility. Current: 25% (one syllable only with mod prompts/cues)  Patient will write sentences with 75% accuracy/intelligibility. Current: 25%  3.   Patient will repeat at syllable level with 75% accuracy. Current 45%    Long Term Goals (1 per week, 12 weeks):   Patient will improve expressive language ability commensurate with maximum capabilities.    Plan   Plan of Care Certification: 1/19/2023  to 2/13/2023.  Recommended Treatment Plan:  Patient will participate in the [facility] outpatient rehabilitation program for speech therapy 1 times per week to address his  Communication deficits, to educate patient and their family, and to participate in a home exercise program.      Therapist's Name:   Braden Persaud CCC-SLP     Date: 1/19/2023

## 2023-01-24 ENCOUNTER — CLINICAL SUPPORT (OUTPATIENT)
Dept: REHABILITATION | Facility: HOSPITAL | Age: 53
End: 2023-01-24
Payer: MEDICAID

## 2023-01-24 DIAGNOSIS — I63.9 CEREBROVASCULAR ACCIDENT (CVA), UNSPECIFIED MECHANISM: Primary | ICD-10-CM

## 2023-01-24 DIAGNOSIS — R47.9 DIFFICULTY WITH SPEECH: ICD-10-CM

## 2023-01-24 PROCEDURE — 92507 TX SP LANG VOICE COMM INDIV: CPT

## 2023-01-24 NOTE — PROGRESS NOTES
Outpatient Speech and Language Therapy Daily Note    Date: 1/24/2023     Name: Karin Carrera   MRN: [id number]    Therapy Diagnosis:   No diagnosis found.   Physician: Referring, Unknown  Physician Orders: Walker Smith MD  Medical Diagnosis from Referral: Stroke    Visit #/Visits authorized: 3/6   Insurance Authorization Period: 12/12/22 to 2/13/2023  Plan of Care Expiration:  ED@ to 12/12/22 to 2/13/2023     Time In: 12:58 pm  Time Out: 1:48 pm    Procedure Min.   Evaluation of Speech Fluency  50         Precautions:Standard  Subjective   Date of Onset: September 2016 is when patient had stroke. Patient fell January 1, 2022, breaking his hip.  History of Current Condition:   Patient is 52 year old man who presents with good cognition, orientation, and receptive language, but shows severe expressive aphasia. Patient had a stroke 2016, and fell (breaking his hip) at a friend's house in January, 2020.  Past Medical History: Karin Carrera  has a past medical history of GERD (gastroesophageal reflux disease), Hypertension, and Stroke.  Karin Carrera  has a past surgical history that includes Cardiac surgery and Hemiarthroplasty of hip (Right, 1/2/2022).  Medical Hx and Allergies:  Karin has a current medication list which includes the following prescription(s): albuterol, amitriptyline, aspirin, atorvastatin, empagliflozin, furosemide, insulin aspart u-100, levemir flextouch u-100 insuln, levofloxacin, lisinopril, metoprolol succinate, metoprolol tartrate, pantoprazole, and potassium chloride sa. Review of patient's allergies indicates:  No Known Allergies  Prior Therapy:  Patient reports he has not had speech therapy in the past year prior to December 2022 evaluation. Patient reported during evaluation that he was here, at [facility], 5 years ago for it.  Social History:  Patient lives at [facility], in his own apartment by himself.  Prior Level of Function: Stroke in  "2016 caused his aphasia.   Current Level of Function: Patient has severe expressive aphasia. Patient can say a few words like "damn," "yeah" and "no."  Pain:   1/10  Pain Location / Description: Right ankle, right wrist--general right side baseline pain.  Nutrition:  Patient reports he eats well.  Patient's Therapy Goals:  Patient wants to be able to express himself and talk better.  Objective   This is patient's third meeting. Patient indicated through gestures/writing single words. Patient wrote single words neatly but indicated complete sentences were difficult cognitively.    Patient practiced phonics, short sounds, combo sounds, repetition, diadochokinetic exercises, and tongue/oral cavity movements.    Patient indicated he practiced speech. He writes it down and tries to read it aloud.  Discussed singing to tunes, then bridging to words.    Patient participated, with great effort pronouncing short words and practicing vowel sounds, tongue and oral cavity movements.  Treatment   Treatment Time In: 12:58 PM  Treatment Time Out: 1:48 PM  Total Treatment Time: 50 minutes    Education: Plan of Care Patient expressed understanding.    Home Program: Patient instructed to practice writing or copying words from a brochure, pamphlet, book, et cetera. Patient instructed to practice humming different pitches and volume. Patient instructed to try singing along to radio or mimicking what he hears on television.  Assessment   Karin is a 52 y.o. male referred to outpatient SpeechTherapy with a medical diagnosis of stroke. Patient presents with expressive aphasia  Demonstrates impairments including limitations as described in the problem list. Positive prognostic factors include patient participation, stimulability. Negative prognostic factors include severity of aphasia. No barriers to therapy noted. Patient will benefit from skilled, outpatient neurological rehabilitation speech therapy.    Rehab Potential: good  Patient's " spiritual, cultural and educational needs considered and patient agreeable to plan of care and goals.    Short Term Goals (1 per week, 12 weeks):   Patient will say one word with 75% accuracy/intelligibility. Current: 25% (one syllable only with mod prompts/cues)  Patient will write sentences with 75% accuracy/intelligibility. Current: 25%  3.   Patient will repeat at syllable level with 75% accuracy. Current 45%    Long Term Goals (1 per week, 12 weeks):   Patient will improve expressive language ability commensurate with maximum capabilities.    Plan   Plan of Care Certification: 1/24/2023  to 2/13/2023.  Recommended Treatment Plan:  Patient will participate in the [facility] outpatient rehabilitation program for speech therapy 1 times per week to address his  Communication deficits, to educate patient and their family, and to participate in a home exercise program.      Therapist's Name:   Braden Persaud CCC-SLP     Date: 1/24/2023

## 2023-01-25 ENCOUNTER — APPOINTMENT (OUTPATIENT)
Dept: RADIOLOGY | Facility: CLINIC | Age: 53
End: 2023-01-25
Attending: INTERNAL MEDICINE
Payer: MEDICAID

## 2023-01-25 ENCOUNTER — CLINICAL SUPPORT (OUTPATIENT)
Dept: REHABILITATION | Facility: HOSPITAL | Age: 53
End: 2023-01-25
Payer: MEDICAID

## 2023-01-25 ENCOUNTER — OFFICE VISIT (OUTPATIENT)
Dept: FAMILY MEDICINE | Facility: CLINIC | Age: 53
End: 2023-01-25
Payer: MEDICAID

## 2023-01-25 VITALS
HEART RATE: 91 BPM | OXYGEN SATURATION: 97 % | WEIGHT: 216.19 LBS | DIASTOLIC BLOOD PRESSURE: 74 MMHG | HEIGHT: 73 IN | SYSTOLIC BLOOD PRESSURE: 132 MMHG | BODY MASS INDEX: 28.65 KG/M2 | RESPIRATION RATE: 18 BRPM | TEMPERATURE: 97 F

## 2023-01-25 DIAGNOSIS — R05.1 ACUTE COUGH: ICD-10-CM

## 2023-01-25 DIAGNOSIS — I63.9 CVA (CEREBRAL VASCULAR ACCIDENT): ICD-10-CM

## 2023-01-25 DIAGNOSIS — J18.9 PNEUMONIA DUE TO INFECTIOUS ORGANISM, UNSPECIFIED LATERALITY, UNSPECIFIED PART OF LUNG: ICD-10-CM

## 2023-01-25 DIAGNOSIS — F51.01 PRIMARY INSOMNIA: Primary | ICD-10-CM

## 2023-01-25 DIAGNOSIS — Z86.73 HISTORY OF CVA (CEREBROVASCULAR ACCIDENT): Primary | ICD-10-CM

## 2023-01-25 DIAGNOSIS — J44.9 CHRONIC OBSTRUCTIVE PULMONARY DISEASE, UNSPECIFIED COPD TYPE: ICD-10-CM

## 2023-01-25 PROCEDURE — 97162 PT EVAL MOD COMPLEX 30 MIN: CPT

## 2023-01-25 PROCEDURE — 1159F MED LIST DOCD IN RCRD: CPT | Mod: CPTII,,, | Performed by: INTERNAL MEDICINE

## 2023-01-25 PROCEDURE — 3075F PR MOST RECENT SYSTOLIC BLOOD PRESS GE 130-139MM HG: ICD-10-PCS | Mod: CPTII,,, | Performed by: INTERNAL MEDICINE

## 2023-01-25 PROCEDURE — 1160F RVW MEDS BY RX/DR IN RCRD: CPT | Mod: CPTII,,, | Performed by: INTERNAL MEDICINE

## 2023-01-25 PROCEDURE — 4010F PR ACE/ARB THEARPY RXD/TAKEN: ICD-10-PCS | Mod: CPTII,,, | Performed by: INTERNAL MEDICINE

## 2023-01-25 PROCEDURE — 99214 OFFICE O/P EST MOD 30 MIN: CPT | Mod: ,,, | Performed by: INTERNAL MEDICINE

## 2023-01-25 PROCEDURE — 3008F PR BODY MASS INDEX (BMI) DOCUMENTED: ICD-10-PCS | Mod: CPTII,,, | Performed by: INTERNAL MEDICINE

## 2023-01-25 PROCEDURE — 1159F PR MEDICATION LIST DOCUMENTED IN MEDICAL RECORD: ICD-10-PCS | Mod: CPTII,,, | Performed by: INTERNAL MEDICINE

## 2023-01-25 PROCEDURE — 3078F DIAST BP <80 MM HG: CPT | Mod: CPTII,,, | Performed by: INTERNAL MEDICINE

## 2023-01-25 PROCEDURE — 4010F ACE/ARB THERAPY RXD/TAKEN: CPT | Mod: CPTII,,, | Performed by: INTERNAL MEDICINE

## 2023-01-25 PROCEDURE — 3075F SYST BP GE 130 - 139MM HG: CPT | Mod: CPTII,,, | Performed by: INTERNAL MEDICINE

## 2023-01-25 PROCEDURE — 71045 X-RAY EXAM CHEST 1 VIEW: CPT | Mod: TC,RHCUB | Performed by: INTERNAL MEDICINE

## 2023-01-25 PROCEDURE — 1111F PR DISCHARGE MEDS RECONCILED W/ CURRENT OUTPATIENT MED LIST: ICD-10-PCS | Mod: CPTII,,, | Performed by: INTERNAL MEDICINE

## 2023-01-25 PROCEDURE — 1111F DSCHRG MED/CURRENT MED MERGE: CPT | Mod: CPTII,,, | Performed by: INTERNAL MEDICINE

## 2023-01-25 PROCEDURE — 1160F PR REVIEW ALL MEDS BY PRESCRIBER/CLIN PHARMACIST DOCUMENTED: ICD-10-PCS | Mod: CPTII,,, | Performed by: INTERNAL MEDICINE

## 2023-01-25 PROCEDURE — 3008F BODY MASS INDEX DOCD: CPT | Mod: CPTII,,, | Performed by: INTERNAL MEDICINE

## 2023-01-25 PROCEDURE — 3078F PR MOST RECENT DIASTOLIC BLOOD PRESSURE < 80 MM HG: ICD-10-PCS | Mod: CPTII,,, | Performed by: INTERNAL MEDICINE

## 2023-01-25 PROCEDURE — 99214 PR OFFICE/OUTPT VISIT, EST, LEVL IV, 30-39 MIN: ICD-10-PCS | Mod: ,,, | Performed by: INTERNAL MEDICINE

## 2023-01-25 RX ORDER — FERROUS SULFATE 325(65) MG
TABLET ORAL
COMMUNITY
Start: 2023-01-16 | End: 2023-09-13 | Stop reason: SDUPTHER

## 2023-01-25 RX ORDER — BENZONATATE 200 MG/1
200 CAPSULE ORAL 3 TIMES DAILY PRN
Qty: 60 CAPSULE | Refills: 1 | Status: SHIPPED | OUTPATIENT
Start: 2023-01-25 | End: 2023-02-07

## 2023-01-25 RX ORDER — AMOXICILLIN AND CLAVULANATE POTASSIUM 875; 125 MG/1; MG/1
1 TABLET, FILM COATED ORAL 2 TIMES DAILY
Qty: 20 TABLET | Refills: 0 | Status: SHIPPED | OUTPATIENT
Start: 2023-01-25 | End: 2023-02-07

## 2023-01-25 RX ORDER — ZOLPIDEM TARTRATE 5 MG/1
5 TABLET ORAL NIGHTLY
Qty: 30 TABLET | Refills: 1 | Status: SHIPPED | OUTPATIENT
Start: 2023-01-25 | End: 2023-03-27 | Stop reason: SDUPTHER

## 2023-01-25 NOTE — PLAN OF CARE
"[facility]  ASSISTIVE TECHNOLOGY EVALUATION    Date: 1/25/2023   Name: Karin Carrera  Clinic Number: [id number]    Therapy Diagnosis:   Encounter Diagnoses   Name Primary?    CVA (cerebral vascular accident)     History of CVA (cerebrovascular accident) Yes     Physician: Isabel Hidalgo NP    Physician Orders: PT Eval and Treat for power wheelchair  Medical Diagnosis from Referral: CVA  Evaluation Date: 1/25/2023  Visit # / Visits authorized: 1/ 1    Time In: 1008  Time Out: 1047  Total Appointment Time (timed & untimed codes): 39 minutes    Precautions: Standard    Past Medical History  CVA in 2016, expressive aphasia, oper heard surgery, HTN, DM< heart disease, blood clots, SOB, right hip surgery.chronic LE edema     Pain at time of evaluation  Patient unable to voice pain due to aphasia but reports pain    Factors that increase pain:   LE edema    Factors that decrease pain:  Unable to verbalize    Current Durable Medical Equipment  MWC which is loaned to him from his assistive living facility.     Home environment  Patient lives in an assistive living facility and uses a MWC for all mobility.  Interior door width: 36"  Exterior door width: 36"  Comments:     Patient's mobility complaints: Patient reports he gets SOB with self propulsion. He is unable to get to the dining anton at his facility without severe SOB and multiple rest breaks due to SOB.  He tries to self propel with his left hand and left foot with his right LE overalapping the left LE     Objective Evaluation:    ROM Right upper extremity  Left upper extremity   Right lower extremity  Left lower extremity    Shoulder flexion  20 150 Hip flexion 90 110   Shoulder IR/ER No active IR/ER noted WFL Hip extension  0 10   Shoulder ABD 10 150 Knee extension  0 0   Elbow flexion/ext No active ROM  WFL Knee flexion  90 100   Wrist flexion/ext No Active ROM WFL Ankle DF 0 10   Finger flexion/ext No active ROM WFL Ankle PF 20 WFL         "     Strength Right upper extremity  Left upper extremity   Right lower extremity  Left lower extremity    Shoulder flex -2/5 4/5 Hip flexion  -3/5 4/5   Shoulder IR/ER 0/5 4/5 Hip extension  2/5 4/5   Shoulder ABD 1/5 4/5 Knee extension  -3/5 4/5   Elbow flexion/ext 1/5 4/5 Knee flexion  3/5 4/5   Wrist flexion/ext 0/5 4/5 Ankle DF 0/5 4/5   Finger flexion/ext 0/5 4/5 Ankle PF 0/5 4/5             Sensation: deminished on the right side    History of pressure sores: small wound on bottom soon after CVA and current wound on right LE.     Transfers:  Sit to/from Stand  Contact Guard Assistance  Stand Pivot Transfer Contact Guard Assistance  Supine to/from Sit Modified Independent    Cognitive Status: severe expressive aphasia    Activities of Daily Living:  Feeding: Independent  Dressing: Modified Independent  Bathing: Standby Assistance  Toileting: Modified Independent    Balance Assessment:  Static Sit Independent  Dynamic Sit Independent  Static Stand Contact Guard Assistance  Dynamic Stand Contact Guard Assistance    Postural Assessment:  Head and Neck: forward head posture  Upper Extremities: right UE hemiplegia with posture in shoulder extension, scapular depression, elbow extension  Trunk: WFL  Pelvis: WFL  Lower Extremities: right LE with mild ABD/Er at rest    Spasticity: mild to moderate spasticity on right LE and flaccid right UE    Gait Analysis: patient took 3 steps during a stand pivot transfer with poor foot clearance, right knee hyperextension during stance and overall poor balance.     Body Measurements(all in inches):  Seat to top of head 36.5   Hip width 20   Chest width 12.5   Shoulder width 21   Outer knee 16.5   Inner knee 6     Right  Left  Shoulder Height 24 26   Axilla to seat 19 19   Elbow to seat 9 9   Thigh Length 22 21   Lower leg length 19 19.5     DME Provider: [facility]     Nutrition: [facility]  Weight loss/gain in past 2 months: no  Difficulty swallowing: no    Assessment and  Recommendations:    A walker will not meet this patient's mobility needs secondary to he is not safe ambulating with any type of gait device due to the significant right side weakness and spasticity .    A manual wheelchair will not meet this patient's mobility needs because he cannot functionally self propel even an optimally configured MWC due to the severity of his hemiplegia.     A power wheelchair is needed to meet this patient's mobility needs because: he will be able to complete the necessary distances at his assistive living facility safely with less SOB and cardiovascular stress as well as less risk of fall.    The wheelchair recommended is: I recommend a Permobil M3 due to the fact that this base will provide him a power base that he will be independent with his household and assistive living facility mobility.  I also feel he will benefit from power tilt and power recline to provide him an adequate method of pressure relief.  Due to his hemiplegia he does not have the ability to perform an effective pressure relief without this power tilt.  Secondary to his chronic LE edema and secondary pain, I also feel he needs power ELR so that he can reposition his LE's for edema control independently. Due to his difficulty with transfers and the fact that he will spend a portion of every day alone in his apartment I feel he will benefit from power seat elevate so that he can independently raise the seat to reach items safely as well as raise the seat to floor height for safer transfers.  This PWC will require a expandable harness so that he can operate the PWC and all of its power seat functions independently.  To provide the necessary posterior and lateral trunk support I recommend a Corpus backrest, and a [place] cushion for adequate positioning and skin protection. Due to the abd/er positioning of the right le he will benefit from distal thigh guide on right side with removable hardware.  He will need a left  side joystick with retractable mount so that he can retract the mount for table access and safe transfers. For anterior stability I recommend a pelvic belt.  Finally he will need a headrest with removable hardware for the necessary head and cervical support. I feel this PWC will meet all of his mobility, positioning, and pressure relieving needs at this time and will continue to meet them over the next 5 + years.       Electronically signed by:  KELECHI MONTEJO PT, ATP  01/25/2023      I CERTIFY THE NEED FOR THESE SERVICES FURNISHED UNDER THIS PLAN OF TREATMENT AND WHILE UNDER MY CARE.    Physician's comments:      Physician's Signature: _________________________________________  Date: __________________________

## 2023-01-25 NOTE — PATIENT INSTRUCTIONS
Karin was seen today for follow-up, cough and leg swelling.    Diagnoses and all orders for this visit:    Primary insomnia    Acute cough  -     X-Ray Chest 1 View; Future    Pneumonia due to infectious organism, unspecified laterality, unspecified part of lung    Chronic obstructive pulmonary disease, unspecified COPD type    Other orders  The following orders have not been finalized:  -     zolpidem (AMBIEN) 5 MG Tab  -     amoxicillin-clavulanate 875-125mg (AUGMENTIN) 875-125 mg per tablet  -     benzonatate (TESSALON) 200 MG capsule

## 2023-01-25 NOTE — PROGRESS NOTES
Subjective:       Patient ID: Karin Carrera is a 52 y.o. male.    Chief Complaint: Follow-up (2 wk f/u. Patient request sleeping meds), Cough, and Leg Swelling    Patient is here today for a follow up evaluation. Patient blood pressure is stable today on intake, 132/74. Patient has a complaint of constant cough. Patient states he does cough up phlegm. Will order chest x-ray. Will order Augmentin 875mg PO BID #20. Will order Tessalon Pearls 200mg PO TID PRN for cough #60 RF1. Patient is requesting medication for insomnia. Will order Ambien 5mg PO QHS #30 RF1. Will follow with patient in 2 months.     Current Medications:    Current Outpatient Medications:     albuterol (PROVENTIL/VENTOLIN HFA) 90 mcg/actuation inhaler, SMARTSI-2 Puff(s) By Mouth Every 4 Hours PRN, Disp: , Rfl:     amitriptyline (ELAVIL) 25 MG tablet, Take 1 tablet (25 mg total) by mouth every evening., Disp: 90 tablet, Rfl: 1    aspirin (ECOTRIN) 81 MG EC tablet, Take 1 tablet (81 mg total) by mouth once daily., Disp: 90 tablet, Rfl: 1    atorvastatin (LIPITOR) 40 MG tablet, Take 1 tablet (40 mg total) by mouth every evening., Disp: 90 tablet, Rfl: 1    empagliflozin (JARDIANCE) 10 mg tablet, Take 1 tablet (10 mg total) by mouth once daily., Disp: 30 tablet, Rfl: 11    ferrous sulfate (FEOSOL) 325 mg (65 mg iron) Tab tablet, Take by mouth., Disp: , Rfl:     furosemide (LASIX) 40 MG tablet, Take 1 tablet (40 mg total) by mouth 2 (two) times daily., Disp: 60 tablet, Rfl: 11    insulin aspart U-100 (NOVOLOG) 100 unit/mL injection, Inject 0-5 Units into the skin 3 (three) times daily before meals., Disp: 10 mL, Rfl: 1    insulin detemir U-100 (LEVEMIR FLEXTOUCH U-100 INSULN) 100 unit/mL (3 mL) InPn pen, Inject 10 Units into the skin every evening., Disp: 36 mL, Rfl: 0    levoFLOXacin (LEVAQUIN) 750 MG tablet, Take 750 mg by mouth., Disp: , Rfl:     lisinopriL (PRINIVIL,ZESTRIL) 5 MG tablet, Take 1 tablet (5 mg total) by mouth once daily., Disp:  90 tablet, Rfl: 1    metoprolol tartrate (LOPRESSOR) 25 MG tablet, Take 12.5 mg by mouth 2 (two) times daily., Disp: , Rfl:     pantoprazole (PROTONIX) 40 MG tablet, Take 1 tablet (40 mg total) by mouth once daily., Disp: 90 tablet, Rfl: 1    potassium chloride SA (K-DUR,KLOR-CON) 20 MEQ tablet, Take 1 tablet (20 mEq total) by mouth 2 (two) times daily., Disp: 20 tablet, Rfl: 0    metoprolol succinate (TOPROL-XL) 25 MG 24 hr tablet, Take 0.5 tablets (12.5 mg total) by mouth once daily. (Patient not taking: Reported on 1/25/2023), Disp: 15 tablet, Rfl: 11    Last Labs:     Lab Requisition on 01/24/2023   Component Date Value    Sodium 01/24/2023 138     Potassium 01/24/2023 5.0     Chloride 01/24/2023 106     CO2 01/24/2023 23     Anion Gap 01/24/2023 14     Glucose 01/24/2023 143 (H)     BUN 01/24/2023 16     Creatinine 01/24/2023 0.86     BUN/Creatinine Ratio 01/24/2023 19     Calcium 01/24/2023 8.7     Total Protein 01/24/2023 7.4     Albumin 01/24/2023 3.6     Globulin 01/24/2023 3.8     A/G Ratio 01/24/2023 0.9     Bilirubin, Total 01/24/2023 0.8     Alk Phos 01/24/2023 143 (H)     ALT 01/24/2023 17     AST 01/24/2023 43 (H)     eGFR 01/24/2023 104     ProBNP 01/24/2023 2,649 (H)    Lab Requisition on 01/09/2023   Component Date Value    Sodium 01/09/2023 141     Potassium 01/09/2023 4.0     Chloride 01/09/2023 104     CO2 01/09/2023 32     Anion Gap 01/09/2023 9     Glucose 01/09/2023 140 (H)     BUN 01/09/2023 14     Creatinine 01/09/2023 1.00     BUN/Creatinine Ratio 01/09/2023 14     Calcium 01/09/2023 8.8     eGFR 01/09/2023 91     ProBNP 01/09/2023 1,241 (H)    Office Visit on 01/04/2023   Component Date Value    Sodium 01/04/2023 139     Potassium 01/04/2023 4.0     Chloride 01/04/2023 103     CO2 01/04/2023 31     Anion Gap 01/04/2023 9     Glucose 01/04/2023 136 (H)     BUN 01/04/2023 17     Creatinine 01/04/2023 1.03     BUN/Creatinine Ratio 01/04/2023 17     Calcium 01/04/2023 9.2     eGFR 01/04/2023  87     RBC 01/04/2023 4.77     Hemoglobin 01/04/2023 10.5 (L)     Hematocrit 01/04/2023 36.9 (L)     MCV 01/04/2023 77.4 (L)     RDW 01/04/2023 27.0 (H)     Hgb A1 01/04/2023 97.5     Hgb A2 01/04/2023 2.5     Pathologist Interp, Hgb * 01/04/2023 Normal Hgb Electrophoresis     WBC 01/04/2023 8.18     RBC 01/04/2023 4.81     Hemoglobin 01/04/2023 10.6 (L)     Hematocrit 01/04/2023 36.9 (L)     MCV 01/04/2023 76.7 (L)     MCH 01/04/2023 22.0 (L)     MCHC 01/04/2023 28.7 (L)     RDW 01/04/2023 26.8 (H)     Platelet Count 01/04/2023 514 (H)     MPV 01/04/2023 8.8 (L)     Neutrophils % 01/04/2023 65.0     Lymphocytes % 01/04/2023 26.2 (L)     Monocytes % 01/04/2023 6.0     Eosinophils % 01/04/2023 1.3     Basophils % 01/04/2023 1.1 (H)     Immature Granulocytes % 01/04/2023 0.4     nRBC, Auto 01/04/2023 0.0     Neutrophils, Abs 01/04/2023 5.32     Lymphocytes, Absolute 01/04/2023 2.14     Monocytes, Absolute 01/04/2023 0.49     Eosinophils, Absolute 01/04/2023 0.11     Basophils, Absolute 01/04/2023 0.09     Immature Granulocytes, A* 01/04/2023 0.03     nRBC, Absolute 01/04/2023 0.00     Diff Type 01/04/2023 Scan Smear     Platelet Morphology 01/04/2023 Increased (A)     Anisocytosis 01/04/2023 2+     Ovalocytes 01/04/2023 Few     Polychromasia 01/04/2023 Few     Hypochromic 01/04/2023 Few    No results displayed because visit has over 200 results.          Last Imaging:  X-Ray Chest 1 View  Narrative: EXAMINATION:  XR CHEST 1 VIEW    CLINICAL HISTORY:  .  Acute cough    COMPARISON:  January 4, 2023    TECHNIQUE:  Chest x-ray AP    FINDINGS:  There is continued cardiomegaly.  Mediastinal contours are unchanged.  Prior median sternotomy.  Pulmonary vasculature is upper normal.  There is some continued hazy opacity in the lung bases.  There is some platelike scarring in the left mid to lower lung.  There is unchanged mild pleural disease.    Osseous structures are similar  Impression: Cardiomegaly and suspected continued  CHF.  Grossly similar to the previous study    Electronically signed by: Mikey Sepulveda  Date:    01/25/2023  Time:    15:49         Review of Systems   Respiratory:  Positive for cough.    Psychiatric/Behavioral:  Positive for sleep disturbance.    All other systems reviewed and are negative.      Objective:      Physical Exam  Constitutional:       Appearance: Normal appearance. He is normal weight.   Cardiovascular:      Rate and Rhythm: Normal rate and regular rhythm.      Pulses: Normal pulses.      Heart sounds: Normal heart sounds.   Pulmonary:      Effort: Pulmonary effort is normal.      Breath sounds: Normal breath sounds.   Abdominal:      General: Abdomen is flat. Bowel sounds are normal.      Palpations: Abdomen is soft.   Musculoskeletal:         General: Normal range of motion.      Cervical back: Normal range of motion and neck supple.   Skin:     General: Skin is warm and dry.   Neurological:      General: No focal deficit present.      Mental Status: He is alert and oriented to person, place, and time. Mental status is at baseline.       Assessment:       1. Primary insomnia        2. Acute cough  X-Ray Chest 1 View      3. Pneumonia due to infectious organism, unspecified laterality, unspecified part of lung        4. Chronic obstructive pulmonary disease, unspecified COPD type             Plan:         Karin was seen today for follow-up, cough and leg swelling.    Diagnoses and all orders for this visit:    Primary insomnia    Acute cough  -     X-Ray Chest 1 View; Future    Pneumonia due to infectious organism, unspecified laterality, unspecified part of lung    Chronic obstructive pulmonary disease, unspecified COPD type    Other orders  The following orders have not been finalized:  -     zolpidem (AMBIEN) 5 MG Tab  -     amoxicillin-clavulanate 875-125mg (AUGMENTIN) 875-125 mg per tablet  -     benzonatate (TESSALON) 200 MG capsule

## 2023-01-26 ENCOUNTER — CLINICAL SUPPORT (OUTPATIENT)
Dept: REHABILITATION | Facility: HOSPITAL | Age: 53
End: 2023-01-26
Payer: MEDICAID

## 2023-01-26 DIAGNOSIS — I63.9 CEREBROVASCULAR ACCIDENT (CVA), UNSPECIFIED MECHANISM: Primary | ICD-10-CM

## 2023-01-26 PROCEDURE — 92507 TX SP LANG VOICE COMM INDIV: CPT

## 2023-01-26 RX ORDER — FUROSEMIDE 40 MG/1
40 TABLET ORAL DAILY
Qty: 7 TABLET | Refills: 0 | Status: SHIPPED | OUTPATIENT
Start: 2023-01-26 | End: 2023-09-13 | Stop reason: SDUPTHER

## 2023-01-26 RX ORDER — POTASSIUM CHLORIDE 20 MEQ/1
20 TABLET, EXTENDED RELEASE ORAL DAILY
Qty: 7 TABLET | Refills: 0 | Status: SHIPPED | OUTPATIENT
Start: 2023-01-26 | End: 2023-02-07

## 2023-01-26 NOTE — PROGRESS NOTES
Outpatient Speech and Language Therapy Daily Note    Date: 1/26/2023     Name: Karin Carrera   MRN: [id number]    Therapy Diagnosis:   Encounter Diagnosis   Name Primary?    Cerebrovascular accident (CVA), unspecified mechanism Yes     Physician: Referring, Unknown  Physician Orders: Walker Smith MD  Medical Diagnosis from Referral: Stroke    Visit #/Visits authorized:5/6   Insurance Authorization Period: 12/12/22 to 2/13/2023  Plan of Care Expiration:  12/12/22 to 2/13/2023     Time In: 12:58 pm  Time Out: 1:48 pm    Procedure Min.   Evaluation of Speech Fluency  50         Precautions:Standard  Subjective   Date of Onset: September 2016 is when patient had stroke. Patient fell January 1, 2022, breaking his hip.  History of Current Condition:   Patient is 52 year old man who presents with good cognition, orientation, and receptive language, but shows severe expressive aphasia. Patient had a stroke 2016, and fell (breaking his hip) at a friend's house in January, 2022.  Past Medical History: Karin Carrera  has a past medical history of GERD (gastroesophageal reflux disease), Hypertension, and Stroke.  Karin Carrera  has a past surgical history that includes Cardiac surgery and Hemiarthroplasty of hip (Right, 1/2/2022).  Medical Hx and Allergies:  Karin has a current medication list which includes the following prescription(s): albuterol, amitriptyline, amoxicillin-clavulanate 875-125mg, aspirin, atorvastatin, benzonatate, empagliflozin, ferrous sulfate, furosemide, insulin aspart u-100, levemir flextouch u-100 insuln, levofloxacin, lisinopril, metoprolol succinate, metoprolol tartrate, pantoprazole, potassium chloride sa, and zolpidem. Review of patient's allergies indicates:  No Known Allergies  Prior Therapy:  Patient reports he has not had speech therapy in the past year prior to December 2022 evaluation. Patient reported during evaluation that he was here, at [facility], [facility]  "years ago for it.  Social History:  Patient lives at [facility], in his own apartment by himself.  Prior Level of Function: Stroke in 2016 caused his aphasia.   Current Level of Function: Patient has severe expressive aphasia. Patient can say a few words like "damn," "yeah" and "no."  Pain:   1/10  Pain Location / Description: Right ankle, right wrist--general right side baseline pain. Patient indicated it can come and go, with bursts of increased pain at times (in ankle, shoulder, and wrist, just jumping up to a 6 occasionally).  Nutrition:  Patient reports he eats well.  Patient's Therapy Goals:  Patient wants to be able to express himself and talk better.  Objective   Patient reported tiredness due to somewhat poor sleep quality last night.    Patient practiced phonics, short sounds, combo sounds, repetition, diadochokinetic exercises, and tongue/oral cavity movements.    Patient indicated he practiced speech. He writes it down and tries to read it aloud.    Patient participated, with great effort pronouncing short words and practicing vowel sounds, tongue and oral cavity movements.    Treatment     Treatment Time In: 12:58 PM  Treatment Time Out: 1:48 PM  Total Treatment Time: 50 minutes    Education: Plan of Care Patient expressed understanding.    Home Program: Patient instructed to practice writing or copying words from a brochure, pamphlet, book, et cetera. Patient instructed to practice humming different pitches and volume. Patient instructed to try singing along to radio or mimicking what he hears on television.    Assessment     Karin is a 52 y.o. male referred to outpatient SpeechTherapy with a medical diagnosis of stroke. Patient presents with expressive aphasia  Demonstrates impairments including limitations as described in the problem list. Positive prognostic factors include patient participation, stimulability. Negative prognostic factors include severity of aphasia. No barriers to " therapy noted. Patient will benefit from skilled, outpatient neurological rehabilitation speech therapy.    Rehab Potential: good  Patient's spiritual, cultural and educational needs considered and patient agreeable to plan of care and goals.    Short Term Goals (1 per week, 12 weeks):   Patient will say one word with 75% accuracy/intelligibility. Current: 55% (one syllable only with mod prompts/cues)  Patient will write sentences with 75% accuracy/intelligibility. Current: 35%  3.   Patient will repeat at syllable level with 75% accuracy. Current 55%    Long Term Goals (1 per week, 12 weeks):   Patient will improve expressive language ability commensurate with maximum capabilities.    Plan   Plan of Care Certification: 1/26/2023  to 2/13/2023.  Recommended Treatment Plan:  Patient will participate in the [facility] outpatient rehabilitation program for speech therapy 1 times per week to address his  Communication deficits, to educate patient and their family, and to participate in a home exercise program.      Therapist's Name:   Braden Persaud CCC-SLP     Date: 1/26/2023

## 2023-01-31 ENCOUNTER — CLINICAL SUPPORT (OUTPATIENT)
Dept: REHABILITATION | Facility: HOSPITAL | Age: 53
End: 2023-01-31
Payer: MEDICAID

## 2023-01-31 DIAGNOSIS — Z86.73 HISTORY OF CVA (CEREBROVASCULAR ACCIDENT): ICD-10-CM

## 2023-01-31 DIAGNOSIS — R47.9 DIFFICULTY WITH SPEECH: Primary | ICD-10-CM

## 2023-01-31 PROCEDURE — 92507 TX SP LANG VOICE COMM INDIV: CPT

## 2023-01-31 NOTE — PLAN OF CARE
Outpatient Speech and Language Therapy Updated Plan of Care    Date: 1/31/2023     Name: Karin Carrera   MRN: [id number]    Therapy Diagnosis:   No diagnosis found.    Physician: Referring, Unknown  Physician Orders: Walker Smith MD  Medical Diagnosis from Referral: Stroke    Visit #/Visits authorized: 6/6   Insurance Authorization Period: 12/12/22 to 2/13/2023  Plan of Care Expiration:  12/12/22 to 2/13/2023     Time In: 12:58 pm  Time Out: 1:48 pm    Procedure Min.   Evaluation of Speech Fluency  50         Precautions:Standard  Subjective   Date of Onset: September 2016 is when patient had stroke. Patient fell January 1, 2022, breaking his hip.  History of Current Condition:   Patient is 52 year old man who presents with good cognition, orientation, and receptive language, but shows severe expressive aphasia. Patient had a stroke 2016, and fell (breaking his hip) at a friend's house in January, 2022.  Past Medical History: Karin Carrera  has a past medical history of GERD (gastroesophageal reflux disease), Hypertension, and Stroke.  Karin Carrera  has a past surgical history that includes Cardiac surgery and Hemiarthroplasty of hip (Right, 1/2/2022).  Medical Hx and Allergies:  Karin has a current medication list which includes the following prescription(s): albuterol, amitriptyline, amoxicillin-clavulanate 875-125mg, aspirin, atorvastatin, benzonatate, empagliflozin, ferrous sulfate, furosemide, insulin aspart u-100, levemir flextouch u-100 insuln, levofloxacin, lisinopril, metoprolol succinate, metoprolol tartrate, pantoprazole, potassium chloride sa, and zolpidem. Review of patient's allergies indicates:  No Known Allergies  Prior Therapy:  Patient reports he has not had speech therapy in the past year prior to December 2022 evaluation. Patient reported during evaluation that he was here, at [facility], 5 years ago for it.  Social History:  Patient lives at [facility]  "[place], in his own apartment by himself.  Prior Level of Function: Stroke in 2016 caused his aphasia.   Current Level of Function: Patient has severe expressive aphasia. Patient can say a few words like "damn," "yeah" and "no."  Pain:   6/10  Pain Location / Description: Right foot, toes/front. Also, the baseline 1/10 pain (that occasionally spikes to around 6) on right side (including right side: ankles, shoulder, wrists).  Nutrition:  Patient reports he eats well.  Patient's Therapy Goals:  Patient wants to be able to express himself and talk better.    Patient said his right toe/front of right foot was cramping. He said the throbbing/cramping affects toes, due to the way he ambulates in this chair (kind of kicking in front of him, with some possible collision with other foot.) He said this happens ever so often, for ten years, which was before his stroke-- patient indicated even before his stroke, before he used a wheelchair, he did this.  Objective     Patient practiced single words with no prompts/cues, with difficulty if word was 2+ syllables. Patient practiced short sounds and diadochokinetic exercises.    Patient participated, with great effort pronouncing short words and practicing vowel sounds, tongue and oral cavity movements.    Treatment     Treatment Time In: 12:58 PM  Treatment Time Out: 1:48 PM  Total Treatment Time: 50 minutes    Education: Plan of Care Patient expressed understanding.    Home Program: Patient instructed to practice writing or copying words from a brochure, pamphlet, book, et cetera. Patient instructed to practice humming different pitches and volume. Patient instructed to try singing along to radio or mimicking what he hears on television.    Assessment   Karin is a 52 y.o. male referred to outpatient SpeechTherapy with a medical diagnosis of stroke. Patient presents with expressive aphasia  Demonstrates impairments including limitations as described in the problem list. Positive " prognostic factors include patient participation, stimulability. Negative prognostic factors include severity of aphasia. No barriers to therapy noted. Patient will benefit from skilled, outpatient neurological rehabilitation speech therapy.    Rehab Potential: good  Patient's spiritual, cultural and educational needs considered and patient agreeable to plan of care and goals.    Short Term Goals (1 per week, 12 weeks):   Patient will say one word with 75% accuracy/intelligibility. Current: 55% (one syllable only with mod prompts/cues)  Patient will write sentences with 75% accuracy/intelligibility. Current: 55%  3.   Patient will repeat at syllable level with 75% accuracy. Current 55%    Long Term Goals (1 per week, 12 weeks):   Patient will improve expressive language ability commensurate with maximum capabilities.    Plan   Plan of Care Certification: 1/31/2023  to 2/13/2023.  Recommended Treatment Plan:  Patient will participate in the [facility] outpatient rehabilitation program for speech therapy 2 times per week to address his  Communication deficits, to educate patient and their family, and to participate in a home exercise program.    Medicaid requirements:  Plan of Care Dates: 1/31/2023 to 12/31/2023  CPT codes and number of units needed per visit: 08950/1  Last face to face visit with MD: 1/25/2023  Short Term Goals (1 per week, 12 weeks):   Patient will say one word with 75% accuracy/intelligibility. Current: 55% (one syllable only with mod prompts/cues)  Patient will write sentences with 75% accuracy/intelligibility. Current: 55%  3.   Patient will repeat at syllable level with 75% accuracy. Current 55%    Long Term Goals (1 per week, 12 weeks):   Patient will improve expressive language ability commensurate with maximum capabilities.    Home exercise program and patient compliance: Patient instructed to practice writing or copying words from a brochure, pamphlet, book, et cetera. Patient instructed to  practice humming different pitches and volume. Patient instructed to try singing along to radio or mimicking what he hears on television.  Discharge Plan: Discharge to [facility] when patient attains max rehab.      Physician Signature:_________________________________________________________  Date: ____________________   Therapist's Name:   Braden Persaud CCC-SLP     Date: 1/31/2023

## 2023-01-31 NOTE — PROGRESS NOTES
Outpatient Speech and Language Therapy Updated Plan of Care    Date: 1/31/2023     Name: Karin Carrera   MRN: [id number]    Therapy Diagnosis:   No diagnosis found.    Physician: Referring, Unknown  Physician Orders: Walker Smith MD  Medical Diagnosis from Referral: Stroke    Visit #/Visits authorized: 6/6   Insurance Authorization Period: 12/12/22 to 2/13/2023  Plan of Care Expiration:  12/12/22 to 2/13/2023     Time In: 12:58 pm  Time Out: 1:48 pm    Procedure Min.   Evaluation of Speech Fluency  50         Precautions:Standard  Subjective   Date of Onset: September 2016 is when patient had stroke. Patient fell January 1, 2022, breaking his hip.  History of Current Condition:   Patient is 52 year old man who presents with good cognition, orientation, and receptive language, but shows severe expressive aphasia. Patient had a stroke 2016, and fell (breaking his hip) at a friend's house in January, 2022.  Past Medical History: Karin Carrera  has a past medical history of GERD (gastroesophageal reflux disease), Hypertension, and Stroke.  Karin Carrera  has a past surgical history that includes Cardiac surgery and Hemiarthroplasty of hip (Right, 1/2/2022).  Medical Hx and Allergies:  Karin has a current medication list which includes the following prescription(s): albuterol, amitriptyline, amoxicillin-clavulanate 875-125mg, aspirin, atorvastatin, benzonatate, empagliflozin, ferrous sulfate, furosemide, insulin aspart u-100, levemir flextouch u-100 insuln, levofloxacin, lisinopril, metoprolol succinate, metoprolol tartrate, pantoprazole, potassium chloride sa, and zolpidem. Review of patient's allergies indicates:  No Known Allergies  Prior Therapy:  Patient reports he has not had speech therapy in the past year prior to December 2022 evaluation. Patient reported during evaluation that he was here, at [facility], 5 years ago for it.  Social History:  Patient lives at [facility]  "[place], in his own apartment by himself.  Prior Level of Function: Stroke in 2016 caused his aphasia.   Current Level of Function: Patient has severe expressive aphasia. Patient can say a few words like "damn," "yeah" and "no."  Pain:   6/10  Pain Location / Description: Right foot, toes/front. Also, the baseline 1/10 pain (that occasionally spikes to around 6) on right side (including right side: ankles, shoulder, wrists).  Nutrition:  Patient reports he eats well.  Patient's Therapy Goals:  Patient wants to be able to express himself and talk better.    Patient said his right toe/front of right foot was cramping. He said the throbbing/cramping affects toes, due to the way he ambulates in this chair (kind of kicking in front of him, with some possible collision with other foot.) He said this happens ever so often, for ten years, which was before his stroke-- patient indicated even before his stroke, before he used a wheelchair, he did this.  Objective     Patient practiced single words with no prompts/cues, with difficulty if word was 2+ syllables. Patient practiced short sounds and diadochokinetic exercises.    Patient participated, with great effort pronouncing short words and practicing vowel sounds, tongue and oral cavity movements.    Treatment     Treatment Time In: 12:58 PM  Treatment Time Out: 1:48 PM  Total Treatment Time: 50 minutes    Education: Plan of Care Patient expressed understanding.    Home Program: Patient instructed to practice writing or copying words from a brochure, pamphlet, book, et cetera. Patient instructed to practice humming different pitches and volume. Patient instructed to try singing along to radio or mimicking what he hears on television.    Assessment   Karin is a 52 y.o. male referred to outpatient SpeechTherapy with a medical diagnosis of stroke. Patient presents with expressive aphasia  Demonstrates impairments including limitations as described in the problem list. Positive " prognostic factors include patient participation, stimulability. Negative prognostic factors include severity of aphasia. No barriers to therapy noted. Patient will benefit from skilled, outpatient neurological rehabilitation speech therapy.    Rehab Potential: good  Patient's spiritual, cultural and educational needs considered and patient agreeable to plan of care and goals.    Short Term Goals (1 per week, 12 weeks):   Patient will say one word with 75% accuracy/intelligibility. Current: 55% (one syllable only with mod prompts/cues)  Patient will write sentences with 75% accuracy/intelligibility. Current: 55%  3.   Patient will repeat at syllable level with 75% accuracy. Current 55%    Long Term Goals (1 per week, 12 weeks):   Patient will improve expressive language ability commensurate with maximum capabilities.    Plan   Plan of Care Certification: 1/31/2023  to 2/13/2023.  Recommended Treatment Plan:  Patient will participate in the [facility] outpatient rehabilitation program for speech therapy 2 times per week to address his  Communication deficits, to educate patient and their family, and to participate in a home exercise program.    Medicaid requirements:  Plan of Care Dates: 1/31/2023 to 12/31/2023  CPT codes and number of units needed per visit: 96156/1  Last face to face visit with MD: 1/25/2023  Short Term Goals (1 per week, 12 weeks):   Patient will say one word with 75% accuracy/intelligibility. Current: 55% (one syllable only with mod prompts/cues)  Patient will write sentences with 75% accuracy/intelligibility. Current: 55%  3.   Patient will repeat at syllable level with 75% accuracy. Current 55%    Long Term Goals (1 per week, 12 weeks):   Patient will improve expressive language ability commensurate with maximum capabilities.    Home exercise program and patient compliance: Patient instructed to practice writing or copying words from a brochure, pamphlet, book, et cetera. Patient instructed to  practice humming different pitches and volume. Patient instructed to try singing along to radio or mimicking what he hears on television.  Discharge Plan: Discharge to [facility] when patient attains max rehab.      Physician Signature:_________________________________________________________  Date: ____________________   Therapist's Name:   Braden Persaud CCC-SLP     Date: 1/31/2023

## 2023-02-07 ENCOUNTER — OFFICE VISIT (OUTPATIENT)
Dept: CARDIOLOGY | Facility: CLINIC | Age: 53
End: 2023-02-07
Payer: MEDICAID

## 2023-02-07 VITALS
HEIGHT: 71 IN | HEART RATE: 102 BPM | RESPIRATION RATE: 16 BRPM | BODY MASS INDEX: 28.7 KG/M2 | SYSTOLIC BLOOD PRESSURE: 130 MMHG | WEIGHT: 205 LBS | DIASTOLIC BLOOD PRESSURE: 60 MMHG

## 2023-02-07 DIAGNOSIS — I25.810 CORONARY ARTERY DISEASE INVOLVING CORONARY BYPASS GRAFT OF NATIVE HEART WITHOUT ANGINA PECTORIS: ICD-10-CM

## 2023-02-07 DIAGNOSIS — I50.22 CHRONIC SYSTOLIC CONGESTIVE HEART FAILURE: ICD-10-CM

## 2023-02-07 DIAGNOSIS — I10 HYPERTENSION, UNSPECIFIED TYPE: Primary | ICD-10-CM

## 2023-02-07 DIAGNOSIS — I10 HYPERTENSION, UNSPECIFIED TYPE: ICD-10-CM

## 2023-02-07 PROCEDURE — 4010F PR ACE/ARB THEARPY RXD/TAKEN: ICD-10-PCS | Mod: CPTII,,, | Performed by: STUDENT IN AN ORGANIZED HEALTH CARE EDUCATION/TRAINING PROGRAM

## 2023-02-07 PROCEDURE — 4010F ACE/ARB THERAPY RXD/TAKEN: CPT | Mod: CPTII,,, | Performed by: STUDENT IN AN ORGANIZED HEALTH CARE EDUCATION/TRAINING PROGRAM

## 2023-02-07 PROCEDURE — 3078F DIAST BP <80 MM HG: CPT | Mod: CPTII,,, | Performed by: STUDENT IN AN ORGANIZED HEALTH CARE EDUCATION/TRAINING PROGRAM

## 2023-02-07 PROCEDURE — 99214 OFFICE O/P EST MOD 30 MIN: CPT | Mod: PBBFAC | Performed by: STUDENT IN AN ORGANIZED HEALTH CARE EDUCATION/TRAINING PROGRAM

## 2023-02-07 PROCEDURE — 3075F PR MOST RECENT SYSTOLIC BLOOD PRESS GE 130-139MM HG: ICD-10-PCS | Mod: CPTII,,, | Performed by: STUDENT IN AN ORGANIZED HEALTH CARE EDUCATION/TRAINING PROGRAM

## 2023-02-07 PROCEDURE — 3008F BODY MASS INDEX DOCD: CPT | Mod: CPTII,,, | Performed by: STUDENT IN AN ORGANIZED HEALTH CARE EDUCATION/TRAINING PROGRAM

## 2023-02-07 PROCEDURE — 99204 PR OFFICE/OUTPT VISIT, NEW, LEVL IV, 45-59 MIN: ICD-10-PCS | Mod: S$PBB,,, | Performed by: STUDENT IN AN ORGANIZED HEALTH CARE EDUCATION/TRAINING PROGRAM

## 2023-02-07 PROCEDURE — 3008F PR BODY MASS INDEX (BMI) DOCUMENTED: ICD-10-PCS | Mod: CPTII,,, | Performed by: STUDENT IN AN ORGANIZED HEALTH CARE EDUCATION/TRAINING PROGRAM

## 2023-02-07 PROCEDURE — 3075F SYST BP GE 130 - 139MM HG: CPT | Mod: CPTII,,, | Performed by: STUDENT IN AN ORGANIZED HEALTH CARE EDUCATION/TRAINING PROGRAM

## 2023-02-07 PROCEDURE — 93010 EKG 12-LEAD: ICD-10-PCS | Mod: S$PBB,,, | Performed by: STUDENT IN AN ORGANIZED HEALTH CARE EDUCATION/TRAINING PROGRAM

## 2023-02-07 PROCEDURE — 1159F MED LIST DOCD IN RCRD: CPT | Mod: CPTII,,, | Performed by: STUDENT IN AN ORGANIZED HEALTH CARE EDUCATION/TRAINING PROGRAM

## 2023-02-07 PROCEDURE — 99204 OFFICE O/P NEW MOD 45 MIN: CPT | Mod: S$PBB,,, | Performed by: STUDENT IN AN ORGANIZED HEALTH CARE EDUCATION/TRAINING PROGRAM

## 2023-02-07 PROCEDURE — 3078F PR MOST RECENT DIASTOLIC BLOOD PRESSURE < 80 MM HG: ICD-10-PCS | Mod: CPTII,,, | Performed by: STUDENT IN AN ORGANIZED HEALTH CARE EDUCATION/TRAINING PROGRAM

## 2023-02-07 PROCEDURE — 93005 ELECTROCARDIOGRAM TRACING: CPT | Mod: PBBFAC | Performed by: STUDENT IN AN ORGANIZED HEALTH CARE EDUCATION/TRAINING PROGRAM

## 2023-02-07 PROCEDURE — 93010 ELECTROCARDIOGRAM REPORT: CPT | Mod: S$PBB,,, | Performed by: STUDENT IN AN ORGANIZED HEALTH CARE EDUCATION/TRAINING PROGRAM

## 2023-02-07 PROCEDURE — 1159F PR MEDICATION LIST DOCUMENTED IN MEDICAL RECORD: ICD-10-PCS | Mod: CPTII,,, | Performed by: STUDENT IN AN ORGANIZED HEALTH CARE EDUCATION/TRAINING PROGRAM

## 2023-02-07 RX ORDER — METOLAZONE 2.5 MG/1
2.5 TABLET ORAL DAILY
COMMUNITY
End: 2023-09-13 | Stop reason: SDUPTHER

## 2023-02-07 RX ORDER — AMLODIPINE BESYLATE 10 MG/1
10 TABLET ORAL DAILY
COMMUNITY
End: 2023-09-13 | Stop reason: SDUPTHER

## 2023-02-07 NOTE — PROGRESS NOTES
PCP: Walker Smith MD    Referring Provider:     Subjective:   Karin Carrera is a 52 y.o. male with hx of CAD s/p CABG, ischemic cardiomyopathy (EF 20%), CVA, with expressive aphasia and right arm weakness,  HTN, GERD, ETOH abuse, and nicotine abuse  who presents to [facility] care.     Previously a patient of Dr. Paez; last seen 2015 - lost to follow up.    Patient was admitted to the hospital in 12/2022 for decompensated HF and was discharged after IV diuresis  Today he is here in a wheelchair; has expressive aphasia.   He lives in a NH; denies any symptoms. He does not want to start any new medications. Expresses disappointment and frustration  I explained to him that he needs to be on HF GDMT if he wants to avoid further hospital admissions.     Fhx: non-contributary  Shx: Smoker, ETOH use *3-4 drinks /day, unknown drug use    EKG 2/7/23 - Sinus tachy with PVC, LVH, non-spec ST abnol  ECHO - 01/01/22  · The left ventricle is severely enlarged with mild eccentric hypertrophy and severely decreased systolic function.  · The estimated ejection fraction is 20%.  · There are segmental left ventricular wall motion abnormalities.  · Left ventricular diastolic dysfunction.  · Atrial fibrillation not observed.  · Normal right ventricular size.  · Mild mitral regurgitation.  · Normal central venous pressure (3 mmHg).    [facility] 2017 - Severe 2VCAD - occuled LAD (LIMA is atretic as well as LAD), LCX patent, RCA  with patent SVG to PDA and patent SVG to Diagonal      Lab Results   Component Value Date     01/24/2023    K 5.0 01/24/2023     01/24/2023    CO2 23 01/24/2023    BUN 16 01/24/2023    CREATININE 0.86 01/24/2023    CALCIUM 8.7 01/24/2023    ANIONGAP 14 01/24/2023    EGFRNONAA 126 01/18/2022       No results found for: CHOL  No results found for: HDL  No results found for: LDLCALC  No results found for: TRIG  No results found for: CHOLHDL    Lab Results   Component Value Date    WBC 5.99 01/27/2023     HGB 11.6 (L) 2023    HCT 39.2 (L) 2023    MCV 80.0 2023     2023           Current Outpatient Medications:     amLODIPine (NORVASC) 10 MG tablet, Take 10 mg by mouth once daily., Disp: , Rfl:     aspirin (ECOTRIN) 81 MG EC tablet, Take 1 tablet (81 mg total) by mouth once daily., Disp: 90 tablet, Rfl: 1    atorvastatin (LIPITOR) 40 MG tablet, Take 1 tablet (40 mg total) by mouth every evening., Disp: 90 tablet, Rfl: 1    ferrous sulfate (FEOSOL) 325 mg (65 mg iron) Tab tablet, Take by mouth., Disp: , Rfl:     furosemide (LASIX) 40 MG tablet, Take 1 tablet (40 mg total) by mouth once daily., Disp: 7 tablet, Rfl: 0    metOLazone (ZAROXOLYN) 2.5 MG tablet, Take 2.5 mg by mouth once daily., Disp: , Rfl:     pantoprazole (PROTONIX) 40 MG tablet, Take 1 tablet (40 mg total) by mouth once daily., Disp: 90 tablet, Rfl: 1    zolpidem (AMBIEN) 5 MG Tab, Take 1 tablet (5 mg total) by mouth every evening., Disp: 30 tablet, Rfl: 1    albuterol (PROVENTIL/VENTOLIN HFA) 90 mcg/actuation inhaler, SMARTSI-2 Puff(s) By Mouth Every 4 Hours PRN, Disp: , Rfl:     empagliflozin (JARDIANCE) 10 mg tablet, Take 1 tablet (10 mg total) by mouth once daily. (Patient not taking: Reported on 2023), Disp: 30 tablet, Rfl: 11    insulin aspart U-100 (NOVOLOG) 100 unit/mL injection, Inject 0-5 Units into the skin 3 (three) times daily before meals., Disp: 10 mL, Rfl: 1    insulin detemir U-100 (LEVEMIR FLEXTOUCH U-100 INSULN) 100 unit/mL (3 mL) InPn pen, Inject 10 Units into the skin every evening., Disp: 36 mL, Rfl: 0    levoFLOXacin (LEVAQUIN) 750 MG tablet, Take 750 mg by mouth., Disp: , Rfl:     lisinopriL (PRINIVIL,ZESTRIL) 5 MG tablet, Take 1 tablet (5 mg total) by mouth once daily. (Patient not taking: Reported on 2023), Disp: 90 tablet, Rfl: 1    Review of Systems   Respiratory:  Negative for cough and shortness of breath.    Cardiovascular:  Negative for chest pain, palpitations, orthopnea,  "claudication, leg swelling and PND.       Objective:   /60   Pulse 102   Resp 16   Ht 5' 11" (1.803 m)   Wt 93 kg (205 lb)   BMI 28.59 kg/m²     Physical Exam  Vitals and nursing note reviewed.   Constitutional:       Appearance: Normal appearance.   Cardiovascular:      Rate and Rhythm: Normal rate and regular rhythm.      Pulses: Normal pulses.      Heart sounds: Normal heart sounds.   Pulmonary:      Breath sounds: Normal breath sounds.   Neurological:      Mental Status: He is alert and oriented to person, place, and time. Mental status is at baseline.         Assessment:     1. Coronary artery disease involving coronary bypass graft of native heart without angina pectoris        2. Chronic systolic congestive heart failure              Plan:   Coronary artery disease involving coronary bypass graft of native heart without angina pectoris  Severe 2VCAD - occuled LAD (LIMA is atretic as well as LAD), LCX patent, RCA  with patent SVG to PDA and patent SVG to Diagonal  - On aspirin and statin therapy  - LAD is atretic as well as LIMA    Chronic systolic congestive heart failure  Ischemic cardiomyopathy; EF 20%, NYHA III , Stage C  Euvolemic on lasix 40mg qd and metolazone; unclear if taking jardiance  GDMT - switch to metop xl 50mg qd from tartrate. Not taking lisinopril; expresses frustration at starting entresto or anyother meds  Devices; none -patient would likely qualify for ICD however is not taking GDMT          "

## 2023-02-07 NOTE — ASSESSMENT & PLAN NOTE
Severe 2VCAD - occuled LAD (LIMA is atretic as well as LAD), LCX patent, RCA  with patent SVG to PDA and patent SVG to Diagonal  - On aspirin and statin therapy  - LAD is atretic as well as LIMA

## 2023-02-07 NOTE — ASSESSMENT & PLAN NOTE
Ischemic cardiomyopathy; EF 20%, NYHA III , Stage C  Euvolemic on lasix 40mg qd and metolazone; unclear if taking jardiance  GDMT - switch to metop xl 50mg qd from tartrate. Not taking lisinopril; expresses frustration at starting entresto or anyother meds  Devices; none -patient would likely qualify for ICD however is not taking GDMT

## 2023-02-15 DIAGNOSIS — I69.320 APHASIA S/P CVA: Primary | ICD-10-CM

## 2023-02-15 DIAGNOSIS — R47.01 EXPRESSIVE APHASIA SYNDROME: ICD-10-CM

## 2023-03-22 DIAGNOSIS — F80.9 COMMUNICATION IMPAIRMENT: Primary | ICD-10-CM

## 2023-03-27 ENCOUNTER — OFFICE VISIT (OUTPATIENT)
Dept: FAMILY MEDICINE | Facility: CLINIC | Age: 53
End: 2023-03-27
Payer: MEDICAID

## 2023-03-27 VITALS
TEMPERATURE: 98 F | BODY MASS INDEX: 27.86 KG/M2 | OXYGEN SATURATION: 98 % | RESPIRATION RATE: 18 BRPM | DIASTOLIC BLOOD PRESSURE: 62 MMHG | HEIGHT: 71 IN | SYSTOLIC BLOOD PRESSURE: 109 MMHG | HEART RATE: 100 BPM | WEIGHT: 199 LBS

## 2023-03-27 DIAGNOSIS — Z01.00 EYE EXAM, ROUTINE: ICD-10-CM

## 2023-03-27 DIAGNOSIS — L03.90 CELLULITIS, UNSPECIFIED CELLULITIS SITE: ICD-10-CM

## 2023-03-27 DIAGNOSIS — Z11.4 SCREENING FOR HIV (HUMAN IMMUNODEFICIENCY VIRUS): Primary | ICD-10-CM

## 2023-03-27 DIAGNOSIS — Z13.1 SCREENING FOR DIABETES MELLITUS (DM): ICD-10-CM

## 2023-03-27 PROBLEM — J18.9 PNEUMONIA DUE TO INFECTIOUS ORGANISM: Status: RESOLVED | Noted: 2022-12-19 | Resolved: 2023-03-27

## 2023-03-27 PROBLEM — J96.01 ACUTE HYPOXEMIC RESPIRATORY FAILURE: Status: RESOLVED | Noted: 2022-12-19 | Resolved: 2023-03-27

## 2023-03-27 LAB
CREAT UR-MCNC: 65 MG/DL (ref 39–259)
HIV 1+O+2 AB SERPL QL: NORMAL
MICROALBUMIN UR-MCNC: 0.8 MG/DL (ref 0–2.8)
MICROALBUMIN/CREAT RATIO PNL UR: 12.3 MG/G (ref 0–30)
PROT UR-MCNC: 12.5 MG/DL (ref 0–11.9)

## 2023-03-27 PROCEDURE — 1159F MED LIST DOCD IN RCRD: CPT | Mod: CPTII,,, | Performed by: INTERNAL MEDICINE

## 2023-03-27 PROCEDURE — 1160F PR REVIEW ALL MEDS BY PRESCRIBER/CLIN PHARMACIST DOCUMENTED: ICD-10-PCS | Mod: CPTII,,, | Performed by: INTERNAL MEDICINE

## 2023-03-27 PROCEDURE — 90686 IIV4 VACC NO PRSV 0.5 ML IM: CPT | Mod: ,,, | Performed by: INTERNAL MEDICINE

## 2023-03-27 PROCEDURE — 3078F PR MOST RECENT DIASTOLIC BLOOD PRESSURE < 80 MM HG: ICD-10-PCS | Mod: CPTII,,, | Performed by: INTERNAL MEDICINE

## 2023-03-27 PROCEDURE — 3008F PR BODY MASS INDEX (BMI) DOCUMENTED: ICD-10-PCS | Mod: CPTII,,, | Performed by: INTERNAL MEDICINE

## 2023-03-27 PROCEDURE — 3061F NEG MICROALBUMINURIA REV: CPT | Mod: CPTII,,, | Performed by: INTERNAL MEDICINE

## 2023-03-27 PROCEDURE — 3066F NEPHROPATHY DOC TX: CPT | Mod: CPTII,,, | Performed by: INTERNAL MEDICINE

## 2023-03-27 PROCEDURE — 90471 FLU VACCINE (QUAD) GREATER THAN OR EQUAL TO 3YO PRESERVATIVE FREE IM: ICD-10-PCS | Mod: ,,, | Performed by: INTERNAL MEDICINE

## 2023-03-27 PROCEDURE — 82043 MICROALBUMIN / CREATININE RATIO URINE: ICD-10-PCS | Mod: ,,, | Performed by: CLINICAL MEDICAL LABORATORY

## 2023-03-27 PROCEDURE — 3061F PR NEG MICROALBUMINURIA RESULT DOCUMENTED/REVIEW: ICD-10-PCS | Mod: CPTII,,, | Performed by: INTERNAL MEDICINE

## 2023-03-27 PROCEDURE — 87389 HIV 1 / 2 ANTIBODY: ICD-10-PCS | Mod: ,,, | Performed by: CLINICAL MEDICAL LABORATORY

## 2023-03-27 PROCEDURE — 3078F DIAST BP <80 MM HG: CPT | Mod: CPTII,,, | Performed by: INTERNAL MEDICINE

## 2023-03-27 PROCEDURE — 99214 PR OFFICE/OUTPT VISIT, EST, LEVL IV, 30-39 MIN: ICD-10-PCS | Mod: 25,,, | Performed by: INTERNAL MEDICINE

## 2023-03-27 PROCEDURE — 84156 ASSAY OF PROTEIN URINE: CPT | Mod: ,,, | Performed by: CLINICAL MEDICAL LABORATORY

## 2023-03-27 PROCEDURE — 90471 IMMUNIZATION ADMIN: CPT | Mod: ,,, | Performed by: INTERNAL MEDICINE

## 2023-03-27 PROCEDURE — 4010F ACE/ARB THERAPY RXD/TAKEN: CPT | Mod: CPTII,,, | Performed by: INTERNAL MEDICINE

## 2023-03-27 PROCEDURE — 87389 HIV-1 AG W/HIV-1&-2 AB AG IA: CPT | Mod: ,,, | Performed by: CLINICAL MEDICAL LABORATORY

## 2023-03-27 PROCEDURE — 1160F RVW MEDS BY RX/DR IN RCRD: CPT | Mod: CPTII,,, | Performed by: INTERNAL MEDICINE

## 2023-03-27 PROCEDURE — 3008F BODY MASS INDEX DOCD: CPT | Mod: CPTII,,, | Performed by: INTERNAL MEDICINE

## 2023-03-27 PROCEDURE — 82043 UR ALBUMIN QUANTITATIVE: CPT | Mod: ,,, | Performed by: CLINICAL MEDICAL LABORATORY

## 2023-03-27 PROCEDURE — 82570 ASSAY OF URINE CREATININE: CPT | Mod: ,,, | Performed by: CLINICAL MEDICAL LABORATORY

## 2023-03-27 PROCEDURE — 3066F PR DOCUMENTATION OF TREATMENT FOR NEPHROPATHY: ICD-10-PCS | Mod: CPTII,,, | Performed by: INTERNAL MEDICINE

## 2023-03-27 PROCEDURE — 82570 MICROALBUMIN / CREATININE RATIO URINE: ICD-10-PCS | Mod: ,,, | Performed by: CLINICAL MEDICAL LABORATORY

## 2023-03-27 PROCEDURE — 90686 FLU VACCINE (QUAD) GREATER THAN OR EQUAL TO 3YO PRESERVATIVE FREE IM: ICD-10-PCS | Mod: ,,, | Performed by: INTERNAL MEDICINE

## 2023-03-27 PROCEDURE — 1159F PR MEDICATION LIST DOCUMENTED IN MEDICAL RECORD: ICD-10-PCS | Mod: CPTII,,, | Performed by: INTERNAL MEDICINE

## 2023-03-27 PROCEDURE — 99214 OFFICE O/P EST MOD 30 MIN: CPT | Mod: 25,,, | Performed by: INTERNAL MEDICINE

## 2023-03-27 PROCEDURE — 3074F PR MOST RECENT SYSTOLIC BLOOD PRESSURE < 130 MM HG: ICD-10-PCS | Mod: CPTII,,, | Performed by: INTERNAL MEDICINE

## 2023-03-27 PROCEDURE — 3074F SYST BP LT 130 MM HG: CPT | Mod: CPTII,,, | Performed by: INTERNAL MEDICINE

## 2023-03-27 PROCEDURE — 84156 PROTEIN, QUANTITATIVE, URINE RANDOM: ICD-10-PCS | Mod: ,,, | Performed by: CLINICAL MEDICAL LABORATORY

## 2023-03-27 PROCEDURE — 4010F PR ACE/ARB THEARPY RXD/TAKEN: ICD-10-PCS | Mod: CPTII,,, | Performed by: INTERNAL MEDICINE

## 2023-03-27 RX ORDER — MUPIROCIN 20 MG/G
OINTMENT TOPICAL
COMMUNITY
Start: 2023-03-06 | End: 2024-02-19 | Stop reason: ALTCHOICE

## 2023-03-27 RX ORDER — POTASSIUM CHLORIDE 750 MG/1
10 TABLET, EXTENDED RELEASE ORAL
COMMUNITY
Start: 2023-02-01 | End: 2023-09-13 | Stop reason: SDUPTHER

## 2023-03-27 RX ORDER — AMITRIPTYLINE HYDROCHLORIDE 25 MG/1
25 TABLET, FILM COATED ORAL NIGHTLY
COMMUNITY
Start: 2023-03-01 | End: 2023-11-16

## 2023-03-27 RX ORDER — LANCETS 33 GAUGE
EACH MISCELLANEOUS
COMMUNITY
Start: 2023-01-24 | End: 2023-09-13 | Stop reason: SDUPTHER

## 2023-03-27 RX ORDER — DOCUSATE SODIUM 100 MG/1
100 CAPSULE, LIQUID FILLED ORAL
COMMUNITY
Start: 2023-02-24 | End: 2024-02-19 | Stop reason: ALTCHOICE

## 2023-03-28 ENCOUNTER — CLINICAL SUPPORT (OUTPATIENT)
Dept: REHABILITATION | Facility: HOSPITAL | Age: 53
End: 2023-03-28
Payer: MEDICAID

## 2023-03-28 DIAGNOSIS — F80.9 COMMUNICATION IMPAIRMENT: ICD-10-CM

## 2023-03-28 PROCEDURE — 92607 EX FOR SPEECH DEVICE RX 1HR: CPT

## 2023-03-28 RX ORDER — INSULIN ASPART 100 [IU]/ML
0-5 INJECTION, SOLUTION INTRAVENOUS; SUBCUTANEOUS
Qty: 10 ML | Refills: 1 | Status: SHIPPED | OUTPATIENT
Start: 2023-03-28 | End: 2023-09-13 | Stop reason: SDUPTHER

## 2023-03-28 RX ORDER — ATORVASTATIN CALCIUM 40 MG/1
40 TABLET, FILM COATED ORAL NIGHTLY
Qty: 90 TABLET | Refills: 1 | Status: SHIPPED | OUTPATIENT
Start: 2023-03-28 | End: 2023-09-13 | Stop reason: SDUPTHER

## 2023-03-28 RX ORDER — INSULIN DETEMIR 100 [IU]/ML
10 INJECTION, SOLUTION SUBCUTANEOUS NIGHTLY
Qty: 36 ML | Refills: 0 | Status: SHIPPED | OUTPATIENT
Start: 2023-03-28 | End: 2023-09-13 | Stop reason: SDUPTHER

## 2023-03-28 RX ORDER — ASPIRIN 81 MG/1
81 TABLET ORAL DAILY
Qty: 90 TABLET | Refills: 1 | Status: SHIPPED | OUTPATIENT
Start: 2023-03-28 | End: 2023-09-13 | Stop reason: SDUPTHER

## 2023-03-28 RX ORDER — SULFAMETHOXAZOLE AND TRIMETHOPRIM 800; 160 MG/1; MG/1
1 TABLET ORAL 2 TIMES DAILY
Qty: 20 TABLET | Refills: 0 | Status: SHIPPED | OUTPATIENT
Start: 2023-03-28 | End: 2023-11-16

## 2023-03-28 RX ORDER — ZOLPIDEM TARTRATE 5 MG/1
5 TABLET ORAL NIGHTLY
Qty: 30 TABLET | Refills: 1 | Status: SHIPPED | OUTPATIENT
Start: 2023-03-28 | End: 2023-05-30

## 2023-03-28 RX ORDER — PANTOPRAZOLE SODIUM 40 MG/1
40 TABLET, DELAYED RELEASE ORAL DAILY
Qty: 90 TABLET | Refills: 1 | Status: SHIPPED | OUTPATIENT
Start: 2023-03-28 | End: 2023-09-13 | Stop reason: SDUPTHER

## 2023-03-28 NOTE — PLAN OF CARE
Outpatient Speech and Language Therapy Plan of Care     Date: 1/31/2023      Name: Karin Carrera   MRN: [id number]           Therapy Diagnosis: Expressive Aphasia     Physician:Unknown  Physician Orders: Walker Smith MD  Medical Diagnosis from Referral: Aphasia S/P CVA     Visit #/Visits authorized: 1/1  Insurance Authorization Period: 3/22/2023 - 3/21/2023  Plan of Care Expiration:  3/28/2023 to 6/20/2023      Time In: 2:00 pm  Time Out: 3:00 pm     Procedure Min.   Evaluation of Speech Fluency  60            Precautions:Standard    Subjective   Date of Onset: September 2016 is when patient had stroke. Patient fell January 1, 2022, breaking his hip.  History of Current Condition:   Patient is 52 year old man who presents with good cognition, orientation, and receptive language, but shows severe expressive aphasia. Patient had a stroke 2016, and fell (breaking his hip) at a friend's house in January, 2022.  Past Medical History: Karin Carrera  has a past medical history of GERD (gastroesophageal reflux disease), Hypertension, and Stroke.  Karin Carrera  has a past surgical history that includes Cardiac surgery and Hemiarthroplasty of hip (Right, 1/2/2022).  Medical Hx and Allergies:  Karin has a current medication list which includes the following prescription(s): albuterol, amitriptyline, amoxicillin-clavulanate 875-125mg, aspirin, atorvastatin, benzonatate, empagliflozin, ferrous sulfate, furosemide, insulin aspart u-100, levemir flextouch u-100 insuln, levofloxacin, lisinopril, metoprolol succinate, metoprolol tartrate, pantoprazole, potassium chloride sa, and zolpidem. Review of patient's allergies indicates:  No Known Allergies  Prior Therapy:  Patient reports he has not had speech therapy in the past year prior to December 2022 evaluation. Patient reported during evaluation that he was here, at [facility], 5 years ago for it.  Social History:  Patient lives at [facility] in his own apartment by himself.  Prior Level of Function: Pt suffered a Stroke in 2016 which caused his aphasia. Pt was independent with activities of daily living prior to incident.  Current Level of Function: Patient has severe expressive aphasia. Patient can answer simple yes/no questions but can not speak in full sentences.   Pain:   3/10  Pain Location / Description: Lower right leg  Nutrition:  Patient reports he eats well.  Patient's Therapy Goals:  Patient wants to be able to express himself and talk better.     Objective      Patient to become familiar with Nuvo speech-generating device .     Patient to increase expressive language to improve verbal communication.     Treatment      Treatment Time In: 2:00 PM  Treatment Time Out: 3:00 PM  Total Treatment Time: 60 minutes     Education: Plan of Care Patient expressed understanding.     Home Program: Patient instructed to practice writing or copying words from a brochure, pamphlet, book, et cetera. Patient instructed to practice humming different pitches and volume. Patient instructed to try singing along to radio or mimicking what he hears on television.     Assessment   Karin is a 52 y.o. male referred to outpatient SpeechTherapy with a medical diagnosis of Aphasia S/P CVA. Patient presents with expressive aphasia due to CVA. Pt demonstrates impairments including limitations in loss of fluency to include speaking in short and incomplete sentences, struggle with repetition, and difficult with word finding. Pt has the Nuvo speech-generating device to assist with communication needs. Positive prognostic factors include patient participation and motivation to return to present level of performance. Negative prognostic factors include severity of aphasia. No barriers to therapy noted. Patient will benefit from skilled outpatient neurological rehabilitation speech therapy to improve communication.     Rehab Potential: Good  Patient's spiritual,  cultural and educational needs considered and patient agreeable to plan of care and goals.       Plan   Plan of Care Certification: 3/28//2023 to 6/20/2023  Recommended Treatment Plan:  Patient will participate in the [facility] outpatient rehabilitation program for speech therapy 1 times per week to address his communication deficits, to educate patient and their family, and to participate in a home exercise program.     Medicaid requirements:  Plan of Care Dates: 3/28/2023 to 6/20/2023  CPT codes and number of units needed per visit: 95442/1  Last face to face visit with MD: 3/27/2023    Short Term Goals (1 per week, 12 weeks):   Given visual, tactile, and auditory cues, Patient will utilize AAC device to comment, ask questions, and provide opinions on a topic in 4 out of 5 situations.  2.   Given visual, tactile, and auditory cues, Patient will utilize AAC device for common social messages for greetings, introductions, and turn taking in 4 out of 5 situations.  3.   Given visual, tactile, and auditory cues, Patient will imitate vowel sounds/consonants to improve verbalizations with 80% accuracy.   4.   Given visual, tactile, and auditory cues, Patient will vocalize a word or phrase to make a request to improve expressive language with 80% accuracy.  5.   Given visual, tactile, and auditory cues, Patient will imitate/repeat names of basic objects to improve expressive language with 80% accuracy.  6.   Given visual, tactile, and auditory cues, Patient will complete sentences with appropriate words to improve word finding skills with 80% accuracy.      Long Term Goals (1 per week, 12 weeks):     Given visual, tactile, and auditory cues, Patient will utilize AAC device to engage in conversation exchange with staff, family, and friends with 90% accuracy.  2.   Given visual, tactile, and auditory cues, Patient will state and demonstrate appropriate use of word-finding strategies with to improve expressive language with 90%  accuracy.    Home exercise program and patient compliance: Patient instructed to practice humming different pitches and volume. Patient instructed to try singing along to radio or mimicking what he hears on television.  Discharge Plan: Discharge to [facility] when patient attains max rehab.        Physician Signature:_________________________________________________________  Date: ____________________     Therapist's Name:   Nessa Rhodes M.S., CCC-SLP    Date: 3/28/2023

## 2023-03-29 PROBLEM — Z01.00 EYE EXAM, ROUTINE: Status: ACTIVE | Noted: 2023-03-29

## 2023-03-29 PROBLEM — Z13.1 SCREENING FOR DIABETES MELLITUS (DM): Status: ACTIVE | Noted: 2023-03-29

## 2023-03-29 PROBLEM — L03.90 CELLULITIS: Status: ACTIVE | Noted: 2023-03-29

## 2023-03-29 PROBLEM — Z11.4 SCREENING FOR HIV (HUMAN IMMUNODEFICIENCY VIRUS): Status: ACTIVE | Noted: 2023-03-29

## 2023-03-29 NOTE — PROGRESS NOTES
Subjective:       Patient ID: Karin Carrera is a 52 y.o. male.    Chief Complaint: Follow-up (States no complaints at this time.)    Follow-up  Associated symptoms include arthralgias. Pertinent negatives include no abdominal pain, chest pain, congestion, coughing, fatigue, myalgias or rash.   .  Patient seen and evaluated today.  He complains of redness warmth and tenderness to the right leg.  His a history of a CVA, dyslipidemia and diabetes.  Patient is due for an eye exam, flu shot, tetanus shot and HIV screening  This will cover all of his care gaps/health maintenance.  We also will order urine protein and albumin.    Right leg is swollen tender his calf tenderness will order venous Doppler study of the right lower extremity also because he is stabilized the right lower extremity will start Bactrim DS 1 p.o. b.i.d..    Current Medications:    Current Outpatient Medications:     albuterol (PROVENTIL/VENTOLIN HFA) 90 mcg/actuation inhaler, SMARTSI-2 Puff(s) By Mouth Every 4 Hours PRN, Disp: , Rfl:     amitriptyline (ELAVIL) 25 MG tablet, Take 25 mg by mouth every evening., Disp: , Rfl:     amLODIPine (NORVASC) 10 MG tablet, Take 10 mg by mouth once daily., Disp: , Rfl:     docusate sodium (COLACE) 100 MG capsule, Take 100 mg by mouth., Disp: , Rfl:     ferrous sulfate (FEOSOL) 325 mg (65 mg iron) Tab tablet, Take by mouth., Disp: , Rfl:     furosemide (LASIX) 40 MG tablet, Take 1 tablet (40 mg total) by mouth once daily., Disp: 7 tablet, Rfl: 0    levoFLOXacin (LEVAQUIN) 750 MG tablet, Take 750 mg by mouth., Disp: , Rfl:     metOLazone (ZAROXOLYN) 2.5 MG tablet, Take 2.5 mg by mouth once daily., Disp: , Rfl:     mupirocin (BACTROBAN) 2 % ointment, apply TO affected area(S) twice daily UNTIL HEALED, Disp: , Rfl:     potassium chloride (KLOR-CON) 10 MEQ TbSR, Take 10 mEq by mouth., Disp: , Rfl:     TRUEPLUS LANCETS 33 gauge Misc, use as directed, Disp: , Rfl:     aspirin (ECOTRIN) 81 MG EC tablet, Take 1  tablet (81 mg total) by mouth once daily., Disp: 90 tablet, Rfl: 1    atorvastatin (LIPITOR) 40 MG tablet, Take 1 tablet (40 mg total) by mouth every evening., Disp: 90 tablet, Rfl: 1    empagliflozin (JARDIANCE) 10 mg tablet, Take 1 tablet (10 mg total) by mouth once daily. (Patient not taking: Reported on 2/7/2023), Disp: 30 tablet, Rfl: 11    insulin aspart U-100 (NOVOLOG) 100 unit/mL injection, Inject 0-5 Units into the skin 3 (three) times daily before meals., Disp: 10 mL, Rfl: 1    insulin detemir U-100, Levemir, (LEVEMIR FLEXTOUCH U-100 INSULN) 100 unit/mL (3 mL) InPn pen, Inject 10 Units into the skin every evening., Disp: 36 mL, Rfl: 0    lisinopriL (PRINIVIL,ZESTRIL) 5 MG tablet, Take 1 tablet (5 mg total) by mouth once daily. (Patient not taking: Reported on 2/7/2023), Disp: 90 tablet, Rfl: 1    pantoprazole (PROTONIX) 40 MG tablet, Take 1 tablet (40 mg total) by mouth once daily., Disp: 90 tablet, Rfl: 1    sulfamethoxazole-trimethoprim 800-160mg (BACTRIM DS) 800-160 mg Tab, Take 1 tablet by mouth 2 (two) times daily., Disp: 20 tablet, Rfl: 0    zolpidem (AMBIEN) 5 MG Tab, Take 1 tablet (5 mg total) by mouth every evening., Disp: 30 tablet, Rfl: 1           Review of Systems   Constitutional:  Negative for appetite change and fatigue.   HENT:  Negative for nasal congestion and rhinorrhea.    Eyes:  Negative for redness and visual disturbance.   Respiratory:  Negative for cough and shortness of breath.    Cardiovascular:  Negative for chest pain and leg swelling.   Gastrointestinal:  Negative for abdominal pain, constipation and diarrhea.   Endocrine: Negative for polyuria.   Genitourinary:  Negative for difficulty urinating, dysuria and erectile dysfunction.   Musculoskeletal:  Positive for arthralgias and leg pain. Negative for myalgias.   Integumentary:  Positive for color change. Negative for rash and mole/lesion.   All other systems reviewed and are negative.             Vitals:    03/27/23 1407  "  BP: 109/62   BP Location: Right arm   Patient Position: Sitting   BP Method: Large (Automatic)   Pulse: 100   Resp: 18   Temp: 97.8 °F (36.6 °C)   TempSrc: Temporal   SpO2: 98%   Weight: 90.3 kg (199 lb)   Height: 5' 11" (1.803 m)        Physical Exam  Vitals and nursing note reviewed.   Constitutional:       Appearance: Normal appearance.   HENT:      Head: Normocephalic and atraumatic.      Nose: Nose normal.      Mouth/Throat:      Mouth: Mucous membranes are moist.      Pharynx: Oropharynx is clear.   Eyes:      Extraocular Movements: Extraocular movements intact.      Pupils: Pupils are equal, round, and reactive to light.   Cardiovascular:      Rate and Rhythm: Normal rate and regular rhythm.      Pulses: Normal pulses.      Heart sounds: Normal heart sounds.   Pulmonary:      Effort: Pulmonary effort is normal.      Breath sounds: Normal breath sounds.   Abdominal:      General: Abdomen is flat. Bowel sounds are normal.      Palpations: Abdomen is soft.   Musculoskeletal:         General: Normal range of motion.      Cervical back: Normal range of motion and neck supple.      Right lower leg: No edema.      Left lower leg: No edema.   Skin:     General: Skin is warm and dry.      Coloration: Skin is not jaundiced or pale.      Findings: No rash.   Neurological:      General: No focal deficit present.      Mental Status: He is alert and oriented to person, place, and time. Mental status is at baseline.   Psychiatric:         Mood and Affect: Mood normal.         Thought Content: Thought content normal.         Last Labs:     Office Visit on 03/27/2023   Component Date Value    HIV 1/2 03/27/2023 Non-Reactive     Creatinine, Urine 03/27/2023 65     Microalbumin 03/27/2023 0.8     Microalbumin/Creatinine * 03/27/2023 12.3     Protein, Urine 03/27/2023 12.5 (H)        Last Imaging:  X-Ray Chest 1 View  Narrative: EXAMINATION:  XR CHEST 1 VIEW    CLINICAL HISTORY:  .  Acute cough    COMPARISON:  January 4, " 2023    TECHNIQUE:  Chest x-ray AP    FINDINGS:  There is continued cardiomegaly.  Mediastinal contours are unchanged.  Prior median sternotomy.  Pulmonary vasculature is upper normal.  There is some continued hazy opacity in the lung bases.  There is some platelike scarring in the left mid to lower lung.  There is unchanged mild pleural disease.    Osseous structures are similar  Impression: Cardiomegaly and suspected continued CHF.  Grossly similar to the previous study    Electronically signed by: Mikey Sepulveda  Date:    01/25/2023  Time:    15:49         **Labs and x-rays personally reviewed by me    ** reviewed      Objective:        Assessment:       1. Screening for HIV (human immunodeficiency virus)  HIV 1/2 Ag/Ab (4th Gen)    HIV 1/2 Ag/Ab (4th Gen)      2. Screening for diabetes mellitus (DM)  Microalbumin/Creatinine Ratio, Urine    Protein, Random Urine    Microalbumin/Creatinine Ratio, Urine    Protein, Random Urine      3. Eye exam, routine  Ambulatory referral/consult to Ophthalmology      4. Cellulitis, unspecified cellulitis site  US Lower Extremity Veins Right           Plan:         [unfilled]

## 2023-04-21 ENCOUNTER — CLINICAL SUPPORT (OUTPATIENT)
Dept: REHABILITATION | Facility: HOSPITAL | Age: 53
End: 2023-04-21
Payer: MEDICAID

## 2023-04-21 DIAGNOSIS — F80.9 COMMUNICATION IMPAIRMENT: Primary | ICD-10-CM

## 2023-04-21 PROCEDURE — 92507 TX SP LANG VOICE COMM INDIV: CPT

## 2023-04-21 NOTE — PROGRESS NOTES
Outpatient Pediatric Speech Therapy Treatment Note    Date: 4/21/2023    Patient Name: Karin Carrera  MRN: [id number]  Therapy Diagnosis:   Encounter Diagnosis   Name Primary?    Communication impairment Yes      Physician: Walker Smith MD   Physician Orders: Eval and Treat   Medical Diagnosis: Communication Impairment   Age: 52 y.o.    Visit # / Visits Authorized: 1 / 12    Date of Evaluation: 3/28/2023   Plan of Care Expiration Date: 6/20/2023   Authorization Date: 3/22/2023 - 6/20/2023       Time In: 2:00 PM  Time Out: 3:00 PM  Total Billable Time: 60 min     Precautions: Standard    Subjective:   Patient Parent reports: Patient reports he doesn't use device as much at home.   He was compliant to home exercise program.   Response to previous treatment: Patient was evaluated fo orlando Keller from [facility] brought Karin to therapy today.  Pain: Karin was unable to rate pain on a numeric scale, but no pain behaviors were noted in today's session.  Objective:   UNTIMED  Procedure Min.   AAC Therapy    60      Charges Billed/# of units: 18494/ 1    Long term goal: increase pt's expressive and receptive language abilities to a level more commensurate with pt's chronological age or until max potential with goals is achieved.     Short Term Goals:  Current Progress:   Given visual, tactile, and auditory cues, Patient will utilize AAC device to comment, ask questions, and provide opinions on a topic in 4 out of 5 situations.   Progressing/ Not Met 4/21/2023  NT     Given visual, tactile, and auditory cues, Patient will utilize AAC device for common social messages for greetings, introductions, and turn taking in 4 out of 5 situations.  Progressing/ Not Met 4/21/2023  Pt utilized device for social messages, greetings, and introductions with mod verbal/visual cueing/demonstration      Given visual, tactile, and auditory cues, Patient will imitate vowel sounds/consonants to improve verbalizations with 80%  accuracy.   Progressing/ Not Met 4/21/2023   NT      Given visual, tactile, and auditory cues, Patient will vocalize a word or phrase to make a request to improve expressive language wi6.     Progressing/ Not Met 4/21/2023   Patient asked to call  at the end of therapy.     Given visual, tactile, and auditory cues, Patient will imitate/repeat names of basic objects to improve expressive language with 80% accuracy.  Progressing/ Not Met 4/21/2023   Pt named object cards with moderate verbal/visual cueing.    Given visual, tactile, and auditory cues, Patient will complete sentences with appropriate words to improve word finding skills with 80% accuracy.   Progressing/ Not Met 4/21/2023  NT       Patient Education/Response:   Parent/guardian is compliant with HEP and POC. Parent/guardian verbalized understanding of ST goals and progression towards goals.    Written Home Exercises Provided: Patient instructed to cont prior HEP.  Strategies / Exercises were reviewed and Karin was able to demonstrate them prior to the end of the session.  Karin's parent/guardian demonstrated good  understanding of the education provided.     See EMR under Patient Instructions for exercises provided prior visit  Assessment:   Karin is progressing toward his goals. Current goals remain appropriate. Goals will be added and re-assessed as needed.      Patient prognosis is Good. Patient will continue to benefit from skilled outpatient speech and language therapy to address the deficits listed in the problem list on initial evaluation, provide patient/family education and to maximize patient's level of independence in the home and community environment. D/C to HOME EXERCISE PROGRAM when max potential with goals is achieved.     Medical necessity is demonstrated by the following IMPAIRMENTS:  Decreased ability to communicate effectively  Barriers to Therapy: none  Patient's spiritual, cultural and educational needs considered and patient  agreeable to plan of care and goals.  Plan:   Speech therapy services will be provided 1/wk through direct intervention, patient participation, and home programming. Therapy will be discontinued when Patient has met all goals, is not making progress, patient discontinues therapy, and/or for any other applicable reason.    Nessa Rhodes M.S., CCC-SLP   4/21/2023

## 2023-04-28 ENCOUNTER — CLINICAL SUPPORT (OUTPATIENT)
Dept: REHABILITATION | Facility: HOSPITAL | Age: 53
End: 2023-04-28
Payer: MEDICAID

## 2023-04-28 DIAGNOSIS — R47.01 EXPRESSIVE APHASIA SYNDROME: Primary | ICD-10-CM

## 2023-04-28 PROCEDURE — 92507 TX SP LANG VOICE COMM INDIV: CPT

## 2023-04-28 NOTE — PROGRESS NOTES
"Progress Notes  Charissa Rhodes CCC-SLP (Speech and Language Pathologist)   Speech Pathology     Outpatient Pediatric Speech Therapy Treatment Note     Date: 4/28/2023    Patient Name: Karin Carrera  MRN: [id number]  Therapy Diagnosis:        Encounter Diagnosis   Name Primary?    Communication impairment Yes      Physician: Walker Smith MD   Physician Orders: Eval and Treat   Medical Diagnosis: Communication Impairment   Age: 52 y.o.     Visit # / Visits Authorized: 3 / 12    Date of Evaluation: 3/28/2023   Plan of Care Expiration Date: 6/20/2023   Authorization Date: 3/22/2023 - 6/20/2023         Time In: 2:25 PM  Time Out: 3:00 PM  Total Billable Time: 35 min      Precautions: Standard     Subjective:   Patient Parent reports: Patient reports he doesn't use device as much at home.   He was compliant to home exercise program.   Response to previous treatment: Patient understood activities  Arianna from [facility] brought Karin to therapy today.  Pain: Karin was unable to rate pain on a numeric scale, but no pain behaviors were noted in today's session.  Objective:   UNTIMED  Procedure Min.   AAC Therapy    35      Charges Billed/# of units: 00771/ 1     Long term goal: increase pt's expressive and receptive language abilities to a level more commensurate with pt's chronological age or until max potential with goals is achieved.      Short Term Goals:  Current Progress:   Given visual, tactile, and auditory cues, Patient will utilize AAC device to comment, ask questions, and provide opinions on a topic in 4 out of 5 situations.   Progressing/ Not Met 4/28/2023  Pt asked and answered questions via device 1 out of 5 situationl      Given visual, tactile, and auditory cues, Patient will utilize AAC device for common social messages for greetings, introductions, and turn taking in 4 out of 5 situations.  Progressing/ Not Met 4/28/2023  Pt had " how are you Charissa" programed in his device when he came in. " Minimum verbal/visual cueing/demonstration      Given visual, tactile, and auditory cues, Patient will imitate vowel sounds/consonants to improve verbalizations with 80% accuracy.   Progressing/ Not Met 4/28/2023   NT      Given visual, tactile, and auditory cues, Patient will vocalize a word or phrase to make a request to improve expressive language.     Progressing/ Not Met 4/28/2023   Patient request for  via communication device   Given visual, tactile, and auditory cues, Patient will imitate/repeat names of basic objects to improve expressive language with 80% accuracy.  Progressing/ Not Met 4/28/2023   65% min -moderate verbal/visual cueing.    Given visual, tactile, and auditory cues, Patient will complete sentences with appropriate words to improve word finding skills with 80% accuracy.   Progressing/ Not Met 4/28/2023  NT       Patient Education/Response:   Parent/guardian is compliant with HEP and POC. Parent/guardian verbalized understanding of ST goals and progression towards goals.     Written Home Exercises Provided: Patient instructed to cont prior HEP.  Strategies / Exercises were reviewed and Karin was able to demonstrate them prior to the end of the session.  Karin's parent/guardian demonstrated good  understanding of the education provided.      See EMR under Patient Instructions for exercises provided prior visit  Assessment:   Karin is progressing toward his goals. Current goals remain appropriate. Goals will be added and re-assessed as needed.       Patient prognosis is Good. Patient will continue to benefit from skilled outpatient speech and language therapy to address the deficits listed in the problem list on initial evaluation, provide patient/family education and to maximize patient's level of independence in the home and community environment. D/C to HOME EXERCISE PROGRAM when max potential with goals is achieved.      Medical necessity is demonstrated by the following  IMPAIRMENTS:  Decreased ability to communicate effectively  Barriers to Therapy: none  Patient's spiritual, cultural and educational needs considered and patient agreeable to plan of care and goals.  Plan:   Speech therapy services will be provided 1/wk through direct intervention, patient participation, and home programming. Therapy will be discontinued when Patient has met all goals, is not making progress, patient discontinues therapy, and/or for any other applicable reason.     Nessa Rhodes M.S., CCC-SLP   4/28/2023

## 2023-05-05 ENCOUNTER — CLINICAL SUPPORT (OUTPATIENT)
Dept: REHABILITATION | Facility: HOSPITAL | Age: 53
End: 2023-05-05
Payer: MEDICAID

## 2023-05-05 DIAGNOSIS — R47.01 EXPRESSIVE APHASIA: Primary | ICD-10-CM

## 2023-05-05 PROCEDURE — 92507 TX SP LANG VOICE COMM INDIV: CPT

## 2023-05-05 NOTE — PROGRESS NOTES
"     Outpatient Speech Therapy Treatment Note     Date: 5/5/2023    Patient Name: Karin Carrera  MRN: [id number]  Therapy Diagnosis:        Encounter Diagnosis   Name Primary?    Communication impairment Yes      Physician: Walker Smith MD   Physician Orders: Eval and Treat   Medical Diagnosis: Communication Impairment   Age: 52 y.o.     Visit # / Visits Authorized: 4 / 12    Date of Evaluation: 3/28/2023   Plan of Care Expiration Date: 6/20/2023   Authorization Date: 3/22/2023 - 6/20/2023         Time In: 1255 PM  Time Out: 1355 PM  Total Billable Time: 60 min      Precautions: Standard     Subjective:   Patient Parent reports: Patient reported no complaints.  He was compliant to home exercise program.   Response to previous treatment: Patient understood activities  Karin transported by [employer] this date.  Pain: Karin was unable to rate pain on a numeric scale, but no pain behaviors were noted in today's session.  Objective:   UNTIMED  Procedure Min.   AAC Therapy    60      Charges Billed/# of units: 53051/ 1     Long term goal: increase pt's expressive and receptive language abilities to a level more commensurate with pt's chronological age or until max potential with goals is achieved.      Short Term Goals:  Current Progress:   Given visual, tactile, and auditory cues, Patient will utilize AAC device to comment, ask questions, and provide opinions on a topic in 4 out of 5 situations.   Progressing/ Not Met 5/5/2023  Pt asked and answered questions via device with 80% acc with 7 out of 10 opp      Given visual, tactile, and auditory cues, Patient will utilize AAC device for common social messages for greetings, introductions, and turn taking in 4 out of 5 situations.  Progressing/ Not Met 5/5/2023 Pt programmed "good afternoon, Hoa" into smart phrases on device independently      Given visual, tactile, and auditory cues, Patient will imitate vowel sounds/consonants to improve " verbalizations with 80% accuracy.   Progressing/ Not Met 5/5/2023  80% acc given max cues      Given visual, tactile, and auditory cues, Patient will vocalize a word or phrase to make a request to improve expressive language.     Progressing/ Not Met 5/5/2023  Pt requested bathroom break using device   Given visual, tactile, and auditory cues, Patient will imitate/repeat names of basic objects to improve expressive language with 80% accuracy.  Progressing/ Not Met 5/5/2023  70% min-moderate verbal/visual cueing.    Given visual, tactile, and auditory cues, Patient will complete sentences with appropriate words to improve word finding skills with 80% accuracy.   Progressing/ Not Met 5/5/2023 NT       Patient Education/Response:   Parent/guardian is compliant with HEP and POC. Parent/guardian verbalized understanding of ST goals and progression towards goals.     Written Home Exercises Provided: Patient instructed to cont prior HEP.  Strategies / Exercises were reviewed and Karin was able to demonstrate them prior to the end of the session.  Karin's parent/guardian demonstrated good  understanding of the education provided.      See EMR under Patient Instructions for exercises provided prior visit  Assessment:   Karin is progressing toward his goals. Current goals remain appropriate. Goals will be added and re-assessed as needed.       Patient prognosis is Good. Patient will continue to benefit from skilled outpatient speech and language therapy to address the deficits listed in the problem list on initial evaluation, provide patient/family education and to maximize patient's level of independence in the home and community environment. D/C to HOME EXERCISE PROGRAM when max potential with goals is achieved.      Medical necessity is demonstrated by the following IMPAIRMENTS:  Decreased ability to communicate effectively  Barriers to Therapy: none  Patient's spiritual, cultural and educational needs considered and patient  agreeable to plan of care and goals.  Plan:   Speech therapy services will be provided 1/wk through direct intervention, patient participation, and home programming. Therapy will be discontinued when Patient has met all goals, is not making progress, patient discontinues therapy, and/or for any other applicable reason.     Hilda Rae, CCC-SLP  5/5/2023

## 2023-05-08 ENCOUNTER — OFFICE VISIT (OUTPATIENT)
Dept: CARDIOLOGY | Facility: CLINIC | Age: 53
End: 2023-05-08
Payer: MEDICAID

## 2023-05-08 VITALS
BODY MASS INDEX: 27.72 KG/M2 | HEART RATE: 88 BPM | WEIGHT: 198 LBS | DIASTOLIC BLOOD PRESSURE: 60 MMHG | RESPIRATION RATE: 16 BRPM | HEIGHT: 71 IN | SYSTOLIC BLOOD PRESSURE: 122 MMHG

## 2023-05-08 DIAGNOSIS — I25.810 CORONARY ARTERY DISEASE INVOLVING CORONARY BYPASS GRAFT OF NATIVE HEART WITHOUT ANGINA PECTORIS: ICD-10-CM

## 2023-05-08 DIAGNOSIS — I50.22 CHRONIC SYSTOLIC CONGESTIVE HEART FAILURE: ICD-10-CM

## 2023-05-08 PROCEDURE — 4010F ACE/ARB THERAPY RXD/TAKEN: CPT | Mod: CPTII,,, | Performed by: STUDENT IN AN ORGANIZED HEALTH CARE EDUCATION/TRAINING PROGRAM

## 2023-05-08 PROCEDURE — 3074F PR MOST RECENT SYSTOLIC BLOOD PRESSURE < 130 MM HG: ICD-10-PCS | Mod: CPTII,,, | Performed by: STUDENT IN AN ORGANIZED HEALTH CARE EDUCATION/TRAINING PROGRAM

## 2023-05-08 PROCEDURE — 1159F PR MEDICATION LIST DOCUMENTED IN MEDICAL RECORD: ICD-10-PCS | Mod: CPTII,,, | Performed by: STUDENT IN AN ORGANIZED HEALTH CARE EDUCATION/TRAINING PROGRAM

## 2023-05-08 PROCEDURE — 3008F BODY MASS INDEX DOCD: CPT | Mod: CPTII,,, | Performed by: STUDENT IN AN ORGANIZED HEALTH CARE EDUCATION/TRAINING PROGRAM

## 2023-05-08 PROCEDURE — 3074F SYST BP LT 130 MM HG: CPT | Mod: CPTII,,, | Performed by: STUDENT IN AN ORGANIZED HEALTH CARE EDUCATION/TRAINING PROGRAM

## 2023-05-08 PROCEDURE — 3061F PR NEG MICROALBUMINURIA RESULT DOCUMENTED/REVIEW: ICD-10-PCS | Mod: CPTII,,, | Performed by: STUDENT IN AN ORGANIZED HEALTH CARE EDUCATION/TRAINING PROGRAM

## 2023-05-08 PROCEDURE — 1159F MED LIST DOCD IN RCRD: CPT | Mod: CPTII,,, | Performed by: STUDENT IN AN ORGANIZED HEALTH CARE EDUCATION/TRAINING PROGRAM

## 2023-05-08 PROCEDURE — 3078F DIAST BP <80 MM HG: CPT | Mod: CPTII,,, | Performed by: STUDENT IN AN ORGANIZED HEALTH CARE EDUCATION/TRAINING PROGRAM

## 2023-05-08 PROCEDURE — 3078F PR MOST RECENT DIASTOLIC BLOOD PRESSURE < 80 MM HG: ICD-10-PCS | Mod: CPTII,,, | Performed by: STUDENT IN AN ORGANIZED HEALTH CARE EDUCATION/TRAINING PROGRAM

## 2023-05-08 PROCEDURE — 99213 OFFICE O/P EST LOW 20 MIN: CPT | Mod: S$PBB,,, | Performed by: STUDENT IN AN ORGANIZED HEALTH CARE EDUCATION/TRAINING PROGRAM

## 2023-05-08 PROCEDURE — 3008F PR BODY MASS INDEX (BMI) DOCUMENTED: ICD-10-PCS | Mod: CPTII,,, | Performed by: STUDENT IN AN ORGANIZED HEALTH CARE EDUCATION/TRAINING PROGRAM

## 2023-05-08 PROCEDURE — 99214 OFFICE O/P EST MOD 30 MIN: CPT | Mod: PBBFAC | Performed by: STUDENT IN AN ORGANIZED HEALTH CARE EDUCATION/TRAINING PROGRAM

## 2023-05-08 PROCEDURE — 99213 PR OFFICE/OUTPT VISIT, EST, LEVL III, 20-29 MIN: ICD-10-PCS | Mod: S$PBB,,, | Performed by: STUDENT IN AN ORGANIZED HEALTH CARE EDUCATION/TRAINING PROGRAM

## 2023-05-08 PROCEDURE — 3061F NEG MICROALBUMINURIA REV: CPT | Mod: CPTII,,, | Performed by: STUDENT IN AN ORGANIZED HEALTH CARE EDUCATION/TRAINING PROGRAM

## 2023-05-08 PROCEDURE — 4010F PR ACE/ARB THEARPY RXD/TAKEN: ICD-10-PCS | Mod: CPTII,,, | Performed by: STUDENT IN AN ORGANIZED HEALTH CARE EDUCATION/TRAINING PROGRAM

## 2023-05-08 PROCEDURE — 3066F PR DOCUMENTATION OF TREATMENT FOR NEPHROPATHY: ICD-10-PCS | Mod: CPTII,,, | Performed by: STUDENT IN AN ORGANIZED HEALTH CARE EDUCATION/TRAINING PROGRAM

## 2023-05-08 PROCEDURE — 3066F NEPHROPATHY DOC TX: CPT | Mod: CPTII,,, | Performed by: STUDENT IN AN ORGANIZED HEALTH CARE EDUCATION/TRAINING PROGRAM

## 2023-05-08 NOTE — ASSESSMENT & PLAN NOTE
Ischemic cardiomyopathy; EF 20%, NYHA III , Stage C  Euvolemic on lasix 40mg qd and metolazone; unclear if taking jardiance  GDMT - continue metop xl 50mg qd    Not taking lisinopril; expresses frustration at starting entresto or anyother meds  Devices; none -patient would likely qualify for ICD however is not taking GDMT

## 2023-05-08 NOTE — PROGRESS NOTES
PCP: Walker Smith MD    Referring Provider:     Subjective:   Karin Carrera is a 52 y.o. male with hx of CAD s/p CABG, ischemic cardiomyopathy (EF 20%), CVA, with expressive aphasia and right arm weakness,  HTN, GERD, ETOH abuse, and nicotine abuse  who presents to [facility] care.     5/8/23 - Patient is doing well. Appears compensated. Has aphasia from prior CVA. Did not bring his meds. Reports taking 9 pills and does not want to start any new pills.     2/2023- Previously a patient of Dr. Paez; last seen 2015 - lost to follow up.    Patient was admitted to the hospital in 12/2022 for decompensated HF and was discharged after IV diuresis  Today he is here in a wheelchair; has expressive aphasia.   He lives in a NH; denies any symptoms. He does not want to start any new medications. Expresses disappointment and frustration  I explained to him that he needs to be on HF GDMT if he wants to avoid further hospital admissions.     Fhx: non-contributary  Shx: Smoker, ETOH use *3-4 drinks /day, unknown drug use    EKG 2/7/23 - Sinus tachy with PVC, LVH, non-spec ST abnol  ECHO - 01/01/22  · The left ventricle is severely enlarged with mild eccentric hypertrophy and severely decreased systolic function.  · The estimated ejection fraction is 20%.  · There are segmental left ventricular wall motion abnormalities.  · Left ventricular diastolic dysfunction.  · Atrial fibrillation not observed.  · Normal right ventricular size.  · Mild mitral regurgitation.  · Normal central venous pressure (3 mmHg).    [facility] 2017 - Severe 2VCAD - occuled LAD (LIMA is atretic as well as LAD), LCX patent, RCA  with patent SVG to PDA and patent SVG to Diagonal      Lab Results   Component Value Date     (L) 02/24/2023    K 2.9 (L) 02/24/2023    CL 95 (L) 02/24/2023    CO2 27 02/24/2023    BUN 14 02/24/2023    CREATININE 0.99 02/24/2023    CALCIUM 9.4 02/24/2023    ANIONGAP 15 02/24/2023    EGFRNONAA 126 01/18/2022       No results  found for: CHOL  No results found for: HDL  No results found for: LDLCALC  No results found for: TRIG  No results found for: CHOLHDL    Lab Results   Component Value Date    WBC 7.84 2023    HGB 14.4 2023    HCT 45.1 2023    MCV 77.4 (L) 2023     2023           Current Outpatient Medications:     albuterol (PROVENTIL/VENTOLIN HFA) 90 mcg/actuation inhaler, SMARTSI-2 Puff(s) By Mouth Every 4 Hours PRN, Disp: , Rfl:     amitriptyline (ELAVIL) 25 MG tablet, Take 25 mg by mouth every evening., Disp: , Rfl:     amLODIPine (NORVASC) 10 MG tablet, Take 10 mg by mouth once daily., Disp: , Rfl:     aspirin (ECOTRIN) 81 MG EC tablet, Take 1 tablet (81 mg total) by mouth once daily., Disp: 90 tablet, Rfl: 1    atorvastatin (LIPITOR) 40 MG tablet, Take 1 tablet (40 mg total) by mouth every evening., Disp: 90 tablet, Rfl: 1    docusate sodium (COLACE) 100 MG capsule, Take 100 mg by mouth., Disp: , Rfl:     empagliflozin (JARDIANCE) 10 mg tablet, Take 1 tablet (10 mg total) by mouth once daily. (Patient not taking: Reported on 2023), Disp: 30 tablet, Rfl: 11    ferrous sulfate (FEOSOL) 325 mg (65 mg iron) Tab tablet, Take by mouth., Disp: , Rfl:     furosemide (LASIX) 40 MG tablet, Take 1 tablet (40 mg total) by mouth once daily., Disp: 7 tablet, Rfl: 0    insulin aspart U-100 (NOVOLOG) 100 unit/mL injection, Inject 0-5 Units into the skin 3 (three) times daily before meals., Disp: 10 mL, Rfl: 1    insulin detemir U-100, Levemir, (LEVEMIR FLEXTOUCH U-100 INSULN) 100 unit/mL (3 mL) InPn pen, Inject 10 Units into the skin every evening., Disp: 36 mL, Rfl: 0    levoFLOXacin (LEVAQUIN) 750 MG tablet, Take 750 mg by mouth., Disp: , Rfl:     lisinopriL (PRINIVIL,ZESTRIL) 5 MG tablet, Take 1 tablet (5 mg total) by mouth once daily. (Patient not taking: Reported on 2023), Disp: 90 tablet, Rfl: 1    metOLazone (ZAROXOLYN) 2.5 MG tablet, Take 2.5 mg by mouth once daily., Disp: , Rfl:      "mupirocin (BACTROBAN) 2 % ointment, apply TO affected area(S) twice daily UNTIL HEALED, Disp: , Rfl:     pantoprazole (PROTONIX) 40 MG tablet, Take 1 tablet (40 mg total) by mouth once daily., Disp: 90 tablet, Rfl: 1    potassium chloride (KLOR-CON) 10 MEQ TbSR, Take 10 mEq by mouth., Disp: , Rfl:     sulfamethoxazole-trimethoprim 800-160mg (BACTRIM DS) 800-160 mg Tab, Take 1 tablet by mouth 2 (two) times daily., Disp: 20 tablet, Rfl: 0    TRUEPLUS LANCETS 33 gauge Misc, use as directed, Disp: , Rfl:     zolpidem (AMBIEN) 5 MG Tab, Take 1 tablet (5 mg total) by mouth every evening., Disp: 30 tablet, Rfl: 1    Review of Systems   Respiratory:  Negative for cough and shortness of breath.    Cardiovascular:  Negative for chest pain, palpitations, orthopnea, claudication, leg swelling and PND.       Objective:   /60   Pulse 88   Resp 16   Ht 5' 11" (1.803 m)   Wt 89.8 kg (198 lb)   BMI 27.62 kg/m²     Physical Exam  Vitals and nursing note reviewed.   Constitutional:       Appearance: Normal appearance.   Cardiovascular:      Rate and Rhythm: Normal rate and regular rhythm.      Pulses: Normal pulses.      Heart sounds: Normal heart sounds.   Pulmonary:      Breath sounds: Normal breath sounds.   Neurological:      Mental Status: He is alert and oriented to person, place, and time. Mental status is at baseline.         Assessment:     1. Chronic systolic congestive heart failure        2. Coronary artery disease involving coronary bypass graft of native heart without angina pectoris                Plan:   Chronic systolic congestive heart failure  Ischemic cardiomyopathy; EF 20%, NYHA III , Stage C  Euvolemic on lasix 40mg qd and metolazone; unclear if taking jardiance  GDMT - continue metop xl 50mg qd    Not taking lisinopril; expresses frustration at starting entresto or anyother meds  Devices; none -patient would likely qualify for ICD however is not taking GDMT        Coronary artery disease involving " coronary bypass graft of native heart without angina pectoris  Severe 2VCAD - occuled LAD (LIMA is atretic as well as LAD), LCX patent, RCA  with patent SVG to PDA and patent SVG to Diagonal  - On aspirin and statin therapy  - LAD is atretic as well as LIMA

## 2023-05-12 ENCOUNTER — CLINICAL SUPPORT (OUTPATIENT)
Dept: REHABILITATION | Facility: HOSPITAL | Age: 53
End: 2023-05-12
Payer: MEDICAID

## 2023-05-12 DIAGNOSIS — R47.01 EXPRESSIVE APHASIA: Primary | ICD-10-CM

## 2023-05-12 PROCEDURE — 92507 TX SP LANG VOICE COMM INDIV: CPT

## 2023-05-12 NOTE — PROGRESS NOTES
Progress Notes  Charissa Rhodes CCC-SLP (Speech and Language Pathologist)   Speech Pathology     Progress Notes  Charissa Rhodes CCC-SLP (Speech and Language Pathologist)   Speech Pathology     Outpatient Pediatric Speech Therapy Treatment Note     Date: 5/12/2023    Patient Name: Karin Carrera  MRN: [id number]  Therapy Diagnosis:           Encounter Diagnosis   Name Primary?    Communication impairment Yes      Physician: aWlker Smith MD   Physician Orders: Eval and Treat   Medical Diagnosis: Communication Impairment   Age: 52 y.o.     Visit # / Visits Authorized: 4/ 12    Date of Evaluation: 3/28/2023   Plan of Care Expiration Date: 6/20/2023   Authorization Date: 3/22/2023 - 6/20/2023         Time In: 2:00 PM  Time Out: 2:45 PM  Total Billable Time: 45 min      Precautions: Standard     Subjective:   Patient Parent reports: Patient reports he doesn't use device as much at home.   He was compliant to home exercise program.   Response to previous treatment: Patient understood activities  Arianna from [facility] brought Karin to therapy today.  Pain: Karin was unable to rate pain on a numeric scale, but no pain behaviors were noted in today's session.  Objective:   UNTIMED  Procedure Min.   AAC Therapy    45      Charges Billed/# of units: 78350/ 1     Long term goal: increase pt's expressive and receptive language abilities to a level more commensurate with pt's chronological age or until max potential with goals is achieved.      Short Term Goals:  Current Progress:   Given visual, tactile, and auditory cues, Patient will utilize AAC device to comment, ask questions, and provide opinions on a topic in 4 out of 5 situations.   Progressing/ Not Met 5/12/2023  Pt answered questions utilizing comm. Device to complete activities.   Given visual, tactile, and auditory cues, Patient will utilize AAC device for common social messages for greetings, introductions, and turn taking in 4 out of 5  situations.  Progressing/ Not Met 5/12/2023  Pt greeted ST with comm. Device upon entering too followed by a question.   Minimum verbal/visual cueing required      Given visual, tactile, and auditory cues, Patient will imitate vowel sounds/consonants to improve verbalizations with 80% accuracy.   Progressing/ Not Met 5/12/2023     NT   Given visual, tactile, and auditory cues, Patient will vocalize a word or phrase to make a request to improve expressive language.     Progressing/ Not Met 5/12/2023   Patient request for  via communication device   Given visual, tactile, and auditory cues, Patient will imitate/repeat names of basic objects to improve expressive language with 80% accuracy.  Progressing/ Not Met 5/12/2023   NT   Given visual, tactile, and auditory cues, Patient will complete sentences with appropriate words to improve word finding skills with 80% accuracy.   Progressing/ Not Met 5/12/2023  Patient completed single word completion with 70% with min-moderate verbal cues      Patient Education/Response:   Parent/guardian is compliant with HEP and POC. Parent/guardian verbalized understanding of ST goals and progression towards goals.     Written Home Exercises Provided: Patient instructed to cont prior HEP.  Strategies / Exercises were reviewed and Karin was able to demonstrate them prior to the end of the session.  Karin's parent/guardian demonstrated good  understanding of the education provided.      See EMR under Patient Instructions for exercises provided prior visit  Assessment:   Karin is progressing toward his goals. Current goals remain appropriate. Goals will be added and re-assessed as needed.       Patient prognosis is Good. Patient will continue to benefit from skilled outpatient speech and language therapy to address the deficits listed in the problem list on initial evaluation, provide patient/family education and to maximize patient's level of independence in the home and community  environment. D/C to HOME EXERCISE PROGRAM when max potential with goals is achieved.      Medical necessity is demonstrated by the following IMPAIRMENTS:  Decreased ability to communicate effectively  Barriers to Therapy: none  Patient's spiritual, cultural and educational needs considered and patient agreeable to plan of care and goals.  Plan:   Speech therapy services will be provided 1/wk through direct intervention, patient participation, and home programming. Therapy will be discontinued when Patient has met all goals, is not making progress, patient discontinues therapy, and/or for any other applicable reason.     Nessa Rhodes M.S., CCC-SLP   5/12/2023

## 2023-05-26 ENCOUNTER — CLINICAL SUPPORT (OUTPATIENT)
Dept: REHABILITATION | Facility: HOSPITAL | Age: 53
End: 2023-05-26
Payer: MEDICAID

## 2023-05-26 DIAGNOSIS — R47.01 EXPRESSIVE APHASIA: Primary | ICD-10-CM

## 2023-05-26 PROCEDURE — 92507 TX SP LANG VOICE COMM INDIV: CPT

## 2023-05-26 NOTE — PROGRESS NOTES
Progress Notes  Charissa Rhodes CCC-SLP (Speech and Language Pathologist)   Speech Pathology     Outpatient Pediatric Speech Therapy Treatment Note     Date: 5/26/2023    Patient Name: Karin Carrera  MRN: [id number]  Therapy Diagnosis:           Encounter Diagnosis   Name Primary?    Communication impairment Yes      Physician: Walker Smith MD   Physician Orders: Eval and Treat   Medical Diagnosis: Communication Impairment   Age: 52 y.o.     Visit # / Visits Authorized: 5/ 12    Date of Evaluation: 3/28/2023   Plan of Care Expiration Date: 6/20/2023   Authorization Date: 3/22/2023 - 6/20/2023         Time In: 2:00 PM  Time Out: 2:54 PM  Total Billable Time: 54 min      Precautions: Standard     Subjective:   Patient Parent reports: Patient reports he uses the device with Nurse Mele.  He was compliant to home exercise program.   Response to previous treatment: Patient understood activities  Arianna from [facility] brought Karin to therapy today.  Pain: Karin was unable to rate pain on a numeric scale, but no pain behaviors were noted in today's session.  Objective:   UNTIMED  Procedure Min.   AAC Therapy    54      Charges Billed/# of units: 52252/ 1     Long term goal: Increase Pt's expressive and receptive language abilities to a level more commensurate with pt's chronological age or until max potential with goals is achieved.      Short Term Goals:  Current Progress:   Given visual, tactile, and auditory cues, Patient will utilize AAC device to comment, ask questions, and provide opinions on a topic in 4 out of 5 situations.   Progressing/ Not Met 5/26/2023  Pt answered questions utilizing comm. Device to complete activities.   Given visual, tactile, and auditory cues, Patient will utilize AAC device for common social messages for greetings, introductions, and turn taking in 4 out of 5 situations.  Progressing/ Not Met 5/26/2023  Pt greeted ST with comm. Device upon entering room.   Minimum  verbal/visual cueing required      Given visual, tactile, and auditory cues, Patient will imitate vowel sounds/consonants to improve verbalizations with 80% accuracy.   Progressing/ Not Met 5/26/2023   50% mod-max verbal/visual cueing   Given visual, tactile, and auditory cues, Patient will vocalize a word or phrase to make a request to improve expressive language.     Progressing/ Not Met 5/26/2023   NT   Given visual, tactile, and auditory cues, Patient will imitate/repeat names of basic objects to improve expressive language with 80% accuracy.  Progressing/ Not Met 5/26/2023   60% mod-max verbal/visual cueing   Given visual, tactile, and auditory cues, Patient will complete sentences with appropriate words to improve word finding skills with 80% accuracy.   Progressing/ Not Met 5/26/2023  NT      Patient Education/Response:   Parent/guardian is compliant with HEP and POC. Parent/guardian verbalized understanding of ST goals and progression towards goals.     Written Home Exercises Provided: Patient instructed to cont prior HEP.  Strategies / Exercises were reviewed and Karin was able to demonstrate them prior to the end of the session.  Karin's parent/guardian demonstrated good  understanding of the education provided.      See EMR under Patient Instructions for exercises provided prior visit  Assessment:   Karin is progressing toward his goals. Current goals remain appropriate. Goals will be added and re-assessed as needed.       Patient prognosis is Good. Patient will continue to benefit from skilled outpatient speech and language therapy to address the deficits listed in the problem list on initial evaluation, provide patient/family education and to maximize patient's level of independence in the home and community environment. D/C to HOME EXERCISE PROGRAM when max potential with goals is achieved.      Medical necessity is demonstrated by the following IMPAIRMENTS:  Decreased ability to communicate  effectively  Barriers to Therapy: none  Patient's spiritual, cultural and educational needs considered and patient agreeable to plan of care and goals.  Plan:   Speech therapy services will be provided 1/wk through direct intervention, patient participation, and home programming. Therapy will be discontinued when Patient has met all goals, is not making progress, patient discontinues therapy, and/or for any other applicable reason.     Nessa Rhodes M.S., CCC-SLP   5/26/2023

## 2023-05-30 RX ORDER — ZOLPIDEM TARTRATE 5 MG/1
TABLET ORAL
Qty: 30 TABLET | Refills: 1 | Status: SHIPPED | OUTPATIENT
Start: 2023-05-30 | End: 2023-09-13 | Stop reason: SDUPTHER

## 2023-06-02 ENCOUNTER — CLINICAL SUPPORT (OUTPATIENT)
Dept: REHABILITATION | Facility: HOSPITAL | Age: 53
End: 2023-06-02
Payer: MEDICAID

## 2023-06-02 DIAGNOSIS — R47.01 EXPRESSIVE APHASIA: Primary | ICD-10-CM

## 2023-06-02 PROCEDURE — 92507 TX SP LANG VOICE COMM INDIV: CPT

## 2023-06-02 NOTE — PROGRESS NOTES
Progress Notes  Charissa Rhodes CCC-SLP (Speech and Language Pathologist)   Speech Pathology     Outpatient Pediatric Speech Therapy Treatment Note     Date: 6/2/2023    Patient Name: Karin Carrera  MRN: [id number]  Therapy Diagnosis:           Encounter Diagnosis   Name Primary?    Communication impairment Yes      Physician: Walker Smith MD   Physician Orders: Eval and Treat   Medical Diagnosis: Communication Impairment   Age: 52 y.o.     Visit # / Visits Authorized: 6/ 12    Date of Evaluation: 3/28/2023   Plan of Care Expiration Date: 6/20/2023   Authorization Date: 3/22/2023 - 6/20/2023         Time In: 2:05 PM  Time Out: 2:50 PM  Total Billable Time: 45 min      Precautions: Standard     Subjective:   Patient Parent reports: Patient reports he uses the device with Nurse Mele.  He was compliant to home exercise program.   Response to previous treatment: Patient understood activities  Arianna from [facility] brought Karin to therapy today.  Pain: Karin was unable to rate pain on a numeric scale, but no pain behaviors were noted in today's session.  Objective:   UNTIMED  Procedure Min.   AAC Therapy    45      Charges Billed/# of units: 91212/ 1     Long term goal: Increase Pt's expressive and receptive language abilities to a level more commensurate with pt's chronological age or until max potential with goals is achieved.      Short Term Goals:  Current Progress:   Given visual, tactile, and auditory cues, Patient will utilize AAC device to comment, ask questions, and provide opinions on a topic in 4 out of 5 situations.   Progressing/ Not Met 6/2/2023  Pt answered questions utilizing comm. Device to complete activities.   Given visual, tactile, and auditory cues, Patient will utilize AAC device for common social messages for greetings, introductions, and turn taking in 4 out of 5 situations.  Progressing/ Not Met 6/2/2023  Pt greeted ST with comm. Device upon entering room.   Minimum verbal/visual  cueing required      Given visual, tactile, and auditory cues, Patient will imitate vowel sounds/consonants to improve verbalizations with 80% accuracy.   Progressing/ Not Met 6/2/2023   50% mod-max verbal/visual cueing   Given visual, tactile, and auditory cues, Patient will vocalize a word or phrase to make a request to improve expressive language.     Progressing/ Not Met 6/2/2023   NT   Given visual, tactile, and auditory cues, Patient will imitate/repeat names of basic objects to improve expressive language with 80% accuracy.  Progressing/ Not Met 6/2/2023   60% mod-max verbal/visual cueing   Given visual, tactile, and auditory cues, Patient will complete sentences with appropriate words to improve word finding skills with 80% accuracy.   Progressing/ Not Met 6/2/2023  75% with min-mod verbal cueing      Patient Education/Response:   Parent/guardian is compliant with HEP and POC. Parent/guardian verbalized understanding of ST goals and progression towards goals.     Written Home Exercises Provided: Patient instructed to cont prior HEP.  Strategies / Exercises were reviewed and Karin was able to demonstrate them prior to the end of the session.  Karin's parent/guardian demonstrated good  understanding of the education provided.      See EMR under Patient Instructions for exercises provided prior visit  Assessment:   Karin is progressing toward his goals. Current goals remain appropriate. Goals will be added and re-assessed as needed.       Patient prognosis is Good. Patient will continue to benefit from skilled outpatient speech and language therapy to address the deficits listed in the problem list on initial evaluation, provide patient/family education and to maximize patient's level of independence in the home and community environment. D/C to HOME EXERCISE PROGRAM when max potential with goals is achieved.      Medical necessity is demonstrated by the following IMPAIRMENTS:  Decreased ability to communicate  effectively  Barriers to Therapy: none  Patient's spiritual, cultural and educational needs considered and patient agreeable to plan of care and goals.  Plan:   Speech therapy services will be provided 1/wk through direct intervention, patient participation, and home programming. Therapy will be discontinued when Patient has met all goals, is not making progress, patient discontinues therapy, and/or for any other applicable reason.     Nessa Rhodes M.S., CCC-SLP   6/2/2023

## 2023-06-09 ENCOUNTER — CLINICAL SUPPORT (OUTPATIENT)
Dept: REHABILITATION | Facility: HOSPITAL | Age: 53
End: 2023-06-09
Payer: MEDICAID

## 2023-06-09 DIAGNOSIS — R47.01 EXPRESSIVE APHASIA: Primary | ICD-10-CM

## 2023-06-09 PROCEDURE — 92507 TX SP LANG VOICE COMM INDIV: CPT

## 2023-06-09 NOTE — PROGRESS NOTES
Aga,      Based on your responses, you may have coronavirus (COVID-19). This illness can cause fever, cough and trouble breathing. Many people get a mild case and get better on their own. Some people can get very sick.    Will I be tested for COVID-19?  We would like to test you for COVID-19 virus. I have placed orders for this test.     To schedule: go to your [email or web address] home page and scroll down to the section that says  You have an appointment that needs to be scheduled  and click the large green button that says  Schedule Now  and follow the steps to find the next available openings.    If you are unable to complete these [email or web address] scheduling steps, please call [phone] to schedule your testing.     Return to work/school/ guidance:  Please let your workplace manager and staffing office know when your isolation ends.       If you receive a positive COVID-19 test result, follow the guidance of the those who are giving you the results. Usually the return to work is 10 days from symptom onset or positive test date, (or in some cases 20 days if you are immunocompromised). If your symptoms started after your positive test, the 10 days should start when your symptoms started.   o If you work at [employer], you must also be cleared by Employee Occupational Health and Safety to return to work.      If you receive a negative COVID-19 test result and did not have a high risk exposure to someone with a known positive COVID-19 test, you can return to work once you're free of fever for 24 hours without fever-reducing medication and your symptoms are improving or resolved.    If you receive a negative COVID-19 test and had a high-risk exposure to someone who has tested positive for COVID-19 then you can return to work 14 days after your last contact with the positive individual. Follow quarantine guidance given by your doctor or public health officials.     Sign up for [facility] [facility]:  We know it's scary to  Progress Notes  Charissa Rhodes CCC-SLP (Speech and Language Pathologist)   Speech Pathology     Outpatient Pediatric Speech Therapy Treatment Note     Date: 6/9/2023    Patient Name: Karin Carrera  MRN: [id number]  Therapy Diagnosis:           Encounter Diagnosis   Name Primary?    Communication impairment Yes      Physician: Walker Smith MD   Physician Orders: Eval and Treat   Medical Diagnosis: Communication Impairment   Age: 52 y.o.     Visit # / Visits Authorized: 7 12    Date of Evaluation: 3/28/2023   Plan of Care Expiration Date: 6/20/2023   Authorization Date: 3/22/2023 - 6/20/2023         Time In: 2:15 PM  Time Out: 3:00 PM  Total Billable Time: 45 min      Precautions: Standard     Subjective:   Patient Parent reports: Patient reports he uses the device with Nurse Mele.  He was compliant to home exercise program.   Response to previous treatment: Patient understood activities  Arianna from [facility] brought Karin to therapy today.  Pain: Karin was unable to rate pain on a numeric scale, but no pain behaviors were noted in today's session.  Objective:   UNTIMED  Procedure Min.   AAC Therapy    45      Charges Billed/# of units: 21873/ 1     Long term goal: Increase Pt's expressive and receptive language abilities to a level more commensurate with pt's chronological age or until max potential with goals is achieved.      Short Term Goals:  Current Progress:   Given visual, tactile, and auditory cues, Patient will utilize AAC device to comment, ask questions, and provide opinions on a topic in 4 out of 5 situations.   Progressing/ Not Met 6/9/2023  3/5 with min-mod cueing   Given visual, tactile, and auditory cues, Patient will utilize AAC device for common social messages for greetings, introductions, and turn taking in 4 out of 5 situations.  Progressing/ Not Met 6/9/2023  Pt greeted ST with comm. Device upon entering room.   Minimum verbal/visual cueing required      Given visual, tactile,  and auditory cues, Patient will imitate vowel sounds/consonants to improve verbalizations with 80% accuracy.   Progressing/ Not Met 6/9/2023   55% mod-max verbal/visual cueing   Given visual, tactile, and auditory cues, Patient will vocalize a word or phrase to make a request to improve expressive language.     Progressing/ Not Met 6/9/2023   50% moderate verbal/visual cueing   Given visual, tactile, and auditory cues, Patient will imitate/repeat names of basic objects to improve expressive language with 80% accuracy.  Progressing/ Not Met 6/9/2023   65% mod-max verbal/visual cueing   Given visual, tactile, and auditory cues, Patient will complete sentences with appropriate words to improve word finding skills with 80% accuracy.   Progressing/ Not Met 6/9/2023  75% with min-mod verbal cueing      Patient Education/Response:   Parent/guardian is compliant with HEP and POC. Parent/guardian verbalized understanding of ST goals and progression towards goals.     Written Home Exercises Provided: Patient instructed to cont prior HEP.  Strategies / Exercises were reviewed and Karin was able to demonstrate them prior to the end of the session.  Karin's parent/guardian demonstrated good  understanding of the education provided.      See EMR under Patient Instructions for exercises provided prior visit  Assessment:   Karin is progressing toward his goals. Current goals remain appropriate. Goals will be added and re-assessed as needed.       Patient prognosis is Good. Patient will continue to benefit from skilled outpatient speech and language therapy to address the deficits listed in the problem list on initial evaluation, provide patient/family education and to maximize patient's level of independence in the home and community environment. D/C to HOME EXERCISE PROGRAM when max potential with goals is achieved.      Medical necessity is demonstrated by the following IMPAIRMENTS:  Decreased ability to communicate  hear that you might have COVID-19. We want to track your symptoms to make sure you're okay over the next 2 weeks. Please look for an email from [email or web address]--this is a free, online program that we'll use to keep in touch. To sign up, follow the link in the email you will receive. Learn more at [email or web address]    How can I take care of myself?  Over the counter medications may help with your symptoms like congestion, cough, chills, or feverThere are not many effective prescription treatments for early COVID-19. Hydroxychloroquine, ivermectin, and azithromycin are not effective or recommended for COVID-19.    If your symptoms started in the last 10 days, you may be able to receive a treatment with monoclonal antibodies. This treatment can lower your risk of severe illness and going to the hospital. It is given through an IV or under your skin (subcutaneous) and must be given at an infusion center. You must be 12 or older, weight at least 88 pounds, and have a positive COVID-19 test.     If you would like to sign up to be considered to receive the monoclonal antibody medicine, please complete a participation form through the [facility] here: MNRAP ([email or web address]). You may also call the [facility] COVID-19 Public Hotline at 1-753.226.5277 (open Mon-Fri: 9am-7pm and Sat: 10am-6pm).     Not all people who are eligible will receive the medicine, since supply is limited. You will be contacted in the next 1 to 2 business days only if you are selected. If you do not receive a call, you have not been selected to receive the medicine. If you have any questions about this medication, please contact your primary care provider. For more information, see [email or web address]/[email or web address]      Get lots of rest. Drink extra fluids (unless a doctor has told you not to)    Take Tylenol (acetaminophen) or ibuprofen for fever or  effectively  Barriers to Therapy: none  Patient's spiritual, cultural and educational needs considered and patient agreeable to plan of care and goals.  Plan:   Speech therapy services will be provided 1/wk through direct intervention, patient participation, and home programming. Therapy will be discontinued when Patient has met all goals, is not making progress, patient discontinues therapy, and/or for any other applicable reason.     Nessa Rhodes M.S., CCC-SLP   6/9/2023                pain. If you have liver or kidney problems, ask your family doctor if it's okay to take Tylenol o ibuprofen    Take over the counter medications for your symptoms, as directed by your doctor. You may also talk to your pharmacist.      If you have other health problems (like cancer, heart failure, an organ transplant or severe kidney disease): Call your specialty clinic if you don't feel better in the next 2 days.    Know when to call 911. Emergency warning signs include:  o Trouble breathing or shortness of breath  o Pain or pressure in the chest that doesn't go away  o Feeling confused like you haven't felt before, or not being able to wake up  o Bluish-colored lips or face    Where can I get more information?     [facility] - About COVID-19: [email or web address]     CDC - What to Do If You're Sick:     www.cdc.gov/coronavirus/2019-ncov/about/steps-when-sick.html    CDC - Ending Home Isolation:  https://www.cdc.gov/coronavirus/2019-ncov/your-health/quarantine-isolation.html    CDC - Caring for Someone:  www.cdc.gov/coronavirus/2019-ncov/if-you-are-sick/care-for-someone.html    [facility] clinical trials (COVID-19 research studies): clinicalaffairs.[facility]-clinical-trials    Below are the COVID-19 hotlines at the [facility] ([facility]). Interpreters are available.  o For health questions: Call 793-273-5908 or 1-693.448.2811 (7 a.m. to 7 p.m.)  o For questions about schools and childcare: Call 727-014-7876 or 1-208.926.2521 (7 a.m. to 7 p.m.)

## 2023-06-12 NOTE — ASSESSMENT & PLAN NOTE
Asa/statin  From assisted living- return on DC     Simple: Patient demonstrates quick and easy understanding/Verbalized Understanding

## 2023-06-16 ENCOUNTER — CLINICAL SUPPORT (OUTPATIENT)
Dept: REHABILITATION | Facility: HOSPITAL | Age: 53
End: 2023-06-16
Payer: MEDICAID

## 2023-06-16 DIAGNOSIS — R47.01 EXPRESSIVE APHASIA: Primary | ICD-10-CM

## 2023-06-16 PROCEDURE — 92507 TX SP LANG VOICE COMM INDIV: CPT

## 2023-06-16 NOTE — PROGRESS NOTES
Progress Notes  Charissa Rhodes CCC-SLP (Speech and Language Pathologist)   Speech Pathology     Outpatient Pediatric Speech Therapy Treatment Note     Date: 6/16/2023    Patient Name: Karin Carrera  MRN: [id number]  Therapy Diagnosis:           Encounter Diagnosis   Name Primary?    Communication impairment Yes      Physician: Walker Smith MD   Physician Orders: Eval and Treat   Medical Diagnosis: Communication Impairment   Age: 52 y.o.     Visit # / Visits Authorized: 8 12    Date of Evaluation: 3/28/2023   Plan of Care Expiration Date: 6/20/2023   Authorization Date: 3/22/2023 - 6/20/2023         Time In: 2:00 PM  Time Out: 3:45 PM  Total Billable Time: 45 min      Precautions: Standard     Subjective:   Patient Parent reports: Patient reports he uses the device with Nurse Mele.  He was compliant to home exercise program.   Response to previous treatment: Patient understood activities  Arianna from [facility] brought Karin to therapy today.  Pain: Karin was unable to rate pain on a numeric scale, but no pain behaviors were noted in today's session.  Objective:   UNTIMED  Procedure Min.   AAC Therapy    45      Charges Billed/# of units: 08939/ 1     Long term goal: Increase Pt's expressive and receptive language abilities to a level more commensurate with pt's chronological age or until max potential with goals is achieved.      Short Term Goals:  Current Progress:   Given visual, tactile, and auditory cues, Patient will utilize AAC device to comment, ask questions, and provide opinions on a topic in 4 out of 5 situations.   Progressing/ Not Met 6/16/2023  3/5 with min cueing   Given visual, tactile, and auditory cues, Patient will utilize AAC device for common social messages for greetings, introductions, and turn taking in 4 out of 5 situations.  Progressing/ Not Met 6/16/2023  Pt greeted ST with comm. Device upon entering room.   Minimum verbal/visual cueing required  3/5      Given visual,  tactile, and auditory cues, Patient will imitate vowel sounds/consonants to improve verbalizations with 80% accuracy.   Progressing/ Not Met 6/16/2023   58% mod-max verbal/visual cueing   Given visual, tactile, and auditory cues, Patient will vocalize a word or phrase to make a request to improve expressive language.     Progressing/ Not Met 6/16/2023   60% moderate verbal/visual cueing   Given visual, tactile, and auditory cues, Patient will imitate/repeat names of basic objects to improve expressive language with 80% accuracy.  Progressing/ Not Met 6/16/2023   67% mod-max verbal/visual cueing   Given visual, tactile, and auditory cues, Patient will complete sentences with appropriate words to improve word finding skills with 80% accuracy.   Progressing/ Not Met 6/16/2023  79% with min-mod verbal cueing      Patient Education/Response:   Parent/guardian is compliant with HEP and POC. Parent/guardian verbalized understanding of ST goals and progression towards goals.     Written Home Exercises Provided: Patient instructed to cont prior HEP.  Strategies / Exercises were reviewed and Karin was able to demonstrate them prior to the end of the session.  Karin's parent/guardian demonstrated good  understanding of the education provided.      See EMR under Patient Instructions for exercises provided prior visit  Assessment:   Karin is progressing toward his goals. Current goals remain appropriate. Goals will be added and re-assessed as needed.       Patient prognosis is Good. Patient will continue to benefit from skilled outpatient speech and language therapy to address the deficits listed in the problem list on initial evaluation, provide patient/family education and to maximize patient's level of independence in the home and community environment. D/C to HOME EXERCISE PROGRAM when max potential with goals is achieved.      Medical necessity is demonstrated by the following IMPAIRMENTS:  Decreased ability to communicate  effectively  Barriers to Therapy: none  Patient's spiritual, cultural and educational needs considered and patient agreeable to plan of care and goals.  Plan:   Speech therapy services will be provided 1/wk through direct intervention, patient participation, and home programming. Therapy will be discontinued when Patient has met all goals, is not making progress, patient discontinues therapy, and/or for any other applicable reason.     Nessa Rhodes M.S., CCC-SLP   6/16/2023

## 2023-06-20 ENCOUNTER — DOCUMENTATION ONLY (OUTPATIENT)
Dept: REHABILITATION | Facility: HOSPITAL | Age: 53
End: 2023-06-20
Payer: MEDICAID

## 2023-06-20 NOTE — PROGRESS NOTES
Outpatient Speech Therapy Updated Plan of Care  Date: 6/20/2023    Patient Name: Karin Carrera  MRN: [id number]  Therapy Diagnosis: No diagnosis found.   Physician: No ref. provider found   Physician Orders: Evaluate and Treat   Medical Diagnosis: S/P CVA   Age: 52 y.o.    Date of Evaluation: 03/28/2023   Plan of Care Dates : 06/22/2023 through 09/14/2023  Authorization Date: 03/22/2023     Precautions: Standard     Subjective:   Patient has attended 8 therapy visits.   He was compliant to home exercise program using the AAC device with nurse Mele.      Objective:     Long term goal: Effectively communicate wants and needs  Related to activities of daily living.   Short Term Goals:  Current Progress:   Given visual, tactile, and auditory cues, Patient will utilize AAC device to comment, ask questions, and provide opinions on a topic in 4 out of 5 situations.  Progressing/ Not Met 6/20/2023  3/5 with min cueing     Given visual, tactile, and auditory cues, Patient will utilize AAC device for common social messages for greetings, introductions, and turn taking in 4 out of 5 situations.  Progressing/ Not Met 6/20/2023  Pt greeted ST with comm. Device upon entering room.   Minimum verbal/visual cueing required  3/5      Given visual, tactile, and auditory cues, Patient will imitate vowel sounds/consonants to improve verbalizations with 80% accuracy.   Progressing/ Not Met 6/20/2023  58% mod-max verbal/visual cueing      Given visual, tactile, and auditory cues, Patient will vocalize a word or phrase to make a request to improve expressive language.     Progressing/ Not Met 6/20/2023   60% moderate verbal/visual cueing      Given visual, tactile, and auditory cues, Patient will imitate/repeat names of basic objects to improve expressive language with 80% accuracy.  Progressing/ Not Met 6/20/2023   67% mod-max verbal/visual cueing     Given visual, tactile, and auditory cues, Patient will complete sentences with  appropriate words to improve word finding skills with 80% accuracy  Progressing/ Not Met 6/20/2023   79% with min-mod verbal cueing      Patient Education/Response:   Patient is compliant with HEP and POC. Patient verbalized understanding of ST goals and progression towards goals.    Written Home Exercises Provided: Patient instructed to cont prior HEP.  Strategies / Exercises were reviewed and Karin was able to demonstrate them prior to the end of the session.  Karin's parent/guardian demonstrated good  understanding of the education provided.     See EMR under Patient Instructions for exercises provided prior visit  Assessment:   Karin is progressing toward his goals. Current goals remain appropriate. Goals will be added and re-assessed as needed.      Patient prognosis is Good. Patient will continue to benefit from skilled outpatient speech and language therapy to address the deficits listed in the problem list on initial evaluation, provide patient/family education and to maximize patient's level of independence in the home and community environment. D/C to HOME EXERCISE PROGRAM when max potential with goals is achieved.     Medical necessity is demonstrated by the following IMPAIRMENTS:  Decreased ability to effectively communicate  Barriers to Therapy: None  Patient's spiritual, cultural and educational needs considered and patient agreeable to plan of care and goals.  Plan:   Speech therapy services will be provided 1/wk through direct intervention, patient participation, and home programming. Therapy will be discontinued when Patient has met all goals, is not making progress, patient discontinues therapy, and/or for any other applicable reason.    Medicaid requirements:  Plan of Care Dates: 06/22/2023 through 09/14/2023  CPT codes and number of units needed per visit: 81077/1  Last face to face visit with MD: 01/25/2023  Long term goal: Effectively communicate wants and needs  Related to activities of daily  living.   Short Term Goals:  Current Progress:   Given visual, tactile, and auditory cues, Patient will utilize AAC device to comment, ask questions, and provide opinions on a topic in 4 out of 5 situations.  Progressing/ Not Met 6/20/2023  3/5 with min cueing     Given visual, tactile, and auditory cues, Patient will utilize AAC device for common social messages for greetings, introductions, and turn taking in 4 out of 5 situations.  Progressing/ Not Met 6/20/2023  Pt greeted ST with comm. Device upon entering room.   Minimum verbal/visual cueing required  3/5      Given visual, tactile, and auditory cues, Patient will imitate vowel sounds/consonants to improve verbalizations with 80% accuracy.   Progressing/ Not Met 6/20/2023  58% mod-max verbal/visual cueing      Given visual, tactile, and auditory cues, Patient will vocalize a word or phrase to make a request to improve expressive language.     Progressing/ Not Met 6/20/2023   60% moderate verbal/visual cueing      Given visual, tactile, and auditory cues, Patient will imitate/repeat names of basic objects to improve expressive language with 80% accuracy.  Progressing/ Not Met 6/20/2023   67% mod-max verbal/visual cueing     Given visual, tactile, and auditory cues, Patient will complete sentences with appropriate words to improve word finding skills with 80% accuracy  Progressing/ Not Met 6/20/2023   79% with min-mod verbal cueing       Home exercise program and patient compliance: Patient is compliant with HEP.  Development of Hep is ongoing.   Discharge Plan:Therapy will be discontinued when Patient has met all goals, is not making progress, patient discontinues therapy, and/or for any other applicable reason.      Physician Signature:_________________________________________________________  Date: ____________________      MELLISSA ALLEN CCC-SLP   6/20/2023

## 2023-06-22 ENCOUNTER — CLINICAL SUPPORT (OUTPATIENT)
Dept: REHABILITATION | Facility: HOSPITAL | Age: 53
End: 2023-06-22
Payer: MEDICAID

## 2023-06-22 DIAGNOSIS — R47.01 EXPRESSIVE APHASIA: Primary | ICD-10-CM

## 2023-06-22 PROCEDURE — 92507 TX SP LANG VOICE COMM INDIV: CPT

## 2023-06-25 NOTE — PROGRESS NOTES
Outpatient Speech Therapy Progress Note  Date: 6/22/2023    Patient Name: Karin Carrera  MRN: [id number]  Therapy Diagnosis:   Encounter Diagnosis   Name Primary?    Expressive aphasia Yes      Physician: Walker Smith MD   Physician Orders: Evaluate and Treat   Medical Diagnosis: S/P CVA  with Communication Impairment  Age: 52 y.o.      Visit # / Visits Authorized: 10/ 12   Date of Evaluation: 03/28/2023   Plan of Care Dates : 06/22/2023 through 09/14/2023  Authorization Date: 03/22/2023     Precautions: Standard     Subjective:   Patient has attended 10 therapy visits.   He was compliant to home exercise program using the AAC device with nurse Mele  He also uses it with other residents but states most communication is via yes/no questions.    Objective:     Long term goal: Effectively communicate wants and needs  Related to activities of daily living.   Short Term Goals:  Current Progress:   Given visual, tactile, and auditory cues, Patient will utilize AAC device to comment, ask questions, and provide opinions on a topic in 4 out of 5 situations.  Progressing/ Not Met 6/22/2023  Patient utilized AAC to generate sentence     Given visual, tactile, and auditory cues, Patient will utilize AAC device for common social messages for greetings, introductions, and turn taking in 4 out of 5 situations.  Progressing/ Not Met 6/22/2023  Pt used AAC device to answer questions related to basic personal information on 7/10 trials.  Minimum verbal/visual cueing required        Given visual, tactile, and auditory cues, Patient will imitate vowel sounds/consonants to improve verbalizations with 80% accuracy.   Progressing/ Not Met 6/22/2023  58% mod-max verbal/visual cueing      Given visual, tactile, and auditory cues, Patient will vocalize a word or phrase to make a request to improve expressive language.     Progressing/ Not Met 6/22/2023         Given visual, tactile, and auditory cues, Patient will imitate/repeat  names of basic objects to improve expressive language with 80% accuracy.  Progressing/ Not Met 6/22/2023   60% mod-max verbal/visual cueing     Given visual, tactile, and auditory cues, Patient will complete sentences with appropriate words to improve word finding skills with 80% accuracy  Progressing/ Not Met 6/22/2023   66% with min-mod verbal cueing      Patient Education/Response:   Patient is compliant with HEP and POC. Patient verbalized understanding of ST goals and progression towards goals.    Written Home Exercises Provided: Patient instructed to cont prior HEP.  Strategies / Exercises were reviewed and Karin was able to demonstrate them prior to the end of the session.  Karin's parent/guardian demonstrated good  understanding of the education provided.     See EMR under Patient Instructions for exercises provided prior visit  Assessment:   Karin is progressing toward his goals. Current goals remain appropriate. Goals will be added and re-assessed as needed.      Patient prognosis is Good. Patient will continue to benefit from skilled outpatient speech and language therapy to address the deficits listed in the problem list on initial evaluation, provide patient/family education and to maximize patient's level of independence in the home and community environment. D/C to HOME EXERCISE PROGRAM when max potential with goals is achieved.     Medical necessity is demonstrated by the following IMPAIRMENTS:  Decreased ability to effectively communicate  Barriers to Therapy: None  Patient's spiritual, cultural and educational needs considered and patient agreeable to plan of care and goals.  Plan:   Speech therapy services will be provided 1/wk through direct intervention, patient participation, and home programming. Therapy will be discontinued when Patient has met all goals, is not making progress, patient discontinues therapy, and/or for any other applicable reason.        MELLISSA ALLEN, JEAN-SLP   6/22/2023

## 2023-06-29 ENCOUNTER — CLINICAL SUPPORT (OUTPATIENT)
Dept: REHABILITATION | Facility: HOSPITAL | Age: 53
End: 2023-06-29
Payer: MEDICAID

## 2023-06-29 DIAGNOSIS — R47.01 EXPRESSIVE APHASIA: Primary | ICD-10-CM

## 2023-06-29 PROCEDURE — 92507 TX SP LANG VOICE COMM INDIV: CPT

## 2023-06-29 NOTE — PROGRESS NOTES
Outpatient Speech Therapy Progress Note  Date: 6/29/2023    Patient Name: Karin Carrera  MRN: [id number]  Therapy Diagnosis:   Encounter Diagnosis   Name Primary?    Expressive aphasia Yes      Physician: Walker Smith MD   Physician Orders: Evaluate and Treat   Medical Diagnosis: S/P CVA  with Communication Impairment  Age: 52 y.o.      Visit # / Visits Authorized: 11/ 22   Date of Evaluation: 03/28/2023   Plan of Care Dates : 06/22/2023 through 09/14/2023  Authorization Date: 03/22/2023     Precautions: Standard     Subjective:   Patient has attended 11 therapy visits.   He was unable to use his AAC in the past week due to leaving charge at clinic.     Objective:     Long term goal: Effectively communicate wants and needs  Related to activities of daily living.   Short Term Goals:  Current Progress:   Given visual, tactile, and auditory cues, Patient will utilize AAC device to comment, ask questions, and provide opinions on a topic in 4 out of 5 situations.  Progressing/ Not Met 6/29/2023  Patient utilized AAC to generate sentences in response to questions with 60% acc with min cues.      Given visual, tactile, and auditory cues, Patient will utilize AAC device for common social messages for greetings, introductions, and turn taking in 4 out of 5 situations.  Progressing/ Not Met 6/29/2023  Pt used AAC device to answer questions related to basic personal information on 7/10 trials.He demonstrated appropriate turn taking for simple conversation.   Minimum verbal/visual cueing required        Given visual, tactile, and auditory cues, Patient will imitate vowel sounds/consonants to improve verbalizations with 80% accuracy.   Progressing/ Not Met 6/29/2023  NT   Given visual, tactile, and auditory cues, Patient will vocalize a word or phrase to make a request to improve expressive language.     Progressing/ Not Met 6/29/2023   40% acc with verbal cues.      Given visual, tactile, and auditory cues, Patient  will imitate/repeat names of basic objects to improve expressive language with 80% accuracy.  Progressing/ Not Met 6/29/2023   55% mod-max verbal/visual cueing     Given visual, tactile, and auditory cues, Patient will complete sentences with appropriate words to improve word finding skills with 80% accuracy  Progressing/ Not Met 6/29/2023   60% with min-mod verbal cueing      Patient Education/Response:   Patient is compliant with HEP and POC. Patient verbalized understanding of ST goals and progression towards goals.    Written Home Exercises Provided: Patient instructed to cont prior HEP.  Strategies / Exercises were reviewed and Karin was able to demonstrate them prior to the end of the session.  Karin's parent/guardian demonstrated good  understanding of the education provided.     See EMR under Patient Instructions for exercises provided prior visit  Assessment:   Karin is progressing toward his goals. Current goals remain appropriate. Goals will be added and re-assessed as needed.      Patient prognosis is Good. Patient will continue to benefit from skilled outpatient speech and language therapy to address the deficits listed in the problem list on initial evaluation, provide patient/family education and to maximize patient's level of independence in the home and community environment. D/C to HOME EXERCISE PROGRAM when max potential with goals is achieved.     Medical necessity is demonstrated by the following IMPAIRMENTS:  Decreased ability to effectively communicate  Barriers to Therapy: None  Patient's spiritual, cultural and educational needs considered and patient agreeable to plan of care and goals.  Plan:   Speech therapy services will be provided 1/wk through direct intervention, patient participation, and home programming. Therapy will be discontinued when Patient has met all goals, is not making progress, patient discontinues therapy, and/or for any other applicable reason.        MELLISSA ALLEN,  CCC-SLP   6/29/2023

## 2023-07-06 ENCOUNTER — CLINICAL SUPPORT (OUTPATIENT)
Dept: REHABILITATION | Facility: HOSPITAL | Age: 53
End: 2023-07-06
Payer: MEDICAID

## 2023-07-06 DIAGNOSIS — R47.01 EXPRESSIVE APHASIA: Primary | ICD-10-CM

## 2023-07-06 PROCEDURE — 92507 TX SP LANG VOICE COMM INDIV: CPT

## 2023-07-06 NOTE — PROGRESS NOTES
Outpatient Speech Therapy Progress Note  Date: 7/6/2023    Patient Name: Karin Carrera  MRN: [id number]  Therapy Diagnosis:   Encounter Diagnosis   Name Primary?    Expressive aphasia Yes      Physician: Walker Smith MD   Physician Orders: Evaluate and Treat   Medical Diagnosis: S/P CVA  with Communication Impairment  Age: 52 y.o.      Visit # / Visits Authorized: 12/ 22   Date of Evaluation: 03/28/2023   Plan of Care Dates : 06/22/2023 through 09/14/2023  Authorization Date: 03/22/2023     Precautions: Standard     Subjective:   Patient has attended 12 therapy visits.   Pt reported he has not felt well the past couple of days.     Objective:     Long term goal: Effectively communicate wants and needs  Related to activities of daily living.   Short Term Goals:  Current Progress:   Given visual, tactile, and auditory cues, Patient will utilize AAC device to comment, ask questions, and provide opinions on a topic in 4 out of 5 situations.  Progressing/ Not Met 7/6/2023  Patient utilized AAC to generate responses to questions with 60% acc with min cues. He became frustrated at times with spelling. He used quick phrase mode of AAC device in response to questions with 50% acc.      Given visual, tactile, and auditory cues, Patient will utilize AAC device for common social messages for greetings, introductions, and turn taking in 4 out of 5 situations.  Progressing/ Not Met 7/6/2023  Pt greeted SLP using AAC device and  answered questions related to basic personal information/activities durnig past week on 7/10 trials.He demonstrated appropriate turn taking for simple conversation.   Minimum verbal/visual cueing required.       Given visual, tactile, and auditory cues, Patient will imitate vowel sounds/consonants to improve verbalizations with 80% accuracy.   Progressing/ Not Met 7/6/2023  NT   Given visual, tactile, and auditory cues, Patient will vocalize a word or phrase to make a request to improve  expressive language.     Progressing/ Not Met 7/6/2023   10%.      Given visual, tactile, and auditory cues, Patient will imitate/repeat names of basic objects to improve expressive language with 80% accuracy.  Progressing/ Not Met 7/6/2023   20% mod-max verbal/visual cueing     Given visual, tactile, and auditory cues, Patient will complete sentences with appropriate words to improve word finding skills with 80% accuracy  Progressing/ Not Met 7/6/2023   NT      Patient Education/Response:   Patient is compliant with HEP and POC. Patient verbalized understanding of ST goals and progression towards goals.    Written Home Exercises Provided: Patient instructed to cont prior HEP.  Strategies / Exercises were reviewed and Karin was able to demonstrate them prior to the end of the session.  Karin's parent/guardian demonstrated good  understanding of the education provided.     See EMR under Patient Instructions for exercises provided prior visit  Assessment:   Karin is progressing toward his goals. Patient prefers to use text talk mode of AAC device most often although he becomes frustrated with spelling at times.   Current goals remain appropriate. Goals will be added and re-assessed as needed.      Patient prognosis is Good. Patient will continue to benefit from skilled outpatient speech and language therapy to address the deficits listed in the problem list on initial evaluation, provide patient/family education and to maximize patient's level of independence in the home and community environment. D/C to HOME EXERCISE PROGRAM when max potential with goals is achieved.     Medical necessity is demonstrated by the following IMPAIRMENTS:  Decreased ability to effectively communicate  Barriers to Therapy: None  Patient's spiritual, cultural and educational needs considered and patient agreeable to plan of care and goals.  Plan:   Speech therapy services will be provided 1/wk through direct intervention, patient  participation, and home programming. Therapy will be discontinued when Patient has met all goals, is not making progress, patient discontinues therapy, and/or for any other applicable reason.        MELLISSA ALLEN, JEAN-SLP   7/6/2023

## 2023-07-27 ENCOUNTER — CLINICAL SUPPORT (OUTPATIENT)
Dept: REHABILITATION | Facility: HOSPITAL | Age: 53
End: 2023-07-27
Payer: MEDICAID

## 2023-07-27 DIAGNOSIS — R47.01 EXPRESSIVE APHASIA: Primary | ICD-10-CM

## 2023-07-27 PROCEDURE — 92507 TX SP LANG VOICE COMM INDIV: CPT

## 2023-07-27 NOTE — PROGRESS NOTES
Outpatient Speech Therapy Progress Note  Date: 7/27/2023    Patient Name: Karin Carrera  MRN: [id number]  Therapy Diagnosis:   Encounter Diagnosis   Name Primary?    Expressive aphasia Yes      Physician: Walker Smith MD   Physician Orders: Evaluate and Treat   Medical Diagnosis: S/P CVA  with Communication Impairment  Age: 52 y.o.      Visit # / Visits Authorized: 13/ 22   Date of Evaluation: 03/28/2023   Plan of Care Dates : 06/22/2023 through 09/14/2023  Authorization Date: 03/22/2023     Precautions: Standard     Subjective:   Introduced new speech therapist. Patient has attended 13 therapy visits.     Objective:     Long term goal: Effectively communicate wants and needs  Related to activities of daily living.   Short Term Goals:  Current Progress:   Given visual, tactile, and auditory cues, Patient will utilize AAC device to comment, ask questions, and provide opinions on a topic in 4 out of 5 situations.  Progressing/ Not Met 7/27/2023  Patient utilized AAC to generate responses to questions with 60% acc with min cues. He became frustrated at times with spelling. He used quick phrase mode of AAC device in response to questions with 50% acc.      Given visual, tactile, and auditory cues, Patient will utilize AAC device for common social messages for greetings, introductions, and turn taking in 4 out of 5 situations.  Progressing/ Not Met 7/27/2023  Pt greeted SLP using AAC device and  answered questions related to basic personal information/Personal interests and hobbies on 6/10 trials.He demonstrated appropriate turn taking for simple conversation.   Minimum verbal/visual cueing required.       Given visual, tactile, and auditory cues, Patient will imitate vowel sounds/consonants to improve verbalizations with 80% accuracy.   Progressing/ Not Met 7/27/2023  NT   Given visual, tactile, and auditory cues, Patient will vocalize a word or phrase to make a request to improve expressive language.      Progressing/ Not Met 7/27/2023   15%.      Given visual, tactile, and auditory cues, Patient will imitate/repeat names of basic objects to improve expressive language with 80% accuracy.  Progressing/ Not Met 7/27/2023   25% mod-max verbal/visual cueing     Given visual, tactile, and auditory cues, Patient will complete sentences with appropriate words to improve word finding skills with 80% accuracy  Progressing/ Not Met 7/27/2023   NT      Patient Education/Response:   Patient is compliant with HEP and POC. Patient verbalized understanding of ST goals and progression towards goals.    Written Home Exercises Provided: Patient instructed to cont prior HEP.  Strategies / Exercises were reviewed and Karin was able to demonstrate them prior to the end of the session.  Karin's parent/guardian demonstrated good  understanding of the education provided.     See EMR under Patient Instructions for exercises provided prior visit  Assessment:   Karin is progressing toward his goals. Patient prefers to use text talk mode of AAC device most often although he becomes frustrated with spelling at times.   Current goals remain appropriate. Goals will be added and re-assessed as needed.      Patient prognosis is Good. Patient will continue to benefit from skilled outpatient speech and language therapy to address the deficits listed in the problem list on initial evaluation, provide patient/family education and to maximize patient's level of independence in the home and community environment. D/C to HOME EXERCISE PROGRAM when max potential with goals is achieved.     Medical necessity is demonstrated by the following IMPAIRMENTS:  Decreased ability to effectively communicate  Barriers to Therapy: None  Patient's spiritual, cultural and educational needs considered and patient agreeable to plan of care and goals.  Plan:   Speech therapy services will be provided 1/wk through direct intervention, patient participation, and home  programming. Therapy will be discontinued when Patient has met all goals, is not making progress, patient discontinues therapy, and/or for any other applicable reason.        Porsche Boyd CCC-SLP   7/27/2023

## 2023-08-03 ENCOUNTER — CLINICAL SUPPORT (OUTPATIENT)
Dept: REHABILITATION | Facility: HOSPITAL | Age: 53
End: 2023-08-03
Payer: MEDICAID

## 2023-08-03 DIAGNOSIS — R47.01 EXPRESSIVE APHASIA: Primary | ICD-10-CM

## 2023-08-03 PROCEDURE — 92507 TX SP LANG VOICE COMM INDIV: CPT

## 2023-08-04 NOTE — PROGRESS NOTES
Outpatient Speech Therapy Progress Note  Date: 8/3/2023    Patient Name: Karin Carrera  MRN: [id number]  Therapy Diagnosis:   Encounter Diagnosis   Name Primary?    Expressive aphasia Yes      Physician: Walker Smith MD   Physician Orders: Evaluate and Treat   Medical Diagnosis: S/P CVA  with Communication Impairment  Age: 52 y.o.      Visit # / Visits Authorized: 14/ 22   Date of Evaluation: 03/28/2023   Plan of Care Dates : 06/22/2023 through 09/14/2023  Authorization Date: 03/22/2023     Precautions: Standard     Subjective:    Patient has attended 14 therapy visits.     Objective:     Long term goal: Effectively communicate wants and needs  Related to activities of daily living.   Short Term Goals:  Current Progress:   Given visual, tactile, and auditory cues, Patient will utilize AAC device to comment, ask questions, and provide opinions on a topic in 4 out of 5 situations.  Progressing/ Not Met 8/3/2023  Patient utilized AAC to generate responses to questions with 50% acc with min cues. He became frustrated at times with spelling.    Given visual, tactile, and auditory cues, Patient will utilize AAC device for common social messages for greetings, introductions, and turn taking in 4 out of 5 situations.  Progressing/ Not Met 8/3/2023  Pt greeted SLP using AAC device and  answered questions related to basic personal information/Personal interests and hobbies on 7/10 trials.He demonstrated appropriate turn taking for simple conversation.   Minimum verbal/visual cueing required.       Given visual, tactile, and auditory cues, Patient will imitate vowel sounds/consonants to improve verbalizations with 80% accuracy.   Progressing/ Not Met 8/3/2023  NT   Given visual, tactile, and auditory cues, Patient will vocalize a word or phrase to make a request to improve expressive language.     Progressing/ Not Met 8/3/2023   Given a list of short phrases, Pt vocalized with 40% accuracy provided word by word  modeling, phonemic cues, and prompts to reduce rate of speech.       Given visual, tactile, and auditory cues, Patient will imitate/repeat names of basic objects to improve expressive language with 80% accuracy.  Progressing/ Not Met 8/3/2023   Given picture cars, Pt verbally ID objects and used AAC to assist on challenging words with 45% accuracy provided max verbal cues, prompting to reduce anomia, phonemic cues.      Given visual, tactile, and auditory cues, Patient will complete sentences with appropriate words to improve word finding skills with 80% accuracy  Progressing/ Not Met 8/3/2023   NT      Patient Education/Response:   Patient is compliant with HEP and POC. Patient verbalized understanding of ST goals and progression towards goals.    Written Home Exercises Provided: Patient instructed to cont prior HEP.  Strategies / Exercises were reviewed and Karin was able to demonstrate them prior to the end of the session.  Karin's parent/guardian demonstrated good  understanding of the education provided.     See EMR under Patient Instructions for exercises provided prior visit  Assessment:   Karin is progressing toward his goals. Patient prefers to use text talk mode of AAC device most often although he becomes frustrated with spelling at times.   Current goals remain appropriate. Goals will be added and re-assessed as needed.      Patient prognosis is Good. Patient will continue to benefit from skilled outpatient speech and language therapy to address the deficits listed in the problem list on initial evaluation, provide patient/family education and to maximize patient's level of independence in the home and community environment. D/C to HOME EXERCISE PROGRAM when max potential with goals is achieved.     Medical necessity is demonstrated by the following IMPAIRMENTS:  Decreased ability to effectively communicate  Barriers to Therapy: None  Patient's spiritual, cultural and educational needs considered and  patient agreeable to plan of care and goals.  Plan:   Speech therapy services will be provided 1/wk through direct intervention, patient participation, and home programming. Therapy will be discontinued when Patient has met all goals, is not making progress, patient discontinues therapy, and/or for any other applicable reason.        Porsche Boyd CCC-SLP   8/3/2023

## 2023-08-10 ENCOUNTER — CLINICAL SUPPORT (OUTPATIENT)
Dept: REHABILITATION | Facility: HOSPITAL | Age: 53
End: 2023-08-10
Payer: MEDICAID

## 2023-08-10 DIAGNOSIS — R47.01 EXPRESSIVE APHASIA: Primary | ICD-10-CM

## 2023-08-10 PROCEDURE — 92507 TX SP LANG VOICE COMM INDIV: CPT

## 2023-08-10 NOTE — PROGRESS NOTES
Outpatient Speech Therapy Progress Note  Date: 8/10/2023    Patient Name: Karin Carrera  MRN: [id number]  Therapy Diagnosis:   Encounter Diagnosis   Name Primary?    Expressive aphasia Yes      Physician: Walker Smith MD   Physician Orders: Evaluate and Treat   Medical Diagnosis: S/P CVA  with Communication Impairment  Age: 52 y.o.    Time In: 1:30pm  Time out: 2:30pm    Visit # / Visits Authorized: 15/ 22   Date of Evaluation: 03/28/2023   Plan of Care Dates : 06/22/2023 through 09/14/2023  Authorization Date: 03/22/2023     Precautions: Standard     Subjective:    Patient has attended 15 therapy visits.     Objective:     Long term goal: Effectively communicate wants and needs  Related to activities of daily living.   Short Term Goals:  Current Progress:   Given visual, tactile, and auditory cues, Patient will utilize AAC device to comment, ask questions, and provide opinions on a topic in 4 out of 5 situations.  Progressing/ Not Met 8/10/2023  Patient utilized AAC to generate responses to questions with 65% acc with min cues. He became frustrated at times with spelling.    Given visual, tactile, and auditory cues, Patient will utilize AAC device for common social messages for greetings, introductions, and turn taking in 4 out of 5 situations.  Progressing/ Not Met 8/10/2023  Pt greeted SLP using AAC device and  answered questions related to basic personal information/Personal interests and hobbies on 8/10 trials.He demonstrated appropriate turn taking for simple conversation.   Minimum verbal/visual cueing required.       Given visual, tactile, and auditory cues, Patient will imitate vowel sounds/consonants to improve verbalizations with 80% accuracy.   Progressing/ Not Met 8/10/2023  While answering WH questions and repeating short phrases, Pt imitated consonants and sounds to increase his intelligibility with 50% accuracy provided max phonemic cues.    Given visual, tactile, and auditory cues,  Patient will vocalize a word or phrase to make a request to improve expressive language.     Progressing/ Not Met 8/10/2023   Given a list of short phrases, Pt vocalized with 50% accuracy provided word by word modeling, phonemic cues, and prompts to reduce rate of speech.       Given visual, tactile, and auditory cues, Patient will imitate/repeat names of basic objects to improve expressive language with 80% accuracy.  Progressing/ Not Met 8/10/2023   Given picture cards, Pt verbally ID objects and used AAC to assist on challenging words with 55% accuracy provided max verbal cues, prompting to reduce anomia, phonemic cues.      Given visual, tactile, and auditory cues, Patient will complete sentences with appropriate words to improve word finding skills with 80% accuracy  Progressing/ Not Met 8/10/2023   NT      Patient Education/Response:   Patient is compliant with HEP and POC. Patient verbalized understanding of ST goals and progression towards goals. Pt was also given a list of short phrases and functional words to practice at home.     Written Home Exercises Provided: Patient instructed to cont prior HEP.  Strategies / Exercises were reviewed and Karin was able to demonstrate them prior to the end of the session.  Karin's parent/guardian demonstrated good  understanding of the education provided.     See EMR under Patient Instructions for exercises provided prior visit  Assessment:   Karin is progressing toward his goals. Current goals remain appropriate. Goals will be added and re-assessed as needed.      Patient prognosis is Good. Patient will continue to benefit from skilled outpatient speech and language therapy to address the deficits listed in the problem list on initial evaluation, provide patient/family education and to maximize patient's level of independence in the home and community environment. D/C to HOME EXERCISE PROGRAM when max potential with goals is achieved.     Medical necessity is demonstrated  by the following IMPAIRMENTS:  Decreased ability to effectively communicate  Barriers to Therapy: None  Patient's spiritual, cultural and educational needs considered and patient agreeable to plan of care and goals.  Plan:   Speech therapy services will be provided 1/wk through direct intervention, patient participation, and home programming. Therapy will be discontinued when Patient has met all goals, is not making progress, patient discontinues therapy, and/or for any other applicable reason.        Porsche Boyd CCC-SLP   8/10/2023

## 2023-09-13 ENCOUNTER — OFFICE VISIT (OUTPATIENT)
Dept: FAMILY MEDICINE | Facility: CLINIC | Age: 53
End: 2023-09-13
Payer: MEDICAID

## 2023-09-13 VITALS
HEIGHT: 71 IN | BODY MASS INDEX: 27.72 KG/M2 | OXYGEN SATURATION: 96 % | SYSTOLIC BLOOD PRESSURE: 120 MMHG | RESPIRATION RATE: 18 BRPM | DIASTOLIC BLOOD PRESSURE: 72 MMHG | HEART RATE: 104 BPM | TEMPERATURE: 97 F | WEIGHT: 198 LBS

## 2023-09-13 DIAGNOSIS — Z01.00 EYE EXAM, ROUTINE: Primary | ICD-10-CM

## 2023-09-13 DIAGNOSIS — E11.9 ENCOUNTER FOR DIABETIC FOOT EXAM: ICD-10-CM

## 2023-09-13 DIAGNOSIS — E13.9 DIABETES MELLITUS OF OTHER TYPE WITHOUT COMPLICATION, UNSPECIFIED WHETHER LONG TERM INSULIN USE: ICD-10-CM

## 2023-09-13 PROCEDURE — 3066F NEPHROPATHY DOC TX: CPT | Mod: CPTII,,, | Performed by: INTERNAL MEDICINE

## 2023-09-13 PROCEDURE — 3078F DIAST BP <80 MM HG: CPT | Mod: CPTII,,, | Performed by: INTERNAL MEDICINE

## 2023-09-13 PROCEDURE — 3061F PR NEG MICROALBUMINURIA RESULT DOCUMENTED/REVIEW: ICD-10-PCS | Mod: CPTII,,, | Performed by: INTERNAL MEDICINE

## 2023-09-13 PROCEDURE — 3008F BODY MASS INDEX DOCD: CPT | Mod: CPTII,,, | Performed by: INTERNAL MEDICINE

## 2023-09-13 PROCEDURE — 4010F PR ACE/ARB THEARPY RXD/TAKEN: ICD-10-PCS | Mod: CPTII,,, | Performed by: INTERNAL MEDICINE

## 2023-09-13 PROCEDURE — 1160F RVW MEDS BY RX/DR IN RCRD: CPT | Mod: CPTII,,, | Performed by: INTERNAL MEDICINE

## 2023-09-13 PROCEDURE — 99214 PR OFFICE/OUTPT VISIT, EST, LEVL IV, 30-39 MIN: ICD-10-PCS | Mod: 25,,, | Performed by: INTERNAL MEDICINE

## 2023-09-13 PROCEDURE — 90677 PNEUMOCOCCAL CONJUGATE VACCINE 20-VALENT: ICD-10-PCS | Mod: ,,, | Performed by: INTERNAL MEDICINE

## 2023-09-13 PROCEDURE — 3074F SYST BP LT 130 MM HG: CPT | Mod: CPTII,,, | Performed by: INTERNAL MEDICINE

## 2023-09-13 PROCEDURE — 4010F ACE/ARB THERAPY RXD/TAKEN: CPT | Mod: CPTII,,, | Performed by: INTERNAL MEDICINE

## 2023-09-13 PROCEDURE — 1159F MED LIST DOCD IN RCRD: CPT | Mod: CPTII,,, | Performed by: INTERNAL MEDICINE

## 2023-09-13 PROCEDURE — 3066F PR DOCUMENTATION OF TREATMENT FOR NEPHROPATHY: ICD-10-PCS | Mod: CPTII,,, | Performed by: INTERNAL MEDICINE

## 2023-09-13 PROCEDURE — 3078F PR MOST RECENT DIASTOLIC BLOOD PRESSURE < 80 MM HG: ICD-10-PCS | Mod: CPTII,,, | Performed by: INTERNAL MEDICINE

## 2023-09-13 PROCEDURE — 90472 TDAP VACCINE GREATER THAN OR EQUAL TO 7YO IM: ICD-10-PCS | Mod: ,,, | Performed by: INTERNAL MEDICINE

## 2023-09-13 PROCEDURE — 90715 TDAP VACCINE GREATER THAN OR EQUAL TO 7YO IM: ICD-10-PCS | Mod: ,,, | Performed by: INTERNAL MEDICINE

## 2023-09-13 PROCEDURE — 3061F NEG MICROALBUMINURIA REV: CPT | Mod: CPTII,,, | Performed by: INTERNAL MEDICINE

## 2023-09-13 PROCEDURE — 3074F PR MOST RECENT SYSTOLIC BLOOD PRESSURE < 130 MM HG: ICD-10-PCS | Mod: CPTII,,, | Performed by: INTERNAL MEDICINE

## 2023-09-13 PROCEDURE — 90715 TDAP VACCINE 7 YRS/> IM: CPT | Mod: ,,, | Performed by: INTERNAL MEDICINE

## 2023-09-13 PROCEDURE — 90472 IMMUNIZATION ADMIN EACH ADD: CPT | Mod: ,,, | Performed by: INTERNAL MEDICINE

## 2023-09-13 PROCEDURE — 90471 PNEUMOCOCCAL CONJUGATE VACCINE 20-VALENT: ICD-10-PCS | Mod: ,,, | Performed by: INTERNAL MEDICINE

## 2023-09-13 PROCEDURE — 1159F PR MEDICATION LIST DOCUMENTED IN MEDICAL RECORD: ICD-10-PCS | Mod: CPTII,,, | Performed by: INTERNAL MEDICINE

## 2023-09-13 PROCEDURE — 99214 OFFICE O/P EST MOD 30 MIN: CPT | Mod: 25,,, | Performed by: INTERNAL MEDICINE

## 2023-09-13 PROCEDURE — 3008F PR BODY MASS INDEX (BMI) DOCUMENTED: ICD-10-PCS | Mod: CPTII,,, | Performed by: INTERNAL MEDICINE

## 2023-09-13 PROCEDURE — 1160F PR REVIEW ALL MEDS BY PRESCRIBER/CLIN PHARMACIST DOCUMENTED: ICD-10-PCS | Mod: CPTII,,, | Performed by: INTERNAL MEDICINE

## 2023-09-13 PROCEDURE — 90677 PCV20 VACCINE IM: CPT | Mod: ,,, | Performed by: INTERNAL MEDICINE

## 2023-09-13 PROCEDURE — 90471 IMMUNIZATION ADMIN: CPT | Mod: ,,, | Performed by: INTERNAL MEDICINE

## 2023-09-13 RX ORDER — SOD.CHLORID/POTASSIUM CHLORIDE 287-180-15
1 TABLET ORAL
COMMUNITY
Start: 2023-07-27 | End: 2023-09-13 | Stop reason: SDUPTHER

## 2023-09-13 RX ORDER — GUAIFENESIN 600 MG/1
600 TABLET, EXTENDED RELEASE ORAL 2 TIMES DAILY
Status: ON HOLD | COMMUNITY
Start: 2023-06-12 | End: 2024-03-09

## 2023-09-13 RX ORDER — CEPHALEXIN 500 MG/1
500 CAPSULE ORAL 3 TIMES DAILY
COMMUNITY
Start: 2023-07-24 | End: 2023-11-16

## 2023-09-13 RX ORDER — BENZONATATE 100 MG/1
100 CAPSULE ORAL 3 TIMES DAILY
COMMUNITY
Start: 2023-06-12 | End: 2023-12-13 | Stop reason: SDUPTHER

## 2023-09-13 RX ORDER — METOPROLOL TARTRATE 25 MG/1
12.5 TABLET, FILM COATED ORAL 2 TIMES DAILY
COMMUNITY
Start: 2023-08-01 | End: 2023-09-13 | Stop reason: SDUPTHER

## 2023-09-13 RX ORDER — CALCIUM CITRATE/VITAMIN D3 200MG-6.25
TABLET ORAL
COMMUNITY
Start: 2023-07-27 | End: 2023-09-13 | Stop reason: SDUPTHER

## 2023-09-13 RX ORDER — FLUTICASONE PROPIONATE 50 MCG
1 SPRAY, SUSPENSION (ML) NASAL 2 TIMES DAILY
Status: ON HOLD | COMMUNITY
Start: 2023-06-12 | End: 2024-03-09

## 2023-09-19 PROBLEM — E11.9 ENCOUNTER FOR DIABETIC FOOT EXAM: Status: ACTIVE | Noted: 2023-03-29

## 2023-09-19 PROBLEM — Z01.00 EYE EXAM, ROUTINE: Status: ACTIVE | Noted: 2023-09-19

## 2023-09-19 RX ORDER — AMLODIPINE BESYLATE 10 MG/1
10 TABLET ORAL DAILY
Qty: 90 TABLET | Refills: 0 | Status: SHIPPED | OUTPATIENT
Start: 2023-09-19 | End: 2023-12-13 | Stop reason: SDUPTHER

## 2023-09-19 RX ORDER — INSULIN ASPART 100 [IU]/ML
0-5 INJECTION, SOLUTION INTRAVENOUS; SUBCUTANEOUS
Qty: 10 ML | Refills: 1 | Status: SHIPPED | OUTPATIENT
Start: 2023-09-19

## 2023-09-19 RX ORDER — LANCETS 33 GAUGE
1 EACH MISCELLANEOUS DAILY
Qty: 200 EACH | Refills: 2 | Status: SHIPPED | OUTPATIENT
Start: 2023-09-19

## 2023-09-19 RX ORDER — ATORVASTATIN CALCIUM 40 MG/1
40 TABLET, FILM COATED ORAL NIGHTLY
Qty: 90 TABLET | Refills: 1 | Status: SHIPPED | OUTPATIENT
Start: 2023-09-19 | End: 2023-12-13 | Stop reason: SDUPTHER

## 2023-09-19 RX ORDER — ZOLPIDEM TARTRATE 5 MG/1
5 TABLET ORAL NIGHTLY
Qty: 30 TABLET | Refills: 1 | Status: SHIPPED | OUTPATIENT
Start: 2023-09-19 | End: 2023-12-11

## 2023-09-19 RX ORDER — INSULIN DETEMIR 100 [IU]/ML
10 INJECTION, SOLUTION SUBCUTANEOUS NIGHTLY
Qty: 36 ML | Refills: 0 | Status: SHIPPED | OUTPATIENT
Start: 2023-09-19 | End: 2023-12-05 | Stop reason: SDUPTHER

## 2023-09-19 RX ORDER — POTASSIUM CHLORIDE 750 MG/1
10 TABLET, EXTENDED RELEASE ORAL DAILY
Qty: 90 TABLET | Refills: 0 | Status: SHIPPED | OUTPATIENT
Start: 2023-09-19 | End: 2023-11-16

## 2023-09-19 RX ORDER — SOD.CHLORID/POTASSIUM CHLORIDE 287-180-15
1 TABLET ORAL DAILY
Qty: 90 TABLET | Refills: 0 | Status: SHIPPED | OUTPATIENT
Start: 2023-09-19

## 2023-09-19 RX ORDER — METOLAZONE 2.5 MG/1
2.5 TABLET ORAL DAILY
Qty: 90 TABLET | Refills: 0 | Status: SHIPPED | OUTPATIENT
Start: 2023-09-19 | End: 2023-11-16

## 2023-09-19 RX ORDER — ASPIRIN 81 MG/1
81 TABLET ORAL DAILY
Qty: 90 TABLET | Refills: 1 | Status: SHIPPED | OUTPATIENT
Start: 2023-09-19 | End: 2023-12-13 | Stop reason: SDUPTHER

## 2023-09-19 RX ORDER — FUROSEMIDE 40 MG/1
40 TABLET ORAL DAILY
Qty: 7 TABLET | Refills: 0 | Status: SHIPPED | OUTPATIENT
Start: 2023-09-19 | End: 2023-12-13 | Stop reason: SDUPTHER

## 2023-09-19 RX ORDER — PANTOPRAZOLE SODIUM 40 MG/1
40 TABLET, DELAYED RELEASE ORAL DAILY
Qty: 90 TABLET | Refills: 1 | Status: SHIPPED | OUTPATIENT
Start: 2023-09-19 | End: 2023-12-13 | Stop reason: SDUPTHER

## 2023-09-19 RX ORDER — LISINOPRIL 5 MG/1
5 TABLET ORAL DAILY
Qty: 90 TABLET | Refills: 1 | Status: SHIPPED | OUTPATIENT
Start: 2023-09-19 | End: 2023-11-16

## 2023-09-19 RX ORDER — FERROUS SULFATE 325(65) MG
325 TABLET ORAL DAILY
Qty: 90 TABLET | Refills: 0 | Status: SHIPPED | OUTPATIENT
Start: 2023-09-19 | End: 2024-02-19 | Stop reason: ALTCHOICE

## 2023-09-19 RX ORDER — CALCIUM CITRATE/VITAMIN D3 200MG-6.25
TABLET ORAL
Qty: 200 EACH | Refills: 1 | Status: SHIPPED | OUTPATIENT
Start: 2023-09-19

## 2023-09-19 RX ORDER — METOPROLOL TARTRATE 25 MG/1
12.5 TABLET, FILM COATED ORAL 2 TIMES DAILY
Qty: 90 TABLET | Refills: 0 | Status: SHIPPED | OUTPATIENT
Start: 2023-09-19 | End: 2023-12-13 | Stop reason: SDUPTHER

## 2023-09-19 NOTE — PROGRESS NOTES
Subjective:       Patient ID: Karin Carrera is a 52 y.o. male.    Chief Complaint: Health Maintenance and Medication Refill  Patient presents with chronic hypertension also complains of chronic constipation.  Was voiced no complaints except he needs medications refilled   Hypertension refill amlodipine 10 mg 1 p.o. q.day  Has a history of a stroke  Refill statin Lipitor 40 mg 1 p.o. q.day.  Will give Pneumovax and tetanus shot today he  Refer to ophthalmology and podiatry.  Medication Refill  Pertinent negatives include no abdominal pain, arthralgias, chest pain, congestion, coughing, fatigue, myalgias or rash.     .    Current Medications:    Current Outpatient Medications:     albuterol (PROVENTIL/VENTOLIN HFA) 90 mcg/actuation inhaler, SMARTSI-2 Puff(s) By Mouth Every 4 Hours PRN, Disp: , Rfl:     docusate sodium (COLACE) 100 MG capsule, Take 100 mg by mouth., Disp: , Rfl:     fluticasone propionate (FLONASE) 50 mcg/actuation nasal spray, 1 spray by Each Nostril route 2 (two) times daily., Disp: , Rfl:     mupirocin (BACTROBAN) 2 % ointment, apply TO affected area(S) twice daily UNTIL HEALED, Disp: , Rfl:     amitriptyline (ELAVIL) 25 MG tablet, Take 25 mg by mouth every evening., Disp: , Rfl:     amLODIPine (NORVASC) 10 MG tablet, Take 1 tablet (10 mg total) by mouth once daily., Disp: 90 tablet, Rfl: 0    aspirin (ECOTRIN) 81 MG EC tablet, Take 1 tablet (81 mg total) by mouth once daily., Disp: 90 tablet, Rfl: 1    atorvastatin (LIPITOR) 40 MG tablet, Take 1 tablet (40 mg total) by mouth every evening., Disp: 90 tablet, Rfl: 1    benzonatate (TESSALON) 100 MG capsule, Take 100 mg by mouth 3 (three) times daily., Disp: , Rfl:     cephALEXin (KEFLEX) 500 MG capsule, Take 500 mg by mouth 3 (three) times daily., Disp: , Rfl:     empagliflozin (JARDIANCE) 10 mg tablet, Take 1 tablet (10 mg total) by mouth once daily., Disp: 30 tablet, Rfl: 11    ferrous sulfate (FEOSOL) 325 mg (65 mg iron) Tab tablet, Take  1 tablet (325 mg total) by mouth once daily., Disp: 90 tablet, Rfl: 0    furosemide (LASIX) 40 MG tablet, Take 1 tablet (40 mg total) by mouth once daily., Disp: 7 tablet, Rfl: 0    insulin aspart U-100 (NOVOLOG) 100 unit/mL injection, Inject 0-5 Units into the skin 3 (three) times daily before meals., Disp: 10 mL, Rfl: 1    insulin detemir U-100, Levemir, (LEVEMIR FLEXTOUCH U100 INSULIN) 100 unit/mL (3 mL) InPn pen, Inject 10 Units into the skin every evening., Disp: 36 mL, Rfl: 0    levoFLOXacin (LEVAQUIN) 750 MG tablet, Take 750 mg by mouth., Disp: , Rfl:     lisinopriL (PRINIVIL,ZESTRIL) 5 MG tablet, Take 1 tablet (5 mg total) by mouth once daily., Disp: 90 tablet, Rfl: 1    metOLazone (ZAROXOLYN) 2.5 MG tablet, Take 1 tablet (2.5 mg total) by mouth once daily., Disp: 90 tablet, Rfl: 0    metoprolol tartrate (LOPRESSOR) 25 MG tablet, Take 0.5 tablets (12.5 mg total) by mouth 2 (two) times daily., Disp: 90 tablet, Rfl: 0    MUCINEX 600 mg 12 hr tablet, Take 600 mg by mouth 2 (two) times daily., Disp: , Rfl:     pantoprazole (PROTONIX) 40 MG tablet, Take 1 tablet (40 mg total) by mouth once daily., Disp: 90 tablet, Rfl: 1    potassium chloride (KLOR-CON) 10 MEQ TbSR, Take 1 tablet (10 mEq total) by mouth once daily., Disp: 90 tablet, Rfl: 0    sulfamethoxazole-trimethoprim 800-160mg (BACTRIM DS) 800-160 mg Tab, Take 1 tablet by mouth 2 (two) times daily. (Patient not taking: Reported on 9/13/2023), Disp: 20 tablet, Rfl: 0    THERMOTABS 287-180-15 mg Tab, Take 1 tablet by mouth once daily., Disp: 90 tablet, Rfl: 0    TRUE METRIX GLUCOSE TEST STRIP Strp, USE TO CHECK BLOOD SUGAR AS DIRECTED, Disp: 200 each, Rfl: 1    TRUEPLUS LANCETS 33 gauge Misc, 1 lancet  by Misc.(Non-Drug; Combo Route) route once daily. use as directed, Disp: 200 each, Rfl: 2    zolpidem (AMBIEN) 5 MG Tab, Take 1 tablet (5 mg total) by mouth every evening., Disp: 30 tablet, Rfl: 1           Review of Systems   Constitutional:  Negative for  "appetite change and fatigue.   HENT:  Negative for nasal congestion and rhinorrhea.    Eyes:  Negative for redness and visual disturbance.   Respiratory:  Negative for cough and shortness of breath.    Cardiovascular:  Negative for chest pain and leg swelling.   Gastrointestinal:  Negative for abdominal pain, constipation and diarrhea.   Endocrine: Negative for polyuria.   Genitourinary:  Negative for difficulty urinating, dysuria and erectile dysfunction.   Musculoskeletal:  Negative for arthralgias and myalgias.   Integumentary:  Negative for rash and mole/lesion.   All other systems reviewed and are negative.               Vitals:    09/13/23 0902   BP: 120/72   BP Location: Left arm   Patient Position: Sitting   BP Method: Medium (Manual)   Pulse: 104   Resp: 18   Temp: 97.1 °F (36.2 °C)   TempSrc: Temporal   SpO2: 96%   Weight: 89.8 kg (198 lb)   Height: 5' 11" (1.803 m)        Physical Exam  Vitals and nursing note reviewed.   Constitutional:       Appearance: Normal appearance.   Cardiovascular:      Rate and Rhythm: Normal rate and regular rhythm.      Pulses: Normal pulses.      Heart sounds: Normal heart sounds.   Pulmonary:      Effort: Pulmonary effort is normal.      Breath sounds: Normal breath sounds.   Abdominal:      General: Abdomen is flat. Bowel sounds are normal.      Palpations: Abdomen is soft.   Musculoskeletal:         General: Normal range of motion.   Skin:     General: Skin is warm and dry.   Neurological:      General: No focal deficit present.      Mental Status: He is alert and oriented to person, place, and time. Mental status is at baseline.           Last Labs:     No visits with results within 1 Month(s) from this visit.   Latest known visit with results is:   Office Visit on 03/27/2023   Component Date Value    HIV 1/2 03/27/2023 Non-Reactive     Creatinine, Urine 03/27/2023 65     Microalbumin 03/27/2023 0.8     Microalbumin/Creatinine * 03/27/2023 12.3     Protein, Urine " 03/27/2023 12.5 (H)        Last Imaging:  X-Ray Chest 1 View  Narrative: EXAMINATION:  XR CHEST 1 VIEW    CLINICAL HISTORY:  .  Acute cough    COMPARISON:  January 4, 2023    TECHNIQUE:  Chest x-ray AP    FINDINGS:  There is continued cardiomegaly.  Mediastinal contours are unchanged.  Prior median sternotomy.  Pulmonary vasculature is upper normal.  There is some continued hazy opacity in the lung bases.  There is some platelike scarring in the left mid to lower lung.  There is unchanged mild pleural disease.    Osseous structures are similar  Impression: Cardiomegaly and suspected continued CHF.  Grossly similar to the previous study    Electronically signed by: Mikey Sepulveda  Date:    01/25/2023  Time:    15:49         **Labs and x-rays personally reviewed by me    ** reviewed      Objective:        Assessment:       1. Eye exam, routine  Ambulatory referral/consult to Ophthalmology      2. Diabetes mellitus of other type without complication, unspecified whether long term insulin use        3. Encounter for diabetic foot exam  Ambulatory referral/consult to Podiatry           Plan:         [unfilled]

## 2023-11-16 ENCOUNTER — OFFICE VISIT (OUTPATIENT)
Dept: CARDIOLOGY | Facility: CLINIC | Age: 53
End: 2023-11-16
Payer: MEDICAID

## 2023-11-16 VITALS
RESPIRATION RATE: 16 BRPM | HEART RATE: 102 BPM | DIASTOLIC BLOOD PRESSURE: 70 MMHG | BODY MASS INDEX: 27.72 KG/M2 | HEIGHT: 71 IN | WEIGHT: 198 LBS | SYSTOLIC BLOOD PRESSURE: 130 MMHG

## 2023-11-16 DIAGNOSIS — I25.810 CORONARY ARTERY DISEASE INVOLVING CORONARY BYPASS GRAFT OF NATIVE HEART WITHOUT ANGINA PECTORIS: ICD-10-CM

## 2023-11-16 DIAGNOSIS — I50.22 CHRONIC SYSTOLIC CONGESTIVE HEART FAILURE: Primary | ICD-10-CM

## 2023-11-16 DIAGNOSIS — E87.6 HYPOKALEMIA: ICD-10-CM

## 2023-11-16 DIAGNOSIS — Z86.73 HISTORY OF CVA (CEREBROVASCULAR ACCIDENT): ICD-10-CM

## 2023-11-16 DIAGNOSIS — I10 HYPERTENSION, UNSPECIFIED TYPE: Primary | ICD-10-CM

## 2023-11-16 DIAGNOSIS — I10 HYPERTENSION, UNSPECIFIED TYPE: ICD-10-CM

## 2023-11-16 PROCEDURE — 3008F PR BODY MASS INDEX (BMI) DOCUMENTED: ICD-10-PCS | Mod: CPTII,,, | Performed by: STUDENT IN AN ORGANIZED HEALTH CARE EDUCATION/TRAINING PROGRAM

## 2023-11-16 PROCEDURE — 3078F DIAST BP <80 MM HG: CPT | Mod: CPTII,,, | Performed by: STUDENT IN AN ORGANIZED HEALTH CARE EDUCATION/TRAINING PROGRAM

## 2023-11-16 PROCEDURE — 3078F PR MOST RECENT DIASTOLIC BLOOD PRESSURE < 80 MM HG: ICD-10-PCS | Mod: CPTII,,, | Performed by: STUDENT IN AN ORGANIZED HEALTH CARE EDUCATION/TRAINING PROGRAM

## 2023-11-16 PROCEDURE — 3075F PR MOST RECENT SYSTOLIC BLOOD PRESS GE 130-139MM HG: ICD-10-PCS | Mod: CPTII,,, | Performed by: STUDENT IN AN ORGANIZED HEALTH CARE EDUCATION/TRAINING PROGRAM

## 2023-11-16 PROCEDURE — 93005 ELECTROCARDIOGRAM TRACING: CPT | Mod: PBBFAC | Performed by: STUDENT IN AN ORGANIZED HEALTH CARE EDUCATION/TRAINING PROGRAM

## 2023-11-16 PROCEDURE — 3075F SYST BP GE 130 - 139MM HG: CPT | Mod: CPTII,,, | Performed by: STUDENT IN AN ORGANIZED HEALTH CARE EDUCATION/TRAINING PROGRAM

## 2023-11-16 PROCEDURE — 3061F NEG MICROALBUMINURIA REV: CPT | Mod: CPTII,,, | Performed by: STUDENT IN AN ORGANIZED HEALTH CARE EDUCATION/TRAINING PROGRAM

## 2023-11-16 PROCEDURE — 3066F NEPHROPATHY DOC TX: CPT | Mod: CPTII,,, | Performed by: STUDENT IN AN ORGANIZED HEALTH CARE EDUCATION/TRAINING PROGRAM

## 2023-11-16 PROCEDURE — 1159F PR MEDICATION LIST DOCUMENTED IN MEDICAL RECORD: ICD-10-PCS | Mod: CPTII,,, | Performed by: STUDENT IN AN ORGANIZED HEALTH CARE EDUCATION/TRAINING PROGRAM

## 2023-11-16 PROCEDURE — 4010F PR ACE/ARB THEARPY RXD/TAKEN: ICD-10-PCS | Mod: CPTII,,, | Performed by: STUDENT IN AN ORGANIZED HEALTH CARE EDUCATION/TRAINING PROGRAM

## 2023-11-16 PROCEDURE — 99214 PR OFFICE/OUTPT VISIT, EST, LEVL IV, 30-39 MIN: ICD-10-PCS | Mod: S$PBB,,, | Performed by: STUDENT IN AN ORGANIZED HEALTH CARE EDUCATION/TRAINING PROGRAM

## 2023-11-16 PROCEDURE — 4010F ACE/ARB THERAPY RXD/TAKEN: CPT | Mod: CPTII,,, | Performed by: STUDENT IN AN ORGANIZED HEALTH CARE EDUCATION/TRAINING PROGRAM

## 2023-11-16 PROCEDURE — 99214 OFFICE O/P EST MOD 30 MIN: CPT | Mod: S$PBB,,, | Performed by: STUDENT IN AN ORGANIZED HEALTH CARE EDUCATION/TRAINING PROGRAM

## 2023-11-16 PROCEDURE — 93010 ELECTROCARDIOGRAM REPORT: CPT | Mod: S$PBB,,, | Performed by: STUDENT IN AN ORGANIZED HEALTH CARE EDUCATION/TRAINING PROGRAM

## 2023-11-16 PROCEDURE — 1159F MED LIST DOCD IN RCRD: CPT | Mod: CPTII,,, | Performed by: STUDENT IN AN ORGANIZED HEALTH CARE EDUCATION/TRAINING PROGRAM

## 2023-11-16 PROCEDURE — 3066F PR DOCUMENTATION OF TREATMENT FOR NEPHROPATHY: ICD-10-PCS | Mod: CPTII,,, | Performed by: STUDENT IN AN ORGANIZED HEALTH CARE EDUCATION/TRAINING PROGRAM

## 2023-11-16 PROCEDURE — 93010 EKG 12-LEAD: ICD-10-PCS | Mod: S$PBB,,, | Performed by: STUDENT IN AN ORGANIZED HEALTH CARE EDUCATION/TRAINING PROGRAM

## 2023-11-16 PROCEDURE — 3061F PR NEG MICROALBUMINURIA RESULT DOCUMENTED/REVIEW: ICD-10-PCS | Mod: CPTII,,, | Performed by: STUDENT IN AN ORGANIZED HEALTH CARE EDUCATION/TRAINING PROGRAM

## 2023-11-16 PROCEDURE — 3008F BODY MASS INDEX DOCD: CPT | Mod: CPTII,,, | Performed by: STUDENT IN AN ORGANIZED HEALTH CARE EDUCATION/TRAINING PROGRAM

## 2023-11-16 PROCEDURE — 99215 OFFICE O/P EST HI 40 MIN: CPT | Mod: PBBFAC | Performed by: STUDENT IN AN ORGANIZED HEALTH CARE EDUCATION/TRAINING PROGRAM

## 2023-11-16 RX ORDER — POTASSIUM CHLORIDE 20 MEQ/1
20 TABLET, EXTENDED RELEASE ORAL
COMMUNITY
Start: 2023-10-17 | End: 2023-12-13 | Stop reason: SDUPTHER

## 2023-11-16 NOTE — PROGRESS NOTES
PCP: Walker Smith MD    Referring Provider:     Subjective:   Karin Carrera is a 52 y.o. male with hx of CAD s/p CABG, ischemic cardiomyopathy (EF 20%), CVA, with expressive aphasia and right arm weakness,  HTN, GERD, ETOH abuse, and nicotine abuse  who presents for follow up    11/16/23 - Doing well. Denies any complaints. Medication list updated (brought med labels).      5/8/23 - Patient is doing well. Appears compensated. Has aphasia from prior CVA. Did not bring his meds. Reports taking 9 pills and does not want to start any new pills.     2/2023- Previously a patient of Dr. Paez; last seen 2015 - lost to follow up.    Patient was admitted to the hospital in 12/2022 for decompensated HF and was discharged after IV diuresis  Today he is here in a wheelchair; has expressive aphasia.   He lives in a NH; denies any symptoms. He does not want to start any new medications. Expresses disappointment and frustration  I explained to him that he needs to be on HF GDMT if he wants to avoid further hospital admissions.     Fhx: non-contributary  Shx: Smoker, ETOH use *3-4 drinks /day, unknown drug use    EKG 2/7/23 - Sinus tachy with PVC, LVH, non-spec ST abnol  ECHO - 01/01/22  · The left ventricle is severely enlarged with mild eccentric hypertrophy and severely decreased systolic function.  · The estimated ejection fraction is 20%.  · There are segmental left ventricular wall motion abnormalities.  · Left ventricular diastolic dysfunction.  · Atrial fibrillation not observed.  · Normal right ventricular size.  · Mild mitral regurgitation.  · Normal central venous pressure (3 mmHg).    [facility] 2017 - Severe 2VCAD - occuled LAD (LIMA is atretic as well as LAD), LCX patent, RCA  with patent SVG to PDA and patent SVG to Diagonal      Lab Results   Component Value Date     (L) 02/24/2023    K 2.9 (L) 02/24/2023    CL 95 (L) 02/24/2023    CO2 27 02/24/2023    BUN 14 02/24/2023    CREATININE 0.99 02/24/2023     "CALCIUM 9.4 2023    ANIONGAP 15 2023    EGFRNONAA 126 2022       No results found for: "CHOL"  No results found for: "HDL"  No results found for: "LDLCALC"  No results found for: "TRIG"  No results found for: "CHOLHDL"    Lab Results   Component Value Date    WBC 7.84 2023    HGB 14.4 2023    HCT 45.1 2023    MCV 77.4 (L) 2023     2023           Current Outpatient Medications:     amLODIPine (NORVASC) 10 MG tablet, Take 1 tablet (10 mg total) by mouth once daily., Disp: 90 tablet, Rfl: 0    aspirin (ECOTRIN) 81 MG EC tablet, Take 1 tablet (81 mg total) by mouth once daily., Disp: 90 tablet, Rfl: 1    atorvastatin (LIPITOR) 40 MG tablet, Take 1 tablet (40 mg total) by mouth every evening., Disp: 90 tablet, Rfl: 1    empagliflozin (JARDIANCE) 10 mg tablet, Take 1 tablet (10 mg total) by mouth once daily., Disp: 30 tablet, Rfl: 11    furosemide (LASIX) 40 MG tablet, Take 1 tablet (40 mg total) by mouth once daily., Disp: 7 tablet, Rfl: 0    metoprolol tartrate (LOPRESSOR) 25 MG tablet, Take 0.5 tablets (12.5 mg total) by mouth 2 (two) times daily., Disp: 90 tablet, Rfl: 0    pantoprazole (PROTONIX) 40 MG tablet, Take 1 tablet (40 mg total) by mouth once daily., Disp: 90 tablet, Rfl: 1    potassium chloride SA (K-DUR,KLOR-CON) 20 MEQ tablet, Take 20 mEq by mouth., Disp: , Rfl:     zolpidem (AMBIEN) 5 MG Tab, Take 1 tablet (5 mg total) by mouth every evening., Disp: 30 tablet, Rfl: 1    albuterol (PROVENTIL/VENTOLIN HFA) 90 mcg/actuation inhaler, SMARTSI-2 Puff(s) By Mouth Every 4 Hours PRN, Disp: , Rfl:     benzonatate (TESSALON) 100 MG capsule, Take 100 mg by mouth 3 (three) times daily., Disp: , Rfl:     docusate sodium (COLACE) 100 MG capsule, Take 100 mg by mouth., Disp: , Rfl:     ferrous sulfate (FEOSOL) 325 mg (65 mg iron) Tab tablet, Take 1 tablet (325 mg total) by mouth once daily., Disp: 90 tablet, Rfl: 0    fluticasone propionate (FLONASE) 50 " "mcg/actuation nasal spray, 1 spray by Each Nostril route 2 (two) times daily., Disp: , Rfl:     insulin aspart U-100 (NOVOLOG) 100 unit/mL injection, Inject 0-5 Units into the skin 3 (three) times daily before meals., Disp: 10 mL, Rfl: 1    insulin detemir U-100, Levemir, (LEVEMIR FLEXTOUCH U100 INSULIN) 100 unit/mL (3 mL) InPn pen, Inject 10 Units into the skin every evening., Disp: 36 mL, Rfl: 0    MUCINEX 600 mg 12 hr tablet, Take 600 mg by mouth 2 (two) times daily., Disp: , Rfl:     mupirocin (BACTROBAN) 2 % ointment, apply TO affected area(S) twice daily UNTIL HEALED, Disp: , Rfl:     THERMOTABS 287-180-15 mg Tab, Take 1 tablet by mouth once daily., Disp: 90 tablet, Rfl: 0    TRUE METRIX GLUCOSE TEST STRIP Strp, USE TO CHECK BLOOD SUGAR AS DIRECTED, Disp: 200 each, Rfl: 1    TRUEPLUS LANCETS 33 gauge Misc, 1 lancet  by Misc.(Non-Drug; Combo Route) route once daily. use as directed, Disp: 200 each, Rfl: 2    Review of Systems   Respiratory:  Negative for cough and shortness of breath.    Cardiovascular:  Negative for chest pain, palpitations, orthopnea, claudication, leg swelling and PND.         Objective:   /70   Pulse 102   Resp 16   Ht 5' 11" (1.803 m)   Wt 89.8 kg (198 lb)   BMI 27.62 kg/m²     Physical Exam  Vitals and nursing note reviewed.   Constitutional:       Appearance: Normal appearance.   Cardiovascular:      Rate and Rhythm: Normal rate and regular rhythm.      Pulses: Normal pulses.      Heart sounds: Normal heart sounds.   Pulmonary:      Breath sounds: Normal breath sounds.   Neurological:      Mental Status: He is alert and oriented to person, place, and time. Mental status is at baseline.           Assessment:     1. Chronic systolic congestive heart failure  Basic Metabolic Panel    CBC Auto Differential      2. History of CVA (cerebrovascular accident)        3. Coronary artery disease involving coronary bypass graft of native heart without angina pectoris        4. Hypokalemia "                Plan:   History of CVA (cerebrovascular accident)  Hx of CVA with residual defects      Coronary artery disease involving coronary bypass graft of native heart without angina pectoris  Severe 2VCAD - occuled LAD (LIMA is atretic as well as LAD), LCX patent, RCA  with patent SVG to PDA and patent SVG to Diagonal  - On aspirin and statin therapy  - LAD is atretic as well as LIMA    Chronic systolic congestive heart failure  Ischemic cardiomyopathy; EF 20%, NYHA III , Stage C  Euvolemic on lasix 40mg qd and metolazone and jardiance. Will stop metolazone given hypokalemia  GDMT - continue 25mg bid    Not taking lisinopril; expresses frustration at starting entresto or anyother meds  Devices; none -patient would likely qualify for ICD however is not taking GDMT        Hypokalemia  Stop metolazone  Check BMP today

## 2023-11-16 NOTE — ASSESSMENT & PLAN NOTE
Ischemic cardiomyopathy; EF 20%, NYHA III , Stage C  Euvolemic on lasix 40mg qd and metolazone and jardiance. Will stop metolazone given hypokalemia  GDMT - continue 25mg bid    Not taking lisinopril; expresses frustration at starting entresto or anyother meds  Devices; none -patient would likely qualify for ICD however is not taking GDMT

## 2023-12-06 RX ORDER — INSULIN DETEMIR 100 [IU]/ML
10 INJECTION, SOLUTION SUBCUTANEOUS NIGHTLY
Qty: 36 ML | Refills: 0 | Status: SHIPPED | OUTPATIENT
Start: 2023-12-06 | End: 2023-12-13 | Stop reason: SDUPTHER

## 2023-12-11 RX ORDER — ZOLPIDEM TARTRATE 5 MG/1
5 TABLET ORAL NIGHTLY
Qty: 30 TABLET | Refills: 1 | Status: SHIPPED | OUTPATIENT
Start: 2023-12-11 | End: 2023-12-13 | Stop reason: SDUPTHER

## 2023-12-13 ENCOUNTER — OFFICE VISIT (OUTPATIENT)
Dept: FAMILY MEDICINE | Facility: CLINIC | Age: 53
End: 2023-12-13
Payer: MEDICAID

## 2023-12-13 VITALS
HEIGHT: 71 IN | BODY MASS INDEX: 28.14 KG/M2 | RESPIRATION RATE: 17 BRPM | WEIGHT: 201 LBS | OXYGEN SATURATION: 96 % | TEMPERATURE: 98 F | HEART RATE: 104 BPM | SYSTOLIC BLOOD PRESSURE: 127 MMHG | DIASTOLIC BLOOD PRESSURE: 79 MMHG

## 2023-12-13 DIAGNOSIS — E13.9 DIABETES MELLITUS OF OTHER TYPE WITHOUT COMPLICATION, UNSPECIFIED WHETHER LONG TERM INSULIN USE: Primary | ICD-10-CM

## 2023-12-13 DIAGNOSIS — E78.5 DYSLIPIDEMIA: ICD-10-CM

## 2023-12-13 DIAGNOSIS — I10 ESSENTIAL (PRIMARY) HYPERTENSION: ICD-10-CM

## 2023-12-13 LAB
ANION GAP SERPL CALCULATED.3IONS-SCNC: 10 MMOL/L (ref 7–16)
BUN SERPL-MCNC: 11 MG/DL (ref 7–18)
BUN/CREAT SERPL: 13 (ref 6–20)
CALCIUM SERPL-MCNC: 8.9 MG/DL (ref 8.5–10.1)
CHLORIDE SERPL-SCNC: 106 MMOL/L (ref 98–107)
CO2 SERPL-SCNC: 26 MMOL/L (ref 21–32)
CREAT SERPL-MCNC: 0.87 MG/DL (ref 0.7–1.3)
EGFR (NO RACE VARIABLE) (RUSH/TITUS): 103 ML/MIN/1.73M2
EST. AVERAGE GLUCOSE BLD GHB EST-MCNC: 120 MG/DL
GLUCOSE SERPL-MCNC: 181 MG/DL (ref 74–106)
HBA1C MFR BLD HPLC: 5.8 % (ref 4.5–6.6)
POTASSIUM SERPL-SCNC: 4 MMOL/L (ref 3.5–5.1)
SODIUM SERPL-SCNC: 138 MMOL/L (ref 136–145)

## 2023-12-13 PROCEDURE — 3061F PR NEG MICROALBUMINURIA RESULT DOCUMENTED/REVIEW: ICD-10-PCS | Mod: CPTII,,, | Performed by: INTERNAL MEDICINE

## 2023-12-13 PROCEDURE — 1159F PR MEDICATION LIST DOCUMENTED IN MEDICAL RECORD: ICD-10-PCS | Mod: CPTII,,, | Performed by: INTERNAL MEDICINE

## 2023-12-13 PROCEDURE — 3066F PR DOCUMENTATION OF TREATMENT FOR NEPHROPATHY: ICD-10-PCS | Mod: CPTII,,, | Performed by: INTERNAL MEDICINE

## 2023-12-13 PROCEDURE — 3061F NEG MICROALBUMINURIA REV: CPT | Mod: CPTII,,, | Performed by: INTERNAL MEDICINE

## 2023-12-13 PROCEDURE — 3044F HG A1C LEVEL LT 7.0%: CPT | Mod: CPTII,,, | Performed by: INTERNAL MEDICINE

## 2023-12-13 PROCEDURE — 3008F BODY MASS INDEX DOCD: CPT | Mod: CPTII,,, | Performed by: INTERNAL MEDICINE

## 2023-12-13 PROCEDURE — 4010F ACE/ARB THERAPY RXD/TAKEN: CPT | Mod: CPTII,,, | Performed by: INTERNAL MEDICINE

## 2023-12-13 PROCEDURE — 3074F PR MOST RECENT SYSTOLIC BLOOD PRESSURE < 130 MM HG: ICD-10-PCS | Mod: CPTII,,, | Performed by: INTERNAL MEDICINE

## 2023-12-13 PROCEDURE — 99214 PR OFFICE/OUTPT VISIT, EST, LEVL IV, 30-39 MIN: ICD-10-PCS | Mod: ,,, | Performed by: INTERNAL MEDICINE

## 2023-12-13 PROCEDURE — 99214 OFFICE O/P EST MOD 30 MIN: CPT | Mod: ,,, | Performed by: INTERNAL MEDICINE

## 2023-12-13 PROCEDURE — 4010F PR ACE/ARB THEARPY RXD/TAKEN: ICD-10-PCS | Mod: CPTII,,, | Performed by: INTERNAL MEDICINE

## 2023-12-13 PROCEDURE — 1160F RVW MEDS BY RX/DR IN RCRD: CPT | Mod: CPTII,,, | Performed by: INTERNAL MEDICINE

## 2023-12-13 PROCEDURE — 3078F DIAST BP <80 MM HG: CPT | Mod: CPTII,,, | Performed by: INTERNAL MEDICINE

## 2023-12-13 PROCEDURE — 83036 HEMOGLOBIN GLYCOSYLATED A1C: CPT | Mod: ,,, | Performed by: CLINICAL MEDICAL LABORATORY

## 2023-12-13 PROCEDURE — 80048 BASIC METABOLIC PANEL: ICD-10-PCS | Mod: ,,, | Performed by: CLINICAL MEDICAL LABORATORY

## 2023-12-13 PROCEDURE — 3044F PR MOST RECENT HEMOGLOBIN A1C LEVEL <7.0%: ICD-10-PCS | Mod: CPTII,,, | Performed by: INTERNAL MEDICINE

## 2023-12-13 PROCEDURE — 3074F SYST BP LT 130 MM HG: CPT | Mod: CPTII,,, | Performed by: INTERNAL MEDICINE

## 2023-12-13 PROCEDURE — 83036 HEMOGLOBIN A1C: ICD-10-PCS | Mod: ,,, | Performed by: CLINICAL MEDICAL LABORATORY

## 2023-12-13 PROCEDURE — 3066F NEPHROPATHY DOC TX: CPT | Mod: CPTII,,, | Performed by: INTERNAL MEDICINE

## 2023-12-13 PROCEDURE — 1160F PR REVIEW ALL MEDS BY PRESCRIBER/CLIN PHARMACIST DOCUMENTED: ICD-10-PCS | Mod: CPTII,,, | Performed by: INTERNAL MEDICINE

## 2023-12-13 PROCEDURE — 80048 BASIC METABOLIC PNL TOTAL CA: CPT | Mod: ,,, | Performed by: CLINICAL MEDICAL LABORATORY

## 2023-12-13 PROCEDURE — 3008F PR BODY MASS INDEX (BMI) DOCUMENTED: ICD-10-PCS | Mod: CPTII,,, | Performed by: INTERNAL MEDICINE

## 2023-12-13 PROCEDURE — 3078F PR MOST RECENT DIASTOLIC BLOOD PRESSURE < 80 MM HG: ICD-10-PCS | Mod: CPTII,,, | Performed by: INTERNAL MEDICINE

## 2023-12-13 PROCEDURE — 1159F MED LIST DOCD IN RCRD: CPT | Mod: CPTII,,, | Performed by: INTERNAL MEDICINE

## 2023-12-13 RX ORDER — METOLAZONE 2.5 MG/1
2.5 TABLET ORAL
COMMUNITY
Start: 2023-11-19 | End: 2024-02-19 | Stop reason: ALTCHOICE

## 2023-12-13 RX ORDER — ATORVASTATIN CALCIUM 40 MG/1
40 TABLET, FILM COATED ORAL NIGHTLY
Qty: 90 TABLET | Refills: 1 | Status: SHIPPED | OUTPATIENT
Start: 2023-12-13 | End: 2024-02-29 | Stop reason: SDUPTHER

## 2023-12-13 RX ORDER — AMLODIPINE BESYLATE 10 MG/1
10 TABLET ORAL DAILY
Qty: 90 TABLET | Refills: 0 | Status: ON HOLD | OUTPATIENT
Start: 2023-12-13 | End: 2024-02-28 | Stop reason: HOSPADM

## 2023-12-13 RX ORDER — LISINOPRIL 5 MG/1
5 TABLET ORAL
Status: ON HOLD | COMMUNITY
Start: 2023-11-19 | End: 2024-02-28 | Stop reason: HOSPADM

## 2023-12-13 RX ORDER — PANTOPRAZOLE SODIUM 40 MG/1
40 TABLET, DELAYED RELEASE ORAL DAILY
Qty: 90 TABLET | Refills: 1 | Status: SHIPPED | OUTPATIENT
Start: 2023-12-13 | End: 2024-02-29 | Stop reason: SDUPTHER

## 2023-12-13 RX ORDER — BENZONATATE 200 MG/1
200 CAPSULE ORAL 2 TIMES DAILY
Qty: 60 CAPSULE | Refills: 3 | Status: SHIPPED | OUTPATIENT
Start: 2023-12-13 | End: 2024-01-11 | Stop reason: SDUPTHER

## 2023-12-13 RX ORDER — METOPROLOL TARTRATE 25 MG/1
12.5 TABLET, FILM COATED ORAL 2 TIMES DAILY
Qty: 90 TABLET | Refills: 0 | Status: ON HOLD | OUTPATIENT
Start: 2023-12-13 | End: 2024-02-28 | Stop reason: HOSPADM

## 2023-12-13 RX ORDER — POTASSIUM CHLORIDE 20 MEQ/1
20 TABLET, EXTENDED RELEASE ORAL DAILY
Qty: 30 TABLET | Refills: 0 | Status: SHIPPED | OUTPATIENT
Start: 2023-12-13 | End: 2024-02-19 | Stop reason: ALTCHOICE

## 2023-12-13 RX ORDER — FUROSEMIDE 40 MG/1
40 TABLET ORAL DAILY
Qty: 7 TABLET | Refills: 0 | Status: ON HOLD | OUTPATIENT
Start: 2023-12-13 | End: 2024-02-28

## 2023-12-13 RX ORDER — ASPIRIN 81 MG/1
81 TABLET ORAL DAILY
Qty: 90 TABLET | Refills: 1 | Status: SHIPPED | OUTPATIENT
Start: 2023-12-13

## 2023-12-13 RX ORDER — ZOLPIDEM TARTRATE 5 MG/1
5 TABLET ORAL NIGHTLY
Qty: 30 TABLET | Refills: 1 | Status: SHIPPED | OUTPATIENT
Start: 2023-12-13 | End: 2024-02-29

## 2023-12-13 RX ORDER — INSULIN DETEMIR 100 [IU]/ML
10 INJECTION, SOLUTION SUBCUTANEOUS NIGHTLY
Qty: 36 ML | Refills: 0 | Status: SHIPPED | OUTPATIENT
Start: 2023-12-13 | End: 2024-02-29 | Stop reason: SDUPTHER

## 2023-12-16 PROBLEM — E78.5 DYSLIPIDEMIA: Status: ACTIVE | Noted: 2023-12-16

## 2023-12-16 NOTE — PROGRESS NOTES
Subjective:       Patient ID: Karin Carrera is a 53 y.o. male.    Chief Complaint: Follow-up and Medication Refill  Patient seen and evaluated patient is in no acute distress patient complains of a chronic severe cough.  Patient does have chronic hypertension chronic dyslipidemia and also chronic hypokalemia and also patient has hyponatremia today I am going to check a follow-up hemoglobin A1c on that is due for today will check a BMP.  For the cough I will write Tessalon Perles g 1 p.o. b.i.d. additional medicines to be refill will be Ambien 5 mg 1 p.o. q.h.s. for insomnia and Protonix 40 mg 1 p.o. q.day GERD  Follow-up  Pertinent negatives include no abdominal pain, arthralgias, chest pain, congestion, coughing, fatigue, myalgias or rash.   Medication Refill  Pertinent negatives include no abdominal pain, arthralgias, chest pain, congestion, coughing, fatigue, myalgias or rash.     .    Current Medications:    Current Outpatient Medications:     albuterol (PROVENTIL/VENTOLIN HFA) 90 mcg/actuation inhaler, SMARTSI-2 Puff(s) By Mouth Every 4 Hours PRN, Disp: , Rfl:     docusate sodium (COLACE) 100 MG capsule, Take 100 mg by mouth., Disp: , Rfl:     ferrous sulfate (FEOSOL) 325 mg (65 mg iron) Tab tablet, Take 1 tablet (325 mg total) by mouth once daily., Disp: 90 tablet, Rfl: 0    fluticasone propionate (FLONASE) 50 mcg/actuation nasal spray, 1 spray by Each Nostril route 2 (two) times daily., Disp: , Rfl:     insulin aspart U-100 (NOVOLOG) 100 unit/mL injection, Inject 0-5 Units into the skin 3 (three) times daily before meals., Disp: 10 mL, Rfl: 1    lisinopriL (PRINIVIL,ZESTRIL) 5 MG tablet, Take 5 mg by mouth., Disp: , Rfl:     metOLazone (ZAROXOLYN) 2.5 MG tablet, Take 2.5 mg by mouth., Disp: , Rfl:     MUCINEX 600 mg 12 hr tablet, Take 600 mg by mouth 2 (two) times daily., Disp: , Rfl:     mupirocin (BACTROBAN) 2 % ointment, apply TO affected area(S) twice daily UNTIL HEALED, Disp: , Rfl:      THERMOTABS 287-180-15 mg Tab, Take 1 tablet by mouth once daily., Disp: 90 tablet, Rfl: 0    TRUE METRIX GLUCOSE TEST STRIP Strp, USE TO CHECK BLOOD SUGAR AS DIRECTED, Disp: 200 each, Rfl: 1    TRUEPLUS LANCETS 33 gauge Misc, 1 lancet  by Misc.(Non-Drug; Combo Route) route once daily. use as directed, Disp: 200 each, Rfl: 2    amLODIPine (NORVASC) 10 MG tablet, Take 1 tablet (10 mg total) by mouth once daily., Disp: 90 tablet, Rfl: 0    aspirin (ECOTRIN) 81 MG EC tablet, Take 1 tablet (81 mg total) by mouth once daily., Disp: 90 tablet, Rfl: 1    atorvastatin (LIPITOR) 40 MG tablet, Take 1 tablet (40 mg total) by mouth every evening., Disp: 90 tablet, Rfl: 1    benzonatate (TESSALON) 200 MG capsule, Take 1 capsule (200 mg total) by mouth 2 (two) times a day., Disp: 60 capsule, Rfl: 3    empagliflozin (JARDIANCE) 10 mg tablet, Take 1 tablet (10 mg total) by mouth once daily., Disp: 30 tablet, Rfl: 11    furosemide (LASIX) 40 MG tablet, Take 1 tablet (40 mg total) by mouth once daily., Disp: 7 tablet, Rfl: 0    insulin detemir U-100, Levemir, (LEVEMIR FLEXTOUCH U100 INSULIN) 100 unit/mL (3 mL) InPn pen, Inject 10 Units into the skin every evening., Disp: 36 mL, Rfl: 0    metoprolol tartrate (LOPRESSOR) 25 MG tablet, Take 0.5 tablets (12.5 mg total) by mouth 2 (two) times daily., Disp: 90 tablet, Rfl: 0    pantoprazole (PROTONIX) 40 MG tablet, Take 1 tablet (40 mg total) by mouth once daily., Disp: 90 tablet, Rfl: 1    potassium chloride SA (K-DUR,KLOR-CON) 20 MEQ tablet, Take 1 tablet (20 mEq total) by mouth once daily., Disp: 30 tablet, Rfl: 0    zolpidem (AMBIEN) 5 MG Tab, Take 1 tablet (5 mg total) by mouth every evening., Disp: 30 tablet, Rfl: 1           Review of Systems   Constitutional:  Negative for appetite change and fatigue.   HENT:  Negative for nasal congestion and rhinorrhea.    Eyes:  Negative for redness and visual disturbance.   Respiratory:  Negative for cough and shortness of breath.   "  Cardiovascular:  Negative for chest pain and leg swelling.   Gastrointestinal:  Negative for abdominal pain, constipation and diarrhea.   Endocrine: Negative for polyuria.   Genitourinary:  Negative for difficulty urinating, dysuria and erectile dysfunction.   Musculoskeletal:  Negative for arthralgias and myalgias.   Integumentary:  Negative for rash and mole/lesion.   All other systems reviewed and are negative.               Vitals:    12/13/23 1010   BP: 127/79   BP Location: Right arm   Patient Position: Sitting   BP Method: Large (Automatic)   Pulse: 104   Resp: 17   Temp: 97.6 °F (36.4 °C)   TempSrc: Temporal   SpO2: 96%   Weight: 91.2 kg (201 lb)   Height: 5' 11" (1.803 m)        Physical Exam  Vitals and nursing note reviewed.   Constitutional:       Appearance: Normal appearance.   Cardiovascular:      Rate and Rhythm: Normal rate and regular rhythm.      Pulses: Normal pulses.      Heart sounds: Normal heart sounds.   Pulmonary:      Effort: Pulmonary effort is normal.      Breath sounds: Normal breath sounds.   Abdominal:      General: Abdomen is flat. Bowel sounds are normal.      Palpations: Abdomen is soft.   Musculoskeletal:         General: Normal range of motion.   Skin:     General: Skin is warm and dry.   Neurological:      General: No focal deficit present.      Mental Status: He is alert and oriented to person, place, and time. Mental status is at baseline.           Last Labs:     Office Visit on 12/13/2023   Component Date Value    Sodium 12/13/2023 138     Potassium 12/13/2023 4.0     Chloride 12/13/2023 106     CO2 12/13/2023 26     Anion Gap 12/13/2023 10     Glucose 12/13/2023 181 (H)     BUN 12/13/2023 11     Creatinine 12/13/2023 0.87     BUN/Creatinine Ratio 12/13/2023 13     Calcium 12/13/2023 8.9     eGFR 12/13/2023 103     Hemoglobin A1C 12/13/2023 5.8     Estimated Average Glucose 12/13/2023 120    Lab Visit on 11/16/2023   Component Date Value    Sodium 11/16/2023 134 (L)     " Potassium 11/16/2023 3.4 (L)     Chloride 11/16/2023 95 (L)     CO2 11/16/2023 33 (H)     Anion Gap 11/16/2023 9     Glucose 11/16/2023 157 (H)     BUN 11/16/2023 17     Creatinine 11/16/2023 0.91     BUN/Creatinine Ratio 11/16/2023 19     Calcium 11/16/2023 10.1     eGFR 11/16/2023 101     WBC 11/16/2023 6.75     RBC 11/16/2023 5.70     Hemoglobin 11/16/2023 17.2     Hematocrit 11/16/2023 49.2     MCV 11/16/2023 86.3     MCH 11/16/2023 30.2     MCHC 11/16/2023 35.0     RDW 11/16/2023 13.5     Platelet Count 11/16/2023 211     MPV 11/16/2023 10.3     Neutrophils % 11/16/2023 62.9     Lymphocytes % 11/16/2023 24.6 (L)     Monocytes % 11/16/2023 10.5 (H)     Eosinophils % 11/16/2023 1.0     Basophils % 11/16/2023 0.7     Immature Granulocytes % 11/16/2023 0.3     nRBC, Auto 11/16/2023 0.0     Neutrophils, Abs 11/16/2023 4.24     Lymphocytes, Absolute 11/16/2023 1.66     Monocytes, Absolute 11/16/2023 0.71     Eosinophils, Absolute 11/16/2023 0.07     Basophils, Absolute 11/16/2023 0.05     Immature Granulocytes, A* 11/16/2023 0.02     nRBC, Absolute 11/16/2023 0.00     Diff Type 11/16/2023 Auto        Last Imaging:  X-Ray Chest 1 View  Narrative: EXAMINATION:  XR CHEST 1 VIEW    CLINICAL HISTORY:  .  Acute cough    COMPARISON:  January 4, 2023    TECHNIQUE:  Chest x-ray AP    FINDINGS:  There is continued cardiomegaly.  Mediastinal contours are unchanged.  Prior median sternotomy.  Pulmonary vasculature is upper normal.  There is some continued hazy opacity in the lung bases.  There is some platelike scarring in the left mid to lower lung.  There is unchanged mild pleural disease.    Osseous structures are similar  Impression: Cardiomegaly and suspected continued CHF.  Grossly similar to the previous study    Electronically signed by: Mikey Sepulveda  Date:    01/25/2023  Time:    15:49         **Labs and x-rays personally reviewed by me    ** reviewed      Objective:        Assessment:       1. Diabetes mellitus  of other type without complication, unspecified whether long term insulin use  Basic Metabolic Panel    Hemoglobin A1C    Basic Metabolic Panel    Hemoglobin A1C      2. Essential (primary) hypertension        3. Dyslipidemia             Plan:         1. Diabetes mellitus of other type without complication, unspecified whether long term insulin use  -     Basic Metabolic Panel; Future; Expected date: 12/13/2023  -     Hemoglobin A1C; Future; Expected date: 12/13/2023    2. Essential (primary) hypertension    3. Dyslipidemia    Other orders  -     amLODIPine (NORVASC) 10 MG tablet; Take 1 tablet (10 mg total) by mouth once daily.  Dispense: 90 tablet; Refill: 0  -     atorvastatin (LIPITOR) 40 MG tablet; Take 1 tablet (40 mg total) by mouth every evening.  Dispense: 90 tablet; Refill: 1  -     furosemide (LASIX) 40 MG tablet; Take 1 tablet (40 mg total) by mouth once daily.  Dispense: 7 tablet; Refill: 0  -     empagliflozin (JARDIANCE) 10 mg tablet; Take 1 tablet (10 mg total) by mouth once daily.  Dispense: 30 tablet; Refill: 11  -     zolpidem (AMBIEN) 5 MG Tab; Take 1 tablet (5 mg total) by mouth every evening.  Dispense: 30 tablet; Refill: 1  -     metoprolol tartrate (LOPRESSOR) 25 MG tablet; Take 0.5 tablets (12.5 mg total) by mouth 2 (two) times daily.  Dispense: 90 tablet; Refill: 0  -     potassium chloride SA (K-DUR,KLOR-CON) 20 MEQ tablet; Take 1 tablet (20 mEq total) by mouth once daily.  Dispense: 30 tablet; Refill: 0  -     pantoprazole (PROTONIX) 40 MG tablet; Take 1 tablet (40 mg total) by mouth once daily.  Dispense: 90 tablet; Refill: 1  -     aspirin (ECOTRIN) 81 MG EC tablet; Take 1 tablet (81 mg total) by mouth once daily.  Dispense: 90 tablet; Refill: 1  -     insulin detemir U-100, Levemir, (LEVEMIR FLEXTOUCH U100 INSULIN) 100 unit/mL (3 mL) InPn pen; Inject 10 Units into the skin every evening.  Dispense: 36 mL; Refill: 0  -     benzonatate (TESSALON) 200 MG capsule; Take 1 capsule (200 mg  total) by mouth 2 (two) times a day.  Dispense: 60 capsule; Refill: 3

## 2023-12-20 ENCOUNTER — TELEPHONE (OUTPATIENT)
Dept: FAMILY MEDICINE | Facility: CLINIC | Age: 53
End: 2023-12-20
Payer: MEDICAID

## 2023-12-20 NOTE — TELEPHONE ENCOUNTER
----- Message from Walker Smith MD sent at 12/19/2023  9:54 AM CST -----  Need to see in  [facility] please  abnl results     [id number] Pt is scheduled to come in on 01/23/24

## 2024-01-02 ENCOUNTER — APPOINTMENT (OUTPATIENT)
Dept: RADIOLOGY | Facility: CLINIC | Age: 54
End: 2024-01-02
Attending: INTERNAL MEDICINE
Payer: MEDICAID

## 2024-01-02 ENCOUNTER — OFFICE VISIT (OUTPATIENT)
Dept: FAMILY MEDICINE | Facility: CLINIC | Age: 54
End: 2024-01-02
Payer: MEDICAID

## 2024-01-02 VITALS
TEMPERATURE: 98 F | SYSTOLIC BLOOD PRESSURE: 112 MMHG | DIASTOLIC BLOOD PRESSURE: 78 MMHG | RESPIRATION RATE: 18 BRPM | HEIGHT: 71 IN | OXYGEN SATURATION: 96 % | HEART RATE: 96 BPM | BODY MASS INDEX: 28.03 KG/M2

## 2024-01-02 DIAGNOSIS — R05.3 CHRONIC COUGH: Primary | ICD-10-CM

## 2024-01-02 DIAGNOSIS — R05.3 CHRONIC COUGH: ICD-10-CM

## 2024-01-02 DIAGNOSIS — R60.0 LEG EDEMA, RIGHT: ICD-10-CM

## 2024-01-02 DIAGNOSIS — R30.0 DYSURIA: ICD-10-CM

## 2024-01-02 LAB
BILIRUB UR QL STRIP: NEGATIVE
CLARITY UR: CLEAR
COLOR UR: ABNORMAL
GLUCOSE UR STRIP-MCNC: >1000 MG/DL
KETONES UR STRIP-SCNC: NEGATIVE MG/DL
LEUKOCYTE ESTERASE UR QL STRIP: NEGATIVE
NITRITE UR QL STRIP: NEGATIVE
PH UR STRIP: 7 PH UNITS
PROT UR QL STRIP: NEGATIVE
RBC # UR STRIP: NEGATIVE /UL
SP GR UR STRIP: 1.02
UROBILINOGEN UR STRIP-ACNC: NORMAL MG/DL

## 2024-01-02 PROCEDURE — 3074F SYST BP LT 130 MM HG: CPT | Mod: CPTII,,, | Performed by: INTERNAL MEDICINE

## 2024-01-02 PROCEDURE — 3078F DIAST BP <80 MM HG: CPT | Mod: CPTII,,, | Performed by: INTERNAL MEDICINE

## 2024-01-02 PROCEDURE — 81003 URINALYSIS AUTO W/O SCOPE: CPT | Mod: QW,,, | Performed by: CLINICAL MEDICAL LABORATORY

## 2024-01-02 PROCEDURE — 71046 X-RAY EXAM CHEST 2 VIEWS: CPT | Mod: TC,RHCUB | Performed by: INTERNAL MEDICINE

## 2024-01-02 PROCEDURE — 1159F MED LIST DOCD IN RCRD: CPT | Mod: CPTII,,, | Performed by: INTERNAL MEDICINE

## 2024-01-02 PROCEDURE — 3008F BODY MASS INDEX DOCD: CPT | Mod: CPTII,,, | Performed by: INTERNAL MEDICINE

## 2024-01-02 PROCEDURE — 99214 OFFICE O/P EST MOD 30 MIN: CPT | Mod: ,,, | Performed by: INTERNAL MEDICINE

## 2024-01-02 RX ORDER — GUAIFENESIN 600 MG/1
600 TABLET, EXTENDED RELEASE ORAL 2 TIMES DAILY
Qty: 30 TABLET | Refills: 0 | Status: SHIPPED | OUTPATIENT
Start: 2024-01-02 | End: 2024-02-19 | Stop reason: ALTCHOICE

## 2024-01-02 RX ORDER — DICLOFENAC SODIUM 10 MG/G
2 GEL TOPICAL 2 TIMES DAILY
Qty: 200 G | Refills: 0 | Status: ON HOLD | OUTPATIENT
Start: 2024-01-02

## 2024-01-03 PROBLEM — R60.0 LEG EDEMA, RIGHT: Status: ACTIVE | Noted: 2024-01-03

## 2024-01-03 PROBLEM — R30.0 DYSURIA: Status: ACTIVE | Noted: 2024-01-03

## 2024-01-03 PROBLEM — R05.3 CHRONIC COUGH: Status: ACTIVE | Noted: 2024-01-03

## 2024-01-03 NOTE — PROGRESS NOTES
Subjective:       Patient ID: Karin Carrera is a 53 y.o. male.    Chief Complaint: Cough (Refused nasal swabs ) and Leg Swelling    Cough    Patient presents with a chronic cough stated he just can not get cough also does complain burning urinates/urinary frequency planes of moderate pain right leg and also pain in the right.  I suspect he does have osteoarthritis of the right he also has right calf tenderness the plan today is to order a venous Doppler study  PA and lateral chest x-ray for chronic cough  UA CNS for the dysuria   Start guaifenesin 600 mg 1 p.o. b.i.d. and   Voltaren gel b.i.d. to the right  Twelve point review of system done it is unremarkable  .    Current Medications:    Current Outpatient Medications:     albuterol (PROVENTIL/VENTOLIN HFA) 90 mcg/actuation inhaler, SMARTSI-2 Puff(s) By Mouth Every 4 Hours PRN, Disp: , Rfl:     amLODIPine (NORVASC) 10 MG tablet, Take 1 tablet (10 mg total) by mouth once daily., Disp: 90 tablet, Rfl: 0    aspirin (ECOTRIN) 81 MG EC tablet, Take 1 tablet (81 mg total) by mouth once daily., Disp: 90 tablet, Rfl: 1    atorvastatin (LIPITOR) 40 MG tablet, Take 1 tablet (40 mg total) by mouth every evening., Disp: 90 tablet, Rfl: 1    benzonatate (TESSALON) 200 MG capsule, Take 1 capsule (200 mg total) by mouth 2 (two) times a day., Disp: 60 capsule, Rfl: 3    docusate sodium (COLACE) 100 MG capsule, Take 100 mg by mouth., Disp: , Rfl:     empagliflozin (JARDIANCE) 10 mg tablet, Take 1 tablet (10 mg total) by mouth once daily., Disp: 30 tablet, Rfl: 11    ferrous sulfate (FEOSOL) 325 mg (65 mg iron) Tab tablet, Take 1 tablet (325 mg total) by mouth once daily., Disp: 90 tablet, Rfl: 0    fluticasone propionate (FLONASE) 50 mcg/actuation nasal spray, 1 spray by Each Nostril route 2 (two) times daily., Disp: , Rfl:     furosemide (LASIX) 40 MG tablet, Take 1 tablet (40 mg total) by mouth once daily., Disp: 7 tablet, Rfl: 0    insulin aspart U-100 (NOVOLOG) 100  unit/mL injection, Inject 0-5 Units into the skin 3 (three) times daily before meals., Disp: 10 mL, Rfl: 1    insulin detemir U-100, Levemir, (LEVEMIR FLEXTOUCH U100 INSULIN) 100 unit/mL (3 mL) InPn pen, Inject 10 Units into the skin every evening., Disp: 36 mL, Rfl: 0    lisinopriL (PRINIVIL,ZESTRIL) 5 MG tablet, Take 5 mg by mouth., Disp: , Rfl:     metOLazone (ZAROXOLYN) 2.5 MG tablet, Take 2.5 mg by mouth., Disp: , Rfl:     metoprolol tartrate (LOPRESSOR) 25 MG tablet, Take 0.5 tablets (12.5 mg total) by mouth 2 (two) times daily., Disp: 90 tablet, Rfl: 0    MUCINEX 600 mg 12 hr tablet, Take 600 mg by mouth 2 (two) times daily., Disp: , Rfl:     mupirocin (BACTROBAN) 2 % ointment, apply TO affected area(S) twice daily UNTIL HEALED, Disp: , Rfl:     pantoprazole (PROTONIX) 40 MG tablet, Take 1 tablet (40 mg total) by mouth once daily., Disp: 90 tablet, Rfl: 1    potassium chloride SA (K-DUR,KLOR-CON) 20 MEQ tablet, Take 1 tablet (20 mEq total) by mouth once daily., Disp: 30 tablet, Rfl: 0    THERMOTABS 287-180-15 mg Tab, Take 1 tablet by mouth once daily., Disp: 90 tablet, Rfl: 0    TRUE METRIX GLUCOSE TEST STRIP Strp, USE TO CHECK BLOOD SUGAR AS DIRECTED, Disp: 200 each, Rfl: 1    TRUEPLUS LANCETS 33 gauge Misc, 1 lancet  by Misc.(Non-Drug; Combo Route) route once daily. use as directed, Disp: 200 each, Rfl: 2    zolpidem (AMBIEN) 5 MG Tab, Take 1 tablet (5 mg total) by mouth every evening., Disp: 30 tablet, Rfl: 1    diclofenac sodium (VOLTAREN ARTHRITIS PAIN) 1 % Gel, Apply 2 g topically 2 (two) times daily., Disp: 200 g, Rfl: 0    guaiFENesin (MUCINEX) 600 mg 12 hr tablet, Take 1 tablet (600 mg total) by mouth 2 (two) times daily., Disp: 30 tablet, Rfl: 0           Review of Systems   Respiratory:  Positive for cough.                 Vitals:    01/02/24 1349   BP: 112/78   BP Location: Right arm   Patient Position: Sitting   BP Method: Large (Automatic)   Pulse: 96   Resp: 18   Temp: 97.8 °F (36.6 °C)  "  TempSrc: Temporal   SpO2: 96%   Weight: Comment: unable to stand   Height: 5' 11" (1.803 m)        Physical Exam  Vitals and nursing note reviewed.   Constitutional:       Appearance: Normal appearance.   Cardiovascular:      Rate and Rhythm: Normal rate and regular rhythm.      Pulses: Normal pulses.      Heart sounds: Normal heart sounds.   Pulmonary:      Effort: Pulmonary effort is normal.      Breath sounds: Normal breath sounds.   Abdominal:      General: Abdomen is flat. Bowel sounds are normal.      Palpations: Abdomen is soft.   Musculoskeletal:         General: Normal range of motion.   Skin:     General: Skin is warm and dry.   Neurological:      General: No focal deficit present.      Mental Status: He is alert and oriented to person, place, and time. Mental status is at baseline.           Last Labs:     Office Visit on 01/02/2024   Component Date Value    Color, UA 01/02/2024 Light-Yellow     Clarity, UA 01/02/2024 Clear     pH, UA 01/02/2024 7.0     Leukocytes, UA 01/02/2024 Negative     Nitrites, UA 01/02/2024 Negative     Protein, UA 01/02/2024 Negative     Glucose, UA 01/02/2024 >1000 (A)     Ketones, UA 01/02/2024 Negative     Urobilinogen, UA 01/02/2024 Normal     Bilirubin, UA 01/02/2024 Negative     Blood, UA 01/02/2024 Negative     Specific Gravity, UA 01/02/2024 1.025    Office Visit on 12/13/2023   Component Date Value    Sodium 12/13/2023 138     Potassium 12/13/2023 4.0     Chloride 12/13/2023 106     CO2 12/13/2023 26     Anion Gap 12/13/2023 10     Glucose 12/13/2023 181 (H)     BUN 12/13/2023 11     Creatinine 12/13/2023 0.87     BUN/Creatinine Ratio 12/13/2023 13     Calcium 12/13/2023 8.9     eGFR 12/13/2023 103     Hemoglobin A1C 12/13/2023 5.8     Estimated Average Glucose 12/13/2023 120        Last Imaging:  X-Ray Chest PA And Lateral  Narrative: EXAMINATION:  XR CHEST PA AND LATERAL    CLINICAL HISTORY:  Chronic cough    COMPARISON:  Chest x-ray January 25, " 2023    TECHNIQUE:  Frontal and lateral views of the chest.    FINDINGS:  Mild cardiomegaly status post sternotomy.  Bilateral interstitial prominence and subtle ground-glass opacity within the bilateral lungs.  Small bilateral pleural effusions.  Constellation of findings suspicious for CHF. Underlying infection not excluded.  Visualized osseous and surrounding soft tissue structures appear grossly unchanged.  Impression: As above.    Point of Service: [facility]    Electronically signed by: Taye Bolivar  Date:    01/02/2024  Time:    14:58         **Labs and x-rays personally reviewed by me    ** reviewed      Objective:        Assessment:       1. Chronic cough  X-Ray Chest PA And Lateral      2. Dysuria  Urinalysis, Reflex to Urine Culture    Urinalysis, Reflex to Urine Culture      3. Leg edema, right  US Lower Extremity Veins Right           Plan:         1. Chronic cough  -     X-Ray Chest PA And Lateral; Future; Expected date: 01/02/2024    2. Dysuria  -     Urinalysis, Reflex to Urine Culture; Future; Expected date: 01/02/2024    3. Leg edema, right  -     US Lower Extremity Veins Right; Future; Expected date: 01/02/2024    Other orders  -     diclofenac sodium (VOLTAREN ARTHRITIS PAIN) 1 % Gel; Apply 2 g topically 2 (two) times daily.  Dispense: 200 g; Refill: 0  -     guaiFENesin (MUCINEX) 600 mg 12 hr tablet; Take 1 tablet (600 mg total) by mouth 2 (two) times daily.  Dispense: 30 tablet; Refill: 0

## 2024-01-04 ENCOUNTER — TELEPHONE (OUTPATIENT)
Dept: FAMILY MEDICINE | Facility: CLINIC | Age: 54
End: 2024-01-04
Payer: MEDICAID

## 2024-01-04 NOTE — TELEPHONE ENCOUNTER
----- Message from Walker Smith MD sent at 1/2/2024  5:06 PM CST -----  Need to see in  Thursday  please  abnl results     [id number] Pt will come in as a walk in today.

## 2024-01-08 ENCOUNTER — APPOINTMENT (OUTPATIENT)
Dept: RADIOLOGY | Facility: CLINIC | Age: 54
End: 2024-01-08
Attending: INTERNAL MEDICINE
Payer: MEDICAID

## 2024-01-08 ENCOUNTER — OFFICE VISIT (OUTPATIENT)
Dept: FAMILY MEDICINE | Facility: CLINIC | Age: 54
End: 2024-01-08
Payer: MEDICAID

## 2024-01-08 VITALS
BODY MASS INDEX: 28.14 KG/M2 | WEIGHT: 201 LBS | OXYGEN SATURATION: 95 % | HEART RATE: 96 BPM | RESPIRATION RATE: 18 BRPM | SYSTOLIC BLOOD PRESSURE: 108 MMHG | HEIGHT: 71 IN | DIASTOLIC BLOOD PRESSURE: 66 MMHG | TEMPERATURE: 97 F

## 2024-01-08 DIAGNOSIS — R06.00 DYSPNEA, UNSPECIFIED TYPE: ICD-10-CM

## 2024-01-08 DIAGNOSIS — R06.00 DYSPNEA, UNSPECIFIED TYPE: Primary | ICD-10-CM

## 2024-01-08 DIAGNOSIS — I50.9 CONGESTIVE HEART FAILURE, UNSPECIFIED HF CHRONICITY, UNSPECIFIED HEART FAILURE TYPE: ICD-10-CM

## 2024-01-08 PROCEDURE — 71046 X-RAY EXAM CHEST 2 VIEWS: CPT | Mod: TC,RHCUB | Performed by: INTERNAL MEDICINE

## 2024-01-08 PROCEDURE — 1159F MED LIST DOCD IN RCRD: CPT | Mod: CPTII,,, | Performed by: INTERNAL MEDICINE

## 2024-01-08 PROCEDURE — 3008F BODY MASS INDEX DOCD: CPT | Mod: CPTII,,, | Performed by: INTERNAL MEDICINE

## 2024-01-08 PROCEDURE — 3078F DIAST BP <80 MM HG: CPT | Mod: CPTII,,, | Performed by: INTERNAL MEDICINE

## 2024-01-08 PROCEDURE — 99214 OFFICE O/P EST MOD 30 MIN: CPT | Mod: ,,, | Performed by: INTERNAL MEDICINE

## 2024-01-08 PROCEDURE — 3074F SYST BP LT 130 MM HG: CPT | Mod: CPTII,,, | Performed by: INTERNAL MEDICINE

## 2024-01-11 PROBLEM — R06.00 DYSPNEA: Status: ACTIVE | Noted: 2024-01-11

## 2024-01-11 PROBLEM — I50.9 CONGESTIVE HEART FAILURE: Status: ACTIVE | Noted: 2024-01-11

## 2024-01-11 NOTE — PROGRESS NOTES
Subjective:       Patient ID: Karin Carrera is a 53 y.o. male.    Chief Complaint: Results (Lab and X-Ray results )    HPI  .  Patient seen and evaluated patient has a recent history of CHF and dyspnea patient does have an abnormal chest x-ray which is suggestive of pulmonary edema.  Patient blood sugar he states he has been running proximally 150 to 200s.  The plan since he has minimal crackles on lungs do a PA and lateral chest x-ray   Increase Jardiance to 25 mg 1 p.o. q.day does have be 80s also patient is having some difficulty with ambulating weakness also problems speaking still he had a history of an acute ischemic cerebrovascular accident to 3 years ago the plan now is to refer the patient for ST and PT evaluation they want us to contact Torres JENKINS   Phone number [phone].    Current Medications:    Current Outpatient Medications:     albuterol (PROVENTIL/VENTOLIN HFA) 90 mcg/actuation inhaler, SMARTSI-2 Puff(s) By Mouth Every 4 Hours PRN, Disp: , Rfl:     amLODIPine (NORVASC) 10 MG tablet, Take 1 tablet (10 mg total) by mouth once daily., Disp: 90 tablet, Rfl: 0    aspirin (ECOTRIN) 81 MG EC tablet, Take 1 tablet (81 mg total) by mouth once daily., Disp: 90 tablet, Rfl: 1    atorvastatin (LIPITOR) 40 MG tablet, Take 1 tablet (40 mg total) by mouth every evening., Disp: 90 tablet, Rfl: 1    benzonatate (TESSALON) 200 MG capsule, Take 1 capsule (200 mg total) by mouth 2 (two) times a day., Disp: 60 capsule, Rfl: 3    diclofenac sodium (VOLTAREN ARTHRITIS PAIN) 1 % Gel, Apply 2 g topically 2 (two) times daily., Disp: 200 g, Rfl: 0    docusate sodium (COLACE) 100 MG capsule, Take 100 mg by mouth., Disp: , Rfl:     ferrous sulfate (FEOSOL) 325 mg (65 mg iron) Tab tablet, Take 1 tablet (325 mg total) by mouth once daily., Disp: 90 tablet, Rfl: 0    fluticasone propionate (FLONASE) 50 mcg/actuation nasal spray, 1 spray by Each Nostril route 2 (two) times daily., Disp: , Rfl:     furosemide (LASIX) 40  MG tablet, Take 1 tablet (40 mg total) by mouth once daily., Disp: 7 tablet, Rfl: 0    guaiFENesin (MUCINEX) 600 mg 12 hr tablet, Take 1 tablet (600 mg total) by mouth 2 (two) times daily., Disp: 30 tablet, Rfl: 0    insulin aspart U-100 (NOVOLOG) 100 unit/mL injection, Inject 0-5 Units into the skin 3 (three) times daily before meals., Disp: 10 mL, Rfl: 1    insulin detemir U-100, Levemir, (LEVEMIR FLEXTOUCH U100 INSULIN) 100 unit/mL (3 mL) InPn pen, Inject 10 Units into the skin every evening., Disp: 36 mL, Rfl: 0    lisinopriL (PRINIVIL,ZESTRIL) 5 MG tablet, Take 5 mg by mouth., Disp: , Rfl:     metOLazone (ZAROXOLYN) 2.5 MG tablet, Take 2.5 mg by mouth., Disp: , Rfl:     metoprolol tartrate (LOPRESSOR) 25 MG tablet, Take 0.5 tablets (12.5 mg total) by mouth 2 (two) times daily., Disp: 90 tablet, Rfl: 0    MUCINEX 600 mg 12 hr tablet, Take 600 mg by mouth 2 (two) times daily., Disp: , Rfl:     mupirocin (BACTROBAN) 2 % ointment, apply TO affected area(S) twice daily UNTIL HEALED, Disp: , Rfl:     pantoprazole (PROTONIX) 40 MG tablet, Take 1 tablet (40 mg total) by mouth once daily., Disp: 90 tablet, Rfl: 1    potassium chloride SA (K-DUR,KLOR-CON) 20 MEQ tablet, Take 1 tablet (20 mEq total) by mouth once daily., Disp: 30 tablet, Rfl: 0    THERMOTABS 287-180-15 mg Tab, Take 1 tablet by mouth once daily., Disp: 90 tablet, Rfl: 0    TRUE METRIX GLUCOSE TEST STRIP Strp, USE TO CHECK BLOOD SUGAR AS DIRECTED, Disp: 200 each, Rfl: 1    TRUEPLUS LANCETS 33 gauge Misc, 1 lancet  by Misc.(Non-Drug; Combo Route) route once daily. use as directed, Disp: 200 each, Rfl: 2    zolpidem (AMBIEN) 5 MG Tab, Take 1 tablet (5 mg total) by mouth every evening., Disp: 30 tablet, Rfl: 1    empagliflozin (JARDIANCE) 25 mg tablet, Take 1 tablet (25 mg total) by mouth once daily., Disp: 90 tablet, Rfl: 1           Review of Systems             Vitals:    01/08/24 0858   BP: 108/66   BP Location: Left arm   Patient Position: Sitting   BP  "Method: Large (Automatic)   Pulse: 96   Resp: 18   Temp: 97.3 °F (36.3 °C)   TempSrc: Temporal   SpO2: 95%   Weight: 91.2 kg (201 lb)   Height: 5' 11" (1.803 m)        Physical Exam  Vitals and nursing note reviewed.   Constitutional:       Appearance: Normal appearance.   Cardiovascular:      Rate and Rhythm: Normal rate and regular rhythm.      Pulses: Normal pulses.      Heart sounds: Normal heart sounds.   Pulmonary:      Effort: Pulmonary effort is normal.      Breath sounds: Normal breath sounds.   Abdominal:      General: Abdomen is flat. Bowel sounds are normal.      Palpations: Abdomen is soft.   Musculoskeletal:         General: Normal range of motion.   Skin:     General: Skin is warm and dry.   Neurological:      General: No focal deficit present.      Mental Status: He is alert and oriented to person, place, and time. Mental status is at baseline.           Last Labs:     Office Visit on 01/02/2024   Component Date Value    Color, UA 01/02/2024 Light-Yellow     Clarity, UA 01/02/2024 Clear     pH, UA 01/02/2024 7.0     Leukocytes, UA 01/02/2024 Negative     Nitrites, UA 01/02/2024 Negative     Protein, UA 01/02/2024 Negative     Glucose, UA 01/02/2024 >1000 (A)     Ketones, UA 01/02/2024 Negative     Urobilinogen, UA 01/02/2024 Normal     Bilirubin, UA 01/02/2024 Negative     Blood, UA 01/02/2024 Negative     Specific Gravity, UA 01/02/2024 1.025    Office Visit on 12/13/2023   Component Date Value    Sodium 12/13/2023 138     Potassium 12/13/2023 4.0     Chloride 12/13/2023 106     CO2 12/13/2023 26     Anion Gap 12/13/2023 10     Glucose 12/13/2023 181 (H)     BUN 12/13/2023 11     Creatinine 12/13/2023 0.87     BUN/Creatinine Ratio 12/13/2023 13     Calcium 12/13/2023 8.9     eGFR 12/13/2023 103     Hemoglobin A1C 12/13/2023 5.8     Estimated Average Glucose 12/13/2023 120        Last Imaging:  X-Ray Chest PA And Lateral  Narrative: EXAMINATION:  XR CHEST PA AND LATERAL    CLINICAL HISTORY:  Dyspnea, " unspecified    COMPARISON:  2 January 2024    TECHNIQUE:  XR CHEST PA AND LATERAL    FINDINGS:  The heart and mediastinum are stable in size and configuration.  The pulmonary vascularity is slightly increased with bilateral increased interstitial lung density.  No other lung infiltrates, effusions, pneumothorax or other abnormality is demonstrated.  Impression: Findings suggest mild cardiac decompensation and / or pneumonitis.  Appearance is similar to recent x-ray.    Electronically signed by: Addison Garzon  Date:    01/08/2024  Time:    10:06         **Labs and x-rays personally reviewed by me    ** reviewed      Objective:        Assessment:       1. Dyspnea, unspecified type  X-Ray Chest PA And Lateral      2. Congestive heart failure, unspecified HF chronicity, unspecified heart failure type  Ambulatory referral/consult to Physical/Occupational Therapy           Plan:         1. Dyspnea, unspecified type  -     X-Ray Chest PA And Lateral; Future; Expected date: 01/08/2024    2. Congestive heart failure, unspecified HF chronicity, unspecified heart failure type  -     Ambulatory referral/consult to Physical/Occupational Therapy; Future; Expected date: 01/15/2024    Other orders  -     empagliflozin (JARDIANCE) 25 mg tablet; Take 1 tablet (25 mg total) by mouth once daily.  Dispense: 90 tablet; Refill: 1

## 2024-01-15 RX ORDER — BENZONATATE 200 MG/1
200 CAPSULE ORAL 2 TIMES DAILY
Qty: 60 CAPSULE | Refills: 3 | Status: ON HOLD | OUTPATIENT
Start: 2024-01-15

## 2024-01-24 ENCOUNTER — HOSPITAL ENCOUNTER (EMERGENCY)
Facility: HOSPITAL | Age: 54
Discharge: HOME | End: 2024-01-24
Payer: MEDICAID

## 2024-01-24 VITALS
HEIGHT: 71 IN | HEART RATE: 103 BPM | SYSTOLIC BLOOD PRESSURE: 117 MMHG | TEMPERATURE: 99 F | OXYGEN SATURATION: 96 % | BODY MASS INDEX: 28.14 KG/M2 | RESPIRATION RATE: 20 BRPM | WEIGHT: 201 LBS | DIASTOLIC BLOOD PRESSURE: 74 MMHG

## 2024-01-24 DIAGNOSIS — R05.9 COUGH: ICD-10-CM

## 2024-01-24 DIAGNOSIS — M79.604 RIGHT LEG PAIN: ICD-10-CM

## 2024-01-24 DIAGNOSIS — J18.9 PNEUMONIA DUE TO INFECTIOUS ORGANISM, UNSPECIFIED LATERALITY, UNSPECIFIED PART OF LUNG: Primary | ICD-10-CM

## 2024-01-24 LAB
ANION GAP SERPL CALCULATED.3IONS-SCNC: 9 MMOL/L (ref 7–16)
BASOPHILS # BLD AUTO: 0.03 K/UL (ref 0–0.2)
BASOPHILS NFR BLD AUTO: 0.4 % (ref 0–1)
BUN SERPL-MCNC: 7 MG/DL (ref 7–18)
BUN/CREAT SERPL: 9 (ref 6–20)
CALCIUM SERPL-MCNC: 9.3 MG/DL (ref 8.5–10.1)
CHLORIDE SERPL-SCNC: 106 MMOL/L (ref 98–107)
CO2 SERPL-SCNC: 26 MMOL/L (ref 21–32)
CREAT SERPL-MCNC: 0.82 MG/DL (ref 0.7–1.3)
DIFFERENTIAL METHOD BLD: ABNORMAL
EGFR (NO RACE VARIABLE) (RUSH/TITUS): 105 ML/MIN/1.73M2
EOSINOPHIL # BLD AUTO: 0.15 K/UL (ref 0–0.5)
EOSINOPHIL NFR BLD AUTO: 2.2 % (ref 1–4)
ERYTHROCYTE [DISTWIDTH] IN BLOOD BY AUTOMATED COUNT: 14.1 % (ref 11.5–14.5)
GLUCOSE SERPL-MCNC: 103 MG/DL (ref 74–106)
HCT VFR BLD AUTO: 41.4 % (ref 40–54)
HGB BLD-MCNC: 14.1 G/DL (ref 13.5–18)
IMM GRANULOCYTES # BLD AUTO: 0.02 K/UL (ref 0–0.04)
IMM GRANULOCYTES NFR BLD: 0.3 % (ref 0–0.4)
LYMPHOCYTES # BLD AUTO: 1.25 K/UL (ref 1–4.8)
LYMPHOCYTES NFR BLD AUTO: 18.6 % (ref 27–41)
MCH RBC QN AUTO: 29.6 PG (ref 27–31)
MCHC RBC AUTO-ENTMCNC: 34.1 G/DL (ref 32–36)
MCV RBC AUTO: 86.8 FL (ref 80–96)
MONOCYTES # BLD AUTO: 0.54 K/UL (ref 0–0.8)
MONOCYTES NFR BLD AUTO: 8 % (ref 2–6)
MPC BLD CALC-MCNC: 9.4 FL (ref 9.4–12.4)
NEUTROPHILS # BLD AUTO: 4.73 K/UL (ref 1.8–7.7)
NEUTROPHILS NFR BLD AUTO: 70.5 % (ref 53–65)
NRBC # BLD AUTO: 0 X10E3/UL
NRBC, AUTO (.00): 0 %
PLATELET # BLD AUTO: 195 K/UL (ref 150–400)
POTASSIUM SERPL-SCNC: 4 MMOL/L (ref 3.5–5.1)
RBC # BLD AUTO: 4.77 M/UL (ref 4.6–6.2)
SODIUM SERPL-SCNC: 137 MMOL/L (ref 136–145)
WBC # BLD AUTO: 6.72 K/UL (ref 4.5–11)

## 2024-01-24 PROCEDURE — 85025 COMPLETE CBC W/AUTO DIFF WBC: CPT | Performed by: NURSE PRACTITIONER

## 2024-01-24 PROCEDURE — 99284 EMERGENCY DEPT VISIT MOD MDM: CPT | Mod: ,,, | Performed by: NURSE PRACTITIONER

## 2024-01-24 PROCEDURE — 96365 THER/PROPH/DIAG IV INF INIT: CPT

## 2024-01-24 PROCEDURE — 99285 EMERGENCY DEPT VISIT HI MDM: CPT | Mod: 25

## 2024-01-24 PROCEDURE — 96375 TX/PRO/DX INJ NEW DRUG ADDON: CPT

## 2024-01-24 PROCEDURE — 80048 BASIC METABOLIC PNL TOTAL CA: CPT | Performed by: NURSE PRACTITIONER

## 2024-01-24 PROCEDURE — 63600175 PHARM REV CODE 636 W HCPCS: Performed by: NURSE PRACTITIONER

## 2024-01-24 PROCEDURE — 25000003 PHARM REV CODE 250: Performed by: NURSE PRACTITIONER

## 2024-01-24 RX ORDER — ONDANSETRON HYDROCHLORIDE 2 MG/ML
4 INJECTION, SOLUTION INTRAVENOUS
Status: COMPLETED | OUTPATIENT
Start: 2024-01-24 | End: 2024-01-24

## 2024-01-24 RX ORDER — AZITHROMYCIN 250 MG/1
250 TABLET, FILM COATED ORAL DAILY
Qty: 6 TABLET | Refills: 0 | Status: SHIPPED | OUTPATIENT
Start: 2024-01-24 | End: 2024-02-08 | Stop reason: SDUPTHER

## 2024-01-24 RX ORDER — ONDANSETRON 4 MG/1
4 TABLET, ORALLY DISINTEGRATING ORAL EVERY 6 HOURS PRN
Qty: 10 TABLET | Refills: 0 | Status: SHIPPED | OUTPATIENT
Start: 2024-01-24 | End: 2024-02-19 | Stop reason: ALTCHOICE

## 2024-01-24 RX ORDER — AMOXICILLIN AND CLAVULANATE POTASSIUM 875; 125 MG/1; MG/1
1 TABLET, FILM COATED ORAL 2 TIMES DAILY
Qty: 20 TABLET | Refills: 0 | Status: SHIPPED | OUTPATIENT
Start: 2024-01-24 | End: 2024-02-09 | Stop reason: SDUPTHER

## 2024-01-24 RX ADMIN — SODIUM CHLORIDE 1000 ML: 9 INJECTION, SOLUTION INTRAVENOUS at 01:01

## 2024-01-24 RX ADMIN — DEXTROSE MONOHYDRATE 1 G: 5 INJECTION INTRAVENOUS at 01:01

## 2024-01-24 RX ADMIN — ONDANSETRON 4 MG: 2 INJECTION INTRAMUSCULAR; INTRAVENOUS at 01:01

## 2024-01-24 NOTE — DISCHARGE INSTRUCTIONS
Follow up with your primary care provider in 2 days. Return to the emergency department for any increase in symptoms or for any other new or worrisome symptoms.

## 2024-01-24 NOTE — ED PROVIDER NOTES
Encounter Date: 1/24/2024       History     Chief Complaint   Patient presents with    Cough     53-year-old male presents to the emergency department to be evaluated for cough that began 2 weeks ago.  He has been around other people who have similar symptoms.  He has also had some nausea and vomiting as well as some diarrhea. He has had some pain in his right lower extremity over the last couple of weeks and was scheduled for a venous doppler of his right lower extremity but missed his appointment. Denies any chest pain or shortness of breath.    The history is provided by the patient.   URI  The primary symptoms include cough, nausea and vomiting. Primary symptoms do not include fever, fatigue, headaches, ear pain, sore throat, swollen glands, wheezing, abdominal pain, myalgias, arthralgias or rash.   Symptoms associated with the illness include congestion and rhinorrhea. The illness is not associated with chills.     Review of patient's allergies indicates:  No Known Allergies  Past Medical History:   Diagnosis Date    GERD (gastroesophageal reflux disease)     Hypertension     Stroke      Past Surgical History:   Procedure Laterality Date    CARDIAC SURGERY      HEMIARTHROPLASTY OF HIP Right 1/2/2022    Procedure: HEMIARTHROPLASTY, HIP;  Surgeon: Ramses Chua MD;  Location: [facility];  Service: Orthopedics;  Laterality: Right;     Family History   Problem Relation Age of Onset    Hypertension Mother      Social History     Tobacco Use    Smoking status: Every Day     Current packs/day: 0.50     Types: Cigarettes    Smokeless tobacco: Current     Types: Chew   Substance Use Topics    Alcohol use: Yes     Comment: 1 quart    Drug use: Never     Review of Systems   Constitutional:  Negative for chills, fatigue and fever.   HENT:  Positive for congestion and rhinorrhea. Negative for ear pain and sore throat.    Respiratory:  Positive for cough. Negative for chest tightness, shortness of breath and  wheezing.    Gastrointestinal:  Positive for diarrhea, nausea and vomiting. Negative for abdominal pain.   Musculoskeletal:  Negative for arthralgias and myalgias.   Skin:  Negative for rash.   Neurological:  Negative for headaches.   All other systems reviewed and are negative.      Physical Exam     Initial Vitals [01/24/24 0851]   BP Pulse Resp Temp SpO2   132/81 105 20 99.4 °F (37.4 °C) 97 %      MAP       --         Physical Exam    Vitals reviewed.  Constitutional: He appears well-developed and well-nourished.   Neck: Neck supple.   Cardiovascular:            tachycardic   Pulmonary/Chest: Breath sounds normal.   Abdominal: Abdomen is soft. Bowel sounds are normal. He exhibits no distension and no mass. There is no abdominal tenderness. There is no rebound and no guarding.   Musculoskeletal:         General: No tenderness or edema. Normal range of motion.      Cervical back: Neck supple.     Neurological: He is alert and oriented to person, place, and time. He has normal strength. GCS score is 15. GCS eye subscore is 4. GCS verbal subscore is 5. GCS motor subscore is 6.   Skin: Skin is warm and dry. Capillary refill takes less than 2 seconds.   Psychiatric: He has a normal mood and affect.         Medical Screening Exam   See Full Note    ED Course   Procedures  Labs Reviewed   CBC WITH DIFFERENTIAL - Abnormal; Notable for the following components:       Result Value    Neutrophils % 70.5 (*)     Lymphocytes % 18.6 (*)     Monocytes % 8.0 (*)     All other components within normal limits   BASIC METABOLIC PANEL - Normal   CBC W/ AUTO DIFFERENTIAL    Narrative:     The following orders were created for panel order CBC auto differential.  Procedure                               Abnormality         Status                     ---------                               -----------         ------                     CBC with Differential[[id number]]       Abnormal            Final result                 Please view  results for these tests on the individual orders.          Imaging Results              US Lower Extremity Veins Right (Final result)  Result time 01/24/24 11:08:26      Final result by Addison Garzon II, MD (01/24/24 11:08:26)                   Impression:      No evidence of deep venous thrombosis.    Ultrasound images stored and captured.      Electronically signed by: Addison Garzon  Date:    01/24/2024  Time:    11:08               Narrative:    EXAMINATION:  US LOWER EXTREMITY VEINS RIGHT    CLINICAL HISTORY:  Pain in right leg    TECHNIQUE:  Duplex and color flow Doppler and dynamic compression was performed of the bilateral lower extremity veins was performed.  The order was incorrect at the time of the reading.    COMPARISON:  None.    FINDINGS:  No evidence of echogenic, non-compressible thrombus seen in the visualized veins of the extremities.  Color Doppler venous waveform pattern is within normal limits.                                       X-Ray Chest 1 View (Final result)  Result time 01/24/24 09:50:01      Final result by Taye Bolivar DO (01/24/24 09:50:01)                   Impression:      As above.    Point of Service: [facility]      Electronically signed by: Taye Bolivar  Date:    01/24/2024  Time:    09:50               Narrative:    EXAMINATION:  XR CHEST 1 VIEW    CLINICAL HISTORY:  Cough, unspecified    COMPARISON:  Chest x-ray January 8, 2024    TECHNIQUE:  Frontal view/views of the chest.    FINDINGS:  Continued cardiomegaly status post sternotomy.  There is a nonspecific reticular pattern in the bilateral lungs which can be seen with interstitial pulmonary edema, reactive airway disease/bronchitis, interstitial pneumonia, senescent change, and/or other interstitial lung disease. No bernie pulmonary edema or focal consolidating pneumonia. Chronic scarring of the left lower lung.  Blunting of the left greater than right CPA.  Small pleural fluid versus scarring.   Visualized osseous and surrounding soft tissue structures appear grossly unchanged.                                       Medications   sodium chloride 0.9% bolus 1,000 mL 1,000 mL (has no administration in time range)   ondansetron injection 4 mg (has no administration in time range)   cefTRIAXone (Rocephin) 1 g in dextrose 5 % in water (D5W) 100 mL IVPB (MB+) (has no administration in time range)     Medical Decision Making  53-year-old male presents to the emergency department to be evaluated for cough that began 2 weeks ago.  He has been around other people who have similar symptoms.  He has also had some nausea and vomiting as well as some diarrhea. He has had some pain in his right lower extremity over the last couple of weeks and was scheduled for a venous doppler of his right lower extremity but missed his appointment. Denies any chest pain or shortness of breath.  X-ray ordered, films reviewed as well as radiologist's interpretation significant for Continued cardiomegaly status post sternotomy.  There is a nonspecific reticular pattern in the bilateral lungs which can be seen with interstitial pulmonary edema, reactive airway disease/bronchitis, interstitial pneumonia, senescent change, and/or other interstitial lung disease. No bernie pulmonary edema or focal consolidating pneumonia. Chronic scarring of the left lower lung.  Blunting of the left greater than right CPA.  Small pleural fluid versus scarring.  Visualized osseous and surrounding soft tissue structures appear grossly unchanged.  Venous Doppler ordered, reviewed radiologist's interpretation significant for no DVT  Diagnosis:  Pneumonia, right leg pain  Prescribed since, Augmentin and Zofran    Amount and/or Complexity of Data Reviewed  Labs: ordered.  Radiology: ordered.    Risk  Prescription drug management.                                      Clinical Impression:   Final diagnoses:  [R05.9] Cough  [M79.604] Right leg pain  [J18.9] Pneumonia  due to infectious organism, unspecified laterality, unspecified part of lung (Primary)        ED Disposition Condition    Discharge Stable          ED Prescriptions       Medication Sig Dispense Start Date End Date Auth. Provider    amoxicillin-clavulanate 875-125mg (AUGMENTIN) 875-125 mg per tablet Take 1 tablet by mouth 2 (two) times daily. 20 tablet 1/24/2024 -- Matilda Cowart FNP    azithromycin (Z-CHIKIS) 250 MG tablet Take 1 tablet (250 mg total) by mouth once daily. Take first 2 tablets together, then 1 every day until finished. 6 tablet 1/24/2024 -- Matilda Cowart FNP    ondansetron (ZOFRAN-ODT) 4 MG TbDL Take 1 tablet (4 mg total) by mouth every 6 (six) hours as needed. 10 tablet 1/24/2024 -- Matilda Cowart FNP          Follow-up Information    None          Matilda Cowart FNP  01/24/24 8905

## 2024-01-24 NOTE — ED TRIAGE NOTES
PATIENT PRESENTS TO ER WITH COMPLAINT OF COUGH, COLD AND CONGESTION. REPORTS RECENT DX OF PNEUMONIA AS WELL AND GENERALIZED WEAKNESS

## 2024-02-05 NOTE — ASSESSMENT & PLAN NOTE
: URIEL Manriquez MD  Treating Investigators: NIRAV Medrano MD  Surgical Oncologist: SARAH Brown M.D. / Gerri Zhang NP  Radiation Oncologist: Javier Moulton M.D, PhD     Protocol: ECOG-ACRIN [id number]  IRB#: [id number]  Patient ID: [id number]  Treatment: Arm B - Carbo+Taxol+Nivolumab  Treatment: Arm C - Nivolumab Monotherapy         A Phase II/III Study of Dilcia-operative Nivolumab and Ipilimumab in Patients with Locoregional Esophageal and Gastroesophageal Junction Adenocarcinoma     Step 2  C11D1: [id number]  Patient presents to clinic today unaccompanied for his C11D1 visit. Patient queried & verbalized his willingness to continue his participation on the above-mentioned trial. Patient queried and denies any new or changed AEs or ConMeds.    Patient completed safety labs, VS, PE & ECOG (ECOG = 0, per Bernadette) today per protocol. Bernadette assessed all patient's labs, VS & PE findings as either WNL or NCS, deeming patient eligible to continue treatment with Nivolumab monotherapy today.    Weight:  Step 1 Week 1 was 117.2 kg  Step 2 C1D1 was 99.2 kg   ( +/- 10% = 89.3 - 109.2 kg).   Today's weight is 87.0 kg   >20% weight loss since Step 1 Week 1 --> >10% - < 20%change from Step 2 Cycle 1.      Per protocol, Nivolumab is administered at a 480 mg flat dose & cannot be modified. MACARIO & Bernadette performed time-out in exam room.     Infusion RN Elena Byrne was instructed to administer the treatment per the treatment plan with full sets of vital signs pre- and post-dose. RN expressed their understanding of all instructions. Patient completed treatment today without event & was DC'd from clinic ambulatory and in stable condition. Please see Infusion RN note for further information.  Infusion RN did not collect any vitals signs at this visit. RN was recounselled on above instructions & verbalized her understanding.     Patient was encouraged to contact MACARIO or Dr. Medrano's team with any questions or concerns & was  Adjust medications as needed     reminded of clinic's 24/7 emergency contact number. Patient verbalized understanding & denies any additional questions at this time.        Step 2 -- C12D1 - [id number]:  Labs @ 1200 -- [facility] 3rd floor  [facility] @ 1300 -- 2nd floor  Infusion @ 1400 (Nivolumab only)        Solicited AEs -- Assessed 08Dec2023:  ** Anemia - Grade 1 -- 30Jan2024 - ongoing -- (NCS per MD)  ** Platelet count decreased - Grade 1 - 08Jan2024 - 30Jan2024 (NCS per MD) -- DC'd 30JAN2024  ** White blood cell count decreased - Grade 1 - 08Jan2024 - ongoing (NCS per MD)  Neutrophil count decreased - Grade 0  ** Lymphocyte count decreased - Grade 2 -- 14Nov2023 - ongoing (NCS per MD)  Peripheral motor neuropathy - Grade 0   Peripheral sensory neuropathy - Grade 0   Creatinine increased - Grade 0  Hypothyroidism - Grade 0   (TSH WNL on 16Oct2023)  Diarrhea (patient baseline BM = 2 stools a day) - Grade 0 -- reports loose stools vs. diarrhea   **Fatigue - Grade 1 -- 16Oct2023 - ongoing (NCS per MD)  **Nausea - Grade 1 - 21Apr2023 - ongoing  (NCS per MD)  **Cough - Grade 1  -- Medical History  Pneumonitis - Grade 0  Hyperglycemia - Grade 0  Adrenal Insufficiency - Grade 0 -- 16Oct2023 ACTH & cortisol WNL  **Rash-maculo-papular - Grade 1 - [id number] - ongoing ( used Sarna [camphor 0.5% - menthol 0.5&] PRN -- Prescribed triamcinolone 24Jul2023 ) -- (NCS per MD)  **Pruritis - Grade 1 - 50Etu3388 - ongoing -- (NCS per MD)  Erythema multiforme - Grade 0  Vomiting - Grade 0    Lipase increased -- Not required per protocol and therefore not assessed this visit.      Ongoing Non-Solicited AEs:  Anorexia - Grade 1 - 09Jan2023 - ongoing (no treatment)  Hoarseness - Grade 2 - 14Mar2023 - ongoing ( no treatment )  Dysphagia - Grade 1 - 16Jun2023 - ongoing ( no treatment )  Weight loss - Grade 2 - [id number] - ongoing ( no treatment )      New or Changed Non-Solicited AEs Since Last Visit:  None       Complete Baseline Medical History, Adverse Events, and Concomitant  Medications are located in patient's shadow chart

## 2024-02-08 ENCOUNTER — APPOINTMENT (OUTPATIENT)
Dept: RADIOLOGY | Facility: CLINIC | Age: 54
End: 2024-02-08
Attending: INTERNAL MEDICINE
Payer: MEDICAID

## 2024-02-08 ENCOUNTER — OFFICE VISIT (OUTPATIENT)
Dept: FAMILY MEDICINE | Facility: CLINIC | Age: 54
End: 2024-02-08
Payer: MEDICAID

## 2024-02-08 VITALS
TEMPERATURE: 98 F | BODY MASS INDEX: 28.14 KG/M2 | HEART RATE: 99 BPM | HEIGHT: 71 IN | OXYGEN SATURATION: 95 % | SYSTOLIC BLOOD PRESSURE: 112 MMHG | DIASTOLIC BLOOD PRESSURE: 60 MMHG | WEIGHT: 201 LBS | RESPIRATION RATE: 16 BRPM

## 2024-02-08 DIAGNOSIS — R05.9 COUGH, UNSPECIFIED TYPE: ICD-10-CM

## 2024-02-08 DIAGNOSIS — Z11.4 ENCOUNTER FOR SCREENING FOR HIV: ICD-10-CM

## 2024-02-08 DIAGNOSIS — R05.9 COUGH, UNSPECIFIED TYPE: Primary | ICD-10-CM

## 2024-02-08 LAB
CTP QC/QA: YES
MOLECULAR STREP A: NEGATIVE
POC MOLECULAR INFLUENZA A AGN: NEGATIVE
POC MOLECULAR INFLUENZA B AGN: NEGATIVE
SARS-COV-2 RDRP RESP QL NAA+PROBE: NEGATIVE

## 2024-02-08 PROCEDURE — 71045 X-RAY EXAM CHEST 1 VIEW: CPT | Mod: TC,RHCUB | Performed by: INTERNAL MEDICINE

## 2024-02-08 PROCEDURE — 99214 OFFICE O/P EST MOD 30 MIN: CPT | Mod: ,,, | Performed by: INTERNAL MEDICINE

## 2024-02-08 PROCEDURE — 3008F BODY MASS INDEX DOCD: CPT | Mod: CPTII,,, | Performed by: INTERNAL MEDICINE

## 2024-02-08 PROCEDURE — 3074F SYST BP LT 130 MM HG: CPT | Mod: CPTII,,, | Performed by: INTERNAL MEDICINE

## 2024-02-08 PROCEDURE — 1159F MED LIST DOCD IN RCRD: CPT | Mod: CPTII,,, | Performed by: INTERNAL MEDICINE

## 2024-02-08 PROCEDURE — 87635 SARS-COV-2 COVID-19 AMP PRB: CPT | Mod: RHCUB | Performed by: INTERNAL MEDICINE

## 2024-02-08 PROCEDURE — 87502 INFLUENZA DNA AMP PROBE: CPT | Mod: RHCUB | Performed by: INTERNAL MEDICINE

## 2024-02-08 PROCEDURE — 87651 STREP A DNA AMP PROBE: CPT | Mod: RHCUB | Performed by: INTERNAL MEDICINE

## 2024-02-08 PROCEDURE — 3078F DIAST BP <80 MM HG: CPT | Mod: CPTII,,, | Performed by: INTERNAL MEDICINE

## 2024-02-08 PROCEDURE — 4010F ACE/ARB THERAPY RXD/TAKEN: CPT | Mod: CPTII,,, | Performed by: INTERNAL MEDICINE

## 2024-02-08 RX ORDER — ALBUTEROL SULFATE 90 UG/1
AEROSOL, METERED RESPIRATORY (INHALATION)
Qty: 18 G | Refills: 6 | Status: ON HOLD | OUTPATIENT
Start: 2024-02-08 | End: 2024-04-03

## 2024-02-08 RX ORDER — AZITHROMYCIN 250 MG/1
250 TABLET, FILM COATED ORAL DAILY
Qty: 6 TABLET | Refills: 0 | Status: SHIPPED | OUTPATIENT
Start: 2024-02-08 | End: 2024-02-19 | Stop reason: ALTCHOICE

## 2024-02-08 RX ORDER — CODEINE PHOSPHATE AND GUAIFENESIN 10; 100 MG/5ML; MG/5ML
5 SOLUTION ORAL 3 TIMES DAILY PRN
Qty: 200 ML | Refills: 0 | Status: ON HOLD | OUTPATIENT
Start: 2024-02-08 | End: 2024-02-28 | Stop reason: HOSPADM

## 2024-02-12 RX ORDER — AMOXICILLIN AND CLAVULANATE POTASSIUM 875; 125 MG/1; MG/1
1 TABLET, FILM COATED ORAL 2 TIMES DAILY
Qty: 20 TABLET | Refills: 0 | Status: SHIPPED | OUTPATIENT
Start: 2024-02-12 | End: 2024-02-19 | Stop reason: ALTCHOICE

## 2024-02-18 PROBLEM — R05.9 COUGH: Status: ACTIVE | Noted: 2024-01-03

## 2024-02-18 PROBLEM — Z11.4 ENCOUNTER FOR SCREENING FOR HIV: Status: ACTIVE | Noted: 2023-09-19

## 2024-02-19 ENCOUNTER — HOSPITAL ENCOUNTER (INPATIENT)
Facility: HOSPITAL | Age: 54
LOS: 9 days | Discharge: HOME | End: 2024-02-28
Attending: EMERGENCY MEDICINE | Admitting: HOSPITALIST
Payer: MEDICAID

## 2024-02-19 DIAGNOSIS — I50.9 CHF (CONGESTIVE HEART FAILURE): ICD-10-CM

## 2024-02-19 DIAGNOSIS — I50.9 LOW CARDIAC OUTPUT SYNDROME: ICD-10-CM

## 2024-02-19 DIAGNOSIS — I50.9 ACUTE ON CHRONIC CONGESTIVE HEART FAILURE, UNSPECIFIED HEART FAILURE TYPE: Primary | ICD-10-CM

## 2024-02-19 DIAGNOSIS — J96.01 ACUTE HYPOXEMIC RESPIRATORY FAILURE: ICD-10-CM

## 2024-02-19 DIAGNOSIS — R07.9 CHEST PAIN: ICD-10-CM

## 2024-02-19 DIAGNOSIS — I50.23 ACUTE ON CHRONIC SYSTOLIC CONGESTIVE HEART FAILURE: ICD-10-CM

## 2024-02-19 DIAGNOSIS — Z86.73 HISTORY OF CVA (CEREBROVASCULAR ACCIDENT): ICD-10-CM

## 2024-02-19 DIAGNOSIS — E87.5 HYPERKALEMIA: ICD-10-CM

## 2024-02-19 DIAGNOSIS — R30.0 DYSURIA: ICD-10-CM

## 2024-02-19 DIAGNOSIS — R06.02 SHORTNESS OF BREATH: ICD-10-CM

## 2024-02-19 PROBLEM — R60.0 LEG EDEMA, RIGHT: Status: RESOLVED | Noted: 2024-01-03 | Resolved: 2024-02-19

## 2024-02-19 PROBLEM — I50.43 ACUTE ON CHRONIC COMBINED SYSTOLIC AND DIASTOLIC HEART FAILURE: Status: RESOLVED | Noted: 2022-01-06 | Resolved: 2024-02-19

## 2024-02-19 PROBLEM — I10 ESSENTIAL (PRIMARY) HYPERTENSION: Status: RESOLVED | Noted: 2022-01-01 | Resolved: 2024-02-19

## 2024-02-19 PROBLEM — K21.9 GERD (GASTROESOPHAGEAL REFLUX DISEASE): Status: RESOLVED | Noted: 2022-01-01 | Resolved: 2024-02-19

## 2024-02-19 PROBLEM — R53.1 RIGHT SIDED WEAKNESS: Status: RESOLVED | Noted: 2021-11-02 | Resolved: 2024-02-19

## 2024-02-19 PROBLEM — Z11.4 ENCOUNTER FOR SCREENING FOR HIV: Status: RESOLVED | Noted: 2023-09-19 | Resolved: 2024-02-19

## 2024-02-19 PROBLEM — R47.01 EXPRESSIVE APHASIA: Status: RESOLVED | Noted: 2023-05-05 | Resolved: 2024-02-19

## 2024-02-19 PROBLEM — N17.9 ACUTE RENAL FAILURE: Status: ACTIVE | Noted: 2024-02-19

## 2024-02-19 PROBLEM — F10.10 ETOH ABUSE: Status: RESOLVED | Noted: 2022-01-01 | Resolved: 2024-02-19

## 2024-02-19 PROBLEM — R06.00 DYSPNEA: Status: RESOLVED | Noted: 2024-01-11 | Resolved: 2024-02-19

## 2024-02-19 PROBLEM — R05.9 COUGH: Status: RESOLVED | Noted: 2024-01-03 | Resolved: 2024-02-19

## 2024-02-19 PROBLEM — L03.90 CELLULITIS: Status: RESOLVED | Noted: 2023-03-29 | Resolved: 2024-02-19

## 2024-02-19 PROBLEM — E87.6 HYPOKALEMIA: Status: RESOLVED | Noted: 2022-01-05 | Resolved: 2024-02-19

## 2024-02-19 PROBLEM — E78.5 DYSLIPIDEMIA: Status: RESOLVED | Noted: 2023-12-16 | Resolved: 2024-02-19

## 2024-02-19 PROBLEM — S72.001A CLOSED FRACTURE OF RIGHT HIP: Status: RESOLVED | Noted: 2022-01-01 | Resolved: 2024-02-19

## 2024-02-19 PROBLEM — R79.89 ELEVATED LFTS: Status: ACTIVE | Noted: 2024-02-19

## 2024-02-19 LAB
ALBUMIN SERPL BCP-MCNC: 3.6 G/DL (ref 3.5–5)
ALBUMIN/GLOB SERPL: 0.8 {RATIO}
ALP SERPL-CCNC: 179 U/L (ref 45–115)
ALT SERPL W P-5'-P-CCNC: 237 U/L (ref 16–61)
ANION GAP SERPL CALCULATED.3IONS-SCNC: 10 MMOL/L (ref 7–16)
AST SERPL W P-5'-P-CCNC: 691 U/L (ref 15–37)
BASOPHILS # BLD AUTO: 0.05 K/UL (ref 0–0.2)
BASOPHILS NFR BLD AUTO: 0.7 % (ref 0–1)
BILIRUB SERPL-MCNC: 2 MG/DL (ref ?–1.2)
BUN SERPL-MCNC: 28 MG/DL (ref 7–18)
BUN/CREAT SERPL: 21 (ref 6–20)
CALCIUM SERPL-MCNC: 9.2 MG/DL (ref 8.5–10.1)
CHLORIDE SERPL-SCNC: 107 MMOL/L (ref 98–107)
CO2 SERPL-SCNC: 25 MMOL/L (ref 21–32)
CREAT SERPL-MCNC: 1.31 MG/DL (ref 0.7–1.3)
DIFFERENTIAL METHOD BLD: ABNORMAL
EGFR (NO RACE VARIABLE) (RUSH/TITUS): 65 ML/MIN/1.73M2
EOSINOPHIL # BLD AUTO: 0.04 K/UL (ref 0–0.5)
EOSINOPHIL NFR BLD AUTO: 0.6 % (ref 1–4)
ERYTHROCYTE [DISTWIDTH] IN BLOOD BY AUTOMATED COUNT: 15.8 % (ref 11.5–14.5)
FLUAV AG UPPER RESP QL IA.RAPID: NEGATIVE
FLUBV AG UPPER RESP QL IA.RAPID: NEGATIVE
GLOBULIN SER-MCNC: 4.3 G/DL (ref 2–4)
GLUCOSE SERPL-MCNC: 179 MG/DL (ref 74–106)
GLUCOSE SERPL-MCNC: 221 MG/DL (ref 70–105)
HCT VFR BLD AUTO: 42.5 % (ref 40–54)
HGB BLD-MCNC: 13.6 G/DL (ref 13.5–18)
IMM GRANULOCYTES # BLD AUTO: 0.03 K/UL (ref 0–0.04)
IMM GRANULOCYTES NFR BLD: 0.4 % (ref 0–0.4)
LYMPHOCYTES # BLD AUTO: 1.77 K/UL (ref 1–4.8)
LYMPHOCYTES NFR BLD AUTO: 26 % (ref 27–41)
MAGNESIUM SERPL-MCNC: 2.5 MG/DL (ref 1.7–2.3)
MCH RBC QN AUTO: 29.4 PG (ref 27–31)
MCHC RBC AUTO-ENTMCNC: 32 G/DL (ref 32–36)
MCV RBC AUTO: 91.8 FL (ref 80–96)
MONOCYTES # BLD AUTO: 0.51 K/UL (ref 0–0.8)
MONOCYTES NFR BLD AUTO: 7.5 % (ref 2–6)
MPC BLD CALC-MCNC: 9.6 FL (ref 9.4–12.4)
NEUTROPHILS # BLD AUTO: 4.4 K/UL (ref 1.8–7.7)
NEUTROPHILS NFR BLD AUTO: 64.8 % (ref 53–65)
NRBC # BLD AUTO: 0.02 X10E3/UL
NRBC, AUTO (.00): 0.3 %
NT-PROBNP SERPL-MCNC: 9937 PG/ML (ref 1–125)
PLATELET # BLD AUTO: 209 K/UL (ref 150–400)
POTASSIUM SERPL-SCNC: 5.5 MMOL/L (ref 3.5–5.1)
PROT SERPL-MCNC: 7.9 G/DL (ref 6.4–8.2)
RBC # BLD AUTO: 4.63 M/UL (ref 4.6–6.2)
SARS-COV-2 RDRP RESP QL NAA+PROBE: NEGATIVE
SODIUM SERPL-SCNC: 136 MMOL/L (ref 136–145)
TROPONIN I SERPL DL<=0.01 NG/ML-MCNC: 22.4 PG/ML
TROPONIN I SERPL DL<=0.01 NG/ML-MCNC: 22.8 PG/ML
WBC # BLD AUTO: 6.8 K/UL (ref 4.5–11)

## 2024-02-19 PROCEDURE — 25000003 PHARM REV CODE 250: Performed by: HOSPITALIST

## 2024-02-19 PROCEDURE — 11000001 HC ACUTE MED/SURG PRIVATE ROOM

## 2024-02-19 PROCEDURE — 99900035 HC TECH TIME PER 15 MIN (STAT)

## 2024-02-19 PROCEDURE — 93005 ELECTROCARDIOGRAM TRACING: CPT

## 2024-02-19 PROCEDURE — 25000003 PHARM REV CODE 250: Performed by: EMERGENCY MEDICINE

## 2024-02-19 PROCEDURE — 25000242 PHARM REV CODE 250 ALT 637 W/ HCPCS: Performed by: HOSPITALIST

## 2024-02-19 PROCEDURE — 99285 EMERGENCY DEPT VISIT HI MDM: CPT | Mod: 25

## 2024-02-19 PROCEDURE — 93010 ELECTROCARDIOGRAM REPORT: CPT | Mod: ,,, | Performed by: INTERNAL MEDICINE

## 2024-02-19 PROCEDURE — 84484 ASSAY OF TROPONIN QUANT: CPT | Performed by: HOSPITALIST

## 2024-02-19 PROCEDURE — 83880 ASSAY OF NATRIURETIC PEPTIDE: CPT | Performed by: EMERGENCY MEDICINE

## 2024-02-19 PROCEDURE — 63600175 PHARM REV CODE 636 W HCPCS: Performed by: EMERGENCY MEDICINE

## 2024-02-19 PROCEDURE — 63600175 PHARM REV CODE 636 W HCPCS: Performed by: HOSPITALIST

## 2024-02-19 PROCEDURE — 27000221 HC OXYGEN, UP TO 24 HOURS

## 2024-02-19 PROCEDURE — 96374 THER/PROPH/DIAG INJ IV PUSH: CPT

## 2024-02-19 PROCEDURE — 94640 AIRWAY INHALATION TREATMENT: CPT

## 2024-02-19 PROCEDURE — 99285 EMERGENCY DEPT VISIT HI MDM: CPT | Mod: ,,, | Performed by: EMERGENCY MEDICINE

## 2024-02-19 PROCEDURE — 84484 ASSAY OF TROPONIN QUANT: CPT | Performed by: EMERGENCY MEDICINE

## 2024-02-19 PROCEDURE — 87635 SARS-COV-2 COVID-19 AMP PRB: CPT | Performed by: EMERGENCY MEDICINE

## 2024-02-19 PROCEDURE — 83735 ASSAY OF MAGNESIUM: CPT | Performed by: EMERGENCY MEDICINE

## 2024-02-19 PROCEDURE — 85025 COMPLETE CBC W/AUTO DIFF WBC: CPT | Performed by: EMERGENCY MEDICINE

## 2024-02-19 PROCEDURE — 25000242 PHARM REV CODE 250 ALT 637 W/ HCPCS: Performed by: EMERGENCY MEDICINE

## 2024-02-19 PROCEDURE — 87804 INFLUENZA ASSAY W/OPTIC: CPT | Performed by: EMERGENCY MEDICINE

## 2024-02-19 PROCEDURE — 82962 GLUCOSE BLOOD TEST: CPT

## 2024-02-19 PROCEDURE — 80053 COMPREHEN METABOLIC PANEL: CPT | Performed by: EMERGENCY MEDICINE

## 2024-02-19 PROCEDURE — 99223 1ST HOSP IP/OBS HIGH 75: CPT | Mod: ,,, | Performed by: HOSPITALIST

## 2024-02-19 RX ORDER — HYDROCODONE BITARTRATE AND ACETAMINOPHEN 5; 325 MG/1; MG/1
1 TABLET ORAL EVERY 8 HOURS PRN
Status: DISCONTINUED | OUTPATIENT
Start: 2024-02-19 | End: 2024-02-20

## 2024-02-19 RX ORDER — METOPROLOL TARTRATE 25 MG/1
12.5 TABLET ORAL 2 TIMES DAILY
Status: DISCONTINUED | OUTPATIENT
Start: 2024-02-19 | End: 2024-02-20

## 2024-02-19 RX ORDER — IBUPROFEN 200 MG
16 TABLET ORAL
Status: DISCONTINUED | OUTPATIENT
Start: 2024-02-19 | End: 2024-02-28 | Stop reason: HOSPADM

## 2024-02-19 RX ORDER — ALBUTEROL SULFATE 90 UG/1
2 AEROSOL, METERED RESPIRATORY (INHALATION) EVERY 6 HOURS PRN
Status: DISCONTINUED | OUTPATIENT
Start: 2024-02-19 | End: 2024-02-19

## 2024-02-19 RX ORDER — ATORVASTATIN CALCIUM 40 MG/1
40 TABLET, FILM COATED ORAL NIGHTLY
Status: DISCONTINUED | OUTPATIENT
Start: 2024-02-19 | End: 2024-02-28 | Stop reason: HOSPADM

## 2024-02-19 RX ORDER — FUROSEMIDE 10 MG/ML
60 INJECTION INTRAMUSCULAR; INTRAVENOUS
Status: COMPLETED | OUTPATIENT
Start: 2024-02-19 | End: 2024-02-19

## 2024-02-19 RX ORDER — HEPARIN SODIUM 5000 [USP'U]/ML
5000 INJECTION, SOLUTION INTRAVENOUS; SUBCUTANEOUS EVERY 8 HOURS
Status: DISCONTINUED | OUTPATIENT
Start: 2024-02-19 | End: 2024-02-28 | Stop reason: HOSPADM

## 2024-02-19 RX ORDER — SODIUM CHLORIDE 0.9 % (FLUSH) 0.9 %
10 SYRINGE (ML) INJECTION EVERY 12 HOURS PRN
Status: DISCONTINUED | OUTPATIENT
Start: 2024-02-19 | End: 2024-02-28 | Stop reason: HOSPADM

## 2024-02-19 RX ORDER — AMLODIPINE BESYLATE 10 MG/1
10 TABLET ORAL DAILY
Status: DISCONTINUED | OUTPATIENT
Start: 2024-02-20 | End: 2024-02-20

## 2024-02-19 RX ORDER — NALOXONE HCL 0.4 MG/ML
0.02 VIAL (ML) INJECTION
Status: DISCONTINUED | OUTPATIENT
Start: 2024-02-19 | End: 2024-02-28 | Stop reason: HOSPADM

## 2024-02-19 RX ORDER — INSULIN ASPART 100 [IU]/ML
0-10 INJECTION, SOLUTION INTRAVENOUS; SUBCUTANEOUS
Status: DISCONTINUED | OUTPATIENT
Start: 2024-02-19 | End: 2024-02-28 | Stop reason: HOSPADM

## 2024-02-19 RX ORDER — ZOLPIDEM TARTRATE 5 MG/1
5 TABLET ORAL NIGHTLY
Status: DISCONTINUED | OUTPATIENT
Start: 2024-02-19 | End: 2024-02-28 | Stop reason: HOSPADM

## 2024-02-19 RX ORDER — IPRATROPIUM BROMIDE AND ALBUTEROL SULFATE 2.5; .5 MG/3ML; MG/3ML
3 SOLUTION RESPIRATORY (INHALATION)
Status: COMPLETED | OUTPATIENT
Start: 2024-02-19 | End: 2024-02-19

## 2024-02-19 RX ORDER — FUROSEMIDE 10 MG/ML
40 INJECTION INTRAMUSCULAR; INTRAVENOUS EVERY 12 HOURS
Status: DISCONTINUED | OUTPATIENT
Start: 2024-02-19 | End: 2024-02-20

## 2024-02-19 RX ORDER — GLUCAGON 1 MG
1 KIT INJECTION
Status: DISCONTINUED | OUTPATIENT
Start: 2024-02-19 | End: 2024-02-28 | Stop reason: HOSPADM

## 2024-02-19 RX ORDER — IPRATROPIUM BROMIDE 0.5 MG/2.5ML
0.5 SOLUTION RESPIRATORY (INHALATION)
Status: DISCONTINUED | OUTPATIENT
Start: 2024-02-19 | End: 2024-02-28 | Stop reason: HOSPADM

## 2024-02-19 RX ORDER — BENZONATATE 100 MG/1
200 CAPSULE ORAL 2 TIMES DAILY
Status: DISCONTINUED | OUTPATIENT
Start: 2024-02-19 | End: 2024-02-20

## 2024-02-19 RX ORDER — GUAIFENESIN 600 MG/1
600 TABLET, EXTENDED RELEASE ORAL 2 TIMES DAILY
Status: DISCONTINUED | OUTPATIENT
Start: 2024-02-19 | End: 2024-02-28 | Stop reason: HOSPADM

## 2024-02-19 RX ORDER — CODEINE PHOSPHATE AND GUAIFENESIN 10; 100 MG/5ML; MG/5ML
5 SOLUTION ORAL 3 TIMES DAILY PRN
Status: DISCONTINUED | OUTPATIENT
Start: 2024-02-19 | End: 2024-02-28 | Stop reason: HOSPADM

## 2024-02-19 RX ORDER — EMPAGLIFLOZIN 10 MG/1
10 TABLET, FILM COATED ORAL DAILY
Status: ON HOLD | COMMUNITY
Start: 2024-02-09 | End: 2024-02-28 | Stop reason: HOSPADM

## 2024-02-19 RX ORDER — ALBUTEROL SULFATE 0.83 MG/ML
2.5 SOLUTION RESPIRATORY (INHALATION) EVERY 6 HOURS PRN
Status: DISCONTINUED | OUTPATIENT
Start: 2024-02-19 | End: 2024-02-28 | Stop reason: HOSPADM

## 2024-02-19 RX ORDER — IBUPROFEN 200 MG
24 TABLET ORAL
Status: DISCONTINUED | OUTPATIENT
Start: 2024-02-19 | End: 2024-02-28 | Stop reason: HOSPADM

## 2024-02-19 RX ORDER — PANTOPRAZOLE SODIUM 40 MG/1
40 TABLET, DELAYED RELEASE ORAL DAILY
Status: DISCONTINUED | OUTPATIENT
Start: 2024-02-20 | End: 2024-02-28 | Stop reason: HOSPADM

## 2024-02-19 RX ORDER — TRAMADOL HYDROCHLORIDE 50 MG/1
50 TABLET ORAL EVERY 8 HOURS PRN
Status: DISCONTINUED | OUTPATIENT
Start: 2024-02-19 | End: 2024-02-28 | Stop reason: HOSPADM

## 2024-02-19 RX ORDER — LISINOPRIL 5 MG/1
5 TABLET ORAL DAILY
Status: DISCONTINUED | OUTPATIENT
Start: 2024-02-20 | End: 2024-02-20

## 2024-02-19 RX ORDER — ASPIRIN 81 MG/1
81 TABLET ORAL DAILY
Status: DISCONTINUED | OUTPATIENT
Start: 2024-02-20 | End: 2024-02-28 | Stop reason: HOSPADM

## 2024-02-19 RX ADMIN — GUAIFENESIN 600 MG: 600 TABLET, EXTENDED RELEASE ORAL at 11:02

## 2024-02-19 RX ADMIN — HEPARIN SODIUM 5000 UNITS: 5000 INJECTION, SOLUTION INTRAVENOUS; SUBCUTANEOUS at 10:02

## 2024-02-19 RX ADMIN — BENZONATATE 200 MG: 100 CAPSULE ORAL at 11:02

## 2024-02-19 RX ADMIN — INSULIN DETEMIR 10 UNITS: 100 INJECTION, SOLUTION SUBCUTANEOUS at 11:02

## 2024-02-19 RX ADMIN — FUROSEMIDE 40 MG: 10 INJECTION, SOLUTION INTRAMUSCULAR; INTRAVENOUS at 11:02

## 2024-02-19 RX ADMIN — SODIUM ZIRCONIUM CYCLOSILICATE 5 G: 5 POWDER, FOR SUSPENSION ORAL at 07:02

## 2024-02-19 RX ADMIN — ATORVASTATIN CALCIUM 40 MG: 40 TABLET, FILM COATED ORAL at 11:02

## 2024-02-19 RX ADMIN — ALBUTEROL SULFATE 2.5 MG: 2.5 SOLUTION RESPIRATORY (INHALATION) at 11:02

## 2024-02-19 RX ADMIN — IPRATROPIUM BROMIDE AND ALBUTEROL SULFATE 3 ML: .5; 3 SOLUTION RESPIRATORY (INHALATION) at 03:02

## 2024-02-19 RX ADMIN — FUROSEMIDE 60 MG: 10 INJECTION, SOLUTION INTRAVENOUS at 03:02

## 2024-02-19 NOTE — H&P
[facility] - Emergency Department  Hospital Medicine  History & Physical    Patient Name: Karin Carrera  MRN: [id number]  Patient Class: Emergency  Admission Date: 2024  Attending Physician: Martita Dawn DO   Primary Care Provider: Walker Smith MD     .     Subjective:     Principal Problem:Acute hypoxemic respiratory failure    Chief Complaint:   Chief Complaint   Patient presents with    Cough        HPI: 53 year old male with an extensive PMH presents with LE swelling and SOB.  He does not use O2 at home.  He is a poor historian from a stroke and is unable to articulate.  But he was able to tell me he was in pain and wanted pain meds.      Past Medical History:   Diagnosis Date    GERD (gastroesophageal reflux disease)     Hypertension     Stroke        Past Surgical History:   Procedure Laterality Date    CARDIAC SURGERY      HEMIARTHROPLASTY OF HIP Right 2022    Procedure: HEMIARTHROPLASTY, HIP;  Surgeon: Ramses Chua MD;  Location: [facility];  Service: Orthopedics;  Laterality: Right;       Review of patient's allergies indicates:  No Known Allergies    No current facility-administered medications on file prior to encounter.     Current Outpatient Medications on File Prior to Encounter   Medication Sig    albuterol (PROVENTIL/VENTOLIN HFA) 90 mcg/actuation inhaler SMARTSI-2 Puff(s) By Mouth Every 4 Hours PRN    amLODIPine (NORVASC) 10 MG tablet Take 1 tablet (10 mg total) by mouth once daily.    aspirin (ECOTRIN) 81 MG EC tablet Take 1 tablet (81 mg total) by mouth once daily.    atorvastatin (LIPITOR) 40 MG tablet Take 1 tablet (40 mg total) by mouth every evening.    benzonatate (TESSALON) 200 MG capsule Take 1 capsule (200 mg total) by mouth 2 (two) times a day.    diclofenac sodium (VOLTAREN ARTHRITIS PAIN) 1 % Gel Apply 2 g topically 2 (two) times daily.    docusate sodium (COLACE) 100 MG capsule Take 100 mg by mouth.    empagliflozin (JARDIANCE)  25 mg tablet Take 1 tablet (25 mg total) by mouth once daily.    fluticasone propionate (FLONASE) 50 mcg/actuation nasal spray 1 spray by Each Nostril route 2 (two) times daily.    furosemide (LASIX) 40 MG tablet Take 1 tablet (40 mg total) by mouth once daily.    guaiFENesin-codeine 100-10 mg/5 ml (TUSSI-ORGANIDIN NR)  mg/5 mL syrup Take 5 mLs by mouth 3 (three) times daily as needed for Cough.    insulin aspart U-100 (NOVOLOG) 100 unit/mL injection Inject 0-5 Units into the skin 3 (three) times daily before meals.    insulin detemir U-100, Levemir, (LEVEMIR FLEXTOUCH U100 INSULIN) 100 unit/mL (3 mL) InPn pen Inject 10 Units into the skin every evening.    lisinopriL (PRINIVIL,ZESTRIL) 5 MG tablet Take 5 mg by mouth.    metoprolol tartrate (LOPRESSOR) 25 MG tablet Take 0.5 tablets (12.5 mg total) by mouth 2 (two) times daily.    MUCINEX 600 mg 12 hr tablet Take 600 mg by mouth 2 (two) times daily.    pantoprazole (PROTONIX) 40 MG tablet Take 1 tablet (40 mg total) by mouth once daily.    THERMOTABS 287-180-15 mg Tab Take 1 tablet by mouth once daily.    TRUE METRIX GLUCOSE TEST STRIP Strp USE TO CHECK BLOOD SUGAR AS DIRECTED    TRUEPLUS LANCETS 33 gauge Misc 1 lancet  by Misc.(Non-Drug; Combo Route) route once daily. use as directed    zolpidem (AMBIEN) 5 MG Tab Take 1 tablet (5 mg total) by mouth every evening.    [DISCONTINUED] amoxicillin-clavulanate 875-125mg (AUGMENTIN) 875-125 mg per tablet Take 1 tablet by mouth 2 (two) times daily.    [DISCONTINUED] azithromycin (Z-CHIKIS) 250 MG tablet Take 1 tablet (250 mg total) by mouth once daily. Take first 2 tablets together, then 1 every day until finished.    [DISCONTINUED] ferrous sulfate (FEOSOL) 325 mg (65 mg iron) Tab tablet Take 1 tablet (325 mg total) by mouth once daily.    [DISCONTINUED] guaiFENesin (MUCINEX) 600 mg 12 hr tablet Take 1 tablet (600 mg total) by mouth 2 (two) times daily.    [DISCONTINUED] metOLazone (ZAROXOLYN) 2.5 MG tablet Take 2.5 mg  by mouth.    [DISCONTINUED] mupirocin (BACTROBAN) 2 % ointment apply TO affected area(S) twice daily UNTIL HEALED    [DISCONTINUED] ondansetron (ZOFRAN-ODT) 4 MG TbDL Take 1 tablet (4 mg total) by mouth every 6 (six) hours as needed.    [DISCONTINUED] potassium chloride SA (K-DUR,KLOR-CON) 20 MEQ tablet Take 1 tablet (20 mEq total) by mouth once daily.     Family History       Problem Relation (Age of Onset)    Hypertension Mother          Tobacco Use    Smoking status: Every Day     Current packs/day: 0.50     Types: Cigarettes    Smokeless tobacco: Current     Types: Chew   Substance and Sexual Activity    Alcohol use: Yes     Comment: 1 quart    Drug use: Never    Sexual activity: Not on file     Review of Systems   Respiratory:  Positive for cough and shortness of breath.    Cardiovascular:  Positive for chest pain and leg swelling.     Objective:     Vital Signs (Most Recent):  Temp: 98.2 °F (36.8 °C) (02/19/24 1348)  Pulse: 104 (02/19/24 1540)  Resp: (!) 32 (02/19/24 1540)  BP: 123/81 (02/19/24 1348)  SpO2: 98 % (02/19/24 1540) Vital Signs (24h Range):  Temp:  [98.2 °F (36.8 °C)] 98.2 °F (36.8 °C)  Pulse:  [104] 104  Resp:  [29-32] 32  SpO2:  [90 %-98 %] 98 %  BP: (123)/(81) 123/81       PHYSICAL EXAM:    GEN: NAD; alert; has dysarthrai  ENT: hearing intact; no oral lesions  CVS: regular rate and rhythm; no murmurs  RESP: clear to auscultation bilaterally; no rhonchi, rales, or wheezes noted  GI: soft, non-tender, non-distended; + bowel sounds  EXTR: no clubbing, cyanosis; +1 BLE edema  NEURO: right sided paralysis; has dysarthria  PSYCH: normal affect  SKIN: no rashes or lesions noted    Assessment/Plan:     * Acute hypoxemic respiratory failure  Has pulmonary edema on CXR; will start Lasix IV and monitor BMP.  He is on O2 and does not use O2 at home.  I will place him on nebs.  Negative COVID; normal Troponin    Chronic systolic congestive heart failure  Will start Lasix IV and check echo; ECHO from 2022  shows EF 20% with diastolic dysfunction; will check drug screen; place on telemetry; check Troponins  Needs pacemaker/AICD for EF 20%; consult Cardiology    Coronary artery disease involving coronary bypass graft of native heart without angina pectoris  Continue home meds; will check echo    Elevated LFTs  Could be from congestion; will monitor while on Lasix      Acute renal failure  Cr slightly elevated; will monitor while on Lasix    Hyperkalemia  Received Lokelma in ER; will monitor    Diabetes mellitus  Will start SSI and monitor BS levels    History of CVA (cerebrovascular accident)  Has right sided paralysis; no acute issues               Martita Dawn DO  Department of Hospital Medicine  [facility] - Emergency Department

## 2024-02-19 NOTE — PROGRESS NOTES
Subjective:       Patient ID: Karin Carrera is a 53 y.o. male.    Chief Complaint: Cough    Cough    Patient complains of a moderate cough shortness a breath patient also requesting a HIV test he is coughing up yellowish-greenish sputum breath sounds are coarse today plan PA and lateral chest x-ray albuterol   Robitussin with codeine a Z-Messi and an HIV test  .    Current Medications:    Current Outpatient Medications:     amLODIPine (NORVASC) 10 MG tablet, Take 1 tablet (10 mg total) by mouth once daily., Disp: 90 tablet, Rfl: 0    aspirin (ECOTRIN) 81 MG EC tablet, Take 1 tablet (81 mg total) by mouth once daily., Disp: 90 tablet, Rfl: 1    atorvastatin (LIPITOR) 40 MG tablet, Take 1 tablet (40 mg total) by mouth every evening., Disp: 90 tablet, Rfl: 1    benzonatate (TESSALON) 200 MG capsule, Take 1 capsule (200 mg total) by mouth 2 (two) times a day., Disp: 60 capsule, Rfl: 3    diclofenac sodium (VOLTAREN ARTHRITIS PAIN) 1 % Gel, Apply 2 g topically 2 (two) times daily., Disp: 200 g, Rfl: 0    docusate sodium (COLACE) 100 MG capsule, Take 100 mg by mouth., Disp: , Rfl:     empagliflozin (JARDIANCE) 25 mg tablet, Take 1 tablet (25 mg total) by mouth once daily., Disp: 90 tablet, Rfl: 1    ferrous sulfate (FEOSOL) 325 mg (65 mg iron) Tab tablet, Take 1 tablet (325 mg total) by mouth once daily., Disp: 90 tablet, Rfl: 0    fluticasone propionate (FLONASE) 50 mcg/actuation nasal spray, 1 spray by Each Nostril route 2 (two) times daily., Disp: , Rfl:     furosemide (LASIX) 40 MG tablet, Take 1 tablet (40 mg total) by mouth once daily., Disp: 7 tablet, Rfl: 0    guaiFENesin (MUCINEX) 600 mg 12 hr tablet, Take 1 tablet (600 mg total) by mouth 2 (two) times daily., Disp: 30 tablet, Rfl: 0    insulin aspart U-100 (NOVOLOG) 100 unit/mL injection, Inject 0-5 Units into the skin 3 (three) times daily before meals., Disp: 10 mL, Rfl: 1    insulin detemir U-100, Levemir, (LEVEMIR FLEXTOUCH U100 INSULIN) 100 unit/mL (3  mL) InPn pen, Inject 10 Units into the skin every evening., Disp: 36 mL, Rfl: 0    lisinopriL (PRINIVIL,ZESTRIL) 5 MG tablet, Take 5 mg by mouth., Disp: , Rfl:     metOLazone (ZAROXOLYN) 2.5 MG tablet, Take 2.5 mg by mouth., Disp: , Rfl:     metoprolol tartrate (LOPRESSOR) 25 MG tablet, Take 0.5 tablets (12.5 mg total) by mouth 2 (two) times daily., Disp: 90 tablet, Rfl: 0    MUCINEX 600 mg 12 hr tablet, Take 600 mg by mouth 2 (two) times daily., Disp: , Rfl:     mupirocin (BACTROBAN) 2 % ointment, apply TO affected area(S) twice daily UNTIL HEALED, Disp: , Rfl:     ondansetron (ZOFRAN-ODT) 4 MG TbDL, Take 1 tablet (4 mg total) by mouth every 6 (six) hours as needed., Disp: 10 tablet, Rfl: 0    pantoprazole (PROTONIX) 40 MG tablet, Take 1 tablet (40 mg total) by mouth once daily., Disp: 90 tablet, Rfl: 1    potassium chloride SA (K-DUR,KLOR-CON) 20 MEQ tablet, Take 1 tablet (20 mEq total) by mouth once daily., Disp: 30 tablet, Rfl: 0    THERMOTABS 287-180-15 mg Tab, Take 1 tablet by mouth once daily., Disp: 90 tablet, Rfl: 0    TRUE METRIX GLUCOSE TEST STRIP Strp, USE TO CHECK BLOOD SUGAR AS DIRECTED, Disp: 200 each, Rfl: 1    TRUEPLUS LANCETS 33 gauge Misc, 1 lancet  by Misc.(Non-Drug; Combo Route) route once daily. use as directed, Disp: 200 each, Rfl: 2    zolpidem (AMBIEN) 5 MG Tab, Take 1 tablet (5 mg total) by mouth every evening., Disp: 30 tablet, Rfl: 1    albuterol (PROVENTIL/VENTOLIN HFA) 90 mcg/actuation inhaler, SMARTSI-2 Puff(s) By Mouth Every 4 Hours PRN, Disp: 18 g, Rfl: 6    amoxicillin-clavulanate 875-125mg (AUGMENTIN) 875-125 mg per tablet, Take 1 tablet by mouth 2 (two) times daily., Disp: 20 tablet, Rfl: 0    azithromycin (Z-CHIKIS) 250 MG tablet, Take 1 tablet (250 mg total) by mouth once daily. Take first 2 tablets together, then 1 every day until finished., Disp: 6 tablet, Rfl: 0    guaiFENesin-codeine 100-10 mg/5 ml (TUSSI-ORGANIDIN NR)  mg/5 mL syrup, Take 5 mLs by mouth 3 (three)  "times daily as needed for Cough., Disp: 200 mL, Rfl: 0           Review of Systems   Respiratory:  Positive for cough.                 Vitals:    02/08/24 1420   BP: 112/60   BP Location: Left arm   Patient Position: Sitting   BP Method: Large (Automatic)   Pulse: 99   Resp: 16   Temp: 97.6 °F (36.4 °C)   TempSrc: Temporal   SpO2: 95%   Weight: 91.2 kg (201 lb)   Height: 5' 11" (1.803 m)        Physical Exam  Vitals and nursing note reviewed.   Constitutional:       Appearance: Normal appearance.   Cardiovascular:      Rate and Rhythm: Normal rate and regular rhythm.      Pulses: Normal pulses.      Heart sounds: Normal heart sounds.   Pulmonary:      Effort: Pulmonary effort is normal.      Breath sounds: Normal breath sounds.   Abdominal:      General: Abdomen is flat. Bowel sounds are normal.      Palpations: Abdomen is soft.   Musculoskeletal:         General: Normal range of motion.   Skin:     General: Skin is warm and dry.   Neurological:      General: No focal deficit present.      Mental Status: He is alert and oriented to person, place, and time. Mental status is at baseline.           Last Labs:     Office Visit on 02/08/2024   Component Date Value    Molecular Strep A, POC 02/08/2024 Negative      Acceptab* 02/08/2024 Yes     POC Molecular Influenza * 02/08/2024 Negative     POC Molecular Influenza * 02/08/2024 Negative      Acceptab* 02/08/2024 Yes     POC Rapid COVID 02/08/2024 Negative      Acceptab* 02/08/2024 Yes    Admission on 01/24/2024, Discharged on 01/24/2024   Component Date Value    Sodium 01/24/2024 137     Potassium 01/24/2024 4.0     Chloride 01/24/2024 106     CO2 01/24/2024 26     Anion Gap 01/24/2024 9     Glucose 01/24/2024 103     BUN 01/24/2024 7     Creatinine 01/24/2024 0.82     BUN/Creatinine Ratio 01/24/2024 9     Calcium 01/24/2024 9.3     eGFR 01/24/2024 105     WBC 01/24/2024 6.72     RBC 01/24/2024 4.77     Hemoglobin 01/24/2024 " 14.1     Hematocrit 01/24/2024 41.4     MCV 01/24/2024 86.8     MCH 01/24/2024 29.6     MCHC 01/24/2024 34.1     RDW 01/24/2024 14.1     Platelet Count 01/24/2024 195     MPV 01/24/2024 9.4     Neutrophils % 01/24/2024 70.5 (H)     Lymphocytes % 01/24/2024 18.6 (L)     Monocytes % 01/24/2024 8.0 (H)     Eosinophils % 01/24/2024 2.2     Basophils % 01/24/2024 0.4     Immature Granulocytes % 01/24/2024 0.3     nRBC, Auto 01/24/2024 0.0     Neutrophils, Abs 01/24/2024 4.73     Lymphocytes, Absolute 01/24/2024 1.25     Monocytes, Absolute 01/24/2024 0.54     Eosinophils, Absolute 01/24/2024 0.15     Basophils, Absolute 01/24/2024 0.03     Immature Granulocytes, A* 01/24/2024 0.02     nRBC, Absolute 01/24/2024 0.00     Diff Type 01/24/2024 Auto        Last Imaging:  X-Ray Chest 1 View  Narrative: EXAMINATION:  XR CHEST 1 VIEW    CLINICAL HISTORY:  Cough, unspecified    TECHNIQUE:  Single frontal view of the chest was performed.    COMPARISON:  01/24/2024    FINDINGS:  Sternotomy and cardiomegaly are similar.  Interstitial prominence.  Small pleural effusion.  Overall appearance of the chest is similar.  Impression: The interstitial opacities are similar prior exam some of which may reflect chronic changes or edema.  Small left pleural effusion is similar.    Electronically signed by: González Roque  Date:    02/08/2024  Time:    15:45         **Labs and x-rays personally reviewed by me    ** reviewed      Objective:        Assessment:       1. Cough, unspecified type  POCT Strep A, Molecular    POCT Influenza A/B Molecular    POCT COVID-19 Rapid Screening    X-Ray Chest 1 View      2. Encounter for screening for HIV  HIV 1/2 Ag/Ab (4th Gen)           Plan:         1. Cough, unspecified type  -     POCT Strep A, Molecular  -     POCT Influenza A/B Molecular  -     POCT COVID-19 Rapid Screening  -     X-Ray Chest 1 View; Future; Expected date: 02/08/2024    2. Encounter for screening for HIV  -     HIV 1/2 Ag/Ab (4th Gen);  Future; Expected date: 2024    Other orders  -     albuterol (PROVENTIL/VENTOLIN HFA) 90 mcg/actuation inhaler; SMARTSI-2 Puff(s) By Mouth Every 4 Hours PRN  Dispense: 18 g; Refill: 6  -     guaiFENesin-codeine 100-10 mg/5 ml (TUSSI-ORGANIDIN NR)  mg/5 mL syrup; Take 5 mLs by mouth 3 (three) times daily as needed for Cough.  Dispense: 200 mL; Refill: 0  -     azithromycin (Z-CHIKIS) 250 MG tablet; Take 1 tablet (250 mg total) by mouth once daily. Take first 2 tablets together, then 1 every day until finished.  Dispense: 6 tablet; Refill: 0

## 2024-02-19 NOTE — ASSESSMENT & PLAN NOTE
Will start Lasix IV and check echo; ECHO from 2022 shows EF 20% with diastolic dysfunction; will check drug screen; place on telemetry; check Troponins  Needs pacemaker/AICD for EF 20%; consult Cardiology

## 2024-02-19 NOTE — Clinical Note
A handoff report was given in room 452. Pt right groin site free of bleeding/hematoma; no s/s of distress; vitals stable at time of handoff; pt on 2L O2.

## 2024-02-19 NOTE — ASSESSMENT & PLAN NOTE
Has pulmonary edema on CXR; will start Lasix IV and monitor BMP.  He is on O2 and does not use O2 at home.  I will place him on nebs.  Negative COVID; normal Troponin

## 2024-02-19 NOTE — ED PROVIDER NOTES
Encounter Date: 2/19/2024    SCRIBE #1 NOTE: I, Oswald Clarke, am scribing for, and in the presence of,  Jose Cruz Bergman MD. I have scribed the entire note.       History     Chief Complaint   Patient presents with    Cough     53 y.o.male presented to the ED  via EMS from home for SOB. PT is nonverbal and has a hand written note that states he is having trouble breathing, swelling both legs,chest pains, body chills, sleeping more than normal, and  paralyzed on right side of the body. PT has current Hx of smoking and medical HX consist of GERD, Hypertension, and Stroke.         History provided by: Hand written note / not sure who from. The history is limited by the condition of the patient and the absence of a caregiver. No  was used.   Shortness of Breath  Associated symptoms include chest pain and leg swelling.   Chest Pain  The current episode started today. Primary symptoms include shortness of breath.   The shortness of breath began today.   His past medical history is significant for hypertension and strokes.     Review of patient's allergies indicates:  No Known Allergies  Past Medical History:   Diagnosis Date    GERD (gastroesophageal reflux disease)     Hypertension     Stroke      Past Surgical History:   Procedure Laterality Date    CARDIAC SURGERY      HEMIARTHROPLASTY OF HIP Right 1/2/2022    Procedure: HEMIARTHROPLASTY, HIP;  Surgeon: Ramses Chua MD;  Location: [facility];  Service: Orthopedics;  Laterality: Right;     Family History   Problem Relation Age of Onset    Hypertension Mother      Social History     Tobacco Use    Smoking status: Every Day     Current packs/day: 0.50     Types: Cigarettes    Smokeless tobacco: Current     Types: Chew   Substance Use Topics    Alcohol use: Yes     Comment: 1 quart    Drug use: Never     Review of Systems   Constitutional:  Positive for chills.   Respiratory:  Positive for shortness of breath.    Cardiovascular:   Positive for chest pain and leg swelling.   All other systems reviewed and are negative.      Physical Exam     Initial Vitals [02/19/24 1348]   BP Pulse Resp Temp SpO2   123/81 104 (!) 29 98.2 °F (36.8 °C) (!) 90 %      MAP       --         Physical Exam    Nursing note and vitals reviewed.  Constitutional: He appears well-developed and well-nourished.   HENT:   Head: Normocephalic and atraumatic.   Eyes: EOM are normal. Pupils are equal, round, and reactive to light.   Neck: Neck supple. No thyromegaly present. No JVD present.   Normal range of motion.  Cardiovascular:  Normal rate, regular rhythm, normal heart sounds and intact distal pulses.           No murmur heard.  Pulmonary/Chest: Breath sounds normal. No stridor. No respiratory distress. He has no wheezes.   Abdominal: Abdomen is soft. Bowel sounds are normal. He exhibits no distension. There is no abdominal tenderness.   Musculoskeletal:         General: Edema present. No tenderness. Normal range of motion.      Cervical back: Normal range of motion and neck supple.     Lymphadenopathy:     He has no cervical adenopathy.   Neurological: He is alert and oriented to person, place, and time. He has normal strength. No cranial nerve deficit or sensory deficit. GCS score is 15. GCS eye subscore is 4. GCS verbal subscore is 5. GCS motor subscore is 6.   Skin: Skin is warm and dry. Capillary refill takes less than 2 seconds. No rash noted.   Psychiatric: He has a normal mood and affect.   Nonverbal/ stroke patient          ED Course   Procedures  Labs Reviewed   NT-PRO NATRIURETIC PEPTIDE - Abnormal; Notable for the following components:       Result Value    ProBNP 9,937 (*)     All other components within normal limits   COMPREHENSIVE METABOLIC PANEL - Abnormal; Notable for the following components:    Potassium 5.5 (*)     Glucose 179 (*)     BUN 28 (*)     Creatinine 1.31 (*)     BUN/Creatinine Ratio 21 (*)     Globulin 4.3 (*)     Bilirubin, Total 2.0 (*)      Alk Phos 179 (*)      (*)      (*)     All other components within normal limits   MAGNESIUM - Abnormal; Notable for the following components:    Magnesium 2.5 (*)     All other components within normal limits   CBC WITH DIFFERENTIAL - Abnormal; Notable for the following components:    RDW 15.8 (*)     Lymphocytes % 26.0 (*)     Monocytes % 7.5 (*)     Eosinophils % 0.6 (*)     nRBC, Auto 0.3 (*)     nRBC, Absolute 0.02 (*)     All other components within normal limits   RAPID INFLUENZA A/B - Normal   TROPONIN I - Normal   SARS-COV-2 RNA AMPLIFICATION, QUAL - Normal    Narrative:     Negative SARS-CoV results should not be used as the sole basis for treatment or patient management decisions; negative results should be considered in the context of a patient's recent exposures, history and the presene of clinical signs and symptoms consistent with COVID-19.  Negative results should be treated as presumptive and confirmed by molecular assay, if necessary for patient management.   TROPONIN I - Normal   CBC W/ AUTO DIFFERENTIAL    Narrative:     The following orders were created for panel order CBC auto differential.  Procedure                               Abnormality         Status                     ---------                               -----------         ------                     CBC with Differential[[id number]]       Abnormal            Final result                 Please view results for these tests on the individual orders.   DRUG SCREEN, URINE (BEAKER)   POCT GLUCOSE MONITORING CONTINUOUS        ECG Results              EKG 12-lead (Final result)        Collection Time Result Time QRS Duration OHS QTC Calculation    02/19/24 13:45:19 02/20/24 04:29:18 94 437                     Final result by Interface, Lab In [facility] (02/20/24 04:29:27)                   Narrative:    Test Reason : R06.02,    Vent. Rate : 104 BPM     Atrial Rate : 000 BPM     P-R Int : 188 ms          QRS Dur : 094 ms       QT Int : 360 ms       P-R-T Axes : 066 -30 118 degrees     QTc Int : 437 ms    Sinus tachycardia with PVC(s)  Left axis deviation  Poor R wave progression  Lateral T wave abnormality may be due to myocardial ischemia  Abnormal ECG    Confirmed by Liv VIDAL, Kai FOWLER ([id number]) on 2/20/2024 4:29:13 AM    Referred By:             Confirmed By:Kai Peck MD                                  Imaging Results              X-Ray Chest 1 View (Final result)  Result time 02/19/24 14:42:52      Final result by Pavan Carmichael DO (02/19/24 14:42:52)                   Impression:      Diffuse interstitial and patchy airspace opacities scattered throughout the lungs have increased from comparison. Findings suggesting worsening pulmonary edema.      Electronically signed by: Pavan Carmichael  Date:    02/19/2024  Time:    14:42               Narrative:    EXAMINATION:  XR CHEST 1 VIEW    CLINICAL HISTORY:  Shortness of breath    TECHNIQUE:  XR CHEST 1 VIEW    COMPARISON:  2/8/24    FINDINGS:  No lines or tubes.    Diffuse interstitial and patchy airspace opacities scattered throughout the lungs have increased from comparison.  Findings suggesting worsening pulmonary edema.    Normal pleura.    Cardiac silhouette is similar to comparison exam.    No obvious acute bone findings.                                       Medications   aspirin EC tablet 81 mg (81 mg Oral Given 2/21/24 0806)   atorvastatin tablet 40 mg (40 mg Oral Given 2/21/24 2011)   guaiFENesin-codeine 100-10 mg/5 ml syrup 5 mL (5 mLs Oral Given 2/21/24 9952)   insulin detemir U-100 injection 10 Units (10 Units Subcutaneous Given 2/21/24 2011)   guaiFENesin 12 hr tablet 600 mg (600 mg Oral Given 2/21/24 2011)   pantoprazole EC tablet 40 mg (40 mg Oral Given 2/21/24 0806)   zolpidem tablet 5 mg (5 mg Oral Given 2/21/24 2011)   ipratropium 0.02 % nebulizer solution 0.5 mg (0.5 mg Nebulization Given 2/21/24 2026)   glucose chewable tablet 16 g (has no  administration in time range)   glucose chewable tablet 24 g (has no administration in time range)   glucagon (human recombinant) injection 1 mg (has no administration in time range)   insulin aspart U-100 injection 0-10 Units (1 Units Subcutaneous Given 2/21/24 2012)   dextrose 10% bolus 125 mL 125 mL (has no administration in time range)   dextrose 10% bolus 250 mL 250 mL (has no administration in time range)   traMADoL tablet 50 mg (has no administration in time range)   sodium chloride 0.9% flush 10 mL (has no administration in time range)   naloxone 0.4 mg/mL injection 0.02 mg (has no administration in time range)   glucose chewable tablet 16 g (has no administration in time range)   glucose chewable tablet 24 g (has no administration in time range)   glucagon (human recombinant) injection 1 mg (has no administration in time range)   heparin (porcine) injection 5,000 Units (5,000 Units Subcutaneous Given 2/22/24 0555)   dextrose 10% bolus 125 mL 125 mL (has no administration in time range)   dextrose 10% bolus 250 mL 250 mL (has no administration in time range)   albuterol nebulizer solution 2.5 mg (2.5 mg Nebulization Given 2/20/24 2003)   furosemide injection 40 mg (40 mg Intravenous Given 2/22/24 0555)   benzonatate capsule 100 mg (has no administration in time range)   sacubitriL-valsartan 24-26 mg per tablet 1 tablet (1 tablet Oral Given 2/21/24 2011)   spironolactone tablet 25 mg (25 mg Oral Given 2/21/24 1509)   simethicone chewable tablet 80 mg (80 mg Oral Given 2/21/24 1536)   acetaminophen tablet 1,000 mg (has no administration in time range)   bisacodyL EC tablet 10 mg (has no administration in time range)   melatonin tablet 6 mg (has no administration in time range)   ondansetron injection 8 mg (8 mg Intravenous Given 2/22/24 0602)   simethicone chewable tablet 80 mg (has no administration in time range)   traZODone tablet 50 mg (0 mg Oral Return to Cabinet 2/21/24 2036)   furosemide injection 60 mg  (60 mg Intravenous Given 2/19/24 1541)   albuterol-ipratropium 2.5 mg-0.5 mg/3 mL nebulizer solution 3 mL (3 mLs Nebulization Given 2/19/24 1540)   sodium zirconium cyclosilicate packet 5 g (5 g Oral Given 2/19/24 1950)   potassium chloride SA CR tablet 40 mEq (40 mEq Oral Given 2/21/24 2011)     Medical Decision Making  MDM    Patient presents for emergent evaluation of acute shortness breath that poses a threat to life and/or bodily function.    In the ED patient found to have acute CHF exacerbation volume overload hyperkalemia.    I ordered labs and personally reviewed them.  Labs significant for elevated proBNP elevated potassium.  Influenza normal..    I ordered X-rays and personally reviewed them and reviewed the radiologist interpretation.  Xray significant for diffuse interstitial patchy airspace opacities.    I ordered EKG and personally reviewed it.  EKG significant for no ST elevation.      Admission MDM  I discussed the patient presentation and findings with the consultant for hospital medicine (speciality).    Patient was managed in the ED with IV Lasix.    The response to treatment was improved.    Patient required emergent consultation to Hospital Medicine (admitting physician) for admission.     Amount and/or Complexity of Data Reviewed  Labs: ordered.  Radiology: ordered.    Risk  Prescription drug management.  Decision regarding hospitalization.              Attending Attestation:           Physician Attestation for Scribe:  Physician Attestation Statement for Scribe #1: I, Jose Cruz Bergman MD, reviewed documentation, as scribed by Oswald Clarke in my presence, and it is both accurate and complete.                                    Clinical Impression:  Final diagnoses:  [R06.02] Shortness of breath  [I50.9] Acute on chronic congestive heart failure, unspecified heart failure type (Primary)  [E87.5] Hyperkalemia  [I50.9] CHF (congestive heart failure)  [R07.9] Chest pain          ED  Disposition Condition    Admit                 Jose Cruz Bergman MD  02/22/24 0650

## 2024-02-19 NOTE — ED NOTES
Patient was having oxygen saturations from 88-91 on 1 liter nasal cannula per finger probe.  Patient changed to ear probe and oxygen saturation came up to 95-96 on 1 liter NC.

## 2024-02-19 NOTE — ASSESSMENT & PLAN NOTE
Continue home meds; will check echo   Full bed change performed. Pt placed in hospital gown and given 2 warm blankets. All soiled clothing removed and discarded per pt request. Family bedside at this time. Will continue to monitor.       Edgar Mason RN  09/22/21 0300

## 2024-02-19 NOTE — HPI
53 year old male with an extensive PMH presents with LE swelling and SOB.  He does not use O2 at home.  He is a poor historian from a stroke and is unable to articulate.  But he was able to tell me he was in pain and wanted pain meds.

## 2024-02-19 NOTE — Clinical Note
Dr. Pacheco at pt bs. Attempts to obtain US guided IV access to right upper arm. Unsuccessful attempts x 2

## 2024-02-19 NOTE — SUBJECTIVE & OBJECTIVE
Past Medical History:   Diagnosis Date    GERD (gastroesophageal reflux disease)     Hypertension     Stroke        Past Surgical History:   Procedure Laterality Date    CARDIAC SURGERY      HEMIARTHROPLASTY OF HIP Right 2022    Procedure: HEMIARTHROPLASTY, HIP;  Surgeon: Ramses Chua MD;  Location: [facility];  Service: Orthopedics;  Laterality: Right;       Review of patient's allergies indicates:  No Known Allergies    No current facility-administered medications on file prior to encounter.     Current Outpatient Medications on File Prior to Encounter   Medication Sig    albuterol (PROVENTIL/VENTOLIN HFA) 90 mcg/actuation inhaler SMARTSI-2 Puff(s) By Mouth Every 4 Hours PRN    amLODIPine (NORVASC) 10 MG tablet Take 1 tablet (10 mg total) by mouth once daily.    aspirin (ECOTRIN) 81 MG EC tablet Take 1 tablet (81 mg total) by mouth once daily.    atorvastatin (LIPITOR) 40 MG tablet Take 1 tablet (40 mg total) by mouth every evening.    benzonatate (TESSALON) 200 MG capsule Take 1 capsule (200 mg total) by mouth 2 (two) times a day.    diclofenac sodium (VOLTAREN ARTHRITIS PAIN) 1 % Gel Apply 2 g topically 2 (two) times daily.    docusate sodium (COLACE) 100 MG capsule Take 100 mg by mouth.    empagliflozin (JARDIANCE) 25 mg tablet Take 1 tablet (25 mg total) by mouth once daily.    fluticasone propionate (FLONASE) 50 mcg/actuation nasal spray 1 spray by Each Nostril route 2 (two) times daily.    furosemide (LASIX) 40 MG tablet Take 1 tablet (40 mg total) by mouth once daily.    guaiFENesin-codeine 100-10 mg/5 ml (TUSSI-ORGANIDIN NR)  mg/5 mL syrup Take 5 mLs by mouth 3 (three) times daily as needed for Cough.    insulin aspart U-100 (NOVOLOG) 100 unit/mL injection Inject 0-5 Units into the skin 3 (three) times daily before meals.    insulin detemir U-100, Levemir, (LEVEMIR FLEXTOUCH U100 INSULIN) 100 unit/mL (3 mL) InPn pen Inject 10 Units into the skin every evening.    lisinopriL  (PRINIVIL,ZESTRIL) 5 MG tablet Take 5 mg by mouth.    metoprolol tartrate (LOPRESSOR) 25 MG tablet Take 0.5 tablets (12.5 mg total) by mouth 2 (two) times daily.    MUCINEX 600 mg 12 hr tablet Take 600 mg by mouth 2 (two) times daily.    pantoprazole (PROTONIX) 40 MG tablet Take 1 tablet (40 mg total) by mouth once daily.    THERMOTABS 287-180-15 mg Tab Take 1 tablet by mouth once daily.    TRUE METRIX GLUCOSE TEST STRIP Strp USE TO CHECK BLOOD SUGAR AS DIRECTED    TRUEPLUS LANCETS 33 gauge Misc 1 lancet  by Misc.(Non-Drug; Combo Route) route once daily. use as directed    zolpidem (AMBIEN) 5 MG Tab Take 1 tablet (5 mg total) by mouth every evening.    [DISCONTINUED] amoxicillin-clavulanate 875-125mg (AUGMENTIN) 875-125 mg per tablet Take 1 tablet by mouth 2 (two) times daily.    [DISCONTINUED] azithromycin (Z-CHIKIS) 250 MG tablet Take 1 tablet (250 mg total) by mouth once daily. Take first 2 tablets together, then 1 every day until finished.    [DISCONTINUED] ferrous sulfate (FEOSOL) 325 mg (65 mg iron) Tab tablet Take 1 tablet (325 mg total) by mouth once daily.    [DISCONTINUED] guaiFENesin (MUCINEX) 600 mg 12 hr tablet Take 1 tablet (600 mg total) by mouth 2 (two) times daily.    [DISCONTINUED] metOLazone (ZAROXOLYN) 2.5 MG tablet Take 2.5 mg by mouth.    [DISCONTINUED] mupirocin (BACTROBAN) 2 % ointment apply TO affected area(S) twice daily UNTIL HEALED    [DISCONTINUED] ondansetron (ZOFRAN-ODT) 4 MG TbDL Take 1 tablet (4 mg total) by mouth every 6 (six) hours as needed.    [DISCONTINUED] potassium chloride SA (K-DUR,KLOR-CON) 20 MEQ tablet Take 1 tablet (20 mEq total) by mouth once daily.     Family History       Problem Relation (Age of Onset)    Hypertension Mother          Tobacco Use    Smoking status: Every Day     Current packs/day: 0.50     Types: Cigarettes    Smokeless tobacco: Current     Types: Chew   Substance and Sexual Activity    Alcohol use: Yes     Comment: 1 quart    Drug use: Never    Sexual  activity: Not on file     Review of Systems   Respiratory:  Positive for cough and shortness of breath.    Cardiovascular:  Positive for chest pain and leg swelling.     Objective:     Vital Signs (Most Recent):  Temp: 98.2 °F (36.8 °C) (02/19/24 1348)  Pulse: 104 (02/19/24 1540)  Resp: (!) 32 (02/19/24 1540)  BP: 123/81 (02/19/24 1348)  SpO2: 98 % (02/19/24 1540) Vital Signs (24h Range):  Temp:  [98.2 °F (36.8 °C)] 98.2 °F (36.8 °C)  Pulse:  [104] 104  Resp:  [29-32] 32  SpO2:  [90 %-98 %] 98 %  BP: (123)/(81) 123/81       PHYSICAL EXAM:    GEN: NAD; alert; has dysarthrai  ENT: hearing intact; no oral lesions  CVS: regular rate and rhythm; no murmurs  RESP: clear to auscultation bilaterally; no rhonchi, rales, or wheezes noted  GI: soft, non-tender, non-distended; + bowel sounds  EXTR: no clubbing, cyanosis; +1 BLE edema  NEURO: right sided paralysis; has dysarthria  PSYCH: normal affect  SKIN: no rashes or lesions noted

## 2024-02-20 PROBLEM — I50.23 ACUTE ON CHRONIC SYSTOLIC CONGESTIVE HEART FAILURE: Status: ACTIVE | Noted: 2022-12-19

## 2024-02-20 LAB
ALBUMIN SERPL BCP-MCNC: 3.3 G/DL (ref 3.5–5)
ALBUMIN/GLOB SERPL: 0.8 {RATIO}
ALP SERPL-CCNC: 166 U/L (ref 45–115)
ALT SERPL W P-5'-P-CCNC: 265 U/L (ref 16–61)
ANION GAP SERPL CALCULATED.3IONS-SCNC: 11 MMOL/L (ref 7–16)
AORTIC ROOT ANNULUS: 2 CM
AORTIC VALVE CUSP SEPERATION: 2.17 CM
AST SERPL W P-5'-P-CCNC: 482 U/L (ref 15–37)
AV INDEX (PROSTH): 0.79
AV MEAN GRADIENT: 2 MMHG
AV PEAK GRADIENT: 2 MMHG
AV VALVE AREA BY VELOCITY RATIO: 3.76 CM²
AV VALVE AREA: 3.9 CM²
AV VELOCITY RATIO: 0.77
BILIRUB SERPL-MCNC: 1.7 MG/DL (ref ?–1.2)
BSA FOR ECHO PROCEDURE: 2.14 M2
BUN SERPL-MCNC: 27 MG/DL (ref 7–18)
BUN/CREAT SERPL: 22 (ref 6–20)
CALCIUM SERPL-MCNC: 8.8 MG/DL (ref 8.5–10.1)
CHLORIDE SERPL-SCNC: 103 MMOL/L (ref 98–107)
CO2 SERPL-SCNC: 27 MMOL/L (ref 21–32)
CREAT SERPL-MCNC: 1.24 MG/DL (ref 0.7–1.3)
CV ECHO LV RWT: 0.23 CM
DOP CALC AO PEAK VEL: 0.77 M/S
DOP CALC AO VTI: 11.2 CM
DOP CALC LVOT AREA: 4.9 CM2
DOP CALC LVOT DIAMETER: 2.5 CM
DOP CALC LVOT PEAK VEL: 0.59 M/S
DOP CALC LVOT STROKE VOLUME: 43.67 CM3
DOP CALC MV VTI: 32 CM
DOP CALCLVOT PEAK VEL VTI: 8.9 CM
E WAVE DECELERATION TIME: 150.05 MSEC
E/A RATIO: 2.78
E/E' RATIO: 11.68 M/S
ECHO LV POSTERIOR WALL: 0.85 CM (ref 0.6–1.1)
EGFR (NO RACE VARIABLE) (RUSH/TITUS): 70 ML/MIN/1.73M2
FRACTIONAL SHORTENING: 8 % (ref 28–44)
GLOBULIN SER-MCNC: 4.1 G/DL (ref 2–4)
GLUCOSE SERPL-MCNC: 121 MG/DL (ref 74–106)
GLUCOSE SERPL-MCNC: 123 MG/DL (ref 70–105)
GLUCOSE SERPL-MCNC: 123 MG/DL (ref 70–105)
GLUCOSE SERPL-MCNC: 146 MG/DL (ref 70–105)
GLUCOSE SERPL-MCNC: 164 MG/DL (ref 70–105)
INTERVENTRICULAR SEPTUM: 0.79 CM (ref 0.6–1.1)
IVC DIAMETER: 2.38 CM
LA MAJOR: 6.9 CM
LACTATE SERPL-SCNC: 1.7 MMOL/L (ref 0.4–2)
LACTATE SERPL-SCNC: 2.3 MMOL/L (ref 0.4–2)
LACTATE SERPL-SCNC: 2.9 MMOL/L (ref 0.4–2)
LACTATE SERPL-SCNC: 3.5 MMOL/L (ref 0.4–2)
LACTATE SERPL-SCNC: 3.8 MMOL/L (ref 0.4–2)
LEFT ATRIUM SIZE: 5.48 CM
LEFT INTERNAL DIMENSION IN SYSTOLE: 6.71 CM (ref 2.1–4)
LEFT VENTRICLE DIASTOLIC VOLUME INDEX: 133.1 ML/M2
LEFT VENTRICLE DIASTOLIC VOLUME: 280.84 ML
LEFT VENTRICLE MASS INDEX: 129 G/M2
LEFT VENTRICLE SYSTOLIC VOLUME INDEX: 110 ML/M2
LEFT VENTRICLE SYSTOLIC VOLUME: 232.15 ML
LEFT VENTRICULAR INTERNAL DIMENSION IN DIASTOLE: 7.3 CM (ref 3.5–6)
LEFT VENTRICULAR MASS: 271.42 G
LV LATERAL E/E' RATIO: 9.25 M/S
LV SEPTAL E/E' RATIO: 15.86 M/S
LVOT MG: 0.83 MMHG
LVOT MV: 0.44 CM/S
MV MEAN GRADIENT: 3 MMHG
MV PEAK A VEL: 0.4 M/S
MV PEAK E VEL: 1.11 M/S
MV PEAK GRADIENT: 8 MMHG
MV STENOSIS PRESSURE HALF TIME: 57.19 MS
MV VALVE AREA BY CONTINUITY EQUATION: 1.36 CM2
MV VALVE AREA P 1/2 METHOD: 3.85 CM2
OHS LV EJECTION FRACTION SIMPSONS BIPLANE MOD: 22 %
OHS QRS DURATION: 94 MS
OHS QTC CALCULATION: 437 MS
PISA MRMAX VEL: 4.28 M/S
PISA TR MAX VEL: 3.53 M/S
POTASSIUM SERPL-SCNC: 4.2 MMOL/L (ref 3.5–5.1)
PROT SERPL-MCNC: 7.4 G/DL (ref 6.4–8.2)
PV PEAK GRADIENT: 1 MMHG
PV PEAK VELOCITY: 0.54 M/S
RA MAJOR: 5.02 CM
RA PRESSURE ESTIMATED: 15 MMHG
RIGHT VENTRICULAR END-DIASTOLIC DIMENSION: 5.16 CM
RV TB RVSP: 19 MMHG
SODIUM SERPL-SCNC: 137 MMOL/L (ref 136–145)
TDI LATERAL: 0.12 M/S
TDI SEPTAL: 0.07 M/S
TDI: 0.1 M/S
TR MAX PG: 50 MMHG
TRICUSPID ANNULAR PLANE SYSTOLIC EXCURSION: 1.1 CM
TROPONIN I SERPL DL<=0.01 NG/ML-MCNC: 19.9 PG/ML
TROPONIN I SERPL DL<=0.01 NG/ML-MCNC: 24.2 PG/ML
TV REST PULMONARY ARTERY PRESSURE: 65 MMHG
Z-SCORE OF LEFT VENTRICULAR DIMENSION IN END DIASTOLE: 0.99
Z-SCORE OF LEFT VENTRICULAR DIMENSION IN END SYSTOLE: 4.01

## 2024-02-20 PROCEDURE — 27000221 HC OXYGEN, UP TO 24 HOURS

## 2024-02-20 PROCEDURE — 99223 1ST HOSP IP/OBS HIGH 75: CPT | Mod: ,,, | Performed by: STUDENT IN AN ORGANIZED HEALTH CARE EDUCATION/TRAINING PROGRAM

## 2024-02-20 PROCEDURE — 25000003 PHARM REV CODE 250: Performed by: HOSPITALIST

## 2024-02-20 PROCEDURE — 11000001 HC ACUTE MED/SURG PRIVATE ROOM

## 2024-02-20 PROCEDURE — 99232 SBSQ HOSP IP/OBS MODERATE 35: CPT | Mod: ,,, | Performed by: HOSPITALIST

## 2024-02-20 PROCEDURE — 94761 N-INVAS EAR/PLS OXIMETRY MLT: CPT

## 2024-02-20 PROCEDURE — 80053 COMPREHEN METABOLIC PANEL: CPT | Performed by: HOSPITALIST

## 2024-02-20 PROCEDURE — 63600175 PHARM REV CODE 636 W HCPCS: Performed by: NURSE PRACTITIONER

## 2024-02-20 PROCEDURE — 83605 ASSAY OF LACTIC ACID: CPT | Performed by: NURSE PRACTITIONER

## 2024-02-20 PROCEDURE — 82962 GLUCOSE BLOOD TEST: CPT

## 2024-02-20 PROCEDURE — 99900035 HC TECH TIME PER 15 MIN (STAT)

## 2024-02-20 PROCEDURE — 63600175 PHARM REV CODE 636 W HCPCS: Performed by: HOSPITALIST

## 2024-02-20 PROCEDURE — 94640 AIRWAY INHALATION TREATMENT: CPT

## 2024-02-20 PROCEDURE — 63600175 PHARM REV CODE 636 W HCPCS: Performed by: STUDENT IN AN ORGANIZED HEALTH CARE EDUCATION/TRAINING PROGRAM

## 2024-02-20 PROCEDURE — 84484 ASSAY OF TROPONIN QUANT: CPT | Performed by: HOSPITALIST

## 2024-02-20 PROCEDURE — 25000242 PHARM REV CODE 250 ALT 637 W/ HCPCS: Performed by: HOSPITALIST

## 2024-02-20 RX ORDER — BENZONATATE 100 MG/1
100 CAPSULE ORAL 3 TIMES DAILY PRN
Status: DISCONTINUED | OUTPATIENT
Start: 2024-02-20 | End: 2024-02-28 | Stop reason: HOSPADM

## 2024-02-20 RX ORDER — FUROSEMIDE 10 MG/ML
40 INJECTION INTRAMUSCULAR; INTRAVENOUS
Status: DISCONTINUED | OUTPATIENT
Start: 2024-02-20 | End: 2024-02-22

## 2024-02-20 RX ORDER — DOBUTAMINE HYDROCHLORIDE 400 MG/100ML
2.5 INJECTION INTRAVENOUS CONTINUOUS
Status: DISCONTINUED | OUTPATIENT
Start: 2024-02-20 | End: 2024-02-21

## 2024-02-20 RX ADMIN — METOPROLOL TARTRATE 12.5 MG: 25 TABLET, FILM COATED ORAL at 02:02

## 2024-02-20 RX ADMIN — ZOLPIDEM TARTRATE 5 MG: 5 TABLET, COATED ORAL at 09:02

## 2024-02-20 RX ADMIN — IPRATROPIUM BROMIDE 0.5 MG: 0.5 SOLUTION RESPIRATORY (INHALATION) at 08:02

## 2024-02-20 RX ADMIN — INSULIN ASPART 1 UNITS: 100 INJECTION, SOLUTION INTRAVENOUS; SUBCUTANEOUS at 09:02

## 2024-02-20 RX ADMIN — ALBUTEROL SULFATE 2.5 MG: 2.5 SOLUTION RESPIRATORY (INHALATION) at 08:02

## 2024-02-20 RX ADMIN — LISINOPRIL 5 MG: 5 TABLET ORAL at 08:02

## 2024-02-20 RX ADMIN — FUROSEMIDE 40 MG: 10 INJECTION, SOLUTION INTRAVENOUS at 03:02

## 2024-02-20 RX ADMIN — GUAIFENESIN 600 MG: 600 TABLET, EXTENDED RELEASE ORAL at 08:02

## 2024-02-20 RX ADMIN — GUAIFENESIN 600 MG: 600 TABLET, EXTENDED RELEASE ORAL at 09:02

## 2024-02-20 RX ADMIN — ASPIRIN 81 MG: 81 TABLET, COATED ORAL at 08:02

## 2024-02-20 RX ADMIN — FUROSEMIDE 40 MG: 10 INJECTION, SOLUTION INTRAMUSCULAR; INTRAVENOUS at 08:02

## 2024-02-20 RX ADMIN — GUAIFENESIN AND CODEINE PHOSPHATE 5 ML: 100; 10 SOLUTION ORAL at 03:02

## 2024-02-20 RX ADMIN — HEPARIN SODIUM 5000 UNITS: 5000 INJECTION, SOLUTION INTRAVENOUS; SUBCUTANEOUS at 06:02

## 2024-02-20 RX ADMIN — PANTOPRAZOLE SODIUM 40 MG: 40 TABLET, DELAYED RELEASE ORAL at 08:02

## 2024-02-20 RX ADMIN — INSULIN DETEMIR 10 UNITS: 100 INJECTION, SOLUTION SUBCUTANEOUS at 09:02

## 2024-02-20 RX ADMIN — IPRATROPIUM BROMIDE 0.5 MG: 0.5 SOLUTION RESPIRATORY (INHALATION) at 01:02

## 2024-02-20 RX ADMIN — ALBUTEROL SULFATE 2.5 MG: 2.5 SOLUTION RESPIRATORY (INHALATION) at 10:02

## 2024-02-20 RX ADMIN — HEPARIN SODIUM 5000 UNITS: 5000 INJECTION, SOLUTION INTRAVENOUS; SUBCUTANEOUS at 03:02

## 2024-02-20 RX ADMIN — GUAIFENESIN AND CODEINE PHOSPHATE 5 ML: 100; 10 SOLUTION ORAL at 11:02

## 2024-02-20 RX ADMIN — HEPARIN SODIUM 5000 UNITS: 5000 INJECTION, SOLUTION INTRAVENOUS; SUBCUTANEOUS at 09:02

## 2024-02-20 RX ADMIN — BENZONATATE 200 MG: 100 CAPSULE ORAL at 08:02

## 2024-02-20 RX ADMIN — ATORVASTATIN CALCIUM 40 MG: 40 TABLET, FILM COATED ORAL at 09:02

## 2024-02-20 RX ADMIN — DOBUTAMINE HYDROCHLORIDE 2.5 MCG/KG/MIN: 400 INJECTION INTRAVENOUS at 08:02

## 2024-02-20 NOTE — ASSESSMENT & PLAN NOTE
Ischemic cardiomyopathy; EF 20%, NYHA III , Stage D  Volume overloaded with low cardiac output - IV lasix 40mg bid, and iv dobutamine 2.5  GDMT - issues with compliance in the past - on lisinopril only  Devices; none -patient would likely qualify for ICD however is not taking GDMT

## 2024-02-20 NOTE — PROGRESS NOTES
[facility] Hospital Medicine  Progress Note    Patient Name: Karin Carrera  MRN: [id number]  Patient Class: IP- Inpatient   Admission Date: 2/19/2024  Length of Stay: 1 days  Attending Physician: Martita Dawn DO  Primary Care Provider: Walker Smith MD        Subjective:     Principal Problem:Acute hypoxemic respiratory failure        HPI:  53 year old male with an extensive PMH presents with LE swelling and SOB.  He does not use O2 at home.  He is a poor historian from a stroke and is unable to articulate.  But he was able to tell me he was in pain and wanted pain meds.      Overview/Hospital Course:  53 year old male with an extensive PMH presents with LE swelling and SOB. He is complaining of cough.  His SOB is improved.  Patient denies chest pain, nausea, vomiting, or diarrhea.      Vitals:    02/20/24 0632 02/20/24 0804 02/20/24 1011 02/20/24 1042   BP: 108/77  129/74    Pulse: 97 100 104 98   Resp: 18 20 17 (!) 28   Temp:       TempSrc:       SpO2: (!) 94% 97% 95% 96%   Weight:       Height:           PHYSICAL EXAMINATION:    GEN: NAD; alert and oriented x 3  RESP: normal respiratory effort; no audible wheezing noted; on O2  EXTR: LE swelling improved bilaterally  NEURO: CN II-XII intact; no focal deficits noted  PSYCH: normal affect  SKIN: warm, dry, intact      Assessment/Plan:      * Acute hypoxemic respiratory failure  Has pulmonary edema on CXR; on Lasix IV; monitor BMP.  He is on O2 and does not use O2 at home.  On nebs.  Negative COVID; normal Troponin    Chronic systolic congestive heart failure  On Lasix IV; pending echo results; ECHO from 2022 shows EF 20% with diastolic dysfunction; pending drug screen; on telemetry; normal Troponins  Needs pacemaker/AICD for EF 20%; consult from Cardiology pending but appears to have been placed on Dobutamine drip per Cardiology; patient feels better    Coronary artery disease involving coronary bypass graft of native  heart without angina pectoris  Continue home meds    Elevated LFTs  From congestion/CHF; will monitor while on Lasix; improved LFT's today      Acute renal failure  Improved    Hyperkalemia  Received Lokelma in ER; resolved    Cough  Add Tessalon Perles; on nebs      Diabetes mellitus  On SSI and monitor BS levels; BS stable    History of CVA (cerebrovascular accident)  Has right sided paralysis; no acute issues          Martita Dawn DO  Department of Hospital Medicine   [facility]

## 2024-02-20 NOTE — HPI
52 y.o. male with hx of CAD s/p CABG, ischemic cardiomyopathy (EF 20%), CVA, with expressive aphasia (able to write for communication) and right arm weakness,  HTN, GERD, ETOH abuse, and nicotine abuse who presented with SOB and admitted for acute decompensated HF with cardiogenic shock.    Patient reports few weeks of worsening SOB, orthopnea and sleeping in a recliner with PND. Also has bilateral leg swelling.   Patient admitted for IV diuresis.   Hospital course c/b runs of NSVT on tele    Today, He reports feeling better after IV diuresis. He was started on IV dobutamine 2.5mcg/kg/min for low cardiac output (elevated lactate and elevated LFTs).

## 2024-02-20 NOTE — ASSESSMENT & PLAN NOTE
On Lasix IV; pending echo results; ECHO from 2022 shows EF 20% with diastolic dysfunction; pending drug screen; on telemetry; normal Troponins  Needs pacemaker/AICD for EF 20%; consult from Cardiology pending but appears to have been placed on Dobutamine drip per Cardiology; patient feels better

## 2024-02-20 NOTE — CARE UPDATE
Spoke with RN who got a call from telemetry about 15min ago that patient had about 5 runs of NSVT that resolved spontaneously on its own. RN saw patient earlier at around 9pm and systolic BP was 114 and she held Lopressor 12.5 po. Told RN to give the Lopressor 12.5 once now. Noted that HR was 58 and systolic . Cardiology was consulted by Dr. Dawn to see patient in the morning for EF 20% and needing an AICD placement.

## 2024-02-20 NOTE — ASSESSMENT & PLAN NOTE
Evidence of poor end organ perfusion - elevated lactate and elevated LFTS (shock liver)  - Agree with IV dobutamine today  - Plan to start entresto tomorrow.

## 2024-02-20 NOTE — SUBJECTIVE & OBJECTIVE
Past Medical History:   Diagnosis Date    GERD (gastroesophageal reflux disease)     Hypertension     Stroke        Past Surgical History:   Procedure Laterality Date    CARDIAC SURGERY      HEMIARTHROPLASTY OF HIP Right 2022    Procedure: HEMIARTHROPLASTY, HIP;  Surgeon: Ramses Chua MD;  Location: [facility];  Service: Orthopedics;  Laterality: Right;       Review of patient's allergies indicates:  No Known Allergies    No current facility-administered medications on file prior to encounter.     Current Outpatient Medications on File Prior to Encounter   Medication Sig    JARDIANCE 10 mg tablet Take 10 mg by mouth once daily.    albuterol (PROVENTIL/VENTOLIN HFA) 90 mcg/actuation inhaler SMARTSI-2 Puff(s) By Mouth Every 4 Hours PRN    amLODIPine (NORVASC) 10 MG tablet Take 1 tablet (10 mg total) by mouth once daily.    aspirin (ECOTRIN) 81 MG EC tablet Take 1 tablet (81 mg total) by mouth once daily.    atorvastatin (LIPITOR) 40 MG tablet Take 1 tablet (40 mg total) by mouth every evening.    benzonatate (TESSALON) 200 MG capsule Take 1 capsule (200 mg total) by mouth 2 (two) times a day.    diclofenac sodium (VOLTAREN ARTHRITIS PAIN) 1 % Gel Apply 2 g topically 2 (two) times daily.    fluticasone propionate (FLONASE) 50 mcg/actuation nasal spray 1 spray by Each Nostril route 2 (two) times daily.    furosemide (LASIX) 40 MG tablet Take 1 tablet (40 mg total) by mouth once daily.    guaiFENesin-codeine 100-10 mg/5 ml (TUSSI-ORGANIDIN NR)  mg/5 mL syrup Take 5 mLs by mouth 3 (three) times daily as needed for Cough.    insulin aspart U-100 (NOVOLOG) 100 unit/mL injection Inject 0-5 Units into the skin 3 (three) times daily before meals.    insulin detemir U-100, Levemir, (LEVEMIR FLEXTOUCH U100 INSULIN) 100 unit/mL (3 mL) InPn pen Inject 10 Units into the skin every evening.    lisinopriL (PRINIVIL,ZESTRIL) 5 MG tablet Take 5 mg by mouth.    metoprolol tartrate (LOPRESSOR) 25 MG tablet Take  0.5 tablets (12.5 mg total) by mouth 2 (two) times daily.    MUCINEX 600 mg 12 hr tablet Take 600 mg by mouth 2 (two) times daily.    pantoprazole (PROTONIX) 40 MG tablet Take 1 tablet (40 mg total) by mouth once daily.    THERMOTABS 287-180-15 mg Tab Take 1 tablet by mouth once daily.    TRUE METRIX GLUCOSE TEST STRIP Strp USE TO CHECK BLOOD SUGAR AS DIRECTED    TRUEPLUS LANCETS 33 gauge Misc 1 lancet  by Misc.(Non-Drug; Combo Route) route once daily. use as directed    zolpidem (AMBIEN) 5 MG Tab Take 1 tablet (5 mg total) by mouth every evening.     Family History       Problem Relation (Age of Onset)    Hypertension Mother          Tobacco Use    Smoking status: Every Day     Current packs/day: 0.50     Types: Cigarettes    Smokeless tobacco: Current     Types: Chew   Substance and Sexual Activity    Alcohol use: Yes     Comment: 1 quart    Drug use: Never    Sexual activity: Not on file     Review of Systems   Constitutional: Negative for diaphoresis and fever.   Cardiovascular:  Positive for dyspnea on exertion, leg swelling, orthopnea and paroxysmal nocturnal dyspnea. Negative for chest pain, claudication, cyanosis, irregular heartbeat, near-syncope, palpitations and syncope.   Respiratory:  Positive for shortness of breath.    All other systems reviewed and are negative.    Objective:     Vital Signs (Most Recent):  Temp: 97.7 °F (36.5 °C) (02/20/24 1422)  Pulse: 89 (02/20/24 1422)  Resp: 18 (02/20/24 1422)  BP: 106/63 (02/20/24 1422)  SpO2: 96 % (02/20/24 1422) Vital Signs (24h Range):  Temp:  [97.4 °F (36.3 °C)-97.7 °F (36.5 °C)] 97.7 °F (36.5 °C)  Pulse:  [] 89  Resp:  [17-31] 18  SpO2:  [92 %-99 %] 96 %  BP: (106-138)/(63-85) 106/63     Weight: 91.2 kg (201 lb)  Body mass index is 28.03 kg/m².    SpO2: 96 %         Intake/Output Summary (Last 24 hours) at 2/20/2024 1548  Last data filed at 2/20/2024 1439  Gross per 24 hour   Intake --   Output 4900 ml   Net -4900 ml       Lines/Drains/Airways        Peripheral Intravenous Line  Duration                  Peripheral IV - Single Lumen 02/19/24 1324 18 G Left;Posterior Hand 1 day                     Physical Exam  Vitals and nursing note reviewed.   Constitutional:       Appearance: Normal appearance.   HENT:      Head: Normocephalic and atraumatic.      Right Ear: External ear normal.      Left Ear: External ear normal.      Nose: Nose normal.      Mouth/Throat:      Mouth: Mucous membranes are dry.      Pharynx: Oropharynx is clear.   Eyes:      Extraocular Movements: Extraocular movements intact.      Conjunctiva/sclera: Conjunctivae normal.   Neck:      Vascular: JVD present.   Cardiovascular:      Rate and Rhythm: Regular rhythm. Tachycardia present.      Pulses: Normal pulses.      Heart sounds: Normal heart sounds.   Pulmonary:      Effort: Pulmonary effort is normal.      Breath sounds: Rales present.   Abdominal:      General: Abdomen is flat.      Palpations: Abdomen is soft.   Musculoskeletal:         General: Normal range of motion.      Cervical back: Normal range of motion and neck supple.      Right lower leg: Edema present.      Left lower leg: Edema present.   Skin:     General: Skin is warm.      Capillary Refill: Capillary refill takes less than 2 seconds.   Neurological:      General: No focal deficit present.      Mental Status: He is alert. Mental status is at baseline.   Psychiatric:         Mood and Affect: Mood normal.         Behavior: Behavior normal.          Significant Labs: BMP:   Recent Labs   Lab 02/19/24  1536 02/20/24  0307   * 121*    137   K 5.5* 4.2    103   CO2 25 27   BUN 28* 27*   CREATININE 1.31* 1.24   CALCIUM 9.2 8.8   MG 2.5*  --    , CMP   Recent Labs   Lab 02/19/24  1536 02/20/24  0307    137   K 5.5* 4.2    103   CO2 25 27   * 121*   BUN 28* 27*   CREATININE 1.31* 1.24   CALCIUM 9.2 8.8   PROT 7.9 7.4   ALBUMIN 3.6 3.3*   BILITOT 2.0* 1.7*   ALKPHOS 179* 166*   * 482*   ALT  "237* 265*   ANIONGAP 10 11   , CBC   Recent Labs   Lab 02/19/24  1536   WBC 6.80   HGB 13.6   HCT 42.5      , Lipid Panel No results for input(s): "CHOL", "HDL", "LDLCALC", "TRIG", "CHOLHDL" in the last 48 hours., Troponin No results for input(s): "TROPONINI" in the last 48 hours., and All pertinent lab results from the last 24 hours have been reviewed.    Significant Imaging: Cardiac Cath: No results found for this or any previous visit.  , Echocardiogram: Transthoracic echo (TTE) complete (Cupid Only):   Results for orders placed or performed during the hospital encounter of 02/19/24   Echo   Result Value Ref Range    BSA 2.14 m2    LVOT stroke volume 43.67 cm3    LVIDd 7.30 (A) 3.5 - 6.0 cm    LV Systolic Volume 232.15 mL    LV Systolic Volume Index 110.0 mL/m2    LVIDs 6.71 (A) 2.1 - 4.0 cm    LV Diastolic Volume 280.84 mL    LV Diastolic Volume Index 133.10 mL/m2    IVS 0.79 0.6 - 1.1 cm    LVOT diameter 2.50 cm    LVOT area 4.9 cm2    FS 8 (A) 28 - 44 %    Left Ventricle Relative Wall Thickness 0.23 cm    Posterior Wall 0.85 0.6 - 1.1 cm    LV mass 271.42 g    LV Mass Index 129 g/m2    MV Peak E Rohit 1.11 m/s    TDI LATERAL 0.12 m/s    TDI SEPTAL 0.07 m/s    E/E' ratio 11.68 m/s    MV Peak A Rohit 0.40 m/s    TR Max Rohit 3.53 m/s    E/A ratio 2.78     E wave deceleration time 150.05 msec    LV SEPTAL E/E' RATIO 15.86 m/s    LV LATERAL E/E' RATIO 9.25 m/s    LVOT peak rohit 0.59 m/s    Left Ventricular Outflow Tract Mean Velocity 0.44 cm/s    Left Ventricular Outflow Tract Mean Gradient 0.83 mmHg    RVDD 5.16 cm    TAPSE 1.10 cm    LA size 5.48 cm    Left Atrium Major Axis 6.90 cm    RA Major Axis 5.02 cm    AV mean gradient 2 mmHg    AV peak gradient 2 mmHg    Ao peak rohit 0.77 m/s    Ao VTI 11.20 cm    LVOT peak VTI 8.90 cm    AV valve area 3.90 cm²    AV Velocity Ratio 0.77     AV index (prosthetic) 0.79     DESMOND by Velocity Ratio 3.76 cm²    Mr max rohit 4.28 m/s    MV mean gradient 3 mmHg    MV peak gradient " 8 mmHg    MV stenosis pressure 1/2 time 57.19 ms    MV valve area p 1/2 method 3.85 cm2    MV valve area by continuity eq 1.36 cm2    MV VTI 32.0 cm    Triscuspid Valve Regurgitation Peak Gradient 50 mmHg    PV PEAK VELOCITY 0.54 m/s    PV peak gradient 1 mmHg    Ao root annulus 2.00 cm    IVC diameter 2.38 cm    Mean e' 0.10 m/s    ZLVIDS 4.01     ZLVIDD 0.99     AORTIC VALVE CUSP SEPERATION 2.17 cm    TV resting pulmonary artery pressure 65 mmHg    RV TB RVSP 19 mmHg    Est. RA pres 15 mmHg    Vásquez's Biplane MOD Ejection Fraction 22 %    Narrative      Left Ventricle: The left ventricle is severely dilated. Normal wall   thickness. There is eccentric hypertrophy. There is severely reduced   systolic function with a visually estimated ejection fraction of 20 - 25%.   Grade III diastolic dysfunction.    Right Ventricle: Moderate right ventricular enlargement. Systolic   function is moderately reduced.    Left Atrium: Left atrium is moderately dilated.    Right Atrium: Right atrium is mildly dilated.    Aortic Valve: The aortic valve is a trileaflet valve. Mildly calcified   cusps.    Mitral Valve: There is mild bileaflet sclerosis. Mildly thickened   leaflets. There is no stenosis. The mean pressure gradient across the   mitral valve is 3 mmHg at a heart rate of  bpm. There is severe   regurgitation with a posterolateral eccentriccally directed jet.    Tricuspid Valve: There is moderate to severe regurgitation with a   centrally directed jet.    Pulmonary Artery: The estimated pulmonary artery systolic pressure is   65 mmHg.    IVC/SVC: Elevated venous pressure at 15 mmHg.    Pericardium: There is a trivial effusion.     , and EK24  Sinus tachycardia with PVC(s)   Left axis deviation   Poor R wave progression   Lateral T wave abnormality may be due to myocardial ischemia   Abnormal ECG

## 2024-02-20 NOTE — CONSULTS
[facility]  Cardiology  Consult Note    Patient Name: Karin Carrera  MRN: [id number]  Admission Date: 2/19/2024  Hospital Length of Stay: 1 days  Code Status: Full Code   Attending Provider: Martita Dawn DO   Consulting Provider: Osmar Ceron MD  Primary Care Physician: Walker Smith MD  Principal Problem:Acute hypoxemic respiratory failure    Patient information was obtained from patient and ER records.     Inpatient consult to Cardiology  Consult performed by: Osmar Ceron MD  Consult ordered by: Martita Dawn DO        Subjective:     Chief Complaint:  shortness of breath     HPI:   52 y.o. male with hx of CAD s/p CABG, ischemic cardiomyopathy (EF 20%), CVA, with expressive aphasia and right arm weakness,  HTN, GERD, ETOH abuse, and nicotine abuse who presented with SOB and admitted for acute decompensated HF with cardiogenic shock.    Patient reports few weeks of worsening SOB, orthopnea and sleeping in a recliner with PND. Also has bilateral leg swelling.   Patient admitted for IV diuresis.   Hospital course c/b runs of NSVT on tele    Today, He reports feeling better after IV diuresis. He was started on IV dobutamine 2.5mcg/kg/min for low cardiac output (elevated lactate and elevated LFTs).     Past Medical History:   Diagnosis Date    GERD (gastroesophageal reflux disease)     Hypertension     Stroke        Past Surgical History:   Procedure Laterality Date    CARDIAC SURGERY      HEMIARTHROPLASTY OF HIP Right 1/2/2022    Procedure: HEMIARTHROPLASTY, HIP;  Surgeon: Ramses Chua MD;  Location: [facility];  Service: Orthopedics;  Laterality: Right;       Review of patient's allergies indicates:  No Known Allergies    No current facility-administered medications on file prior to encounter.     Current Outpatient Medications on File Prior to Encounter   Medication Sig    JARDIANCE 10 mg tablet Take 10 mg by mouth once daily.     albuterol (PROVENTIL/VENTOLIN HFA) 90 mcg/actuation inhaler SMARTSI-2 Puff(s) By Mouth Every 4 Hours PRN    amLODIPine (NORVASC) 10 MG tablet Take 1 tablet (10 mg total) by mouth once daily.    aspirin (ECOTRIN) 81 MG EC tablet Take 1 tablet (81 mg total) by mouth once daily.    atorvastatin (LIPITOR) 40 MG tablet Take 1 tablet (40 mg total) by mouth every evening.    benzonatate (TESSALON) 200 MG capsule Take 1 capsule (200 mg total) by mouth 2 (two) times a day.    diclofenac sodium (VOLTAREN ARTHRITIS PAIN) 1 % Gel Apply 2 g topically 2 (two) times daily.    fluticasone propionate (FLONASE) 50 mcg/actuation nasal spray 1 spray by Each Nostril route 2 (two) times daily.    furosemide (LASIX) 40 MG tablet Take 1 tablet (40 mg total) by mouth once daily.    guaiFENesin-codeine 100-10 mg/5 ml (TUSSI-ORGANIDIN NR)  mg/5 mL syrup Take 5 mLs by mouth 3 (three) times daily as needed for Cough.    insulin aspart U-100 (NOVOLOG) 100 unit/mL injection Inject 0-5 Units into the skin 3 (three) times daily before meals.    insulin detemir U-100, Levemir, (LEVEMIR FLEXTOUCH U100 INSULIN) 100 unit/mL (3 mL) InPn pen Inject 10 Units into the skin every evening.    lisinopriL (PRINIVIL,ZESTRIL) 5 MG tablet Take 5 mg by mouth.    metoprolol tartrate (LOPRESSOR) 25 MG tablet Take 0.5 tablets (12.5 mg total) by mouth 2 (two) times daily.    MUCINEX 600 mg 12 hr tablet Take 600 mg by mouth 2 (two) times daily.    pantoprazole (PROTONIX) 40 MG tablet Take 1 tablet (40 mg total) by mouth once daily.    THERMOTABS 287-180-15 mg Tab Take 1 tablet by mouth once daily.    TRUE METRIX GLUCOSE TEST STRIP Strp USE TO CHECK BLOOD SUGAR AS DIRECTED    TRUEPLUS LANCETS 33 gauge Misc 1 lancet  by Misc.(Non-Drug; Combo Route) route once daily. use as directed    zolpidem (AMBIEN) 5 MG Tab Take 1 tablet (5 mg total) by mouth every evening.     Family History       Problem Relation (Age of Onset)    Hypertension Mother          Tobacco Use     Smoking status: Every Day     Current packs/day: 0.50     Types: Cigarettes    Smokeless tobacco: Current     Types: Chew   Substance and Sexual Activity    Alcohol use: Yes     Comment: 1 quart    Drug use: Never    Sexual activity: Not on file     Review of Systems   Constitutional: Negative for diaphoresis and fever.   Cardiovascular:  Positive for dyspnea on exertion, leg swelling, orthopnea and paroxysmal nocturnal dyspnea. Negative for chest pain, claudication, cyanosis, irregular heartbeat, near-syncope, palpitations and syncope.   Respiratory:  Positive for shortness of breath.    All other systems reviewed and are negative.    Objective:     Vital Signs (Most Recent):  Temp: 97.7 °F (36.5 °C) (02/20/24 1422)  Pulse: 89 (02/20/24 1422)  Resp: 18 (02/20/24 1422)  BP: 106/63 (02/20/24 1422)  SpO2: 96 % (02/20/24 1422) Vital Signs (24h Range):  Temp:  [97.4 °F (36.3 °C)-97.7 °F (36.5 °C)] 97.7 °F (36.5 °C)  Pulse:  [] 89  Resp:  [17-31] 18  SpO2:  [92 %-99 %] 96 %  BP: (106-138)/(63-85) 106/63     Weight: 91.2 kg (201 lb)  Body mass index is 28.03 kg/m².    SpO2: 96 %         Intake/Output Summary (Last 24 hours) at 2/20/2024 1548  Last data filed at 2/20/2024 1439  Gross per 24 hour   Intake --   Output 4900 ml   Net -4900 ml       Lines/Drains/Airways       Peripheral Intravenous Line  Duration                  Peripheral IV - Single Lumen 02/19/24 1324 18 G Left;Posterior Hand 1 day                     Physical Exam  Vitals and nursing note reviewed.   Constitutional:       Appearance: Normal appearance.   HENT:      Head: Normocephalic and atraumatic.      Right Ear: External ear normal.      Left Ear: External ear normal.      Nose: Nose normal.      Mouth/Throat:      Mouth: Mucous membranes are dry.      Pharynx: Oropharynx is clear.   Eyes:      Extraocular Movements: Extraocular movements intact.      Conjunctiva/sclera: Conjunctivae normal.   Neck:      Vascular: JVD present.  "  Cardiovascular:      Rate and Rhythm: Regular rhythm. Tachycardia present.      Pulses: Normal pulses.      Heart sounds: Normal heart sounds.   Pulmonary:      Effort: Pulmonary effort is normal.      Breath sounds: Rales present.   Abdominal:      General: Abdomen is flat.      Palpations: Abdomen is soft.   Musculoskeletal:         General: Normal range of motion.      Cervical back: Normal range of motion and neck supple.      Right lower leg: Edema present.      Left lower leg: Edema present.   Skin:     General: Skin is warm.      Capillary Refill: Capillary refill takes less than 2 seconds.   Neurological:      General: No focal deficit present.      Mental Status: He is alert. Mental status is at baseline.   Psychiatric:         Mood and Affect: Mood normal.         Behavior: Behavior normal.          Significant Labs: BMP:   Recent Labs   Lab 02/19/24  1536 02/20/24  0307   * 121*    137   K 5.5* 4.2    103   CO2 25 27   BUN 28* 27*   CREATININE 1.31* 1.24   CALCIUM 9.2 8.8   MG 2.5*  --    , CMP   Recent Labs   Lab 02/19/24  1536 02/20/24  0307    137   K 5.5* 4.2    103   CO2 25 27   * 121*   BUN 28* 27*   CREATININE 1.31* 1.24   CALCIUM 9.2 8.8   PROT 7.9 7.4   ALBUMIN 3.6 3.3*   BILITOT 2.0* 1.7*   ALKPHOS 179* 166*   * 482*   * 265*   ANIONGAP 10 11   , CBC   Recent Labs   Lab 02/19/24  1536   WBC 6.80   HGB 13.6   HCT 42.5      , Lipid Panel No results for input(s): "CHOL", "HDL", "LDLCALC", "TRIG", "CHOLHDL" in the last 48 hours., Troponin No results for input(s): "TROPONINI" in the last 48 hours., and All pertinent lab results from the last 24 hours have been reviewed.    Significant Imaging: Cardiac Cath: No results found for this or any previous visit.  , Echocardiogram: Transthoracic echo (TTE) complete (Cupid Only):   Results for orders placed or performed during the hospital encounter of 02/19/24   Echo   Result Value Ref Range    BSA " 2.14 m2    LVOT stroke volume 43.67 cm3    LVIDd 7.30 (A) 3.5 - 6.0 cm    LV Systolic Volume 232.15 mL    LV Systolic Volume Index 110.0 mL/m2    LVIDs 6.71 (A) 2.1 - 4.0 cm    LV Diastolic Volume 280.84 mL    LV Diastolic Volume Index 133.10 mL/m2    IVS 0.79 0.6 - 1.1 cm    LVOT diameter 2.50 cm    LVOT area 4.9 cm2    FS 8 (A) 28 - 44 %    Left Ventricle Relative Wall Thickness 0.23 cm    Posterior Wall 0.85 0.6 - 1.1 cm    LV mass 271.42 g    LV Mass Index 129 g/m2    MV Peak E Rohit 1.11 m/s    TDI LATERAL 0.12 m/s    TDI SEPTAL 0.07 m/s    E/E' ratio 11.68 m/s    MV Peak A Rohit 0.40 m/s    TR Max Rohit 3.53 m/s    E/A ratio 2.78     E wave deceleration time 150.05 msec    LV SEPTAL E/E' RATIO 15.86 m/s    LV LATERAL E/E' RATIO 9.25 m/s    LVOT peak rohit 0.59 m/s    Left Ventricular Outflow Tract Mean Velocity 0.44 cm/s    Left Ventricular Outflow Tract Mean Gradient 0.83 mmHg    RVDD 5.16 cm    TAPSE 1.10 cm    LA size 5.48 cm    Left Atrium Major Axis 6.90 cm    RA Major Axis 5.02 cm    AV mean gradient 2 mmHg    AV peak gradient 2 mmHg    Ao peak rohit 0.77 m/s    Ao VTI 11.20 cm    LVOT peak VTI 8.90 cm    AV valve area 3.90 cm²    AV Velocity Ratio 0.77     AV index (prosthetic) 0.79     DESMOND by Velocity Ratio 3.76 cm²    Mr max rohit 4.28 m/s    MV mean gradient 3 mmHg    MV peak gradient 8 mmHg    MV stenosis pressure 1/2 time 57.19 ms    MV valve area p 1/2 method 3.85 cm2    MV valve area by continuity eq 1.36 cm2    MV VTI 32.0 cm    Triscuspid Valve Regurgitation Peak Gradient 50 mmHg    PV PEAK VELOCITY 0.54 m/s    PV peak gradient 1 mmHg    Ao root annulus 2.00 cm    IVC diameter 2.38 cm    Mean e' 0.10 m/s    ZLVIDS 4.01     ZLVIDD 0.99     AORTIC VALVE CUSP SEPERATION 2.17 cm    TV resting pulmonary artery pressure 65 mmHg    RV TB RVSP 19 mmHg    Est. RA pres 15 mmHg    Vásquez's Biplane MOD Ejection Fraction 22 %    Narrative      Left Ventricle: The left ventricle is severely dilated. Normal wall    thickness. There is eccentric hypertrophy. There is severely reduced   systolic function with a visually estimated ejection fraction of 20 - 25%.   Grade III diastolic dysfunction.    Right Ventricle: Moderate right ventricular enlargement. Systolic   function is moderately reduced.    Left Atrium: Left atrium is moderately dilated.    Right Atrium: Right atrium is mildly dilated.    Aortic Valve: The aortic valve is a trileaflet valve. Mildly calcified   cusps.    Mitral Valve: There is mild bileaflet sclerosis. Mildly thickened   leaflets. There is no stenosis. The mean pressure gradient across the   mitral valve is 3 mmHg at a heart rate of  bpm. There is severe   regurgitation with a posterolateral eccentriccally directed jet.    Tricuspid Valve: There is moderate to severe regurgitation with a   centrally directed jet.    Pulmonary Artery: The estimated pulmonary artery systolic pressure is   65 mmHg.    IVC/SVC: Elevated venous pressure at 15 mmHg.    Pericardium: There is a trivial effusion.     , and EK24  Sinus tachycardia with PVC(s)   Left axis deviation   Poor R wave progression   Lateral T wave abnormality may be due to myocardial ischemia   Abnormal ECG     Assessment and Plan:     Low cardiac output syndrome  Evidence of poor end organ perfusion - elevated lactate and elevated LFTS (shock liver)  - Agree with IV dobutamine today  - Plan to start entresto tomorrow.     Acute on chronic systolic congestive heart failure  Ischemic cardiomyopathy; EF 20%, NYHA III , Stage D  Volume overloaded with low cardiac output - IV lasix 40mg bid, and iv dobutamine 2.5  GDMT - issues with compliance in the past - on lisinopril only  Devices; none -patient would likely qualify for ICD however is not taking GDMT    Coronary artery disease involving coronary bypass graft of native heart without angina pectoris  Patient with known CAD s/p CABG, which is controlled Will continue ASA and Statin and monitor for  S/Sx of angina/ACS. Continue to monitor on telemetry.         VTE Risk Mitigation (From admission, onward)           Ordered     heparin (porcine) injection 5,000 Units  Every 8 hours         02/19/24 2038     IP VTE HIGH RISK PATIENT  Once         02/19/24 2038     Place sequential compression device  Until discontinued         02/19/24 2038                    Thank you for your consult. I will follow-up with patient. Please contact us if you have any additional questions.    Osmar Ceron MD  Cardiology   [facility]

## 2024-02-20 NOTE — PLAN OF CARE
[facility]  Initial Discharge Assessment       Primary Care Provider: Walker Smith MD    Admission Diagnosis: Shortness of breath [R06.02]  Hyperkalemia [E87.5]  CHF (congestive heart failure) [I50.9]  Chest pain [R07.9]  Acute hypoxemic respiratory failure [J96.01]  Acute on chronic congestive heart failure, unspecified heart failure type [I50.9]    Admission Date: 2/19/2024  Expected Discharge Date:     Transition of Care Barriers: None    Pay[place] / Plan[place] / Product Type: Government /     Extended Emergency Contact Information  Primary Emergency Contact: Zeyad Whipple  Mobile Phone: [phone]  Relation: Relative  Preferred language: English   needed? No  Secondary Emergency Contact: zahraa childers  Mobile Phone: [phone]  Relation: Brother    Discharge Plan A: Home with family  Discharge Plan B: Assisted Living      [place], [place] - [address]  [address] [facility] [address]  Phone: [phone] Fax: [phone]    [facility] [address] [place]  [address] [facility] [address]  Phone: [phone] Fax: [phone]      Initial Assessment (most recent)       Adult Discharge Assessment - 02/20/24 0911          Discharge Assessment    Assessment Type Discharge Planning Assessment     Confirmed/corrected address, phone number and insurance Yes     Source of Information patient     Communicated DEANNA with patient/caregiver Date not available/Unable to determine     People in Home facility resident     Do you expect to return to your current living situation? No     Do you have help at home or someone to help you manage your care at home? Yes     Prior to hospitilization cognitive status: Unable to Assess     Equipment Currently Used at Home power chair     Patient currently being followed by outpatient case management? No     Do you currently have service(s) that help  you manage your care at home? Yes     Is the pt/caregiver preference to resume services with current agency Yes     Do you take prescription medications? Yes     Do you have prescription coverage? Yes     Coverage medicaid     Do you have any problems affording any of your prescribed medications? No     Is the patient taking medications as prescribed? yes     Who is going to help you get home at discharge? kory evans     How do you get to doctors appointments? car, drives self     Are you on dialysis? No     Discharge Plan A Home with family     Discharge Plan B Assisted Living     DME Needed Upon Discharge  none     Discharge Plan discussed with: Spouse/sig other     Name(s) and Number(s) kory     Transition of Care Barriers None        Physical Activity    On average, how many days per week do you engage in moderate to strenuous exercise (like a brisk walk)? 0 days     On average, how many minutes do you engage in exercise at this level? 0 min        Financial Resource Strain    How hard is it for you to pay for the very basics like food, housing, medical care, and heating? Not hard at all        Housing Stability    In the last 12 months, was there a time when you were not able to pay the mortgage or rent on time? No     In the last 12 months, how many places have you lived? 1     In the last 12 months, was there a time when you did not have a steady place to sleep or slept in a shelter (including now)? No        Transportation Needs    In the past 12 months, has lack of transportation kept you from medical appointments or from getting medications? No     In the past 12 months, has lack of transportation kept you from meetings, work, or from getting things needed for daily living? No        Food Insecurity    Within the past 12 months, you worried that your food would run out before you got the money to buy more. Never true     Within the past 12 months, the food you bought just didn't last and you didn't  have money to get more. Never true        Stress    Do you feel stress - tense, restless, nervous, or anxious, or unable to sleep at night because your mind is troubled all the time - these days? Not at all        Social Connections    In a typical week, how many times do you talk on the phone with family, friends, or neighbors? More than three times a week     How often do you get together with friends or relatives? More than three times a week     How often do you attend Scientologist or Yazidi services? Never     Do you belong to any clubs or organizations such as Scientologist groups, unions, fraternal or athletic groups, or school groups? No     How often do you attend meetings of the clubs or organizations you belong to? Never        Alcohol Use    Q1: How often do you have a drink containing alcohol? Never     Q2: How many drinks containing alcohol do you have on a typical day when you are drinking? Patient does not drink     Q3: How often do you have six or more drinks on one occasion? Never                   Ss spoke with pt's roshan horn and pt was living at home with her pta and plans to return at d/c. Pt receives meals on wheels 5 days a week and has services from medicaid waiver weekly. No d/c needs stated at this time. Ss following.

## 2024-02-20 NOTE — ASSESSMENT & PLAN NOTE
Has pulmonary edema on CXR; on Lasix IV; monitor BMP.  He is on O2 and does not use O2 at home.  On nebs.  Negative COVID; normal Troponin

## 2024-02-20 NOTE — HOSPITAL COURSE
53 year old male with an extensive PMH presents with LE swelling and SOB. He was found to be in acute systolic CHF exacerbation.  He was diuresed with Lasix with improvement.   Echo shows ejection fraction of 20 - 25% with grade III diastolic dysfunction.  Cardiology was consulted who placed the patient on Entresto.  His BP dropped on Entresto, and this medication was discontinued.   He will be sent home with Lasix 40 mg BID and will follow up with Cardiology as outpt.  Will check if patient will need O2 at home--he uses tobacco products and smokes.  Will also Rx Symbicort  He states he has some SOB at all times--this is due to his COPD and CHF.  Will also send him to Pulmonary as an outpt.  Patient denies chest pain, nausea, vomiting, or diarrhea and is stable for discharge.

## 2024-02-21 PROBLEM — E87.5 HYPERKALEMIA: Status: RESOLVED | Noted: 2024-02-19 | Resolved: 2024-02-21

## 2024-02-21 PROBLEM — I50.9 LOW CARDIAC OUTPUT SYNDROME: Status: RESOLVED | Noted: 2024-01-11 | Resolved: 2024-02-21

## 2024-02-21 LAB
ALBUMIN SERPL BCP-MCNC: 3.3 G/DL (ref 3.5–5)
ALP SERPL-CCNC: 160 U/L (ref 45–115)
ALT SERPL W P-5'-P-CCNC: 232 U/L (ref 16–61)
ANION GAP SERPL CALCULATED.3IONS-SCNC: 11 MMOL/L (ref 7–16)
AST SERPL W P-5'-P-CCNC: 186 U/L (ref 15–37)
BILIRUB DIRECT SERPL-MCNC: 0.5 MG/DL (ref 0–0.2)
BILIRUB SERPL-MCNC: 1.2 MG/DL (ref ?–1.2)
BUN SERPL-MCNC: 29 MG/DL (ref 7–18)
BUN/CREAT SERPL: 31 (ref 6–20)
CALCIUM SERPL-MCNC: 8.1 MG/DL (ref 8.5–10.1)
CHLORIDE SERPL-SCNC: 101 MMOL/L (ref 98–107)
CO2 SERPL-SCNC: 30 MMOL/L (ref 21–32)
CREAT SERPL-MCNC: 0.93 MG/DL (ref 0.7–1.3)
EGFR (NO RACE VARIABLE) (RUSH/TITUS): 98 ML/MIN/1.73M2
GLUCOSE SERPL-MCNC: 138 MG/DL (ref 70–105)
GLUCOSE SERPL-MCNC: 182 MG/DL (ref 70–105)
GLUCOSE SERPL-MCNC: 196 MG/DL (ref 70–105)
GLUCOSE SERPL-MCNC: 224 MG/DL (ref 70–105)
GLUCOSE SERPL-MCNC: 74 MG/DL (ref 74–106)
POTASSIUM SERPL-SCNC: 2.9 MMOL/L (ref 3.5–5.1)
PROT SERPL-MCNC: 7.2 G/DL (ref 6.4–8.2)
SODIUM SERPL-SCNC: 139 MMOL/L (ref 136–145)

## 2024-02-21 PROCEDURE — 27000221 HC OXYGEN, UP TO 24 HOURS

## 2024-02-21 PROCEDURE — 11000001 HC ACUTE MED/SURG PRIVATE ROOM

## 2024-02-21 PROCEDURE — 25000003 PHARM REV CODE 250: Performed by: NURSE PRACTITIONER

## 2024-02-21 PROCEDURE — 94640 AIRWAY INHALATION TREATMENT: CPT

## 2024-02-21 PROCEDURE — 99232 SBSQ HOSP IP/OBS MODERATE 35: CPT | Mod: ,,, | Performed by: HOSPITALIST

## 2024-02-21 PROCEDURE — 94761 N-INVAS EAR/PLS OXIMETRY MLT: CPT

## 2024-02-21 PROCEDURE — 63600175 PHARM REV CODE 636 W HCPCS: Performed by: STUDENT IN AN ORGANIZED HEALTH CARE EDUCATION/TRAINING PROGRAM

## 2024-02-21 PROCEDURE — 82962 GLUCOSE BLOOD TEST: CPT

## 2024-02-21 PROCEDURE — 25000003 PHARM REV CODE 250: Performed by: HOSPITALIST

## 2024-02-21 PROCEDURE — 63600175 PHARM REV CODE 636 W HCPCS: Performed by: HOSPITALIST

## 2024-02-21 PROCEDURE — 80048 BASIC METABOLIC PNL TOTAL CA: CPT | Performed by: HOSPITALIST

## 2024-02-21 PROCEDURE — 80076 HEPATIC FUNCTION PANEL: CPT | Performed by: NURSE PRACTITIONER

## 2024-02-21 PROCEDURE — 99233 SBSQ HOSP IP/OBS HIGH 50: CPT | Mod: ,,, | Performed by: NURSE PRACTITIONER

## 2024-02-21 PROCEDURE — 25000242 PHARM REV CODE 250 ALT 637 W/ HCPCS: Performed by: HOSPITALIST

## 2024-02-21 PROCEDURE — 25000003 PHARM REV CODE 250: Performed by: STUDENT IN AN ORGANIZED HEALTH CARE EDUCATION/TRAINING PROGRAM

## 2024-02-21 PROCEDURE — 99900035 HC TECH TIME PER 15 MIN (STAT)

## 2024-02-21 RX ORDER — ACETAMINOPHEN 500 MG
1000 TABLET ORAL EVERY 6 HOURS PRN
Status: DISCONTINUED | OUTPATIENT
Start: 2024-02-21 | End: 2024-02-28 | Stop reason: HOSPADM

## 2024-02-21 RX ORDER — ONDANSETRON HYDROCHLORIDE 2 MG/ML
8 INJECTION, SOLUTION INTRAVENOUS EVERY 6 HOURS PRN
Status: DISCONTINUED | OUTPATIENT
Start: 2024-02-21 | End: 2024-02-28 | Stop reason: HOSPADM

## 2024-02-21 RX ORDER — TRAZODONE HYDROCHLORIDE 50 MG/1
50 TABLET ORAL NIGHTLY PRN
Status: DISCONTINUED | OUTPATIENT
Start: 2024-02-21 | End: 2024-02-28 | Stop reason: HOSPADM

## 2024-02-21 RX ORDER — TALC
6 POWDER (GRAM) TOPICAL NIGHTLY PRN
Status: DISCONTINUED | OUTPATIENT
Start: 2024-02-21 | End: 2024-02-28 | Stop reason: HOSPADM

## 2024-02-21 RX ORDER — POTASSIUM CHLORIDE 20 MEQ/1
40 TABLET, EXTENDED RELEASE ORAL 3 TIMES DAILY
Status: COMPLETED | OUTPATIENT
Start: 2024-02-21 | End: 2024-02-21

## 2024-02-21 RX ORDER — BISACODYL 5 MG
10 TABLET, DELAYED RELEASE (ENTERIC COATED) ORAL DAILY PRN
Status: DISCONTINUED | OUTPATIENT
Start: 2024-02-21 | End: 2024-02-28 | Stop reason: HOSPADM

## 2024-02-21 RX ORDER — SIMETHICONE 80 MG
1 TABLET,CHEWABLE ORAL 3 TIMES DAILY PRN
Status: DISCONTINUED | OUTPATIENT
Start: 2024-02-21 | End: 2024-02-28 | Stop reason: HOSPADM

## 2024-02-21 RX ORDER — SPIRONOLACTONE 25 MG/1
25 TABLET ORAL DAILY
Status: DISCONTINUED | OUTPATIENT
Start: 2024-02-21 | End: 2024-02-27

## 2024-02-21 RX ADMIN — INSULIN ASPART 2 UNITS: 100 INJECTION, SOLUTION INTRAVENOUS; SUBCUTANEOUS at 05:02

## 2024-02-21 RX ADMIN — INSULIN ASPART 1 UNITS: 100 INJECTION, SOLUTION INTRAVENOUS; SUBCUTANEOUS at 08:02

## 2024-02-21 RX ADMIN — INSULIN ASPART 4 UNITS: 100 INJECTION, SOLUTION INTRAVENOUS; SUBCUTANEOUS at 01:02

## 2024-02-21 RX ADMIN — ASPIRIN 81 MG: 81 TABLET, COATED ORAL at 08:02

## 2024-02-21 RX ADMIN — GUAIFENESIN 600 MG: 600 TABLET, EXTENDED RELEASE ORAL at 08:02

## 2024-02-21 RX ADMIN — SPIRONOLACTONE 25 MG: 25 TABLET ORAL at 03:02

## 2024-02-21 RX ADMIN — HEPARIN SODIUM 5000 UNITS: 5000 INJECTION, SOLUTION INTRAVENOUS; SUBCUTANEOUS at 09:02

## 2024-02-21 RX ADMIN — FUROSEMIDE 40 MG: 10 INJECTION, SOLUTION INTRAVENOUS at 05:02

## 2024-02-21 RX ADMIN — SACUBITRIL AND VALSARTAN 1 TABLET: 24; 26 TABLET, FILM COATED ORAL at 09:02

## 2024-02-21 RX ADMIN — FUROSEMIDE 40 MG: 10 INJECTION, SOLUTION INTRAVENOUS at 06:02

## 2024-02-21 RX ADMIN — INSULIN DETEMIR 10 UNITS: 100 INJECTION, SOLUTION SUBCUTANEOUS at 08:02

## 2024-02-21 RX ADMIN — POTASSIUM CHLORIDE 40 MEQ: 1500 TABLET, EXTENDED RELEASE ORAL at 03:02

## 2024-02-21 RX ADMIN — IPRATROPIUM BROMIDE 0.5 MG: 0.5 SOLUTION RESPIRATORY (INHALATION) at 07:02

## 2024-02-21 RX ADMIN — HEPARIN SODIUM 5000 UNITS: 5000 INJECTION, SOLUTION INTRAVENOUS; SUBCUTANEOUS at 06:02

## 2024-02-21 RX ADMIN — ZOLPIDEM TARTRATE 5 MG: 5 TABLET, COATED ORAL at 08:02

## 2024-02-21 RX ADMIN — PANTOPRAZOLE SODIUM 40 MG: 40 TABLET, DELAYED RELEASE ORAL at 08:02

## 2024-02-21 RX ADMIN — SACUBITRIL AND VALSARTAN 1 TABLET: 24; 26 TABLET, FILM COATED ORAL at 08:02

## 2024-02-21 RX ADMIN — IPRATROPIUM BROMIDE 0.5 MG: 0.5 SOLUTION RESPIRATORY (INHALATION) at 01:02

## 2024-02-21 RX ADMIN — HEPARIN SODIUM 5000 UNITS: 5000 INJECTION, SOLUTION INTRAVENOUS; SUBCUTANEOUS at 03:02

## 2024-02-21 RX ADMIN — SIMETHICONE 80 MG: 80 TABLET, CHEWABLE ORAL at 03:02

## 2024-02-21 RX ADMIN — IPRATROPIUM BROMIDE 0.5 MG: 0.5 SOLUTION RESPIRATORY (INHALATION) at 08:02

## 2024-02-21 RX ADMIN — POTASSIUM CHLORIDE 40 MEQ: 1500 TABLET, EXTENDED RELEASE ORAL at 08:02

## 2024-02-21 RX ADMIN — ATORVASTATIN CALCIUM 40 MG: 40 TABLET, FILM COATED ORAL at 08:02

## 2024-02-21 RX ADMIN — GUAIFENESIN AND CODEINE PHOSPHATE 5 ML: 100; 10 SOLUTION ORAL at 11:02

## 2024-02-21 RX ADMIN — ONDANSETRON 8 MG: 2 INJECTION INTRAMUSCULAR; INTRAVENOUS at 07:02

## 2024-02-21 NOTE — SUBJECTIVE & OBJECTIVE
Interval History: Patient seen today, dyspnea and orthopnea has improved. Lactate normalized, livers enzymes down trending,discontinued dobutamine. K+ 2.9, being replaced by primary. Start Entresto 24-26 BID and continue IV diuresis.    Review of Systems   Constitutional: Negative for diaphoresis and fever.   Cardiovascular:  Positive for dyspnea on exertion, leg swelling, orthopnea and paroxysmal nocturnal dyspnea. Negative for chest pain, claudication, cyanosis, irregular heartbeat, near-syncope, palpitations and syncope.   Respiratory:  Positive for shortness of breath.    All other systems reviewed and are negative.    Objective:     Vital Signs (Most Recent):  Temp: 97.8 °F (36.6 °C) (02/21/24 1017)  Pulse: 94 (02/21/24 1017)  Resp: 18 (02/21/24 1017)  BP: 119/74 (02/21/24 1017)  SpO2: (!) 92 % (02/21/24 1017) Vital Signs (24h Range):  Temp:  [97.7 °F (36.5 °C)-98.1 °F (36.7 °C)] 97.8 °F (36.6 °C)  Pulse:  [85-95] 94  Resp:  [17-20] 18  SpO2:  [84 %-98 %] 92 %  BP: ()/(62-74) 119/74     Weight: 91.2 kg (201 lb)  Body mass index is 28.03 kg/m².     SpO2: (!) 92 %         Intake/Output Summary (Last 24 hours) at 2/21/2024 1310  Last data filed at 2/21/2024 1017  Gross per 24 hour   Intake 480 ml   Output 2600 ml   Net -2120 ml       Lines/Drains/Airways       Peripheral Intravenous Line  Duration                  Peripheral IV - Single Lumen 02/19/24 1324 18 G Left;Posterior Hand 1 day                       Physical Exam  Vitals reviewed.   Constitutional:       General: He is not in acute distress.  Neck:      Vascular: JVD present.   Cardiovascular:      Rate and Rhythm: Normal rate and regular rhythm.      Heart sounds: Normal heart sounds.   Pulmonary:      Effort: Pulmonary effort is normal.      Breath sounds: No rales.   Abdominal:      General: Abdomen is flat.      Palpations: Abdomen is soft.   Musculoskeletal:      Right lower leg: Edema present.      Left lower leg: Edema present.   Skin:      "General: Skin is warm and dry.   Neurological:      Mental Status: He is alert. Mental status is at baseline.            Significant Labs: ABG: No results for input(s): "PH", "PCO2", "HCO3", "POCSATURATED", "BE" in the last 48 hours., Blood Culture: No results for input(s): "LABBLOO" in the last 48 hours., BMP:   Recent Labs   Lab 02/19/24  1536 02/20/24  0307 02/21/24  0334   * 121* 74    137 139   K 5.5* 4.2 2.9*    103 101   CO2 25 27 30   BUN 28* 27* 29*   CREATININE 1.31* 1.24 0.93   CALCIUM 9.2 8.8 8.1*   MG 2.5*  --   --    , CMP   Recent Labs   Lab 02/19/24  1536 02/20/24  0307 02/21/24  0334 02/21/24  0733    137 139  --    K 5.5* 4.2 2.9*  --     103 101  --    CO2 25 27 30  --    * 121* 74  --    BUN 28* 27* 29*  --    CREATININE 1.31* 1.24 0.93  --    CALCIUM 9.2 8.8 8.1*  --    PROT 7.9 7.4  --  7.2   ALBUMIN 3.6 3.3*  --  3.3*   BILITOT 2.0* 1.7*  --  1.2   ALKPHOS 179* 166*  --  160*   * 482*  --  186*   * 265*  --  232*   ANIONGAP 10 11 11  --    , CBC   Recent Labs   Lab 02/19/24  1536   WBC 6.80   HGB 13.6   HCT 42.5      , INR No results for input(s): "INR", "PROTIME" in the last 48 hours., Lipid Panel No results for input(s): "CHOL", "HDL", "LDLCALC", "TRIG", "CHOLHDL" in the last 48 hours., and Troponin No results for input(s): "TROPONINI" in the last 48 hours.    Significant Imaging: Cardiac Cath: No results found for this or any previous visit.  and Echocardiogram: Transthoracic echo (TTE) complete (Cupid Only):   Results for orders placed or performed during the hospital encounter of 02/19/24   Echo   Result Value Ref Range    BSA 2.14 m2    LVOT stroke volume 43.67 cm3    LVIDd 7.30 (A) 3.5 - 6.0 cm    LV Systolic Volume 232.15 mL    LV Systolic Volume Index 110.0 mL/m2    LVIDs 6.71 (A) 2.1 - 4.0 cm    LV Diastolic Volume 280.84 mL    LV Diastolic Volume Index 133.10 mL/m2    IVS 0.79 0.6 - 1.1 cm    LVOT diameter 2.50 cm    LVOT " area 4.9 cm2    FS 8 (A) 28 - 44 %    Left Ventricle Relative Wall Thickness 0.23 cm    Posterior Wall 0.85 0.6 - 1.1 cm    LV mass 271.42 g    LV Mass Index 129 g/m2    MV Peak E Rohit 1.11 m/s    TDI LATERAL 0.12 m/s    TDI SEPTAL 0.07 m/s    E/E' ratio 11.68 m/s    MV Peak A Rohit 0.40 m/s    TR Max Rohit 3.53 m/s    E/A ratio 2.78     E wave deceleration time 150.05 msec    LV SEPTAL E/E' RATIO 15.86 m/s    LV LATERAL E/E' RATIO 9.25 m/s    LVOT peak rohit 0.59 m/s    Left Ventricular Outflow Tract Mean Velocity 0.44 cm/s    Left Ventricular Outflow Tract Mean Gradient 0.83 mmHg    RVDD 5.16 cm    TAPSE 1.10 cm    LA size 5.48 cm    Left Atrium Major Axis 6.90 cm    RA Major Axis 5.02 cm    AV mean gradient 2 mmHg    AV peak gradient 2 mmHg    Ao peak rohit 0.77 m/s    Ao VTI 11.20 cm    LVOT peak VTI 8.90 cm    AV valve area 3.90 cm²    AV Velocity Ratio 0.77     AV index (prosthetic) 0.79     DESMOND by Velocity Ratio 3.76 cm²    Mr max rohit 4.28 m/s    MV mean gradient 3 mmHg    MV peak gradient 8 mmHg    MV stenosis pressure 1/2 time 57.19 ms    MV valve area p 1/2 method 3.85 cm2    MV valve area by continuity eq 1.36 cm2    MV VTI 32.0 cm    Triscuspid Valve Regurgitation Peak Gradient 50 mmHg    PV PEAK VELOCITY 0.54 m/s    PV peak gradient 1 mmHg    Ao root annulus 2.00 cm    IVC diameter 2.38 cm    Mean e' 0.10 m/s    ZLVIDS 4.01     ZLVIDD 0.99     AORTIC VALVE CUSP SEPERATION 2.17 cm    TV resting pulmonary artery pressure 65 mmHg    RV TB RVSP 19 mmHg    Est. RA pres 15 mmHg    Vásquez's Biplane MOD Ejection Fraction 22 %    Narrative      Left Ventricle: The left ventricle is severely dilated. Normal wall   thickness. There is eccentric hypertrophy. There is severely reduced   systolic function with a visually estimated ejection fraction of 20 - 25%.   Grade III diastolic dysfunction.    Right Ventricle: Moderate right ventricular enlargement. Systolic   function is moderately reduced.    Left Atrium: Left atrium  is moderately dilated.    Right Atrium: Right atrium is mildly dilated.    Aortic Valve: The aortic valve is a trileaflet valve. Mildly calcified   cusps.    Mitral Valve: There is mild bileaflet sclerosis. Mildly thickened   leaflets. There is no stenosis. The mean pressure gradient across the   mitral valve is 3 mmHg at a heart rate of  bpm. There is severe   regurgitation with a posterolateral eccentriccally directed jet.    Tricuspid Valve: There is moderate to severe regurgitation with a   centrally directed jet.    Pulmonary Artery: The estimated pulmonary artery systolic pressure is   65 mmHg.    IVC/SVC: Elevated venous pressure at 15 mmHg.    Pericardium: There is a trivial effusion.

## 2024-02-21 NOTE — ASSESSMENT & PLAN NOTE
Ischemic cardiomyopathy; EF 20%, NYHA III , Stage D  Volume overloaded with low cardiac output - IV lasix 40mg bid, and iv dobutamine 2.5  GDMT - issues with compliance in the past - on lisinopril only  Devices; none -patient would likely qualify for ICD however is not taking GDMT    2/21/2024:  - Lactate normalized, liver enzymes and creatinine down trending; discontinued IV dobutamine  - Start Entresto 24-26 BID and spironolactone 25 daily   - Will consider adding SGLT2 prior to discharging if BP/creatinine stable  - No BB due to concern for low cardiac output  - Continue IV diuresis, possible discharge home tomorrow

## 2024-02-21 NOTE — PROGRESS NOTES
[facility]  [facility] Medicine  Progress Note    Patient Name: Karin Carrera  MRN: [id number]  Patient Class: IP- Inpatient   Admission Date: 2/19/2024  Length of Stay: 2 days  Attending Physician: Martita Dawn DO  Primary Care Provider: Walker Smith MD        Subjective:     Principal Problem:Acute on chronic systolic congestive heart failure        HPI:  53 year old male with an extensive PMH presents with LE swelling and SOB.  He does not use O2 at home.  He is a poor historian from a stroke and is unable to articulate.  But he was able to tell me he was in pain and wanted pain meds.      Overview/Hospital Course:  53 year old male with an extensive PMH presents with LE swelling and SOB. LE swelling improved; he feels better overall.  His SOB is improved.  Patient denies chest pain, nausea, vomiting, or diarrhea.      Vitals:    02/21/24 0718 02/21/24 0720 02/21/24 0916 02/21/24 1017   BP:   119/73 119/74   Pulse: 95 95  94   Resp: 18 18  18   Temp:    97.8 °F (36.6 °C)   TempSrc:    Oral   SpO2: 97% 97%  (!) 92%   Weight:       Height:           PHYSICAL EXAMINATION:    GEN: NAD; alert and oriented x 3  CVS: regular rate and rhythm  RESP: normal respiratory effort; no audible wheezing noted  EXTR: improved LE swelling      Assessment/Plan:      * Acute on chronic systolic congestive heart failure  On Lasix IV and off Dobutamine; now on Entresto;  Echo shows ejection fraction of 20 - 25% with grade III diastolic dysfunction.  Needs pacemaker/AICD for EF 20-25%; appreciate consult from Cardiology; appears to have poor prognosis due to non-compliance and lack of understanding of severity of medical illness    Acute hypoxemic respiratory failure  Improved; on Lasix; He is on O2 and does not use O2 at home.  On nebs.  Negative COVID; normal Troponin    Coronary artery disease involving coronary bypass graft of native heart without angina pectoris  Continue home  meds    Elevated LFTs  From congestion/CHF; improved LFT's      Hypokalemia  Patient now has hypokalemia; will supplement    Acute renal failure  Resolved    Cough  On Tessalon Perles; on nebs      Diabetes mellitus  On SSI and monitor BS levels; BS stable    History of CVA (cerebrovascular accident)  Has right sided paralysis; no acute issues          Martita Dawn DO  Department of Hospital Medicine   [facility]

## 2024-02-21 NOTE — PROGRESS NOTES
[facility]  Cardiology  Progress Note    Patient Name: Karin Carrera  MRN: [id number]  Admission Date: 2/19/2024  Hospital Length of Stay: 2 days  Code Status: Full Code   Attending Physician: Martita Dawn DO   Primary Care Physician: Walker Smith MD  Expected Discharge Date:   Principal Problem:Acute on chronic systolic congestive heart failure    Subjective:     Hospital Course:   No notes on file    Interval History: Patient seen today, dyspnea and orthopnea has improved. Lactate normalized, livers enzymes down trending,discontinued dobutamine. K+ 2.9, being replaced by primary. Start Entresto 24-26 BID and continue IV diuresis.    Review of Systems   Constitutional: Negative for diaphoresis and fever.   Cardiovascular:  Positive for dyspnea on exertion, leg swelling, orthopnea and paroxysmal nocturnal dyspnea. Negative for chest pain, claudication, cyanosis, irregular heartbeat, near-syncope, palpitations and syncope.   Respiratory:  Positive for shortness of breath.    All other systems reviewed and are negative.    Objective:     Vital Signs (Most Recent):  Temp: 97.8 °F (36.6 °C) (02/21/24 1017)  Pulse: 94 (02/21/24 1017)  Resp: 18 (02/21/24 1017)  BP: 119/74 (02/21/24 1017)  SpO2: (!) 92 % (02/21/24 1017) Vital Signs (24h Range):  Temp:  [97.7 °F (36.5 °C)-98.1 °F (36.7 °C)] 97.8 °F (36.6 °C)  Pulse:  [85-95] 94  Resp:  [17-20] 18  SpO2:  [84 %-98 %] 92 %  BP: ()/(62-74) 119/74     Weight: 91.2 kg (201 lb)  Body mass index is 28.03 kg/m².     SpO2: (!) 92 %         Intake/Output Summary (Last 24 hours) at 2/21/2024 1310  Last data filed at 2/21/2024 1017  Gross per 24 hour   Intake 480 ml   Output 2600 ml   Net -2120 ml       Lines/Drains/Airways       Peripheral Intravenous Line  Duration                  Peripheral IV - Single Lumen 02/19/24 1324 18 G Left;Posterior Hand 1 day                       Physical Exam  Vitals reviewed.   Constitutional:        "General: He is not in acute distress.  Neck:      Vascular: JVD present.   Cardiovascular:      Rate and Rhythm: Normal rate and regular rhythm.      Heart sounds: Normal heart sounds.   Pulmonary:      Effort: Pulmonary effort is normal.      Breath sounds: No rales.   Abdominal:      General: Abdomen is flat.      Palpations: Abdomen is soft.   Musculoskeletal:      Right lower leg: Edema present.      Left lower leg: Edema present.   Skin:     General: Skin is warm and dry.   Neurological:      Mental Status: He is alert. Mental status is at baseline.            Significant Labs: ABG: No results for input(s): "PH", "PCO2", "HCO3", "POCSATURATED", "BE" in the last 48 hours., Blood Culture: No results for input(s): "LABBLOO" in the last 48 hours., BMP:   Recent Labs   Lab 02/19/24  1536 02/20/24  0307 02/21/24  0334   * 121* 74    137 139   K 5.5* 4.2 2.9*    103 101   CO2 25 27 30   BUN 28* 27* 29*   CREATININE 1.31* 1.24 0.93   CALCIUM 9.2 8.8 8.1*   MG 2.5*  --   --    , CMP   Recent Labs   Lab 02/19/24  1536 02/20/24  0307 02/21/24  0334 02/21/24  0733    137 139  --    K 5.5* 4.2 2.9*  --     103 101  --    CO2 25 27 30  --    * 121* 74  --    BUN 28* 27* 29*  --    CREATININE 1.31* 1.24 0.93  --    CALCIUM 9.2 8.8 8.1*  --    PROT 7.9 7.4  --  7.2   ALBUMIN 3.6 3.3*  --  3.3*   BILITOT 2.0* 1.7*  --  1.2   ALKPHOS 179* 166*  --  160*   * 482*  --  186*   * 265*  --  232*   ANIONGAP 10 11 11  --    , CBC   Recent Labs   Lab 02/19/24  1536   WBC 6.80   HGB 13.6   HCT 42.5      , INR No results for input(s): "INR", "PROTIME" in the last 48 hours., Lipid Panel No results for input(s): "CHOL", "HDL", "LDLCALC", "TRIG", "CHOLHDL" in the last 48 hours., and Troponin No results for input(s): "TROPONINI" in the last 48 hours.    Significant Imaging: Cardiac Cath: No results found for this or any previous visit.  and Echocardiogram: Transthoracic echo (TTE) " complete (Cupid Only):   Results for orders placed or performed during the hospital encounter of 02/19/24   Echo   Result Value Ref Range    BSA 2.14 m2    LVOT stroke volume 43.67 cm3    LVIDd 7.30 (A) 3.5 - 6.0 cm    LV Systolic Volume 232.15 mL    LV Systolic Volume Index 110.0 mL/m2    LVIDs 6.71 (A) 2.1 - 4.0 cm    LV Diastolic Volume 280.84 mL    LV Diastolic Volume Index 133.10 mL/m2    IVS 0.79 0.6 - 1.1 cm    LVOT diameter 2.50 cm    LVOT area 4.9 cm2    FS 8 (A) 28 - 44 %    Left Ventricle Relative Wall Thickness 0.23 cm    Posterior Wall 0.85 0.6 - 1.1 cm    LV mass 271.42 g    LV Mass Index 129 g/m2    MV Peak E Rohit 1.11 m/s    TDI LATERAL 0.12 m/s    TDI SEPTAL 0.07 m/s    E/E' ratio 11.68 m/s    MV Peak A Rohit 0.40 m/s    TR Max Rohit 3.53 m/s    E/A ratio 2.78     E wave deceleration time 150.05 msec    LV SEPTAL E/E' RATIO 15.86 m/s    LV LATERAL E/E' RATIO 9.25 m/s    LVOT peak rohit 0.59 m/s    Left Ventricular Outflow Tract Mean Velocity 0.44 cm/s    Left Ventricular Outflow Tract Mean Gradient 0.83 mmHg    RVDD 5.16 cm    TAPSE 1.10 cm    LA size 5.48 cm    Left Atrium Major Axis 6.90 cm    RA Major Axis 5.02 cm    AV mean gradient 2 mmHg    AV peak gradient 2 mmHg    Ao peak rohit 0.77 m/s    Ao VTI 11.20 cm    LVOT peak VTI 8.90 cm    AV valve area 3.90 cm²    AV Velocity Ratio 0.77     AV index (prosthetic) 0.79     DESMOND by Velocity Ratio 3.76 cm²    Mr max rohit 4.28 m/s    MV mean gradient 3 mmHg    MV peak gradient 8 mmHg    MV stenosis pressure 1/2 time 57.19 ms    MV valve area p 1/2 method 3.85 cm2    MV valve area by continuity eq 1.36 cm2    MV VTI 32.0 cm    Triscuspid Valve Regurgitation Peak Gradient 50 mmHg    PV PEAK VELOCITY 0.54 m/s    PV peak gradient 1 mmHg    Ao root annulus 2.00 cm    IVC diameter 2.38 cm    Mean e' 0.10 m/s    ZLVIDS 4.01     ZLVIDD 0.99     AORTIC VALVE CUSP SEPERATION 2.17 cm    TV resting pulmonary artery pressure 65 mmHg    RV TB RVSP 19 mmHg    Est. RA pres 15  mmHg    Vásquez's Biplane MOD Ejection Fraction 22 %    Narrative      Left Ventricle: The left ventricle is severely dilated. Normal wall   thickness. There is eccentric hypertrophy. There is severely reduced   systolic function with a visually estimated ejection fraction of 20 - 25%.   Grade III diastolic dysfunction.    Right Ventricle: Moderate right ventricular enlargement. Systolic   function is moderately reduced.    Left Atrium: Left atrium is moderately dilated.    Right Atrium: Right atrium is mildly dilated.    Aortic Valve: The aortic valve is a trileaflet valve. Mildly calcified   cusps.    Mitral Valve: There is mild bileaflet sclerosis. Mildly thickened   leaflets. There is no stenosis. The mean pressure gradient across the   mitral valve is 3 mmHg at a heart rate of  bpm. There is severe   regurgitation with a posterolateral eccentriccally directed jet.    Tricuspid Valve: There is moderate to severe regurgitation with a   centrally directed jet.    Pulmonary Artery: The estimated pulmonary artery systolic pressure is   65 mmHg.    IVC/SVC: Elevated venous pressure at 15 mmHg.    Pericardium: There is a trivial effusion.       Assessment and Plan:     Brief HPI:   52 y.o. male with hx of CAD s/p CABG, ischemic cardiomyopathy (EF 20%), CVA, with expressive aphasia and right arm weakness,  HTN, GERD, ETOH abuse, and nicotine abuse who presented with SOB and admitted for acute decompensated HF with cardiogenic shock.     Patient reports few weeks of worsening SOB, orthopnea and sleeping in a recliner with PND. Also has bilateral leg swelling.   Patient admitted for IV diuresis.   Hospital course c/b runs of NSVT on tele     Today, He reports feeling better after IV diuresis. He was started on IV dobutamine 2.5mcg/kg/min for low cardiac output (elevated lactate and elevated LFTs).        * Acute on chronic systolic congestive heart failure  Ischemic cardiomyopathy; EF 20%, NYHA III , Stage D  Volume  overloaded with low cardiac output - IV lasix 40mg bid, and iv dobutamine 2.5  GDMT - issues with compliance in the past - on lisinopril only  Devices; none -patient would likely qualify for ICD however is not taking GDMT    2/21/2024:  - Lactate normalized, liver enzymes and creatinine down trending; discontinued IV dobutamine  - Start Entresto 24-26 BID and spironolactone 25 daily   - Will consider adding SGLT2 prior to discharging if BP/creatinine stable  - No BB due to concern for low cardiac output  - Continue IV diuresis, possible discharge home tomorrow    Coronary artery disease involving coronary bypass graft of native heart without angina pectoris  Patient with known CAD s/p CABG, which is controlled Will continue ASA and Statin and monitor for S/Sx of angina/ACS. Continue to monitor on telemetry.         VTE Risk Mitigation (From admission, onward)           Ordered     heparin (porcine) injection 5,000 Units  Every 8 hours         02/19/24 2038     IP VTE HIGH RISK PATIENT  Once         02/19/24 2038     Place sequential compression device  Until discontinued         02/19/24 2038                    ISADORA Hendrickson  Cardiology  [facility]

## 2024-02-21 NOTE — PLAN OF CARE
Problem: Adult Inpatient Plan of Care  Goal: Plan of Care Review  Outcome: Ongoing, Progressing  Goal: Patient-Specific Goal (Individualized)  Outcome: Ongoing, Progressing  Goal: Absence of Hospital-Acquired Illness or Injury  Outcome: Ongoing, Progressing  Goal: Optimal Comfort and Wellbeing  Outcome: Ongoing, Progressing  Goal: Readiness for Transition of Care  Outcome: Ongoing, Progressing     Problem: Diabetes Comorbidity  Goal: Blood Glucose Level Within Targeted Range  Outcome: Ongoing, Progressing     Problem: Fluid and Electrolyte Imbalance (Acute Kidney Injury/Impairment)  Goal: Fluid and Electrolyte Balance  Outcome: Ongoing, Progressing     Problem: Oral Intake Inadequate (Acute Kidney Injury/Impairment)  Goal: Optimal Nutrition Intake  Outcome: Ongoing, Progressing     Problem: Renal Function Impairment (Acute Kidney Injury/Impairment)  Goal: Effective Renal Function  Outcome: Ongoing, Progressing     Problem: Skin Injury Risk Increased  Goal: Skin Health and Integrity  Outcome: Ongoing, Progressing     Problem: Gas Exchange Impaired  Goal: Optimal Gas Exchange  Outcome: Ongoing, Progressing     Problem: Fall Injury Risk  Goal: Absence of Fall and Fall-Related Injury  Outcome: Ongoing, Progressing

## 2024-02-21 NOTE — ASSESSMENT & PLAN NOTE
Improved; on Lasix; He is on O2 and does not use O2 at home.  On nebs.  Negative COVID; normal Troponin

## 2024-02-21 NOTE — ASSESSMENT & PLAN NOTE
On Lasix IV and off Dobutamine; now on Entresto;  Echo shows ejection fraction of 20 - 25% with grade III diastolic dysfunction.  Needs pacemaker/AICD for EF 20-25%; appreciate consult from Cardiology; appears to have poor prognosis due to non-compliance and lack of understanding of severity of medical illness

## 2024-02-22 PROBLEM — E83.51 HYPOCALCEMIA: Status: ACTIVE | Noted: 2024-02-22

## 2024-02-22 PROBLEM — N17.9 ACUTE RENAL FAILURE: Status: RESOLVED | Noted: 2024-02-19 | Resolved: 2024-02-22

## 2024-02-22 LAB
ALBUMIN SERPL BCP-MCNC: 3.1 G/DL (ref 3.5–5)
ALBUMIN/GLOB SERPL: 0.8 {RATIO}
ALP SERPL-CCNC: 154 U/L (ref 45–115)
ALT SERPL W P-5'-P-CCNC: 194 U/L (ref 16–61)
ANION GAP SERPL CALCULATED.3IONS-SCNC: 11 MMOL/L (ref 7–16)
ANION GAP SERPL CALCULATED.3IONS-SCNC: 11 MMOL/L (ref 7–16)
AST SERPL W P-5'-P-CCNC: 117 U/L (ref 15–37)
BILIRUB SERPL-MCNC: 0.9 MG/DL (ref ?–1.2)
BILIRUB UR QL STRIP: NEGATIVE
BUN SERPL-MCNC: 17 MG/DL (ref 7–18)
BUN SERPL-MCNC: 22 MG/DL (ref 7–18)
BUN/CREAT SERPL: 22 (ref 6–20)
BUN/CREAT SERPL: 23 (ref 6–20)
CALCIUM SERPL-MCNC: 7.9 MG/DL (ref 8.5–10.1)
CALCIUM SERPL-MCNC: 8.2 MG/DL (ref 8.5–10.1)
CHLORIDE SERPL-SCNC: 96 MMOL/L (ref 98–107)
CHLORIDE SERPL-SCNC: 97 MMOL/L (ref 98–107)
CLARITY UR: CLEAR
CO2 SERPL-SCNC: 28 MMOL/L (ref 21–32)
CO2 SERPL-SCNC: 29 MMOL/L (ref 21–32)
COLOR UR: YELLOW
CREAT SERPL-MCNC: 0.77 MG/DL (ref 0.7–1.3)
CREAT SERPL-MCNC: 0.96 MG/DL (ref 0.7–1.3)
EGFR (NO RACE VARIABLE) (RUSH/TITUS): 107 ML/MIN/1.73M2
EGFR (NO RACE VARIABLE) (RUSH/TITUS): 95 ML/MIN/1.73M2
GLOBULIN SER-MCNC: 3.7 G/DL (ref 2–4)
GLUCOSE SERPL-MCNC: 135 MG/DL (ref 70–105)
GLUCOSE SERPL-MCNC: 138 MG/DL (ref 74–106)
GLUCOSE SERPL-MCNC: 143 MG/DL (ref 70–105)
GLUCOSE SERPL-MCNC: 144 MG/DL (ref 70–105)
GLUCOSE SERPL-MCNC: 166 MG/DL (ref 70–105)
GLUCOSE SERPL-MCNC: 184 MG/DL (ref 74–106)
GLUCOSE UR STRIP-MCNC: >1000 MG/DL
KETONES UR STRIP-SCNC: NEGATIVE MG/DL
LACTATE SERPL-SCNC: 1.9 MMOL/L (ref 0.4–2)
LACTATE SERPL-SCNC: 2.1 MMOL/L (ref 0.4–2)
LEUKOCYTE ESTERASE UR QL STRIP: NEGATIVE
NITRITE UR QL STRIP: NEGATIVE
PH UR STRIP: 6.5 PH UNITS
POTASSIUM SERPL-SCNC: 3.5 MMOL/L (ref 3.5–5.1)
POTASSIUM SERPL-SCNC: 3.6 MMOL/L (ref 3.5–5.1)
PROT SERPL-MCNC: 6.8 G/DL (ref 6.4–8.2)
PROT UR QL STRIP: 20
RBC # UR STRIP: NEGATIVE /UL
SODIUM SERPL-SCNC: 132 MMOL/L (ref 136–145)
SODIUM SERPL-SCNC: 132 MMOL/L (ref 136–145)
SP GR UR STRIP: 1.03
UROBILINOGEN UR STRIP-ACNC: NORMAL MG/DL

## 2024-02-22 PROCEDURE — 25000242 PHARM REV CODE 250 ALT 637 W/ HCPCS: Performed by: HOSPITALIST

## 2024-02-22 PROCEDURE — 99900035 HC TECH TIME PER 15 MIN (STAT)

## 2024-02-22 PROCEDURE — 25000242 PHARM REV CODE 250 ALT 637 W/ HCPCS: Performed by: STUDENT IN AN ORGANIZED HEALTH CARE EDUCATION/TRAINING PROGRAM

## 2024-02-22 PROCEDURE — 83605 ASSAY OF LACTIC ACID: CPT | Performed by: NURSE PRACTITIONER

## 2024-02-22 PROCEDURE — 4A023N6 MEASUREMENT OF CARDIAC SAMPLING AND PRESSURE, RIGHT HEART, PERCUTANEOUS APPROACH: ICD-10-PCS | Performed by: HOSPITALIST

## 2024-02-22 PROCEDURE — 80307 DRUG TEST PRSMV CHEM ANLYZR: CPT | Performed by: HOSPITALIST

## 2024-02-22 PROCEDURE — 99233 SBSQ HOSP IP/OBS HIGH 50: CPT | Mod: 25,,, | Performed by: NURSE PRACTITIONER

## 2024-02-22 PROCEDURE — 25000003 PHARM REV CODE 250: Performed by: HOSPITALIST

## 2024-02-22 PROCEDURE — 93458 L HRT ARTERY/VENTRICLE ANGIO: CPT | Performed by: HOSPITALIST

## 2024-02-22 PROCEDURE — 27000221 HC OXYGEN, UP TO 24 HOURS

## 2024-02-22 PROCEDURE — 93451 RIGHT HEART CATH: CPT | Mod: 26,,, | Performed by: HOSPITALIST

## 2024-02-22 PROCEDURE — 63600175 PHARM REV CODE 636 W HCPCS: Performed by: HOSPITALIST

## 2024-02-22 PROCEDURE — 80053 COMPREHEN METABOLIC PANEL: CPT | Performed by: HOSPITALIST

## 2024-02-22 PROCEDURE — 99152 MOD SED SAME PHYS/QHP 5/>YRS: CPT | Performed by: HOSPITALIST

## 2024-02-22 PROCEDURE — 81003 URINALYSIS AUTO W/O SCOPE: CPT | Mod: 59 | Performed by: NURSE PRACTITIONER

## 2024-02-22 PROCEDURE — 82962 GLUCOSE BLOOD TEST: CPT

## 2024-02-22 PROCEDURE — 99153 MOD SED SAME PHYS/QHP EA: CPT | Performed by: HOSPITALIST

## 2024-02-22 PROCEDURE — 25000003 PHARM REV CODE 250: Performed by: STUDENT IN AN ORGANIZED HEALTH CARE EDUCATION/TRAINING PROGRAM

## 2024-02-22 PROCEDURE — 94761 N-INVAS EAR/PLS OXIMETRY MLT: CPT

## 2024-02-22 PROCEDURE — C1894 INTRO/SHEATH, NON-LASER: HCPCS | Performed by: HOSPITALIST

## 2024-02-22 PROCEDURE — 63600175 PHARM REV CODE 636 W HCPCS: Performed by: STUDENT IN AN ORGANIZED HEALTH CARE EDUCATION/TRAINING PROGRAM

## 2024-02-22 PROCEDURE — 99152 MOD SED SAME PHYS/QHP 5/>YRS: CPT | Mod: ,,, | Performed by: HOSPITALIST

## 2024-02-22 PROCEDURE — 94640 AIRWAY INHALATION TREATMENT: CPT

## 2024-02-22 PROCEDURE — 99232 SBSQ HOSP IP/OBS MODERATE 35: CPT | Mod: ,,, | Performed by: HOSPITALIST

## 2024-02-22 PROCEDURE — C1751 CATH, INF, PER/CENT/MIDLINE: HCPCS | Performed by: HOSPITALIST

## 2024-02-22 PROCEDURE — 80048 BASIC METABOLIC PNL TOTAL CA: CPT | Mod: XB | Performed by: HOSPITALIST

## 2024-02-22 PROCEDURE — 25000003 PHARM REV CODE 250: Performed by: NURSE PRACTITIONER

## 2024-02-22 PROCEDURE — 11000001 HC ACUTE MED/SURG PRIVATE ROOM

## 2024-02-22 RX ORDER — MIDAZOLAM HYDROCHLORIDE 1 MG/ML
INJECTION INTRAMUSCULAR; INTRAVENOUS
Status: DISCONTINUED | OUTPATIENT
Start: 2024-02-22 | End: 2024-02-22 | Stop reason: HOSPADM

## 2024-02-22 RX ORDER — FENTANYL CITRATE 50 UG/ML
INJECTION, SOLUTION INTRAMUSCULAR; INTRAVENOUS
Status: DISCONTINUED | OUTPATIENT
Start: 2024-02-22 | End: 2024-02-22 | Stop reason: HOSPADM

## 2024-02-22 RX ORDER — LIDOCAINE HYDROCHLORIDE 10 MG/ML
INJECTION INFILTRATION; PERINEURAL
Status: DISCONTINUED | OUTPATIENT
Start: 2024-02-22 | End: 2024-02-22 | Stop reason: HOSPADM

## 2024-02-22 RX ORDER — FUROSEMIDE 10 MG/ML
60 INJECTION INTRAMUSCULAR; INTRAVENOUS
Status: DISCONTINUED | OUTPATIENT
Start: 2024-02-23 | End: 2024-02-23

## 2024-02-22 RX ADMIN — PANTOPRAZOLE SODIUM 40 MG: 40 TABLET, DELAYED RELEASE ORAL at 09:02

## 2024-02-22 RX ADMIN — EMPAGLIFLOZIN 10 MG: 10 TABLET, FILM COATED ORAL at 11:02

## 2024-02-22 RX ADMIN — ZOLPIDEM TARTRATE 5 MG: 5 TABLET, COATED ORAL at 08:02

## 2024-02-22 RX ADMIN — SACUBITRIL AND VALSARTAN 1 TABLET: 24; 26 TABLET, FILM COATED ORAL at 09:02

## 2024-02-22 RX ADMIN — GUAIFENESIN 600 MG: 600 TABLET, EXTENDED RELEASE ORAL at 08:02

## 2024-02-22 RX ADMIN — INSULIN DETEMIR 10 UNITS: 100 INJECTION, SOLUTION SUBCUTANEOUS at 08:02

## 2024-02-22 RX ADMIN — HEPARIN SODIUM 5000 UNITS: 5000 INJECTION, SOLUTION INTRAVENOUS; SUBCUTANEOUS at 05:02

## 2024-02-22 RX ADMIN — GUAIFENESIN 600 MG: 600 TABLET, EXTENDED RELEASE ORAL at 09:02

## 2024-02-22 RX ADMIN — ATORVASTATIN CALCIUM 40 MG: 40 TABLET, FILM COATED ORAL at 08:02

## 2024-02-22 RX ADMIN — IPRATROPIUM BROMIDE 0.5 MG: 0.5 SOLUTION RESPIRATORY (INHALATION) at 08:02

## 2024-02-22 RX ADMIN — ONDANSETRON 8 MG: 2 INJECTION INTRAMUSCULAR; INTRAVENOUS at 06:02

## 2024-02-22 RX ADMIN — GUAIFENESIN AND CODEINE PHOSPHATE 5 ML: 100; 10 SOLUTION ORAL at 09:02

## 2024-02-22 RX ADMIN — FUROSEMIDE 40 MG: 10 INJECTION, SOLUTION INTRAVENOUS at 05:02

## 2024-02-22 RX ADMIN — TRAMADOL HYDROCHLORIDE 50 MG: 50 TABLET, COATED ORAL at 06:02

## 2024-02-22 RX ADMIN — HEPARIN SODIUM 5000 UNITS: 5000 INJECTION, SOLUTION INTRAVENOUS; SUBCUTANEOUS at 09:02

## 2024-02-22 RX ADMIN — TRAMADOL HYDROCHLORIDE 50 MG: 50 TABLET, COATED ORAL at 09:02

## 2024-02-22 RX ADMIN — IPRATROPIUM BROMIDE 0.5 MG: 0.5 SOLUTION RESPIRATORY (INHALATION) at 07:02

## 2024-02-22 RX ADMIN — ASPIRIN 81 MG: 81 TABLET, COATED ORAL at 09:02

## 2024-02-22 RX ADMIN — IPRATROPIUM BROMIDE 0.5 MG: 0.5 SOLUTION RESPIRATORY (INHALATION) at 02:02

## 2024-02-22 RX ADMIN — SACUBITRIL AND VALSARTAN 1 TABLET: 24; 26 TABLET, FILM COATED ORAL at 08:02

## 2024-02-22 RX ADMIN — SPIRONOLACTONE 25 MG: 25 TABLET ORAL at 09:02

## 2024-02-22 NOTE — PROGRESS NOTES
[facility]  Cardiology  Progress Note    Patient Name: Karin Carrera  MRN: [id number]  Admission Date: 2/19/2024  Hospital Length of Stay: 3 days  Code Status: Full Code   Attending Physician: Martita Dawn DO   Primary Care Physician: Walker Smtih MD  Expected Discharge Date:   Principal Problem:Acute on chronic systolic congestive heart failure    Subjective:     Hospital Course:   No notes on file    Interval History: Patient seen today, reports he was up 4 times last night with his breathing. Also c/o lower back pain and dysuria x 2 days. Urinalysis ordered. Plan for RHC today, has been off dobutamine for 24 hours, repeat lactate this morning trended up 2.1.    Review of Systems   Constitutional: Negative for diaphoresis and fever.   Cardiovascular:  Positive for dyspnea on exertion, leg swelling, orthopnea and paroxysmal nocturnal dyspnea. Negative for chest pain, claudication, cyanosis, irregular heartbeat, near-syncope, palpitations and syncope.   Respiratory:  Positive for shortness of breath.    Musculoskeletal:  Positive for back pain.   Genitourinary:  Positive for dysuria.   All other systems reviewed and are negative.    Objective:     Vital Signs (Most Recent):  Temp: 97.4 °F (36.3 °C) (02/22/24 1050)  Pulse: 96 (02/22/24 1050)  Resp: 20 (02/22/24 1050)  BP: 116/65 (02/22/24 1050)  SpO2: 97 % (02/22/24 1050) Vital Signs (24h Range):  Temp:  [97.4 °F (36.3 °C)-97.7 °F (36.5 °C)] 97.4 °F (36.3 °C)  Pulse:  [83-96] 96  Resp:  [16-20] 20  SpO2:  [95 %-99 %] 97 %  BP: (103-120)/(65-73) 116/65     Weight: 91.2 kg (201 lb)  Body mass index is 28.03 kg/m².     SpO2: 97 %         Intake/Output Summary (Last 24 hours) at 2/22/2024 1204  Last data filed at 2/22/2024 1107  Gross per 24 hour   Intake --   Output 600 ml   Net -600 ml       Lines/Drains/Airways       Peripheral Intravenous Line  Duration                  Peripheral IV - Single Lumen 02/19/24 1324 18 G  "Left;Posterior Hand 2 days                       Physical Exam  Vitals reviewed.   Constitutional:       General: He is not in acute distress.  Neck:      Vascular: JVD present.   Cardiovascular:      Rate and Rhythm: Normal rate and regular rhythm.      Heart sounds: Normal heart sounds.   Pulmonary:      Effort: Pulmonary effort is normal.      Breath sounds: No rales.   Abdominal:      General: Abdomen is flat.      Palpations: Abdomen is soft.   Musculoskeletal:      Right lower leg: Edema present.      Left lower leg: Edema present.   Skin:     General: Skin is warm and dry.   Neurological:      Mental Status: He is alert. Mental status is at baseline.            Significant Labs: ABG: No results for input(s): "PH", "PCO2", "HCO3", "POCSATURATED", "BE" in the last 48 hours., Blood Culture: No results for input(s): "LABBLOO" in the last 48 hours., BMP:   Recent Labs   Lab 02/21/24  0334 02/22/24  0254   GLU 74 184*    132*   K 2.9* 3.6    96*   CO2 30 29   BUN 29* 22*   CREATININE 0.93 0.96   CALCIUM 8.1* 7.9*   , CMP   Recent Labs   Lab 02/21/24  0334 02/21/24  0733 02/22/24  0254     --  132*   K 2.9*  --  3.6     --  96*   CO2 30  --  29   GLU 74  --  184*   BUN 29*  --  22*   CREATININE 0.93  --  0.96   CALCIUM 8.1*  --  7.9*   PROT  --  7.2  --    ALBUMIN  --  3.3*  --    BILITOT  --  1.2  --    ALKPHOS  --  160*  --    AST  --  186*  --    ALT  --  232*  --    ANIONGAP 11  --  11   , CBC No results for input(s): "WBC", "HGB", "HCT", "PLT" in the last 48 hours., INR No results for input(s): "INR", "PROTIME" in the last 48 hours., Lipid Panel No results for input(s): "CHOL", "HDL", "LDLCALC", "TRIG", "CHOLHDL" in the last 48 hours., and Troponin No results for input(s): "TROPONINI" in the last 48 hours.    Significant Imaging: Cardiac Cath: No results found for this or any previous visit.  and Echocardiogram: Transthoracic echo (TTE) complete (Cupid Only):   Results for orders placed " or performed during the hospital encounter of 02/19/24   Echo   Result Value Ref Range    BSA 2.14 m2    LVOT stroke volume 43.67 cm3    LVIDd 7.30 (A) 3.5 - 6.0 cm    LV Systolic Volume 232.15 mL    LV Systolic Volume Index 110.0 mL/m2    LVIDs 6.71 (A) 2.1 - 4.0 cm    LV Diastolic Volume 280.84 mL    LV Diastolic Volume Index 133.10 mL/m2    IVS 0.79 0.6 - 1.1 cm    LVOT diameter 2.50 cm    LVOT area 4.9 cm2    FS 8 (A) 28 - 44 %    Left Ventricle Relative Wall Thickness 0.23 cm    Posterior Wall 0.85 0.6 - 1.1 cm    LV mass 271.42 g    LV Mass Index 129 g/m2    MV Peak E Rohit 1.11 m/s    TDI LATERAL 0.12 m/s    TDI SEPTAL 0.07 m/s    E/E' ratio 11.68 m/s    MV Peak A Rohit 0.40 m/s    TR Max Rohit 3.53 m/s    E/A ratio 2.78     E wave deceleration time 150.05 msec    LV SEPTAL E/E' RATIO 15.86 m/s    LV LATERAL E/E' RATIO 9.25 m/s    LVOT peak rohit 0.59 m/s    Left Ventricular Outflow Tract Mean Velocity 0.44 cm/s    Left Ventricular Outflow Tract Mean Gradient 0.83 mmHg    RVDD 5.16 cm    TAPSE 1.10 cm    LA size 5.48 cm    Left Atrium Major Axis 6.90 cm    RA Major Axis 5.02 cm    AV mean gradient 2 mmHg    AV peak gradient 2 mmHg    Ao peak rohit 0.77 m/s    Ao VTI 11.20 cm    LVOT peak VTI 8.90 cm    AV valve area 3.90 cm²    AV Velocity Ratio 0.77     AV index (prosthetic) 0.79     DESMOND by Velocity Ratio 3.76 cm²    Mr max rohit 4.28 m/s    MV mean gradient 3 mmHg    MV peak gradient 8 mmHg    MV stenosis pressure 1/2 time 57.19 ms    MV valve area p 1/2 method 3.85 cm2    MV valve area by continuity eq 1.36 cm2    MV VTI 32.0 cm    Triscuspid Valve Regurgitation Peak Gradient 50 mmHg    PV PEAK VELOCITY 0.54 m/s    PV peak gradient 1 mmHg    Ao root annulus 2.00 cm    IVC diameter 2.38 cm    Mean e' 0.10 m/s    ZLVIDS 4.01     ZLVIDD 0.99     AORTIC VALVE CUSP SEPERATION 2.17 cm    TV resting pulmonary artery pressure 65 mmHg    RV TB RVSP 19 mmHg    Est. RA pres 15 mmHg    Vásquez's Biplane MOD Ejection Fraction 22 %     Narrative      Left Ventricle: The left ventricle is severely dilated. Normal wall   thickness. There is eccentric hypertrophy. There is severely reduced   systolic function with a visually estimated ejection fraction of 20 - 25%.   Grade III diastolic dysfunction.    Right Ventricle: Moderate right ventricular enlargement. Systolic   function is moderately reduced.    Left Atrium: Left atrium is moderately dilated.    Right Atrium: Right atrium is mildly dilated.    Aortic Valve: The aortic valve is a trileaflet valve. Mildly calcified   cusps.    Mitral Valve: There is mild bileaflet sclerosis. Mildly thickened   leaflets. There is no stenosis. The mean pressure gradient across the   mitral valve is 3 mmHg at a heart rate of  bpm. There is severe   regurgitation with a posterolateral eccentriccally directed jet.    Tricuspid Valve: There is moderate to severe regurgitation with a   centrally directed jet.    Pulmonary Artery: The estimated pulmonary artery systolic pressure is   65 mmHg.    IVC/SVC: Elevated venous pressure at 15 mmHg.    Pericardium: There is a trivial effusion.       Assessment and Plan:     Brief HPI:   52 y.o. male with hx of CAD s/p CABG, ischemic cardiomyopathy (EF 20%), CVA, with expressive aphasia (can write for communication) and right arm weakness,  HTN, GERD, ETOH abuse, and nicotine abuse who presented with SOB and admitted for acute decompensated HF with cardiogenic shock.     Patient reports few weeks of worsening SOB, orthopnea and sleeping in a recliner with PND. Also has bilateral leg swelling.   Patient admitted for IV diuresis.   Hospital course c/b runs of NSVT on tele     Today, He reports feeling better after IV diuresis. He was started on IV dobutamine 2.5mcg/kg/min for low cardiac output (elevated lactate and elevated LFTs).       * Acute on chronic systolic congestive heart failure  Ischemic cardiomyopathy; EF 20%, NYHA III , Stage D  Volume overloaded with low cardiac  "output - IV lasix 40mg bid, and iv dobutamine 2.5  GDMT - issues with compliance in the past - on lisinopril only  Devices; none -patient would likely qualify for ICD however is not taking GDMT    2/21/2024:  - Lactate normalized, liver enzymes and creatinine down trending; discontinued IV dobutamine  - Start Entresto 24-26 BID and spironolactone 25 daily   - Will consider adding SGLT2 prior to discharging if BP/creatinine stable  - No BB due to concern for low cardiac output  - Continue IV diuresis, possible discharge home tomorrow    2/22/2024:  - Patient seen and evaluated by Dr. Ceron  - Has been off dobutamine 24 hours, lactate 2.1 this morning and pt reports "feeling worse"; state up and down from bed 4 times last night  - Plan for RHC today. Procedure, risk, and benefits discussed in detail with patient, he understands and wishes to proceed. Consent obtained and on chart.  - Further recommendations to follow    Dysuria  2/22/2024:  - Pt with c/o dysuria x 2 days and lower back pain  - UA ordered    Coronary artery disease involving coronary bypass graft of native heart without angina pectoris  Patient with known CAD s/p CABG, which is controlled Will continue ASA and Statin and monitor for S/Sx of angina/ACS. Continue to monitor on telemetry.         VTE Risk Mitigation (From admission, onward)           Ordered     heparin (porcine) injection 5,000 Units  Every 8 hours         02/19/24 2038     IP VTE HIGH RISK PATIENT  Once         02/19/24 2038     Place sequential compression device  Until discontinued         02/19/24 2038                    ISADORA Hendrickson  Cardiology  [facility]    "

## 2024-02-22 NOTE — ASSESSMENT & PLAN NOTE
"Ischemic cardiomyopathy; EF 20%, NYHA III , Stage D  Volume overloaded with low cardiac output - IV lasix 40mg bid, and iv dobutamine 2.5  GDMT - issues with compliance in the past - on lisinopril only  Devices; none -patient would likely qualify for ICD however is not taking GDMT    2/21/2024:  - Lactate normalized, liver enzymes and creatinine down trending; discontinued IV dobutamine  - Start Entresto 24-26 BID and spironolactone 25 daily   - Will consider adding SGLT2 prior to discharging if BP/creatinine stable  - No BB due to concern for low cardiac output  - Continue IV diuresis, possible discharge home tomorrow    2/22/2024:  - Patient seen and evaluated by Dr. Ceron  - Has been off dobutamine 24 hours, lactate 2.1 this morning and pt reports "feeling worse"; state up and down from bed 4 times last night  - Plan for RHC today. Procedure, risk, and benefits discussed in detail with patient, he understands and wishes to proceed. Consent obtained and on chart.  - Further recommendations to follow  "

## 2024-02-22 NOTE — SUBJECTIVE & OBJECTIVE
Objective:     Vital Signs (Most Recent):  Temp: 97.4 °F (36.3 °C) (02/22/24 1050)  Pulse: 96 (02/22/24 1050)  Resp: 20 (02/22/24 1050)  BP: 116/65 (02/22/24 1050)  SpO2: 97 % (02/22/24 1050) Vital Signs (24h Range):  Temp:  [97.4 °F (36.3 °C)-97.7 °F (36.5 °C)] 97.4 °F (36.3 °C)  Pulse:  [83-96] 96  Resp:  [16-20] 20  SpO2:  [95 %-99 %] 97 %  BP: (103-120)/(65-73) 116/65       PHYSICAL EXAM:    GEN: NAD; alert and oriented x 3  CVS: regular rate and rhythm; no murmurs  RESP: clear to auscultation bilaterally; no rhonchi, rales, or wheezes noted  GI: soft, non-tender, non-distended; + bowel sounds  EXTR: no clubbing, cyanosis, or edema

## 2024-02-22 NOTE — ASSESSMENT & PLAN NOTE
On Lasix; He is on O2 and does not use O2 at home.  On nebs.  Negative COVID; normal Troponin; will check CXR

## 2024-02-22 NOTE — ASSESSMENT & PLAN NOTE
On Lasix IV and off Dobutamine; n Entresto;  Echo shows ejection fraction of 20 - 25% with grade III diastolic dysfunction. Cardiology to perform RHC today.  He has a poor prognosis due to non-compliance and lack of understanding of the severity of his medical conditions.

## 2024-02-22 NOTE — SUBJECTIVE & OBJECTIVE
Interval History: Patient seen today, reports he was up 4 times last night with his breathing. Also c/o lower back pain and dysuria x 2 days. Urinalysis ordered. Plan for RHC today, has been off dobutamine for 24 hours, repeat lactate this morning trended up 2.1.    Review of Systems   Constitutional: Negative for diaphoresis and fever.   Cardiovascular:  Positive for dyspnea on exertion, leg swelling, orthopnea and paroxysmal nocturnal dyspnea. Negative for chest pain, claudication, cyanosis, irregular heartbeat, near-syncope, palpitations and syncope.   Respiratory:  Positive for shortness of breath.    Musculoskeletal:  Positive for back pain.   Genitourinary:  Positive for dysuria.   All other systems reviewed and are negative.    Objective:     Vital Signs (Most Recent):  Temp: 97.4 °F (36.3 °C) (02/22/24 1050)  Pulse: 96 (02/22/24 1050)  Resp: 20 (02/22/24 1050)  BP: 116/65 (02/22/24 1050)  SpO2: 97 % (02/22/24 1050) Vital Signs (24h Range):  Temp:  [97.4 °F (36.3 °C)-97.7 °F (36.5 °C)] 97.4 °F (36.3 °C)  Pulse:  [83-96] 96  Resp:  [16-20] 20  SpO2:  [95 %-99 %] 97 %  BP: (103-120)/(65-73) 116/65     Weight: 91.2 kg (201 lb)  Body mass index is 28.03 kg/m².     SpO2: 97 %         Intake/Output Summary (Last 24 hours) at 2/22/2024 1204  Last data filed at 2/22/2024 1107  Gross per 24 hour   Intake --   Output 600 ml   Net -600 ml       Lines/Drains/Airways       Peripheral Intravenous Line  Duration                  Peripheral IV - Single Lumen 02/19/24 1324 18 G Left;Posterior Hand 2 days                       Physical Exam  Vitals reviewed.   Constitutional:       General: He is not in acute distress.  Neck:      Vascular: JVD present.   Cardiovascular:      Rate and Rhythm: Normal rate and regular rhythm.      Heart sounds: Normal heart sounds.   Pulmonary:      Effort: Pulmonary effort is normal.      Breath sounds: No rales.   Abdominal:      General: Abdomen is flat.      Palpations: Abdomen is soft.  "  Musculoskeletal:      Right lower leg: Edema present.      Left lower leg: Edema present.   Skin:     General: Skin is warm and dry.   Neurological:      Mental Status: He is alert. Mental status is at baseline.            Significant Labs: ABG: No results for input(s): "PH", "PCO2", "HCO3", "POCSATURATED", "BE" in the last 48 hours., Blood Culture: No results for input(s): "LABBLOO" in the last 48 hours., BMP:   Recent Labs   Lab 02/21/24  0334 02/22/24  0254   GLU 74 184*    132*   K 2.9* 3.6    96*   CO2 30 29   BUN 29* 22*   CREATININE 0.93 0.96   CALCIUM 8.1* 7.9*   , CMP   Recent Labs   Lab 02/21/24 0334 02/21/24  0733 02/22/24 0254     --  132*   K 2.9*  --  3.6     --  96*   CO2 30  --  29   GLU 74  --  184*   BUN 29*  --  22*   CREATININE 0.93  --  0.96   CALCIUM 8.1*  --  7.9*   PROT  --  7.2  --    ALBUMIN  --  3.3*  --    BILITOT  --  1.2  --    ALKPHOS  --  160*  --    AST  --  186*  --    ALT  --  232*  --    ANIONGAP 11  --  11   , CBC No results for input(s): "WBC", "HGB", "HCT", "PLT" in the last 48 hours., INR No results for input(s): "INR", "PROTIME" in the last 48 hours., Lipid Panel No results for input(s): "CHOL", "HDL", "LDLCALC", "TRIG", "CHOLHDL" in the last 48 hours., and Troponin No results for input(s): "TROPONINI" in the last 48 hours.    Significant Imaging: Cardiac Cath: No results found for this or any previous visit.  and Echocardiogram: Transthoracic echo (TTE) complete (Cupid Only):   Results for orders placed or performed during the hospital encounter of 02/19/24   Echo   Result Value Ref Range    BSA 2.14 m2    LVOT stroke volume 43.67 cm3    LVIDd 7.30 (A) 3.5 - 6.0 cm    LV Systolic Volume 232.15 mL    LV Systolic Volume Index 110.0 mL/m2    LVIDs 6.71 (A) 2.1 - 4.0 cm    LV Diastolic Volume 280.84 mL    LV Diastolic Volume Index 133.10 mL/m2    IVS 0.79 0.6 - 1.1 cm    LVOT diameter 2.50 cm    LVOT area 4.9 cm2    FS 8 (A) 28 - 44 %    Left " Ventricle Relative Wall Thickness 0.23 cm    Posterior Wall 0.85 0.6 - 1.1 cm    LV mass 271.42 g    LV Mass Index 129 g/m2    MV Peak E Rohit 1.11 m/s    TDI LATERAL 0.12 m/s    TDI SEPTAL 0.07 m/s    E/E' ratio 11.68 m/s    MV Peak A Rohit 0.40 m/s    TR Max Rohit 3.53 m/s    E/A ratio 2.78     E wave deceleration time 150.05 msec    LV SEPTAL E/E' RATIO 15.86 m/s    LV LATERAL E/E' RATIO 9.25 m/s    LVOT peak rohit 0.59 m/s    Left Ventricular Outflow Tract Mean Velocity 0.44 cm/s    Left Ventricular Outflow Tract Mean Gradient 0.83 mmHg    RVDD 5.16 cm    TAPSE 1.10 cm    LA size 5.48 cm    Left Atrium Major Axis 6.90 cm    RA Major Axis 5.02 cm    AV mean gradient 2 mmHg    AV peak gradient 2 mmHg    Ao peak rohit 0.77 m/s    Ao VTI 11.20 cm    LVOT peak VTI 8.90 cm    AV valve area 3.90 cm²    AV Velocity Ratio 0.77     AV index (prosthetic) 0.79     DESMOND by Velocity Ratio 3.76 cm²    Mr max rohit 4.28 m/s    MV mean gradient 3 mmHg    MV peak gradient 8 mmHg    MV stenosis pressure 1/2 time 57.19 ms    MV valve area p 1/2 method 3.85 cm2    MV valve area by continuity eq 1.36 cm2    MV VTI 32.0 cm    Triscuspid Valve Regurgitation Peak Gradient 50 mmHg    PV PEAK VELOCITY 0.54 m/s    PV peak gradient 1 mmHg    Ao root annulus 2.00 cm    IVC diameter 2.38 cm    Mean e' 0.10 m/s    ZLVIDS 4.01     ZLVIDD 0.99     AORTIC VALVE CUSP SEPERATION 2.17 cm    TV resting pulmonary artery pressure 65 mmHg    RV TB RVSP 19 mmHg    Est. RA pres 15 mmHg    Vásquez's Biplane MOD Ejection Fraction 22 %    Narrative      Left Ventricle: The left ventricle is severely dilated. Normal wall   thickness. There is eccentric hypertrophy. There is severely reduced   systolic function with a visually estimated ejection fraction of 20 - 25%.   Grade III diastolic dysfunction.    Right Ventricle: Moderate right ventricular enlargement. Systolic   function is moderately reduced.    Left Atrium: Left atrium is moderately dilated.    Right Atrium:  Right atrium is mildly dilated.    Aortic Valve: The aortic valve is a trileaflet valve. Mildly calcified   cusps.    Mitral Valve: There is mild bileaflet sclerosis. Mildly thickened   leaflets. There is no stenosis. The mean pressure gradient across the   mitral valve is 3 mmHg at a heart rate of  bpm. There is severe   regurgitation with a posterolateral eccentriccally directed jet.    Tricuspid Valve: There is moderate to severe regurgitation with a   centrally directed jet.    Pulmonary Artery: The estimated pulmonary artery systolic pressure is   65 mmHg.    IVC/SVC: Elevated venous pressure at 15 mmHg.    Pericardium: There is a trivial effusion.

## 2024-02-22 NOTE — PROGRESS NOTES
[facility] Hospital Medicine  Progress Note    Patient Name: Karin Carrera  MRN: [id number]  Patient Class: IP- Inpatient   Admission Date: 2/19/2024  Length of Stay: 3 days  Attending Physician: Martita Dawn DO  Primary Care Provider: Walker Smith MD        Subjective:     Principal Problem:Acute on chronic systolic congestive heart failure        HPI:  53 year old male with an extensive PMH presents with LE swelling and SOB.  He does not use O2 at home.  He is a poor historian from a stroke and is unable to articulate.  But he was able to tell me he was in pain and wanted pain meds.      Overview/Hospital Course:  53 year old male with an extensive PMH presents with LE swelling and SOB. LE swelling improved but he states he does not feel well today; he is having chest pain and SOB.  Patient denies nausea, vomiting, or diarrhea.          Objective:     Vital Signs (Most Recent):  Temp: 97.4 °F (36.3 °C) (02/22/24 1050)  Pulse: 96 (02/22/24 1050)  Resp: 20 (02/22/24 1050)  BP: 116/65 (02/22/24 1050)  SpO2: 97 % (02/22/24 1050) Vital Signs (24h Range):  Temp:  [97.4 °F (36.3 °C)-97.7 °F (36.5 °C)] 97.4 °F (36.3 °C)  Pulse:  [83-96] 96  Resp:  [16-20] 20  SpO2:  [95 %-99 %] 97 %  BP: (103-120)/(65-73) 116/65       PHYSICAL EXAM:    GEN: NAD; alert and oriented x 3  CVS: regular rate and rhythm; no murmurs  RESP: clear to auscultation bilaterally; no rhonchi, rales, or wheezes noted  GI: soft, non-tender, non-distended; + bowel sounds  EXTR: no clubbing, cyanosis, or edema      Assessment/Plan:      * Acute on chronic systolic congestive heart failure  On Lasix IV and off Dobutamine; n Entresto;  Echo shows ejection fraction of 20 - 25% with grade III diastolic dysfunction. Cardiology to perform RHC today.  He has a poor prognosis due to non-compliance and lack of understanding of the severity of his medical conditions.    Acute hypoxemic respiratory failure  On Lasix; He is  on O2 and does not use O2 at home.  On nebs.  Negative COVID; normal Troponin; will check CXR    Coronary artery disease involving coronary bypass graft of native heart without angina pectoris  Continue home meds    Elevated LFTs  From congestion/CHF; improved LFT's      Hypokalemia  Resolved    Hypocalcemia  Will check corrected Ca level and supplement prn    Dysuria  UA does not show signs of infection      Cough  On Tessalon Perles; on nebs      Diabetes mellitus  On SSI and monitor BS levels; BS stable    History of CVA (cerebrovascular accident)  Has right sided paralysis; no acute issues              Martita Dawn DO  Department of Hospital Medicine   [facility]

## 2024-02-23 LAB
AMPHET UR QL SCN: NEGATIVE
BARBITURATES UR QL SCN: NEGATIVE
BENZODIAZ METAB UR QL SCN: NEGATIVE
CANNABINOIDS UR QL SCN: NEGATIVE
COCAINE UR QL SCN: NEGATIVE
GLUCOSE SERPL-MCNC: 115 MG/DL (ref 70–105)
GLUCOSE SERPL-MCNC: 131 MG/DL (ref 70–105)
GLUCOSE SERPL-MCNC: 136 MG/DL (ref 70–105)
GLUCOSE SERPL-MCNC: 179 MG/DL (ref 70–105)
OPIATES UR QL SCN: POSITIVE
PCP UR QL SCN: NEGATIVE

## 2024-02-23 PROCEDURE — 27000221 HC OXYGEN, UP TO 24 HOURS

## 2024-02-23 PROCEDURE — 25000003 PHARM REV CODE 250: Performed by: HOSPITALIST

## 2024-02-23 PROCEDURE — 99900035 HC TECH TIME PER 15 MIN (STAT)

## 2024-02-23 PROCEDURE — 63600175 PHARM REV CODE 636 W HCPCS: Performed by: HOSPITALIST

## 2024-02-23 PROCEDURE — 94761 N-INVAS EAR/PLS OXIMETRY MLT: CPT

## 2024-02-23 PROCEDURE — 94640 AIRWAY INHALATION TREATMENT: CPT

## 2024-02-23 PROCEDURE — 25000242 PHARM REV CODE 250 ALT 637 W/ HCPCS: Performed by: HOSPITALIST

## 2024-02-23 PROCEDURE — 99233 SBSQ HOSP IP/OBS HIGH 50: CPT | Mod: ,,, | Performed by: NURSE PRACTITIONER

## 2024-02-23 PROCEDURE — 25000242 PHARM REV CODE 250 ALT 637 W/ HCPCS: Performed by: STUDENT IN AN ORGANIZED HEALTH CARE EDUCATION/TRAINING PROGRAM

## 2024-02-23 PROCEDURE — 25000003 PHARM REV CODE 250: Performed by: STUDENT IN AN ORGANIZED HEALTH CARE EDUCATION/TRAINING PROGRAM

## 2024-02-23 PROCEDURE — 11000001 HC ACUTE MED/SURG PRIVATE ROOM

## 2024-02-23 PROCEDURE — 63600175 PHARM REV CODE 636 W HCPCS: Performed by: NURSE PRACTITIONER

## 2024-02-23 PROCEDURE — 82962 GLUCOSE BLOOD TEST: CPT

## 2024-02-23 PROCEDURE — 25000003 PHARM REV CODE 250: Performed by: NURSE PRACTITIONER

## 2024-02-23 PROCEDURE — 99232 SBSQ HOSP IP/OBS MODERATE 35: CPT | Mod: ,,, | Performed by: INTERNAL MEDICINE

## 2024-02-23 RX ORDER — FUROSEMIDE 10 MG/ML
80 INJECTION INTRAMUSCULAR; INTRAVENOUS
Status: DISCONTINUED | OUTPATIENT
Start: 2024-02-23 | End: 2024-02-25

## 2024-02-23 RX ORDER — FUROSEMIDE 10 MG/ML
40 INJECTION INTRAMUSCULAR; INTRAVENOUS ONCE
Status: COMPLETED | OUTPATIENT
Start: 2024-02-23 | End: 2024-02-23

## 2024-02-23 RX ADMIN — TRAMADOL HYDROCHLORIDE 50 MG: 50 TABLET, COATED ORAL at 05:02

## 2024-02-23 RX ADMIN — INSULIN ASPART 2 UNITS: 100 INJECTION, SOLUTION INTRAVENOUS; SUBCUTANEOUS at 12:02

## 2024-02-23 RX ADMIN — ASPIRIN 81 MG: 81 TABLET, COATED ORAL at 08:02

## 2024-02-23 RX ADMIN — SPIRONOLACTONE 25 MG: 25 TABLET ORAL at 08:02

## 2024-02-23 RX ADMIN — ONDANSETRON 8 MG: 2 INJECTION INTRAMUSCULAR; INTRAVENOUS at 10:02

## 2024-02-23 RX ADMIN — HEPARIN SODIUM 5000 UNITS: 5000 INJECTION, SOLUTION INTRAVENOUS; SUBCUTANEOUS at 09:02

## 2024-02-23 RX ADMIN — ZOLPIDEM TARTRATE 5 MG: 5 TABLET, COATED ORAL at 09:02

## 2024-02-23 RX ADMIN — FUROSEMIDE 80 MG: 10 INJECTION, SOLUTION INTRAVENOUS at 05:02

## 2024-02-23 RX ADMIN — TRAMADOL HYDROCHLORIDE 50 MG: 50 TABLET, COATED ORAL at 10:02

## 2024-02-23 RX ADMIN — PANTOPRAZOLE SODIUM 40 MG: 40 TABLET, DELAYED RELEASE ORAL at 08:02

## 2024-02-23 RX ADMIN — FUROSEMIDE 40 MG: 10 INJECTION, SOLUTION INTRAMUSCULAR; INTRAVENOUS at 12:02

## 2024-02-23 RX ADMIN — HEPARIN SODIUM 5000 UNITS: 5000 INJECTION, SOLUTION INTRAVENOUS; SUBCUTANEOUS at 05:02

## 2024-02-23 RX ADMIN — GUAIFENESIN AND CODEINE PHOSPHATE 5 ML: 100; 10 SOLUTION ORAL at 09:02

## 2024-02-23 RX ADMIN — IPRATROPIUM BROMIDE 0.5 MG: 0.5 SOLUTION RESPIRATORY (INHALATION) at 07:02

## 2024-02-23 RX ADMIN — ATORVASTATIN CALCIUM 40 MG: 40 TABLET, FILM COATED ORAL at 09:02

## 2024-02-23 RX ADMIN — SACUBITRIL AND VALSARTAN 1 TABLET: 24; 26 TABLET, FILM COATED ORAL at 08:02

## 2024-02-23 RX ADMIN — SACUBITRIL AND VALSARTAN 1 TABLET: 24; 26 TABLET, FILM COATED ORAL at 09:02

## 2024-02-23 RX ADMIN — EMPAGLIFLOZIN 10 MG: 10 TABLET, FILM COATED ORAL at 08:02

## 2024-02-23 RX ADMIN — TRAMADOL HYDROCHLORIDE 50 MG: 50 TABLET, COATED ORAL at 02:02

## 2024-02-23 RX ADMIN — GUAIFENESIN 600 MG: 600 TABLET, EXTENDED RELEASE ORAL at 08:02

## 2024-02-23 RX ADMIN — FUROSEMIDE 60 MG: 10 INJECTION, SOLUTION INTRAMUSCULAR; INTRAVENOUS at 05:02

## 2024-02-23 RX ADMIN — IPRATROPIUM BROMIDE 0.5 MG: 0.5 SOLUTION RESPIRATORY (INHALATION) at 01:02

## 2024-02-23 RX ADMIN — HEPARIN SODIUM 5000 UNITS: 5000 INJECTION, SOLUTION INTRAVENOUS; SUBCUTANEOUS at 03:02

## 2024-02-23 RX ADMIN — INSULIN DETEMIR 10 UNITS: 100 INJECTION, SOLUTION SUBCUTANEOUS at 09:02

## 2024-02-23 NOTE — SUBJECTIVE & OBJECTIVE
Interval History: Patient seen today, continues to have JVD, orthopnea and LE edema. No input documented and no daily weights. CXR yesterday with increasing right pleural effusion. Will increase lasix to 80mg BID, 1500ml fluid restriction, and strict I & O and daily weights. Continue diuresis over the weekend.    Review of Systems   Constitutional: Negative for diaphoresis and fever.   Cardiovascular:  Positive for dyspnea on exertion, leg swelling, orthopnea and paroxysmal nocturnal dyspnea. Negative for chest pain, claudication, cyanosis, irregular heartbeat, near-syncope, palpitations and syncope.   Respiratory:  Positive for shortness of breath.    Musculoskeletal:  Positive for back pain.   Genitourinary:  Positive for dysuria.   All other systems reviewed and are negative.    Objective:     Vital Signs (Most Recent):  Temp: 98.2 °F (36.8 °C) (02/23/24 1021)  Pulse: 100 (02/23/24 1021)  Resp: (!) 24 (02/23/24 1021)  BP: 100/65 (02/23/24 1021)  SpO2: 100 % (02/23/24 1021) Vital Signs (24h Range):  Temp:  [97.4 °F (36.3 °C)-98.2 °F (36.8 °C)] 98.2 °F (36.8 °C)  Pulse:  [] 100  Resp:  [16-24] 24  SpO2:  [89 %-100 %] 100 %  BP: (100-120)/(64-78) 100/65     Weight: 91.2 kg (201 lb)  Body mass index is 28.03 kg/m².     SpO2: 100 %         Intake/Output Summary (Last 24 hours) at 2/23/2024 1208  Last data filed at 2/23/2024 0844  Gross per 24 hour   Intake 700 ml   Output 950 ml   Net -250 ml       Lines/Drains/Airways       Peripheral Intravenous Line  Duration                  Peripheral IV - Single Lumen 02/23/24 1019 22 G Left;Posterior Hand <1 day                       Physical Exam  Vitals reviewed.   Constitutional:       General: He is not in acute distress.  Neck:      Vascular: JVD present.   Cardiovascular:      Rate and Rhythm: Normal rate and regular rhythm.      Heart sounds: Normal heart sounds.   Pulmonary:      Effort: Pulmonary effort is normal.      Breath sounds: No rales.   Abdominal:       "General: Abdomen is flat.      Palpations: Abdomen is soft.   Musculoskeletal:      Right lower leg: Edema present.      Left lower leg: Edema present.   Skin:     General: Skin is warm and dry.   Neurological:      Mental Status: He is alert. Mental status is at baseline.            Significant Labs: ABG: No results for input(s): "PH", "PCO2", "HCO3", "POCSATURATED", "BE" in the last 48 hours., Blood Culture: No results for input(s): "LABBLOO" in the last 48 hours., BMP:   Recent Labs   Lab 02/22/24  0254 02/22/24  1800   * 138*   * 132*   K 3.6 3.5   CL 96* 97*   CO2 29 28   BUN 22* 17   CREATININE 0.96 0.77   CALCIUM 7.9* 8.2*   , CMP   Recent Labs   Lab 02/22/24  0254 02/22/24  1800   * 132*   K 3.6 3.5   CL 96* 97*   CO2 29 28   * 138*   BUN 22* 17   CREATININE 0.96 0.77   CALCIUM 7.9* 8.2*   PROT  --  6.8   ALBUMIN  --  3.1*   BILITOT  --  0.9   ALKPHOS  --  154*   AST  --  117*   ALT  --  194*   ANIONGAP 11 11   , CBC No results for input(s): "WBC", "HGB", "HCT", "PLT" in the last 48 hours., INR No results for input(s): "INR", "PROTIME" in the last 48 hours., Lipid Panel No results for input(s): "CHOL", "HDL", "LDLCALC", "TRIG", "CHOLHDL" in the last 48 hours., and Troponin No results for input(s): "TROPONINI" in the last 48 hours.    Significant Imaging: Cardiac Cath: No results found for this or any previous visit. , Echocardiogram: Transthoracic echo (TTE) complete (Cupid Only):   Results for orders placed or performed during the hospital encounter of 02/19/24   Echo   Result Value Ref Range    BSA 2.14 m2    LVOT stroke volume 43.67 cm3    LVIDd 7.30 (A) 3.5 - 6.0 cm    LV Systolic Volume 232.15 mL    LV Systolic Volume Index 110.0 mL/m2    LVIDs 6.71 (A) 2.1 - 4.0 cm    LV Diastolic Volume 280.84 mL    LV Diastolic Volume Index 133.10 mL/m2    IVS 0.79 0.6 - 1.1 cm    LVOT diameter 2.50 cm    LVOT area 4.9 cm2    FS 8 (A) 28 - 44 %    Left Ventricle Relative Wall Thickness 0.23 " cm    Posterior Wall 0.85 0.6 - 1.1 cm    LV mass 271.42 g    LV Mass Index 129 g/m2    MV Peak E Rohit 1.11 m/s    TDI LATERAL 0.12 m/s    TDI SEPTAL 0.07 m/s    E/E' ratio 11.68 m/s    MV Peak A Rohit 0.40 m/s    TR Max Rohit 3.53 m/s    E/A ratio 2.78     E wave deceleration time 150.05 msec    LV SEPTAL E/E' RATIO 15.86 m/s    LV LATERAL E/E' RATIO 9.25 m/s    LVOT peak rohit 0.59 m/s    Left Ventricular Outflow Tract Mean Velocity 0.44 cm/s    Left Ventricular Outflow Tract Mean Gradient 0.83 mmHg    RVDD 5.16 cm    TAPSE 1.10 cm    LA size 5.48 cm    Left Atrium Major Axis 6.90 cm    RA Major Axis 5.02 cm    AV mean gradient 2 mmHg    AV peak gradient 2 mmHg    Ao peak rohit 0.77 m/s    Ao VTI 11.20 cm    LVOT peak VTI 8.90 cm    AV valve area 3.90 cm²    AV Velocity Ratio 0.77     AV index (prosthetic) 0.79     DESMOND by Velocity Ratio 3.76 cm²    Mr max rohit 4.28 m/s    MV mean gradient 3 mmHg    MV peak gradient 8 mmHg    MV stenosis pressure 1/2 time 57.19 ms    MV valve area p 1/2 method 3.85 cm2    MV valve area by continuity eq 1.36 cm2    MV VTI 32.0 cm    Triscuspid Valve Regurgitation Peak Gradient 50 mmHg    PV PEAK VELOCITY 0.54 m/s    PV peak gradient 1 mmHg    Ao root annulus 2.00 cm    IVC diameter 2.38 cm    Mean e' 0.10 m/s    ZLVIDS 4.01     ZLVIDD 0.99     AORTIC VALVE CUSP SEPERATION 2.17 cm    TV resting pulmonary artery pressure 65 mmHg    RV TB RVSP 19 mmHg    Est. RA pres 15 mmHg    Vásquez's Biplane MOD Ejection Fraction 22 %    Narrative      Left Ventricle: The left ventricle is severely dilated. Normal wall   thickness. There is eccentric hypertrophy. There is severely reduced   systolic function with a visually estimated ejection fraction of 20 - 25%.   Grade III diastolic dysfunction.    Right Ventricle: Moderate right ventricular enlargement. Systolic   function is moderately reduced.    Left Atrium: Left atrium is moderately dilated.    Right Atrium: Right atrium is mildly dilated.    Aortic  Valve: The aortic valve is a trileaflet valve. Mildly calcified   cusps.    Mitral Valve: There is mild bileaflet sclerosis. Mildly thickened   leaflets. There is no stenosis. The mean pressure gradient across the   mitral valve is 3 mmHg at a heart rate of  bpm. There is severe   regurgitation with a posterolateral eccentriccally directed jet.    Tricuspid Valve: There is moderate to severe regurgitation with a   centrally directed jet.    Pulmonary Artery: The estimated pulmonary artery systolic pressure is   65 mmHg.    IVC/SVC: Elevated venous pressure at 15 mmHg.    Pericardium: There is a trivial effusion.     , and X-Ray: CXR: X-Ray Chest 1 View (CXR):   Results for orders placed or performed during the hospital encounter of 02/19/24   X-Ray Chest 1 View    Narrative    EXAMINATION:  XR CHEST 1 VIEW    CLINICAL HISTORY:  Shortness of breath    TECHNIQUE:  XR CHEST 1 VIEW    COMPARISON:  2/8/24    FINDINGS:  No lines or tubes.    Diffuse interstitial and patchy airspace opacities scattered throughout the lungs have increased from comparison.  Findings suggesting worsening pulmonary edema.    Normal pleura.    Cardiac silhouette is similar to comparison exam.    No obvious acute bone findings.      Impression    Diffuse interstitial and patchy airspace opacities scattered throughout the lungs have increased from comparison. Findings suggesting worsening pulmonary edema.      Electronically signed by: Pavan Carmichael  Date:    02/19/2024  Time:    14:42

## 2024-02-23 NOTE — PROGRESS NOTES
[facility]  Cardiology  Progress Note    Patient Name: Karin Carrera  MRN: [id number]  Admission Date: 2/19/2024  Hospital Length of Stay: 4 days  Code Status: Full Code   Attending Physician: Walker Smith MD   Primary Care Physician: Walker Smith MD  Expected Discharge Date:   Principal Problem:Acute on chronic systolic congestive heart failure    Subjective:     Hospital Course:   No notes on file    Interval History: Patient seen today, continues to have JVD, orthopnea and LE edema. No input documented and no daily weights. CXR yesterday with increasing right pleural effusion. Will increase lasix to 80mg BID, 1500ml fluid restriction, and strict I & O and daily weights. Continue diuresis over the weekend.    Review of Systems   Constitutional: Negative for diaphoresis and fever.   Cardiovascular:  Positive for dyspnea on exertion, leg swelling, orthopnea and paroxysmal nocturnal dyspnea. Negative for chest pain, claudication, cyanosis, irregular heartbeat, near-syncope, palpitations and syncope.   Respiratory:  Positive for shortness of breath.    Musculoskeletal:  Positive for back pain.   Genitourinary:  Positive for dysuria.   All other systems reviewed and are negative.    Objective:     Vital Signs (Most Recent):  Temp: 98.2 °F (36.8 °C) (02/23/24 1021)  Pulse: 100 (02/23/24 1021)  Resp: (!) 24 (02/23/24 1021)  BP: 100/65 (02/23/24 1021)  SpO2: 100 % (02/23/24 1021) Vital Signs (24h Range):  Temp:  [97.4 °F (36.3 °C)-98.2 °F (36.8 °C)] 98.2 °F (36.8 °C)  Pulse:  [] 100  Resp:  [16-24] 24  SpO2:  [89 %-100 %] 100 %  BP: (100-120)/(64-78) 100/65     Weight: 91.2 kg (201 lb)  Body mass index is 28.03 kg/m².     SpO2: 100 %         Intake/Output Summary (Last 24 hours) at 2/23/2024 1208  Last data filed at 2/23/2024 0844  Gross per 24 hour   Intake 700 ml   Output 950 ml   Net -250 ml       Lines/Drains/Airways       Peripheral Intravenous Line  Duration           "        Peripheral IV - Single Lumen 02/23/24 1019 22 G Left;Posterior Hand <1 day                       Physical Exam  Vitals reviewed.   Constitutional:       General: He is not in acute distress.  Neck:      Vascular: JVD present.   Cardiovascular:      Rate and Rhythm: Normal rate and regular rhythm.      Heart sounds: Normal heart sounds.   Pulmonary:      Effort: Pulmonary effort is normal.      Breath sounds: No rales.   Abdominal:      General: Abdomen is flat.      Palpations: Abdomen is soft.   Musculoskeletal:      Right lower leg: Edema present.      Left lower leg: Edema present.   Skin:     General: Skin is warm and dry.   Neurological:      Mental Status: He is alert. Mental status is at baseline.            Significant Labs: ABG: No results for input(s): "PH", "PCO2", "HCO3", "POCSATURATED", "BE" in the last 48 hours., Blood Culture: No results for input(s): "LABBLOO" in the last 48 hours., BMP:   Recent Labs   Lab 02/22/24  0254 02/22/24  1800   * 138*   * 132*   K 3.6 3.5   CL 96* 97*   CO2 29 28   BUN 22* 17   CREATININE 0.96 0.77   CALCIUM 7.9* 8.2*   , CMP   Recent Labs   Lab 02/22/24  0254 02/22/24  1800   * 132*   K 3.6 3.5   CL 96* 97*   CO2 29 28   * 138*   BUN 22* 17   CREATININE 0.96 0.77   CALCIUM 7.9* 8.2*   PROT  --  6.8   ALBUMIN  --  3.1*   BILITOT  --  0.9   ALKPHOS  --  154*   AST  --  117*   ALT  --  194*   ANIONGAP 11 11   , CBC No results for input(s): "WBC", "HGB", "HCT", "PLT" in the last 48 hours., INR No results for input(s): "INR", "PROTIME" in the last 48 hours., Lipid Panel No results for input(s): "CHOL", "HDL", "LDLCALC", "TRIG", "CHOLHDL" in the last 48 hours., and Troponin No results for input(s): "TROPONINI" in the last 48 hours.    Significant Imaging: Cardiac Cath: No results found for this or any previous visit. , Echocardiogram: Transthoracic echo (TTE) complete (Cupid Only):   Results for orders placed or performed during the hospital " encounter of 02/19/24   Echo   Result Value Ref Range    BSA 2.14 m2    LVOT stroke volume 43.67 cm3    LVIDd 7.30 (A) 3.5 - 6.0 cm    LV Systolic Volume 232.15 mL    LV Systolic Volume Index 110.0 mL/m2    LVIDs 6.71 (A) 2.1 - 4.0 cm    LV Diastolic Volume 280.84 mL    LV Diastolic Volume Index 133.10 mL/m2    IVS 0.79 0.6 - 1.1 cm    LVOT diameter 2.50 cm    LVOT area 4.9 cm2    FS 8 (A) 28 - 44 %    Left Ventricle Relative Wall Thickness 0.23 cm    Posterior Wall 0.85 0.6 - 1.1 cm    LV mass 271.42 g    LV Mass Index 129 g/m2    MV Peak E Rohit 1.11 m/s    TDI LATERAL 0.12 m/s    TDI SEPTAL 0.07 m/s    E/E' ratio 11.68 m/s    MV Peak A Rohit 0.40 m/s    TR Max Rohit 3.53 m/s    E/A ratio 2.78     E wave deceleration time 150.05 msec    LV SEPTAL E/E' RATIO 15.86 m/s    LV LATERAL E/E' RATIO 9.25 m/s    LVOT peak rhoit 0.59 m/s    Left Ventricular Outflow Tract Mean Velocity 0.44 cm/s    Left Ventricular Outflow Tract Mean Gradient 0.83 mmHg    RVDD 5.16 cm    TAPSE 1.10 cm    LA size 5.48 cm    Left Atrium Major Axis 6.90 cm    RA Major Axis 5.02 cm    AV mean gradient 2 mmHg    AV peak gradient 2 mmHg    Ao peak rohit 0.77 m/s    Ao VTI 11.20 cm    LVOT peak VTI 8.90 cm    AV valve area 3.90 cm²    AV Velocity Ratio 0.77     AV index (prosthetic) 0.79     DESMOND by Velocity Ratio 3.76 cm²    Mr max rohit 4.28 m/s    MV mean gradient 3 mmHg    MV peak gradient 8 mmHg    MV stenosis pressure 1/2 time 57.19 ms    MV valve area p 1/2 method 3.85 cm2    MV valve area by continuity eq 1.36 cm2    MV VTI 32.0 cm    Triscuspid Valve Regurgitation Peak Gradient 50 mmHg    PV PEAK VELOCITY 0.54 m/s    PV peak gradient 1 mmHg    Ao root annulus 2.00 cm    IVC diameter 2.38 cm    Mean e' 0.10 m/s    ZLVIDS 4.01     ZLVIDD 0.99     AORTIC VALVE CUSP SEPERATION 2.17 cm    TV resting pulmonary artery pressure 65 mmHg    RV TB RVSP 19 mmHg    Est. RA pres 15 mmHg    Vásquez's Biplane MOD Ejection Fraction 22 %    Narrative      Left  Ventricle: The left ventricle is severely dilated. Normal wall   thickness. There is eccentric hypertrophy. There is severely reduced   systolic function with a visually estimated ejection fraction of 20 - 25%.   Grade III diastolic dysfunction.    Right Ventricle: Moderate right ventricular enlargement. Systolic   function is moderately reduced.    Left Atrium: Left atrium is moderately dilated.    Right Atrium: Right atrium is mildly dilated.    Aortic Valve: The aortic valve is a trileaflet valve. Mildly calcified   cusps.    Mitral Valve: There is mild bileaflet sclerosis. Mildly thickened   leaflets. There is no stenosis. The mean pressure gradient across the   mitral valve is 3 mmHg at a heart rate of  bpm. There is severe   regurgitation with a posterolateral eccentriccally directed jet.    Tricuspid Valve: There is moderate to severe regurgitation with a   centrally directed jet.    Pulmonary Artery: The estimated pulmonary artery systolic pressure is   65 mmHg.    IVC/SVC: Elevated venous pressure at 15 mmHg.    Pericardium: There is a trivial effusion.     , and X-Ray: CXR: X-Ray Chest 1 View (CXR):   Results for orders placed or performed during the hospital encounter of 02/19/24   X-Ray Chest 1 View    Narrative    EXAMINATION:  XR CHEST 1 VIEW    CLINICAL HISTORY:  Shortness of breath    TECHNIQUE:  XR CHEST 1 VIEW    COMPARISON:  2/8/24    FINDINGS:  No lines or tubes.    Diffuse interstitial and patchy airspace opacities scattered throughout the lungs have increased from comparison.  Findings suggesting worsening pulmonary edema.    Normal pleura.    Cardiac silhouette is similar to comparison exam.    No obvious acute bone findings.      Impression    Diffuse interstitial and patchy airspace opacities scattered throughout the lungs have increased from comparison. Findings suggesting worsening pulmonary edema.      Electronically signed by: Pavan Carmichael  Date:    02/19/2024  Time:    14:42  "    Assessment and Plan:     Brief HPI: 52 y.o. male with hx of CAD s/p CABG, ischemic cardiomyopathy (EF 20%), CVA, with expressive aphasia (able to write for communication) and right arm weakness,  HTN, GERD, ETOH abuse, and nicotine abuse who presented with SOB and admitted for acute decompensated HF with cardiogenic shock.    Patient reports few weeks of worsening SOB, orthopnea and sleeping in a recliner with PND. Also has bilateral leg swelling.   Patient admitted for IV diuresis.   Hospital course c/b runs of NSVT on tele    Today, He reports feeling better after IV diuresis. He was started on IV dobutamine 2.5mcg/kg/min for low cardiac output (elevated lactate and elevated LFTs).     * Acute on chronic systolic congestive heart failure  Ischemic cardiomyopathy; EF 20%, NYHA III , Stage D  Volume overloaded with low cardiac output - IV lasix 40mg bid, and iv dobutamine 2.5  GDMT - issues with compliance in the past - on lisinopril only  Devices; none -patient would likely qualify for ICD however is not taking GDMT    2/21/2024:  - Lactate normalized, liver enzymes and creatinine down trending; discontinued IV dobutamine  - Start Entresto 24-26 BID and spironolactone 25 daily   - Will consider adding SGLT2 prior to discharging if BP/creatinine stable  - No BB due to concern for low cardiac output  - Continue IV diuresis, possible discharge home tomorrow    2/22/2024:  - Patient seen and evaluated by Dr. Ceron  - Has been off dobutamine 24 hours, lactate 2.1 this morning and pt reports "feeling worse"; state up and down from bed 4 times last night  - Plan for RHC today. Procedure, risk, and benefits discussed in detail with patient, he understands and wishes to proceed. Consent obtained and on chart.  - Further recommendations to follow    2/23/2024:  - Patient seen and evaluated by Dr. Escobar  - RHC with elevated filling pressures and low normal cardiac output per Dr. Ceron (report not " available)  - Continued orthopnea, LE edema, +JVD, and worsening right pleural effusion on cxr yest  - Increase IV lasix to 80mg BID  - 1500ml fluid restriction ordered (pt also instructed)  - Strict I & Os and daily weights  - Continue diuresing     Dysuria  2/22/2024:  - Pt with c/o dysuria x 2 days and lower back pain  - UA ordered    2/23/2024:  - UA without signs of infection    Coronary artery disease involving coronary bypass graft of native heart without angina pectoris  Patient with known CAD s/p CABG, which is controlled Will continue ASA and Statin and monitor for S/Sx of angina/ACS. Continue to monitor on telemetry.         VTE Risk Mitigation (From admission, onward)           Ordered     heparin (porcine) injection 5,000 Units  Every 8 hours         02/19/24 2038     IP VTE HIGH RISK PATIENT  Once         02/19/24 2038     Place sequential compression device  Until discontinued         02/19/24 2038                    ISADORA Hendrickson  Cardiology  [facility]

## 2024-02-23 NOTE — PROGRESS NOTES
[facility] Hospital Medicine  Progress Note    Patient Name: Karin Carrera  MRN: [id number]  Patient Class: IP- Inpatient   Admission Date: 2/19/2024  Length of Stay: 4 days  Attending Physician: Walker Smith MD  Primary Care Provider: Walker Smith MD        Subjective:     Principal Problem:Acute on chronic systolic congestive heart failure        HPI:  53 year old male with an extensive PMH presents with LE swelling and SOB.  He does not use O2 at home.  He is a poor historian from a stroke and is unable to articulate.  But he was able to tell me he was in pain and wanted pain meds.      Overview/Hospital Course:  53 year old male with an extensive PMH presents with LE swelling and SOB. LE swelling improved but he states he does not feel well today; he is having chest pain and SOB.  Patient denies nausea, vomiting, or diarrhea.      No new subjective & objective note has been filed under this hospital service since the last note was generated.    02/23/2024 patient voiced no complaints lungs clear cardiovascular S1-S2 regular abdomen is soft positive bowel sounds nontender extremities + 3 pitting edema   Continue current medical management  Assessment/Plan:      * Acute on chronic systolic congestive heart failure  On Lasix IV and off Dobutamine; n Entresto;  Echo shows ejection fraction of 20 - 25% with grade III diastolic dysfunction. Cardiology to perform RHC today.  He has a poor prognosis due to non-compliance and lack of understanding of the severity of his medical conditions.    Hypocalcemia  Will check corrected Ca level and supplement prn    Elevated LFTs  From congestion/CHF; improved LFT's      Dysuria  UA does not show signs of infection      Cough  On Tessalon Perles; on nebs      Diabetes mellitus  On SSI and monitor BS levels; BS stable    History of CVA (cerebrovascular accident)  Has right sided paralysis; no acute issues      Acute hypoxemic respiratory  failure  On Lasix; He is on O2 and does not use O2 at home.  On nebs.  Negative COVID; normal Troponin; will check CXR    Hypokalemia  Resolved    Coronary artery disease involving coronary bypass graft of native heart without angina pectoris  Continue home meds            Walker Smith MD  Department of Hospital Medicine   [facility]

## 2024-02-23 NOTE — ASSESSMENT & PLAN NOTE
"Ischemic cardiomyopathy; EF 20%, NYHA III , Stage D  Volume overloaded with low cardiac output - IV lasix 40mg bid, and iv dobutamine 2.5  GDMT - issues with compliance in the past - on lisinopril only  Devices; none -patient would likely qualify for ICD however is not taking GDMT    2/21/2024:  - Lactate normalized, liver enzymes and creatinine down trending; discontinued IV dobutamine  - Start Entresto 24-26 BID and spironolactone 25 daily   - Will consider adding SGLT2 prior to discharging if BP/creatinine stable  - No BB due to concern for low cardiac output  - Continue IV diuresis, possible discharge home tomorrow    2/22/2024:  - Patient seen and evaluated by Dr. Ceron  - Has been off dobutamine 24 hours, lactate 2.1 this morning and pt reports "feeling worse"; state up and down from bed 4 times last night  - Plan for RHC today. Procedure, risk, and benefits discussed in detail with patient, he understands and wishes to proceed. Consent obtained and on chart.  - Further recommendations to follow    2/23/2024:  - Patient seen and evaluated by Dr. Escobar  - RHC with elevated filling pressures and low normal cardiac output per Dr. Ceron (report not available)  - Continued orthopnea, LE edema, +JVD, and worsening right pleural effusion on cxr yest  - Increase IV lasix to 80mg BID  - 1500ml fluid restriction ordered (pt also instructed)  - Strict I & Os and daily weights  - Continue diuresing   "

## 2024-02-23 NOTE — ASSESSMENT & PLAN NOTE
2/22/2024:  - Pt with c/o dysuria x 2 days and lower back pain  - UA ordered    2/23/2024:  - UA without signs of infection

## 2024-02-24 LAB
ANION GAP SERPL CALCULATED.3IONS-SCNC: 9 MMOL/L (ref 7–16)
BUN SERPL-MCNC: 10 MG/DL (ref 7–18)
BUN/CREAT SERPL: 11 (ref 6–20)
CALCIUM SERPL-MCNC: 8.5 MG/DL (ref 8.5–10.1)
CHLORIDE SERPL-SCNC: 90 MMOL/L (ref 98–107)
CO2 SERPL-SCNC: 29 MMOL/L (ref 21–32)
CREAT SERPL-MCNC: 0.91 MG/DL (ref 0.7–1.3)
EGFR (NO RACE VARIABLE) (RUSH/TITUS): 101 ML/MIN/1.73M2
GLUCOSE SERPL-MCNC: 120 MG/DL (ref 70–105)
GLUCOSE SERPL-MCNC: 131 MG/DL (ref 74–106)
GLUCOSE SERPL-MCNC: 164 MG/DL (ref 70–105)
GLUCOSE SERPL-MCNC: 222 MG/DL (ref 70–105)
GLUCOSE SERPL-MCNC: 250 MG/DL (ref 70–105)
POTASSIUM SERPL-SCNC: 3.2 MMOL/L (ref 3.5–5.1)
SODIUM SERPL-SCNC: 125 MMOL/L (ref 136–145)

## 2024-02-24 PROCEDURE — 99232 SBSQ HOSP IP/OBS MODERATE 35: CPT | Mod: ,,, | Performed by: INTERNAL MEDICINE

## 2024-02-24 PROCEDURE — 82962 GLUCOSE BLOOD TEST: CPT

## 2024-02-24 PROCEDURE — 94761 N-INVAS EAR/PLS OXIMETRY MLT: CPT

## 2024-02-24 PROCEDURE — 25000242 PHARM REV CODE 250 ALT 637 W/ HCPCS: Performed by: STUDENT IN AN ORGANIZED HEALTH CARE EDUCATION/TRAINING PROGRAM

## 2024-02-24 PROCEDURE — 27000221 HC OXYGEN, UP TO 24 HOURS

## 2024-02-24 PROCEDURE — 25000242 PHARM REV CODE 250 ALT 637 W/ HCPCS: Performed by: HOSPITALIST

## 2024-02-24 PROCEDURE — 99900035 HC TECH TIME PER 15 MIN (STAT)

## 2024-02-24 PROCEDURE — 25000003 PHARM REV CODE 250: Performed by: NURSE PRACTITIONER

## 2024-02-24 PROCEDURE — 11000001 HC ACUTE MED/SURG PRIVATE ROOM

## 2024-02-24 PROCEDURE — 63600175 PHARM REV CODE 636 W HCPCS: Performed by: NURSE PRACTITIONER

## 2024-02-24 PROCEDURE — 80048 BASIC METABOLIC PNL TOTAL CA: CPT | Performed by: NURSE PRACTITIONER

## 2024-02-24 PROCEDURE — 63600175 PHARM REV CODE 636 W HCPCS: Performed by: HOSPITALIST

## 2024-02-24 PROCEDURE — 25000003 PHARM REV CODE 250: Performed by: HOSPITALIST

## 2024-02-24 PROCEDURE — 25000003 PHARM REV CODE 250: Performed by: STUDENT IN AN ORGANIZED HEALTH CARE EDUCATION/TRAINING PROGRAM

## 2024-02-24 PROCEDURE — 94640 AIRWAY INHALATION TREATMENT: CPT

## 2024-02-24 RX ADMIN — GUAIFENESIN 600 MG: 600 TABLET, EXTENDED RELEASE ORAL at 09:02

## 2024-02-24 RX ADMIN — INSULIN ASPART 2 UNITS: 100 INJECTION, SOLUTION INTRAVENOUS; SUBCUTANEOUS at 06:02

## 2024-02-24 RX ADMIN — HEPARIN SODIUM 5000 UNITS: 5000 INJECTION, SOLUTION INTRAVENOUS; SUBCUTANEOUS at 06:02

## 2024-02-24 RX ADMIN — TRAMADOL HYDROCHLORIDE 50 MG: 50 TABLET, COATED ORAL at 09:02

## 2024-02-24 RX ADMIN — ZOLPIDEM TARTRATE 5 MG: 5 TABLET, COATED ORAL at 09:02

## 2024-02-24 RX ADMIN — FUROSEMIDE 80 MG: 10 INJECTION, SOLUTION INTRAVENOUS at 06:02

## 2024-02-24 RX ADMIN — IPRATROPIUM BROMIDE 0.5 MG: 0.5 SOLUTION RESPIRATORY (INHALATION) at 12:02

## 2024-02-24 RX ADMIN — SPIRONOLACTONE 25 MG: 25 TABLET ORAL at 09:02

## 2024-02-24 RX ADMIN — HEPARIN SODIUM 5000 UNITS: 5000 INJECTION, SOLUTION INTRAVENOUS; SUBCUTANEOUS at 03:02

## 2024-02-24 RX ADMIN — FUROSEMIDE 80 MG: 10 INJECTION, SOLUTION INTRAVENOUS at 04:02

## 2024-02-24 RX ADMIN — HEPARIN SODIUM 5000 UNITS: 5000 INJECTION, SOLUTION INTRAVENOUS; SUBCUTANEOUS at 09:02

## 2024-02-24 RX ADMIN — ONDANSETRON 8 MG: 2 INJECTION INTRAMUSCULAR; INTRAVENOUS at 09:02

## 2024-02-24 RX ADMIN — IPRATROPIUM BROMIDE 0.5 MG: 0.5 SOLUTION RESPIRATORY (INHALATION) at 07:02

## 2024-02-24 RX ADMIN — ASPIRIN 81 MG: 81 TABLET, COATED ORAL at 09:02

## 2024-02-24 RX ADMIN — INSULIN DETEMIR 10 UNITS: 100 INJECTION, SOLUTION SUBCUTANEOUS at 09:02

## 2024-02-24 RX ADMIN — TRAMADOL HYDROCHLORIDE 50 MG: 50 TABLET, COATED ORAL at 05:02

## 2024-02-24 RX ADMIN — SACUBITRIL AND VALSARTAN 1 TABLET: 24; 26 TABLET, FILM COATED ORAL at 09:02

## 2024-02-24 RX ADMIN — INSULIN ASPART 4 UNITS: 100 INJECTION, SOLUTION INTRAVENOUS; SUBCUTANEOUS at 06:02

## 2024-02-24 RX ADMIN — ATORVASTATIN CALCIUM 40 MG: 40 TABLET, FILM COATED ORAL at 09:02

## 2024-02-24 RX ADMIN — EMPAGLIFLOZIN 10 MG: 10 TABLET, FILM COATED ORAL at 09:02

## 2024-02-24 RX ADMIN — PANTOPRAZOLE SODIUM 40 MG: 40 TABLET, DELAYED RELEASE ORAL at 09:02

## 2024-02-24 NOTE — PROGRESS NOTES
Feeling better today.      Physical examination:    Regular rate and rhythm    Lungs clear    Abdomen soft, nontender not distended.      Extremities:  2+ edema is now 1+ edema, much better.      Laboratory:  Kidneys are tolerating diuresis well.      Impression:  Ischemic cardiomyopathy with decompensated congestive heart failure, diuresing well.      Plan:     Continue diuresis.  Consider replacement of potassium.

## 2024-02-24 NOTE — PLAN OF CARE
Problem: Adult Inpatient Plan of Care  Goal: Absence of Hospital-Acquired Illness or Injury  Outcome: Ongoing, Progressing     Problem: Adult Inpatient Plan of Care  Goal: Optimal Comfort and Wellbeing  Outcome: Ongoing, Progressing     Problem: Diabetes Comorbidity  Goal: Blood Glucose Level Within Targeted Range  Outcome: Ongoing, Progressing     Problem: Fluid and Electrolyte Imbalance (Acute Kidney Injury/Impairment)  Goal: Fluid and Electrolyte Balance  Outcome: Ongoing, Progressing     Problem: Skin Injury Risk Increased  Goal: Skin Health and Integrity  Outcome: Ongoing, Progressing     Problem: Fall Injury Risk  Goal: Absence of Fall and Fall-Related Injury  Outcome: Ongoing, Progressing

## 2024-02-24 NOTE — PLAN OF CARE
Problem: Adult Inpatient Plan of Care  Goal: Absence of Hospital-Acquired Illness or Injury  Outcome: Ongoing, Progressing     Problem: Adult Inpatient Plan of Care  Goal: Optimal Comfort and Wellbeing  Outcome: Ongoing, Progressing     Problem: Diabetes Comorbidity  Goal: Blood Glucose Level Within Targeted Range  Outcome: Ongoing, Progressing     Problem: Fluid and Electrolyte Imbalance (Acute Kidney Injury/Impairment)  Goal: Fluid and Electrolyte Balance  Outcome: Ongoing, Progressing     Problem: Oral Intake Inadequate (Acute Kidney Injury/Impairment)  Goal: Optimal Nutrition Intake  Outcome: Ongoing, Progressing     Problem: Renal Function Impairment (Acute Kidney Injury/Impairment)  Goal: Effective Renal Function  Outcome: Ongoing, Progressing     Problem: Skin Injury Risk Increased  Goal: Skin Health and Integrity  Outcome: Ongoing, Progressing     Problem: Fall Injury Risk  Goal: Absence of Fall and Fall-Related Injury  Outcome: Ongoing, Progressing

## 2024-02-25 LAB
ANION GAP SERPL CALCULATED.3IONS-SCNC: 8 MMOL/L (ref 7–16)
BUN SERPL-MCNC: 11 MG/DL (ref 7–18)
BUN/CREAT SERPL: 14 (ref 6–20)
CALCIUM SERPL-MCNC: 8 MG/DL (ref 8.5–10.1)
CHLORIDE SERPL-SCNC: 89 MMOL/L (ref 98–107)
CO2 SERPL-SCNC: 30 MMOL/L (ref 21–32)
CREAT SERPL-MCNC: 0.78 MG/DL (ref 0.7–1.3)
EGFR (NO RACE VARIABLE) (RUSH/TITUS): 107 ML/MIN/1.73M2
GLUCOSE SERPL-MCNC: 129 MG/DL (ref 70–105)
GLUCOSE SERPL-MCNC: 132 MG/DL (ref 70–105)
GLUCOSE SERPL-MCNC: 134 MG/DL (ref 70–105)
GLUCOSE SERPL-MCNC: 144 MG/DL (ref 74–106)
GLUCOSE SERPL-MCNC: 177 MG/DL (ref 70–105)
GLUCOSE SERPL-MCNC: 295 MG/DL (ref 70–105)
GLUCOSE SERPL-MCNC: 387 MG/DL (ref 70–105)
POTASSIUM SERPL-SCNC: 3.2 MMOL/L (ref 3.5–5.1)
SODIUM SERPL-SCNC: 124 MMOL/L (ref 136–145)

## 2024-02-25 PROCEDURE — 99900035 HC TECH TIME PER 15 MIN (STAT)

## 2024-02-25 PROCEDURE — 11000001 HC ACUTE MED/SURG PRIVATE ROOM

## 2024-02-25 PROCEDURE — 25000003 PHARM REV CODE 250: Performed by: HOSPITALIST

## 2024-02-25 PROCEDURE — 80048 BASIC METABOLIC PNL TOTAL CA: CPT | Performed by: NURSE PRACTITIONER

## 2024-02-25 PROCEDURE — 63600175 PHARM REV CODE 636 W HCPCS: Performed by: NURSE PRACTITIONER

## 2024-02-25 PROCEDURE — 82962 GLUCOSE BLOOD TEST: CPT

## 2024-02-25 PROCEDURE — 25000003 PHARM REV CODE 250: Performed by: NURSE PRACTITIONER

## 2024-02-25 PROCEDURE — 25000003 PHARM REV CODE 250: Performed by: INTERNAL MEDICINE

## 2024-02-25 PROCEDURE — 25000003 PHARM REV CODE 250: Performed by: STUDENT IN AN ORGANIZED HEALTH CARE EDUCATION/TRAINING PROGRAM

## 2024-02-25 PROCEDURE — 94640 AIRWAY INHALATION TREATMENT: CPT

## 2024-02-25 PROCEDURE — 27000221 HC OXYGEN, UP TO 24 HOURS

## 2024-02-25 PROCEDURE — 63600175 PHARM REV CODE 636 W HCPCS: Performed by: HOSPITALIST

## 2024-02-25 PROCEDURE — 94761 N-INVAS EAR/PLS OXIMETRY MLT: CPT

## 2024-02-25 PROCEDURE — 25000242 PHARM REV CODE 250 ALT 637 W/ HCPCS: Performed by: STUDENT IN AN ORGANIZED HEALTH CARE EDUCATION/TRAINING PROGRAM

## 2024-02-25 PROCEDURE — 25000242 PHARM REV CODE 250 ALT 637 W/ HCPCS: Performed by: HOSPITALIST

## 2024-02-25 RX ORDER — POTASSIUM CHLORIDE 20 MEQ/1
40 TABLET, EXTENDED RELEASE ORAL 2 TIMES DAILY
Status: COMPLETED | OUTPATIENT
Start: 2024-02-25 | End: 2024-02-25

## 2024-02-25 RX ADMIN — HEPARIN SODIUM 5000 UNITS: 5000 INJECTION, SOLUTION INTRAVENOUS; SUBCUTANEOUS at 05:02

## 2024-02-25 RX ADMIN — SACUBITRIL AND VALSARTAN 1 TABLET: 24; 26 TABLET, FILM COATED ORAL at 11:02

## 2024-02-25 RX ADMIN — IPRATROPIUM BROMIDE 0.5 MG: 0.5 SOLUTION RESPIRATORY (INHALATION) at 07:02

## 2024-02-25 RX ADMIN — TRAMADOL HYDROCHLORIDE 50 MG: 50 TABLET, COATED ORAL at 02:02

## 2024-02-25 RX ADMIN — GUAIFENESIN 600 MG: 600 TABLET, EXTENDED RELEASE ORAL at 10:02

## 2024-02-25 RX ADMIN — POTASSIUM CHLORIDE 40 MEQ: 1500 TABLET, EXTENDED RELEASE ORAL at 10:02

## 2024-02-25 RX ADMIN — IPRATROPIUM BROMIDE 0.5 MG: 0.5 SOLUTION RESPIRATORY (INHALATION) at 08:02

## 2024-02-25 RX ADMIN — ATORVASTATIN CALCIUM 40 MG: 40 TABLET, FILM COATED ORAL at 10:02

## 2024-02-25 RX ADMIN — FUROSEMIDE 80 MG: 10 INJECTION, SOLUTION INTRAVENOUS at 05:02

## 2024-02-25 RX ADMIN — SACUBITRIL AND VALSARTAN 1 TABLET: 24; 26 TABLET, FILM COATED ORAL at 08:02

## 2024-02-25 RX ADMIN — ASPIRIN 81 MG: 81 TABLET, COATED ORAL at 08:02

## 2024-02-25 RX ADMIN — SODIUM CHLORIDE 300 ML: 9 INJECTION, SOLUTION INTRAVENOUS at 04:02

## 2024-02-25 RX ADMIN — INSULIN ASPART 6 UNITS: 100 INJECTION, SOLUTION INTRAVENOUS; SUBCUTANEOUS at 12:02

## 2024-02-25 RX ADMIN — GUAIFENESIN 600 MG: 600 TABLET, EXTENDED RELEASE ORAL at 08:02

## 2024-02-25 RX ADMIN — IPRATROPIUM BROMIDE 0.5 MG: 0.5 SOLUTION RESPIRATORY (INHALATION) at 03:02

## 2024-02-25 RX ADMIN — POTASSIUM CHLORIDE 40 MEQ: 1500 TABLET, EXTENDED RELEASE ORAL at 12:02

## 2024-02-25 RX ADMIN — INSULIN ASPART 2 UNITS: 100 INJECTION, SOLUTION INTRAVENOUS; SUBCUTANEOUS at 08:02

## 2024-02-25 RX ADMIN — HEPARIN SODIUM 5000 UNITS: 5000 INJECTION, SOLUTION INTRAVENOUS; SUBCUTANEOUS at 10:02

## 2024-02-25 RX ADMIN — ZOLPIDEM TARTRATE 5 MG: 5 TABLET, COATED ORAL at 10:02

## 2024-02-25 RX ADMIN — EMPAGLIFLOZIN 10 MG: 10 TABLET, FILM COATED ORAL at 08:02

## 2024-02-25 RX ADMIN — TRAMADOL HYDROCHLORIDE 50 MG: 50 TABLET, COATED ORAL at 10:02

## 2024-02-25 RX ADMIN — HEPARIN SODIUM 5000 UNITS: 5000 INJECTION, SOLUTION INTRAVENOUS; SUBCUTANEOUS at 02:02

## 2024-02-25 RX ADMIN — SPIRONOLACTONE 25 MG: 25 TABLET ORAL at 08:02

## 2024-02-25 RX ADMIN — INSULIN DETEMIR 10 UNITS: 100 INJECTION, SOLUTION SUBCUTANEOUS at 10:02

## 2024-02-25 RX ADMIN — PANTOPRAZOLE SODIUM 40 MG: 40 TABLET, DELAYED RELEASE ORAL at 08:02

## 2024-02-25 NOTE — PROGRESS NOTES
Patient feels fine today with the mouth of tobacco juice.  Had an episode of hypotension earlier and was given a bolus of IV fluid.  Blood pressure has come up nicely.    Physical examination:    Lower extremity edema is all but resolved.  All else unchanged.    Plan:     Patient seems to be compensated now for CHF.  If labs are okay might go home tomorrow.

## 2024-02-25 NOTE — PLAN OF CARE
Problem: Adult Inpatient Plan of Care  Goal: Absence of Hospital-Acquired Illness or Injury  Outcome: Ongoing, Progressing     Problem: Adult Inpatient Plan of Care  Goal: Optimal Comfort and Wellbeing  Outcome: Ongoing, Progressing     Problem: Diabetes Comorbidity  Goal: Blood Glucose Level Within Targeted Range  Outcome: Ongoing, Progressing     Problem: Fluid and Electrolyte Imbalance (Acute Kidney Injury/Impairment)  Goal: Fluid and Electrolyte Balance  Outcome: Ongoing, Progressing     Problem: Renal Function Impairment (Acute Kidney Injury/Impairment)  Goal: Effective Renal Function  Outcome: Ongoing, Progressing     Problem: Skin Injury Risk Increased  Goal: Skin Health and Integrity  Outcome: Ongoing, Progressing     Problem: Fall Injury Risk  Goal: Absence of Fall and Fall-Related Injury  Outcome: Ongoing, Progressing

## 2024-02-25 NOTE — PROGRESS NOTES
[facility] Hospital Medicine  Progress Note    Patient Name: Karin Carrera  MRN: [id number]  Patient Class: IP- Inpatient   Admission Date: 2/19/2024  Length of Stay: 5 days  Attending Physician: Walker Smith MD  Primary Care Provider: Walker Smith MD        Subjective:     Principal Problem:Acute on chronic systolic congestive heart failure        HPI:  53 year old male with an extensive PMH presents with LE swelling and SOB.  He does not use O2 at home.  He is a poor historian from a stroke and is unable to articulate.  But he was able to tell me he was in pain and wanted pain meds.      Overview/Hospital Course:  53 year old male with an extensive PMH presents with LE swelling and SOB. LE swelling improved but he states he does not feel well today; he is having chest pain and SOB.  Patient denies nausea, vomiting, or diarrhea.      No new subjective & objective note has been filed under this hospital service since the last note was generated.    02/23/2024 patient voiced no complaints lungs clear cardiovascular S1-S2 regular abdomen is soft positive bowel sounds nontender extremities + 3 pitting edema   Continue current medical management    02/24/2024   Patient did have evidence of V-tach last night   Plan consult Cardiology otherwise continue current medical management.  Assessment/Plan:      * Acute on chronic systolic congestive heart failure  On Lasix IV and off Dobutamine; n Entresto;  Echo shows ejection fraction of 20 - 25% with grade III diastolic dysfunction. Cardiology to perform RHC today.  He has a poor prognosis due to non-compliance and lack of understanding of the severity of his medical conditions.    Hypocalcemia  Will check corrected Ca level and supplement prn    Elevated LFTs  From congestion/CHF; improved LFT's      Dysuria  UA does not show signs of infection      Cough  On Tessalon Perles; on nebs      Diabetes mellitus  On SSI and monitor BS  levels; BS stable    History of CVA (cerebrovascular accident)  Has right sided paralysis; no acute issues      Acute hypoxemic respiratory failure  On Lasix; He is on O2 and does not use O2 at home.  On nebs.  Negative COVID; normal Troponin; will check CXR    Hypokalemia  Resolved    Coronary artery disease involving coronary bypass graft of native heart without angina pectoris  Continue home meds          Dr. Walker KOWALSKI MD  Department of Hospital Medicine   [facility]

## 2024-02-26 PROBLEM — E87.6 HYPOKALEMIA: Status: RESOLVED | Noted: 2022-01-05 | Resolved: 2024-02-26

## 2024-02-26 PROBLEM — R30.0 DYSURIA: Status: RESOLVED | Noted: 2024-01-03 | Resolved: 2024-02-26

## 2024-02-26 PROBLEM — E87.1 HYPONATREMIA: Status: ACTIVE | Noted: 2024-02-26

## 2024-02-26 PROBLEM — J90 PLEURAL EFFUSION: Status: ACTIVE | Noted: 2024-02-26

## 2024-02-26 PROBLEM — E83.51 HYPOCALCEMIA: Status: RESOLVED | Noted: 2024-02-22 | Resolved: 2024-02-26

## 2024-02-26 PROBLEM — R79.89 ELEVATED LFTS: Status: RESOLVED | Noted: 2024-02-19 | Resolved: 2024-02-26

## 2024-02-26 LAB
ALBUMIN SERPL BCP-MCNC: 2.9 G/DL (ref 3.5–5)
ALBUMIN/GLOB SERPL: 0.8 {RATIO}
ALP SERPL-CCNC: 113 U/L (ref 45–115)
ALT SERPL W P-5'-P-CCNC: 74 U/L (ref 16–61)
ANION GAP SERPL CALCULATED.3IONS-SCNC: 10 MMOL/L (ref 7–16)
AST SERPL W P-5'-P-CCNC: 29 U/L (ref 15–37)
BILIRUB SERPL-MCNC: 1 MG/DL (ref ?–1.2)
BUN SERPL-MCNC: 15 MG/DL (ref 7–18)
BUN/CREAT SERPL: 21 (ref 6–20)
CALCIUM SERPL-MCNC: 8.6 MG/DL (ref 8.5–10.1)
CHLORIDE SERPL-SCNC: 90 MMOL/L (ref 98–107)
CO2 SERPL-SCNC: 31 MMOL/L (ref 21–32)
CREAT SERPL-MCNC: 0.71 MG/DL (ref 0.7–1.3)
EGFR (NO RACE VARIABLE) (RUSH/TITUS): 110 ML/MIN/1.73M2
GLOBULIN SER-MCNC: 3.7 G/DL (ref 2–4)
GLUCOSE SERPL-MCNC: 107 MG/DL (ref 74–106)
GLUCOSE SERPL-MCNC: 114 MG/DL (ref 70–105)
GLUCOSE SERPL-MCNC: 123 MG/DL (ref 70–105)
GLUCOSE SERPL-MCNC: 128 MG/DL (ref 70–105)
GLUCOSE SERPL-MCNC: 141 MG/DL (ref 70–105)
GLUCOSE SERPL-MCNC: 155 MG/DL (ref 70–105)
GLUCOSE SERPL-MCNC: 229 MG/DL (ref 70–105)
POTASSIUM SERPL-SCNC: 4.3 MMOL/L (ref 3.5–5.1)
PROT SERPL-MCNC: 6.6 G/DL (ref 6.4–8.2)
SODIUM SERPL-SCNC: 127 MMOL/L (ref 136–145)

## 2024-02-26 PROCEDURE — 94761 N-INVAS EAR/PLS OXIMETRY MLT: CPT

## 2024-02-26 PROCEDURE — 27000221 HC OXYGEN, UP TO 24 HOURS

## 2024-02-26 PROCEDURE — 99232 SBSQ HOSP IP/OBS MODERATE 35: CPT | Mod: ,,, | Performed by: HOSPITALIST

## 2024-02-26 PROCEDURE — 25000003 PHARM REV CODE 250: Performed by: NURSE PRACTITIONER

## 2024-02-26 PROCEDURE — 25000003 PHARM REV CODE 250: Performed by: STUDENT IN AN ORGANIZED HEALTH CARE EDUCATION/TRAINING PROGRAM

## 2024-02-26 PROCEDURE — 99900035 HC TECH TIME PER 15 MIN (STAT)

## 2024-02-26 PROCEDURE — 11000001 HC ACUTE MED/SURG PRIVATE ROOM

## 2024-02-26 PROCEDURE — 63600175 PHARM REV CODE 636 W HCPCS: Performed by: HOSPITALIST

## 2024-02-26 PROCEDURE — 25000242 PHARM REV CODE 250 ALT 637 W/ HCPCS: Performed by: STUDENT IN AN ORGANIZED HEALTH CARE EDUCATION/TRAINING PROGRAM

## 2024-02-26 PROCEDURE — 80053 COMPREHEN METABOLIC PANEL: CPT | Performed by: NURSE PRACTITIONER

## 2024-02-26 PROCEDURE — 94640 AIRWAY INHALATION TREATMENT: CPT

## 2024-02-26 PROCEDURE — 25000242 PHARM REV CODE 250 ALT 637 W/ HCPCS: Performed by: HOSPITALIST

## 2024-02-26 PROCEDURE — 99232 SBSQ HOSP IP/OBS MODERATE 35: CPT | Mod: ,,, | Performed by: NURSE PRACTITIONER

## 2024-02-26 PROCEDURE — 82962 GLUCOSE BLOOD TEST: CPT

## 2024-02-26 PROCEDURE — 25000003 PHARM REV CODE 250: Performed by: HOSPITALIST

## 2024-02-26 RX ORDER — FUROSEMIDE 10 MG/ML
80 INJECTION INTRAMUSCULAR; INTRAVENOUS ONCE
Status: DISCONTINUED | OUTPATIENT
Start: 2024-02-26 | End: 2024-02-28 | Stop reason: HOSPADM

## 2024-02-26 RX ORDER — FUROSEMIDE 10 MG/ML
80 INJECTION INTRAMUSCULAR; INTRAVENOUS ONCE
Status: COMPLETED | OUTPATIENT
Start: 2024-02-27 | End: 2024-02-27

## 2024-02-26 RX ADMIN — SPIRONOLACTONE 25 MG: 25 TABLET ORAL at 08:02

## 2024-02-26 RX ADMIN — GUAIFENESIN 600 MG: 600 TABLET, EXTENDED RELEASE ORAL at 08:02

## 2024-02-26 RX ADMIN — IPRATROPIUM BROMIDE 0.5 MG: 0.5 SOLUTION RESPIRATORY (INHALATION) at 07:02

## 2024-02-26 RX ADMIN — EMPAGLIFLOZIN 10 MG: 10 TABLET, FILM COATED ORAL at 08:02

## 2024-02-26 RX ADMIN — TRAMADOL HYDROCHLORIDE 50 MG: 50 TABLET, COATED ORAL at 07:02

## 2024-02-26 RX ADMIN — HEPARIN SODIUM 5000 UNITS: 5000 INJECTION, SOLUTION INTRAVENOUS; SUBCUTANEOUS at 10:02

## 2024-02-26 RX ADMIN — IPRATROPIUM BROMIDE 0.5 MG: 0.5 SOLUTION RESPIRATORY (INHALATION) at 01:02

## 2024-02-26 RX ADMIN — HEPARIN SODIUM 5000 UNITS: 5000 INJECTION, SOLUTION INTRAVENOUS; SUBCUTANEOUS at 02:02

## 2024-02-26 RX ADMIN — INSULIN DETEMIR 10 UNITS: 100 INJECTION, SOLUTION SUBCUTANEOUS at 10:02

## 2024-02-26 RX ADMIN — HEPARIN SODIUM 5000 UNITS: 5000 INJECTION, SOLUTION INTRAVENOUS; SUBCUTANEOUS at 05:02

## 2024-02-26 RX ADMIN — ZOLPIDEM TARTRATE 5 MG: 5 TABLET, COATED ORAL at 10:02

## 2024-02-26 RX ADMIN — TRAMADOL HYDROCHLORIDE 50 MG: 50 TABLET, COATED ORAL at 04:02

## 2024-02-26 RX ADMIN — GUAIFENESIN AND CODEINE PHOSPHATE 5 ML: 100; 10 SOLUTION ORAL at 10:02

## 2024-02-26 RX ADMIN — ATORVASTATIN CALCIUM 40 MG: 40 TABLET, FILM COATED ORAL at 10:02

## 2024-02-26 RX ADMIN — PANTOPRAZOLE SODIUM 40 MG: 40 TABLET, DELAYED RELEASE ORAL at 08:02

## 2024-02-26 RX ADMIN — SACUBITRIL AND VALSARTAN 1 TABLET: 24; 26 TABLET, FILM COATED ORAL at 08:02

## 2024-02-26 RX ADMIN — ASPIRIN 81 MG: 81 TABLET, COATED ORAL at 08:02

## 2024-02-26 NOTE — ASSESSMENT & PLAN NOTE
"Ischemic cardiomyopathy; EF 20%, NYHA III , Stage D  Volume overloaded with low cardiac output - IV lasix 40mg bid, and iv dobutamine 2.5  GDMT - issues with compliance in the past - on lisinopril only  Devices; none -patient would likely qualify for ICD however is not taking GDMT    2/21/2024:  - Lactate normalized, liver enzymes and creatinine down trending; discontinued IV dobutamine  - Start Entresto 24-26 BID and spironolactone 25 daily   - Will consider adding SGLT2 prior to discharging if BP/creatinine stable  - No BB due to concern for low cardiac output  - Continue IV diuresis, possible discharge home tomorrow    2/22/2024:  - Patient seen and evaluated by Dr. Ceron  - Has been off dobutamine 24 hours, lactate 2.1 this morning and pt reports "feeling worse"; state up and down from bed 4 times last night  - Plan for RHC today. Procedure, risk, and benefits discussed in detail with patient, he understands and wishes to proceed. Consent obtained and on chart.  - Further recommendations to follow    2/23/2024:  - Patient seen and evaluated by Dr. Escobar  - RHC with elevated filling pressures and low normal cardiac output per Dr. Ceron (report not available)  - Continued orthopnea, LE edema, +JVD, and worsening right pleural effusion on cxr yest  - Increase IV lasix to 80mg BID  - 1500ml fluid restriction ordered (pt also instructed)  - Strict I & Os and daily weights  - Continue diuresing    Pt followed by VICENTE Escobar over the weekend    2/26/2024:  - Patient seen and evaluated by Dr. Pacheco  - Fluid status improving, however patient with symptomatic hypotension  - Discontinue Entresto and Farxiga, continue IV lasix   - Continue monitoring    "

## 2024-02-26 NOTE — ASSESSMENT & PLAN NOTE
On Entresto and Spironolactone;  s/p cardiac cath; Echo shows ejection fraction of 20 - 25% with grade III diastolic dysfunction. Improving

## 2024-02-26 NOTE — PROGRESS NOTES
[facility]  [facility] Medicine  Progress Note    Patient Name: Karin Carrera  MRN: [id number]  Patient Class: IP- Inpatient   Admission Date: 2/19/2024  Length of Stay: 7 days  Attending Physician: Martita Dawn DO  Primary Care Provider: Walker Smith MD        Subjective:     Principal Problem:Acute on chronic systolic congestive heart failure        HPI:  53 year old male with an extensive PMH presents with LE swelling and SOB.  He does not use O2 at home.  He is a poor historian from a stroke and is unable to articulate.  But he was able to tell me he was in pain and wanted pain meds.      Overview/Hospital Course:  53 year old male with an extensive PMH presents with LE swelling and SOB. LE swelling improved; feels ok today. Patient denies chest pain, shortness of breath, nausea, vomiting, or diarrhea.        Objective:     Vital Signs (Most Recent):  Temp: 97.5 °F (36.4 °C) (02/26/24 1026)  Pulse: 90 (02/26/24 1026)  Resp: 18 (02/26/24 1026)  BP: 104/69 (02/26/24 1026)  SpO2: 97 % (02/26/24 1026) Vital Signs (24h Range):  Temp:  [97.4 °F (36.3 °C)-98.1 °F (36.7 °C)] 97.5 °F (36.4 °C)  Pulse:  [83-97] 90  Resp:  [14-20] 18  SpO2:  [92 %-100 %] 97 %  BP: ()/(41-70) 104/69       PHYSICAL EXAM:    GEN: NAD; alert and oriented x 3  CVS: regular rate and rhythm; no murmurs  RESP: clear to auscultation bilaterally; no rhonchi, rales, or wheezes noted  GI: soft, non-tender, non-distended; + bowel sounds  EXTR: no clubbing, cyanosis, or edema      Assessment/Plan:      * Acute on chronic systolic congestive heart failure  On Entresto and Spironolactone;  s/p cardiac cath; Echo shows ejection fraction of 20 - 25% with grade III diastolic dysfunction. Improving    Acute hypoxemic respiratory failure  He is on O2 and does not use O2 at home. On nebs; due to CHF    Pleural effusion  From CHF; will check U/S to see if there is enough fluid for  thoracentesis    Hyponatremia  Due to diuresis; Na 127; will monitor    Coronary artery disease involving coronary bypass graft of native heart without angina pectoris  Continue home meds    Cough  On Tessalon Perles; on nebs      Diabetes mellitus  On Jardiance and Insulin; BS stable; will monitor BS levels    History of CVA (cerebrovascular accident)  Has right sided paralysis; no acute issues            Martita Dawn DO  Department of Hospital Medicine   [facility]

## 2024-02-26 NOTE — SUBJECTIVE & OBJECTIVE
Interval History:  Patient is awake alert distress he has been over diuresed this weekend blood pressure did drop to 75-80 systolic.  Otherwise he voiced no complaints will give a 3 cc bolus of today.    Review of Systems   Constitutional:  Negative for fatigue and fever.   Cardiovascular:  Negative for chest pain.   Psychiatric/Behavioral:  Negative for confusion.      Objective:     Vital Signs (Most Recent):  Temp: 97.8 °F (36.6 °C) (02/26/24 0659)  Pulse: 83 (02/26/24 0745)  Resp: 20 (02/26/24 0745)  BP: 105/70 (02/26/24 0659)  SpO2: 97 % (room air) (02/26/24 0745) Vital Signs (24h Range):  Temp:  [97.4 °F (36.3 °C)-98.1 °F (36.7 °C)] 97.8 °F (36.6 °C)  Pulse:  [83-97] 83  Resp:  [14-20] 20  SpO2:  [92 %-100 %] 97 %  BP: ()/(41-70) 105/70     Weight: 90.2 kg (198 lb 13.7 oz)  Body mass index is 27.73 kg/m².    Intake/Output Summary (Last 24 hours) at 2/26/2024 0840  Last data filed at 2/26/2024 0659  Gross per 24 hour   Intake --   Output 200 ml   Net -200 ml         Physical Exam  Vitals and nursing note reviewed.   Constitutional:       Appearance: Normal appearance.   Cardiovascular:      Rate and Rhythm: Normal rate and regular rhythm.      Pulses: Normal pulses.      Heart sounds: Normal heart sounds.   Pulmonary:      Effort: Pulmonary effort is normal.      Breath sounds: Normal breath sounds.   Abdominal:      General: Abdomen is flat. Bowel sounds are normal.      Palpations: Abdomen is soft.   Musculoskeletal:         General: Normal range of motion.   Skin:     General: Skin is warm and dry.   Neurological:      General: No focal deficit present.      Mental Status: He is alert and oriented to person, place, and time. Mental status is at baseline.             Significant Labs: All pertinent labs within the past 24 hours have been reviewed.  BMP:   Recent Labs   Lab 02/26/24  0648   *   *   K 4.3   CL 90*   CO2 31   BUN 15   CREATININE 0.71   CALCIUM 8.6     CBC: No results for  "input(s): "WBC", "HGB", "HCT", "PLT" in the last 48 hours.    Significant Imaging: I have reviewed all pertinent imaging results/findings within the past 24 hours.  "

## 2024-02-26 NOTE — ASSESSMENT & PLAN NOTE
On Lasix IV and off Dobutamine; n Entresto;  Echo shows ejection fraction of 20 - 25% with grade III diastolic dysfunction. Cardiology to perform RHC today.  He has a poor prognosis due to non-compliance and lack of understanding of the severity of his medical conditions.  Patient condition is.  Improving

## 2024-02-26 NOTE — PROGRESS NOTES
[facility]  [facility] Medicine  Progress Note    Patient Name: Karin Carrera  MRN: [id number]  Patient Class: IP- Inpatient   Admission Date: 2/19/2024  Length of Stay: 7 days  Attending Physician: Martita Dawn DO  Primary Care Provider: Walker Smith MD        Subjective:     Principal Problem:Acute on chronic systolic congestive heart failure  PATIENT SEEN AND EVALUATED ON     02/25/2024      HPI:  53 year old male with an extensive PMH presents with LE swelling and SOB.  He does not use O2 at home.  He is a poor historian from a stroke and is unable to articulate.  But he was able to tell me he was in pain and wanted pain meds.      Overview/Hospital Course:  53 year old male with an extensive PMH presents with LE swelling and SOB. LE swelling improved but he states he does not feel well today; he is having chest pain and SOB.  Patient denies nausea, vomiting, or diarrhea.      Interval History:  Patient is awake alert distress he has been over diuresed this weekend blood pressure did drop to 75-80 systolic.  Otherwise he voiced no complaints will give a 3 cc bolus of today.    Review of Systems   Constitutional:  Negative for fatigue and fever.   Cardiovascular:  Negative for chest pain.   Psychiatric/Behavioral:  Negative for confusion.      Objective:     Vital Signs (Most Recent):  Temp: 97.8 °F (36.6 °C) (02/26/24 0659)  Pulse: 83 (02/26/24 0745)  Resp: 20 (02/26/24 0745)  BP: 105/70 (02/26/24 0659)  SpO2: 97 % (room air) (02/26/24 0745) Vital Signs (24h Range):  Temp:  [97.4 °F (36.3 °C)-98.1 °F (36.7 °C)] 97.8 °F (36.6 °C)  Pulse:  [83-97] 83  Resp:  [14-20] 20  SpO2:  [92 %-100 %] 97 %  BP: ()/(41-70) 105/70     Weight: 90.2 kg (198 lb 13.7 oz)  Body mass index is 27.73 kg/m².    Intake/Output Summary (Last 24 hours) at 2/26/2024 0840  Last data filed at 2/26/2024 0659  Gross per 24 hour   Intake --   Output 200 ml   Net -200 ml         Physical  "Exam  Vitals and nursing note reviewed.   Constitutional:       Appearance: Normal appearance.   Cardiovascular:      Rate and Rhythm: Normal rate and regular rhythm.      Pulses: Normal pulses.      Heart sounds: Normal heart sounds.   Pulmonary:      Effort: Pulmonary effort is normal.      Breath sounds: Normal breath sounds.   Abdominal:      General: Abdomen is flat. Bowel sounds are normal.      Palpations: Abdomen is soft.   Musculoskeletal:         General: Normal range of motion.   Skin:     General: Skin is warm and dry.   Neurological:      General: No focal deficit present.      Mental Status: He is alert and oriented to person, place, and time. Mental status is at baseline.             Significant Labs: All pertinent labs within the past 24 hours have been reviewed.  BMP:   Recent Labs   Lab 02/26/24  0648   *   *   K 4.3   CL 90*   CO2 31   BUN 15   CREATININE 0.71   CALCIUM 8.6     CBC: No results for input(s): "WBC", "HGB", "HCT", "PLT" in the last 48 hours.    Significant Imaging: I have reviewed all pertinent imaging results/findings within the past 24 hours.    Assessment/Plan:      * Acute on chronic systolic congestive heart failure  On Lasix IV and off Dobutamine; n Entresto;  Echo shows ejection fraction of 20 - 25% with grade III diastolic dysfunction. Cardiology to perform RHC today.  He has a poor prognosis due to non-compliance and lack of understanding of the severity of his medical conditions.  Patient condition is.  Improving    Hypocalcemia  Will check corrected Ca level and supplement prn    Elevated LFTs  From congestion/CHF; improved LFT's      Dysuria  UA does not show signs of infection      Cough  On Tessalon Perles; on nebs      Diabetes mellitus  On SSI and monitor BS levels; BS stable    History of CVA (cerebrovascular accident)  Has right sided paralysis; no acute issues      Acute hypoxemic respiratory failure  On Lasix; He is on O2 and does not use O2 at home.  " On nebs.  Negative COVID; normal Troponin; will check CXR    Hypokalemia  Resolved    Coronary artery disease involving coronary bypass graft of native heart without angina pectoris  Continue home meds  No chest pain this weekend      VTE Risk Mitigation (From admission, onward)           Ordered     heparin (porcine) injection 5,000 Units  Every 8 hours         02/19/24 2038     IP VTE HIGH RISK PATIENT  Once         02/19/24 2038     Place sequential compression device  Until discontinued         02/19/24 2038                    Discharge Planning   DEANNA:      Code Status: Full Code   Is the patient medically ready for discharge?:     Reason for patient still in hospital (select all that apply): Treatment  Discharge Plan A: Home with family                  Walker Smith MD  Department of Hospital Medicine   [facility]

## 2024-02-26 NOTE — PROGRESS NOTES
[facility]  Adult Nutrition  First Assessment Note         Reason for Assessment  Reason For Assessment: length of stay   Nutrition Risk Screen: no indicators present    Assessment and Plan    Patient admitted 2/19 and assessed for LOS. Patient with dx of CHF, CAD, s/p cardiac cath, s/p CVA.  Patient is ordered a 2 gm Na+ diet with 1500 ml fluid. Po intake good with 100% documented per flowsheets. Last BM 2/23. Patient is 198 pounds with a BMI of 27.73 which is considered overweight.    Recommend to continue current diet as tolerated. Encourage good po intakes. RD Following.     Learning Needs/Social Determinants of Health  Learning Assessment       02/20/2024 0438 [facility] (2/19/2024 - Present)   Created by Ade Chung, RN - RN (Nurse) Status: Complete                 PRIMARY LEARNER     Primary Learner Name:  Karin Carrera  - 02/20/2024 0438    Relationship:  Patient HC - 02/20/2024 0438    Does the primary learner have any barriers to learning?:  Language  - 02/20/2024 0438    What is the preferred language of the primary learner?:  English [facility] 02/20/2024 0438    Is an  required?:  No [facility] 02/20/2024 0438    How does the primary learner prefer to learn new concepts?:  Reading, Demonstration  - 02/20/2024 0438    How often do you need to have someone help you read instructions, pamphlets, or written material from your doctor or pharmacy?:  Sometimes [facility] 02/20/2024 0438        CO-LEARNER #1     No question answered        CO-LEARNER #2     No question answered        SPECIAL TOPICS     No question answered        ANSWERED BY:     No question answered        Edit History       Ade Chung, RN - RN (Nurse)   02/20/2024 0438                           Social Determinants of Health     Tobacco Use: High Risk (2/23/2024)    Patient History     Smoking Tobacco Use: Every Day     Smokeless Tobacco Use: Current     Passive Exposure: Not on file    Alcohol Use: Not At Risk (2/20/2024)    AUDIT-C     Frequency of Alcohol Consumption: Never     Average Number of Drinks: Patient does not drink     Frequency of Binge Drinking: Never   Financial Resource Strain: Low Risk  (2/20/2024)    Overall Financial Resource Strain (CARDIA)     Difficulty of Paying Living Expenses: Not hard at all   Food Insecurity: No Food Insecurity (2/20/2024)    Hunger Vital Sign     Worried About Running Out of Food in the Last Year: Never true     Ran Out of Food in the Last Year: Never true   Transportation Needs: No Transportation Needs (2/20/2024)    PRAPARE - Transportation     Lack of Transportation (Medical): No     Lack of Transportation (Non-Medical): No   Physical Activity: Inactive (2/20/2024)    Exercise Vital Sign     Days of Exercise per Week: 0 days     Minutes of Exercise per Session: 0 min   Stress: No Stress Concern Present (2/20/2024)    [employer] - Occupational Stress Questionnaire     Feeling of Stress : Not at all   Social Connections: Unknown (2/20/2024)    Social Connection and Isolation Panel [NHANES]     Frequency of Communication with Friends and Family: More than three times a week     Frequency of Social Gatherings with Friends and Family: More than three times a week     Attends Restorationism Services: Never     Active Member of Clubs or Organizations: No     Attends Club or Organization Meetings: Never     Marital Status: Not on file   Housing Stability: Low Risk  (2/20/2024)    Housing Stability Vital Sign     Unable to Pay for Housing in the Last Year: No     Number of Places Lived in the Last Year: 1     Unstable Housing in the Last Year: No   Depression: Low Risk  (2/8/2024)    Depression     Last PHQ-4: Flowsheet Data: 0            Malnutrition  Is Patient Malnourished: No    Skin Integrity  Burt Risk Assessment  Sensory Perception: 3-->slightly limited  Moisture: 4-->rarely moist  Activity: 3-->walks  occasionally  Mobility: 2-->very limited  Nutrition: 3-->adequate  Friction and Shear: 3-->no apparent problem  Burt Score: 18  Comments on skin integrity:     Nutrition Diagnosis  Decreased nutrient needs of Sodium   related to Chronic illness as evidenced by CHF  Comments:         Recent Labs   Lab 02/26/24  0648 02/26/24  1144   *  --    POCGLU  --  123*     Comments on Glucose: Hx DM    Nutrition Prescription / Recommendations  Recommendation/Intervention: continue diet as tolerated; encourage po intakes  Goals: po intake % with weight maintenance during admission  Nutrition Goal Status: new  Current Diet Order: 2 gm Na+ 1500 ml fluid  Chewing or Swallowing Difficulty?: No Chewing or swallowing difficulty  Recommended Diet: Low Sodium ( 2g Sodium)  Recommended Oral Supplement: No Oral Supplements  Is Nutrition Support Recommended: [facility] Support: No  Is Nutrition Education Recommended: No    Monitor and Evaluation  % current Intake: P.O. intake of 75 - 100 %  % intake to meet estimated needs: 75 - 100 %  Food and Nutrient Intake: energy intake, food and beverage intake  Food and Nutrient Adminstration: diet order  Anthropometric Measurements: height/length, weight, weight change, body mass index  Biochemical Data, Medical Tests and Procedures: electrolyte and renal panel, gastrointestinal profile, glucose/endocrine profile, inflammatory profile  Tolerance: tolerating    Current Medical Diagnosis and Past Medical History     Past Medical History:   Diagnosis Date    GERD (gastroesophageal reflux disease)     Hypertension     Stroke        Nutrition/Diet History  Food Allergies: NKFA    Lab/Procedures/Meds  Recent Labs   Lab 02/26/24  0648   *   K 4.3   BUN 15   CREATININE 0.71   CALCIUM 8.6   ALBUMIN 2.9*   CL 90*   ALT 74*   AST 29   Na low; related to diuresis for CHF  Last A1c:   Lab Results   Component Value Date    HGBA1C 5.8 12/13/2023     Lab Results   Component Value  "Date    RBC 4.63 02/19/2024    HGB 13.6 02/19/2024    HCT 42.5 02/19/2024    MCV 91.8 02/19/2024    MCH 29.4 02/19/2024    MCHC 32.0 02/19/2024    TIBC 281 12/21/2022     Pertinent Labs Reviewed: reviewed  Pertinent Medications Reviewed: reviewed  Scheduled Meds:   aspirin  81 mg Oral Daily    atorvastatin  40 mg Oral QHS    furosemide (LASIX) injection  80 mg Intravenous Once    [START ON 2/27/2024] furosemide (LASIX) injection  80 mg Intravenous Once    guaiFENesin  600 mg Oral BID    heparin (porcine)  5,000 Units Subcutaneous Q8H    insulin detemir U-100  10 Units Subcutaneous QHS    ipratropium  0.5 mg Nebulization Q6H WAKE    pantoprazole  40 mg Oral Daily    spironolactone  25 mg Oral Daily    zolpidem  5 mg Oral QHS     Continuous Infusions:  PRN Meds:.acetaminophen, albuterol sulfate, benzonatate, bisacodyL, dextrose 10%, dextrose 10%, dextrose 10%, dextrose 10%, glucagon (human recombinant), glucagon (human recombinant), glucose, glucose, glucose, glucose, guaiFENesin-codeine 100-10 mg/5 ml, insulin aspart U-100, melatonin, naloxone, ondansetron, simethicone, simethicone, sodium chloride 0.9%, traMADoL, traZODone      Anthropometrics  Temp: 97.7 °F (36.5 °C)  Height: 5' 11" (180.3 cm)  Height (inches): 71 in  Weight Method: Bed Scale  Weight: 90.2 kg (198 lb 13.7 oz)  Weight (lb): 198.86 lb  Ideal Body Weight (IBW), Male: 172 lb  % Ideal Body Weight, Male (lb): 116.86 %  BMI (Calculated): 27.7  BMI Grade: 25 - 29.9 - overweight       Estimated/Assessed Needs      Temp: 97.7 °F (36.5 °C)Oral  Weight Used For Calorie Calculations: 89.8 kg (198 lb)   Energy Need Method: Kcal/kg Energy Calorie Requirements (kcal): 4241-6199  Weight Used For Protein Calculations: 89.8 kg (198 lb)  Protein Requirements:   Estimated Fluid Requirement Method: RDA Method    RDA Method (mL): 1796       Nutrition by Nursing     Intake (%): 100%  Diet/Feeding Assistance: tray set-up  Diet/Feeding Tolerance: good  Last Bowel " Movement: 02/23/24                Nutrition Follow-Up  RD Follow-up?: Yes      Nutrition Discharge Planning: unsure of d/c plan at this time; continue low sodium diet            Available via Secure Chat

## 2024-02-26 NOTE — SUBJECTIVE & OBJECTIVE
Objective:     Vital Signs (Most Recent):  Temp: 97.5 °F (36.4 °C) (02/26/24 1026)  Pulse: 90 (02/26/24 1026)  Resp: 18 (02/26/24 1026)  BP: 104/69 (02/26/24 1026)  SpO2: 97 % (02/26/24 1026) Vital Signs (24h Range):  Temp:  [97.4 °F (36.3 °C)-98.1 °F (36.7 °C)] 97.5 °F (36.4 °C)  Pulse:  [83-97] 90  Resp:  [14-20] 18  SpO2:  [92 %-100 %] 97 %  BP: ()/(41-70) 104/69       PHYSICAL EXAM:    GEN: NAD; alert and oriented x 3  CVS: regular rate and rhythm; no murmurs  RESP: clear to auscultation bilaterally; no rhonchi, rales, or wheezes noted  GI: soft, non-tender, non-distended; + bowel sounds  EXTR: no clubbing, cyanosis, or edema

## 2024-02-26 NOTE — PROGRESS NOTES
[facility]  Cardiology  Progress Note    Patient Name: Karin Carrera  MRN: [id number]  Admission Date: 2/19/2024  Hospital Length of Stay: 7 days  Code Status: Full Code   Attending Physician: Martita Dawn DO   Primary Care Physician: Walker Smith MD  Expected Discharge Date:   Principal Problem:Acute on chronic systolic congestive heart failure    Subjective:     Hospital Course:   No notes on file    Interval History: Patient seen today, BP 90s/60s and patient reports feeling terrible, very fatigued. Discontinue Entresto and Farxiga per Dr. Pacheco, and continue IV diuresis.    Review of Systems   Constitutional: Positive for malaise/fatigue. Negative for chills and fever.   Cardiovascular:  Positive for dyspnea on exertion, leg swelling (improved) and orthopnea (improved). Negative for chest pain.   Respiratory:  Positive for cough. Negative for shortness of breath.      Objective:     Vital Signs (Most Recent):  Temp: 97.7 °F (36.5 °C) (02/26/24 1402)  Pulse: 88 (02/26/24 1402)  Resp: 18 (02/26/24 1402)  BP: 95/61 (02/26/24 1402)  SpO2: 98 % (02/26/24 1402) Vital Signs (24h Range):  Temp:  [97.4 °F (36.3 °C)-98.1 °F (36.7 °C)] 97.7 °F (36.5 °C)  Pulse:  [83-97] 88  Resp:  [18-20] 18  SpO2:  [92 %-98 %] 98 %  BP: ()/(50-70) 95/61     Weight: 90.2 kg (198 lb 13.7 oz)  Body mass index is 27.73 kg/m².     SpO2: 98 %         Intake/Output Summary (Last 24 hours) at 2/26/2024 1542  Last data filed at 2/26/2024 0659  Gross per 24 hour   Intake --   Output 200 ml   Net -200 ml       Lines/Drains/Airways       Peripheral Intravenous Line  Duration                  Peripheral IV - Single Lumen 02/23/24 1019 22 G Left;Posterior Hand 3 days                       Physical Exam  Vitals reviewed.   Constitutional:       General: He is not in acute distress.  Neck:      Vascular: JVD (improving) present.   Cardiovascular:      Rate and Rhythm: Normal rate and regular rhythm.      " Heart sounds: Normal heart sounds.   Pulmonary:      Effort: Pulmonary effort is normal.      Breath sounds: Normal breath sounds. No rales.   Abdominal:      General: Abdomen is flat.      Palpations: Abdomen is soft.   Musculoskeletal:      Right lower leg: Edema present.      Left lower leg: Edema present.   Skin:     General: Skin is warm and dry.   Neurological:      Mental Status: He is alert. Mental status is at baseline.            Significant Labs: ABG: No results for input(s): "PH", "PCO2", "HCO3", "POCSATURATED", "BE" in the last 48 hours., Blood Culture: No results for input(s): "LABBLOO" in the last 48 hours., BMP:   Recent Labs   Lab 02/25/24  0328 02/26/24  0648   * 107*   * 127*   K 3.2* 4.3   CL 89* 90*   CO2 30 31   BUN 11 15   CREATININE 0.78 0.71   CALCIUM 8.0* 8.6   , CMP   Recent Labs   Lab 02/25/24  0328 02/26/24  0648   * 127*   K 3.2* 4.3   CL 89* 90*   CO2 30 31   * 107*   BUN 11 15   CREATININE 0.78 0.71   CALCIUM 8.0* 8.6   PROT  --  6.6   ALBUMIN  --  2.9*   BILITOT  --  1.0   ALKPHOS  --  113   AST  --  29   ALT  --  74*   ANIONGAP 8 10   , CBC No results for input(s): "WBC", "HGB", "HCT", "PLT" in the last 48 hours., INR No results for input(s): "INR", "PROTIME" in the last 48 hours., Lipid Panel No results for input(s): "CHOL", "HDL", "LDLCALC", "TRIG", "CHOLHDL" in the last 48 hours., and Troponin No results for input(s): "TROPONINI" in the last 48 hours.    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 >5L/min, index >2.2 indexed to BSA); MAP 75, : 0.85 Paez, Rajinder: 2.33    Pulmonary Wedge A Wave Pressure: 32 mmHg ; Pulmonary Wedge V Wave Pressure: 44 mmHg ; Pulmonary Wedge Mean Pressure: 27 mmHg    Pulmonary Artery Systolic Pressure: 57 mmHg ; Pulmonary " Artery Diastolic Pressure: 22 mmHg ; Pulmonary Artery Mean Pressure: 38 mmHg    Recommend clinical correlation, continued diuresis w/ titration +/- dobutamine to goal net negative 1-2ml/kg/hr    Using US guidance, tried to place PIV in bascilic vein of RUE pre-procedure, however patient has flaccid hemiparesis and R sided aphasia and hemineglect; able to cannulate basilic vein x 2 preprocedure in addition to nurse attempts via US guidance, but could not advance PIV, micropuncture wire, etc for planned upgrade to 7F sheath intraprocedure for use as access due to flaccid paralysis (no resting muscle tone). Explained to patient, who was amenable to R femoral vein access which was performed without difficulty following lidocaine administration, in sterile fashion once timeout performed and patient prepped/draped.    The procedure log was documented by Documenter: Isabel Alcaraz RN and verified by Ruiz Pacheco MD.    Date: 2/23/2024  Time: 10:27 AM     , Echocardiogram: Transthoracic echo (TTE) complete (Cupid Only):   Results for orders placed or performed during the hospital encounter of 02/19/24   Echo   Result Value Ref Range    BSA 2.14 m2    LVOT stroke volume 43.67 cm3    LVIDd 7.30 (A) 3.5 - 6.0 cm    LV Systolic Volume 232.15 mL    LV Systolic Volume Index 110.0 mL/m2    LVIDs 6.71 (A) 2.1 - 4.0 cm    LV Diastolic Volume 280.84 mL    LV Diastolic Volume Index 133.10 mL/m2    IVS 0.79 0.6 - 1.1 cm    LVOT diameter 2.50 cm    LVOT area 4.9 cm2    FS 8 (A) 28 - 44 %    Left Ventricle Relative Wall Thickness 0.23 cm    Posterior Wall 0.85 0.6 - 1.1 cm    LV mass 271.42 g    LV Mass Index 129 g/m2    MV Peak E Barbie 1.11 m/s    TDI LATERAL 0.12 m/s    TDI SEPTAL 0.07 m/s    E/E' ratio 11.68 m/s    MV Peak A Barbie 0.40 m/s    TR Max Barbie 3.53 m/s    E/A ratio 2.78     E wave deceleration time 150.05 msec    LV SEPTAL E/E' RATIO 15.86 m/s    LV LATERAL E/E' RATIO 9.25 m/s    LVOT peak barbie 0.59 m/s    Left Ventricular  Outflow Tract Mean Velocity 0.44 cm/s    Left Ventricular Outflow Tract Mean Gradient 0.83 mmHg    RVDD 5.16 cm    TAPSE 1.10 cm    LA size 5.48 cm    Left Atrium Major Axis 6.90 cm    RA Major Axis 5.02 cm    AV mean gradient 2 mmHg    AV peak gradient 2 mmHg    Ao peak barbie 0.77 m/s    Ao VTI 11.20 cm    LVOT peak VTI 8.90 cm    AV valve area 3.90 cm²    AV Velocity Ratio 0.77     AV index (prosthetic) 0.79     DESMOND by Velocity Ratio 3.76 cm²    Mr max barbie 4.28 m/s    MV mean gradient 3 mmHg    MV peak gradient 8 mmHg    MV stenosis pressure 1/2 time 57.19 ms    MV valve area p 1/2 method 3.85 cm2    MV valve area by continuity eq 1.36 cm2    MV VTI 32.0 cm    Triscuspid Valve Regurgitation Peak Gradient 50 mmHg    PV PEAK VELOCITY 0.54 m/s    PV peak gradient 1 mmHg    Ao root annulus 2.00 cm    IVC diameter 2.38 cm    Mean e' 0.10 m/s    ZLVIDS 4.01     ZLVIDD 0.99     AORTIC VALVE CUSP SEPERATION 2.17 cm    TV resting pulmonary artery pressure 65 mmHg    RV TB RVSP 19 mmHg    Est. RA pres 15 mmHg    Vásquez's Biplane MOD Ejection Fraction 22 %    Narrative      Left Ventricle: The left ventricle is severely dilated. Normal wall   thickness. There is eccentric hypertrophy. There is severely reduced   systolic function with a visually estimated ejection fraction of 20 - 25%.   Grade III diastolic dysfunction.    Right Ventricle: Moderate right ventricular enlargement. Systolic   function is moderately reduced.    Left Atrium: Left atrium is moderately dilated.    Right Atrium: Right atrium is mildly dilated.    Aortic Valve: The aortic valve is a trileaflet valve. Mildly calcified   cusps.    Mitral Valve: There is mild bileaflet sclerosis. Mildly thickened   leaflets. There is no stenosis. The mean pressure gradient across the   mitral valve is 3 mmHg at a heart rate of  bpm. There is severe   regurgitation with a posterolateral eccentriccally directed jet.    Tricuspid Valve: There is moderate to severe  regurgitation with a   centrally directed jet.    Pulmonary Artery: The estimated pulmonary artery systolic pressure is   65 mmHg.    IVC/SVC: Elevated venous pressure at 15 mmHg.    Pericardium: There is a trivial effusion.     , and X-Ray: CXR: X-Ray Chest 1 View (CXR):   Results for orders placed or performed during the hospital encounter of 02/19/24   X-Ray Chest 1 View    Narrative    EXAMINATION:  XR CHEST 1 VIEW    CLINICAL HISTORY:  Shortness of breath    TECHNIQUE:  XR CHEST 1 VIEW    COMPARISON:  2/8/24    FINDINGS:  No lines or tubes.    Diffuse interstitial and patchy airspace opacities scattered throughout the lungs have increased from comparison.  Findings suggesting worsening pulmonary edema.    Normal pleura.    Cardiac silhouette is similar to comparison exam.    No obvious acute bone findings.      Impression    Diffuse interstitial and patchy airspace opacities scattered throughout the lungs have increased from comparison. Findings suggesting worsening pulmonary edema.      Electronically signed by: Pavan Carmichael  Date:    02/19/2024  Time:    14:42     Assessment and Plan:     Brief HPI: 52 y.o. male with hx of CAD s/p CABG, ischemic cardiomyopathy (EF 20%), CVA, with expressive aphasia (able to write for communication) and right arm weakness,  HTN, GERD, ETOH abuse, and nicotine abuse who presented with SOB and admitted for acute decompensated HF with cardiogenic shock.     Patient reports few weeks of worsening SOB, orthopnea and sleeping in a recliner with PND. Also has bilateral leg swelling.   Patient admitted for IV diuresis.   Hospital course c/b runs of NSVT on tele     Today, He reports feeling better after IV diuresis. He was started on IV dobutamine 2.5mcg/kg/min for low cardiac output (elevated lactate and elevated LFTs).     * Acute on chronic systolic congestive heart failure  Ischemic cardiomyopathy; EF 20%, NYHA III , Stage D  Volume overloaded with low cardiac output - IV  "lasix 40mg bid, and iv dobutamine 2.5  GDMT - issues with compliance in the past - on lisinopril only  Devices; none -patient would likely qualify for ICD however is not taking GDMT    2/21/2024:  - Lactate normalized, liver enzymes and creatinine down trending; discontinued IV dobutamine  - Start Entresto 24-26 BID and spironolactone 25 daily   - Will consider adding SGLT2 prior to discharging if BP/creatinine stable  - No BB due to concern for low cardiac output  - Continue IV diuresis, possible discharge home tomorrow    2/22/2024:  - Patient seen and evaluated by Dr. Ceron  - Has been off dobutamine 24 hours, lactate 2.1 this morning and pt reports "feeling worse"; state up and down from bed 4 times last night  - Plan for RHC today. Procedure, risk, and benefits discussed in detail with patient, he understands and wishes to proceed. Consent obtained and on chart.  - Further recommendations to follow    2/23/2024:  - Patient seen and evaluated by Dr. Escobar  - RHC with elevated filling pressures and low normal cardiac output per Dr. Ceron (report not available)  - Continued orthopnea, LE edema, +JVD, and worsening right pleural effusion on cxr yest  - Increase IV lasix to 80mg BID  - 1500ml fluid restriction ordered (pt also instructed)  - Strict I & Os and daily weights  - Continue diuresing    Pt followed by VICENTE Escobar over the weekend    2/26/2024:  - Patient seen and evaluated by Dr. Pacheco  - Fluid status improving, however patient with symptomatic hypotension  - Discontinue Entresto and Farxiga, continue IV lasix   - Continue monitoring      Coronary artery disease involving coronary bypass graft of native heart without angina pectoris  Patient with known CAD s/p CABG, which is controlled Will continue ASA and Statin and monitor for S/Sx of angina/ACS. Continue to monitor on telemetry.         VTE Risk Mitigation (From admission, onward)           Ordered     heparin (porcine) injection 5,000 " Units  Every 8 hours         02/19/24 2038     IP VTE HIGH RISK PATIENT  Once         02/19/24 2038     Place sequential compression device  Until discontinued         02/19/24 2038                    ISADORA Hendrickson  Cardiology  [facility]

## 2024-02-26 NOTE — SUBJECTIVE & OBJECTIVE
Interval History: Patient seen today, BP 90s/60s and patient reports feeling terrible, very fatigued. Discontinue Entresto and Farxiga per Dr. Pacheco, and continue IV diuresis.    Review of Systems   Constitutional: Positive for malaise/fatigue. Negative for chills and fever.   Cardiovascular:  Positive for dyspnea on exertion, leg swelling (improved) and orthopnea (improved). Negative for chest pain.   Respiratory:  Positive for cough. Negative for shortness of breath.      Objective:     Vital Signs (Most Recent):  Temp: 97.7 °F (36.5 °C) (02/26/24 1402)  Pulse: 88 (02/26/24 1402)  Resp: 18 (02/26/24 1402)  BP: 95/61 (02/26/24 1402)  SpO2: 98 % (02/26/24 1402) Vital Signs (24h Range):  Temp:  [97.4 °F (36.3 °C)-98.1 °F (36.7 °C)] 97.7 °F (36.5 °C)  Pulse:  [83-97] 88  Resp:  [18-20] 18  SpO2:  [92 %-98 %] 98 %  BP: ()/(50-70) 95/61     Weight: 90.2 kg (198 lb 13.7 oz)  Body mass index is 27.73 kg/m².     SpO2: 98 %         Intake/Output Summary (Last 24 hours) at 2/26/2024 1542  Last data filed at 2/26/2024 0659  Gross per 24 hour   Intake --   Output 200 ml   Net -200 ml       Lines/Drains/Airways       Peripheral Intravenous Line  Duration                  Peripheral IV - Single Lumen 02/23/24 1019 22 G Left;Posterior Hand 3 days                       Physical Exam  Vitals reviewed.   Constitutional:       General: He is not in acute distress.  Neck:      Vascular: JVD (improving) present.   Cardiovascular:      Rate and Rhythm: Normal rate and regular rhythm.      Heart sounds: Normal heart sounds.   Pulmonary:      Effort: Pulmonary effort is normal.      Breath sounds: Normal breath sounds. No rales.   Abdominal:      General: Abdomen is flat.      Palpations: Abdomen is soft.   Musculoskeletal:      Right lower leg: Edema present.      Left lower leg: Edema present.   Skin:     General: Skin is warm and dry.   Neurological:      Mental Status: He is alert. Mental status is at baseline.         "    Significant Labs: ABG: No results for input(s): "PH", "PCO2", "HCO3", "POCSATURATED", "BE" in the last 48 hours., Blood Culture: No results for input(s): "LABBLOO" in the last 48 hours., BMP:   Recent Labs   Lab 02/25/24  0328 02/26/24  0648   * 107*   * 127*   K 3.2* 4.3   CL 89* 90*   CO2 30 31   BUN 11 15   CREATININE 0.78 0.71   CALCIUM 8.0* 8.6   , CMP   Recent Labs   Lab 02/25/24  0328 02/26/24  0648   * 127*   K 3.2* 4.3   CL 89* 90*   CO2 30 31   * 107*   BUN 11 15   CREATININE 0.78 0.71   CALCIUM 8.0* 8.6   PROT  --  6.6   ALBUMIN  --  2.9*   BILITOT  --  1.0   ALKPHOS  --  113   AST  --  29   ALT  --  74*   ANIONGAP 8 10   , CBC No results for input(s): "WBC", "HGB", "HCT", "PLT" in the last 48 hours., INR No results for input(s): "INR", "PROTIME" in the last 48 hours., Lipid Panel No results for input(s): "CHOL", "HDL", "LDLCALC", "TRIG", "CHOLHDL" in the last 48 hours., and Troponin No results for input(s): "TROPONINI" in the last 48 hours.    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 >5L/min, index >2.2 indexed to BSA); MAP 75, : 0.85 Paez, Rajinder: 2.33    Pulmonary Wedge A Wave Pressure: 32 mmHg ; Pulmonary Wedge V Wave Pressure: 44 mmHg ; Pulmonary Wedge Mean Pressure: 27 mmHg    Pulmonary Artery Systolic Pressure: 57 mmHg ; Pulmonary Artery Diastolic Pressure: 22 mmHg ; Pulmonary Artery Mean Pressure: 38 mmHg    Recommend clinical correlation, continued diuresis w/ titration +/- dobutamine to goal net negative 1-2ml/kg/hr    Using US guidance, tried to place PIV in bascilic vein of RUE pre-procedure, however patient has flaccid hemiparesis and R sided aphasia and hemineglect; able to cannulate basilic vein x 2 preprocedure in addition to nurse attempts via US " guidance, but could not advance PIV, micropuncture wire, etc for planned upgrade to 7F sheath intraprocedure for use as access due to flaccid paralysis (no resting muscle tone). Explained to patient, who was amenable to R femoral vein access which was performed without difficulty following lidocaine administration, in sterile fashion once timeout performed and patient prepped/draped.    The procedure log was documented by Documenter: Isabel Alcaraz RN and verified by Ruiz Pacheco MD.    Date: 2/23/2024  Time: 10:27 AM     , Echocardiogram: Transthoracic echo (TTE) complete (Cupid Only):   Results for orders placed or performed during the hospital encounter of 02/19/24   Echo   Result Value Ref Range    BSA 2.14 m2    LVOT stroke volume 43.67 cm3    LVIDd 7.30 (A) 3.5 - 6.0 cm    LV Systolic Volume 232.15 mL    LV Systolic Volume Index 110.0 mL/m2    LVIDs 6.71 (A) 2.1 - 4.0 cm    LV Diastolic Volume 280.84 mL    LV Diastolic Volume Index 133.10 mL/m2    IVS 0.79 0.6 - 1.1 cm    LVOT diameter 2.50 cm    LVOT area 4.9 cm2    FS 8 (A) 28 - 44 %    Left Ventricle Relative Wall Thickness 0.23 cm    Posterior Wall 0.85 0.6 - 1.1 cm    LV mass 271.42 g    LV Mass Index 129 g/m2    MV Peak E Rohit 1.11 m/s    TDI LATERAL 0.12 m/s    TDI SEPTAL 0.07 m/s    E/E' ratio 11.68 m/s    MV Peak A Rohit 0.40 m/s    TR Max Rohit 3.53 m/s    E/A ratio 2.78     E wave deceleration time 150.05 msec    LV SEPTAL E/E' RATIO 15.86 m/s    LV LATERAL E/E' RATIO 9.25 m/s    LVOT peak rohit 0.59 m/s    Left Ventricular Outflow Tract Mean Velocity 0.44 cm/s    Left Ventricular Outflow Tract Mean Gradient 0.83 mmHg    RVDD 5.16 cm    TAPSE 1.10 cm    LA size 5.48 cm    Left Atrium Major Axis 6.90 cm    RA Major Axis 5.02 cm    AV mean gradient 2 mmHg    AV peak gradient 2 mmHg    Ao peak rohit 0.77 m/s    Ao VTI 11.20 cm    LVOT peak VTI 8.90 cm    AV valve area 3.90 cm²    AV Velocity Ratio 0.77     AV index (prosthetic) 0.79     DESMOND by Velocity  Ratio 3.76 cm²    Mr max barbie 4.28 m/s    MV mean gradient 3 mmHg    MV peak gradient 8 mmHg    MV stenosis pressure 1/2 time 57.19 ms    MV valve area p 1/2 method 3.85 cm2    MV valve area by continuity eq 1.36 cm2    MV VTI 32.0 cm    Triscuspid Valve Regurgitation Peak Gradient 50 mmHg    PV PEAK VELOCITY 0.54 m/s    PV peak gradient 1 mmHg    Ao root annulus 2.00 cm    IVC diameter 2.38 cm    Mean e' 0.10 m/s    ZLVIDS 4.01     ZLVIDD 0.99     AORTIC VALVE CUSP SEPERATION 2.17 cm    TV resting pulmonary artery pressure 65 mmHg    RV TB RVSP 19 mmHg    Est. RA pres 15 mmHg    Vásquez's Biplane MOD Ejection Fraction 22 %    Narrative      Left Ventricle: The left ventricle is severely dilated. Normal wall   thickness. There is eccentric hypertrophy. There is severely reduced   systolic function with a visually estimated ejection fraction of 20 - 25%.   Grade III diastolic dysfunction.    Right Ventricle: Moderate right ventricular enlargement. Systolic   function is moderately reduced.    Left Atrium: Left atrium is moderately dilated.    Right Atrium: Right atrium is mildly dilated.    Aortic Valve: The aortic valve is a trileaflet valve. Mildly calcified   cusps.    Mitral Valve: There is mild bileaflet sclerosis. Mildly thickened   leaflets. There is no stenosis. The mean pressure gradient across the   mitral valve is 3 mmHg at a heart rate of  bpm. There is severe   regurgitation with a posterolateral eccentriccally directed jet.    Tricuspid Valve: There is moderate to severe regurgitation with a   centrally directed jet.    Pulmonary Artery: The estimated pulmonary artery systolic pressure is   65 mmHg.    IVC/SVC: Elevated venous pressure at 15 mmHg.    Pericardium: There is a trivial effusion.     , and X-Ray: CXR: X-Ray Chest 1 View (CXR):   Results for orders placed or performed during the hospital encounter of 02/19/24   X-Ray Chest 1 View    Narrative    EXAMINATION:  XR CHEST 1 VIEW    CLINICAL  HISTORY:  Shortness of breath    TECHNIQUE:  XR CHEST 1 VIEW    COMPARISON:  2/8/24    FINDINGS:  No lines or tubes.    Diffuse interstitial and patchy airspace opacities scattered throughout the lungs have increased from comparison.  Findings suggesting worsening pulmonary edema.    Normal pleura.    Cardiac silhouette is similar to comparison exam.    No obvious acute bone findings.      Impression    Diffuse interstitial and patchy airspace opacities scattered throughout the lungs have increased from comparison. Findings suggesting worsening pulmonary edema.      Electronically signed by: Pavan Carmichael  Date:    02/19/2024  Time:    14:42

## 2024-02-27 LAB
ANION GAP SERPL CALCULATED.3IONS-SCNC: 10 MMOL/L (ref 7–16)
BUN SERPL-MCNC: 14 MG/DL (ref 7–18)
BUN/CREAT SERPL: 22 (ref 6–20)
CALCIUM SERPL-MCNC: 8.3 MG/DL (ref 8.5–10.1)
CHLORIDE SERPL-SCNC: 90 MMOL/L (ref 98–107)
CHOLEST SERPL-MCNC: 106 MG/DL (ref 0–200)
CHOLEST/HDLC SERPL: 3 {RATIO}
CO2 SERPL-SCNC: 27 MMOL/L (ref 21–32)
CREAT SERPL-MCNC: 0.64 MG/DL (ref 0.7–1.3)
EGFR (NO RACE VARIABLE) (RUSH/TITUS): 113 ML/MIN/1.73M2
GLUCOSE SERPL-MCNC: 126 MG/DL (ref 74–106)
GLUCOSE SERPL-MCNC: 133 MG/DL (ref 70–105)
GLUCOSE SERPL-MCNC: 143 MG/DL (ref 70–105)
GLUCOSE SERPL-MCNC: 190 MG/DL (ref 70–105)
HDLC SERPL-MCNC: 35 MG/DL (ref 40–60)
LDLC SERPL CALC-MCNC: 53 MG/DL
LDLC/HDLC SERPL: 1.5 {RATIO}
NONHDLC SERPL-MCNC: 71 MG/DL
POTASSIUM SERPL-SCNC: 4 MMOL/L (ref 3.5–5.1)
SODIUM SERPL-SCNC: 123 MMOL/L (ref 136–145)
TRIGL SERPL-MCNC: 89 MG/DL (ref 35–150)
VLDLC SERPL-MCNC: 18 MG/DL

## 2024-02-27 PROCEDURE — 25000003 PHARM REV CODE 250: Performed by: HOSPITALIST

## 2024-02-27 PROCEDURE — 82962 GLUCOSE BLOOD TEST: CPT

## 2024-02-27 PROCEDURE — 63600175 PHARM REV CODE 636 W HCPCS: Performed by: HOSPITALIST

## 2024-02-27 PROCEDURE — 80061 LIPID PANEL: CPT | Performed by: NURSE PRACTITIONER

## 2024-02-27 PROCEDURE — 25000003 PHARM REV CODE 250: Performed by: NURSE PRACTITIONER

## 2024-02-27 PROCEDURE — 99900035 HC TECH TIME PER 15 MIN (STAT)

## 2024-02-27 PROCEDURE — 94761 N-INVAS EAR/PLS OXIMETRY MLT: CPT

## 2024-02-27 PROCEDURE — 80048 BASIC METABOLIC PNL TOTAL CA: CPT | Performed by: HOSPITALIST

## 2024-02-27 PROCEDURE — 27000221 HC OXYGEN, UP TO 24 HOURS

## 2024-02-27 PROCEDURE — 25000242 PHARM REV CODE 250 ALT 637 W/ HCPCS: Performed by: HOSPITALIST

## 2024-02-27 PROCEDURE — 11000001 HC ACUTE MED/SURG PRIVATE ROOM

## 2024-02-27 PROCEDURE — 94640 AIRWAY INHALATION TREATMENT: CPT

## 2024-02-27 PROCEDURE — 63600175 PHARM REV CODE 636 W HCPCS: Performed by: NURSE PRACTITIONER

## 2024-02-27 PROCEDURE — 99232 SBSQ HOSP IP/OBS MODERATE 35: CPT | Mod: ,,, | Performed by: HOSPITALIST

## 2024-02-27 RX ORDER — FUROSEMIDE 10 MG/ML
80 INJECTION INTRAMUSCULAR; INTRAVENOUS
Status: DISCONTINUED | OUTPATIENT
Start: 2024-02-27 | End: 2024-02-28 | Stop reason: HOSPADM

## 2024-02-27 RX ADMIN — TRAMADOL HYDROCHLORIDE 50 MG: 50 TABLET, COATED ORAL at 01:02

## 2024-02-27 RX ADMIN — IPRATROPIUM BROMIDE 0.5 MG: 0.5 SOLUTION RESPIRATORY (INHALATION) at 08:02

## 2024-02-27 RX ADMIN — GUAIFENESIN 600 MG: 600 TABLET, EXTENDED RELEASE ORAL at 09:02

## 2024-02-27 RX ADMIN — INSULIN DETEMIR 10 UNITS: 100 INJECTION, SOLUTION SUBCUTANEOUS at 09:02

## 2024-02-27 RX ADMIN — TRAMADOL HYDROCHLORIDE 50 MG: 50 TABLET, COATED ORAL at 04:02

## 2024-02-27 RX ADMIN — SPIRONOLACTONE 25 MG: 25 TABLET ORAL at 09:02

## 2024-02-27 RX ADMIN — IPRATROPIUM BROMIDE 0.5 MG: 0.5 SOLUTION RESPIRATORY (INHALATION) at 07:02

## 2024-02-27 RX ADMIN — TRAMADOL HYDROCHLORIDE 50 MG: 50 TABLET, COATED ORAL at 09:02

## 2024-02-27 RX ADMIN — ATORVASTATIN CALCIUM 40 MG: 40 TABLET, FILM COATED ORAL at 09:02

## 2024-02-27 RX ADMIN — HEPARIN SODIUM 5000 UNITS: 5000 INJECTION, SOLUTION INTRAVENOUS; SUBCUTANEOUS at 02:02

## 2024-02-27 RX ADMIN — ASPIRIN 81 MG: 81 TABLET, COATED ORAL at 09:02

## 2024-02-27 RX ADMIN — ZOLPIDEM TARTRATE 5 MG: 5 TABLET, COATED ORAL at 09:02

## 2024-02-27 RX ADMIN — GUAIFENESIN AND CODEINE PHOSPHATE 5 ML: 100; 10 SOLUTION ORAL at 09:02

## 2024-02-27 RX ADMIN — HEPARIN SODIUM 5000 UNITS: 5000 INJECTION, SOLUTION INTRAVENOUS; SUBCUTANEOUS at 05:02

## 2024-02-27 RX ADMIN — FUROSEMIDE 80 MG: 10 INJECTION, SOLUTION INTRAVENOUS at 09:02

## 2024-02-27 RX ADMIN — IPRATROPIUM BROMIDE 0.5 MG: 0.5 SOLUTION RESPIRATORY (INHALATION) at 12:02

## 2024-02-27 RX ADMIN — PANTOPRAZOLE SODIUM 40 MG: 40 TABLET, DELAYED RELEASE ORAL at 09:02

## 2024-02-27 NOTE — ASSESSMENT & PLAN NOTE
He is on O2 and does not use O2 at home. On nebs; due to CHF; U/S does not show enough fluid for thoracentesis

## 2024-02-27 NOTE — SUBJECTIVE & OBJECTIVE
Interval History: Patient seen by Dr. Pacheco today, BP improved 111/86 after holding Entresto and Jardiance. However, pt continues to feel unwell. Will have PT evaluate and treat. Na 123 today.    Review of Systems   Constitutional: Positive for malaise/fatigue. Negative for chills and fever.   Cardiovascular:  Positive for dyspnea on exertion, leg swelling (improved) and orthopnea (improved). Negative for chest pain.   Respiratory:  Positive for cough. Negative for shortness of breath.      Objective:     Vital Signs (Most Recent):  Temp: 97.2 °F (36.2 °C) (02/27/24 1031)  Pulse: 105 (02/27/24 1203)  Resp: 20 (02/27/24 1203)  BP: 113/73 (02/27/24 1031)  SpO2: 99 % (02/27/24 1203) Vital Signs (24h Range):  Temp:  [97.2 °F (36.2 °C)-97.8 °F (36.6 °C)] 97.2 °F (36.2 °C)  Pulse:  [] 105  Resp:  [16-20] 20  SpO2:  [93 %-99 %] 99 %  BP: ()/(61-86) 113/73     Weight: 90.2 kg (198 lb 13.7 oz)  Body mass index is 27.73 kg/m².     SpO2: 99 %         Intake/Output Summary (Last 24 hours) at 2/27/2024 1256  Last data filed at 2/27/2024 1031  Gross per 24 hour   Intake --   Output 1200 ml   Net -1200 ml       Lines/Drains/Airways       Peripheral Intravenous Line  Duration                  Peripheral IV - Single Lumen 02/23/24 1019 22 G Left;Posterior Hand 4 days                       Physical Exam  Vitals reviewed.   Constitutional:       General: He is not in acute distress.  Neck:      Vascular: JVD (improving) present.   Cardiovascular:      Rate and Rhythm: Normal rate and regular rhythm.      Heart sounds: Normal heart sounds.   Pulmonary:      Effort: Pulmonary effort is normal.      Breath sounds: Normal breath sounds. No rales.   Abdominal:      General: Abdomen is flat.      Palpations: Abdomen is soft.   Musculoskeletal:      Right lower leg: Edema present.      Left lower leg: Edema present.   Skin:     General: Skin is warm and dry.   Neurological:      Mental Status: He is alert. Mental status is at  "baseline.            Significant Labs: ABG: No results for input(s): "PH", "PCO2", "HCO3", "POCSATURATED", "BE" in the last 48 hours., Blood Culture: No results for input(s): "LABBLOO" in the last 48 hours., BMP:   Recent Labs   Lab 02/26/24  0648 02/27/24  0410   * 126*   * 123*   K 4.3 4.0   CL 90* 90*   CO2 31 27   BUN 15 14   CREATININE 0.71 0.64*   CALCIUM 8.6 8.3*   , CMP   Recent Labs   Lab 02/26/24  0648 02/27/24  0410   * 123*   K 4.3 4.0   CL 90* 90*   CO2 31 27   * 126*   BUN 15 14   CREATININE 0.71 0.64*   CALCIUM 8.6 8.3*   PROT 6.6  --    ALBUMIN 2.9*  --    BILITOT 1.0  --    ALKPHOS 113  --    AST 29  --    ALT 74*  --    ANIONGAP 10 10   , CBC No results for input(s): "WBC", "HGB", "HCT", "PLT" in the last 48 hours., INR No results for input(s): "INR", "PROTIME" in the last 48 hours., Lipid Panel No results for input(s): "CHOL", "HDL", "LDLCALC", "TRIG", "CHOLHDL" in the last 48 hours., and Troponin No results for input(s): "TROPONINI" in the last 48 hours.    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 >5L/min, index >2.2 indexed to BSA); MAP 75, : 0.85 Paez, Rajinder: 2.33    Pulmonary Wedge A Wave Pressure: 32 mmHg ; Pulmonary Wedge V Wave Pressure: 44 mmHg ; Pulmonary Wedge Mean Pressure: 27 mmHg    Pulmonary Artery Systolic Pressure: 57 mmHg ; Pulmonary Artery Diastolic Pressure: 22 mmHg ; Pulmonary Artery Mean Pressure: 38 mmHg    Recommend clinical correlation, continued diuresis w/ titration +/- dobutamine to goal net negative 1-2ml/kg/hr    Using US guidance, tried to place PIV in bascilic vein of RUE pre-procedure, however patient has flaccid hemiparesis and R sided aphasia and hemineglect; able to cannulate basilic vein x 2 preprocedure in addition to nurse " attempts via US guidance, but could not advance PIV, micropuncture wire, etc for planned upgrade to 7F sheath intraprocedure for use as access due to flaccid paralysis (no resting muscle tone). Explained to patient, who was amenable to R femoral vein access which was performed without difficulty following lidocaine administration, in sterile fashion once timeout performed and patient prepped/draped.    The procedure log was documented by Documenter: Isabel Alcaraz RN and verified by Ruiz Pacheco MD.    Date: 2/23/2024  Time: 10:27 AM     , Echocardiogram: Transthoracic echo (TTE) complete (Cupid Only):   Results for orders placed or performed during the hospital encounter of 02/19/24   Echo   Result Value Ref Range    BSA 2.14 m2    LVOT stroke volume 43.67 cm3    LVIDd 7.30 (A) 3.5 - 6.0 cm    LV Systolic Volume 232.15 mL    LV Systolic Volume Index 110.0 mL/m2    LVIDs 6.71 (A) 2.1 - 4.0 cm    LV Diastolic Volume 280.84 mL    LV Diastolic Volume Index 133.10 mL/m2    IVS 0.79 0.6 - 1.1 cm    LVOT diameter 2.50 cm    LVOT area 4.9 cm2    FS 8 (A) 28 - 44 %    Left Ventricle Relative Wall Thickness 0.23 cm    Posterior Wall 0.85 0.6 - 1.1 cm    LV mass 271.42 g    LV Mass Index 129 g/m2    MV Peak E Rohit 1.11 m/s    TDI LATERAL 0.12 m/s    TDI SEPTAL 0.07 m/s    E/E' ratio 11.68 m/s    MV Peak A Rohit 0.40 m/s    TR Max Rohit 3.53 m/s    E/A ratio 2.78     E wave deceleration time 150.05 msec    LV SEPTAL E/E' RATIO 15.86 m/s    LV LATERAL E/E' RATIO 9.25 m/s    LVOT peak rohit 0.59 m/s    Left Ventricular Outflow Tract Mean Velocity 0.44 cm/s    Left Ventricular Outflow Tract Mean Gradient 0.83 mmHg    RVDD 5.16 cm    TAPSE 1.10 cm    LA size 5.48 cm    Left Atrium Major Axis 6.90 cm    RA Major Axis 5.02 cm    AV mean gradient 2 mmHg    AV peak gradient 2 mmHg    Ao peak rohit 0.77 m/s    Ao VTI 11.20 cm    LVOT peak VTI 8.90 cm    AV valve area 3.90 cm²    AV Velocity Ratio 0.77     AV index (prosthetic) 0.79     DESMOND  by Velocity Ratio 3.76 cm²    Mr max barbie 4.28 m/s    MV mean gradient 3 mmHg    MV peak gradient 8 mmHg    MV stenosis pressure 1/2 time 57.19 ms    MV valve area p 1/2 method 3.85 cm2    MV valve area by continuity eq 1.36 cm2    MV VTI 32.0 cm    Triscuspid Valve Regurgitation Peak Gradient 50 mmHg    PV PEAK VELOCITY 0.54 m/s    PV peak gradient 1 mmHg    Ao root annulus 2.00 cm    IVC diameter 2.38 cm    Mean e' 0.10 m/s    ZLVIDS 4.01     ZLVIDD 0.99     AORTIC VALVE CUSP SEPERATION 2.17 cm    TV resting pulmonary artery pressure 65 mmHg    RV TB RVSP 19 mmHg    Est. RA pres 15 mmHg    Vásquez's Biplane MOD Ejection Fraction 22 %    Narrative      Left Ventricle: The left ventricle is severely dilated. Normal wall   thickness. There is eccentric hypertrophy. There is severely reduced   systolic function with a visually estimated ejection fraction of 20 - 25%.   Grade III diastolic dysfunction.    Right Ventricle: Moderate right ventricular enlargement. Systolic   function is moderately reduced.    Left Atrium: Left atrium is moderately dilated.    Right Atrium: Right atrium is mildly dilated.    Aortic Valve: The aortic valve is a trileaflet valve. Mildly calcified   cusps.    Mitral Valve: There is mild bileaflet sclerosis. Mildly thickened   leaflets. There is no stenosis. The mean pressure gradient across the   mitral valve is 3 mmHg at a heart rate of  bpm. There is severe   regurgitation with a posterolateral eccentriccally directed jet.    Tricuspid Valve: There is moderate to severe regurgitation with a   centrally directed jet.    Pulmonary Artery: The estimated pulmonary artery systolic pressure is   65 mmHg.    IVC/SVC: Elevated venous pressure at 15 mmHg.    Pericardium: There is a trivial effusion.     , and X-Ray: CXR: X-Ray Chest 1 View (CXR):   Results for orders placed or performed during the hospital encounter of 02/19/24   X-Ray Chest 1 View    Narrative    EXAMINATION:  XR CHEST 1  VIEW    CLINICAL HISTORY:  Shortness of breath    TECHNIQUE:  XR CHEST 1 VIEW    COMPARISON:  2/8/24    FINDINGS:  No lines or tubes.    Diffuse interstitial and patchy airspace opacities scattered throughout the lungs have increased from comparison.  Findings suggesting worsening pulmonary edema.    Normal pleura.    Cardiac silhouette is similar to comparison exam.    No obvious acute bone findings.      Impression    Diffuse interstitial and patchy airspace opacities scattered throughout the lungs have increased from comparison. Findings suggesting worsening pulmonary edema.      Electronically signed by: Pavan Carmichael  Date:    02/19/2024  Time:    14:42

## 2024-02-27 NOTE — ASSESSMENT & PLAN NOTE
"Ischemic cardiomyopathy; EF 20%, NYHA III , Stage D  Volume overloaded with low cardiac output - IV lasix 40mg bid, and iv dobutamine 2.5  GDMT - issues with compliance in the past - on lisinopril only  Devices; none -patient would likely qualify for ICD however is not taking GDMT    2/21/2024:  - Lactate normalized, liver enzymes and creatinine down trending; discontinued IV dobutamine  - Start Entresto 24-26 BID and spironolactone 25 daily   - Will consider adding SGLT2 prior to discharging if BP/creatinine stable  - No BB due to concern for low cardiac output  - Continue IV diuresis, possible discharge home tomorrow    2/22/2024:  - Patient seen and evaluated by Dr. Ceron  - Has been off dobutamine 24 hours, lactate 2.1 this morning and pt reports "feeling worse"; state up and down from bed 4 times last night  - Plan for RHC today. Procedure, risk, and benefits discussed in detail with patient, he understands and wishes to proceed. Consent obtained and on chart.  - Further recommendations to follow    2/23/2024:  - Patient seen and evaluated by Dr. Escobar  - RHC with elevated filling pressures and low normal cardiac output per Dr. Ceron (report not available)  - Continued orthopnea, LE edema, +JVD, and worsening right pleural effusion on cxr yest  - Increase IV lasix to 80mg BID  - 1500ml fluid restriction ordered (pt also instructed)  - Strict I & Os and daily weights  - Continue diuresing    Pt followed by VICENTE Escobar over the weekend    2/26/2024:  - Patient seen and evaluated by Dr. Pacheco  - Fluid status improving, however patient with symptomatic hypotension  - Discontinue Entresto and Jardiance, continue IV lasix   - Continue monitoring      2/27/2024:  - Patient seen and evaluated by Dr. Pacheco  - BP improved off Entresto and Jardiance, now 111/86  - Patient continues to feel bad, lung sound with occasional crackle and continued coughing; will repeat cxr  - PT to evaluate and treat; " weakness  - Na 123 today  - Further recommendations to follow per Dr. Pacheco

## 2024-02-27 NOTE — PLAN OF CARE
Problem: Adult Inpatient Plan of Care  Goal: Plan of Care Review  Outcome: Ongoing, Progressing  Goal: Patient-Specific Goal (Individualized)  Outcome: Ongoing, Progressing  Goal: Absence of Hospital-Acquired Illness or Injury  Outcome: Ongoing, Progressing  Goal: Optimal Comfort and Wellbeing  Outcome: Ongoing, Progressing  Goal: Readiness for Transition of Care  Outcome: Ongoing, Progressing     Problem: Diabetes Comorbidity  Goal: Blood Glucose Level Within Targeted Range  Outcome: Ongoing, Progressing     Problem: Fluid and Electrolyte Imbalance (Acute Kidney Injury/Impairment)  Goal: Fluid and Electrolyte Balance  Outcome: Ongoing, Progressing     Problem: Oral Intake Inadequate (Acute Kidney Injury/Impairment)  Goal: Optimal Nutrition Intake  Outcome: Ongoing, Progressing     Problem: Renal Function Impairment (Acute Kidney Injury/Impairment)  Goal: Effective Renal Function  Outcome: Ongoing, Progressing     Problem: Skin Injury Risk Increased  Goal: Skin Health and Integrity  Outcome: Ongoing, Progressing     Problem: Fall Injury Risk  Goal: Absence of Fall and Fall-Related Injury  Outcome: Ongoing, Progressing     Problem: Airway Clearance Ineffective  Goal: Effective Airway Clearance  Outcome: Ongoing, Progressing

## 2024-02-27 NOTE — PLAN OF CARE
Problem: Adult Inpatient Plan of Care  Goal: Plan of Care Review  Outcome: Ongoing, Progressing  Goal: Patient-Specific Goal (Individualized)  Outcome: Ongoing, Progressing  Goal: Absence of Hospital-Acquired Illness or Injury  Outcome: Ongoing, Progressing  Goal: Optimal Comfort and Wellbeing  Outcome: Ongoing, Progressing  Goal: Readiness for Transition of Care  Outcome: Ongoing, Progressing     Problem: Diabetes Comorbidity  Goal: Blood Glucose Level Within Targeted Range  Outcome: Ongoing, Progressing     Problem: Fluid and Electrolyte Imbalance (Acute Kidney Injury/Impairment)  Goal: Fluid and Electrolyte Balance  Outcome: Ongoing, Progressing     Problem: Oral Intake Inadequate (Acute Kidney Injury/Impairment)  Goal: Optimal Nutrition Intake  Outcome: Ongoing, Progressing     Problem: Renal Function Impairment (Acute Kidney Injury/Impairment)  Goal: Effective Renal Function  Outcome: Ongoing, Progressing     Problem: Skin Injury Risk Increased  Goal: Skin Health and Integrity  Outcome: Ongoing, Progressing     Problem: Fall Injury Risk  Goal: Absence of Fall and Fall-Related Injury  Outcome: Ongoing, Progressing     Problem: Airway Clearance Ineffective  Goal: Effective Airway Clearance  Outcome: Ongoing, Progressing     Problem: Gas Exchange Impaired  Goal: Optimal Gas Exchange  Outcome: Ongoing, Progressing

## 2024-02-27 NOTE — PROGRESS NOTES
[facility]  [facility]  Progress Note    Patient Name: Karin Carrera  MRN: [id number]  Patient Class: IP- Inpatient   Admission Date: 2/19/2024  Length of Stay: 8 days  Attending Physician: Martita Dawn DO  Primary Care Provider: Walker Smith MD        Subjective:     Principal Problem:Acute on chronic systolic congestive heart failure        HPI:  53 year old male with an extensive PMH presents with LE swelling and SOB.  He does not use O2 at home.  He is a poor historian from a stroke and is unable to articulate.  But he was able to tell me he was in pain and wanted pain meds.      Overview/Hospital Course:  53 year old male with an extensive PMH presents with LE swelling and SOB. LE swelling improved; feels ok today. Patient denies chest pain, shortness of breath, nausea, vomiting, or diarrhea.      Vitals:    02/27/24 0743 02/27/24 0914 02/27/24 1031 02/27/24 1203   BP:   113/73    Pulse: 103  99 105   Resp: 20 16 17 20   Temp:   97.2 °F (36.2 °C)    TempSrc:   Oral    SpO2: 99%  97% 99%   Weight:       Height:             PHYSICAL EXAMINATION:    GEN: NAD; alert and oriented x 3  CVS: regular rate and rhythm  RESP: normal respiratory effort; no audible wheezing noted  GI: non-distended  EXTR: no swelling noted      Assessment/Plan:      * Acute on chronic systolic congestive heart failure  Off Entresto due to low BP; continue meds per Cardiology; Echo shows ejection fraction of 20 - 25% with grade III diastolic dysfunction. Improving    Acute hypoxemic respiratory failure  He is on O2 and does not use O2 at home. On nebs; due to CHF; U/S does not show enough fluid for thoracentesis    Pleural effusion  From CHF; not enough fluid on U/S for thoracentesis    Hyponatremia  Na 123; probably due to medications; if continues to drop will add NS    Coronary artery disease involving coronary bypass graft of native heart without angina pectoris  Continue home  meds    Cough  On Tessalon Perles; on nebs      Diabetes mellitus  On Jardiance and Insulin; BS stable; will monitor BS levels    History of CVA (cerebrovascular accident)  Has right sided paralysis; no acute issues          Martita Dawn DO  Department of Hospital Medicine   [facility]

## 2024-02-27 NOTE — PROGRESS NOTES
[facility]  Cardiology  Progress Note    Patient Name: Karin Carrera  MRN: [id number]  Admission Date: 2/19/2024  Hospital Length of Stay: 8 days  Code Status: Full Code   Attending Physician: Martita Dawn DO   Primary Care Physician: Walker Smith MD  Expected Discharge Date:   Principal Problem:Acute on chronic systolic congestive heart failure    Subjective:     Hospital Course:   No notes on file    Interval History: Patient seen by Dr. Pacheco today, BP improved 111/86 after holding Entresto and Jardiance. However, pt continues to feel unwell. Will have PT evaluate and treat. Na 123 today.    Review of Systems   Constitutional: Positive for malaise/fatigue. Negative for chills and fever.   Cardiovascular:  Positive for dyspnea on exertion, leg swelling (improved) and orthopnea (improved). Negative for chest pain.   Respiratory:  Positive for cough. Negative for shortness of breath.      Objective:     Vital Signs (Most Recent):  Temp: 97.2 °F (36.2 °C) (02/27/24 1031)  Pulse: 105 (02/27/24 1203)  Resp: 20 (02/27/24 1203)  BP: 113/73 (02/27/24 1031)  SpO2: 99 % (02/27/24 1203) Vital Signs (24h Range):  Temp:  [97.2 °F (36.2 °C)-97.8 °F (36.6 °C)] 97.2 °F (36.2 °C)  Pulse:  [] 105  Resp:  [16-20] 20  SpO2:  [93 %-99 %] 99 %  BP: ()/(61-86) 113/73     Weight: 90.2 kg (198 lb 13.7 oz)  Body mass index is 27.73 kg/m².     SpO2: 99 %         Intake/Output Summary (Last 24 hours) at 2/27/2024 1256  Last data filed at 2/27/2024 1031  Gross per 24 hour   Intake --   Output 1200 ml   Net -1200 ml       Lines/Drains/Airways       Peripheral Intravenous Line  Duration                  Peripheral IV - Single Lumen 02/23/24 1019 22 G Left;Posterior Hand 4 days                       Physical Exam  Vitals reviewed.   Constitutional:       General: He is not in acute distress.  Neck:      Vascular: JVD (improving) present.   Cardiovascular:      Rate and Rhythm: Normal rate  "and regular rhythm.      Heart sounds: Normal heart sounds.   Pulmonary:      Effort: Pulmonary effort is normal.      Breath sounds: Normal breath sounds. No rales.   Abdominal:      General: Abdomen is flat.      Palpations: Abdomen is soft.   Musculoskeletal:      Right lower leg: Edema present.      Left lower leg: Edema present.   Skin:     General: Skin is warm and dry.   Neurological:      Mental Status: He is alert. Mental status is at baseline.            Significant Labs: ABG: No results for input(s): "PH", "PCO2", "HCO3", "POCSATURATED", "BE" in the last 48 hours., Blood Culture: No results for input(s): "LABBLOO" in the last 48 hours., BMP:   Recent Labs   Lab 02/26/24  0648 02/27/24  0410   * 126*   * 123*   K 4.3 4.0   CL 90* 90*   CO2 31 27   BUN 15 14   CREATININE 0.71 0.64*   CALCIUM 8.6 8.3*   , CMP   Recent Labs   Lab 02/26/24  0648 02/27/24  0410   * 123*   K 4.3 4.0   CL 90* 90*   CO2 31 27   * 126*   BUN 15 14   CREATININE 0.71 0.64*   CALCIUM 8.6 8.3*   PROT 6.6  --    ALBUMIN 2.9*  --    BILITOT 1.0  --    ALKPHOS 113  --    AST 29  --    ALT 74*  --    ANIONGAP 10 10   , CBC No results for input(s): "WBC", "HGB", "HCT", "PLT" in the last 48 hours., INR No results for input(s): "INR", "PROTIME" in the last 48 hours., Lipid Panel No results for input(s): "CHOL", "HDL", "LDLCALC", "TRIG", "CHOLHDL" in the last 48 hours., and Troponin No results for input(s): "TROPONINI" in the last 48 hours.    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 >5L/min, index >2.2 indexed to BSA); MAP 75, : 0.85 Paez, Rajinder: 2.33    Pulmonary Wedge A Wave Pressure: 32 mmHg ; Pulmonary Wedge V Wave Pressure: 44 mmHg ; Pulmonary Wedge Mean Pressure: 27 mmHg    Pulmonary Artery Systolic " Pressure: 57 mmHg ; Pulmonary Artery Diastolic Pressure: 22 mmHg ; Pulmonary Artery Mean Pressure: 38 mmHg    Recommend clinical correlation, continued diuresis w/ titration +/- dobutamine to goal net negative 1-2ml/kg/hr    Using US guidance, tried to place PIV in bascilic vein of RUE pre-procedure, however patient has flaccid hemiparesis and R sided aphasia and hemineglect; able to cannulate basilic vein x 2 preprocedure in addition to nurse attempts via US guidance, but could not advance PIV, micropuncture wire, etc for planned upgrade to 7F sheath intraprocedure for use as access due to flaccid paralysis (no resting muscle tone). Explained to patient, who was amenable to R femoral vein access which was performed without difficulty following lidocaine administration, in sterile fashion once timeout performed and patient prepped/draped.    The procedure log was documented by Documenter: Isabel Alcaraz RN and verified by Ruiz Pacheco MD.    Date: 2/23/2024  Time: 10:27 AM     , Echocardiogram: Transthoracic echo (TTE) complete (Cupid Only):   Results for orders placed or performed during the hospital encounter of 02/19/24   Echo   Result Value Ref Range    BSA 2.14 m2    LVOT stroke volume 43.67 cm3    LVIDd 7.30 (A) 3.5 - 6.0 cm    LV Systolic Volume 232.15 mL    LV Systolic Volume Index 110.0 mL/m2    LVIDs 6.71 (A) 2.1 - 4.0 cm    LV Diastolic Volume 280.84 mL    LV Diastolic Volume Index 133.10 mL/m2    IVS 0.79 0.6 - 1.1 cm    LVOT diameter 2.50 cm    LVOT area 4.9 cm2    FS 8 (A) 28 - 44 %    Left Ventricle Relative Wall Thickness 0.23 cm    Posterior Wall 0.85 0.6 - 1.1 cm    LV mass 271.42 g    LV Mass Index 129 g/m2    MV Peak E Barbie 1.11 m/s    TDI LATERAL 0.12 m/s    TDI SEPTAL 0.07 m/s    E/E' ratio 11.68 m/s    MV Peak A Barbie 0.40 m/s    TR Max Barbie 3.53 m/s    E/A ratio 2.78     E wave deceleration time 150.05 msec    LV SEPTAL E/E' RATIO 15.86 m/s    LV LATERAL E/E' RATIO 9.25 m/s    LVOT peak barbie  0.59 m/s    Left Ventricular Outflow Tract Mean Velocity 0.44 cm/s    Left Ventricular Outflow Tract Mean Gradient 0.83 mmHg    RVDD 5.16 cm    TAPSE 1.10 cm    LA size 5.48 cm    Left Atrium Major Axis 6.90 cm    RA Major Axis 5.02 cm    AV mean gradient 2 mmHg    AV peak gradient 2 mmHg    Ao peak barbie 0.77 m/s    Ao VTI 11.20 cm    LVOT peak VTI 8.90 cm    AV valve area 3.90 cm²    AV Velocity Ratio 0.77     AV index (prosthetic) 0.79     DESMOND by Velocity Ratio 3.76 cm²    Mr max barbie 4.28 m/s    MV mean gradient 3 mmHg    MV peak gradient 8 mmHg    MV stenosis pressure 1/2 time 57.19 ms    MV valve area p 1/2 method 3.85 cm2    MV valve area by continuity eq 1.36 cm2    MV VTI 32.0 cm    Triscuspid Valve Regurgitation Peak Gradient 50 mmHg    PV PEAK VELOCITY 0.54 m/s    PV peak gradient 1 mmHg    Ao root annulus 2.00 cm    IVC diameter 2.38 cm    Mean e' 0.10 m/s    ZLVIDS 4.01     ZLVIDD 0.99     AORTIC VALVE CUSP SEPERATION 2.17 cm    TV resting pulmonary artery pressure 65 mmHg    RV TB RVSP 19 mmHg    Est. RA pres 15 mmHg    Vásquez's Biplane MOD Ejection Fraction 22 %    Narrative      Left Ventricle: The left ventricle is severely dilated. Normal wall   thickness. There is eccentric hypertrophy. There is severely reduced   systolic function with a visually estimated ejection fraction of 20 - 25%.   Grade III diastolic dysfunction.    Right Ventricle: Moderate right ventricular enlargement. Systolic   function is moderately reduced.    Left Atrium: Left atrium is moderately dilated.    Right Atrium: Right atrium is mildly dilated.    Aortic Valve: The aortic valve is a trileaflet valve. Mildly calcified   cusps.    Mitral Valve: There is mild bileaflet sclerosis. Mildly thickened   leaflets. There is no stenosis. The mean pressure gradient across the   mitral valve is 3 mmHg at a heart rate of  bpm. There is severe   regurgitation with a posterolateral eccentriccally directed jet.    Tricuspid Valve: There is  moderate to severe regurgitation with a   centrally directed jet.    Pulmonary Artery: The estimated pulmonary artery systolic pressure is   65 mmHg.    IVC/SVC: Elevated venous pressure at 15 mmHg.    Pericardium: There is a trivial effusion.     , and X-Ray: CXR: X-Ray Chest 1 View (CXR):   Results for orders placed or performed during the hospital encounter of 02/19/24   X-Ray Chest 1 View    Narrative    EXAMINATION:  XR CHEST 1 VIEW    CLINICAL HISTORY:  Shortness of breath    TECHNIQUE:  XR CHEST 1 VIEW    COMPARISON:  2/8/24    FINDINGS:  No lines or tubes.    Diffuse interstitial and patchy airspace opacities scattered throughout the lungs have increased from comparison.  Findings suggesting worsening pulmonary edema.    Normal pleura.    Cardiac silhouette is similar to comparison exam.    No obvious acute bone findings.      Impression    Diffuse interstitial and patchy airspace opacities scattered throughout the lungs have increased from comparison. Findings suggesting worsening pulmonary edema.      Electronically signed by: Pavan Carmichael  Date:    02/19/2024  Time:    14:42     Assessment and Plan:     Brief HPI:   52 y.o. male with hx of CAD s/p CABG, ischemic cardiomyopathy (EF 20%), CVA, with expressive aphasia (able to write for communication) and right arm weakness,  HTN, GERD, ETOH abuse, and nicotine abuse who presented with SOB and admitted for acute decompensated HF with cardiogenic shock.     Patient reports few weeks of worsening SOB, orthopnea and sleeping in a recliner with PND. Also has bilateral leg swelling.   Patient admitted for IV diuresis.   Hospital course c/b runs of NSVT on tele     Today, He reports feeling better after IV diuresis. He was started on IV dobutamine 2.5mcg/kg/min for low cardiac output (elevated lactate and elevated LFTs).        * Acute on chronic systolic congestive heart failure  Ischemic cardiomyopathy; EF 20%, NYHA III , Stage D  Volume overloaded with low  "cardiac output - IV lasix 40mg bid, and iv dobutamine 2.5  GDMT - issues with compliance in the past - on lisinopril only  Devices; none -patient would likely qualify for ICD however is not taking GDMT    2/21/2024:  - Lactate normalized, liver enzymes and creatinine down trending; discontinued IV dobutamine  - Start Entresto 24-26 BID and spironolactone 25 daily   - Will consider adding SGLT2 prior to discharging if BP/creatinine stable  - No BB due to concern for low cardiac output  - Continue IV diuresis, possible discharge home tomorrow    2/22/2024:  - Patient seen and evaluated by Dr. Ceron  - Has been off dobutamine 24 hours, lactate 2.1 this morning and pt reports "feeling worse"; state up and down from bed 4 times last night  - Plan for RHC today. Procedure, risk, and benefits discussed in detail with patient, he understands and wishes to proceed. Consent obtained and on chart.  - Further recommendations to follow    2/23/2024:  - Patient seen and evaluated by Dr. Escobar  - RHC with elevated filling pressures and low normal cardiac output per Dr. Ceron (report not available)  - Continued orthopnea, LE edema, +JVD, and worsening right pleural effusion on cxr yest  - Increase IV lasix to 80mg BID  - 1500ml fluid restriction ordered (pt also instructed)  - Strict I & Os and daily weights  - Continue diuresing    Pt followed by VICENTE Escobar over the weekend    2/26/2024:  - Patient seen and evaluated by Dr. Pacheco  - Fluid status improving, however patient with symptomatic hypotension  - Discontinue Entresto and Jardiance, continue IV lasix   - Continue monitoring      2/27/2024:  - Patient seen and evaluated by Dr. Pacheco  - BP improved off Entresto and Jardiance, now 111/86  - Patient continues to feel bad, lung sound with occasional crackle and continued coughing; will repeat cxr  - PT to evaluate and treat; weakness  - Na 123 today; Discontinued spironolactone (can interfere with sodium " reabsorption)  - Continue diuresis  - Further recommendations to follow per Dr. Pacheco    Coronary artery disease involving coronary bypass graft of native heart without angina pectoris  Patient with known CAD s/p CABG, which is controlled Will continue ASA and Statin and monitor for S/Sx of angina/ACS. Continue to monitor on telemetry.         VTE Risk Mitigation (From admission, onward)           Ordered     heparin (porcine) injection 5,000 Units  Every 8 hours         02/19/24 2038     IP VTE HIGH RISK PATIENT  Once         02/19/24 2038     Place sequential compression device  Until discontinued         02/19/24 2038                    ISADORA Hendrickson  Cardiology  [facility]

## 2024-02-27 NOTE — ASSESSMENT & PLAN NOTE
"Ischemic cardiomyopathy; EF 20%, NYHA III , Stage D  Volume overloaded with low cardiac output - IV lasix 40mg bid, and iv dobutamine 2.5  GDMT - issues with compliance in the past - on lisinopril only  Devices; none -patient would likely qualify for ICD however is not taking GDMT    2/21/2024:  - Lactate normalized, liver enzymes and creatinine down trending; discontinued IV dobutamine  - Start Entresto 24-26 BID and spironolactone 25 daily   - Will consider adding SGLT2 prior to discharging if BP/creatinine stable  - No BB due to concern for low cardiac output  - Continue IV diuresis, possible discharge home tomorrow    2/22/2024:  - Patient seen and evaluated by Dr. Ceron  - Has been off dobutamine 24 hours, lactate 2.1 this morning and pt reports "feeling worse"; state up and down from bed 4 times last night  - Plan for RHC today. Procedure, risk, and benefits discussed in detail with patient, he understands and wishes to proceed. Consent obtained and on chart.  - Further recommendations to follow    2/23/2024:  - Patient seen and evaluated by Dr. Escobar  - RHC with elevated filling pressures and low normal cardiac output per Dr. Ceron (report not available)  - Continued orthopnea, LE edema, +JVD, and worsening right pleural effusion on cxr yest  - Increase IV lasix to 80mg BID  - 1500ml fluid restriction ordered (pt also instructed)  - Strict I & Os and daily weights  - Continue diuresing    Pt followed by VICENTE Escobar over the weekend    2/26/2024:  - Patient seen and evaluated by Dr. Pacheco  - Fluid status improving, however patient with symptomatic hypotension  - Discontinue Entresto and Jardiance, continue IV lasix   - Continue monitoring      2/27/2024:  - Patient seen and evaluated by Dr. Pacheco  - BP improved off Entresto and Jardiance, now 111/86  - Patient continues to feel bad, lung sound with occasional crackle and continued coughing; will repeat cxr  - PT to evaluate and treat; " weakness  - Na 123 today; Discontinued spironolactone (can interfere with sodium reabsorption)  - Continue diuresis  - Further recommendations to follow per Dr. Pacheco; see attestation     2/28/2024:  - Pt discharging home today per wife  - He refused IV lasix last night and this morning  - Pt unable to tolerate spironolactone due to hyponatremia, unable to tolerate SGLT2 due to hypotension, and no BB prescribed due to concern for low cardiac output  - He was taking lisinopril 5mg daily at home which has been discontinued per discharge med rec. I added Losartan 12.5mg daily (for afterload reduction) to his discharge medications and sent prescription to pharmacy  - Instructed to keep follow up appointment with cardiology in 2 weeks

## 2024-02-27 NOTE — ASSESSMENT & PLAN NOTE
Off Entresto due to low BP; continue meds per Cardiology; Echo shows ejection fraction of 20 - 25% with grade III diastolic dysfunction. Improving

## 2024-02-28 VITALS
WEIGHT: 198.88 LBS | OXYGEN SATURATION: 99 % | SYSTOLIC BLOOD PRESSURE: 111 MMHG | HEART RATE: 107 BPM | BODY MASS INDEX: 27.84 KG/M2 | HEIGHT: 71 IN | DIASTOLIC BLOOD PRESSURE: 77 MMHG | TEMPERATURE: 98 F | RESPIRATION RATE: 18 BRPM

## 2024-02-28 LAB
ANION GAP SERPL CALCULATED.3IONS-SCNC: 9 MMOL/L (ref 7–16)
BUN SERPL-MCNC: 14 MG/DL (ref 7–18)
BUN/CREAT SERPL: 17 (ref 6–20)
CALCIUM SERPL-MCNC: 8.9 MG/DL (ref 8.5–10.1)
CHLORIDE SERPL-SCNC: 93 MMOL/L (ref 98–107)
CO2 SERPL-SCNC: 29 MMOL/L (ref 21–32)
CREAT SERPL-MCNC: 0.83 MG/DL (ref 0.7–1.3)
EGFR (NO RACE VARIABLE) (RUSH/TITUS): 105 ML/MIN/1.73M2
GLUCOSE SERPL-MCNC: 140 MG/DL (ref 70–105)
GLUCOSE SERPL-MCNC: 142 MG/DL (ref 74–106)
GLUCOSE SERPL-MCNC: 147 MG/DL (ref 70–105)
GLUCOSE SERPL-MCNC: 150 MG/DL (ref 70–105)
POTASSIUM SERPL-SCNC: 4.4 MMOL/L (ref 3.5–5.1)
SODIUM SERPL-SCNC: 127 MMOL/L (ref 136–145)

## 2024-02-28 PROCEDURE — 94761 N-INVAS EAR/PLS OXIMETRY MLT: CPT

## 2024-02-28 PROCEDURE — 25000242 PHARM REV CODE 250 ALT 637 W/ HCPCS: Performed by: HOSPITALIST

## 2024-02-28 PROCEDURE — 63600175 PHARM REV CODE 636 W HCPCS: Performed by: HOSPITALIST

## 2024-02-28 PROCEDURE — 27000221 HC OXYGEN, UP TO 24 HOURS

## 2024-02-28 PROCEDURE — 99900035 HC TECH TIME PER 15 MIN (STAT)

## 2024-02-28 PROCEDURE — 99232 SBSQ HOSP IP/OBS MODERATE 35: CPT | Mod: ,,, | Performed by: NURSE PRACTITIONER

## 2024-02-28 PROCEDURE — 25000003 PHARM REV CODE 250: Performed by: HOSPITALIST

## 2024-02-28 PROCEDURE — 99239 HOSP IP/OBS DSCHRG MGMT >30: CPT | Mod: ,,, | Performed by: HOSPITALIST

## 2024-02-28 PROCEDURE — 80048 BASIC METABOLIC PNL TOTAL CA: CPT | Performed by: HOSPITALIST

## 2024-02-28 PROCEDURE — 82962 GLUCOSE BLOOD TEST: CPT

## 2024-02-28 PROCEDURE — 94640 AIRWAY INHALATION TREATMENT: CPT

## 2024-02-28 PROCEDURE — 97161 PT EVAL LOW COMPLEX 20 MIN: CPT

## 2024-02-28 RX ORDER — LOSARTAN POTASSIUM 25 MG/1
12.5 TABLET ORAL DAILY
Qty: 45 TABLET | Refills: 1 | Status: ON HOLD | OUTPATIENT
Start: 2024-02-28 | End: 2024-04-03 | Stop reason: HOSPADM

## 2024-02-28 RX ORDER — BUDESONIDE AND FORMOTEROL FUMARATE DIHYDRATE 160; 4.5 UG/1; UG/1
2 AEROSOL RESPIRATORY (INHALATION) EVERY 12 HOURS
Qty: 1 G | Refills: 0 | Status: ON HOLD | OUTPATIENT
Start: 2024-02-28 | End: 2024-04-03

## 2024-02-28 RX ORDER — BENZONATATE 100 MG/1
100 CAPSULE ORAL 3 TIMES DAILY PRN
Qty: 30 CAPSULE | Refills: 0 | Status: ON HOLD | OUTPATIENT
Start: 2024-02-28 | End: 2024-03-10 | Stop reason: HOSPADM

## 2024-02-28 RX ORDER — FUROSEMIDE 40 MG/1
40 TABLET ORAL 2 TIMES DAILY
Qty: 60 TABLET | Refills: 0 | Status: ON HOLD | OUTPATIENT
Start: 2024-02-28 | End: 2024-04-03 | Stop reason: HOSPADM

## 2024-02-28 RX ADMIN — TRAMADOL HYDROCHLORIDE 50 MG: 50 TABLET, COATED ORAL at 06:02

## 2024-02-28 RX ADMIN — TRAMADOL HYDROCHLORIDE 50 MG: 50 TABLET, COATED ORAL at 02:02

## 2024-02-28 RX ADMIN — HEPARIN SODIUM 5000 UNITS: 5000 INJECTION, SOLUTION INTRAVENOUS; SUBCUTANEOUS at 06:02

## 2024-02-28 RX ADMIN — PANTOPRAZOLE SODIUM 40 MG: 40 TABLET, DELAYED RELEASE ORAL at 08:02

## 2024-02-28 RX ADMIN — IPRATROPIUM BROMIDE 0.5 MG: 0.5 SOLUTION RESPIRATORY (INHALATION) at 07:02

## 2024-02-28 RX ADMIN — IPRATROPIUM BROMIDE 0.5 MG: 0.5 SOLUTION RESPIRATORY (INHALATION) at 01:02

## 2024-02-28 RX ADMIN — ASPIRIN 81 MG: 81 TABLET, COATED ORAL at 08:02

## 2024-02-28 RX ADMIN — GUAIFENESIN 600 MG: 600 TABLET, EXTENDED RELEASE ORAL at 08:02

## 2024-02-28 NOTE — RESPIRATORY THERAPY
Treatment given by RT student Hermanadalberto MARTÍNEZ     02/28/24 5601   Patient Assessment/Suction   Level of Consciousness (AVPU) alert   Respiratory Effort Unlabored   Expansion/Accessory Muscles/Retractions no use of accessory muscles;no retractions   All Lung Fields Breath Sounds Anterior:;Lateral:;crackles, fine;equal bilaterally   Rhythm/Pattern, Respiratory unlabored;pattern regular;depth regular;no shortness of breath reported   PRE-TX-O2   Device (Oxygen Therapy) room air   SpO2 99 %   Pulse Oximetry Type Intermittent   Pulse 109   Resp 20   Positioning Sitting in chair   Positioning   Body Position position maintained   Aerosol Therapy   $ Aerosol Therapy Charges Aerosol Treatment   Daily Review of Necessity (SVN) completed   Respiratory Treatment Status (SVN) given   Treatment Route (SVN) oxygen;mask   Patient Position (SVN) sitting in chair   Post Treatment Assessment (SVN) breath sounds unchanged   Signs of Intolerance (SVN) none   Breath Sounds Post-Respiratory Treatment   Throughout All Fields Post-Treatment All Fields   Throughout All Fields Post-Treatment Anterior:;Lateral:;no change   Post-treatment Heart Rate (beats/min) 108   Post-treatment Resp Rate (breaths/min) 20

## 2024-02-28 NOTE — SUBJECTIVE & OBJECTIVE
Interval History: Patient seen today, up in chair dressed. Wife reports he is being discharged home today. Patient refused IV lasix last night and this morning. Na improving, 127 today.    Review of Systems   Constitutional: Positive for malaise/fatigue. Negative for chills and fever.   Cardiovascular:  Positive for dyspnea on exertion, leg swelling (improved) and orthopnea (improved). Negative for chest pain.   Respiratory:  Positive for cough. Negative for shortness of breath.      Objective:     Vital Signs (Most Recent):  Temp: 97.9 °F (36.6 °C) (02/28/24 1431)  Pulse: 107 (02/28/24 1431)  Resp: 18 (02/28/24 1431)  BP: 111/77 (02/28/24 1431)  SpO2: 99 % (02/28/24 1431) Vital Signs (24h Range):  Temp:  [97.4 °F (36.3 °C)-97.9 °F (36.6 °C)] 97.9 °F (36.6 °C)  Pulse:  [] 107  Resp:  [16-21] 18  SpO2:  [94 %-99 %] 99 %  BP: (105-132)/(65-80) 111/77     Weight: 90.2 kg (198 lb 13.7 oz)  Body mass index is 27.73 kg/m².     SpO2: 99 %         Intake/Output Summary (Last 24 hours) at 2/28/2024 1442  Last data filed at 2/28/2024 0639  Gross per 24 hour   Intake --   Output 1300 ml   Net -1300 ml       Lines/Drains/Airways       Peripheral Intravenous Line  Duration                  Peripheral IV - Single Lumen 02/23/24 1019 22 G Left;Posterior Hand 5 days                       Physical Exam  Vitals reviewed.   Constitutional:       General: He is not in acute distress.  Neck:      Vascular: JVD (improving) present.   Cardiovascular:      Rate and Rhythm: Normal rate and regular rhythm.      Heart sounds: Normal heart sounds.   Pulmonary:      Effort: Pulmonary effort is normal.      Breath sounds: Normal breath sounds. No rales.   Abdominal:      General: Abdomen is flat.      Palpations: Abdomen is soft.   Musculoskeletal:      Right lower leg: Edema present.      Left lower leg: Edema present.   Skin:     General: Skin is warm and dry.   Neurological:      Mental Status: He is alert. Mental status is at baseline.  "           Significant Labs: ABG: No results for input(s): "PH", "PCO2", "HCO3", "POCSATURATED", "BE" in the last 48 hours., Blood Culture: No results for input(s): "LABBLOO" in the last 48 hours., BMP:   Recent Labs   Lab 02/27/24  0410 02/28/24  0253   * 142*   * 127*   K 4.0 4.4   CL 90* 93*   CO2 27 29   BUN 14 14   CREATININE 0.64* 0.83   CALCIUM 8.3* 8.9   , CMP   Recent Labs   Lab 02/27/24  0410 02/28/24  0253   * 127*   K 4.0 4.4   CL 90* 93*   CO2 27 29   * 142*   BUN 14 14   CREATININE 0.64* 0.83   CALCIUM 8.3* 8.9   ANIONGAP 10 9   , CBC No results for input(s): "WBC", "HGB", "HCT", "PLT" in the last 48 hours., INR No results for input(s): "INR", "PROTIME" in the last 48 hours., Lipid Panel   Recent Labs   Lab 02/27/24  1201   CHOL 106   HDL 35*   LDLCALC 53   TRIG 89   CHOLHDL 3.0   , and Troponin No results for input(s): "TROPONINI" in the last 48 hours.    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 >5L/min, index >2.2 indexed to BSA); MAP 75, : 0.85 Paez, Rajinder: 2.33    Pulmonary Wedge A Wave Pressure: 32 mmHg ; Pulmonary Wedge V Wave Pressure: 44 mmHg ; Pulmonary Wedge Mean Pressure: 27 mmHg    Pulmonary Artery Systolic Pressure: 57 mmHg ; Pulmonary Artery Diastolic Pressure: 22 mmHg ; Pulmonary Artery Mean Pressure: 38 mmHg    Recommend clinical correlation, continued diuresis w/ titration +/- dobutamine to goal net negative 1-2ml/kg/hr    Using US guidance, tried to place PIV in bascilic vein of RUE pre-procedure, however patient has flaccid hemiparesis and R sided aphasia and hemineglect; able to cannulate basilic vein x 2 preprocedure in addition to nurse attempts via US guidance, but could not advance PIV, micropuncture wire, etc for planned upgrade to 7F sheath " intraprocedure for use as access due to flaccid paralysis (no resting muscle tone). Explained to patient, who was amenable to R femoral vein access which was performed without difficulty following lidocaine administration, in sterile fashion once timeout performed and patient prepped/draped.    The procedure log was documented by Documenter: Isabel Alcaraz RN and verified by Ruiz Pacheco MD.    Date: 2/23/2024  Time: 10:27 AM      and Echocardiogram: Transthoracic echo (TTE) complete (Cupid Only):   Results for orders placed or performed during the hospital encounter of 02/19/24   Echo   Result Value Ref Range    BSA 2.14 m2    LVOT stroke volume 43.67 cm3    LVIDd 7.30 (A) 3.5 - 6.0 cm    LV Systolic Volume 232.15 mL    LV Systolic Volume Index 110.0 mL/m2    LVIDs 6.71 (A) 2.1 - 4.0 cm    LV Diastolic Volume 280.84 mL    LV Diastolic Volume Index 133.10 mL/m2    IVS 0.79 0.6 - 1.1 cm    LVOT diameter 2.50 cm    LVOT area 4.9 cm2    FS 8 (A) 28 - 44 %    Left Ventricle Relative Wall Thickness 0.23 cm    Posterior Wall 0.85 0.6 - 1.1 cm    LV mass 271.42 g    LV Mass Index 129 g/m2    MV Peak E Rohit 1.11 m/s    TDI LATERAL 0.12 m/s    TDI SEPTAL 0.07 m/s    E/E' ratio 11.68 m/s    MV Peak A Rohit 0.40 m/s    TR Max Rohit 3.53 m/s    E/A ratio 2.78     E wave deceleration time 150.05 msec    LV SEPTAL E/E' RATIO 15.86 m/s    LV LATERAL E/E' RATIO 9.25 m/s    LVOT peak rohit 0.59 m/s    Left Ventricular Outflow Tract Mean Velocity 0.44 cm/s    Left Ventricular Outflow Tract Mean Gradient 0.83 mmHg    RVDD 5.16 cm    TAPSE 1.10 cm    LA size 5.48 cm    Left Atrium Major Axis 6.90 cm    RA Major Axis 5.02 cm    AV mean gradient 2 mmHg    AV peak gradient 2 mmHg    Ao peak rohit 0.77 m/s    Ao VTI 11.20 cm    LVOT peak VTI 8.90 cm    AV valve area 3.90 cm²    AV Velocity Ratio 0.77     AV index (prosthetic) 0.79     DESMOND by Velocity Ratio 3.76 cm²    Mr max rohit 4.28 m/s    MV mean gradient 3 mmHg    MV peak gradient 8 mmHg     MV stenosis pressure 1/2 time 57.19 ms    MV valve area p 1/2 method 3.85 cm2    MV valve area by continuity eq 1.36 cm2    MV VTI 32.0 cm    Triscuspid Valve Regurgitation Peak Gradient 50 mmHg    PV PEAK VELOCITY 0.54 m/s    PV peak gradient 1 mmHg    Ao root annulus 2.00 cm    IVC diameter 2.38 cm    Mean e' 0.10 m/s    ZLVIDS 4.01     ZLVIDD 0.99     AORTIC VALVE CUSP SEPERATION 2.17 cm    TV resting pulmonary artery pressure 65 mmHg    RV TB RVSP 19 mmHg    Est. RA pres 15 mmHg    Vásquez's Biplane MOD Ejection Fraction 22 %    Narrative      Left Ventricle: The left ventricle is severely dilated. Normal wall   thickness. There is eccentric hypertrophy. There is severely reduced   systolic function with a visually estimated ejection fraction of 20 - 25%.   Grade III diastolic dysfunction.    Right Ventricle: Moderate right ventricular enlargement. Systolic   function is moderately reduced.    Left Atrium: Left atrium is moderately dilated.    Right Atrium: Right atrium is mildly dilated.    Aortic Valve: The aortic valve is a trileaflet valve. Mildly calcified   cusps.    Mitral Valve: There is mild bileaflet sclerosis. Mildly thickened   leaflets. There is no stenosis. The mean pressure gradient across the   mitral valve is 3 mmHg at a heart rate of  bpm. There is severe   regurgitation with a posterolateral eccentriccally directed jet.    Tricuspid Valve: There is moderate to severe regurgitation with a   centrally directed jet.    Pulmonary Artery: The estimated pulmonary artery systolic pressure is   65 mmHg.    IVC/SVC: Elevated venous pressure at 15 mmHg.    Pericardium: There is a trivial effusion.

## 2024-02-28 NOTE — DISCHARGE SUMMARY
[facility]  [facility] Medicine  Discharge Summary      Patient Name: Karin Carrera  MRN: [id number]  JOON: [id number]  Patient Class: IP- Inpatient  Admission Date: 2/19/2024  Hospital Length of Stay: 9 days  Discharge Date and Time:  02/28/2024 9:46 AM  Attending Physician: Martita Dawn DO   Discharging Provider: Martita Dawn DO  Primary Care Provider: Walker Smith MD    HPI:   53 year old male with an extensive PMH presents with LE swelling and SOB.  He does not use O2 at home.  He is a poor historian from a stroke and is unable to articulate.  But he was able to tell me he was in pain and wanted pain meds.            Hospital Course:   53 year old male with an extensive PMH presents with LE swelling and SOB. He was found to be in acute systolic CHF exacerbation.  He was diuresed with Lasix with improvement.   Echo shows ejection fraction of 20 - 25% with grade III diastolic dysfunction.  Cardiology was consulted who placed the patient on Entresto.  His BP dropped on Entresto, and this medication was discontinued.   He will be sent home with Lasix 40 mg BID and will follow up with Cardiology as outpt.  Will check if patient will need O2 at home--he uses tobacco products and smokes.  Will also Rx Symbicort  He states he has some SOB at all times--this is due to his COPD and CHF.  Will also send him to Pulmonary as an outpt.  Patient denies chest pain, nausea, vomiting, or diarrhea and is stable for discharge.       PHYSICAL EXAM:    GEN: NAD; alert   CVS: regular rate and rhythm; no murmurs  RESP: clear to auscultation bilaterally; no rhonchi, rales, or wheezes noted  GI: soft, non-tender, non-distended; + bowel sounds  EXTR: no clubbing, cyanosis, or edema; chronic venous stasis changes bilaterally but RLE >LLE  NEURO: R sided paralysis; has dysarthria    Final Active Diagnoses:    Diagnosis Date Noted POA    PRINCIPAL PROBLEM:  Acute on chronic systolic  congestive heart failure [I50.23] 12/19/2022 Yes    Acute hypoxemic respiratory failure [J96.01] 12/19/2022 Yes    Hyponatremia [E87.1] 02/26/2024 No    Pleural effusion [J90] 02/26/2024 Yes    Coronary artery disease involving coronary bypass graft of native heart without angina pectoris [I25.810] 01/01/2022 Yes    Cough [R05.9] 01/03/2024 Yes    Diabetes mellitus [E11.9] 03/29/2023 Yes    History of CVA (cerebrovascular accident) [Z86.73] 12/19/2022 Not Applicable        Discharged Condition: stable    Disposition: home    Follow Up:   Follow-up Information       Anay Barry FNP Follow up in 2 week(s).    Specialty: Cardiology  Why: Please schedule follow up with VICENTE johnson NP, in 2 weeks; Hospital discharge  Contact information:  [address] [place] [address]  [phone]               Osmar Ceron MD Follow up in 6 week(s).    Specialties: Interventional Cardiology, Cardiology  Why: Please schedule follow up with Dr. Ceron in 6 weeks  Contact information:  [address] [place] [address]  [phone]               Walker Smith MD Follow up in 2 day(s).    Specialties: Internal Medicine, Family Medicine, Hospitalist  Contact information:  [address] [facility] [address]  [phone]               Sun Ledezma MD Follow up in 1 week(s).    Specialty: Pulmonary Disease  Why: COPD  Contact information:  [address] [place], [place] [address]  [phone]                                Medications:  Reconciled Home Medications:      Medication List        START taking these medications      budesonide-formoterol 160-4.5 mcg 160-4.5 mcg/actuation Hfaa  Commonly known as: SYMBICORT  Inhale 2 puffs into the lungs every 12 (twelve) hours. Controller            CHANGE how you take these medications      benzonatate 100 MG capsule  Commonly known as: TESSALON  Take 1 capsule (100 mg total) by mouth 3 (three) times daily as needed  for Cough.  What changed:   medication strength  how much to take  when to take this  reasons to take this     furosemide 40 MG tablet  Commonly known as: LASIX  Take 1 tablet (40 mg total) by mouth 2 (two) times a day.  What changed: when to take this            CONTINUE taking these medications      albuterol 90 mcg/actuation inhaler  Commonly known as: PROVENTIL/VENTOLIN HFA  SMARTSI-2 Puff(s) By Mouth Every 4 Hours PRN     aspirin 81 MG EC tablet  Commonly known as: ECOTRIN  Take 1 tablet (81 mg total) by mouth once daily.     atorvastatin 40 MG tablet  Commonly known as: LIPITOR  Take 1 tablet (40 mg total) by mouth every evening.     fluticasone propionate 50 mcg/actuation nasal spray  Commonly known as: FLONASE  1 spray by Each Nostril route 2 (two) times daily.     insulin aspart U-100 100 unit/mL injection  Commonly known as: NovoLOG  Inject 0-5 Units into the skin 3 (three) times daily before meals.     LEVEMIR FLEXTOUCH U100 INSULIN 100 unit/mL (3 mL) Inpn pen  Generic drug: insulin detemir U-100 (Levemir)  Inject 10 Units into the skin every evening.     MUCINEX 600 mg 12 hr tablet  Generic drug: guaiFENesin  Take 600 mg by mouth 2 (two) times daily.     pantoprazole 40 MG tablet  Commonly known as: PROTONIX  Take 1 tablet (40 mg total) by mouth once daily.     THERMOTABS 287-180-15 mg Tab  Generic drug: sod.chlorid-potassium chloride  Take 1 tablet by mouth once daily.     TRUE METRIX GLUCOSE TEST STRIP Strp  Generic drug: blood sugar diagnostic  USE TO CHECK BLOOD SUGAR AS DIRECTED     TRUEPLUS LANCETS 33 gauge Misc  Generic drug: lancets  1 lancet  by Misc.(Non-Drug; Combo Route) route once daily. use as directed     zolpidem 5 MG Tab  Commonly known as: AMBIEN  Take 1 tablet (5 mg total) by mouth every evening.            STOP taking these medications      amLODIPine 10 MG tablet  Commonly known as: NORVASC     diclofenac sodium 1 % Gel  Commonly known as: VOLTAREN ARTHRITIS PAIN      guaiFENesin-codeine 100-10 mg/5 ml  mg/5 mL syrup  Commonly known as: TUSSI-ORGANIDIN NR     JARDIANCE 10 mg tablet  Generic drug: empagliflozin     lisinopriL 5 MG tablet  Commonly known as: PRINIVIL,ZESTRIL     metoprolol tartrate 25 MG tablet  Commonly known as: LOPRESSOR                Time spent on the discharge of patient: 45 minutes     All of the information has been discussed with the patient who acknowledged verbal understanding.        Martita Dawn DO  Department of Hospital Medicine  [facility]

## 2024-02-28 NOTE — PLAN OF CARE
[facility]  Discharge Final Note    Primary Care Provider: Walker Smith MD    Expected Discharge Date: 2/28/2024    Final Discharge Note (most recent)       Final Note - 02/28/24 1008          Final Note    Assessment Type Final Discharge Note     Anticipated Discharge Disposition Home or Self Care        Post-Acute Status    Discharge Delays None known at this time                   Pt d/c home with no needs. Consult for referral to Pulmonary clinic as outpt . Ss does not set outpt appts. Rn informed.      230 pm- consult for home 02. Referral faxed to the medical store.  Important Message from Medicare             Contact Info       Anay Barry, ISADORA   Specialty: Cardiology    [address] [place] [address] [facility] [place] [address]   Phone: [phone]       Next Steps: Follow up in 2 week(s)    Instructions: Please schedule follow up with VICENTE johnson, PRAVEEN, in 2 weeks; Hospital discharge    Osmar Ceron MD   Specialty: Interventional Cardiology, Cardiology    [address] [place] [address]   Phone: [phone]       Next Steps: Follow up in 6 week(s)    Instructions: Please schedule follow up with Dr. Ceron in 6 weeks    Walker Smith MD   Specialty: Internal Medicine, Family Medicine, Hospitalist   Relationship: PCP - General    [address] [address] [place] [address]   Phone: [phone]       Next Steps: Follow up in 2 day(s)    Sun Ledezma MD   Specialty: Pulmonary Disease    [address] [place] [address]   Phone: [phone]       Next Steps: Follow up in 1 week(s)    Instructions: COPD

## 2024-02-28 NOTE — PROGRESS NOTES
[facility]  Cardiology  Progress Note    Patient Name: Karin Carrera  MRN: [id number]  Admission Date: 2/19/2024  Hospital Length of Stay: 9 days  Code Status: Full Code   Attending Physician: Martita Dawn DO   Primary Care Physician: Walker Smith MD  Expected Discharge Date: 2/28/2024  Principal Problem:Acute on chronic systolic congestive heart failure    Subjective:     Hospital Course:   No notes on file    Interval History: Patient seen today, up in chair dressed. Wife reports he is being discharged home today. Patient refused IV lasix last night and this morning. Na improving, 127 today.    Review of Systems   Constitutional: Positive for malaise/fatigue. Negative for chills and fever.   Cardiovascular:  Positive for dyspnea on exertion, leg swelling (improved) and orthopnea (improved). Negative for chest pain.   Respiratory:  Positive for cough. Negative for shortness of breath.      Objective:     Vital Signs (Most Recent):  Temp: 97.9 °F (36.6 °C) (02/28/24 1431)  Pulse: 107 (02/28/24 1431)  Resp: 18 (02/28/24 1431)  BP: 111/77 (02/28/24 1431)  SpO2: 99 % (02/28/24 1431) Vital Signs (24h Range):  Temp:  [97.4 °F (36.3 °C)-97.9 °F (36.6 °C)] 97.9 °F (36.6 °C)  Pulse:  [] 107  Resp:  [16-21] 18  SpO2:  [94 %-99 %] 99 %  BP: (105-132)/(65-80) 111/77     Weight: 90.2 kg (198 lb 13.7 oz)  Body mass index is 27.73 kg/m².     SpO2: 99 %         Intake/Output Summary (Last 24 hours) at 2/28/2024 1442  Last data filed at 2/28/2024 0639  Gross per 24 hour   Intake --   Output 1300 ml   Net -1300 ml       Lines/Drains/Airways       Peripheral Intravenous Line  Duration                  Peripheral IV - Single Lumen 02/23/24 1019 22 G Left;Posterior Hand 5 days                       Physical Exam  Vitals reviewed.   Constitutional:       General: He is not in acute distress.  Neck:      Vascular: JVD (improving) present.   Cardiovascular:      Rate and Rhythm: Normal  "rate and regular rhythm.      Heart sounds: Normal heart sounds.   Pulmonary:      Effort: Pulmonary effort is normal.      Breath sounds: Normal breath sounds. No rales.   Abdominal:      General: Abdomen is flat.      Palpations: Abdomen is soft.   Musculoskeletal:      Right lower leg: Edema present.      Left lower leg: Edema present.   Skin:     General: Skin is warm and dry.   Neurological:      Mental Status: He is alert. Mental status is at baseline.            Significant Labs: ABG: No results for input(s): "PH", "PCO2", "HCO3", "POCSATURATED", "BE" in the last 48 hours., Blood Culture: No results for input(s): "LABBLOO" in the last 48 hours., BMP:   Recent Labs   Lab 02/27/24  0410 02/28/24  0253   * 142*   * 127*   K 4.0 4.4   CL 90* 93*   CO2 27 29   BUN 14 14   CREATININE 0.64* 0.83   CALCIUM 8.3* 8.9   , CMP   Recent Labs   Lab 02/27/24  0410 02/28/24  0253   * 127*   K 4.0 4.4   CL 90* 93*   CO2 27 29   * 142*   BUN 14 14   CREATININE 0.64* 0.83   CALCIUM 8.3* 8.9   ANIONGAP 10 9   , CBC No results for input(s): "WBC", "HGB", "HCT", "PLT" in the last 48 hours., INR No results for input(s): "INR", "PROTIME" in the last 48 hours., Lipid Panel   Recent Labs   Lab 02/27/24  1201   CHOL 106   HDL 35*   LDLCALC 53   TRIG 89   CHOLHDL 3.0   , and Troponin No results for input(s): "TROPONINI" in the last 48 hours.    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 >5L/min, index >2.2 indexed to BSA); MAP 75, : 0.85 Paez, Rajinder: 2.33    Pulmonary Wedge A Wave Pressure: 32 mmHg ; Pulmonary Wedge V Wave Pressure: 44 mmHg ; Pulmonary Wedge Mean Pressure: 27 mmHg    Pulmonary Artery Systolic Pressure: 57 mmHg ; Pulmonary Artery Diastolic Pressure: 22 mmHg ; Pulmonary Artery Mean Pressure: " 38 mmHg    Recommend clinical correlation, continued diuresis w/ titration +/- dobutamine to goal net negative 1-2ml/kg/hr    Using US guidance, tried to place PIV in bascilic vein of RUE pre-procedure, however patient has flaccid hemiparesis and R sided aphasia and hemineglect; able to cannulate basilic vein x 2 preprocedure in addition to nurse attempts via US guidance, but could not advance PIV, micropuncture wire, etc for planned upgrade to 7F sheath intraprocedure for use as access due to flaccid paralysis (no resting muscle tone). Explained to patient, who was amenable to R femoral vein access which was performed without difficulty following lidocaine administration, in sterile fashion once timeout performed and patient prepped/draped.    The procedure log was documented by Documenter: Isabel Alcaraz RN and verified by Ruiz Pacheco MD.    Date: 2/23/2024  Time: 10:27 AM      and Echocardiogram: Transthoracic echo (TTE) complete (Cupid Only):   Results for orders placed or performed during the hospital encounter of 02/19/24   Echo   Result Value Ref Range    BSA 2.14 m2    LVOT stroke volume 43.67 cm3    LVIDd 7.30 (A) 3.5 - 6.0 cm    LV Systolic Volume 232.15 mL    LV Systolic Volume Index 110.0 mL/m2    LVIDs 6.71 (A) 2.1 - 4.0 cm    LV Diastolic Volume 280.84 mL    LV Diastolic Volume Index 133.10 mL/m2    IVS 0.79 0.6 - 1.1 cm    LVOT diameter 2.50 cm    LVOT area 4.9 cm2    FS 8 (A) 28 - 44 %    Left Ventricle Relative Wall Thickness 0.23 cm    Posterior Wall 0.85 0.6 - 1.1 cm    LV mass 271.42 g    LV Mass Index 129 g/m2    MV Peak E Barbie 1.11 m/s    TDI LATERAL 0.12 m/s    TDI SEPTAL 0.07 m/s    E/E' ratio 11.68 m/s    MV Peak A Barbie 0.40 m/s    TR Max Barbie 3.53 m/s    E/A ratio 2.78     E wave deceleration time 150.05 msec    LV SEPTAL E/E' RATIO 15.86 m/s    LV LATERAL E/E' RATIO 9.25 m/s    LVOT peak barbie 0.59 m/s    Left Ventricular Outflow Tract Mean Velocity 0.44 cm/s    Left Ventricular Outflow  Tract Mean Gradient 0.83 mmHg    RVDD 5.16 cm    TAPSE 1.10 cm    LA size 5.48 cm    Left Atrium Major Axis 6.90 cm    RA Major Axis 5.02 cm    AV mean gradient 2 mmHg    AV peak gradient 2 mmHg    Ao peak barbie 0.77 m/s    Ao VTI 11.20 cm    LVOT peak VTI 8.90 cm    AV valve area 3.90 cm²    AV Velocity Ratio 0.77     AV index (prosthetic) 0.79     DESMOND by Velocity Ratio 3.76 cm²    Mr max barbie 4.28 m/s    MV mean gradient 3 mmHg    MV peak gradient 8 mmHg    MV stenosis pressure 1/2 time 57.19 ms    MV valve area p 1/2 method 3.85 cm2    MV valve area by continuity eq 1.36 cm2    MV VTI 32.0 cm    Triscuspid Valve Regurgitation Peak Gradient 50 mmHg    PV PEAK VELOCITY 0.54 m/s    PV peak gradient 1 mmHg    Ao root annulus 2.00 cm    IVC diameter 2.38 cm    Mean e' 0.10 m/s    ZLVIDS 4.01     ZLVIDD 0.99     AORTIC VALVE CUSP SEPERATION 2.17 cm    TV resting pulmonary artery pressure 65 mmHg    RV TB RVSP 19 mmHg    Est. RA pres 15 mmHg    Vásquez's Biplane MOD Ejection Fraction 22 %    Narrative      Left Ventricle: The left ventricle is severely dilated. Normal wall   thickness. There is eccentric hypertrophy. There is severely reduced   systolic function with a visually estimated ejection fraction of 20 - 25%.   Grade III diastolic dysfunction.    Right Ventricle: Moderate right ventricular enlargement. Systolic   function is moderately reduced.    Left Atrium: Left atrium is moderately dilated.    Right Atrium: Right atrium is mildly dilated.    Aortic Valve: The aortic valve is a trileaflet valve. Mildly calcified   cusps.    Mitral Valve: There is mild bileaflet sclerosis. Mildly thickened   leaflets. There is no stenosis. The mean pressure gradient across the   mitral valve is 3 mmHg at a heart rate of  bpm. There is severe   regurgitation with a posterolateral eccentriccally directed jet.    Tricuspid Valve: There is moderate to severe regurgitation with a   centrally directed jet.    Pulmonary Artery: The  "estimated pulmonary artery systolic pressure is   65 mmHg.    IVC/SVC: Elevated venous pressure at 15 mmHg.    Pericardium: There is a trivial effusion.       Assessment and Plan:     Brief HPI: 52 y.o. male with hx of CAD s/p CABG, ischemic cardiomyopathy (EF 20%), CVA, with expressive aphasia (able to write for communication) and right arm weakness,  HTN, GERD, ETOH abuse, and nicotine abuse who presented with SOB and admitted for acute decompensated HF with cardiogenic shock.     Patient reports few weeks of worsening SOB, orthopnea and sleeping in a recliner with PND. Also has bilateral leg swelling.   Patient admitted for IV diuresis.   Hospital course c/b runs of NSVT on tele     Today, He reports feeling better after IV diuresis. He was started on IV dobutamine 2.5mcg/kg/min for low cardiac output (elevated lactate and elevated LFTs).     * Acute on chronic systolic congestive heart failure  Ischemic cardiomyopathy; EF 20%, NYHA III , Stage D  Volume overloaded with low cardiac output - IV lasix 40mg bid, and iv dobutamine 2.5  GDMT - issues with compliance in the past - on lisinopril only  Devices; none -patient would likely qualify for ICD however is not taking GDMT    2/21/2024:  - Lactate normalized, liver enzymes and creatinine down trending; discontinued IV dobutamine  - Start Entresto 24-26 BID and spironolactone 25 daily   - Will consider adding SGLT2 prior to discharging if BP/creatinine stable  - No BB due to concern for low cardiac output  - Continue IV diuresis, possible discharge home tomorrow    2/22/2024:  - Patient seen and evaluated by Dr. Ceron  - Has been off dobutamine 24 hours, lactate 2.1 this morning and pt reports "feeling worse"; state up and down from bed 4 times last night  - Plan for RHC today. Procedure, risk, and benefits discussed in detail with patient, he understands and wishes to proceed. Consent obtained and on chart.  - Further recommendations to " follow    2/23/2024:  - Patient seen and evaluated by Dr. Escobar  - RHC with elevated filling pressures and low normal cardiac output per Dr. Ceron (report not available)  - Continued orthopnea, LE edema, +JVD, and worsening right pleural effusion on cxr yest  - Increase IV lasix to 80mg BID  - 1500ml fluid restriction ordered (pt also instructed)  - Strict I & Os and daily weights  - Continue diuresing    Pt followed by VICENTE Escobar over the weekend    2/26/2024:  - Patient seen and evaluated by Dr. Pacheco  - Fluid status improving, however patient with symptomatic hypotension  - Discontinue Entresto and Jardiance, continue IV lasix   - Continue monitoring      2/27/2024:  - Patient seen and evaluated by Dr. Pacheco  - BP improved off Entresto and Jardiance, now 111/86  - Patient continues to feel bad, lung sound with occasional crackle and continued coughing; will repeat cxr  - PT to evaluate and treat; weakness  - Na 123 today; Discontinued spironolactone (can interfere with sodium reabsorption)  - Continue diuresis  - Further recommendations to follow per Dr. Pacheco; see attestation     2/28/2024:  - Pt discharging home today per wife  - He refused IV lasix last night and this morning  - Pt unable to tolerate spironolactone due to hyponatremia, unable to tolerate SGLT2 due to hypotension, and no BB prescribed due to concern for low cardiac output  - He was taking lisinopril 5mg daily at home which has been discontinued per discharge med rec. I added Losartan 12.5mg daily (for afterload reduction) to his discharge medications and sent prescription to pharmacy  - Instructed to keep follow up appointment with cardiology in 2 weeks    Coronary artery disease involving coronary bypass graft of native heart without angina pectoris  Patient with known CAD s/p CABG, which is controlled Will continue ASA and Statin and monitor for S/Sx of angina/ACS. Continue to monitor on telemetry.         VTE Risk Mitigation  (From admission, onward)           Ordered     heparin (porcine) injection 5,000 Units  Every 8 hours         02/19/24 2038     IP VTE HIGH RISK PATIENT  Once         02/19/24 2038     Place sequential compression device  Until discontinued         02/19/24 2038                    ISADORA Hendrickson  Cardiology  [facility]

## 2024-02-28 NOTE — RESPIRATORY THERAPY
Treatment given by RT student Alexandre MAURICIO     02/28/24 0720   Patient Assessment/Suction   Level of Consciousness (AVPU) alert   Respiratory Effort Unlabored   Expansion/Accessory Muscles/Retractions no retractions;no use of accessory muscles   All Lung Fields Breath Sounds Anterior:;Lateral:;crackles, fine;equal bilaterally   Rhythm/Pattern, Respiratory unlabored;pattern regular;depth regular;no shortness of breath reported   PRE-TX-O2   Device (Oxygen Therapy) room air;nasal cannula   $ Is the patient on Low Flow Oxygen? Yes  (Nasal cannula on at wall.)   Flow (L/min) 1  (On at wall)   SpO2 98 %   Pulse Oximetry Type Intermittent   $ Pulse Oximetry - Multiple Charge Pulse Oximetry - Multiple   Pulse (!) 111   Resp (!) 21   Positioning Sitting in chair   Positioning   Body Position position maintained   Aerosol Therapy   $ Aerosol Therapy Charges Aerosol Treatment   Daily Review of Necessity (SVN) completed   Respiratory Treatment Status (SVN) given   Treatment Route (SVN) oxygen;mask   Patient Position (SVN) sitting in chair   Post Treatment Assessment (SVN) breath sounds unchanged   Signs of Intolerance (SVN) none   Breath Sounds Post-Respiratory Treatment   Throughout All Fields Post-Treatment All Fields   Throughout All Fields Post-Treatment Anterior:;Lateral:;no change   Post-treatment Heart Rate (beats/min) 109   Post-treatment Resp Rate (breaths/min) 20   Respiratory Evaluation   $ Care Plan Tech Time 15 min

## 2024-02-28 NOTE — NURSING
Ambulated in room on room air. O2 sat dropped to 86%. Assisted back to chair. C/o shortness of breath. O2 reapplied at 2L nasal cannula. O2 sat improved to 96%.

## 2024-02-29 ENCOUNTER — OFFICE VISIT (OUTPATIENT)
Dept: FAMILY MEDICINE | Facility: CLINIC | Age: 54
End: 2024-02-29
Payer: MEDICAID

## 2024-02-29 ENCOUNTER — HOSPITAL ENCOUNTER (EMERGENCY)
Facility: HOSPITAL | Age: 54
Discharge: HOME | End: 2024-02-29
Payer: MEDICAID

## 2024-02-29 VITALS
DIASTOLIC BLOOD PRESSURE: 81 MMHG | WEIGHT: 198 LBS | BODY MASS INDEX: 27.72 KG/M2 | HEART RATE: 117 BPM | TEMPERATURE: 97 F | OXYGEN SATURATION: 97 % | SYSTOLIC BLOOD PRESSURE: 115 MMHG | HEIGHT: 71 IN | RESPIRATION RATE: 18 BRPM

## 2024-02-29 VITALS
BODY MASS INDEX: 27.37 KG/M2 | DIASTOLIC BLOOD PRESSURE: 89 MMHG | OXYGEN SATURATION: 96 % | TEMPERATURE: 98 F | WEIGHT: 196.19 LBS | RESPIRATION RATE: 18 BRPM | HEART RATE: 123 BPM | SYSTOLIC BLOOD PRESSURE: 131 MMHG

## 2024-02-29 DIAGNOSIS — R00.0 TACHYCARDIA: ICD-10-CM

## 2024-02-29 DIAGNOSIS — R05.3 CHRONIC COUGH: Primary | ICD-10-CM

## 2024-02-29 DIAGNOSIS — I50.9 CONGESTIVE HEART FAILURE, UNSPECIFIED HF CHRONICITY, UNSPECIFIED HEART FAILURE TYPE: ICD-10-CM

## 2024-02-29 LAB
EKG 12-LEAD: NORMAL
HEART RATE: 107
Lab: NORMAL
PR INTERVAL: 207
PRT AXES: NORMAL
QRS DURATION: 102
QT/QTC: NORMAL
VENTRICULAR RATE: NORMAL

## 2024-02-29 PROCEDURE — 3008F BODY MASS INDEX DOCD: CPT | Mod: CPTII,,, | Performed by: INTERNAL MEDICINE

## 2024-02-29 PROCEDURE — 99282 EMERGENCY DEPT VISIT SF MDM: CPT | Mod: ,,, | Performed by: NURSE PRACTITIONER

## 2024-02-29 PROCEDURE — 99214 OFFICE O/P EST MOD 30 MIN: CPT | Mod: ,,, | Performed by: INTERNAL MEDICINE

## 2024-02-29 PROCEDURE — 3074F SYST BP LT 130 MM HG: CPT | Mod: CPTII,,, | Performed by: INTERNAL MEDICINE

## 2024-02-29 PROCEDURE — 93005 ELECTROCARDIOGRAM TRACING: CPT

## 2024-02-29 PROCEDURE — 93000 ELECTROCARDIOGRAM COMPLETE: CPT | Mod: ,,, | Performed by: INTERNAL MEDICINE

## 2024-02-29 PROCEDURE — 1111F DSCHRG MED/CURRENT MED MERGE: CPT | Mod: CPTII,,, | Performed by: INTERNAL MEDICINE

## 2024-02-29 PROCEDURE — 99281 EMR DPT VST MAYX REQ PHY/QHP: CPT | Mod: 25

## 2024-02-29 PROCEDURE — 4010F ACE/ARB THERAPY RXD/TAKEN: CPT | Mod: CPTII,,, | Performed by: INTERNAL MEDICINE

## 2024-02-29 PROCEDURE — 93010 ELECTROCARDIOGRAM REPORT: CPT | Mod: ,,, | Performed by: STUDENT IN AN ORGANIZED HEALTH CARE EDUCATION/TRAINING PROGRAM

## 2024-02-29 PROCEDURE — 3079F DIAST BP 80-89 MM HG: CPT | Mod: CPTII,,, | Performed by: INTERNAL MEDICINE

## 2024-02-29 RX ORDER — ZOLPIDEM TARTRATE 5 MG/1
5 TABLET ORAL NIGHTLY
Qty: 30 TABLET | Refills: 1 | Status: ON HOLD | OUTPATIENT
Start: 2024-02-29 | End: 2024-04-03 | Stop reason: HOSPADM

## 2024-02-29 RX ORDER — ATORVASTATIN CALCIUM 40 MG/1
40 TABLET, FILM COATED ORAL NIGHTLY
Qty: 90 TABLET | Refills: 1 | Status: SHIPPED | OUTPATIENT
Start: 2024-02-29

## 2024-02-29 RX ORDER — INSULIN DETEMIR 100 [IU]/ML
10 INJECTION, SOLUTION SUBCUTANEOUS NIGHTLY
Qty: 36 ML | Refills: 0 | Status: SHIPPED | OUTPATIENT
Start: 2024-02-29

## 2024-02-29 RX ORDER — PANTOPRAZOLE SODIUM 40 MG/1
40 TABLET, DELAYED RELEASE ORAL DAILY
Qty: 90 TABLET | Refills: 1 | Status: SHIPPED | OUTPATIENT
Start: 2024-02-29

## 2024-02-29 NOTE — LETTER
Patient: Karin Carrera  YOB: 1970  Date: 2/29/2024 Time: 1:18 PM  Location: [facility]    Leaving the Hospital Against Medical Advice    Chart #:[id number]    This will certify that I, the undersigned,    ______________________________________________________________________    A patient in the above named medical center, having requested discharge and removal from the medical [place] against the advice of my attending physician(s), hereby release [facility], its physicians, officers and employees, severally and individually, from any and all liability of any nature whatsoever for any injury or harm or complication of any kind that may result directly or indirectly, by reason of my terminating my stay as a patient at [facility] and my departure from [facility], and hereby waive any and all rights of action I may now have or later acquire as a result of my voluntary departure from [facility] and the termination of my stay as a patient therein.    This release is made with the full knowledge of the danger that may result from the action which I am taking.      Date:_______________________                         ___________________________                                                                                    Patient/Legal Representative    Witness:        ____________________________                          ___________________________  Nurse                                                                        Physician

## 2024-02-29 NOTE — PT/OT/SLP EVAL
Physical Therapy Evaluation    Patient Name:  Karin Carrera   MRN:  [id number]    Recommendations:     Discharge Recommendations: No Therapy Indicated   Discharge Equipment Recommendations: none   Barriers to discharge: Decreased caregiver support    Assessment:     Karin Carrera is a 53 y.o. male admitted with a medical diagnosis of Acute on chronic systolic congestive heart failure.  He presents with the following impairments/functional limitations: impaired cardiopulmonary response to activity Patient with some sob and coughing during ambulation. Sats drop to 86% with room air post gait. May need oxygen for home.    Rehab Prognosis: Good; patient would benefit from acute skilled PT services to address these deficits and reach maximum level of function.    Recent Surgery: Procedure(s) (LRB):  INSERTION, CATHETER, RIGHT HEART (Right) 6 Days Post-Op    Plan:     During this hospitalization, patient to be seen 1 x/week to address the identified rehab impairments via gait training and progress toward the following goals:    Plan of Care Expires:  02/28/24    Subjective     Chief Complaint: sob  Patient/Family Comments/goals: cought  Pain/Comfort:  Pain Rating 1: 0/10    Patients cultural, spiritual, Advent conflicts given the current situation:      Living Environment:    Prior to admission, patients level of function was ambulates with cane.  Equipment used at home: power chair, cane, straight.  DME owned (not currently used): wheelchair.  Upon discharge, patient will have assistance from family.    Objective:     Communicated with nurse prior to session.  Patient found supine with    upon PT entry to room.    General Precautions: Standard, aphasia  Orthopedic Precautions:    Braces:    Respiratory Status: Nasal cannula, flow 2 L/min    Exams:  Expressive aphasia  RLE ROM: WFL  RLE Strength: 3/5  LLE ROM: WFL  LLE Strength: WFL    Functional Mobility:  Transfers:     Sit to Stand:  contact guard  assistance with straight cane  Gait: ambulated 80 feet with cane, room air, sats 86%      AM-PAC 6 CLICK MOBILITY  Total Score:16       Treatment & Education:  na    Patient left HOB elevated with all lines intact.    GOALS:   Multidisciplinary Problems       Physical Therapy Goals       Not on file                    History:     Past Medical History:   Diagnosis Date    GERD (gastroesophageal reflux disease)     Hypertension     Stroke        Past Surgical History:   Procedure Laterality Date    CARDIAC SURGERY      HEMIARTHROPLASTY OF HIP Right 1/2/2022    Procedure: HEMIARTHROPLASTY, HIP;  Surgeon: Ramses Chua MD;  Location: [facility];  Service: Orthopedics;  Laterality: Right;    RIGHT HEART CATHETERIZATION Right 2/22/2024    Procedure: INSERTION, CATHETER, RIGHT HEART;  Surgeon: Ruiz Pacheco MD;  Location: [facility] CATH LAB;  Service: Cardiology;  Laterality: Right;       Time Tracking:     PT Received On: 02/28/24  PT Start Time: 0840     PT Stop Time: 0855  PT Total Time (min): 15 min     Billable Minutes: Evaluation 15      02/28/2024

## 2024-02-29 NOTE — ED PROVIDER NOTES
Encounter Date: 2/29/2024       History     Chief Complaint   Patient presents with    Abnormal ECG     Presents from clinic for abnormal ECG. Denies chest pain or SOB. States that he has had low level nausea for about 2 weeks.      53 year old male presents to ED for abnormal ECG. Spouse reports patient was discharged from hospital on yesterday afternoon with home O2 that was set up on this morning. Patient had a follow up appointment with PCP on today and was noted to have an elevated heart rate. Patient was sent from PCP's office for further evaluation. Spouse states patient was mostly bed bound during hospitalization with patient being hospitalized since 2/19. She states patient uses a wheelchair and walker/cane and was ambulating into the doctor's office which may have caused elevated heart rate. Patient denies chest pain, shortness of breath, weakness/dizziness. Reports nausea without vomiting; denies diarrhea.     The history is provided by the spouse, the patient and medical records.     Review of patient's allergies indicates:  No Known Allergies  Past Medical History:   Diagnosis Date    GERD (gastroesophageal reflux disease)     Hypertension     Stroke      Past Surgical History:   Procedure Laterality Date    CARDIAC SURGERY      HEMIARTHROPLASTY OF HIP Right 1/2/2022    Procedure: HEMIARTHROPLASTY, HIP;  Surgeon: Ramses Chua MD;  Location: [facility] ORTHO OR;  Service: Orthopedics;  Laterality: Right;    RIGHT HEART CATHETERIZATION Right 2/22/2024    Procedure: INSERTION, CATHETER, RIGHT HEART;  Surgeon: Ruiz Pacheco MD;  Location: [facility] CATH LAB;  Service: Cardiology;  Laterality: Right;     Family History   Problem Relation Age of Onset    Hypertension Mother      Social History     Tobacco Use    Smoking status: Every Day     Current packs/day: 0.50     Types: Cigarettes    Smokeless tobacco: Current     Types: Chew   Substance Use Topics    Alcohol use: Yes     Comment: 1 quart    Drug  use: Never     Review of Systems   Constitutional:  Negative for chills, fatigue and fever.   Respiratory:  Negative for cough and shortness of breath.    Cardiovascular:  Positive for palpitations. Negative for chest pain.   Gastrointestinal:  Positive for nausea. Negative for vomiting.   Musculoskeletal:  Negative for arthralgias and back pain.   Skin:  Negative for color change and wound.   Neurological:  Negative for dizziness and weakness.   Hematological:  Negative for adenopathy. Does not bruise/bleed easily.   Psychiatric/Behavioral:  Negative for agitation and confusion.    All other systems reviewed and are negative.      Physical Exam     Initial Vitals   BP Pulse Resp Temp SpO2   02/29/24 1125 02/29/24 1125 02/29/24 1137 02/29/24 1137 02/29/24 1137   128/71 (!) 114 19 98 °F (36.7 °C) (!) 93 %      MAP       --                Physical Exam    Nursing note and vitals reviewed.  Constitutional: He appears well-developed and well-nourished.   HENT:   Head: Normocephalic and atraumatic.   Eyes: EOM are normal. Pupils are equal, round, and reactive to light.   Neck: Neck supple.   Normal range of motion.  Cardiovascular:  Regular rhythm.   Tachycardia present.         No murmur heard.  Pulmonary/Chest: He has no wheezes. He has no rhonchi.   Abdominal: Abdomen is soft. He exhibits no distension. There is no abdominal tenderness.   Musculoskeletal:         General: No tenderness or edema.      Cervical back: Normal range of motion and neck supple.     Lymphadenopathy:     He has no cervical adenopathy.   Neurological: He is alert and oriented to person, place, and time. No cranial nerve deficit or sensory deficit.   Skin: Skin is warm and dry. Capillary refill takes less than 2 seconds.   Psychiatric: He has a normal mood and affect. Thought content normal.         Medical Screening Exam   See Full Note    ED Course   Procedures  Labs Reviewed   CBC W/ AUTO DIFFERENTIAL    Narrative:     The following orders  were created for panel order CBC auto differential.  Procedure                               Abnormality         Status                     ---------                               -----------         ------                     CBC with Differential[[id number]]                                                        Please view results for these tests on the individual orders.   COMPREHENSIVE METABOLIC PANEL   MAGNESIUM   NT-PRO NATRIURETIC PEPTIDE   D DIMER, QUANTITATIVE   CBC WITH DIFFERENTIAL          Imaging Results    None          Medications - No data to display  Medical Decision Making  53 year old male presents to ED for abnormal ECG. Spouse reports patient was discharged from hospital on yesterday afternoon with home O2 that was set up on this morning. Patient had a follow up appointment with PCP on today and was noted to have an elevated heart rate. Patient was sent from PCP's office for further evaluation. Spouse states patient was mostly bed bound during hospitalization with patient being hospitalized since 2/19. She states patient uses a wheelchair and walker/cane and was ambulating into the doctor's office which may have caused elevated heart rate. Patient denies chest pain, shortness of breath, weakness/dizziness. Reports nausea without vomiting; denies diarrhea.     Labs, diagnostics ordered. Patient refused labs/diagnostics and stated he wanted to leave. ANASTASIIA papers signed.     Amount and/or Complexity of Data Reviewed  Labs: ordered.  Radiology: ordered.                                      Clinical Impression:   Final diagnoses:  [R00.0] Tachycardia        ED Disposition Condition    Mirian Londono, [facility]  02/29/24 1676

## 2024-02-29 NOTE — PROGRESS NOTES
Pt sent to ER  for SOB, Chest pain, and abnormal ECG per Dr Smith; pt will arrive to [facility] with family member.

## 2024-03-01 ENCOUNTER — TELEPHONE (OUTPATIENT)
Dept: EMERGENCY MEDICINE | Facility: HOSPITAL | Age: 54
End: 2024-03-01
Payer: MEDICAID

## 2024-03-02 ENCOUNTER — HOSPITAL ENCOUNTER (EMERGENCY)
Facility: HOSPITAL | Age: 54
Discharge: HOME | End: 2024-03-03
Attending: EMERGENCY MEDICINE
Payer: MEDICAID

## 2024-03-02 VITALS
HEART RATE: 110 BPM | BODY MASS INDEX: 25.2 KG/M2 | DIASTOLIC BLOOD PRESSURE: 76 MMHG | HEIGHT: 71 IN | OXYGEN SATURATION: 100 % | SYSTOLIC BLOOD PRESSURE: 100 MMHG | WEIGHT: 180 LBS | RESPIRATION RATE: 24 BRPM | TEMPERATURE: 99 F

## 2024-03-02 DIAGNOSIS — R53.1 GENERAL WEAKNESS: ICD-10-CM

## 2024-03-02 DIAGNOSIS — K80.50 BILIARY COLIC: Primary | ICD-10-CM

## 2024-03-02 LAB
ALBUMIN SERPL BCP-MCNC: 3.5 G/DL (ref 3.5–5)
ALBUMIN/GLOB SERPL: 0.8 {RATIO}
ALP SERPL-CCNC: 118 U/L (ref 45–115)
ALT SERPL W P-5'-P-CCNC: 51 U/L (ref 16–61)
ANION GAP SERPL CALCULATED.3IONS-SCNC: 10 MMOL/L (ref 7–16)
APTT PPP: 29.6 SECONDS (ref 25.2–37.3)
AST SERPL W P-5'-P-CCNC: 24 U/L (ref 15–37)
BASOPHILS # BLD AUTO: 0.09 K/UL (ref 0–0.2)
BASOPHILS NFR BLD AUTO: 1.2 % (ref 0–1)
BILIRUB SERPL-MCNC: 1 MG/DL (ref ?–1.2)
BILIRUB UR QL STRIP: NEGATIVE
BUN SERPL-MCNC: 22 MG/DL (ref 7–18)
BUN/CREAT SERPL: 22 (ref 6–20)
CALCIUM SERPL-MCNC: 8.9 MG/DL (ref 8.5–10.1)
CHLORIDE SERPL-SCNC: 108 MMOL/L (ref 98–107)
CLARITY UR: CLEAR
CO2 SERPL-SCNC: 26 MMOL/L (ref 21–32)
COLOR UR: YELLOW
CREAT SERPL-MCNC: 1.01 MG/DL (ref 0.7–1.3)
DIFFERENTIAL METHOD BLD: ABNORMAL
EGFR (NO RACE VARIABLE) (RUSH/TITUS): 89 ML/MIN/1.73M2
EOSINOPHIL # BLD AUTO: 0.11 K/UL (ref 0–0.5)
EOSINOPHIL NFR BLD AUTO: 1.4 % (ref 1–4)
ERYTHROCYTE [DISTWIDTH] IN BLOOD BY AUTOMATED COUNT: 15.4 % (ref 11.5–14.5)
GLOBULIN SER-MCNC: 4.2 G/DL (ref 2–4)
GLUCOSE SERPL-MCNC: 181 MG/DL (ref 74–106)
GLUCOSE SERPL-MCNC: 185 MG/DL (ref 70–105)
GLUCOSE UR STRIP-MCNC: >1000 MG/DL
HCT VFR BLD AUTO: 38.6 % (ref 40–54)
HCT VFR BLD AUTO: 41.6 % (ref 40–54)
HGB BLD-MCNC: 12.5 G/DL (ref 13.5–18)
HGB BLD-MCNC: 13 G/DL (ref 13.5–18)
IMM GRANULOCYTES # BLD AUTO: 0.07 K/UL (ref 0–0.04)
IMM GRANULOCYTES NFR BLD: 0.9 % (ref 0–0.4)
INR BLD: 1.32
KETONES UR STRIP-SCNC: NEGATIVE MG/DL
LEUKOCYTE ESTERASE UR QL STRIP: NEGATIVE
LYMPHOCYTES # BLD AUTO: 1.48 K/UL (ref 1–4.8)
LYMPHOCYTES NFR BLD AUTO: 19.1 % (ref 27–41)
MAGNESIUM SERPL-MCNC: 2.3 MG/DL (ref 1.7–2.3)
MCH RBC QN AUTO: 28.5 PG (ref 27–31)
MCHC RBC AUTO-ENTMCNC: 32.4 G/DL (ref 32–36)
MCV RBC AUTO: 88.1 FL (ref 80–96)
MONOCYTES # BLD AUTO: 0.52 K/UL (ref 0–0.8)
MONOCYTES NFR BLD AUTO: 6.7 % (ref 2–6)
MPC BLD CALC-MCNC: 8.9 FL (ref 9.4–12.4)
NEUTROPHILS # BLD AUTO: 5.46 K/UL (ref 1.8–7.7)
NEUTROPHILS NFR BLD AUTO: 70.7 % (ref 53–65)
NITRITE UR QL STRIP: NEGATIVE
NRBC # BLD AUTO: 0 X10E3/UL
NRBC, AUTO (.00): 0 %
NT-PROBNP SERPL-MCNC: 8737 PG/ML (ref 1–125)
PH UR STRIP: 5 PH UNITS
PLATELET # BLD AUTO: 317 K/UL (ref 150–400)
POC OCCULT BLOOD STOOL: POSITIVE
POTASSIUM SERPL-SCNC: 4.1 MMOL/L (ref 3.5–5.1)
PROT SERPL-MCNC: 7.7 G/DL (ref 6.4–8.2)
PROT UR QL STRIP: 10
PROTHROMBIN TIME: 16.2 SECONDS (ref 11.7–14.7)
RBC # BLD AUTO: 4.38 M/UL (ref 4.6–6.2)
RBC # UR STRIP: NEGATIVE /UL
SODIUM SERPL-SCNC: 140 MMOL/L (ref 136–145)
SP GR UR STRIP: 1.03
TROPONIN I SERPL DL<=0.01 NG/ML-MCNC: 21.6 PG/ML
UROBILINOGEN UR STRIP-ACNC: NORMAL MG/DL
WBC # BLD AUTO: 7.73 K/UL (ref 4.5–11)

## 2024-03-02 PROCEDURE — 93005 ELECTROCARDIOGRAM TRACING: CPT

## 2024-03-02 PROCEDURE — 83880 ASSAY OF NATRIURETIC PEPTIDE: CPT | Performed by: EMERGENCY MEDICINE

## 2024-03-02 PROCEDURE — 25000003 PHARM REV CODE 250: Performed by: EMERGENCY MEDICINE

## 2024-03-02 PROCEDURE — C9113 INJ PANTOPRAZOLE SODIUM, VIA: HCPCS | Performed by: EMERGENCY MEDICINE

## 2024-03-02 PROCEDURE — 85610 PROTHROMBIN TIME: CPT | Performed by: EMERGENCY MEDICINE

## 2024-03-02 PROCEDURE — 96366 THER/PROPH/DIAG IV INF ADDON: CPT

## 2024-03-02 PROCEDURE — 96375 TX/PRO/DX INJ NEW DRUG ADDON: CPT

## 2024-03-02 PROCEDURE — 85025 COMPLETE CBC W/AUTO DIFF WBC: CPT | Performed by: EMERGENCY MEDICINE

## 2024-03-02 PROCEDURE — 63600175 PHARM REV CODE 636 W HCPCS: Performed by: EMERGENCY MEDICINE

## 2024-03-02 PROCEDURE — 99285 EMERGENCY DEPT VISIT HI MDM: CPT | Mod: 25

## 2024-03-02 PROCEDURE — 96368 THER/DIAG CONCURRENT INF: CPT

## 2024-03-02 PROCEDURE — 85018 HEMOGLOBIN: CPT | Performed by: EMERGENCY MEDICINE

## 2024-03-02 PROCEDURE — 93010 ELECTROCARDIOGRAM REPORT: CPT | Mod: ,,, | Performed by: HOSPITALIST

## 2024-03-02 PROCEDURE — 85730 THROMBOPLASTIN TIME PARTIAL: CPT | Performed by: EMERGENCY MEDICINE

## 2024-03-02 PROCEDURE — 82962 GLUCOSE BLOOD TEST: CPT

## 2024-03-02 PROCEDURE — 80053 COMPREHEN METABOLIC PANEL: CPT | Performed by: EMERGENCY MEDICINE

## 2024-03-02 PROCEDURE — 82272 OCCULT BLD FECES 1-3 TESTS: CPT

## 2024-03-02 PROCEDURE — 84484 ASSAY OF TROPONIN QUANT: CPT | Performed by: EMERGENCY MEDICINE

## 2024-03-02 PROCEDURE — 96365 THER/PROPH/DIAG IV INF INIT: CPT

## 2024-03-02 PROCEDURE — 99284 EMERGENCY DEPT VISIT MOD MDM: CPT | Mod: ,,, | Performed by: EMERGENCY MEDICINE

## 2024-03-02 PROCEDURE — 81003 URINALYSIS AUTO W/O SCOPE: CPT | Performed by: EMERGENCY MEDICINE

## 2024-03-02 PROCEDURE — 86901 BLOOD TYPING SEROLOGIC RH(D): CPT | Performed by: EMERGENCY MEDICINE

## 2024-03-02 PROCEDURE — 83735 ASSAY OF MAGNESIUM: CPT | Performed by: EMERGENCY MEDICINE

## 2024-03-02 RX ORDER — METOPROLOL SUCCINATE 25 MG/1
12.5 TABLET, EXTENDED RELEASE ORAL DAILY
Status: ON HOLD | COMMUNITY
End: 2024-04-03

## 2024-03-02 RX ORDER — PANTOPRAZOLE SODIUM 40 MG/10ML
80 INJECTION, POWDER, LYOPHILIZED, FOR SOLUTION INTRAVENOUS
Status: COMPLETED | OUTPATIENT
Start: 2024-03-02 | End: 2024-03-02

## 2024-03-02 RX ORDER — AMOXICILLIN AND CLAVULANATE POTASSIUM 875; 125 MG/1; MG/1
1 TABLET, FILM COATED ORAL 2 TIMES DAILY
Qty: 14 TABLET | Refills: 0 | Status: ON HOLD | OUTPATIENT
Start: 2024-03-02 | End: 2024-04-03 | Stop reason: HOSPADM

## 2024-03-02 RX ORDER — POTASSIUM CHLORIDE 750 MG/1
10 TABLET, EXTENDED RELEASE ORAL DAILY
COMMUNITY

## 2024-03-02 RX ORDER — ONDANSETRON HYDROCHLORIDE 2 MG/ML
4 INJECTION, SOLUTION INTRAVENOUS ONCE
Status: COMPLETED | OUTPATIENT
Start: 2024-03-02 | End: 2024-03-02

## 2024-03-02 RX ORDER — KETOROLAC TROMETHAMINE 30 MG/ML
30 INJECTION, SOLUTION INTRAMUSCULAR; INTRAVENOUS
Status: COMPLETED | OUTPATIENT
Start: 2024-03-02 | End: 2024-03-02

## 2024-03-02 RX ORDER — DICYCLOMINE HYDROCHLORIDE 20 MG/1
20 TABLET ORAL 2 TIMES DAILY
Qty: 20 TABLET | Refills: 0 | Status: ON HOLD | OUTPATIENT
Start: 2024-03-02 | End: 2024-04-03

## 2024-03-02 RX ORDER — ONDANSETRON HYDROCHLORIDE 2 MG/ML
4 INJECTION, SOLUTION INTRAVENOUS ONCE
Status: DISCONTINUED | OUTPATIENT
Start: 2024-03-02 | End: 2024-03-02

## 2024-03-02 RX ORDER — PROMETHAZINE HYDROCHLORIDE 25 MG/1
25 TABLET ORAL EVERY 6 HOURS PRN
Qty: 15 TABLET | Refills: 0 | Status: SHIPPED | OUTPATIENT
Start: 2024-03-02 | End: 2024-06-17

## 2024-03-02 RX ORDER — AMLODIPINE BESYLATE 10 MG/1
10 TABLET ORAL DAILY
COMMUNITY

## 2024-03-02 RX ADMIN — PANTOPRAZOLE SODIUM 8 MG/HR: 40 INJECTION, POWDER, FOR SOLUTION INTRAVENOUS at 09:03

## 2024-03-02 RX ADMIN — ONDANSETRON 4 MG: 2 INJECTION INTRAMUSCULAR; INTRAVENOUS at 07:03

## 2024-03-02 RX ADMIN — PANTOPRAZOLE SODIUM 8 MG/HR: 40 INJECTION, POWDER, FOR SOLUTION INTRAVENOUS at 04:03

## 2024-03-02 RX ADMIN — PANTOPRAZOLE SODIUM 80 MG: 40 INJECTION, POWDER, FOR SOLUTION INTRAVENOUS at 04:03

## 2024-03-02 RX ADMIN — AMPICILLIN AND SULBACTAM 3 G: 2; 1 INJECTION, POWDER, FOR SOLUTION INTRAVENOUS at 09:03

## 2024-03-02 RX ADMIN — KETOROLAC TROMETHAMINE 30 MG: 30 INJECTION, SOLUTION INTRAMUSCULAR at 09:03

## 2024-03-02 NOTE — ED PROVIDER NOTES
Encounter Date: 3/2/2024    SCRIBE #1 NOTE: I, Diamond Angel, am scribing for, and in the presence of,  Jose Cruz Bergman MD.       History     Chief Complaint   Patient presents with    Abdominal Pain     Patient is a 54 y/o Male that presents to the ED via EMS with c/o Abdominal Pain. Patient, had a stroke and it has affected his speech and Right side. EMS states patient has Black Stool like dark tar. PMhx consists of GERD, HTN, and Stroke. There are no other issue to report with this patient at this time.    The history is provided by the patient and the EMS personnel. No  was used.     Review of patient's allergies indicates:  No Known Allergies  Past Medical History:   Diagnosis Date    GERD (gastroesophageal reflux disease)     Hypertension     Stroke      Past Surgical History:   Procedure Laterality Date    CARDIAC SURGERY      HEMIARTHROPLASTY OF HIP Right 1/2/2022    Procedure: HEMIARTHROPLASTY, HIP;  Surgeon: Ramses Chua MD;  Location: [facility] ORTHO OR;  Service: Orthopedics;  Laterality: Right;    RIGHT HEART CATHETERIZATION Right 2/22/2024    Procedure: INSERTION, CATHETER, RIGHT HEART;  Surgeon: Ruiz Pacheco MD;  Location: [facility] CATH LAB;  Service: Cardiology;  Laterality: Right;     Family History   Problem Relation Age of Onset    Hypertension Mother      Social History     Tobacco Use    Smoking status: Every Day     Current packs/day: 0.50     Types: Cigarettes    Smokeless tobacco: Current     Types: Chew   Substance Use Topics    Alcohol use: Yes     Comment: 1 quart    Drug use: Never     Review of Systems   Constitutional:  Negative for fever.   Respiratory:  Negative for chest tightness.    Gastrointestinal:  Positive for abdominal pain and blood in stool. Negative for nausea.       Physical Exam     Initial Vitals   BP Pulse Resp Temp SpO2   03/02/24 1456 03/02/24 1456 03/02/24 1457 03/02/24 1456 03/02/24 1456   117/74 110 (!) 25 98.7 °F (37.1 °C) 100 %       MAP       --                Physical Exam    Nursing note and vitals reviewed.  Constitutional: He appears well-developed and well-nourished.   HENT:   Head: Normocephalic and atraumatic.   Eyes: EOM are normal. Pupils are equal, round, and reactive to light.   Neck: Neck supple. No thyromegaly present. No JVD present.   Normal range of motion.  Cardiovascular:  Normal rate, regular rhythm, normal heart sounds and intact distal pulses.           No murmur heard.  Pulmonary/Chest: Breath sounds normal. No stridor. No respiratory distress. He has no wheezes.   Abdominal: Abdomen is soft. Bowel sounds are normal. He exhibits no distension. There is no abdominal tenderness.   Musculoskeletal:         General: No tenderness or edema.      Cervical back: Normal range of motion and neck supple.      Comments: Apheresis Lower right leg  Paresis chronic on right side arm.      Lymphadenopathy:     He has no cervical adenopathy.   Neurological: He is alert and oriented to person, place, and time. He has normal strength. No cranial nerve deficit or sensory deficit. GCS score is 15. GCS eye subscore is 4. GCS verbal subscore is 5. GCS motor subscore is 6.   Skin: Skin is warm and dry. Capillary refill takes less than 2 seconds. No rash noted.   Psychiatric: He has a normal mood and affect.         ED Course   Procedures  Labs Reviewed   COMPREHENSIVE METABOLIC PANEL - Abnormal; Notable for the following components:       Result Value    Chloride 108 (*)     Glucose 181 (*)     BUN 22 (*)     BUN/Creatinine Ratio 22 (*)     Globulin 4.2 (*)     Alk Phos 118 (*)     All other components within normal limits   PROTIME-INR - Abnormal; Notable for the following components:    PT 16.2 (*)     All other components within normal limits   URINALYSIS, REFLEX TO URINE CULTURE - Abnormal; Notable for the following components:    Protein, UA 10 (*)     Glucose, UA >1000 (*)     All other components within normal limits   NT-PRO  NATRIURETIC PEPTIDE - Abnormal; Notable for the following components:    ProBNP 8,737 (*)     All other components within normal limits   CBC WITH DIFFERENTIAL - Abnormal; Notable for the following components:    RBC 4.38 (*)     Hemoglobin 12.5 (*)     Hematocrit 38.6 (*)     RDW 15.4 (*)     MPV 8.9 (*)     Neutrophils % 70.7 (*)     Lymphocytes % 19.1 (*)     Monocytes % 6.7 (*)     Basophils % 1.2 (*)     Immature Granulocytes % 0.9 (*)     Immature Granulocytes, Absolute 0.07 (*)     All other components within normal limits   HEMOGLOBIN AND HEMATOCRIT, BLOOD - Abnormal; Notable for the following components:    Hemoglobin 13.0 (*)     All other components within normal limits   POCT OCCULT BLOOD (STOOL) - Abnormal; Notable for the following components:    Fecal Occult Blood Positive (*)     All other components within normal limits   POCT GLUCOSE MONITORING CONTINUOUS - Abnormal; Notable for the following components:    POC Glucose 185 (*)     All other components within normal limits   APTT - Normal   MAGNESIUM - Normal   TROPONIN I - Normal   CBC W/ AUTO DIFFERENTIAL    Narrative:     The following orders were created for panel order CBC auto differential.  Procedure                               Abnormality         Status                     ---------                               -----------         ------                     CBC with Differential[[id number]]       Abnormal            Final result                 Please view results for these tests on the individual orders.   TYPE & SCREEN   POCT GLUCOSE MONITORING CONTINUOUS          Imaging Results              US Abdomen Limited_Gallbladder (Final result)  Result time 03/02/24 21:05:20      Final result by González Roque MD (03/02/24 21:05:20)                   Impression:      Mild gallbladder wall thickening nonspecific.  No cholelithiasis.  The sonographer does report a positive sonographic Galicia sign also a nonspecific finding.    Fatty infiltration  of the liver.      Electronically signed by: González Roque  Date:    03/02/2024  Time:    21:05               Narrative:    EXAMINATION:  US ABDOMEN LIMITED_GALLBLADDER    CLINICAL HISTORY:  Right upper quadrant abdominal pain;    TECHNIQUE:  Limited ultrasound of the right upper quadrant of the abdomen  was performed.  Ultrasound images captured and stored.    COMPARISON:  None.    FINDINGS:  Liver: Normal in size, measuring 14.1 cm. Fatty liver infiltration. No focal hepatic lesions.    Gallbladder: No stones are seen.  There is mild gallbladder wall thickening.  Sonographer reports a positive sonographic Galicia sign.  No pericholecystic fluid.    Biliary system: The common duct is not dilated, measuring 7 mm.  No intrahepatic ductal dilatation.    Right kidney: Normal in size and echotexture, measuring 10.5 cm.    Miscellaneous: No upper abdominal ascites.  Hepatic and portal veins are patent with normal direction of flow.                                       X-Ray Chest 1 View (Final result)  Result time 03/02/24 17:36:42      Final result by González Roque MD (03/02/24 17:36:42)                   Impression:      Worsened interstitial densities favoring edema.      Electronically signed by: González Roque  Date:    03/02/2024  Time:    17:36               Narrative:    EXAMINATION:  XR CHEST 1 VIEW    CLINICAL HISTORY:  Weakness    TECHNIQUE:  Single frontal view of the chest was performed.    COMPARISON:  02/27/2024    FINDINGS:  Sternotomy and cardiomegaly are again seen.  Worsened interstitial opacities.  Small pleural effusions again seen.  No pneumothorax.                                       Medications   pantoprazole (PROTONIX) 40 mg in sodium chloride 0.9 % 100 mL IVPB (MB+) (8 mg/hr Intravenous New Bag 3/2/24 2109)   promethazine (PHENERGAN) 25 mg in dextrose 5 % (D5W) 50 mL IVPB (25 mg Intravenous Not Given 3/2/24 2230)   pantoprazole injection 80 mg (80 mg Intravenous Given 3/2/24 1612)   ondansetron  injection 4 mg (4 mg Intravenous Given 3/2/24 1948)   ampicillin-sulbactam (UNASYN) 3 g in sodium chloride 0.9 % 100 mL IVPB (MB+) (0 g Intravenous Stopped 3/2/24 2243)   ketorolac injection 30 mg (30 mg Intravenous Given 3/2/24 2148)     Medical Decision Making  Amount and/or Complexity of Data Reviewed  Labs: ordered.  Radiology: ordered.    Risk  Prescription drug management.              Attending Attestation:           Physician Attestation for Scribe:  Physician Attestation Statement for Scribe #1: I, Jose Cruz Bergman MD, reviewed documentation, as scribed by Diamond Ortiz in my presence, and it is both accurate and complete.             ED Course as of 03/02/24 2250   Sat Mar 02, 2024   1810 Care transferred to Dr. Branch.  Patient getting Protonix infusion will repeat H&H Hemoccult was positive no gross bleeding observed in ED patient otherwise asymptomatic [PK]   2242 Ultrasound showed thickened gallbladder wall which was nonspecific.  I will treat the patient with IV Unasyn and discharge him on Augmentin with outpatient follow-up with General surgery. [HK]      ED Course User Index  [HK] Narayan Branch MD  [PK] Jose Cruz Bergman MD                             Clinical Impression:  Final diagnoses:  [R53.1] General weakness  [K80.50] Biliary colic (Primary)          ED Disposition Condition    Discharge Stable          ED Prescriptions       Medication Sig Dispense Start Date End Date Auth. Provider    amoxicillin-clavulanate 875-125mg (AUGMENTIN) 875-125 mg per tablet Take 1 tablet by mouth 2 (two) times daily. 14 tablet 3/2/2024 -- Narayan Branch MD    dicyclomine (BENTYL) 20 mg tablet Take 1 tablet (20 mg total) by mouth 2 (two) times daily. 20 tablet 3/2/2024 4/1/2024 Narayan Branch MD    promethazine (PHENERGAN) 25 MG tablet Take 1 tablet (25 mg total) by mouth every 6 (six) hours as needed for Nausea. 15 tablet 3/2/2024 -- Narayan Branch MD          Follow-up Information     None          Narayan Branch MD  03/02/24 4243

## 2024-03-03 NOTE — ED NOTES
Thank you. Your form was submitted successfully.  Please don't forget the Face Sheet & Medical Records.      Confirmation #: [id number]  Submission Date / Time: Mar 2, 2024 11:32 PM

## 2024-03-04 LAB
INDIRECT COOMBS: NORMAL
RH BLD: NORMAL
SPECIMEN OUTDATE: NORMAL

## 2024-03-04 NOTE — PROGRESS NOTES
Subjective:       Patient ID: Karin Carrera is a 53 y.o. male.    Chief Complaint: Transitional Care, Chest Pain, and Back Pain    Chest Pain   Associated symptoms include back pain.   Back Pain  Associated symptoms include chest pain.     .  Patient seen and evaluated patient is complain of shortness a breath also is heart rate is elevated at 117 118 also complaining of substernal chest pain plan EKG EKG shows signs of cardiac ischemia I will also order chest x-ray BNP and CBC.  At this point due to the patient's CHF and tachycardia we are going to call 9 emergency room hospitalization has been discussed with the patient  Current Medications:    Current Outpatient Medications:     albuterol (PROVENTIL/VENTOLIN HFA) 90 mcg/actuation inhaler, SMARTSI-2 Puff(s) By Mouth Every 4 Hours PRN, Disp: 18 g, Rfl: 6    aspirin (ECOTRIN) 81 MG EC tablet, Take 1 tablet (81 mg total) by mouth once daily., Disp: 90 tablet, Rfl: 1    benzonatate (TESSALON) 100 MG capsule, Take 1 capsule (100 mg total) by mouth 3 (three) times daily as needed for Cough., Disp: 30 capsule, Rfl: 0    budesonide-formoterol 160-4.5 mcg (SYMBICORT) 160-4.5 mcg/actuation HFAA, Inhale 2 puffs into the lungs every 12 (twelve) hours. Controller, Disp: 1 g, Rfl: 0    fluticasone propionate (FLONASE) 50 mcg/actuation nasal spray, 1 spray by Each Nostril route 2 (two) times daily., Disp: , Rfl:     furosemide (LASIX) 40 MG tablet, Take 1 tablet (40 mg total) by mouth 2 (two) times a day., Disp: 60 tablet, Rfl: 0    insulin aspart U-100 (NOVOLOG) 100 unit/mL injection, Inject 0-5 Units into the skin 3 (three) times daily before meals., Disp: 10 mL, Rfl: 1    losartan (COZAAR) 25 MG tablet, Take 0.5 tablets (12.5 mg total) by mouth once daily., Disp: 45 tablet, Rfl: 1    MUCINEX 600 mg 12 hr tablet, Take 600 mg by mouth 2 (two) times daily., Disp: , Rfl:     THERMOTABS 287-180-15 mg Tab, Take 1 tablet by mouth once daily., Disp: 90 tablet, Rfl: 0    TRUE  "METRIX GLUCOSE TEST STRIP Strp, USE TO CHECK BLOOD SUGAR AS DIRECTED, Disp: 200 each, Rfl: 1    TRUEPLUS LANCETS 33 gauge Misc, 1 lancet  by Misc.(Non-Drug; Combo Route) route once daily. use as directed, Disp: 200 each, Rfl: 2    amLODIPine (NORVASC) 10 MG tablet, Take 10 mg by mouth once daily., Disp: , Rfl:     amoxicillin-clavulanate 875-125mg (AUGMENTIN) 875-125 mg per tablet, Take 1 tablet by mouth 2 (two) times daily., Disp: 14 tablet, Rfl: 0    atorvastatin (LIPITOR) 40 MG tablet, Take 1 tablet (40 mg total) by mouth every evening., Disp: 90 tablet, Rfl: 1    dicyclomine (BENTYL) 20 mg tablet, Take 1 tablet (20 mg total) by mouth 2 (two) times daily., Disp: 20 tablet, Rfl: 0    empagliflozin (JARDIANCE) 10 mg tablet, Take 10 mg by mouth once daily., Disp: , Rfl:     insulin detemir U-100, Levemir, (LEVEMIR FLEXTOUCH U100 INSULIN) 100 unit/mL (3 mL) InPn pen, Inject 10 Units into the skin every evening., Disp: 36 mL, Rfl: 0    metoprolol succinate (TOPROL-XL) 25 MG 24 hr tablet, Take 12.5 mg by mouth once daily. Take 1/2 tablet by mouth daily, Disp: , Rfl:     pantoprazole (PROTONIX) 40 MG tablet, Take 1 tablet (40 mg total) by mouth once daily., Disp: 90 tablet, Rfl: 1    potassium chloride SA (K-DUR,KLOR-CON) 20 MEQ tablet, Take 20 mEq by mouth once daily., Disp: , Rfl:     promethazine (PHENERGAN) 25 MG tablet, Take 1 tablet (25 mg total) by mouth every 6 (six) hours as needed for Nausea., Disp: 15 tablet, Rfl: 0    zolpidem (AMBIEN) 5 MG Tab, TAKE ONE TABLET BY MOUTH EVERY EVENING, Disp: 30 tablet, Rfl: 1           Review of Systems   Cardiovascular:  Positive for chest pain.   Musculoskeletal:  Positive for back pain.                Vitals:    02/29/24 1003   BP: 115/81   BP Location: Left arm   Patient Position: Sitting   BP Method: Large (Automatic)   Pulse: (!) 117   Resp: 18   Temp: 97.1 °F (36.2 °C)   TempSrc: Temporal   SpO2: 97%   Weight: 89.8 kg (198 lb)  Comment: wheelchair   Height: 5' 11" " (1.803 m)        Physical Exam  Vitals and nursing note reviewed.   Constitutional:       Appearance: Normal appearance.   Cardiovascular:      Rate and Rhythm: Normal rate and regular rhythm.      Pulses: Normal pulses.      Heart sounds: Normal heart sounds.   Pulmonary:      Effort: Pulmonary effort is normal.      Breath sounds: Normal breath sounds.   Abdominal:      General: Abdomen is flat. Bowel sounds are normal.      Palpations: Abdomen is soft.   Musculoskeletal:         General: Normal range of motion.   Skin:     General: Skin is warm and dry.   Neurological:      General: No focal deficit present.      Mental Status: He is alert and oriented to person, place, and time. Mental status is at baseline.           Last Labs:     Office Visit on 02/29/2024   Component Date Value    MI Interval 02/29/2024 207     QRS Duration 02/29/2024 102     QT/QTc 02/29/2024 333/445     PRT Axes 02/29/2024 67/-34/115     Heart Rate 02/29/2024 107     Results 02/29/2024     No results displayed because visit has over 200 results.      Office Visit on 02/08/2024   Component Date Value    Molecular Strep A, POC 02/08/2024 Negative      Acceptab* 02/08/2024 Yes     POC Molecular Influenza * 02/08/2024 Negative     POC Molecular Influenza * 02/08/2024 Negative      Acceptab* 02/08/2024 Yes     POC Rapid COVID 02/08/2024 Negative      Acceptab* 02/08/2024 Yes        Last Imaging:  US Abdomen Limited_Gallbladder  Narrative: EXAMINATION:  US ABDOMEN LIMITED_GALLBLADDER    CLINICAL HISTORY:  Right upper quadrant abdominal pain;    TECHNIQUE:  Limited ultrasound of the right upper quadrant of the abdomen  was performed.  Ultrasound images captured and stored.    COMPARISON:  None.    FINDINGS:  Liver: Normal in size, measuring 14.1 cm. Fatty liver infiltration. No focal hepatic lesions.    Gallbladder: No stones are seen.  There is mild gallbladder wall thickening.  Sonographer reports a  positive sonographic Galicia sign.  No pericholecystic fluid.    Biliary system: The common duct is not dilated, measuring 7 mm.  No intrahepatic ductal dilatation.    Right kidney: Normal in size and echotexture, measuring 10.5 cm.    Miscellaneous: No upper abdominal ascites.  Hepatic and portal veins are patent with normal direction of flow.  Impression: Mild gallbladder wall thickening nonspecific.  No cholelithiasis.  The sonographer does report a positive sonographic Galicia sign also a nonspecific finding.    Fatty infiltration of the liver.    Electronically signed by: González Roque  Date:    03/02/2024  Time:    21:05  X-Ray Chest 1 View  Narrative: EXAMINATION:  XR CHEST 1 VIEW    CLINICAL HISTORY:  Weakness    TECHNIQUE:  Single frontal view of the chest was performed.    COMPARISON:  02/27/2024    FINDINGS:  Sternotomy and cardiomegaly are again seen.  Worsened interstitial opacities.  Small pleural effusions again seen.  No pneumothorax.  Impression: Worsened interstitial densities favoring edema.    Electronically signed by: González Roque  Date:    03/02/2024  Time:    17:36         **Labs and x-rays personally reviewed by me    ** reviewed      Objective:        Assessment:       1. Chronic cough        2. Congestive heart failure, unspecified HF chronicity, unspecified heart failure type  POCT EKG 12-LEAD (Manually Resulted by Ordering Provider)           Plan:         1. Chronic cough    2. Congestive heart failure, unspecified HF chronicity, unspecified heart failure type  -     POCT EKG 12-LEAD (Manually Resulted by Ordering Provider)    Other orders  -     atorvastatin (LIPITOR) 40 MG tablet; Take 1 tablet (40 mg total) by mouth every evening.  Dispense: 90 tablet; Refill: 1  -     insulin detemir U-100, Levemir, (LEVEMIR FLEXTOUCH U100 INSULIN) 100 unit/mL (3 mL) InPn pen; Inject 10 Units into the skin every evening.  Dispense: 36 mL; Refill: 0  -     pantoprazole (PROTONIX) 40 MG tablet; Take 1 tablet  (40 mg total) by mouth once daily.  Dispense: 90 tablet; Refill: 1

## 2024-03-08 ENCOUNTER — HOSPITAL ENCOUNTER (OUTPATIENT)
Facility: HOSPITAL | Age: 54
Discharge: HOME | End: 2024-03-10
Attending: EMERGENCY MEDICINE | Admitting: STUDENT IN AN ORGANIZED HEALTH CARE EDUCATION/TRAINING PROGRAM
Payer: MEDICAID

## 2024-03-08 DIAGNOSIS — K21.00 GASTROESOPHAGEAL REFLUX DISEASE WITH ESOPHAGITIS WITHOUT HEMORRHAGE: ICD-10-CM

## 2024-03-08 DIAGNOSIS — J43.9 PULMONARY EMPHYSEMA, UNSPECIFIED EMPHYSEMA TYPE: ICD-10-CM

## 2024-03-08 DIAGNOSIS — I10 ESSENTIAL (PRIMARY) HYPERTENSION: ICD-10-CM

## 2024-03-08 DIAGNOSIS — E11.59 TYPE 2 DIABETES MELLITUS WITH OTHER CIRCULATORY COMPLICATION, UNSPECIFIED WHETHER LONG TERM INSULIN USE: ICD-10-CM

## 2024-03-08 DIAGNOSIS — K70.0 ALCOHOLIC FATTY LIVER: ICD-10-CM

## 2024-03-08 DIAGNOSIS — K81.9 ACALCULOUS CHOLECYSTITIS: Primary | ICD-10-CM

## 2024-03-08 DIAGNOSIS — K81.0 ACUTE ACALCULOUS CHOLECYSTITIS: ICD-10-CM

## 2024-03-08 DIAGNOSIS — I69.351 HEMIPARESIS AFFECTING RIGHT SIDE AS LATE EFFECT OF CEREBROVASCULAR ACCIDENT: ICD-10-CM

## 2024-03-08 DIAGNOSIS — L03.115 CELLULITIS OF RIGHT LOWER EXTREMITY: ICD-10-CM

## 2024-03-08 DIAGNOSIS — I25.810 CORONARY ARTERY DISEASE INVOLVING CORONARY BYPASS GRAFT OF NATIVE HEART WITHOUT ANGINA PECTORIS: ICD-10-CM

## 2024-03-08 DIAGNOSIS — I50.22 CHRONIC SYSTOLIC CONGESTIVE HEART FAILURE: ICD-10-CM

## 2024-03-08 DIAGNOSIS — I27.20 PULMONARY HYPERTENSION: ICD-10-CM

## 2024-03-08 DIAGNOSIS — I25.10 CAD (CORONARY ARTERY DISEASE): ICD-10-CM

## 2024-03-08 LAB
ALBUMIN SERPL BCP-MCNC: 3.6 G/DL (ref 3.5–5)
ALBUMIN/GLOB SERPL: 0.8 {RATIO}
ALP SERPL-CCNC: 140 U/L (ref 45–115)
ALT SERPL W P-5'-P-CCNC: 48 U/L (ref 16–61)
ANION GAP SERPL CALCULATED.3IONS-SCNC: 8 MMOL/L (ref 7–16)
AST SERPL W P-5'-P-CCNC: 23 U/L (ref 15–37)
BASOPHILS # BLD AUTO: 0.08 K/UL (ref 0–0.2)
BASOPHILS NFR BLD AUTO: 0.9 % (ref 0–1)
BILIRUB SERPL-MCNC: 1.4 MG/DL (ref ?–1.2)
BILIRUB UR QL STRIP: NEGATIVE
BUN SERPL-MCNC: 19 MG/DL (ref 7–18)
BUN/CREAT SERPL: 18 (ref 6–20)
CALCIUM SERPL-MCNC: 8.9 MG/DL (ref 8.5–10.1)
CHLORIDE SERPL-SCNC: 103 MMOL/L (ref 98–107)
CLARITY UR: CLEAR
CO2 SERPL-SCNC: 30 MMOL/L (ref 21–32)
COLOR UR: ABNORMAL
CREAT SERPL-MCNC: 1.06 MG/DL (ref 0.7–1.3)
DIFFERENTIAL METHOD BLD: ABNORMAL
EGFR (NO RACE VARIABLE) (RUSH/TITUS): 84 ML/MIN/1.73M2
EOSINOPHIL # BLD AUTO: 0.16 K/UL (ref 0–0.5)
EOSINOPHIL NFR BLD AUTO: 1.8 % (ref 1–4)
ERYTHROCYTE [DISTWIDTH] IN BLOOD BY AUTOMATED COUNT: 15.6 % (ref 11.5–14.5)
GLOBULIN SER-MCNC: 4.4 G/DL (ref 2–4)
GLUCOSE SERPL-MCNC: 128 MG/DL (ref 74–106)
GLUCOSE UR STRIP-MCNC: >1000 MG/DL
HCT VFR BLD AUTO: 41.3 % (ref 40–54)
HGB BLD-MCNC: 12.7 G/DL (ref 13.5–18)
IMM GRANULOCYTES # BLD AUTO: 0.06 K/UL (ref 0–0.04)
IMM GRANULOCYTES NFR BLD: 0.7 % (ref 0–0.4)
KETONES UR STRIP-SCNC: NEGATIVE MG/DL
LEUKOCYTE ESTERASE UR QL STRIP: NEGATIVE
LIPASE SERPL-CCNC: <19 U/L (ref 16–77)
LYMPHOCYTES # BLD AUTO: 1.26 K/UL (ref 1–4.8)
LYMPHOCYTES NFR BLD AUTO: 14.5 % (ref 27–41)
MCH RBC QN AUTO: 27.7 PG (ref 27–31)
MCHC RBC AUTO-ENTMCNC: 30.8 G/DL (ref 32–36)
MCV RBC AUTO: 90.2 FL (ref 80–96)
MONOCYTES # BLD AUTO: 0.27 K/UL (ref 0–0.8)
MONOCYTES NFR BLD AUTO: 3.1 % (ref 2–6)
MPC BLD CALC-MCNC: 9.4 FL (ref 9.4–12.4)
NEUTROPHILS # BLD AUTO: 6.88 K/UL (ref 1.8–7.7)
NEUTROPHILS NFR BLD AUTO: 79 % (ref 53–65)
NITRITE UR QL STRIP: NEGATIVE
NRBC # BLD AUTO: 0 X10E3/UL
NRBC, AUTO (.00): 0 %
PH UR STRIP: 6 PH UNITS
PLATELET # BLD AUTO: 314 K/UL (ref 150–400)
POTASSIUM SERPL-SCNC: 3.3 MMOL/L (ref 3.5–5.1)
PROT SERPL-MCNC: 8 G/DL (ref 6.4–8.2)
PROT UR QL STRIP: 10
RBC # BLD AUTO: 4.58 M/UL (ref 4.6–6.2)
RBC # UR STRIP: NEGATIVE /UL
SODIUM SERPL-SCNC: 138 MMOL/L (ref 136–145)
SP GR UR STRIP: 1.03
UROBILINOGEN UR STRIP-ACNC: NORMAL MG/DL
WBC # BLD AUTO: 8.71 K/UL (ref 4.5–11)

## 2024-03-08 PROCEDURE — 63600175 PHARM REV CODE 636 W HCPCS: Performed by: EMERGENCY MEDICINE

## 2024-03-08 PROCEDURE — 83036 HEMOGLOBIN GLYCOSYLATED A1C: CPT | Performed by: HOSPITALIST

## 2024-03-08 PROCEDURE — 99285 EMERGENCY DEPT VISIT HI MDM: CPT | Mod: ,,, | Performed by: EMERGENCY MEDICINE

## 2024-03-08 PROCEDURE — 96375 TX/PRO/DX INJ NEW DRUG ADDON: CPT

## 2024-03-08 PROCEDURE — 85025 COMPLETE CBC W/AUTO DIFF WBC: CPT | Performed by: EMERGENCY MEDICINE

## 2024-03-08 PROCEDURE — 80053 COMPREHEN METABOLIC PANEL: CPT | Performed by: EMERGENCY MEDICINE

## 2024-03-08 PROCEDURE — 81003 URINALYSIS AUTO W/O SCOPE: CPT | Performed by: EMERGENCY MEDICINE

## 2024-03-08 PROCEDURE — 83690 ASSAY OF LIPASE: CPT | Performed by: EMERGENCY MEDICINE

## 2024-03-08 PROCEDURE — 96361 HYDRATE IV INFUSION ADD-ON: CPT

## 2024-03-08 PROCEDURE — 85610 PROTHROMBIN TIME: CPT | Performed by: HOSPITALIST

## 2024-03-08 PROCEDURE — 99285 EMERGENCY DEPT VISIT HI MDM: CPT | Mod: 25

## 2024-03-08 RX ORDER — SODIUM CHLORIDE, SODIUM LACTATE, POTASSIUM CHLORIDE, CALCIUM CHLORIDE 600; 310; 30; 20 MG/100ML; MG/100ML; MG/100ML; MG/100ML
1000 INJECTION, SOLUTION INTRAVENOUS
Status: COMPLETED | OUTPATIENT
Start: 2024-03-08 | End: 2024-03-09

## 2024-03-08 RX ORDER — ONDANSETRON HYDROCHLORIDE 2 MG/ML
4 INJECTION, SOLUTION INTRAVENOUS
Status: COMPLETED | OUTPATIENT
Start: 2024-03-08 | End: 2024-03-08

## 2024-03-08 RX ORDER — MORPHINE SULFATE 4 MG/ML
4 INJECTION, SOLUTION INTRAMUSCULAR; INTRAVENOUS
Status: COMPLETED | OUTPATIENT
Start: 2024-03-08 | End: 2024-03-08

## 2024-03-08 RX ADMIN — MORPHINE SULFATE 4 MG: 4 INJECTION, SOLUTION INTRAMUSCULAR; INTRAVENOUS at 10:03

## 2024-03-08 RX ADMIN — SODIUM CHLORIDE, POTASSIUM CHLORIDE, SODIUM LACTATE AND CALCIUM CHLORIDE 1000 ML: 600; 310; 30; 20 INJECTION, SOLUTION INTRAVENOUS at 10:03

## 2024-03-08 RX ADMIN — ONDANSETRON 4 MG: 2 INJECTION INTRAMUSCULAR; INTRAVENOUS at 10:03

## 2024-03-09 ENCOUNTER — ANESTHESIA (OUTPATIENT)
Dept: SURGERY | Facility: HOSPITAL | Age: 54
End: 2024-03-09
Payer: MEDICAID

## 2024-03-09 ENCOUNTER — ANESTHESIA EVENT (OUTPATIENT)
Dept: SURGERY | Facility: HOSPITAL | Age: 54
End: 2024-03-09
Payer: MEDICAID

## 2024-03-09 PROBLEM — E11.9 TYPE 2 DIABETES MELLITUS: Status: ACTIVE | Noted: 2024-03-09

## 2024-03-09 PROBLEM — K81.0 ACUTE ACALCULOUS CHOLECYSTITIS: Status: ACTIVE | Noted: 2024-03-09

## 2024-03-09 PROBLEM — I50.9 CHF (CONGESTIVE HEART FAILURE): Status: ACTIVE | Noted: 2024-02-20

## 2024-03-09 PROBLEM — I27.20 PULMONARY HYPERTENSION: Status: ACTIVE | Noted: 2024-03-09

## 2024-03-09 PROBLEM — L03.115 CELLULITIS OF RIGHT LOWER EXTREMITY: Status: ACTIVE | Noted: 2024-03-09

## 2024-03-09 PROBLEM — K21.00 GERD WITH ESOPHAGITIS: Status: ACTIVE | Noted: 2018-01-01

## 2024-03-09 PROBLEM — I69.351 HEMIPARESIS AFFECTING RIGHT SIDE AS LATE EFFECT OF CEREBROVASCULAR ACCIDENT: Status: ACTIVE | Noted: 2024-03-09

## 2024-03-09 PROBLEM — J44.9 COPD (CHRONIC OBSTRUCTIVE PULMONARY DISEASE): Status: ACTIVE | Noted: 2024-03-09

## 2024-03-09 PROBLEM — K70.0 ALCOHOLIC FATTY LIVER: Status: ACTIVE | Noted: 2024-03-09

## 2024-03-09 LAB
APTT PPP: 29.3 SECONDS (ref 25.2–37.3)
CRP SERPL-MCNC: 2.97 MG/DL (ref 0–0.8)
EST. AVERAGE GLUCOSE BLD GHB EST-MCNC: 126 MG/DL
GLUCOSE SERPL-MCNC: 103 MG/DL (ref 70–105)
GLUCOSE SERPL-MCNC: 143 MG/DL (ref 70–105)
GLUCOSE SERPL-MCNC: 148 MG/DL (ref 70–105)
GLUCOSE SERPL-MCNC: 157 MG/DL (ref 70–105)
HBA1C MFR BLD HPLC: 6 % (ref 4.5–6.6)
INR BLD: 1.24
MAGNESIUM SERPL-MCNC: 2.3 MG/DL (ref 1.7–2.3)
OHS QRS DURATION: 100 MS
OHS QTC CALCULATION: 483 MS
POTASSIUM SERPL-SCNC: 4.2 MMOL/L (ref 3.5–5.1)
PROTHROMBIN TIME: 15.5 SECONDS (ref 11.7–14.7)
SARS-COV-2 RDRP RESP QL NAA+PROBE: NEGATIVE

## 2024-03-09 PROCEDURE — 93010 ELECTROCARDIOGRAM REPORT: CPT | Mod: ,,, | Performed by: HOSPITALIST

## 2024-03-09 PROCEDURE — 96375 TX/PRO/DX INJ NEW DRUG ADDON: CPT | Mod: 59

## 2024-03-09 PROCEDURE — 63600175 PHARM REV CODE 636 W HCPCS: Performed by: STUDENT IN AN ORGANIZED HEALTH CARE EDUCATION/TRAINING PROGRAM

## 2024-03-09 PROCEDURE — G0378 HOSPITAL OBSERVATION PER HR: HCPCS

## 2024-03-09 PROCEDURE — 27000165 HC TUBE, ETT CUFFED: Performed by: ANESTHESIOLOGY

## 2024-03-09 PROCEDURE — 47563 LAPARO CHOLECYSTECTOMY/GRAPH: CPT | Mod: ,,, | Performed by: STUDENT IN AN ORGANIZED HEALTH CARE EDUCATION/TRAINING PROGRAM

## 2024-03-09 PROCEDURE — 93005 ELECTROCARDIOGRAM TRACING: CPT | Mod: 59

## 2024-03-09 PROCEDURE — 25000242 PHARM REV CODE 250 ALT 637 W/ HCPCS: Performed by: HOSPITALIST

## 2024-03-09 PROCEDURE — 83735 ASSAY OF MAGNESIUM: CPT | Performed by: HOSPITALIST

## 2024-03-09 PROCEDURE — 99214 OFFICE O/P EST MOD 30 MIN: CPT | Mod: ,,, | Performed by: HOSPITALIST

## 2024-03-09 PROCEDURE — 25000242 PHARM REV CODE 250 ALT 637 W/ HCPCS: Performed by: STUDENT IN AN ORGANIZED HEALTH CARE EDUCATION/TRAINING PROGRAM

## 2024-03-09 PROCEDURE — 71000033 HC RECOVERY, INTIAL HOUR: Performed by: STUDENT IN AN ORGANIZED HEALTH CARE EDUCATION/TRAINING PROGRAM

## 2024-03-09 PROCEDURE — 96372 THER/PROPH/DIAG INJ SC/IM: CPT | Mod: 59 | Performed by: STUDENT IN AN ORGANIZED HEALTH CARE EDUCATION/TRAINING PROGRAM

## 2024-03-09 PROCEDURE — 96365 THER/PROPH/DIAG IV INF INIT: CPT

## 2024-03-09 PROCEDURE — 99900035 HC TECH TIME PER 15 MIN (STAT)

## 2024-03-09 PROCEDURE — 82962 GLUCOSE BLOOD TEST: CPT

## 2024-03-09 PROCEDURE — 37000008 HC ANESTHESIA 1ST 15 MINUTES: Performed by: STUDENT IN AN ORGANIZED HEALTH CARE EDUCATION/TRAINING PROGRAM

## 2024-03-09 PROCEDURE — 27000655: Performed by: ANESTHESIOLOGY

## 2024-03-09 PROCEDURE — 63600175 PHARM REV CODE 636 W HCPCS: Performed by: NURSE ANESTHETIST, CERTIFIED REGISTERED

## 2024-03-09 PROCEDURE — 25000003 PHARM REV CODE 250: Performed by: STUDENT IN AN ORGANIZED HEALTH CARE EDUCATION/TRAINING PROGRAM

## 2024-03-09 PROCEDURE — D9220A PRA ANESTHESIA: Mod: ANES,,, | Performed by: ANESTHESIOLOGY

## 2024-03-09 PROCEDURE — 96366 THER/PROPH/DIAG IV INF ADDON: CPT

## 2024-03-09 PROCEDURE — 37000009 HC ANESTHESIA EA ADD 15 MINS: Performed by: STUDENT IN AN ORGANIZED HEALTH CARE EDUCATION/TRAINING PROGRAM

## 2024-03-09 PROCEDURE — 25500020 PHARM REV CODE 255: Performed by: EMERGENCY MEDICINE

## 2024-03-09 PROCEDURE — 63600175 PHARM REV CODE 636 W HCPCS: Performed by: EMERGENCY MEDICINE

## 2024-03-09 PROCEDURE — 63600175 PHARM REV CODE 636 W HCPCS: Performed by: ANESTHESIOLOGY

## 2024-03-09 PROCEDURE — 27000221 HC OXYGEN, UP TO 24 HOURS

## 2024-03-09 PROCEDURE — C9113 INJ PANTOPRAZOLE SODIUM, VIA: HCPCS | Performed by: STUDENT IN AN ORGANIZED HEALTH CARE EDUCATION/TRAINING PROGRAM

## 2024-03-09 PROCEDURE — 25000003 PHARM REV CODE 250: Performed by: HOSPITALIST

## 2024-03-09 PROCEDURE — 87635 SARS-COV-2 COVID-19 AMP PRB: CPT | Performed by: EMERGENCY MEDICINE

## 2024-03-09 PROCEDURE — 63600175 PHARM REV CODE 636 W HCPCS: Performed by: HOSPITALIST

## 2024-03-09 PROCEDURE — 27000510 HC BLANKET BAIR HUGGER ANY SIZE: Performed by: ANESTHESIOLOGY

## 2024-03-09 PROCEDURE — 96367 TX/PROPH/DG ADDL SEQ IV INF: CPT

## 2024-03-09 PROCEDURE — 27000716 HC OXISENSOR PROBE, ANY SIZE: Performed by: ANESTHESIOLOGY

## 2024-03-09 PROCEDURE — 84132 ASSAY OF SERUM POTASSIUM: CPT | Performed by: HOSPITALIST

## 2024-03-09 PROCEDURE — 96376 TX/PRO/DX INJ SAME DRUG ADON: CPT | Mod: 59

## 2024-03-09 PROCEDURE — 94640 AIRWAY INHALATION TREATMENT: CPT | Mod: XB

## 2024-03-09 PROCEDURE — 27201423 OPTIME MED/SURG SUP & DEVICES STERILE SUPPLY: Performed by: STUDENT IN AN ORGANIZED HEALTH CARE EDUCATION/TRAINING PROGRAM

## 2024-03-09 PROCEDURE — 36000709 HC OR TIME LEV III EA ADD 15 MIN: Performed by: STUDENT IN AN ORGANIZED HEALTH CARE EDUCATION/TRAINING PROGRAM

## 2024-03-09 PROCEDURE — 27000689 HC BLADE LARYNGOSCOPE ANY SIZE: Performed by: ANESTHESIOLOGY

## 2024-03-09 PROCEDURE — 96372 THER/PROPH/DIAG INJ SC/IM: CPT | Mod: 59 | Performed by: HOSPITALIST

## 2024-03-09 PROCEDURE — 96361 HYDRATE IV INFUSION ADD-ON: CPT | Mod: 59

## 2024-03-09 PROCEDURE — 36000708 HC OR TIME LEV III 1ST 15 MIN: Performed by: STUDENT IN AN ORGANIZED HEALTH CARE EDUCATION/TRAINING PROGRAM

## 2024-03-09 PROCEDURE — D9220A PRA ANESTHESIA: Mod: CRNA,,, | Performed by: NURSE ANESTHETIST, CERTIFIED REGISTERED

## 2024-03-09 PROCEDURE — 88304 TISSUE EXAM BY PATHOLOGIST: CPT | Mod: TC,SUR | Performed by: STUDENT IN AN ORGANIZED HEALTH CARE EDUCATION/TRAINING PROGRAM

## 2024-03-09 PROCEDURE — 86140 C-REACTIVE PROTEIN: CPT | Performed by: HOSPITALIST

## 2024-03-09 PROCEDURE — 25000003 PHARM REV CODE 250: Performed by: EMERGENCY MEDICINE

## 2024-03-09 PROCEDURE — 94761 N-INVAS EAR/PLS OXIMETRY MLT: CPT

## 2024-03-09 PROCEDURE — 74300 X-RAY BILE DUCTS/PANCREAS: CPT | Mod: 26,,, | Performed by: STUDENT IN AN ORGANIZED HEALTH CARE EDUCATION/TRAINING PROGRAM

## 2024-03-09 PROCEDURE — 88304 TISSUE EXAM BY PATHOLOGIST: CPT | Mod: 26,,, | Performed by: PATHOLOGY

## 2024-03-09 PROCEDURE — 25000003 PHARM REV CODE 250: Performed by: NURSE ANESTHETIST, CERTIFIED REGISTERED

## 2024-03-09 RX ORDER — PANTOPRAZOLE SODIUM 40 MG/1
40 TABLET, DELAYED RELEASE ORAL DAILY
Status: DISCONTINUED | OUTPATIENT
Start: 2024-03-09 | End: 2024-03-09

## 2024-03-09 RX ORDER — AMLODIPINE BESYLATE 10 MG/1
10 TABLET ORAL DAILY
Status: DISCONTINUED | OUTPATIENT
Start: 2024-03-09 | End: 2024-03-10 | Stop reason: HOSPADM

## 2024-03-09 RX ORDER — BUDESONIDE 0.5 MG/2ML
0.5 INHALANT ORAL EVERY 12 HOURS
Status: DISCONTINUED | OUTPATIENT
Start: 2024-03-09 | End: 2024-03-10 | Stop reason: HOSPADM

## 2024-03-09 RX ORDER — TALC
6 POWDER (GRAM) TOPICAL NIGHTLY PRN
Status: DISCONTINUED | OUTPATIENT
Start: 2024-03-09 | End: 2024-03-10 | Stop reason: HOSPADM

## 2024-03-09 RX ORDER — TRAZODONE HYDROCHLORIDE 50 MG/1
50 TABLET ORAL NIGHTLY PRN
Status: DISCONTINUED | OUTPATIENT
Start: 2024-03-09 | End: 2024-03-10 | Stop reason: HOSPADM

## 2024-03-09 RX ORDER — SODIUM CHLORIDE, SODIUM LACTATE, POTASSIUM CHLORIDE, CALCIUM CHLORIDE 600; 310; 30; 20 MG/100ML; MG/100ML; MG/100ML; MG/100ML
INJECTION, SOLUTION INTRAVENOUS CONTINUOUS
Status: DISCONTINUED | OUTPATIENT
Start: 2024-03-09 | End: 2024-03-10 | Stop reason: HOSPADM

## 2024-03-09 RX ORDER — SODIUM CHLORIDE, SODIUM LACTATE, POTASSIUM CHLORIDE, CALCIUM CHLORIDE 600; 310; 30; 20 MG/100ML; MG/100ML; MG/100ML; MG/100ML
125 INJECTION, SOLUTION INTRAVENOUS CONTINUOUS
Status: DISCONTINUED | OUTPATIENT
Start: 2024-03-09 | End: 2024-03-10 | Stop reason: HOSPADM

## 2024-03-09 RX ORDER — HYDROMORPHONE HYDROCHLORIDE 2 MG/ML
1 INJECTION, SOLUTION INTRAMUSCULAR; INTRAVENOUS; SUBCUTANEOUS EVERY 4 HOURS PRN
Status: DISCONTINUED | OUTPATIENT
Start: 2024-03-09 | End: 2024-03-09

## 2024-03-09 RX ORDER — GLUCAGON 1 MG
1 KIT INJECTION
Status: DISCONTINUED | OUTPATIENT
Start: 2024-03-09 | End: 2024-03-10 | Stop reason: HOSPADM

## 2024-03-09 RX ORDER — ONDANSETRON HYDROCHLORIDE 2 MG/ML
4 INJECTION, SOLUTION INTRAVENOUS DAILY PRN
Status: DISCONTINUED | OUTPATIENT
Start: 2024-03-09 | End: 2024-03-09 | Stop reason: HOSPADM

## 2024-03-09 RX ORDER — PHENYLEPHRINE HYDROCHLORIDE 10 MG/ML
INJECTION INTRAVENOUS
Status: DISCONTINUED | OUTPATIENT
Start: 2024-03-09 | End: 2024-03-09

## 2024-03-09 RX ORDER — BISACODYL 5 MG
10 TABLET, DELAYED RELEASE (ENTERIC COATED) ORAL DAILY PRN
Status: DISCONTINUED | OUTPATIENT
Start: 2024-03-09 | End: 2024-03-10 | Stop reason: HOSPADM

## 2024-03-09 RX ORDER — ROCURONIUM BROMIDE 10 MG/ML
INJECTION, SOLUTION INTRAVENOUS
Status: DISCONTINUED | OUTPATIENT
Start: 2024-03-09 | End: 2024-03-09

## 2024-03-09 RX ORDER — ACETAMINOPHEN 500 MG
1000 TABLET ORAL EVERY 6 HOURS PRN
Status: DISCONTINUED | OUTPATIENT
Start: 2024-03-09 | End: 2024-03-10 | Stop reason: HOSPADM

## 2024-03-09 RX ORDER — LIDOCAINE HYDROCHLORIDE 20 MG/ML
INJECTION INTRAVENOUS
Status: DISCONTINUED | OUTPATIENT
Start: 2024-03-09 | End: 2024-03-09

## 2024-03-09 RX ORDER — MORPHINE SULFATE 10 MG/ML
4 INJECTION INTRAMUSCULAR; INTRAVENOUS; SUBCUTANEOUS EVERY 5 MIN PRN
Status: DISCONTINUED | OUTPATIENT
Start: 2024-03-09 | End: 2024-03-09 | Stop reason: HOSPADM

## 2024-03-09 RX ORDER — SIMETHICONE 80 MG
1 TABLET,CHEWABLE ORAL 3 TIMES DAILY PRN
Status: DISCONTINUED | OUTPATIENT
Start: 2024-03-09 | End: 2024-03-10 | Stop reason: HOSPADM

## 2024-03-09 RX ORDER — ONDANSETRON HYDROCHLORIDE 2 MG/ML
4 INJECTION, SOLUTION INTRAVENOUS EVERY 8 HOURS PRN
Status: DISCONTINUED | OUTPATIENT
Start: 2024-03-09 | End: 2024-03-09

## 2024-03-09 RX ORDER — OXYCODONE AND ACETAMINOPHEN 5; 325 MG/1; MG/1
1 TABLET ORAL EVERY 6 HOURS PRN
Status: DISCONTINUED | OUTPATIENT
Start: 2024-03-09 | End: 2024-03-10 | Stop reason: HOSPADM

## 2024-03-09 RX ORDER — ZOLPIDEM TARTRATE 5 MG/1
5 TABLET ORAL NIGHTLY
Status: DISCONTINUED | OUTPATIENT
Start: 2024-03-09 | End: 2024-03-10 | Stop reason: HOSPADM

## 2024-03-09 RX ORDER — DIPHENHYDRAMINE HYDROCHLORIDE 50 MG/ML
25 INJECTION INTRAMUSCULAR; INTRAVENOUS EVERY 6 HOURS PRN
Status: DISCONTINUED | OUTPATIENT
Start: 2024-03-09 | End: 2024-03-09 | Stop reason: HOSPADM

## 2024-03-09 RX ORDER — ONDANSETRON HYDROCHLORIDE 2 MG/ML
8 INJECTION, SOLUTION INTRAVENOUS EVERY 6 HOURS PRN
Status: DISCONTINUED | OUTPATIENT
Start: 2024-03-09 | End: 2024-03-10 | Stop reason: HOSPADM

## 2024-03-09 RX ORDER — IPRATROPIUM BROMIDE AND ALBUTEROL SULFATE 2.5; .5 MG/3ML; MG/3ML
3 SOLUTION RESPIRATORY (INHALATION) EVERY 6 HOURS
Status: DISCONTINUED | OUTPATIENT
Start: 2024-03-09 | End: 2024-03-10 | Stop reason: HOSPADM

## 2024-03-09 RX ORDER — SODIUM CHLORIDE 0.9 % (FLUSH) 0.9 %
10 SYRINGE (ML) INJECTION
Status: DISCONTINUED | OUTPATIENT
Start: 2024-03-09 | End: 2024-03-10 | Stop reason: HOSPADM

## 2024-03-09 RX ORDER — ATORVASTATIN CALCIUM 40 MG/1
40 TABLET, FILM COATED ORAL NIGHTLY
Status: DISCONTINUED | OUTPATIENT
Start: 2024-03-09 | End: 2024-03-10 | Stop reason: HOSPADM

## 2024-03-09 RX ORDER — HYDROMORPHONE HYDROCHLORIDE 2 MG/ML
0.5 INJECTION, SOLUTION INTRAMUSCULAR; INTRAVENOUS; SUBCUTANEOUS EVERY 4 HOURS PRN
Status: DISCONTINUED | OUTPATIENT
Start: 2024-03-09 | End: 2024-03-09

## 2024-03-09 RX ORDER — HYDROMORPHONE HYDROCHLORIDE 2 MG/ML
0.5 INJECTION, SOLUTION INTRAMUSCULAR; INTRAVENOUS; SUBCUTANEOUS EVERY 5 MIN PRN
Status: DISCONTINUED | OUTPATIENT
Start: 2024-03-09 | End: 2024-03-09 | Stop reason: HOSPADM

## 2024-03-09 RX ORDER — MEPERIDINE HYDROCHLORIDE 25 MG/ML
25 INJECTION INTRAMUSCULAR; INTRAVENOUS; SUBCUTANEOUS EVERY 10 MIN PRN
Status: DISCONTINUED | OUTPATIENT
Start: 2024-03-09 | End: 2024-03-09 | Stop reason: HOSPADM

## 2024-03-09 RX ORDER — OXYCODONE HYDROCHLORIDE 5 MG/1
5 TABLET ORAL
Status: DISCONTINUED | OUTPATIENT
Start: 2024-03-09 | End: 2024-03-09 | Stop reason: HOSPADM

## 2024-03-09 RX ORDER — ASPIRIN 81 MG/1
81 TABLET ORAL DAILY
Status: DISCONTINUED | OUTPATIENT
Start: 2024-03-09 | End: 2024-03-10 | Stop reason: HOSPADM

## 2024-03-09 RX ORDER — PANTOPRAZOLE SODIUM 40 MG/10ML
40 INJECTION, POWDER, LYOPHILIZED, FOR SOLUTION INTRAVENOUS DAILY
Status: DISCONTINUED | OUTPATIENT
Start: 2024-03-09 | End: 2024-03-10 | Stop reason: HOSPADM

## 2024-03-09 RX ORDER — HYDROMORPHONE HYDROCHLORIDE 2 MG/ML
1 INJECTION, SOLUTION INTRAMUSCULAR; INTRAVENOUS; SUBCUTANEOUS EVERY 4 HOURS PRN
Status: DISCONTINUED | OUTPATIENT
Start: 2024-03-09 | End: 2024-03-10 | Stop reason: HOSPADM

## 2024-03-09 RX ORDER — PROPOFOL 10 MG/ML
VIAL (ML) INTRAVENOUS
Status: DISCONTINUED | OUTPATIENT
Start: 2024-03-09 | End: 2024-03-09

## 2024-03-09 RX ORDER — PROCHLORPERAZINE EDISYLATE 5 MG/ML
5 INJECTION INTRAMUSCULAR; INTRAVENOUS EVERY 6 HOURS PRN
Status: DISCONTINUED | OUTPATIENT
Start: 2024-03-09 | End: 2024-03-10 | Stop reason: HOSPADM

## 2024-03-09 RX ORDER — GUAIFENESIN AND DEXTROMETHORPHAN HYDROBROMIDE 10; 100 MG/5ML; MG/5ML
10 SYRUP ORAL EVERY 6 HOURS PRN
Status: DISCONTINUED | OUTPATIENT
Start: 2024-03-09 | End: 2024-03-10 | Stop reason: HOSPADM

## 2024-03-09 RX ORDER — ETOMIDATE 2 MG/ML
INJECTION INTRAVENOUS
Status: DISCONTINUED | OUTPATIENT
Start: 2024-03-09 | End: 2024-03-09

## 2024-03-09 RX ORDER — INSULIN ASPART 100 [IU]/ML
0-10 INJECTION, SOLUTION INTRAVENOUS; SUBCUTANEOUS EVERY 6 HOURS PRN
Status: DISCONTINUED | OUTPATIENT
Start: 2024-03-09 | End: 2024-03-10 | Stop reason: HOSPADM

## 2024-03-09 RX ADMIN — BUDESONIDE 0.5 MG: 0.5 INHALANT RESPIRATORY (INHALATION) at 07:03

## 2024-03-09 RX ADMIN — ONDANSETRON 8 MG: 2 INJECTION INTRAMUSCULAR; INTRAVENOUS at 03:03

## 2024-03-09 RX ADMIN — TRAZODONE HYDROCHLORIDE 50 MG: 50 TABLET ORAL at 09:03

## 2024-03-09 RX ADMIN — IOPAMIDOL 100 ML: 755 INJECTION, SOLUTION INTRAVENOUS at 12:03

## 2024-03-09 RX ADMIN — SODIUM CHLORIDE, POTASSIUM CHLORIDE, SODIUM LACTATE AND CALCIUM CHLORIDE: 600; 310; 30; 20 INJECTION, SOLUTION INTRAVENOUS at 03:03

## 2024-03-09 RX ADMIN — PIPERACILLIN AND TAZOBACTAM 4.5 G: 4; .5 INJECTION, POWDER, FOR SOLUTION INTRAVENOUS; PARENTERAL at 03:03

## 2024-03-09 RX ADMIN — ZOLPIDEM TARTRATE 5 MG: 5 TABLET ORAL at 09:03

## 2024-03-09 RX ADMIN — ETOMIDATE 15 MG: 2 INJECTION, SOLUTION INTRAVENOUS at 09:03

## 2024-03-09 RX ADMIN — LIDOCAINE HYDROCHLORIDE 60 MG: 20 INJECTION, SOLUTION INTRAVENOUS at 09:03

## 2024-03-09 RX ADMIN — ROCURONIUM BROMIDE 10 MG: 10 INJECTION, SOLUTION INTRAVENOUS at 10:03

## 2024-03-09 RX ADMIN — ROCURONIUM BROMIDE 40 MG: 10 INJECTION, SOLUTION INTRAVENOUS at 09:03

## 2024-03-09 RX ADMIN — OXYCODONE HYDROCHLORIDE AND ACETAMINOPHEN 1 TABLET: 5; 325 TABLET ORAL at 09:03

## 2024-03-09 RX ADMIN — INSULIN DETEMIR 15 UNITS: 100 INJECTION, SOLUTION SUBCUTANEOUS at 03:03

## 2024-03-09 RX ADMIN — ATORVASTATIN CALCIUM 40 MG: 40 TABLET, FILM COATED ORAL at 09:03

## 2024-03-09 RX ADMIN — MORPHINE SULFATE 2 MG: 10 INJECTION INTRAVENOUS at 11:03

## 2024-03-09 RX ADMIN — IPRATROPIUM BROMIDE AND ALBUTEROL SULFATE 3 ML: .5; 3 SOLUTION RESPIRATORY (INHALATION) at 07:03

## 2024-03-09 RX ADMIN — PIPERACILLIN AND TAZOBACTAM 4.5 G: 4; .5 INJECTION, POWDER, FOR SOLUTION INTRAVENOUS; PARENTERAL at 06:03

## 2024-03-09 RX ADMIN — IPRATROPIUM BROMIDE AND ALBUTEROL SULFATE 3 ML: .5; 3 SOLUTION RESPIRATORY (INHALATION) at 12:03

## 2024-03-09 RX ADMIN — ATORVASTATIN CALCIUM 40 MG: 40 TABLET, FILM COATED ORAL at 03:03

## 2024-03-09 RX ADMIN — INSULIN DETEMIR 15 UNITS: 100 INJECTION, SOLUTION SUBCUTANEOUS at 09:03

## 2024-03-09 RX ADMIN — SUGAMMADEX 200 MG: 100 INJECTION, SOLUTION INTRAVENOUS at 11:03

## 2024-03-09 RX ADMIN — ZOLPIDEM TARTRATE 5 MG: 5 TABLET ORAL at 03:03

## 2024-03-09 RX ADMIN — PHENYLEPHRINE HYDROCHLORIDE 100 MCG: 10 INJECTION INTRAVENOUS at 10:03

## 2024-03-09 RX ADMIN — THIAMINE HYDROCHLORIDE: 100 INJECTION, SOLUTION INTRAMUSCULAR; INTRAVENOUS at 01:03

## 2024-03-09 RX ADMIN — HYDROMORPHONE HYDROCHLORIDE 1 MG: 2 INJECTION INTRAMUSCULAR; INTRAVENOUS; SUBCUTANEOUS at 01:03

## 2024-03-09 RX ADMIN — GUAIFENESIN AND DEXTROMETHORPHAN 10 ML: 100; 10 SYRUP ORAL at 09:03

## 2024-03-09 RX ADMIN — SODIUM CHLORIDE, POTASSIUM CHLORIDE, SODIUM LACTATE AND CALCIUM CHLORIDE 125 ML/HR: 600; 310; 30; 20 INJECTION, SOLUTION INTRAVENOUS at 12:03

## 2024-03-09 RX ADMIN — PROPOFOL 75 MG: 10 INJECTION, EMULSION INTRAVENOUS at 09:03

## 2024-03-09 RX ADMIN — PANTOPRAZOLE SODIUM 40 MG: 40 INJECTION, POWDER, FOR SOLUTION INTRAVENOUS at 01:03

## 2024-03-09 RX ADMIN — HYDROMORPHONE HYDROCHLORIDE 1 MG: 2 INJECTION INTRAMUSCULAR; INTRAVENOUS; SUBCUTANEOUS at 03:03

## 2024-03-09 RX ADMIN — PIPERACILLIN AND TAZOBACTAM 4.5 G: 4; .5 INJECTION, POWDER, FOR SOLUTION INTRAVENOUS; PARENTERAL at 09:03

## 2024-03-09 NOTE — ASSESSMENT & PLAN NOTE
CAD with CABG, Chronic CHF; ejection fraction 20-25%; recent cardiac catheterization in February.  Patient being managed medically and is doing well; optimized per Medicine.  We will proceed with surgery.    To the OR for laparoscopic cholecystectomy with intraoperative cholangiogram.      Risks and benefits explained with the patient including risks for infection, bleeding, injury to surrounding structures, hematoma/seroma formation with need for possible evacuation, possible open.  The patient verbalized understanding, agrees and wishes to proceed with surgery.

## 2024-03-09 NOTE — ASSESSMENT & PLAN NOTE
Continue home inhaled steroid and bronchodilator.  Not on home oxygen and not hypoxic.  Controlled.  Patient has stopped smoking.

## 2024-03-09 NOTE — ANESTHESIA POSTPROCEDURE EVALUATION
Anesthesia Post Evaluation    Patient: Karin Carrera    Procedure(s) Performed: Procedure(s) (LRB):  CHOLECYSTECTOMY, LAPAROSCOPIC, WITH CHOLANGIOGRAM (N/A)    Final Anesthesia Type: general      Patient location during evaluation: PACU  Patient participation: Yes- Able to Participate  Level of consciousness: awake and sedated  Post-procedure vital signs: reviewed and stable  Pain management: adequate  Airway patency: patent    PONV status at discharge: No PONV  Anesthetic complications: no      Cardiovascular status: blood pressure returned to baseline  Respiratory status: unassisted  Hydration status: euvolemic  Follow-up not needed.              Vitals Value Taken Time   /81 03/09/24 1128   Temp 98 03/09/24 1130   Pulse 96 03/09/24 1129   Resp 18 03/09/24 1130   SpO2 92 % 03/09/24 1129   Vitals shown include unvalidated device data.      No case tracking events are documented in the log.      Pain/Dane Score: Pain Rating Prior to Med Admin: 8 (3/9/2024  3:15 AM)  Pain Rating Post Med Admin: 0 (3/9/2024  3:45 AM)

## 2024-03-09 NOTE — ANESTHESIA PROCEDURE NOTES
Intubation    Date/Time: 3/9/2024 9:44 AM    Performed by: Rohit Frazier CRNA  Authorized by: Hernando Carbajal MD    Intubation:     Induction:  Intravenous    Intubated:  Postinduction    Mask Ventilation:  Easy mask    Attempts:  1    Attempted By:  CRNA    Method of Intubation:  Direct    Blade:  Dane 4    Laryngeal View Grade: Grade IIA - cords partially seen      Difficult Airway Encountered?: No      Complications:  None    Airway Device:  Oral endotracheal tube    Airway Device Size:  7.5    Style/Cuff Inflation:  Cuffed (inflated to minimal occlusive pressure)    Inflation Amount (mL):  10    Tube secured:  21    Secured at:  The lips    Placement Verified By:  Capnometry    Complicating Factors:  None    Findings Post-Intubation:  BS equal bilateral and atraumatic/condition of teeth unchanged

## 2024-03-09 NOTE — ASSESSMENT & PLAN NOTE
Could not tolerate augmentin due to N/V but zosyn should cover and will consult wound care team.    Does not meet sepsis criteria.  Will check CRP.

## 2024-03-09 NOTE — SUBJECTIVE & OBJECTIVE
Past Medical History:   Diagnosis Date    Alcoholic fatty liver     CHF (congestive heart failure) 2024    EF 25%    COPD (chronic obstructive pulmonary disease)     Coronary artery disease involving coronary bypass graft of native heart without angina pectoris 2022    Dyslipidemia 2023    Essential (primary) hypertension 2022    Expressive aphasia 2023    Former smoker     GERD with esophagitis 2018    EGD    Hemiparesis affecting right side as late effect of cerebrovascular accident     Pulmonary hypertension     Stroke     RHP    Tricuspid regurgitation     Type 2 diabetes mellitus        Past Surgical History:   Procedure Laterality Date    CARDIAC SURGERY      CABG    CORONARY ANGIOPLASTY WITH STENT PLACEMENT      bare metal stent    HEMIARTHROPLASTY OF HIP Right 2022    Procedure: HEMIARTHROPLASTY, HIP;  Surgeon: Ramses Chua MD;  Location: [facility]THO OR;  Service: Orthopedics;  Laterality: Right;    RIGHT HEART CATHETERIZATION Right 2024    Procedure: INSERTION, CATHETER, RIGHT HEART;  Surgeon: Ruiz Pacheco MD;  Location: [facility] CATH LAB;  Service: Cardiology;  Laterality: Right;       Review of patient's allergies indicates:  No Known Allergies    No current facility-administered medications on file prior to encounter.     Current Outpatient Medications on File Prior to Encounter   Medication Sig    albuterol (PROVENTIL/VENTOLIN HFA) 90 mcg/actuation inhaler SMARTSI-2 Puff(s) By Mouth Every 4 Hours PRN    amLODIPine (NORVASC) 10 MG tablet Take 10 mg by mouth once daily.    amoxicillin-clavulanate 875-125mg (AUGMENTIN) 875-125 mg per tablet Take 1 tablet by mouth 2 (two) times daily.    aspirin (ECOTRIN) 81 MG EC tablet Take 1 tablet (81 mg total) by mouth once daily.    atorvastatin (LIPITOR) 40 MG tablet Take 1 tablet (40 mg total) by mouth every evening.    benzonatate (TESSALON) 100 MG capsule Take 1 capsule (100 mg total) by mouth 3 (three)  times daily as needed for Cough.    budesonide-formoterol 160-4.5 mcg (SYMBICORT) 160-4.5 mcg/actuation HFAA Inhale 2 puffs into the lungs every 12 (twelve) hours. Controller    dicyclomine (BENTYL) 20 mg tablet Take 1 tablet (20 mg total) by mouth 2 (two) times daily.    empagliflozin (JARDIANCE) 10 mg tablet Take 10 mg by mouth once daily.    fluticasone propionate (FLONASE) 50 mcg/actuation nasal spray 1 spray by Each Nostril route 2 (two) times daily.    furosemide (LASIX) 40 MG tablet Take 1 tablet (40 mg total) by mouth 2 (two) times a day.    insulin aspart U-100 (NOVOLOG) 100 unit/mL injection Inject 0-5 Units into the skin 3 (three) times daily before meals.    insulin detemir U-100, Levemir, (LEVEMIR FLEXTOUCH U100 INSULIN) 100 unit/mL (3 mL) InPn pen Inject 10 Units into the skin every evening.    losartan (COZAAR) 25 MG tablet Take 0.5 tablets (12.5 mg total) by mouth once daily.    metoprolol succinate (TOPROL-XL) 25 MG 24 hr tablet Take 12.5 mg by mouth once daily. Take 1/2 tablet by mouth daily    MUCINEX 600 mg 12 hr tablet Take 600 mg by mouth 2 (two) times daily.    pantoprazole (PROTONIX) 40 MG tablet Take 1 tablet (40 mg total) by mouth once daily.    potassium chloride SA (K-DUR,KLOR-CON) 20 MEQ tablet Take 20 mEq by mouth once daily.    promethazine (PHENERGAN) 25 MG tablet Take 1 tablet (25 mg total) by mouth every 6 (six) hours as needed for Nausea.    THERMOTABS 287-180-15 mg Tab Take 1 tablet by mouth once daily.    TRUE METRIX GLUCOSE TEST STRIP Strp USE TO CHECK BLOOD SUGAR AS DIRECTED    TRUEPLUS LANCETS 33 gauge Misc 1 lancet  by Misc.(Non-Drug; Combo Route) route once daily. use as directed    zolpidem (AMBIEN) 5 MG Tab TAKE ONE TABLET BY MOUTH EVERY EVENING     Family History       Problem Relation (Age of Onset)    Hypertension Mother          Tobacco Use    Smoking status: Every Day     Current packs/day: 0.50     Types: Cigarettes    Smokeless tobacco: Current     Types: Chew    Substance and Sexual Activity    Alcohol use: Yes     Comment: 1 quart    Drug use: Never    Sexual activity: Not on file     Review of Systems   Constitutional:  Negative for appetite change, chills, fatigue, fever and unexpected weight change.   HENT:  Negative for congestion, mouth sores, nosebleeds, sinus pain, sore throat and trouble swallowing.    Respiratory:  Negative for apnea, cough, chest tightness and shortness of breath.    Cardiovascular:  Negative for chest pain, palpitations and leg swelling.   Gastrointestinal:  Positive for abdominal pain, nausea and vomiting. Negative for blood in stool, constipation and diarrhea.   Endocrine: Negative for polydipsia and polyuria.   Genitourinary:  Negative for decreased urine volume, difficulty urinating, dysuria and frequency.   Musculoskeletal:  Negative for arthralgias, back pain and neck pain.   Skin:  Positive for wound. Negative for rash.   Neurological:  Negative for syncope, light-headedness and headaches.   Hematological:  Does not bruise/bleed easily.   Psychiatric/Behavioral:  Negative for confusion and suicidal ideas.      Objective:     Vital Signs (Most Recent):  Temp: 97.7 °F (36.5 °C) (03/09/24 0243)  Pulse: 101 (03/09/24 0243)  Resp: 18 (03/09/24 0243)  BP: 118/74 (03/09/24 0243)  SpO2: 98 % (03/09/24 0243) Vital Signs (24h Range):  Temp:  [97.7 °F (36.5 °C)-98.8 °F (37.1 °C)] 97.7 °F (36.5 °C)  Pulse:  [101-119] 101  Resp:  [17-25] 18  SpO2:  [92 %-98 %] 98 %  BP: (104-143)/(74-85) 118/74     Weight: 96.1 kg (211 lb 12.8 oz)  Body mass index is 27.19 kg/m².     Physical Exam  Vitals and nursing note reviewed. Exam conducted with a chaperone present.   Constitutional:       Appearance: Normal appearance.   HENT:      Head: Atraumatic.      Mouth/Throat:      Mouth: Mucous membranes are moist.      Pharynx: Oropharynx is clear.   Eyes:      Conjunctiva/sclera: Conjunctivae normal.      Pupils: Pupils are equal, round, and reactive to light.    Neck:      Vascular: No carotid bruit.   Cardiovascular:      Rate and Rhythm: Regular rhythm. Tachycardia present.      Pulses: Normal pulses.      Heart sounds: Murmur heard.   Pulmonary:      Effort: Pulmonary effort is normal.      Breath sounds: Normal breath sounds.   Abdominal:      General: Abdomen is flat. Bowel sounds are normal.      Palpations: Abdomen is soft.      Tenderness: There is abdominal tenderness. There is no right CVA tenderness, left CVA tenderness, guarding or rebound.      Hernia: No hernia is present.   Musculoskeletal:         General: Normal range of motion.      Cervical back: Neck supple.      Right lower leg: Edema present.      Left lower leg: No edema.   Skin:     General: Skin is warm and dry.      Capillary Refill: Capillary refill takes less than 2 seconds.      Coloration: Skin is not jaundiced or pale.      Findings: Erythema present. No bruising, lesion or rash.      Comments: R:E with marked erythema from ankle to pretib area but does cover circumference but no foot ulcer or drainage or fluctuance.     Neurological:      General: No focal deficit present.      Mental Status: He is alert and oriented to person, place, and time.   Psychiatric:         Mood and Affect: Mood normal.              CRANIAL NERVES     CN III, IV, VI   Pupils are equal, round, and reactive to light.       Significant Labs: All pertinent labs within the past 24 hours have been reviewed.    Significant Imaging: I have reviewed all pertinent imaging results/findings within the past 24 hours.

## 2024-03-09 NOTE — ANESTHESIA PREPROCEDURE EVALUATION
03/09/2024  Karin Carrera is a 53 y.o., male.      Pre-op Assessment    I have reviewed the Patient Summary Reports.     I have reviewed the Nursing Notes. I have reviewed the NPO Status.   I have reviewed the Medications.     Review of Systems  Anesthesia Hx:  No problems with previous Anesthesia                Social:  Non-Smoker, No Alcohol Use, Former Smoker       Hematology/Oncology:  Hematology Normal   Oncology Normal                                   EENT/Dental:  EENT/Dental Normal           Cardiovascular:     Hypertension   CAD       CHF                                 Pulmonary:   COPD      Pulmonary HTN               Renal/:  Renal/ Normal                 Hepatic/GI:      Liver Disease,  Alcoholic fatty liver          Musculoskeletal:  Musculoskeletal Normal                Neurological:   CVA        CVA, rt weakness                            Endocrine:  Diabetes, poorly controlled, type 2           Dermatological:  Skin Normal    Psych:  Psychiatric Normal                    Physical Exam  General: Well nourished    Airway:  Mallampati: II / II  Mouth Opening: Normal  TM Distance: > 6 cm  Tongue: Normal  Neck ROM: Normal ROM    Chest/Lungs:  Clear to auscultation, Normal Respiratory Rate    Heart:  Rate: Normal  Rhythm: Regular Rhythm        Anesthesia Plan  Type of Anesthesia, risks & benefits discussed:    Anesthesia Type: Gen ETT  Intra-op Monitoring Plan: Standard ASA Monitors  Post Op Pain Control Plan: multimodal analgesia  Induction:  IV  Informed Consent: Informed consent signed with the Patient and all parties understand the risks and agree with anesthesia plan.  All questions answered. Patient consented to blood products? Yes  ASA Score: 4  Day of Surgery Review of History & Physical: H&P Update referred to the surgeon/provider.I have interviewed and examined the patient. I  have reviewed the patient's H&P dated: There are no significant changes.     Ready For Surgery From Anesthesia Perspective.     .

## 2024-03-09 NOTE — NURSING
Returned from surgery via stretcher. No distress or discomfort noted at this time. Vital signs obtained.

## 2024-03-09 NOTE — ASSESSMENT & PLAN NOTE
RUE with contraction noted and 0/5.  RLE 4/5.   Will continue low dose asa and statin and ARB.

## 2024-03-09 NOTE — ASSESSMENT & PLAN NOTE
Patient with thickened gallbladder wall which may from his hepatic steatitosis.  PT INR pending.  Lipase normal.  Patient does not meet sepsis criteria.  Zosyn started in ED.  Agree with IV analgesics and IV antiemetics.  Will give IV PPI due to h/o alcoholic gastritis.  Gerd with esophagitis per EGD.       Defer evaluation to admitting surgical team.  May want HIDA but with return to ED with symptoms may opt for surgery.

## 2024-03-09 NOTE — ASSESSMENT & PLAN NOTE
Continue norvasc.  Patient is not on home oxygen.  Echo 2/20/24  RVSP 64 mmHg and mod to severe tricuspid regurgitation.  Stable on not on home oxygen.

## 2024-03-09 NOTE — ASSESSMENT & PLAN NOTE
Patient with known CAD s/p stent placement and CABG, which is controlled Will continue ASA and Statin and monitor for S/Sx of angina/ACS. Continue to monitor on telemetry. Will also continue BB

## 2024-03-09 NOTE — SUBJECTIVE & OBJECTIVE
No current facility-administered medications on file prior to encounter.     Current Outpatient Medications on File Prior to Encounter   Medication Sig    albuterol (PROVENTIL/VENTOLIN HFA) 90 mcg/actuation inhaler SMARTSI-2 Puff(s) By Mouth Every 4 Hours PRN    amLODIPine (NORVASC) 10 MG tablet Take 10 mg by mouth once daily.    amoxicillin-clavulanate 875-125mg (AUGMENTIN) 875-125 mg per tablet Take 1 tablet by mouth 2 (two) times daily.    aspirin (ECOTRIN) 81 MG EC tablet Take 1 tablet (81 mg total) by mouth once daily.    atorvastatin (LIPITOR) 40 MG tablet Take 1 tablet (40 mg total) by mouth every evening.    benzonatate (TESSALON) 100 MG capsule Take 1 capsule (100 mg total) by mouth 3 (three) times daily as needed for Cough.    budesonide-formoterol 160-4.5 mcg (SYMBICORT) 160-4.5 mcg/actuation HFAA Inhale 2 puffs into the lungs every 12 (twelve) hours. Controller    dicyclomine (BENTYL) 20 mg tablet Take 1 tablet (20 mg total) by mouth 2 (two) times daily.    empagliflozin (JARDIANCE) 10 mg tablet Take 10 mg by mouth once daily.    fluticasone propionate (FLONASE) 50 mcg/actuation nasal spray 1 spray by Each Nostril route 2 (two) times daily.    furosemide (LASIX) 40 MG tablet Take 1 tablet (40 mg total) by mouth 2 (two) times a day.    insulin aspart U-100 (NOVOLOG) 100 unit/mL injection Inject 0-5 Units into the skin 3 (three) times daily before meals.    insulin detemir U-100, Levemir, (LEVEMIR FLEXTOUCH U100 INSULIN) 100 unit/mL (3 mL) InPn pen Inject 10 Units into the skin every evening.    losartan (COZAAR) 25 MG tablet Take 0.5 tablets (12.5 mg total) by mouth once daily.    metoprolol succinate (TOPROL-XL) 25 MG 24 hr tablet Take 12.5 mg by mouth once daily. Take 1/2 tablet by mouth daily    MUCINEX 600 mg 12 hr tablet Take 600 mg by mouth 2 (two) times daily.    pantoprazole (PROTONIX) 40 MG tablet Take 1 tablet (40 mg total) by mouth once daily.    potassium chloride SA (K-DUR,KLOR-CON) 20 MEQ  tablet Take 20 mEq by mouth once daily.    promethazine (PHENERGAN) 25 MG tablet Take 1 tablet (25 mg total) by mouth every 6 (six) hours as needed for Nausea.    THERMOTABS 287-180-15 mg Tab Take 1 tablet by mouth once daily.    TRUE METRIX GLUCOSE TEST STRIP Strp USE TO CHECK BLOOD SUGAR AS DIRECTED    TRUEPLUS LANCETS 33 gauge Misc 1 lancet  by Misc.(Non-Drug; Combo Route) route once daily. use as directed    zolpidem (AMBIEN) 5 MG Tab TAKE ONE TABLET BY MOUTH EVERY EVENING       Review of patient's allergies indicates:  No Known Allergies    Past Medical History:   Diagnosis Date    Alcoholic fatty liver     CHF (congestive heart failure) 02/20/2024    EF 25%    COPD (chronic obstructive pulmonary disease)     Coronary artery disease involving coronary bypass graft of native heart without angina pectoris 01/01/2022    Dyslipidemia 12/16/2023    Essential (primary) hypertension 01/01/2022    Expressive aphasia 05/05/2023    Former smoker     GERD with esophagitis 2018    EGD    Hemiparesis affecting right side as late effect of cerebrovascular accident     Pulmonary hypertension     Stroke     right hand contracted    Tricuspid regurgitation     Type 2 diabetes mellitus      Past Surgical History:   Procedure Laterality Date    CARDIAC SURGERY      CABG    CORONARY ANGIOPLASTY WITH STENT PLACEMENT      bare metal stent    HEMIARTHROPLASTY OF HIP Right 01/02/2022    Procedure: HEMIARTHROPLASTY, HIP;  Surgeon: Ramses Chua MD;  Location: [facility];  Service: Orthopedics;  Laterality: Right;    RIGHT HEART CATHETERIZATION Right 02/22/2024    Procedure: INSERTION, CATHETER, RIGHT HEART;  Surgeon: Ruiz Pacheco MD;  Location: [facility] CATH LAB;  Service: Cardiology;  Laterality: Right;     Family History       Problem Relation (Age of Onset)    Hypertension Mother          Tobacco Use    Smoking status: Every Day     Current packs/day: 0.50     Types: Cigarettes    Smokeless tobacco: Current      Types: Chew   Substance and Sexual Activity    Alcohol use: Yes     Comment: 1 quart    Drug use: Never    Sexual activity: Not on file     Review of Systems   Constitutional: Negative.  Negative for appetite change, chills, fever and unexpected weight change.   HENT: Negative.  Negative for trouble swallowing.    Eyes: Negative.    Respiratory: Negative.  Negative for chest tightness and shortness of breath.    Cardiovascular: Negative.  Negative for chest pain.   Gastrointestinal:  Positive for abdominal pain, nausea and vomiting. Negative for abdominal distention and blood in stool.   Endocrine: Negative.    Genitourinary: Negative.  Negative for hematuria.   Musculoskeletal: Negative.  Negative for back pain and myalgias.   Skin: Negative.  Negative for color change.   Allergic/Immunologic: Negative.    Neurological: Negative.  Negative for dizziness, syncope, weakness and light-headedness.   Psychiatric/Behavioral: Negative.  Negative for agitation.      Objective:     Vital Signs (Most Recent):  Temp: 97.7 °F (36.5 °C) (03/09/24 0243)  Pulse: 91 (03/09/24 0725)  Resp: 20 (03/09/24 0725)  BP: 122/79 (03/09/24 0635)  SpO2: (!) 92 % (03/09/24 0725) Vital Signs (24h Range):  Temp:  [97.7 °F (36.5 °C)-98.8 °F (37.1 °C)] 97.7 °F (36.5 °C)  Pulse:  [] 91  Resp:  [17-25] 20  SpO2:  [92 %-99 %] 92 %  BP: (104-143)/(74-85) 122/79     Weight: 96.1 kg (211 lb 12.8 oz)  Body mass index is 27.19 kg/m².     Physical Exam  Constitutional:       General: He is not in acute distress.     Appearance: Normal appearance.   HENT:      Head: Normocephalic.   Cardiovascular:      Rate and Rhythm: Normal rate.   Pulmonary:      Effort: Pulmonary effort is normal. No respiratory distress.   Abdominal:      General: There is no distension.      Tenderness: There is abdominal tenderness in the right upper quadrant. There is no guarding or rebound. Positive signs include Galicia's sign.   Skin:     General: Skin is warm.       Coloration: Skin is not jaundiced.   Neurological:      General: No focal deficit present.      Mental Status: He is alert. Mental status is at baseline.      Cranial Nerves: No cranial nerve deficit.            I have reviewed all pertinent lab results within the past 24 hours.  CBC:   Recent Labs   Lab 03/08/24 2221   WBC 8.71   RBC 4.58*   HGB 12.7*   HCT 41.3      MCV 90.2   MCH 27.7   MCHC 30.8*     BMP:   Recent Labs   Lab 03/08/24 2221 03/09/24  0247   *  --      --    K 3.3* 4.2     --    CO2 30  --    BUN 19*  --    CREATININE 1.06  --    CALCIUM 8.9  --    MG  --  2.3     LFTs:   Recent Labs   Lab 03/08/24 2221   ALT 48   AST 23   ALKPHOS 140*   BILITOT 1.4*   PROT 8.0   ALBUMIN 3.6       Significant Diagnostics:  I have reviewed all pertinent imaging results/findings within the past 24 hours.

## 2024-03-09 NOTE — PHARMACY MED REC
"Admission Medication History     The home medication history was taken by Tara Gerber.    You may go to "Admission" then "Reconcile Home Medications" tabs to review and/or act upon these items.     The home medication list has been updated by the Pharmacy department.   Please read ALL comments highlighted in yellow.   Please address this information as you see fit.    Feel free to contact us if you have any questions or require assistance.  I did not speak with the patient because patient was in surgery; instead I used [employer] and reached out to [facility] pharmacy to determine when patient last filled his medications.  For ones not recently filled, I noted the date they were last filled at [facility].  I did update his Potassium Chloride from 20 meq to 10 meq, as the 10 meq was picked up more recently (in January) than the 20 meq (which was in September).  For his insulin, [facility] did not have a recent prescription for Novolog and showed Levemir last picked up in December.  For OTC medications such as Aspirin, Thermotabs, and diabetic supplies, I did not change them as it could be possible he's getting them without a prescription despite previously having a prescription for those.      The medications listed below were removed from the home medication list. Please reorder if appropriate:  Mucinex 600 mg  Flonase nasal spray    Medications listed below were obtained from: Analytic software- [employer], Medical records, and Patient's pharmacy  PTA Medications   Medication Sig    albuterol (PROVENTIL/VENTOLIN HFA) 90 mcg/actuation inhaler SMARTSI-2 Puff(s) By Mouth Every 4 Hours PRN    amLODIPine (NORVASC) 10 MG tablet Take 10 mg by mouth once daily.    budesonide-formoterol 160-4.5 mcg (SYMBICORT) 160-4.5 mcg/actuation HFAA Inhale 2 puffs into the lungs every 12 (twelve) hours. Controller    empagliflozin (JARDIANCE) 10 mg tablet Take 10 mg by mouth once daily.    furosemide (LASIX) 40 MG tablet Take 1 tablet (40 mg " total) by mouth 2 (two) times a day.    zolpidem (AMBIEN) 5 MG Tab TAKE ONE TABLET BY MOUTH EVERY EVENING    amoxicillin-clavulanate 875-125mg (AUGMENTIN) 875-125 mg per tablet Take 1 tablet by mouth 2 (two) times daily.    aspirin (ECOTRIN) 81 MG EC tablet Take 1 tablet (81 mg total) by mouth once daily.    atorvastatin (LIPITOR) 40 MG tablet Take 1 tablet (40 mg total) by mouth every evening.    benzonatate (TESSALON) 100 MG capsule Take 1 capsule (100 mg total) by mouth 3 (three) times daily as needed for Cough.    dicyclomine (BENTYL) 20 mg tablet Take 1 tablet (20 mg total) by mouth 2 (two) times daily.    insulin aspart U-100 (NOVOLOG) 100 unit/mL injection Inject 0-5 Units into the skin 3 (three) times daily before meals.    insulin detemir U-100, Levemir, (LEVEMIR FLEXTOUCH U100 INSULIN) 100 unit/mL (3 mL) InPn pen Inject 10 Units into the skin every evening.    losartan (COZAAR) 25 MG tablet Take 0.5 tablets (12.5 mg total) by mouth once daily.    metoprolol succinate (TOPROL-XL) 25 MG 24 hr tablet Take 12.5 mg by mouth once daily. Take 1/2 tablet by mouth daily    pantoprazole (PROTONIX) 40 MG tablet Take 1 tablet (40 mg total) by mouth once daily.    potassium chloride (KLOR-CON) 10 MEQ TbSR Take 10 mEq by mouth once daily.    promethazine (PHENERGAN) 25 MG tablet Take 1 tablet (25 mg total) by mouth every 6 (six) hours as needed for Nausea.    THERMOTABS 287-180-15 mg Tab Take 1 tablet by mouth once daily.    TRUE METRIX GLUCOSE TEST STRIP Strp USE TO CHECK BLOOD SUGAR AS DIRECTED    TRUEPLUS LANCETS 33 gauge Misc 1 lancet  by Misc.(Non-Drug; Combo Route) route once daily. use as directed         Current Outpatient Medications on File Prior to Encounter   Medication Sig Dispense Refill Last Dose    albuterol (PROVENTIL/VENTOLIN HFA) 90 mcg/actuation inhaler SMARTSI-2 Puff(s) By Mouth Every 4 Hours PRN 18 g 6     amLODIPine (NORVASC) 10 MG tablet Take 10 mg by mouth once daily.        budesonide-formoterol 160-4.5 mcg (SYMBICORT) 160-4.5 mcg/actuation HFAA Inhale 2 puffs into the lungs every 12 (twelve) hours. Controller 1 g 0     empagliflozin (JARDIANCE) 10 mg tablet Take 10 mg by mouth once daily.       furosemide (LASIX) 40 MG tablet Take 1 tablet (40 mg total) by mouth 2 (two) times a day. 60 tablet 0     zolpidem (AMBIEN) 5 MG Tab TAKE ONE TABLET BY MOUTH EVERY EVENING 30 tablet 1     amoxicillin-clavulanate 875-125mg (AUGMENTIN) 875-125 mg per tablet Take 1 tablet by mouth 2 (two) times daily. 14 tablet 0     aspirin (ECOTRIN) 81 MG EC tablet Take 1 tablet (81 mg total) by mouth once daily. 90 tablet 1     atorvastatin (LIPITOR) 40 MG tablet Take 1 tablet (40 mg total) by mouth every evening. 90 tablet 1     benzonatate (TESSALON) 100 MG capsule Take 1 capsule (100 mg total) by mouth 3 (three) times daily as needed for Cough. 30 capsule 0     dicyclomine (BENTYL) 20 mg tablet Take 1 tablet (20 mg total) by mouth 2 (two) times daily. 20 tablet 0     insulin aspart U-100 (NOVOLOG) 100 unit/mL injection Inject 0-5 Units into the skin 3 (three) times daily before meals. 10 mL 1     insulin detemir U-100, Levemir, (LEVEMIR FLEXTOUCH U100 INSULIN) 100 unit/mL (3 mL) InPn pen Inject 10 Units into the skin every evening. 36 mL 0     losartan (COZAAR) 25 MG tablet Take 0.5 tablets (12.5 mg total) by mouth once daily. 45 tablet 1     metoprolol succinate (TOPROL-XL) 25 MG 24 hr tablet Take 12.5 mg by mouth once daily. Take 1/2 tablet by mouth daily       pantoprazole (PROTONIX) 40 MG tablet Take 1 tablet (40 mg total) by mouth once daily. 90 tablet 1     potassium chloride (KLOR-CON) 10 MEQ TbSR Take 10 mEq by mouth once daily.       promethazine (PHENERGAN) 25 MG tablet Take 1 tablet (25 mg total) by mouth every 6 (six) hours as needed for Nausea. 15 tablet 0     THERMOTABS 287-180-15 mg Tab Take 1 tablet by mouth once daily. 90 tablet 0     TRUE METRIX GLUCOSE TEST STRIP Strp USE TO CHECK BLOOD  SUGAR AS DIRECTED 200 each 1     TRUEPLUS LANCETS 33 gauge Misc 1 lancet  by Misc.(Non-Drug; Combo Route) route once daily. use as directed 200 each 2     [DISCONTINUED] fluticasone propionate (FLONASE) 50 mcg/actuation nasal spray 1 spray by Each Nostril route 2 (two) times daily.       [DISCONTINUED] MUCINEX 600 mg 12 hr tablet Take 600 mg by mouth 2 (two) times daily.            Potential issues to be addressed PRIOR TO DISCHARGE  No issues identified, as I did not speak directly with the patient/patient's family to determine whether patient is taking his medication when able.    Tara Gerber  Pharmacy [facility] - Medication History  EXT. [phone]    .

## 2024-03-09 NOTE — TRANSFER OF CARE
"Anesthesia Transfer of Care Note    Patient: Karin Carrera    Procedure(s) Performed: Procedure(s) (LRB):  CHOLECYSTECTOMY, LAPAROSCOPIC, WITH CHOLANGIOGRAM (N/A)    Patient location: PACU    Anesthesia Type: general    Transport from OR: Transported from OR on 100% O2 by closed face mask with adequate spontaneous ventilation    Post pain: adequate analgesia    Post assessment: no apparent anesthetic complications    Post vital signs: stable    Level of consciousness: responds to stimulation    Nausea/Vomiting: no nausea/vomiting    Complications: none    Transfer of care protocol was followed      Last vitals: Visit Vitals  /81   Pulse 96   Temp 37 °C (98.6 °F) (Oral)   Resp 16   Ht 6' 2" (1.88 m)   Wt 96.1 kg (211 lb 12.8 oz)   SpO2 (!) 92%   BMI 27.19 kg/m²     "

## 2024-03-09 NOTE — ED PROVIDER NOTES
Encounter Date: 3/8/2024    SCRIBE #1 NOTE: I, Maia Beach, am scribing for, and in the presence of,  Narayan Branch MD. I have scribed the entire note.       History     Chief Complaint   Patient presents with    Abdominal Pain     This is a 54 y/o male, who presents to the ED with complaints of vomiting which started 3 weeks ago. HX is limited due to the pt only mumbling and shaking his head. He is tender to the gastric area. There is no Hx of a fever,chills, or diarrhea. According to previous records, the pt was recently Dx with gallstones and was told to follow up with general surgery, but apparently the pain worsened tonight. There are no other complaints/pain in the ED at this time. He has a known hx of HTN, GERD, and CVA. He is a current every day smoker. He has had a right cardiac cath.     The history is provided by the patient. No  was used.     Review of patient's allergies indicates:  No Known Allergies  Past Medical History:   Diagnosis Date    Alcoholic fatty liver     CHF (congestive heart failure) 02/20/2024    EF 25%    COPD (chronic obstructive pulmonary disease)     Coronary artery disease involving coronary bypass graft of native heart without angina pectoris 01/01/2022    Dyslipidemia 12/16/2023    Essential (primary) hypertension 01/01/2022    Expressive aphasia 05/05/2023    Former smoker     GERD with esophagitis 2018    EGD    Hemiparesis affecting right side as late effect of cerebrovascular accident     Pulmonary hypertension     Stroke     right hand contracted    Tricuspid regurgitation     Type 2 diabetes mellitus      Past Surgical History:   Procedure Laterality Date    CARDIAC SURGERY      CABG    CORONARY ANGIOPLASTY WITH STENT PLACEMENT      bare metal stent    HEMIARTHROPLASTY OF HIP Right 01/02/2022    Procedure: HEMIARTHROPLASTY, HIP;  Surgeon: Ramses Chua MD;  Location: [facility];  Service: Orthopedics;  Laterality: Right;     LAPAROSCOPIC CHOLECYSTECTOMY  03/09/2024    Dr. Bennett    RIGHT HEART CATHETERIZATION Right 02/22/2024    Procedure: INSERTION, CATHETER, RIGHT HEART;  Surgeon: Ruiz Pacheco MD;  Location: [facility] CATH LAB;  Service: Cardiology;  Laterality: Right;     Family History   Problem Relation Age of Onset    Hypertension Mother      Social History     Tobacco Use    Smoking status: Every Day     Current packs/day: 0.50     Types: Cigarettes    Smokeless tobacco: Current     Types: Chew   Substance Use Topics    Alcohol use: Yes     Comment: 1 quart    Drug use: Never     Review of Systems   Constitutional:  Positive for fever.   All other systems reviewed and are negative.      Physical Exam     Initial Vitals   BP Pulse Resp Temp SpO2   03/08/24 2036 03/08/24 2034 03/08/24 2034 03/08/24 2036 03/08/24 2034   (!) 143/85 (!) 119 (!) 25 98.6 °F (37 °C) (!) 92 %      MAP       --                Physical Exam    Nursing note and vitals reviewed.  Constitutional: He appears well-developed and well-nourished.   HENT:   Head: Normocephalic and atraumatic.   Eyes: EOM are normal. Pupils are equal, round, and reactive to light.   Neck: Neck supple. No thyromegaly present.   Normal range of motion.  Cardiovascular:  Normal rate, regular rhythm, normal heart sounds and intact distal pulses.           No murmur heard.  Pulmonary/Chest: Breath sounds normal. No respiratory distress. He has no wheezes.   Abdominal: Abdomen is soft. Bowel sounds are normal. There is abdominal tenderness (Gastric tenderness.).   Musculoskeletal:         General: No tenderness or edema. Normal range of motion.      Cervical back: Normal range of motion and neck supple.     Lymphadenopathy:     He has no cervical adenopathy.   Neurological: He is alert and oriented to person, place, and time. He has normal strength and normal reflexes. No cranial nerve deficit or sensory deficit. GCS score is 15. GCS eye subscore is 4. GCS verbal subscore is 5. GCS  motor subscore is 6.   Skin: Skin is warm and dry. Capillary refill takes less than 2 seconds. No rash noted.   Psychiatric: He has a normal mood and affect.         ED Course   Procedures  Labs Reviewed   COMPREHENSIVE METABOLIC PANEL - Abnormal; Notable for the following components:       Result Value    Potassium 3.3 (*)     Glucose 128 (*)     BUN 19 (*)     Globulin 4.4 (*)     Bilirubin, Total 1.4 (*)     Alk Phos 140 (*)     All other components within normal limits   URINALYSIS, REFLEX TO URINE CULTURE - Abnormal; Notable for the following components:    Protein, UA 10 (*)     Glucose, UA >1000 (*)     All other components within normal limits   CBC WITH DIFFERENTIAL - Abnormal; Notable for the following components:    RBC 4.58 (*)     Hemoglobin 12.7 (*)     MCHC 30.8 (*)     RDW 15.6 (*)     Neutrophils % 79.0 (*)     Lymphocytes % 14.5 (*)     Immature Granulocytes % 0.7 (*)     Immature Granulocytes, Absolute 0.06 (*)     All other components within normal limits   LIPASE - Normal   CBC W/ AUTO DIFFERENTIAL    Narrative:     The following orders were created for panel order CBC W/ AUTO DIFFERENTIAL.  Procedure                               Abnormality         Status                     ---------                               -----------         ------                     CBC with Differential[[id number]]       Abnormal            Final result                 Please view results for these tests on the individual orders.   POCT GLUCOSE MONITORING CONTINUOUS        ECG Results              EKG 12-lead (Final result)        Collection Time Result Time QRS Duration OHS QTC Calculation    03/09/24 02:10:28 03/09/24 17:35:03 100 483                     Final result by Interface, Lab In [facility] (03/09/24 17:35:13)                   Narrative:    Test Reason : I25.10,    Vent. Rate : 106 BPM     Atrial Rate : 106 BPM     P-R Int : 186 ms          QRS Dur : 100 ms      QT Int : 364 ms       P-R-T Axes : 072 -44  107 degrees     QTc Int : 483 ms    Sinus tachycardia  Possible Left atrial enlargement  Left axis deviation  LVH ( R in aVL , Alpha product )  T wave abnormality, consider lateral ischemia  When compared with ECG of 02-MAR-2024 14:55,  PREVIOUS ECG IS PRESENT  Confirmed by Ruddy VIDAL, Corinne ZHANG ([id number]) on 3/9/2024 5:35:01 PM    Referred By: [email or web address]   SELF           Confirmed By:Corinne Fox MD                                  Imaging Results              CT Abdomen Pelvis With IV Contrast NO Oral Contrast (Final result)  Result time 03/09/24 08:26:33      Final result by Pavan Carmichael DO (03/09/24 08:26:33)                   Impression:      Minimal ascites.    Pulmonary edema.    Gallbladder wall thickening.      Electronically signed by: Pavan Carmichael  Date:    03/09/2024  Time:    08:26               Narrative:    EXAMINATION:  CT ABDOMEN PELVIS WITH IV CONTRAST    CLINICAL HISTORY:  Abdominal pain, acute, nonlocalized;    COMPARISON:  2018    TECHNIQUE:  CT ABDOMEN PELVIS WITH IV [id number] cc of Isovue 370    Dose reduction:    The CT exam was performed using one or more dose reduction techniques: Automatic exposure control, automated adjustment of the MA and/or kVP according to patient size, or use of iterative reconstruction technique.    FINDINGS:  Lower lobes: Lower lobe airspace opacities and interlobular septal thickening.    Cardiac: No effusion.  Moderately enlarged heart.    Abdomen:    Hepatobiliary/gallbladder: No focal liver lesion. Gallbladder wall thickening..  No ductal obstruction.    Spleen: Normal    Pancreas: Normal    Adrenal/Genitourinary system: Normal    Bowel and Mesentery: There is no evidence for bowel obstruction.    Peritoneum: Minimal ascites.    Retroperitoneum: No enlarged lymph nodes.  Small volume fluid within the pelvis.    Vasculature: Mild-to-moderate calcified vascular disease.    Reproductive: Normal.    Lymph nodes: No enlarged lymph  nodes.    Abdominal wall: Normal.    Osseous structures: Normal.                                       Medications   sodium chloride 0.9% flush 10 mL (has no administration in time range)   melatonin tablet 6 mg (has no administration in time range)   piperacillin-tazobactam (ZOSYN) 4.5 g in dextrose 5 % in water (D5W) 100 mL IVPB (MB+) (0 g Intravenous Stopped 3/9/24 2253)   prochlorperazine injection Soln 5 mg (has no administration in time range)   lactated ringers infusion ( Intravenous New Bag 3/9/24 0317)   HYDROmorphone (PF) injection 1 mg (1 mg Intravenous Given 3/9/24 1315)   acetaminophen tablet 1,000 mg (has no administration in time range)   bisacodyL EC tablet 10 mg (has no administration in time range)   dextromethorphan-guaiFENesin  mg/5 ml liquid 10 mL (10 mLs Oral Given 3/9/24 2104)   simethicone chewable tablet 80 mg (has no administration in time range)   ondansetron injection 8 mg (8 mg Intravenous Given 3/9/24 0323)   traZODone tablet 50 mg (50 mg Oral Given 3/9/24 2104)   amLODIPine tablet 10 mg (10 mg Oral Not Given 3/9/24 0900)   aspirin EC tablet 81 mg (81 mg Oral Not Given 3/9/24 0900)   atorvastatin tablet 40 mg (40 mg Oral Given 3/9/24 2101)   losartan split tablet 12.5 mg (12.5 mg Oral Not Given 3/9/24 0900)   metoprolol succinate (TOPROL-XL) 24 hr split tablet 12.5 mg (12.5 mg Oral Not Given 3/9/24 0900)   zolpidem tablet 5 mg (5 mg Oral Given 3/9/24 2101)   albuterol-ipratropium 2.5 mg-0.5 mg/3 mL nebulizer solution 3 mL (3 mLs Nebulization Given 3/10/24 0018)   budesonide nebulizer solution 0.5 mg (0.5 mg Nebulization Given 3/9/24 1939)   glucagon (human recombinant) injection 1 mg (has no administration in time range)   insulin detemir U-100 injection 15 Units (15 Units Subcutaneous Given 3/9/24 2107)   insulin aspart U-100 injection 0-10 Units (has no administration in time range)   dextrose 10% bolus 125 mL 125 mL (has no administration in time range)   dextrose 10% bolus 250  mL 250 mL (has no administration in time range)   sodium chloride 0.9% 1,000 mL with mvi, (ADULT) no.4 with vit K 3,300 unit- 150 mcg/10 mL 10 mL, thiamine 100 mg, folic acid 1 mg infusion ( Intravenous New Bag 3/9/24 1321)   pantoprazole injection 40 mg (40 mg Intravenous Given 3/9/24 1309)   lactated ringers infusion (125 mL/hr Intravenous New Bag 3/10/24 0140)   oxyCODONE-acetaminophen 5-325 mg per tablet 1 tablet (1 tablet Oral Given 3/9/24 2101)   morphine injection 4 mg (4 mg Intravenous Given 3/8/24 2242)   ondansetron injection 4 mg (4 mg Intravenous Given 3/8/24 2243)   lactated ringers infusion (0 mLs Intravenous Stopped 3/9/24 0037)   iopamidoL (ISOVUE-370) injection 100 mL (100 mLs Intravenous Given 3/9/24 0032)     Medical Decision Making  Amount and/or Complexity of Data Reviewed  Labs: ordered.  Radiology: ordered.  Discussion of management or test interpretation with external provider(s): 0145: Dr. Branch contacts Dr. Bennett, the on call surgeon to discuss the pt's case and possible admissions. Dr. Bennett would like to consult the pt for surgery of the gallbladder in the morning.      Risk  Prescription drug management.              Attending Attestation:           Physician Attestation for Scribe:  Physician Attestation Statement for Scribe #1: INarayan MD, reviewed documentation, as scribed by Maia Beach in my presence, and it is both accurate and complete.             ED Course as of 03/10/24 0145   Sat Mar 09, 2024   0148 CT abdomen and pelvis with contrast:   Cardiomegaly  Mild pulmonary edema and pleural effusion  Mild perihepatic ascites and edematous gallbladder wall.   No hydronephrosis.   No acute bowel process.    [BW]   0149 MDM:  A 53-year-old male patient came with right upper quadrant abdominal pain and tenderness.  Differential diagnosis cholecystitis, appendicitis, colitis. [HK]      ED Course User Index  [BW] Maia Beach  [HK] Narayan Branch MD                              Clinical Impression:  Final diagnoses:  [K81.9] Acalculous cholecystitis (Primary)  [I25.10] CAD (coronary artery disease)  [K81.0] Acute acalculous cholecystitis [K81.0]  [I25.810] Coronary artery disease involving coronary bypass graft of native heart without angina pectoris [I25.810]  [I27.20] Pulmonary hypertension [I27.20]  [E11.59] Type 2 diabetes mellitus with other circulatory complication, unspecified whether long term insulin use [E11.59]  [I10] Essential (primary) hypertension [I10]  [K70.0] Alcoholic fatty liver [K70.0]  [K21.00] Gastroesophageal reflux disease with esophagitis without hemorrhage [K21.00]  [I69.351] Hemiparesis affecting right side as late effect of cerebrovascular accident [I69.351]  [J43.9] Pulmonary emphysema, unspecified emphysema type [J43.9]  [L03.115] Cellulitis of right lower extremity [L03.115]  [I50.22] Chronic systolic congestive heart failure [I50.22]          ED Disposition Condition    Observation                 Narayan Branch MD  03/10/24 0145

## 2024-03-09 NOTE — ANESTHESIA RELEASE NOTE
"Anesthesia Release from PACU Note    Patient: Karin Carrera    Procedure(s) Performed: Procedure(s) (LRB):  CHOLECYSTECTOMY, LAPAROSCOPIC, WITH CHOLANGIOGRAM (N/A)    Anesthesia type: general    Post pain: Adequate analgesia    Post assessment: no apparent anesthetic complications    Last Vitals: Visit Vitals  /79   Pulse 91   Temp 36.5 °C (97.7 °F) (Oral)   Resp 20   Ht 6' 2" (1.88 m)   Wt 96.1 kg (211 lb 12.8 oz)   SpO2 (!) 92%   BMI 27.19 kg/m²       Post vital signs: stable    Level of consciousness: sedated    Nausea/Vomiting: no nausea/no vomiting    Complications: none    Airway Patency: patent    Respiratory: unassisted    Cardiovascular: stable and blood pressure at baseline    Hydration: euvolemic  "

## 2024-03-09 NOTE — H&P
[facility]  General Surgery  History & Physical    Patient Name: Karin Carrera  MRN: [id number]  Admission Date: 3/8/2024  Attending Physician: Karson Bennett DO   Primary Care Provider: Walker Smith MD    Patient information was obtained from patient and ER records.     Subjective:     Chief Complaint/Reason for Admission:  Abdominal pain    History of Present Illness: 53-year-old  male presents to the ER with abdominal pain.  Has had a history of right upper quadrant abdominal pain and has been seen previously for questionable cholecystitis; placed on Augmentin.  Still having nausea and vomiting.  CT scan of the abdomen pelvis showed a thickened gallbladder with some pericholecystic fluid/perihepatic ascites concerning for acalculous cholecystitis.  Patient admitted to surgery for evaluation.  History of coronary artery disease with bare metal stent, CABG in 2016; CHF with ejection fraction 25%; history of alcoholic fatty liver.  History of CVA with right hemiparesis and expressive aphasia; communicates with writing on paper and texting.  No previous abdominal surgeries.  Total bilirubin 1.4; no leukocytosis, lipase unremarkable        No current facility-administered medications on file prior to encounter.     Current Outpatient Medications on File Prior to Encounter   Medication Sig    albuterol (PROVENTIL/VENTOLIN HFA) 90 mcg/actuation inhaler SMARTSI-2 Puff(s) By Mouth Every 4 Hours PRN    amLODIPine (NORVASC) 10 MG tablet Take 10 mg by mouth once daily.    amoxicillin-clavulanate 875-125mg (AUGMENTIN) 875-125 mg per tablet Take 1 tablet by mouth 2 (two) times daily.    aspirin (ECOTRIN) 81 MG EC tablet Take 1 tablet (81 mg total) by mouth once daily.    atorvastatin (LIPITOR) 40 MG tablet Take 1 tablet (40 mg total) by mouth every evening.    benzonatate (TESSALON) 100 MG capsule Take 1 capsule (100 mg total) by mouth 3 (three) times daily as needed for Cough.     budesonide-formoterol 160-4.5 mcg (SYMBICORT) 160-4.5 mcg/actuation HFAA Inhale 2 puffs into the lungs every 12 (twelve) hours. Controller    dicyclomine (BENTYL) 20 mg tablet Take 1 tablet (20 mg total) by mouth 2 (two) times daily.    empagliflozin (JARDIANCE) 10 mg tablet Take 10 mg by mouth once daily.    fluticasone propionate (FLONASE) 50 mcg/actuation nasal spray 1 spray by Each Nostril route 2 (two) times daily.    furosemide (LASIX) 40 MG tablet Take 1 tablet (40 mg total) by mouth 2 (two) times a day.    insulin aspart U-100 (NOVOLOG) 100 unit/mL injection Inject 0-5 Units into the skin 3 (three) times daily before meals.    insulin detemir U-100, Levemir, (LEVEMIR FLEXTOUCH U100 INSULIN) 100 unit/mL (3 mL) InPn pen Inject 10 Units into the skin every evening.    losartan (COZAAR) 25 MG tablet Take 0.5 tablets (12.5 mg total) by mouth once daily.    metoprolol succinate (TOPROL-XL) 25 MG 24 hr tablet Take 12.5 mg by mouth once daily. Take 1/2 tablet by mouth daily    MUCINEX 600 mg 12 hr tablet Take 600 mg by mouth 2 (two) times daily.    pantoprazole (PROTONIX) 40 MG tablet Take 1 tablet (40 mg total) by mouth once daily.    potassium chloride SA (K-DUR,KLOR-CON) 20 MEQ tablet Take 20 mEq by mouth once daily.    promethazine (PHENERGAN) 25 MG tablet Take 1 tablet (25 mg total) by mouth every 6 (six) hours as needed for Nausea.    THERMOTABS 287-180-15 mg Tab Take 1 tablet by mouth once daily.    TRUE METRIX GLUCOSE TEST STRIP Strp USE TO CHECK BLOOD SUGAR AS DIRECTED    TRUEPLUS LANCETS 33 gauge Misc 1 lancet  by Misc.(Non-Drug; Combo Route) route once daily. use as directed    zolpidem (AMBIEN) 5 MG Tab TAKE ONE TABLET BY MOUTH EVERY EVENING       Review of patient's allergies indicates:  No Known Allergies    Past Medical History:   Diagnosis Date    Alcoholic fatty liver     CHF (congestive heart failure) 02/20/2024    EF 25%    COPD (chronic obstructive pulmonary disease)     Coronary artery  disease involving coronary bypass graft of native heart without angina pectoris 01/01/2022    Dyslipidemia 12/16/2023    Essential (primary) hypertension 01/01/2022    Expressive aphasia 05/05/2023    Former smoker     GERD with esophagitis 2018    EGD    Hemiparesis affecting right side as late effect of cerebrovascular accident     Pulmonary hypertension     Stroke     right hand contracted    Tricuspid regurgitation     Type 2 diabetes mellitus      Past Surgical History:   Procedure Laterality Date    CARDIAC SURGERY      CABG    CORONARY ANGIOPLASTY WITH STENT PLACEMENT      bare metal stent    HEMIARTHROPLASTY OF HIP Right 01/02/2022    Procedure: HEMIARTHROPLASTY, HIP;  Surgeon: Ramses Chua MD;  [facility];  Service: Orthopedics;  Laterality: Right;    RIGHT HEART CATHETERIZATION Right 02/22/2024    Procedure: INSERTION, CATHETER, RIGHT HEART;  Surgeon: Ruiz Pacheco MD;  Location: [facility] CATH LAB;  Service: Cardiology;  Laterality: Right;     Family History       Problem Relation (Age of Onset)    Hypertension Mother          Tobacco Use    Smoking status: Every Day     Current packs/day: 0.50     Types: Cigarettes    Smokeless tobacco: Current     Types: Chew   Substance and Sexual Activity    Alcohol use: Yes     Comment: 1 quart    Drug use: Never    Sexual activity: Not on file     Review of Systems   Constitutional: Negative.  Negative for appetite change, chills, fever and unexpected weight change.   HENT: Negative.  Negative for trouble swallowing.    Eyes: Negative.    Respiratory: Negative.  Negative for chest tightness and shortness of breath.    Cardiovascular: Negative.  Negative for chest pain.   Gastrointestinal:  Positive for abdominal pain, nausea and vomiting. Negative for abdominal distention and blood in stool.   Endocrine: Negative.    Genitourinary: Negative.  Negative for hematuria.   Musculoskeletal: Negative.  Negative for back pain and myalgias.   Skin:  Negative.  Negative for color change.   Allergic/Immunologic: Negative.    Neurological: Negative.  Negative for dizziness, syncope, weakness and light-headedness.   Psychiatric/Behavioral: Negative.  Negative for agitation.      Objective:     Vital Signs (Most Recent):  Temp: 97.7 °F (36.5 °C) (03/09/24 0243)  Pulse: 91 (03/09/24 0725)  Resp: 20 (03/09/24 0725)  BP: 122/79 (03/09/24 0635)  SpO2: (!) 92 % (03/09/24 0725) Vital Signs (24h Range):  Temp:  [97.7 °F (36.5 °C)-98.8 °F (37.1 °C)] 97.7 °F (36.5 °C)  Pulse:  [] 91  Resp:  [17-25] 20  SpO2:  [92 %-99 %] 92 %  BP: (104-143)/(74-85) 122/79     Weight: 96.1 kg (211 lb 12.8 oz)  Body mass index is 27.19 kg/m².     Physical Exam  Constitutional:       General: He is not in acute distress.     Appearance: Normal appearance.   HENT:      Head: Normocephalic.   Cardiovascular:      Rate and Rhythm: Normal rate.   Pulmonary:      Effort: Pulmonary effort is normal. No respiratory distress.   Abdominal:      General: There is no distension.      Tenderness: There is abdominal tenderness in the right upper quadrant. There is no guarding or rebound. Positive signs include Galicia's sign.   Skin:     General: Skin is warm.      Coloration: Skin is not jaundiced.   Neurological:      General: No focal deficit present.      Mental Status: He is alert. Mental status is at baseline.      Cranial Nerves: No cranial nerve deficit.            I have reviewed all pertinent lab results within the past 24 hours.  CBC:   Recent Labs   Lab 03/08/24 2221   WBC 8.71   RBC 4.58*   HGB 12.7*   HCT 41.3      MCV 90.2   MCH 27.7   MCHC 30.8*     BMP:   Recent Labs   Lab 03/08/24 2221 03/09/24 0247   *  --      --    K 3.3* 4.2     --    CO2 30  --    BUN 19*  --    CREATININE 1.06  --    CALCIUM 8.9  --    MG  --  2.3     LFTs:   Recent Labs   Lab 03/08/24  2221   ALT 48   AST 23   ALKPHOS 140*   BILITOT 1.4*   PROT 8.0   ALBUMIN 3.6       Significant  Diagnostics:  I have reviewed all pertinent imaging results/findings within the past 24 hours.    Assessment/Plan:     * Acute acalculous cholecystitis  CAD with CABG, Chronic CHF; ejection fraction 20-25%; recent cardiac catheterization in February.  Patient being managed medically and is doing well; optimized per Medicine.  We will proceed with surgery.    To the OR for laparoscopic cholecystectomy with intraoperative cholangiogram.      Risks and benefits explained with the patient including risks for infection, bleeding, injury to surrounding structures, hematoma/seroma formation with need for possible evacuation, possible open.  The patient verbalized understanding, agrees and wishes to proceed with surgery.          VTE Risk Mitigation (From admission, onward)           Ordered     Place SAGE hose  Until discontinued         03/09/24 0222     IP VTE HIGH RISK PATIENT  Once         03/09/24 0159     Place sequential compression device  Until discontinued         03/09/24 0159                    Karson Martinez, DO  General Surgery  [facility]

## 2024-03-09 NOTE — HPI
53-year-old  male presents to the ER with abdominal pain.  Has had a history of right upper quadrant abdominal pain and has been seen previously for questionable cholecystitis; placed on Augmentin.  Still having nausea and vomiting.  CT scan of the abdomen pelvis showed a thickened gallbladder with some pericholecystic fluid/perihepatic ascites concerning for acalculous cholecystitis.  Patient admitted to surgery for evaluation.  History of coronary artery disease with bare metal stent, CABG in 2016; CHF with ejection fraction 25%; history of alcoholic fatty liver.  History of CVA with right hemiparesis and expressive aphasia; communicates with writing on paper and texting.  No previous abdominal surgeries.  Total bilirubin 1.4; no leukocytosis, lipase unremarkable

## 2024-03-09 NOTE — PLAN OF CARE
1126 pt to pacu pt resp reg and non labored pt sao2 93% on 7l fm pt dsg d/I to abd x 4 no bleeding noted pt pain leve o at present pt with old cva pt with no movement r arm and weak movement of r lower ext     1135   pt c/o pain in abd pt pain level 7 pt sleeping at times gave morphine 2mg ivp will titrate for pain control     1145   pt resting well awakens easily resp reg and non labored    1150 pt placed on o2 at 2l nbp pt dsg d/I to abd pt accu check 143     1210 pt vs stable pt sao2 94% on 2l nbp pt states pain better  dsg d/I to abd x 4 orders to release to room per md     1225 pt released to w felicitas rn b/p 123/75 pulse 94 resp 16 sao2 94% on 2l nbp temp 97.4 oral pt awake following commands

## 2024-03-09 NOTE — ASSESSMENT & PLAN NOTE
To the OR for laparoscopic cholecystectomy with intraoperative cholangiogram.      Risks and benefits explained with the patient including risks for infection, bleeding, injury to surrounding structures, hematoma/seroma formation with need for possible evacuation, possible open.  The patient verbalized understanding, agrees and wishes to proceed with surgery.

## 2024-03-09 NOTE — HPI
54 yo M presented to [facility] ED with a week of N/V and abdominal pain.  Pain has been in epigastrium to RUQ and worsening over the past week.  He had been having that pain a week prior to the N/V and US 3/2 showed thickened gallbladder wall but no stones.  Patient was discharged on augmentin due to some RLE cellulitis and antiemetics and analgesics along with bentyl for the abdominal pain but symptoms continued  Lipase is normal.  Patient does have a h/o alcoholic fatty liver disease but does not drink daily any longer and stopped smoking.      He actually looks pretty good with only some mild TTP but the N/V is now what brought him to the ED.  He could not keep down his meds and he has a complicated history of CAD (CABG and bare metal stent) along with severe pulmonary edema and systolic dysfunction (EF 25%).  He is well controlled on his chronic medical problems and compliant.  He follows with Dr. Walker Smith as his PCP and he will follow in hospital over the weekend.      Deandre Piña communicates through texting on his phone. He has expressive aphasia from previous stroke and dense RUE HP and RLE 4.5.  Full ROS and problem based evaluation below under assessment and plan for more information.

## 2024-03-09 NOTE — ASSESSMENT & PLAN NOTE
"Patient is identified as having Systolic (HFrEF) heart failure that is Chronic. CHF is currently controlled. Latest ECHO performed and demonstrates- Results for orders placed during the hospital encounter of 02/19/24    Echo    Interpretation Summary    Left Ventricle: The left ventricle is severely dilated. Normal wall thickness. There is eccentric hypertrophy. There is severely reduced systolic function with a visually estimated ejection fraction of 20 - 25%. Grade III diastolic dysfunction.    Right Ventricle: Moderate right ventricular enlargement. Systolic function is moderately reduced.    Left Atrium: Left atrium is moderately dilated.    Right Atrium: Right atrium is mildly dilated.    Aortic Valve: The aortic valve is a trileaflet valve. Mildly calcified cusps.    Mitral Valve: There is mild bileaflet sclerosis. Mildly thickened leaflets. There is no stenosis. The mean pressure gradient across the mitral valve is 3 mmHg at a heart rate of  bpm. There is severe regurgitation with a posterolateral eccentriccally directed jet.    Tricuspid Valve: There is moderate to severe regurgitation with a centrally directed jet.    Pulmonary Artery: The estimated pulmonary artery systolic pressure is 65 mmHg.    IVC/SVC: Elevated venous pressure at 15 mmHg.    Pericardium: There is a trivial effusion.  . Continue Beta Blocker, ACE/ARB, and Furosemide and monitor clinical status closely. Monitor on telemetry. Patient is on CHF pathway.  Monitor strict Is&Os and daily weights.  Place on fluid restriction of    . Cardiology has not been consulted. Continue to stress to patient importance of self efficacy and  on diet for CHF. Last BNP reviewed- and noted below No results for input(s): "BNP", "BNPTRIAGEBLO" in the last 168 hours.    Monitor volume status closely.  Decreased LR to 50 cc hour and will hold lasix.  Giving banana bag daily.  [place] protocol.    "

## 2024-03-09 NOTE — CONSULTS
[facility]  [facility] Medicine  Consult Note    Patient Name: Karin Carrera  MRN: [id number]  Admission Date: 3/8/2024  Hospital Length of Stay: 0 days  Attending Physician: Karson Bennett DO   Primary Care Provider: Walker Smith MD           Patient information was obtained from patient, past medical records, and ER records.     Consults  Subjective:     Principal Problem: Acute acalculous cholecystitis    Chief Complaint:   Chief Complaint   Patient presents with    Abdominal Pain        HPI: 52 yo M presented to [facility] ED with a week of N/V and abdominal pain.  Pain has been in epigastrium to RUQ and worsening over the past week.  He had been having that pain a week prior to the N/V and US 3/2 showed thickened gallbladder wall but no stones.  Patient was discharged on augmentin due to some RLE cellulitis and antiemetics and analgesics along with bentyl for the abdominal pain but symptoms continued  Lipase is normal.  Patient does have a h/o alcoholic fatty liver disease but does not drink daily any longer and stopped smoking.      He actually looks pretty good with only some mild TTP but the N/V is now what brought him to the ED.  He could not keep down his meds and he has a complicated history of CAD (CABG and bare metal stent) along with severe pulmonary edema and systolic dysfunction (EF 25%).  He is well controlled on his chronic medical problems and compliant.  He follows with Dr. Walker Smith as his PCP and he will follow in hospital over the weekend.      Deandre Piña communicates through texting on his phone. He has expressive aphasia from previous stroke and dense RUE HP and RLE 4.5.  Full ROS and problem based evaluation below under assessment and plan for more information.     Past Medical History:   Diagnosis Date    Alcoholic fatty liver     CHF (congestive heart failure) 02/20/2024    EF 25%    COPD (chronic obstructive pulmonary disease)     Coronary artery disease  involving coronary bypass graft of native heart without angina pectoris 2022    Dyslipidemia 2023    Essential (primary) hypertension 2022    Expressive aphasia 2023    Former smoker     GERD with esophagitis 2018    EGD    Hemiparesis affecting right side as late effect of cerebrovascular accident     Pulmonary hypertension     Stroke     RHP    Tricuspid regurgitation     Type 2 diabetes mellitus        Past Surgical History:   Procedure Laterality Date    CARDIAC SURGERY      CABG    CORONARY ANGIOPLASTY WITH STENT PLACEMENT      bare metal stent    HEMIARTHROPLASTY OF HIP Right 2022    Procedure: HEMIARTHROPLASTY, HIP;  Surgeon: Ramses Chua MD;  Location: [facility] ORTHO OR;  Service: Orthopedics;  Laterality: Right;    RIGHT HEART CATHETERIZATION Right 2024    Procedure: INSERTION, CATHETER, RIGHT HEART;  Surgeon: Ruiz Pacheco MD;  Location: [facility] CATH LAB;  Service: Cardiology;  Laterality: Right;       Review of patient's allergies indicates:  No Known Allergies    No current facility-administered medications on file prior to encounter.     Current Outpatient Medications on File Prior to Encounter   Medication Sig    albuterol (PROVENTIL/VENTOLIN HFA) 90 mcg/actuation inhaler SMARTSI-2 Puff(s) By Mouth Every 4 Hours PRN    amLODIPine (NORVASC) 10 MG tablet Take 10 mg by mouth once daily.    amoxicillin-clavulanate 875-125mg (AUGMENTIN) 875-125 mg per tablet Take 1 tablet by mouth 2 (two) times daily.    aspirin (ECOTRIN) 81 MG EC tablet Take 1 tablet (81 mg total) by mouth once daily.    atorvastatin (LIPITOR) 40 MG tablet Take 1 tablet (40 mg total) by mouth every evening.    benzonatate (TESSALON) 100 MG capsule Take 1 capsule (100 mg total) by mouth 3 (three) times daily as needed for Cough.    budesonide-formoterol 160-4.5 mcg (SYMBICORT) 160-4.5 mcg/actuation HFAA Inhale 2 puffs into the lungs every 12 (twelve) hours. Controller    dicyclomine (BENTYL)  20 mg tablet Take 1 tablet (20 mg total) by mouth 2 (two) times daily.    empagliflozin (JARDIANCE) 10 mg tablet Take 10 mg by mouth once daily.    fluticasone propionate (FLONASE) 50 mcg/actuation nasal spray 1 spray by Each Nostril route 2 (two) times daily.    furosemide (LASIX) 40 MG tablet Take 1 tablet (40 mg total) by mouth 2 (two) times a day.    insulin aspart U-100 (NOVOLOG) 100 unit/mL injection Inject 0-5 Units into the skin 3 (three) times daily before meals.    insulin detemir U-100, Levemir, (LEVEMIR FLEXTOUCH U100 INSULIN) 100 unit/mL (3 mL) InPn pen Inject 10 Units into the skin every evening.    losartan (COZAAR) 25 MG tablet Take 0.5 tablets (12.5 mg total) by mouth once daily.    metoprolol succinate (TOPROL-XL) 25 MG 24 hr tablet Take 12.5 mg by mouth once daily. Take 1/2 tablet by mouth daily    MUCINEX 600 mg 12 hr tablet Take 600 mg by mouth 2 (two) times daily.    pantoprazole (PROTONIX) 40 MG tablet Take 1 tablet (40 mg total) by mouth once daily.    potassium chloride SA (K-DUR,KLOR-CON) 20 MEQ tablet Take 20 mEq by mouth once daily.    promethazine (PHENERGAN) 25 MG tablet Take 1 tablet (25 mg total) by mouth every 6 (six) hours as needed for Nausea.    THERMOTABS 287-180-15 mg Tab Take 1 tablet by mouth once daily.    TRUE METRIX GLUCOSE TEST STRIP Strp USE TO CHECK BLOOD SUGAR AS DIRECTED    TRUEPLUS LANCETS 33 gauge Misc 1 lancet  by Misc.(Non-Drug; Combo Route) route once daily. use as directed    zolpidem (AMBIEN) 5 MG Tab TAKE ONE TABLET BY MOUTH EVERY EVENING     Family History       Problem Relation (Age of Onset)    Hypertension Mother          Tobacco Use    Smoking status: Every Day     Current packs/day: 0.50     Types: Cigarettes    Smokeless tobacco: Current     Types: Chew   Substance and Sexual Activity    Alcohol use: Yes     Comment: 1 quart    Drug use: Never    Sexual activity: Not on file     Review of Systems   Constitutional:  Negative for appetite change, chills,  fatigue, fever and unexpected weight change.   HENT:  Negative for congestion, mouth sores, nosebleeds, sinus pain, sore throat and trouble swallowing.    Respiratory:  Negative for apnea, cough, chest tightness and shortness of breath.    Cardiovascular:  Negative for chest pain, palpitations and leg swelling.   Gastrointestinal:  Positive for abdominal pain, nausea and vomiting. Negative for blood in stool, constipation and diarrhea.   Endocrine: Negative for polydipsia and polyuria.   Genitourinary:  Negative for decreased urine volume, difficulty urinating, dysuria and frequency.   Musculoskeletal:  Negative for arthralgias, back pain and neck pain.   Skin:  Positive for wound. Negative for rash.   Neurological:  Negative for syncope, light-headedness and headaches.   Hematological:  Does not bruise/bleed easily.   Psychiatric/Behavioral:  Negative for confusion and suicidal ideas.      Objective:     Vital Signs (Most Recent):  Temp: 97.7 °F (36.5 °C) (03/09/24 0243)  Pulse: 101 (03/09/24 0243)  Resp: 18 (03/09/24 0243)  BP: 118/74 (03/09/24 0243)  SpO2: 98 % (03/09/24 0243) Vital Signs (24h Range):  Temp:  [97.7 °F (36.5 °C)-98.8 °F (37.1 °C)] 97.7 °F (36.5 °C)  Pulse:  [101-119] 101  Resp:  [17-25] 18  SpO2:  [92 %-98 %] 98 %  BP: (104-143)/(74-85) 118/74     Weight: 96.1 kg (211 lb 12.8 oz)  Body mass index is 27.19 kg/m².     Physical Exam  Vitals and nursing note reviewed. Exam conducted with a chaperone present.   Constitutional:       Appearance: Normal appearance.   HENT:      Head: Atraumatic.      Mouth/Throat:      Mouth: Mucous membranes are moist.      Pharynx: Oropharynx is clear.   Eyes:      Conjunctiva/sclera: Conjunctivae normal.      Pupils: Pupils are equal, round, and reactive to light.   Neck:      Vascular: No carotid bruit.   Cardiovascular:      Rate and Rhythm: Regular rhythm. Tachycardia present.      Pulses: Normal pulses.      Heart sounds: Murmur heard.   Pulmonary:      Effort:  Pulmonary effort is normal.      Breath sounds: Normal breath sounds.   Abdominal:      General: Abdomen is flat. Bowel sounds are normal.      Palpations: Abdomen is soft.      Tenderness: There is abdominal tenderness. There is no right CVA tenderness, left CVA tenderness, guarding or rebound.      Hernia: No hernia is present.   Musculoskeletal:         General: Normal range of motion.      Cervical back: Neck supple.      Right lower leg: Edema present.      Left lower leg: No edema.   Skin:     General: Skin is warm and dry.      Capillary Refill: Capillary refill takes less than 2 seconds.      Coloration: Skin is not jaundiced or pale.      Findings: Erythema present. No bruising, lesion or rash.      Comments: R:E with marked erythema from ankle to pretib area but does cover circumference but no foot ulcer or drainage or fluctuance.     Neurological:      General: No focal deficit present.      Mental Status: He is alert and oriented to person, place, and time.   Psychiatric:         Mood and Affect: Mood normal.              CRANIAL NERVES     CN III, IV, VI   Pupils are equal, round, and reactive to light.       Significant Labs: All pertinent labs within the past 24 hours have been reviewed.    Significant Imaging: I have reviewed all pertinent imaging results/findings within the past 24 hours.  Assessment/Plan:     * Acute acalculous cholecystitis  Patient with thickened gallbladder wall which may from his hepatic steatitosis.  PT INR pending.  Lipase normal.  Patient does not meet sepsis criteria.  Zosyn started in ED.  Agree with IV analgesics and IV antiemetics.  Will give IV PPI due to h/o alcoholic gastritis.  Gerd with esophagitis per EGD.       Defer evaluation to admitting surgical team.  May want HIDA but with return to ED with symptoms may opt for surgery.        Cellulitis of right lower extremity  Could not tolerate augmentin due to N/V but zosyn should cover and will consult wound care team.     Does not meet sepsis criteria.  Will check CRP.        Coronary artery disease involving coronary bypass graft of native heart without angina pectoris  Patient with known CAD s/p stent placement and CABG, which is controlled Will continue ASA and Statin and monitor for S/Sx of angina/ACS. Continue to monitor on telemetry. Will also continue BB    COPD (chronic obstructive pulmonary disease)  Continue home inhaled steroid and bronchodilator.  Not on home oxygen and not hypoxic.  Controlled.  Patient has stopped smoking.      CHF (congestive heart failure)  Patient is identified as having Systolic (HFrEF) heart failure that is Chronic. CHF is currently controlled. Latest ECHO performed and demonstrates- Results for orders placed during the hospital encounter of 02/19/24    Echo    Interpretation Summary    Left Ventricle: The left ventricle is severely dilated. Normal wall thickness. There is eccentric hypertrophy. There is severely reduced systolic function with a visually estimated ejection fraction of 20 - 25%. Grade III diastolic dysfunction.    Right Ventricle: Moderate right ventricular enlargement. Systolic function is moderately reduced.    Left Atrium: Left atrium is moderately dilated.    Right Atrium: Right atrium is mildly dilated.    Aortic Valve: The aortic valve is a trileaflet valve. Mildly calcified cusps.    Mitral Valve: There is mild bileaflet sclerosis. Mildly thickened leaflets. There is no stenosis. The mean pressure gradient across the mitral valve is 3 mmHg at a heart rate of  bpm. There is severe regurgitation with a posterolateral eccentriccally directed jet.    Tricuspid Valve: There is moderate to severe regurgitation with a centrally directed jet.    Pulmonary Artery: The estimated pulmonary artery systolic pressure is 65 mmHg.    IVC/SVC: Elevated venous pressure at 15 mmHg.    Pericardium: There is a trivial effusion.  . Continue Beta Blocker, ACE/ARB, and Furosemide and monitor  "clinical status closely. Monitor on telemetry. Patient is on CHF pathway.  Monitor strict Is&Os and daily weights.  Place on fluid restriction of    . Cardiology has not been consulted. Continue to stress to patient importance of self efficacy and  on diet for CHF. Last BNP reviewed- and noted below No results for input(s): "BNP", "BNPTRIAGEBLO" in the last 168 hours.    Monitor volume status closely.  Decreased LR to 50 cc hour and will hold lasix.  Giving banana bag daily.  CIWA protocol.      Essential (primary) hypertension  Chronic, controlled. Latest blood pressure and vitals reviewed-     Temp:  [98.6 °F (37 °C)-98.8 °F (37.1 °C)]   Pulse:  [104-119]   Resp:  [17-25]   BP: (104-143)/(76-85)   SpO2:  [92 %] .   Home meds for hypertension were reviewed and noted below.   Hypertension Medications               amLODIPine (NORVASC) 10 MG tablet Take 10 mg by mouth once daily.    furosemide (LASIX) 40 MG tablet Take 1 tablet (40 mg total) by mouth 2 (two) times a day.    losartan (COZAAR) 25 MG tablet Take 0.5 tablets (12.5 mg total) by mouth once daily.    metoprolol succinate (TOPROL-XL) 25 MG 24 hr tablet Take 12.5 mg by mouth once daily. Take 1/2 tablet by mouth daily            While in the hospital, will manage blood pressure as follows; Continue home antihypertensive regimen    Will utilize p.r.n. blood pressure medication only if patient's blood pressure greater than 140/90 and he develops symptoms such as worsening chest pain or shortness of breath.    Pulmonary hypertension  Continue norvasc.  Patient is not on home oxygen.  Echo 2/20/24  RVSP 64 mmHg and mod to severe tricuspid regurgitation.  Stable on not on home oxygen.        Alcoholic fatty liver  Banana bag daily.  CIWA protocol.  No signs of ETOH at this time.  Very stable.  Says he haro not drink daily any longer.        GERD with esophagitis  IV protonix      Hemiparesis affecting right side as late effect of cerebrovascular accident  RUE " with contraction noted and 0/5.  RLE 4/5.   Will continue low dose asa and statin and ARB.          VTE Risk Mitigation (From admission, onward)           Ordered     Place SAGE hose  Until discontinued         03/09/24 0222     IP VTE HIGH RISK PATIENT  Once         03/09/24 0159     Place sequential compression device  Until discontinued         03/09/24 0159                        Thank you for your consult. I will follow-up with patient. Please contact us if you have any additional questions.    Corinne Fox MD  Department of Hospital Medicine   [facility]

## 2024-03-09 NOTE — ED TRIAGE NOTES
Pt presents with abdomen pain nausea vomiting recent diagnosis of gallstones with FU with general surgery, worse tonight. Pt no cooperative with triage only saying head yes and no

## 2024-03-09 NOTE — ASSESSMENT & PLAN NOTE
Chronic, controlled. Latest blood pressure and vitals reviewed-     Temp:  [98.6 °F (37 °C)-98.8 °F (37.1 °C)]   Pulse:  [104-119]   Resp:  [17-25]   BP: (104-143)/(76-85)   SpO2:  [92 %] .   Home meds for hypertension were reviewed and noted below.   Hypertension Medications               amLODIPine (NORVASC) 10 MG tablet Take 10 mg by mouth once daily.    furosemide (LASIX) 40 MG tablet Take 1 tablet (40 mg total) by mouth 2 (two) times a day.    losartan (COZAAR) 25 MG tablet Take 0.5 tablets (12.5 mg total) by mouth once daily.    metoprolol succinate (TOPROL-XL) 25 MG 24 hr tablet Take 12.5 mg by mouth once daily. Take 1/2 tablet by mouth daily            While in the hospital, will manage blood pressure as follows; Continue home antihypertensive regimen    Will utilize p.r.n. blood pressure medication only if patient's blood pressure greater than 140/90 and he develops symptoms such as worsening chest pain or shortness of breath.

## 2024-03-09 NOTE — ASSESSMENT & PLAN NOTE
Banana bag daily.  CIWA protocol.  No signs of ETOH at this time.  Very stable.  Says he haro not drink daily any longer.

## 2024-03-09 NOTE — OP NOTE
[facility]  Surgery Department  Operative Note    SUMMARY     Date of Procedure: 3/9/2024     Procedure: Procedure(s) (LRB):  CHOLECYSTECTOMY, LAPAROSCOPIC, WITH CHOLANGIOGRAM (N/A)     Surgeon(s) and Role:     * Karson Bennett DO - Primary    Assisting Surgeon: None    Pre-Operative Diagnosis: Acalculous cholecystitis [K81.9]    Post-Operative Diagnosis: Post-Op Diagnosis Codes:     * Acalculous cholecystitis [K81.9]    Anesthesia: General    Operative Findings (including complications, if any):  Edematous gallbladder; ascites around the liver with cirrhotic looking liver; cholangiogram showed adequate filling both proximally and distally with no filling defects    Description of Technical Procedures:  Patient was taken the operating room and placed on the operating table in supine position.  Patient underwent general anesthesia per the anesthesia team.  The abdomen was then prepped and draped in usual sterile fashion.  A stab incision was made in left upper quadrant a Veress needle was inserted and the abdomen was insufflated to 15 mmHg; patient tolerated insufflation well.  A 5 mm trocar was placed at the umbilicus under visualization with the laparoscoped; no injuries identified.  We then placed an 11 mm trocar at the subxiphoid region, followed by 2 5 mm trocars in the right upper quadrant; all done under visualization.  We then grasped the gallbladder and retracted cephalad.  The gallbladder was edematous and distended.  We then dissected out the cystic duct and the cystic artery and found these 2 structures going to the gallbladder with the liver bed behind these structures, thus obtaining the critical view.  We doubly clipped and transected the vessel.  We then placed a clip near the infundibulum and then partially transected the cystic duct.  We placed a cholangiogram catheter and then shot a cholangiogram.  We had filling of the biliary tract both proximally and distally and  emptying into the duodenum, no filling defects.  We then removed the cholangiogram catheter, doubly clipped the cystic duct and transected.  We then took the gallbladder off the liver with hook cautery without difficulty.  The gallbladder was removed from the subxiphoid port with the Endo-Catch bag and sent to pathology.  We closed the subxiphoid fascia with 0 Vicryl via a Bridger-Lata under visualization.  We irrigated the gallbladder fossa and suctioned clear, bleeding controlled electrocautery and Surgicel.  The abdomen was then desufflated and trocars removed.  Skin incisions approximated with a 4-0 Monocryl deep dermal suture.  The skin was cleaned and dried, Mastisol Steri-Strips applied.  Patient was awakened, extubated and taken the PACU in stable condition to.  Patient tolerated procedure well.        Estimated Blood Loss (EBL): 10cc           Implants: * No implants in log *    Specimens:   Specimen (24h ago, onward)       Start     Ordered    03/09/24 1048  Surgical Pathology  RELEASE UPON ORDERING         03/09/24 1048                            Condition: Good    Disposition: PACU - hemodynamically stable.    Attestation: I was present and scrubbed for the entire procedure.

## 2024-03-10 VITALS
RESPIRATION RATE: 18 BRPM | HEART RATE: 101 BPM | OXYGEN SATURATION: 99 % | TEMPERATURE: 98 F | BODY MASS INDEX: 27.18 KG/M2 | SYSTOLIC BLOOD PRESSURE: 121 MMHG | DIASTOLIC BLOOD PRESSURE: 79 MMHG | HEIGHT: 74 IN | WEIGHT: 211.81 LBS

## 2024-03-10 LAB
ALBUMIN SERPL BCP-MCNC: 3 G/DL (ref 3.5–5)
ALP SERPL-CCNC: 109 U/L (ref 45–115)
ALT SERPL W P-5'-P-CCNC: 52 U/L (ref 16–61)
ANION GAP SERPL CALCULATED.3IONS-SCNC: 11 MMOL/L (ref 7–16)
AST SERPL W P-5'-P-CCNC: 45 U/L (ref 15–37)
BASOPHILS # BLD AUTO: 0.05 K/UL (ref 0–0.2)
BASOPHILS NFR BLD AUTO: 0.6 % (ref 0–1)
BILIRUB DIRECT SERPL-MCNC: 0.7 MG/DL (ref 0–0.2)
BILIRUB SERPL-MCNC: 1.5 MG/DL (ref ?–1.2)
BUN SERPL-MCNC: 24 MG/DL (ref 7–18)
BUN/CREAT SERPL: 24 (ref 6–20)
CALCIUM SERPL-MCNC: 8.5 MG/DL (ref 8.5–10.1)
CHLORIDE SERPL-SCNC: 103 MMOL/L (ref 98–107)
CO2 SERPL-SCNC: 23 MMOL/L (ref 21–32)
CREAT SERPL-MCNC: 0.99 MG/DL (ref 0.7–1.3)
DIFFERENTIAL METHOD BLD: ABNORMAL
EGFR (NO RACE VARIABLE) (RUSH/TITUS): 91 ML/MIN/1.73M2
EOSINOPHIL # BLD AUTO: 0.12 K/UL (ref 0–0.5)
EOSINOPHIL NFR BLD AUTO: 1.5 % (ref 1–4)
ERYTHROCYTE [DISTWIDTH] IN BLOOD BY AUTOMATED COUNT: 15.3 % (ref 11.5–14.5)
GLUCOSE SERPL-MCNC: 125 MG/DL (ref 70–105)
GLUCOSE SERPL-MCNC: 149 MG/DL (ref 74–106)
GLUCOSE SERPL-MCNC: 169 MG/DL (ref 70–105)
HCT VFR BLD AUTO: 36.4 % (ref 40–54)
HGB BLD-MCNC: 11.9 G/DL (ref 13.5–18)
IMM GRANULOCYTES # BLD AUTO: 0.03 K/UL (ref 0–0.04)
IMM GRANULOCYTES NFR BLD: 0.4 % (ref 0–0.4)
LYMPHOCYTES # BLD AUTO: 0.91 K/UL (ref 1–4.8)
LYMPHOCYTES NFR BLD AUTO: 11.3 % (ref 27–41)
MCH RBC QN AUTO: 27.9 PG (ref 27–31)
MCHC RBC AUTO-ENTMCNC: 32.7 G/DL (ref 32–36)
MCV RBC AUTO: 85.2 FL (ref 80–96)
MONOCYTES # BLD AUTO: 0.33 K/UL (ref 0–0.8)
MONOCYTES NFR BLD AUTO: 4.1 % (ref 2–6)
MPC BLD CALC-MCNC: 9.8 FL (ref 9.4–12.4)
NEUTROPHILS # BLD AUTO: 6.64 K/UL (ref 1.8–7.7)
NEUTROPHILS NFR BLD AUTO: 82.1 % (ref 53–65)
NRBC # BLD AUTO: 0 X10E3/UL
NRBC, AUTO (.00): 0 %
PLATELET # BLD AUTO: 260 K/UL (ref 150–400)
POTASSIUM SERPL-SCNC: 3.8 MMOL/L (ref 3.5–5.1)
PROT SERPL-MCNC: 6.5 G/DL (ref 6.4–8.2)
RBC # BLD AUTO: 4.27 M/UL (ref 4.6–6.2)
SODIUM SERPL-SCNC: 133 MMOL/L (ref 136–145)
WBC # BLD AUTO: 8.08 K/UL (ref 4.5–11)

## 2024-03-10 PROCEDURE — 63600175 PHARM REV CODE 636 W HCPCS: Performed by: HOSPITALIST

## 2024-03-10 PROCEDURE — 25000003 PHARM REV CODE 250: Performed by: STUDENT IN AN ORGANIZED HEALTH CARE EDUCATION/TRAINING PROGRAM

## 2024-03-10 PROCEDURE — 99900035 HC TECH TIME PER 15 MIN (STAT)

## 2024-03-10 PROCEDURE — 63600175 PHARM REV CODE 636 W HCPCS: Performed by: STUDENT IN AN ORGANIZED HEALTH CARE EDUCATION/TRAINING PROGRAM

## 2024-03-10 PROCEDURE — 94640 AIRWAY INHALATION TREATMENT: CPT | Mod: XB

## 2024-03-10 PROCEDURE — 80048 BASIC METABOLIC PNL TOTAL CA: CPT | Performed by: STUDENT IN AN ORGANIZED HEALTH CARE EDUCATION/TRAINING PROGRAM

## 2024-03-10 PROCEDURE — 96372 THER/PROPH/DIAG INJ SC/IM: CPT | Performed by: STUDENT IN AN ORGANIZED HEALTH CARE EDUCATION/TRAINING PROGRAM

## 2024-03-10 PROCEDURE — 82962 GLUCOSE BLOOD TEST: CPT

## 2024-03-10 PROCEDURE — 27000221 HC OXYGEN, UP TO 24 HOURS

## 2024-03-10 PROCEDURE — 85025 COMPLETE CBC W/AUTO DIFF WBC: CPT | Performed by: STUDENT IN AN ORGANIZED HEALTH CARE EDUCATION/TRAINING PROGRAM

## 2024-03-10 PROCEDURE — G0378 HOSPITAL OBSERVATION PER HR: HCPCS

## 2024-03-10 PROCEDURE — 25000242 PHARM REV CODE 250 ALT 637 W/ HCPCS: Performed by: STUDENT IN AN ORGANIZED HEALTH CARE EDUCATION/TRAINING PROGRAM

## 2024-03-10 PROCEDURE — 99222 1ST HOSP IP/OBS MODERATE 55: CPT | Mod: ,,, | Performed by: INTERNAL MEDICINE

## 2024-03-10 PROCEDURE — 94761 N-INVAS EAR/PLS OXIMETRY MLT: CPT

## 2024-03-10 PROCEDURE — 96366 THER/PROPH/DIAG IV INF ADDON: CPT

## 2024-03-10 PROCEDURE — 96376 TX/PRO/DX INJ SAME DRUG ADON: CPT

## 2024-03-10 PROCEDURE — C9113 INJ PANTOPRAZOLE SODIUM, VIA: HCPCS | Performed by: STUDENT IN AN ORGANIZED HEALTH CARE EDUCATION/TRAINING PROGRAM

## 2024-03-10 PROCEDURE — 63600175 PHARM REV CODE 636 W HCPCS: Performed by: ANESTHESIOLOGY

## 2024-03-10 PROCEDURE — 80076 HEPATIC FUNCTION PANEL: CPT | Performed by: STUDENT IN AN ORGANIZED HEALTH CARE EDUCATION/TRAINING PROGRAM

## 2024-03-10 PROCEDURE — 99024 POSTOP FOLLOW-UP VISIT: CPT | Mod: ,,, | Performed by: STUDENT IN AN ORGANIZED HEALTH CARE EDUCATION/TRAINING PROGRAM

## 2024-03-10 PROCEDURE — 25000003 PHARM REV CODE 250: Performed by: HOSPITALIST

## 2024-03-10 RX ORDER — DOCUSATE SODIUM 100 MG/1
100 CAPSULE, LIQUID FILLED ORAL 2 TIMES DAILY
Qty: 28 CAPSULE | Refills: 0 | Status: SHIPPED | OUTPATIENT
Start: 2024-03-10 | End: 2024-06-17

## 2024-03-10 RX ORDER — OXYCODONE AND ACETAMINOPHEN 5; 325 MG/1; MG/1
1 TABLET ORAL EVERY 6 HOURS PRN
Qty: 20 TABLET | Refills: 0 | Status: SHIPPED | OUTPATIENT
Start: 2024-03-10 | End: 2024-03-10

## 2024-03-10 RX ORDER — OXYCODONE AND ACETAMINOPHEN 5; 325 MG/1; MG/1
1 TABLET ORAL EVERY 6 HOURS PRN
Qty: 20 TABLET | Refills: 0 | Status: ON HOLD | OUTPATIENT
Start: 2024-03-10 | End: 2024-04-03 | Stop reason: HOSPADM

## 2024-03-10 RX ADMIN — BUDESONIDE 0.5 MG: 0.5 INHALANT RESPIRATORY (INHALATION) at 07:03

## 2024-03-10 RX ADMIN — SODIUM CHLORIDE, POTASSIUM CHLORIDE, SODIUM LACTATE AND CALCIUM CHLORIDE 125 ML/HR: 600; 310; 30; 20 INJECTION, SOLUTION INTRAVENOUS at 01:03

## 2024-03-10 RX ADMIN — OXYCODONE HYDROCHLORIDE AND ACETAMINOPHEN 1 TABLET: 5; 325 TABLET ORAL at 05:03

## 2024-03-10 RX ADMIN — OXYCODONE HYDROCHLORIDE AND ACETAMINOPHEN 1 TABLET: 5; 325 TABLET ORAL at 12:03

## 2024-03-10 RX ADMIN — ASPIRIN 81 MG: 81 TABLET, COATED ORAL at 09:03

## 2024-03-10 RX ADMIN — METOPROLOL SUCCINATE 12.5 MG: 25 TABLET, EXTENDED RELEASE ORAL at 09:03

## 2024-03-10 RX ADMIN — PANTOPRAZOLE SODIUM 40 MG: 40 INJECTION, POWDER, FOR SOLUTION INTRAVENOUS at 09:03

## 2024-03-10 RX ADMIN — AMLODIPINE BESYLATE 10 MG: 10 TABLET ORAL at 09:03

## 2024-03-10 RX ADMIN — IPRATROPIUM BROMIDE AND ALBUTEROL SULFATE 3 ML: .5; 3 SOLUTION RESPIRATORY (INHALATION) at 12:03

## 2024-03-10 RX ADMIN — PIPERACILLIN AND TAZOBACTAM 4.5 G: 4; .5 INJECTION, POWDER, FOR SOLUTION INTRAVENOUS; PARENTERAL at 03:03

## 2024-03-10 RX ADMIN — INSULIN ASPART 2 UNITS: 100 INJECTION, SOLUTION INTRAVENOUS; SUBCUTANEOUS at 12:03

## 2024-03-10 RX ADMIN — LOSARTAN POTASSIUM 12.5 MG: 25 TABLET, FILM COATED ORAL at 09:03

## 2024-03-10 RX ADMIN — THIAMINE HYDROCHLORIDE: 100 INJECTION, SOLUTION INTRAMUSCULAR; INTRAVENOUS at 09:03

## 2024-03-10 RX ADMIN — IPRATROPIUM BROMIDE AND ALBUTEROL SULFATE 3 ML: .5; 3 SOLUTION RESPIRATORY (INHALATION) at 07:03

## 2024-03-10 NOTE — DISCHARGE SUMMARY
[facility]  General Surgery  Discharge Summary      Patient Name: Karin Carrera  MRN: [id number]  Admission Date: 3/8/2024  Hospital Length of Stay: 0 days  Discharge Date and Time:  03/10/2024 9:31 AM  Attending Physician: Karson Bennett DO   Discharging Provider: Karson Bennett DO  Primary Care Provider: Walker Smith MD    HPI:   53-year-old  male presents to the ER with abdominal pain.  Has had a history of right upper quadrant abdominal pain and has been seen previously for questionable cholecystitis; placed on Augmentin.  Still having nausea and vomiting.  CT scan of the abdomen pelvis showed a thickened gallbladder with some pericholecystic fluid/perihepatic ascites concerning for acalculous cholecystitis.  Patient admitted to surgery for evaluation.  History of coronary artery disease with bare metal stent, CABG in 2016; CHF with ejection fraction 25%; history of alcoholic fatty liver.  History of CVA with right hemiparesis and expressive aphasia; communicates with writing on paper and texting.  No previous abdominal surgeries.  Total bilirubin 1.4; no leukocytosis, lipase unremarkable        Procedure(s) (LRB):  CHOLECYSTECTOMY, LAPAROSCOPIC, WITH CHOLANGIOGRAM (N/A)      Indwelling Lines/Drains at time of discharge:   Lines/Drains/Airways       None                 Hospital Course: 53-year-old  male presented to the ER with right upper quadrant abdominal pain; found to have acalculous cholecystitis on imaging.  Patient was taken the operating room for laparoscopic cholecystectomy with intraoperative cholangiogram; cholangiogram showed no filling defects.  Patient did have a total bilirubin of 1.4; repeat bilirubin of 1.5.  Patient did have a cirrhotic looking liver with ascites during surgery.  Patient tolerating diet.  Pain controlled.  Patient will be discharged home to follow-up with General surgery in 2 weeks.  Patient also we will be referred to  Gastroenterology for elevated liver enzymes/cirrhosis.    Goals of Care Treatment Preferences:  Code Status: Full Code      Consults:   Consults (From admission, onward)          Status Ordering Provider     Inpatient consult to Social Work  Once        Provider:  (Not yet assigned)    Acknowledged MARGA BENNETT     Inpatient consult to Hospitalist  Once        Provider:  Corinne Fox MD    Acknowledged MARGA BENNETT            Significant Diagnostic Studies: N/A    Pending Diagnostic Studies:       Procedure Component Value Units Date/Time    Surgical Pathology [[id number]] Collected: 03/09/24 1048    Order Status: Sent Lab Status: No result     Specimen: Tissue from Gallbladder           Final Active Diagnoses:    Diagnosis Date Noted POA    PRINCIPAL PROBLEM:  Acute acalculous cholecystitis [K81.0] 03/09/2024 Yes    Hemiparesis affecting right side as late effect of cerebrovascular accident [I69.351] 03/09/2024 Not Applicable    COPD (chronic obstructive pulmonary disease) [J44.9] 03/09/2024 Yes    Type 2 diabetes mellitus [E11.9] 03/09/2024 Yes    Alcoholic fatty liver [K70.0] 03/09/2024 Yes    Pulmonary hypertension [I27.20] 03/09/2024 Yes    Cellulitis of right lower extremity [L03.115] 03/09/2024 Yes    CHF (congestive heart failure) [I50.9] 02/20/2024 Yes    Essential (primary) hypertension [I10] 01/01/2022 Yes    Coronary artery disease involving coronary bypass graft of native heart without angina pectoris [I25.810] 01/01/2022 Yes    GERD with esophagitis [K21.00] 2018 Yes      Problems Resolved During this Admission:      Discharged Condition: good    Disposition: Home or Self Care    Follow Up:   Follow-up Information       Marga Bennett, DO. Schedule an appointment as soon as possible for a visit in 2 week(s).    Specialties: General Surgery, Surgery  Contact information:  [address] [facility] [address]  [phone]               Zain Whipple  MD. Schedule an appointment as soon as possible for a visit in 2 week(s).    Specialty: Gastroenterology  Contact information:  [address] [place] [address]  [phone]               Aleks Whipple DO .    Specialty: Hospitalist  Contact information:  [address] [place] [address]  [phone]                           Patient Instructions:      Ambulatory referral/consult to Gastroenterology   Standing Status: Future   Referral Priority: Routine Referral Type: Consultation   Referral Reason: Specialty Services Required   Referred to Provider: LULU WHIPPLE Requested Specialty: Gastroenterology   Number of Visits Requested: 1     Diet diabetic     Lifting restrictions   Order Comments: No lifting over 20 lb for the next 2 weeks      Remove dressing in 72 hours   Order Comments: Ok to shower tomorrow. Allow soap/water to rinse over wounds. Remove bandaids in 72 hours.  Leave strips in place. Do not scrub glue/strips off; will remove in clinic in 2 weeks. No tub baths or swimming.  Follow up in clinic in 2 weeks     Activity as tolerated     Medications:  Reconciled Home Medications:      Medication List        START taking these medications      docusate sodium 100 MG capsule  Commonly known as: COLACE  Take 1 capsule (100 mg total) by mouth 2 (two) times daily.     oxyCODONE-acetaminophen 5-325 mg per tablet  Commonly known as: PERCOCET  Take 1 tablet by mouth every 6 (six) hours as needed for Pain.            CONTINUE taking these medications      albuterol 90 mcg/actuation inhaler  Commonly known as: PROVENTIL/VENTOLIN HFA  SMARTSI-2 Puff(s) By Mouth Every 4 Hours PRN     amLODIPine 10 MG tablet  Commonly known as: NORVASC  Take 10 mg by mouth once daily.     amoxicillin-clavulanate 875-125mg 875-125 mg per tablet  Commonly known as: AUGMENTIN  Take 1 tablet by mouth 2 (two) times daily.     aspirin 81 MG EC tablet  Commonly known as: ECOTRIN  Take 1 tablet (81 mg total) by mouth once daily.      atorvastatin 40 MG tablet  Commonly known as: LIPITOR  Take 1 tablet (40 mg total) by mouth every evening.     budesonide-formoterol 160-4.5 mcg 160-4.5 mcg/actuation Hfaa  Commonly known as: SYMBICORT  Inhale 2 puffs into the lungs every 12 (twelve) hours. Controller     dicyclomine 20 mg tablet  Commonly known as: BENTYL  Take 1 tablet (20 mg total) by mouth 2 (two) times daily.     empagliflozin 10 mg tablet  Commonly known as: Jardiance  Take 10 mg by mouth once daily.     furosemide 40 MG tablet  Commonly known as: LASIX  Take 1 tablet (40 mg total) by mouth 2 (two) times a day.     insulin aspart U-100 100 unit/mL injection  Commonly known as: NovoLOG  Inject 0-5 Units into the skin 3 (three) times daily before meals.     LEVEMIR FLEXTOUCH U100 INSULIN 100 unit/mL (3 mL) Inpn pen  Generic drug: insulin detemir U-100 (Levemir)  Inject 10 Units into the skin every evening.     losartan 25 MG tablet  Commonly known as: COZAAR  Take 0.5 tablets (12.5 mg total) by mouth once daily.     metoprolol succinate 25 MG 24 hr tablet  Commonly known as: TOPROL-XL  Take 12.5 mg by mouth once daily. Take 1/2 tablet by mouth daily     pantoprazole 40 MG tablet  Commonly known as: PROTONIX  Take 1 tablet (40 mg total) by mouth once daily.     potassium chloride 10 MEQ Tbsr  Commonly known as: KLOR-CON  Take 10 mEq by mouth once daily.     promethazine 25 MG tablet  Commonly known as: PHENERGAN  Take 1 tablet (25 mg total) by mouth every 6 (six) hours as needed for Nausea.     THERMOTABS 287-180-15 mg Tab  Generic drug: sod.chlorid-potassium chloride  Take 1 tablet by mouth once daily.     TRUE METRIX GLUCOSE TEST STRIP Strp  Generic drug: blood sugar diagnostic  USE TO CHECK BLOOD SUGAR AS DIRECTED     TRUEPLUS LANCETS 33 gauge Misc  Generic drug: lancets  1 lancet  by Misc.(Non-Drug; Combo Route) route once daily. use as directed     zolpidem 5 MG Tab  Commonly known as: AMBIEN  TAKE ONE TABLET BY MOUTH EVERY EVENING             STOP taking these medications      benzonatate 100 MG capsule  Commonly known as: TESSALON            Time spent on the discharge of patient: 31 minutes    Karson Martinez,   General Surgery  [facility]

## 2024-03-10 NOTE — PLAN OF CARE
Problem: Adult Inpatient Plan of Care  Goal: Plan of Care Review  Outcome: Ongoing, Progressing  Goal: Patient-Specific Goal (Individualized)  Outcome: Ongoing, Progressing  Goal: Absence of Hospital-Acquired Illness or Injury  Outcome: Ongoing, Progressing  Goal: Optimal Comfort and Wellbeing  Outcome: Ongoing, Progressing     Problem: Diabetes Comorbidity  Goal: Blood Glucose Level Within Targeted Range  Outcome: Ongoing, Progressing     Problem: Skin Injury Risk Increased  Goal: Skin Health and Integrity  Outcome: Ongoing, Progressing     Problem: Respiratory Compromise COPD (Chronic Obstructive Pulmonary Disease)  Goal: Effective Oxygenation and Ventilation  Outcome: Ongoing, Progressing

## 2024-03-10 NOTE — SUBJECTIVE & OBJECTIVE
Past Medical History:   Diagnosis Date    Alcoholic fatty liver     CHF (congestive heart failure) 2024    EF 25%    COPD (chronic obstructive pulmonary disease)     Coronary artery disease involving coronary bypass graft of native heart without angina pectoris 2022    Dyslipidemia 2023    Essential (primary) hypertension 2022    Expressive aphasia 2023    Former smoker     GERD with esophagitis 2018    EGD    Hemiparesis affecting right side as late effect of cerebrovascular accident     Pulmonary hypertension     Stroke     right hand contracted    Tricuspid regurgitation     Type 2 diabetes mellitus        Past Surgical History:   Procedure Laterality Date    CARDIAC SURGERY      CABG    CORONARY ANGIOPLASTY WITH STENT PLACEMENT      bare metal stent    HEMIARTHROPLASTY OF HIP Right 2022    Procedure: HEMIARTHROPLASTY, HIP;  Surgeon: Ramses Chua MD;  Location: [facility] OR;  Service: Orthopedics;  Laterality: Right;    LAPAROSCOPIC CHOLECYSTECTOMY  2024    Dr. Bennett    RIGHT HEART CATHETERIZATION Right 2024    Procedure: INSERTION, CATHETER, RIGHT HEART;  Surgeon: Ruiz Pacheco MD;  Location: [facility] CATH LAB;  Service: Cardiology;  Laterality: Right;       Review of patient's allergies indicates:  No Known Allergies    No current facility-administered medications on file prior to encounter.     Current Outpatient Medications on File Prior to Encounter   Medication Sig    albuterol (PROVENTIL/VENTOLIN HFA) 90 mcg/actuation inhaler SMARTSI-2 Puff(s) By Mouth Every 4 Hours PRN    amLODIPine (NORVASC) 10 MG tablet Take 10 mg by mouth once daily.    budesonide-formoterol 160-4.5 mcg (SYMBICORT) 160-4.5 mcg/actuation HFAA Inhale 2 puffs into the lungs every 12 (twelve) hours. Controller    empagliflozin (JARDIANCE) 10 mg tablet Take 10 mg by mouth once daily.    furosemide (LASIX) 40 MG tablet Take 1 tablet (40 mg total) by mouth 2 (two) times a  day.    zolpidem (AMBIEN) 5 MG Tab TAKE ONE TABLET BY MOUTH EVERY EVENING    amoxicillin-clavulanate 875-125mg (AUGMENTIN) 875-125 mg per tablet Take 1 tablet by mouth 2 (two) times daily.    aspirin (ECOTRIN) 81 MG EC tablet Take 1 tablet (81 mg total) by mouth once daily.    atorvastatin (LIPITOR) 40 MG tablet Take 1 tablet (40 mg total) by mouth every evening.    dicyclomine (BENTYL) 20 mg tablet Take 1 tablet (20 mg total) by mouth 2 (two) times daily.    insulin aspart U-100 (NOVOLOG) 100 unit/mL injection Inject 0-5 Units into the skin 3 (three) times daily before meals.    insulin detemir U-100, Levemir, (LEVEMIR FLEXTOUCH U100 INSULIN) 100 unit/mL (3 mL) InPn pen Inject 10 Units into the skin every evening.    losartan (COZAAR) 25 MG tablet Take 0.5 tablets (12.5 mg total) by mouth once daily.    metoprolol succinate (TOPROL-XL) 25 MG 24 hr tablet Take 12.5 mg by mouth once daily. Take 1/2 tablet by mouth daily    pantoprazole (PROTONIX) 40 MG tablet Take 1 tablet (40 mg total) by mouth once daily.    potassium chloride (KLOR-CON) 10 MEQ TbSR Take 10 mEq by mouth once daily.    promethazine (PHENERGAN) 25 MG tablet Take 1 tablet (25 mg total) by mouth every 6 (six) hours as needed for Nausea.    THERMOTABS 287-180-15 mg Tab Take 1 tablet by mouth once daily.    TRUE METRIX GLUCOSE TEST STRIP Strp USE TO CHECK BLOOD SUGAR AS DIRECTED    TRUEPLUS LANCETS 33 gauge Misc 1 lancet  by Misc.(Non-Drug; Combo Route) route once daily. use as directed    [DISCONTINUED] benzonatate (TESSALON) 100 MG capsule Take 1 capsule (100 mg total) by mouth 3 (three) times daily as needed for Cough.     Family History       Problem Relation (Age of Onset)    Hypertension Mother          Tobacco Use    Smoking status: Every Day     Current packs/day: 0.50     Types: Cigarettes    Smokeless tobacco: Current     Types: Chew   Substance and Sexual Activity    Alcohol use: Yes     Comment: 1 quart    Drug use: Never    Sexual activity:  Not on file     Review of Systems   Constitutional:  Negative for fatigue and fever.   Cardiovascular:  Negative for chest pain.   Gastrointestinal:  Positive for abdominal pain.   Psychiatric/Behavioral:  Negative for confusion.      Objective:     Vital Signs (Most Recent):  Temp: 98.2 °F (36.8 °C) (03/10/24 0645)  Pulse: 108 (03/10/24 0715)  Resp: (!) 24 (03/10/24 0715)  BP: 133/88 (03/10/24 0645)  SpO2: (!) 93 % (03/10/24 0715) Vital Signs (24h Range):  Temp:  [97.4 °F (36.3 °C)-98.6 °F (37 °C)] 98.2 °F (36.8 °C)  Pulse:  [] 108  Resp:  [16-38] 24  SpO2:  [90 %-100 %] 93 %  BP: (103-134)/(66-88) 133/88     Weight: 96.1 kg (211 lb 12.8 oz)  Body mass index is 27.19 kg/m².     Physical Exam  Vitals and nursing note reviewed.   Constitutional:       Appearance: Normal appearance.   Cardiovascular:      Rate and Rhythm: Normal rate and regular rhythm.      Pulses: Normal pulses.      Heart sounds: Normal heart sounds.   Pulmonary:      Effort: Pulmonary effort is normal.      Breath sounds: Normal breath sounds.   Abdominal:      General: Abdomen is flat. Bowel sounds are normal.      Palpations: Abdomen is soft.      Tenderness: There is abdominal tenderness.   Musculoskeletal:         General: Normal range of motion.   Skin:     General: Skin is warm and dry.   Neurological:      General: No focal deficit present.      Mental Status: He is alert and oriented to person, place, and time. Mental status is at baseline.                Significant Labs: All pertinent labs within the past 24 hours have been reviewed.  BMP:   Recent Labs   Lab 03/09/24  0247 03/10/24  0803   GLU  --  149*   NA  --  133*   K 4.2 3.8   CL  --  103   CO2  --  23   BUN  --  24*   CREATININE  --  0.99   CALCIUM  --  8.5   MG 2.3  --      CBC:   Recent Labs   Lab 03/08/24  2221 03/10/24  0803   WBC 8.71 8.08   HGB 12.7* 11.9*   HCT 41.3 36.4*    260     CMP:   Recent Labs   Lab 03/08/24  2221 03/09/24  0247 03/10/24  0803      --  133*   K 3.3* 4.2 3.8     --  103   CO2 30  --  23   *  --  149*   BUN 19*  --  24*   CREATININE 1.06  --  0.99   CALCIUM 8.9  --  8.5   PROT 8.0  --  6.5   ALBUMIN 3.6  --  3.0*   BILITOT 1.4*  --  1.5*   ALKPHOS 140*  --  109   AST 23  --  45*   ALT 48  --  52   ANIONGAP 8  --  11       Significant Imaging: I have reviewed all pertinent imaging results/findings within the past 24 hours.

## 2024-03-10 NOTE — PLAN OF CARE
Problem: Adult Inpatient Plan of Care  Goal: Absence of Hospital-Acquired Illness or Injury  Outcome: Ongoing, Progressing     Problem: Diabetes Comorbidity  Goal: Blood Glucose Level Within Targeted Range  Outcome: Ongoing, Progressing     Problem: Skin Injury Risk Increased  Goal: Skin Health and Integrity  Outcome: Ongoing, Progressing     Problem: Respiratory Compromise COPD (Chronic Obstructive Pulmonary Disease)  Goal: Effective Oxygenation and Ventilation  Outcome: Ongoing, Progressing

## 2024-03-10 NOTE — ASSESSMENT & PLAN NOTE
Patient with thickened gallbladder wall which may from his hepatic steatitosis.  PT INR pending.  Lipase normal.  Patient does not meet sepsis criteria.  Zosyn started in ED.  Agree with IV analgesics and IV antiemetics.  Will give IV PPI due to h/o alcoholic gastritis.  Gerd with esophagitis per EGD.     Patient is status post cholecystectomy he is doing well   I spoke with surgery patient may be discharged possibly today  Patient can follow up with me in my clinic in the proximally 1 week

## 2024-03-10 NOTE — PLAN OF CARE
[facility]  Initial Discharge Assessment       Primary Care Provider: Walker Smith MD    Admission Diagnosis: Acalculous cholecystitis [K81.9]  CAD (coronary artery disease) [I25.10]    Admission Date: 3/8/2024  Expected Discharge Date: 3/10/2024    Transition of Care Barriers: None    Pay[place] / Plan[place] / Product Type: Government /     Extended Emergency Contact Information  Primary Emergency Contact: SarabjitZeyad  Mobile Phone: [phone]  Relation: Relative  Preferred language: English   needed? No  Secondary Emergency Contact: zahraa childers  Mobile Phone: [phone]  Relation: Brother    Discharge Plan A: Home with family, Home Health  Discharge Plan B: Home with family      [facility] [address]  [address] [facility] [address]  Phone: [phone] Fax: [phone]    [facility] [place] [id number]  Phone: [phone] Fax: [phone]      Initial Assessment (most recent)       Adult Discharge Assessment - 03/10/24 0922          Discharge Assessment    Assessment Type Discharge Planning Assessment     Confirmed/corrected address, phone number and insurance Yes     Confirmed Demographics Correct on Facesheet     Source of Information family     If unable to respond/provide information was family/caregiver contacted? Yes     Contact Name/Number sig other selisa     People in Home significant other     Do you expect to return to your current living situation? Yes     Do you have help at home or someone to help you manage your care at home? Yes     Who are your caregiver(s) and their phone number(s)? sig other selisa     Walking or Climbing Stairs Difficulty yes     Walking or Climbing Stairs ambulation difficulty, requires equipment;stair climbing difficulty, requires equipment;transferring difficulty, requires equipment      Mobility Management wc and cane     Dressing/Bathing Difficulty yes     Dressing/Bathing bathing difficulty, assistance 1 person;dressing difficulty, assistance 1 person     Home Accessibility wheelchair accessible     Home Layout Able to live on 1st floor     Equipment Currently Used at Home cane, straight;wheelchair;oxygen     Patient currently being followed by outpatient case management? No     Do you currently have service(s) that help you manage your care at home? No     Do you take prescription medications? Yes     Do you have prescription coverage? Yes     Do you have any problems affording any of your prescribed medications? No     Who is going to help you get home at discharge? sig other selisa     How do you get to doctors appointments? car, drives self;family or friend will provide     Are you on dialysis? No     Do you take coumadin? No     Discharge Plan A Home with family;Home Health     Discharge Plan B Home with family     DME Needed Upon Discharge  none     Discharge Plan discussed with: Spouse/sig other     Transition of Care Barriers None                   [facility]  Discharge Final Note    Primary Care Provider: Walker Smith MD    Expected Discharge Date: 3/10/2024    Final Discharge Note (most recent)       Final Note - 03/10/24 0934          Final Note    Assessment Type Final Discharge Note     Anticipated Discharge Disposition Home-Health Care Svc        Post-Acute Status    Post-Acute Authorization Home Health     Home Health Status Set-up Complete/Auth obtained     Discharge Delays None known at this time                     Important Message from Medicare             Contact Info       Karson Bennett DO   Specialty: General Surgery, Surgery    [address] [facility]  [facility] [address]   Phone: [phone]       Next Steps: Schedule an appointment as soon as possible for a visit in 2 week(s)    Zain Whipple MD  "  Specialty: Gastroenterology    [address] [place] [address]   Phone: [phone]       Next Steps: Schedule an appointment as soon as possible for a visit in 2 week(s)    Aleks Whipple DO   Specialty: Hospitalist    [address] [place] [address]   Phone: [phone]       Next Steps: Follow up          Consult for "anticipate dc tomorrow", spoke with dr strickland and he was unsure of what kind of help pt had at home, Called brother zahraa but he says to call pt's sig other kory, spoke with kory and pt lives home with her, no hh pta, has needed dme, sdoh up to date, choice for [facility], referral sent to kaycee with [facility], dc plan home today with hh             "

## 2024-03-10 NOTE — ASSESSMENT & PLAN NOTE
Flattened scale insulin protocol  Accu-Cheks q.a.c. and q.h.s.    Hold Oral hypoglycemics while patient is in the hospital.

## 2024-03-10 NOTE — H&P
[facility]  [facility]  History & Physical    Patient Name: Karin Carrera  MRN: [id number]  Patient Class: OP- Observation  Admission Date: 3/8/2024  Attending Physician: Karson Bennett DO   Primary Care Provider: Walker Smith MD         Patient information was obtained from patient, relative(s), and ER records  .      MEDICINE CONSULTATION  Subjective:     Principal Problem:Acute acalculous cholecystitis    Chief Complaint:   Chief Complaint   Patient presents with    Abdominal Pain        HPI: 52 yo M presented to [facility] ED with a week of N/V and abdominal pain.  Pain has been in epigastrium to RUQ and worsening over the past week.  He had been having that pain a week prior to the N/V and US 3/2 showed thickened gallbladder wall but no stones.  Patient was discharged on augmentin due to some RLE cellulitis and antiemetics and analgesics along with bentyl for the abdominal pain but symptoms continued  Lipase is normal.  Patient does have a h/o alcoholic fatty liver disease but does not drink daily any longer and stopped smoking.      He actually looks pretty good with only some mild TTP but the N/V is now what brought him to the ED.  He could not keep down his meds and he has a complicated history of CAD (CABG and bare metal stent) along with severe pulmonary edema and systolic dysfunction (EF 25%).  He is well controlled on his chronic medical problems and compliant.  He follows with Dr. Walker Smith as his PCP and he will follow in hospital over the weekend.      Deandre Piña communicates through texting on his phone. He has expressive aphasia from previous stroke and dense RUE HP and RLE 4.5.  Full ROS and problem based evaluation below under assessment and plan for more information.     Past Medical History:   Diagnosis Date    Alcoholic fatty liver     CHF (congestive heart failure) 02/20/2024    EF 25%    COPD (chronic obstructive pulmonary disease)     Coronary artery  disease involving coronary bypass graft of native heart without angina pectoris 2022    Dyslipidemia 2023    Essential (primary) hypertension 2022    Expressive aphasia 2023    Former smoker     GERD with esophagitis 2018    EGD    Hemiparesis affecting right side as late effect of cerebrovascular accident     Pulmonary hypertension     Stroke     right hand contracted    Tricuspid regurgitation     Type 2 diabetes mellitus        Past Surgical History:   Procedure Laterality Date    CARDIAC SURGERY      CABG    CORONARY ANGIOPLASTY WITH STENT PLACEMENT      bare metal stent    HEMIARTHROPLASTY OF HIP Right 2022    Procedure: HEMIARTHROPLASTY, HIP;  Surgeon: Ramses Chua MD;  Location: [facility] ORTHO OR;  Service: Orthopedics;  Laterality: Right;    LAPAROSCOPIC CHOLECYSTECTOMY  2024    Dr. Bennett    RIGHT HEART CATHETERIZATION Right 2024    Procedure: INSERTION, CATHETER, RIGHT HEART;  Surgeon: Ruiz Pacheco MD;  Location: [facility] CATH LAB;  Service: Cardiology;  Laterality: Right;       Review of patient's allergies indicates:  No Known Allergies    No current facility-administered medications on file prior to encounter.     Current Outpatient Medications on File Prior to Encounter   Medication Sig    albuterol (PROVENTIL/VENTOLIN HFA) 90 mcg/actuation inhaler SMARTSI-2 Puff(s) By Mouth Every 4 Hours PRN    amLODIPine (NORVASC) 10 MG tablet Take 10 mg by mouth once daily.    budesonide-formoterol 160-4.5 mcg (SYMBICORT) 160-4.5 mcg/actuation HFAA Inhale 2 puffs into the lungs every 12 (twelve) hours. Controller    empagliflozin (JARDIANCE) 10 mg tablet Take 10 mg by mouth once daily.    furosemide (LASIX) 40 MG tablet Take 1 tablet (40 mg total) by mouth 2 (two) times a day.    zolpidem (AMBIEN) 5 MG Tab TAKE ONE TABLET BY MOUTH EVERY EVENING    amoxicillin-clavulanate 875-125mg (AUGMENTIN) 875-125 mg per tablet Take 1 tablet by mouth 2 (two) times daily.     aspirin (ECOTRIN) 81 MG EC tablet Take 1 tablet (81 mg total) by mouth once daily.    atorvastatin (LIPITOR) 40 MG tablet Take 1 tablet (40 mg total) by mouth every evening.    dicyclomine (BENTYL) 20 mg tablet Take 1 tablet (20 mg total) by mouth 2 (two) times daily.    insulin aspart U-100 (NOVOLOG) 100 unit/mL injection Inject 0-5 Units into the skin 3 (three) times daily before meals.    insulin detemir U-100, Levemir, (LEVEMIR FLEXTOUCH U100 INSULIN) 100 unit/mL (3 mL) InPn pen Inject 10 Units into the skin every evening.    losartan (COZAAR) 25 MG tablet Take 0.5 tablets (12.5 mg total) by mouth once daily.    metoprolol succinate (TOPROL-XL) 25 MG 24 hr tablet Take 12.5 mg by mouth once daily. Take 1/2 tablet by mouth daily    pantoprazole (PROTONIX) 40 MG tablet Take 1 tablet (40 mg total) by mouth once daily.    potassium chloride (KLOR-CON) 10 MEQ TbSR Take 10 mEq by mouth once daily.    promethazine (PHENERGAN) 25 MG tablet Take 1 tablet (25 mg total) by mouth every 6 (six) hours as needed for Nausea.    THERMOTABS 287-180-15 mg Tab Take 1 tablet by mouth once daily.    TRUE METRIX GLUCOSE TEST STRIP Strp USE TO CHECK BLOOD SUGAR AS DIRECTED    TRUEPLUS LANCETS 33 gauge Misc 1 lancet  by Misc.(Non-Drug; Combo Route) route once daily. use as directed    [DISCONTINUED] benzonatate (TESSALON) 100 MG capsule Take 1 capsule (100 mg total) by mouth 3 (three) times daily as needed for Cough.     Family History       Problem Relation (Age of Onset)    Hypertension Mother          Tobacco Use    Smoking status: Every Day     Current packs/day: 0.50     Types: Cigarettes    Smokeless tobacco: Current     Types: Chew   Substance and Sexual Activity    Alcohol use: Yes     Comment: 1 quart    Drug use: Never    Sexual activity: Not on file     Review of Systems   Constitutional:  Negative for fatigue and fever.   Cardiovascular:  Negative for chest pain.   Gastrointestinal:  Positive for abdominal pain.    Psychiatric/Behavioral:  Negative for confusion.      Objective:     Vital Signs (Most Recent):  Temp: 98.2 °F (36.8 °C) (03/10/24 0645)  Pulse: 108 (03/10/24 0715)  Resp: (!) 24 (03/10/24 0715)  BP: 133/88 (03/10/24 0645)  SpO2: (!) 93 % (03/10/24 0715) Vital Signs (24h Range):  Temp:  [97.4 °F (36.3 °C)-98.6 °F (37 °C)] 98.2 °F (36.8 °C)  Pulse:  [] 108  Resp:  [16-38] 24  SpO2:  [90 %-100 %] 93 %  BP: (103-134)/(66-88) 133/88     Weight: 96.1 kg (211 lb 12.8 oz)  Body mass index is 27.19 kg/m².     Physical Exam  Vitals and nursing note reviewed.   Constitutional:       Appearance: Normal appearance.   Cardiovascular:      Rate and Rhythm: Normal rate and regular rhythm.      Pulses: Normal pulses.      Heart sounds: Normal heart sounds.   Pulmonary:      Effort: Pulmonary effort is normal.      Breath sounds: Normal breath sounds.   Abdominal:      General: Abdomen is flat. Bowel sounds are normal.      Palpations: Abdomen is soft.      Tenderness: There is abdominal tenderness.   Musculoskeletal:         General: Normal range of motion.   Skin:     General: Skin is warm and dry.   Neurological:      General: No focal deficit present.      Mental Status: He is alert and oriented to person, place, and time. Mental status is at baseline.                Significant Labs: All pertinent labs within the past 24 hours have been reviewed.  BMP:   Recent Labs   Lab 03/09/24  0247 03/10/24  0803   GLU  --  149*   NA  --  133*   K 4.2 3.8   CL  --  103   CO2  --  23   BUN  --  24*   CREATININE  --  0.99   CALCIUM  --  8.5   MG 2.3  --      CBC:   Recent Labs   Lab 03/08/24  2221 03/10/24  0803   WBC 8.71 8.08   HGB 12.7* 11.9*   HCT 41.3 36.4*    260     CMP:   Recent Labs   Lab 03/08/24  2221 03/09/24  0247 03/10/24  0803     --  133*   K 3.3* 4.2 3.8     --  103   CO2 30  --  23   *  --  149*   BUN 19*  --  24*   CREATININE 1.06  --  0.99   CALCIUM 8.9  --  8.5   PROT 8.0  --  6.5    ALBUMIN 3.6  --  3.0*   BILITOT 1.4*  --  1.5*   ALKPHOS 140*  --  109   AST 23  --  45*   ALT 48  --  52   ANIONGAP 8  --  11       Significant Imaging: I have reviewed all pertinent imaging results/findings within the past 24 hours.  Assessment/Plan:     * Acute acalculous cholecystitis  Patient with thickened gallbladder wall which may from his hepatic steatitosis.  PT INR pending.  Lipase normal.  Patient does not meet sepsis criteria.  Zosyn started in ED.  Agree with IV analgesics and IV antiemetics.  Will give IV PPI due to h/o alcoholic gastritis.  Gerd with esophagitis per EGD.     Patient is status post cholecystectomy he is doing well   I spoke with surgery patient may be discharged possibly today  Patient can follow up with me in my clinic in the proximally 1 week        Cellulitis of right lower extremity  Could not tolerate augmentin due to N/V but zosyn should cover and will consult wound care team.    Does not meet sepsis criteria.  Will check CRP.        Pulmonary hypertension  Continue norvasc.  Patient is not on home oxygen.  Echo 2/20/24  RVSP 64 mmHg and mod to severe tricuspid regurgitation.  Stable on not on home oxygen.        GERD with esophagitis  IV protonix      Alcoholic fatty liver  Banana bag daily.  CIWA protocol.  No signs of ETOH at this time.  Very stable.  Says he haro not drink daily any longer.        Type 2 diabetes mellitus    Flattened scale insulin protocol  Accu-Cheks q.a.c. and q.h.s.    Hold Oral hypoglycemics while patient is in the hospital.    COPD (chronic obstructive pulmonary disease)  Continue home inhaled steroid and bronchodilator.  Not on home oxygen and not hypoxic.  Controlled.  Patient has stopped smoking.      CHF (congestive heart failure)  Patient is identified as having Systolic (HFrEF) heart failure that is Chronic. CHF is currently controlled. Latest ECHO performed and demonstrates- Results for orders placed during the hospital encounter of  "02/19/24    Echo    Interpretation Summary    Left Ventricle: The left ventricle is severely dilated. Normal wall thickness. There is eccentric hypertrophy. There is severely reduced systolic function with a visually estimated ejection fraction of 20 - 25%. Grade III diastolic dysfunction.    Right Ventricle: Moderate right ventricular enlargement. Systolic function is moderately reduced.    Left Atrium: Left atrium is moderately dilated.    Right Atrium: Right atrium is mildly dilated.    Aortic Valve: The aortic valve is a trileaflet valve. Mildly calcified cusps.    Mitral Valve: There is mild bileaflet sclerosis. Mildly thickened leaflets. There is no stenosis. The mean pressure gradient across the mitral valve is 3 mmHg at a heart rate of  bpm. There is severe regurgitation with a posterolateral eccentriccally directed jet.    Tricuspid Valve: There is moderate to severe regurgitation with a centrally directed jet.    Pulmonary Artery: The estimated pulmonary artery systolic pressure is 65 mmHg.    IVC/SVC: Elevated venous pressure at 15 mmHg.    Pericardium: There is a trivial effusion.  . Continue Beta Blocker, ACE/ARB, and Furosemide and monitor clinical status closely. Monitor on telemetry. Patient is on CHF pathway.  Monitor strict Is&Os and daily weights.  Place on fluid restriction of    . Cardiology has not been consulted. Continue to stress to patient importance of self efficacy and  on diet for CHF. Last BNP reviewed- and noted below No results for input(s): "BNP", "BNPTRIAGEBLO" in the last 168 hours.    Monitor volume status closely.  Decreased LR to 50 cc hour and will hold lasix.  Giving banana bag daily.  CIWA protocol.      Hemiparesis affecting right side as late effect of cerebrovascular accident  RUE with contraction noted and 0/5.  RLE 4/5.   Will continue low dose asa and statin and ARB.        Coronary artery disease involving coronary bypass graft of native heart without angina " pectoris  Patient with known CAD s/p stent placement and CABG, which is controlled Will continue ASA and Statin and monitor for S/Sx of angina/ACS. Continue to monitor on telemetry. Will also continue BB    Essential (primary) hypertension  Chronic, controlled. Latest blood pressure and vitals reviewed-     Temp:  [98.6 °F (37 °C)-98.8 °F (37.1 °C)]   Pulse:  [104-119]   Resp:  [17-25]   BP: (104-143)/(76-85)   SpO2:  [92 %] .   Home meds for hypertension were reviewed and noted below.   Hypertension Medications               amLODIPine (NORVASC) 10 MG tablet Take 10 mg by mouth once daily.    furosemide (LASIX) 40 MG tablet Take 1 tablet (40 mg total) by mouth 2 (two) times a day.    losartan (COZAAR) 25 MG tablet Take 0.5 tablets (12.5 mg total) by mouth once daily.    metoprolol succinate (TOPROL-XL) 25 MG 24 hr tablet Take 12.5 mg by mouth once daily. Take 1/2 tablet by mouth daily            While in the hospital, will manage blood pressure as follows; Continue home antihypertensive regimen    Will utilize p.r.n. blood pressure medication only if patient's blood pressure greater than 140/90 and he develops symptoms such as worsening chest pain or shortness of breath.      VTE Risk Mitigation (From admission, onward)           Ordered     Place SAGE hose  Until discontinued         03/09/24 0222     IP VTE HIGH RISK PATIENT  Once         03/09/24 0159     Place sequential compression device  Until discontinued         03/09/24 0159                       On 03/10/2024, patient should be placed in hospital observation services under my care.             Walker Smith MD  Department of Hospital Medicine  [facility]

## 2024-03-10 NOTE — NURSING
Patient is discharge home. Patient has been told that liver enzymes are still slightly elevated but no obstruction found during surgery; likely from some fatty liver/cirrhosis. Also patient will be set to see gastroenterology as an outpatient.

## 2024-03-10 NOTE — HOSPITAL COURSE
53-year-old  male presented to the ER with right upper quadrant abdominal pain; found to have acalculous cholecystitis on imaging.  Patient was taken the operating room for laparoscopic cholecystectomy with intraoperative cholangiogram; cholangiogram showed no filling defects.  Patient did have a total bilirubin of 1.4; repeat bilirubin of 1.5.  Patient did have a cirrhotic looking liver with ascites during surgery.  Patient tolerating diet.  Pain controlled.  Patient will be discharged home to follow-up with General surgery in 2 weeks.  Patient also we will be referred to Gastroenterology for elevated liver enzymes/cirrhosis.

## 2024-03-11 PROCEDURE — G0180 MD CERTIFICATION HHA PATIENT: HCPCS | Mod: ,,, | Performed by: STUDENT IN AN ORGANIZED HEALTH CARE EDUCATION/TRAINING PROGRAM

## 2024-03-12 LAB
ESTROGEN SERPL-MCNC: NORMAL PG/ML
INSULIN SERPL-ACNC: NORMAL U[IU]/ML
LAB AP GROSS DESCRIPTION: NORMAL
LAB AP LABORATORY NOTES: NORMAL
T3RU NFR SERPL: NORMAL %

## 2024-03-18 NOTE — TELEPHONE ENCOUNTER
Please see the attached refill request.   

## 2024-03-19 ENCOUNTER — HOSPITAL ENCOUNTER (INPATIENT)
Facility: HOSPITAL | Age: 54
LOS: 15 days | Discharge: HOME HEALTH | End: 2024-04-03
Attending: EMERGENCY MEDICINE | Admitting: HOSPITALIST
Payer: MEDICAID

## 2024-03-19 DIAGNOSIS — R06.00 DYSPNEA: ICD-10-CM

## 2024-03-19 DIAGNOSIS — E87.6 HYPOKALEMIA: ICD-10-CM

## 2024-03-19 DIAGNOSIS — L03.115 CELLULITIS OF RIGHT LOWER EXTREMITY: ICD-10-CM

## 2024-03-19 DIAGNOSIS — L03.119 CELLULITIS OF LEG: ICD-10-CM

## 2024-03-19 DIAGNOSIS — A41.9 SEPSIS, DUE TO UNSPECIFIED ORGANISM, UNSPECIFIED WHETHER ACUTE ORGAN DYSFUNCTION PRESENT: ICD-10-CM

## 2024-03-19 DIAGNOSIS — I25.810 CORONARY ARTERY DISEASE INVOLVING CORONARY BYPASS GRAFT OF NATIVE HEART WITHOUT ANGINA PECTORIS: ICD-10-CM

## 2024-03-19 DIAGNOSIS — I50.84 END STAGE HEART FAILURE: ICD-10-CM

## 2024-03-19 DIAGNOSIS — E11.59 TYPE 2 DIABETES MELLITUS WITH OTHER CIRCULATORY COMPLICATION, WITHOUT LONG-TERM CURRENT USE OF INSULIN: ICD-10-CM

## 2024-03-19 DIAGNOSIS — R57.0 CARDIOGENIC SHOCK: Primary | ICD-10-CM

## 2024-03-19 DIAGNOSIS — I27.20 PULMONARY HYPERTENSION: ICD-10-CM

## 2024-03-19 DIAGNOSIS — Z90.49 S/P CHOLECYSTECTOMY: ICD-10-CM

## 2024-03-19 DIAGNOSIS — G47.33 OSA (OBSTRUCTIVE SLEEP APNEA): ICD-10-CM

## 2024-03-19 DIAGNOSIS — R57.9 SHOCK: ICD-10-CM

## 2024-03-19 DIAGNOSIS — R78.81 BACTEREMIA: ICD-10-CM

## 2024-03-19 DIAGNOSIS — R07.9 CHEST PAIN: ICD-10-CM

## 2024-03-19 DIAGNOSIS — I47.29 NSVT (NONSUSTAINED VENTRICULAR TACHYCARDIA): ICD-10-CM

## 2024-03-19 DIAGNOSIS — I50.43 ACUTE ON CHRONIC COMBINED SYSTOLIC AND DIASTOLIC HEART FAILURE: ICD-10-CM

## 2024-03-19 DIAGNOSIS — J43.9 PULMONARY EMPHYSEMA, UNSPECIFIED EMPHYSEMA TYPE: ICD-10-CM

## 2024-03-19 DIAGNOSIS — I10 ESSENTIAL (PRIMARY) HYPERTENSION: ICD-10-CM

## 2024-03-19 DIAGNOSIS — A41.9 SEPSIS WITHOUT ACUTE ORGAN DYSFUNCTION, DUE TO UNSPECIFIED ORGANISM: ICD-10-CM

## 2024-03-19 DIAGNOSIS — K70.0 ALCOHOLIC FATTY LIVER: ICD-10-CM

## 2024-03-19 DIAGNOSIS — I69.351 HEMIPARESIS AFFECTING RIGHT SIDE AS LATE EFFECT OF CEREBROVASCULAR ACCIDENT: ICD-10-CM

## 2024-03-19 PROBLEM — I50.22 CHRONIC SYSTOLIC HEART FAILURE: Status: ACTIVE | Noted: 2024-03-19

## 2024-03-19 LAB
ALBUMIN SERPL BCP-MCNC: 3.1 G/DL (ref 3.5–5)
ALBUMIN/GLOB SERPL: 0.8 {RATIO}
ALP SERPL-CCNC: 158 U/L (ref 45–115)
ALT SERPL W P-5'-P-CCNC: 47 U/L (ref 16–61)
ANION GAP SERPL CALCULATED.3IONS-SCNC: 8 MMOL/L (ref 7–16)
APTT PPP: 30.9 SECONDS (ref 25.2–37.3)
AST SERPL W P-5'-P-CCNC: 33 U/L (ref 15–37)
BASOPHILS # BLD AUTO: 0.05 K/UL (ref 0–0.2)
BASOPHILS NFR BLD AUTO: 0.9 % (ref 0–1)
BILIRUB SERPL-MCNC: 1.2 MG/DL (ref ?–1.2)
BUN SERPL-MCNC: 12 MG/DL (ref 7–18)
BUN/CREAT SERPL: 10 (ref 6–20)
CALCIUM SERPL-MCNC: 8.8 MG/DL (ref 8.5–10.1)
CHLORIDE SERPL-SCNC: 99 MMOL/L (ref 98–107)
CO2 SERPL-SCNC: 34 MMOL/L (ref 21–32)
CREAT SERPL-MCNC: 1.18 MG/DL (ref 0.7–1.3)
CRP SERPL-MCNC: 1.96 MG/DL (ref 0–0.8)
DIFFERENTIAL METHOD BLD: ABNORMAL
EGFR (NO RACE VARIABLE) (RUSH/TITUS): 74 ML/MIN/1.73M2
EOSINOPHIL # BLD AUTO: 0.12 K/UL (ref 0–0.5)
EOSINOPHIL NFR BLD AUTO: 2.2 % (ref 1–4)
ERYTHROCYTE [DISTWIDTH] IN BLOOD BY AUTOMATED COUNT: 15.8 % (ref 11.5–14.5)
GLOBULIN SER-MCNC: 4 G/DL (ref 2–4)
GLUCOSE SERPL-MCNC: 138 MG/DL (ref 74–106)
HCO3 UR-SCNC: 35.6 MMOL/L (ref 21–28)
HCT VFR BLD AUTO: 39.1 % (ref 40–54)
HCT VFR BLD CALC: 41 % (ref 35–51)
HGB BLD-MCNC: 11.9 G/DL (ref 13.5–18)
IMM GRANULOCYTES # BLD AUTO: 0.02 K/UL (ref 0–0.04)
IMM GRANULOCYTES NFR BLD: 0.4 % (ref 0–0.4)
INR BLD: 1.32
LACTATE SERPL-SCNC: 2.7 MMOL/L (ref 0.4–2)
LDH SERPL L TO P-CCNC: 2.2 MMOL/L (ref 0.3–1.2)
LYMPHOCYTES # BLD AUTO: 1.28 K/UL (ref 1–4.8)
LYMPHOCYTES NFR BLD AUTO: 22.9 % (ref 27–41)
MAGNESIUM SERPL-MCNC: 2.7 MG/DL (ref 1.7–2.3)
MCH RBC QN AUTO: 26.8 PG (ref 27–31)
MCHC RBC AUTO-ENTMCNC: 30.4 G/DL (ref 32–36)
MCV RBC AUTO: 88.1 FL (ref 80–96)
MONOCYTES # BLD AUTO: 0.41 K/UL (ref 0–0.8)
MONOCYTES NFR BLD AUTO: 7.3 % (ref 2–6)
MPC BLD CALC-MCNC: 9.3 FL (ref 9.4–12.4)
NEUTROPHILS # BLD AUTO: 3.7 K/UL (ref 1.8–7.7)
NEUTROPHILS NFR BLD AUTO: 66.3 % (ref 53–65)
NRBC # BLD AUTO: 0 X10E3/UL
NRBC, AUTO (.00): 0 %
NT-PROBNP SERPL-MCNC: 7486 PG/ML (ref 1–125)
PCO2 BLDA: 50 MMHG (ref 35–48)
PH SMN: 7.46 [PH] (ref 7.35–7.45)
PLATELET # BLD AUTO: 235 K/UL (ref 150–400)
PO2 BLDA: 20 MMHG (ref 83–108)
POC BASE EXCESS: 10.2 MMOL/L (ref -2–3)
POC CO2: 37.1 MMOL/L
POC IONIZED CALCIUM: 1.03 MMOL/L (ref 1.15–1.35)
POC SATURATED O2: 36 % (ref 95–98)
POCT GLUCOSE: 177 MG/DL (ref 60–95)
POTASSIUM BLD-SCNC: 3.5 MMOL/L (ref 3.4–4.5)
POTASSIUM SERPL-SCNC: 4.2 MMOL/L (ref 3.5–5.1)
PREALB SERPL NEPH-MCNC: 14 MG/DL (ref 20–40)
PROT SERPL-MCNC: 7.1 G/DL (ref 6.4–8.2)
PROTHROMBIN TIME: 16.2 SECONDS (ref 11.7–14.7)
RBC # BLD AUTO: 4.44 M/UL (ref 4.6–6.2)
SARS-COV-2 RDRP RESP QL NAA+PROBE: NEGATIVE
SODIUM BLD-SCNC: 132 MMOL/L (ref 136–145)
SODIUM SERPL-SCNC: 137 MMOL/L (ref 136–145)
TROPONIN I SERPL DL<=0.01 NG/ML-MCNC: 19.5 PG/ML
TROPONIN I SERPL DL<=0.01 NG/ML-MCNC: 22.2 PG/ML
WBC # BLD AUTO: 5.58 K/UL (ref 4.5–11)

## 2024-03-19 PROCEDURE — 63600175 PHARM REV CODE 636 W HCPCS: Performed by: EMERGENCY MEDICINE

## 2024-03-19 PROCEDURE — 86140 C-REACTIVE PROTEIN: CPT | Performed by: HOSPITALIST

## 2024-03-19 PROCEDURE — 87149 DNA/RNA DIRECT PROBE: CPT | Performed by: HOSPITALIST

## 2024-03-19 PROCEDURE — 87635 SARS-COV-2 COVID-19 AMP PRB: CPT | Performed by: HOSPITALIST

## 2024-03-19 PROCEDURE — 84484 ASSAY OF TROPONIN QUANT: CPT | Performed by: EMERGENCY MEDICINE

## 2024-03-19 PROCEDURE — 84484 ASSAY OF TROPONIN QUANT: CPT | Performed by: HOSPITALIST

## 2024-03-19 PROCEDURE — 96374 THER/PROPH/DIAG INJ IV PUSH: CPT

## 2024-03-19 PROCEDURE — 11000001 HC ACUTE MED/SURG PRIVATE ROOM

## 2024-03-19 PROCEDURE — 83735 ASSAY OF MAGNESIUM: CPT | Performed by: EMERGENCY MEDICINE

## 2024-03-19 PROCEDURE — 85025 COMPLETE CBC W/AUTO DIFF WBC: CPT | Performed by: EMERGENCY MEDICINE

## 2024-03-19 PROCEDURE — 82803 BLOOD GASES ANY COMBINATION: CPT

## 2024-03-19 PROCEDURE — 99285 EMERGENCY DEPT VISIT HI MDM: CPT | Mod: 25

## 2024-03-19 PROCEDURE — 80053 COMPREHEN METABOLIC PANEL: CPT | Performed by: EMERGENCY MEDICINE

## 2024-03-19 PROCEDURE — 84295 ASSAY OF SERUM SODIUM: CPT

## 2024-03-19 PROCEDURE — 63600175 PHARM REV CODE 636 W HCPCS: Performed by: HOSPITALIST

## 2024-03-19 PROCEDURE — 83880 ASSAY OF NATRIURETIC PEPTIDE: CPT | Performed by: EMERGENCY MEDICINE

## 2024-03-19 PROCEDURE — 99223 1ST HOSP IP/OBS HIGH 75: CPT | Mod: ,,, | Performed by: HOSPITALIST

## 2024-03-19 PROCEDURE — 85730 THROMBOPLASTIN TIME PARTIAL: CPT | Performed by: EMERGENCY MEDICINE

## 2024-03-19 PROCEDURE — 83605 ASSAY OF LACTIC ACID: CPT

## 2024-03-19 PROCEDURE — 82330 ASSAY OF CALCIUM: CPT

## 2024-03-19 PROCEDURE — 85610 PROTHROMBIN TIME: CPT | Performed by: EMERGENCY MEDICINE

## 2024-03-19 PROCEDURE — 93005 ELECTROCARDIOGRAM TRACING: CPT

## 2024-03-19 PROCEDURE — 99285 EMERGENCY DEPT VISIT HI MDM: CPT | Mod: ,,, | Performed by: FAMILY MEDICINE

## 2024-03-19 PROCEDURE — 85014 HEMATOCRIT: CPT

## 2024-03-19 PROCEDURE — 85651 RBC SED RATE NONAUTOMATED: CPT | Performed by: HOSPITALIST

## 2024-03-19 PROCEDURE — 87040 BLOOD CULTURE FOR BACTERIA: CPT | Performed by: HOSPITALIST

## 2024-03-19 PROCEDURE — 84132 ASSAY OF SERUM POTASSIUM: CPT

## 2024-03-19 PROCEDURE — 93010 ELECTROCARDIOGRAM REPORT: CPT | Mod: ,,, | Performed by: INTERNAL MEDICINE

## 2024-03-19 PROCEDURE — 83605 ASSAY OF LACTIC ACID: CPT | Performed by: HOSPITALIST

## 2024-03-19 PROCEDURE — 82947 ASSAY GLUCOSE BLOOD QUANT: CPT

## 2024-03-19 PROCEDURE — 25000003 PHARM REV CODE 250: Performed by: HOSPITALIST

## 2024-03-19 PROCEDURE — 84134 ASSAY OF PREALBUMIN: CPT | Performed by: HOSPITALIST

## 2024-03-19 RX ORDER — TRAZODONE HYDROCHLORIDE 50 MG/1
50 TABLET ORAL NIGHTLY PRN
Status: DISCONTINUED | OUTPATIENT
Start: 2024-03-19 | End: 2024-04-03 | Stop reason: HOSPADM

## 2024-03-19 RX ORDER — FUROSEMIDE 10 MG/ML
60 INJECTION INTRAMUSCULAR; INTRAVENOUS
Status: COMPLETED | OUTPATIENT
Start: 2024-03-19 | End: 2024-03-19

## 2024-03-19 RX ORDER — TALC
6 POWDER (GRAM) TOPICAL NIGHTLY PRN
Status: DISCONTINUED | OUTPATIENT
Start: 2024-03-19 | End: 2024-04-03 | Stop reason: HOSPADM

## 2024-03-19 RX ORDER — ONDANSETRON HYDROCHLORIDE 2 MG/ML
8 INJECTION, SOLUTION INTRAVENOUS EVERY 6 HOURS PRN
Status: DISCONTINUED | OUTPATIENT
Start: 2024-03-19 | End: 2024-04-03 | Stop reason: HOSPADM

## 2024-03-19 RX ORDER — SIMETHICONE 80 MG
1 TABLET,CHEWABLE ORAL 3 TIMES DAILY PRN
Status: DISCONTINUED | OUTPATIENT
Start: 2024-03-19 | End: 2024-04-03 | Stop reason: HOSPADM

## 2024-03-19 RX ORDER — ENOXAPARIN SODIUM 100 MG/ML
40 INJECTION SUBCUTANEOUS EVERY 24 HOURS
Status: DISCONTINUED | OUTPATIENT
Start: 2024-03-20 | End: 2024-04-03 | Stop reason: HOSPADM

## 2024-03-19 RX ORDER — GUAIFENESIN AND DEXTROMETHORPHAN HYDROBROMIDE 10; 100 MG/5ML; MG/5ML
10 SYRUP ORAL EVERY 6 HOURS PRN
Status: DISCONTINUED | OUTPATIENT
Start: 2024-03-19 | End: 2024-04-03 | Stop reason: HOSPADM

## 2024-03-19 RX ORDER — BISACODYL 5 MG
10 TABLET, DELAYED RELEASE (ENTERIC COATED) ORAL DAILY PRN
Status: DISCONTINUED | OUTPATIENT
Start: 2024-03-19 | End: 2024-04-03 | Stop reason: HOSPADM

## 2024-03-19 RX ORDER — ACETAMINOPHEN 500 MG
1000 TABLET ORAL EVERY 6 HOURS PRN
Status: DISCONTINUED | OUTPATIENT
Start: 2024-03-19 | End: 2024-03-20

## 2024-03-19 RX ADMIN — FUROSEMIDE 60 MG: 10 INJECTION, SOLUTION INTRAMUSCULAR; INTRAVENOUS at 04:03

## 2024-03-19 RX ADMIN — VANCOMYCIN HYDROCHLORIDE 2000 MG: 500 INJECTION, POWDER, LYOPHILIZED, FOR SOLUTION INTRAVENOUS at 10:03

## 2024-03-19 NOTE — ED PROVIDER NOTES
Encounter Date: 3/19/2024    SCRIBE #1 NOTE: I, Scarlett Bosch, am scribing for, and in the presence of,  Tavo Schafer MD. I have scribed the entire note.       History     Chief Complaint   Patient presents with    Shortness of Breath    Abdominal Pain     The pt is a 52 y/o male coming into the ED via EMS with complaints of SOB and abdominal pain. The pt was unable to speak well but wrote that he had a recent cholecystectomy. According to triage notes, this was on 03/09/24 with Dr. Bennett. The pt also complains of surgical site drainage. On file, the pt has a Hx of COPD, CHF, and Type 2 diabetes. The pt also has a Hx of a stroke and expressive aphasia. This limited some of the information obtained from the pt. There are no other complaints at this time.    The history is provided by the patient. No  was used.     Review of patient's allergies indicates:  No Known Allergies  Past Medical History:   Diagnosis Date    Alcoholic fatty liver     CHF (congestive heart failure) 02/20/2024    EF 25%    COPD (chronic obstructive pulmonary disease)     Coronary artery disease involving coronary bypass graft of native heart without angina pectoris 01/01/2022    Dyslipidemia 12/16/2023    Essential (primary) hypertension 01/01/2022    Expressive aphasia 05/05/2023    GERD with esophagitis 2018    EGD    Hemiparesis affecting right side as late effect of cerebrovascular accident     ROSEMARIE (obstructive sleep apnea)     Pulmonary hypertension     Stroke     right hand contracted    Tricuspid regurgitation     Type 2 diabetes mellitus      Past Surgical History:   Procedure Laterality Date    CARDIAC SURGERY      CABG    CORONARY ANGIOPLASTY WITH STENT PLACEMENT      bare metal stent    HEMIARTHROPLASTY OF HIP Right 01/02/2022    Procedure: HEMIARTHROPLASTY, HIP;  Surgeon: Ramses Chua MD;  Location: [facility];  Service: Orthopedics;  Laterality: Right;    LAPAROSCOPIC CHOLECYSTECTOMY  03/09/2024     Dr. Bennett    LAPAROSCOPIC CHOLECYSTECTOMY WITH CHOLANGIOGRAPHY N/A 3/9/2024    Procedure: CHOLECYSTECTOMY, LAPAROSCOPIC, WITH CHOLANGIOGRAM;  Surgeon: Karson Bennett DO;  Location: [facility], [place] OR;  Service: General;  Laterality: N/A;    RIGHT HEART CATHETERIZATION Right 02/22/2024    Procedure: INSERTION, CATHETER, RIGHT HEART;  Surgeon: Ruiz Pacheco MD;  Location: [facility] [place] CATH LAB;  Service: Cardiology;  Laterality: Right;     Family History   Problem Relation Age of Onset    Diabetes Mother     Heart disease Mother     Hypertension Mother      Social History     Tobacco Use    Smoking status: Former     Current packs/day: 0.50     Types: Cigarettes    Smokeless tobacco: Current     Types: Chew   Substance Use Topics    Alcohol use: Not Currently     Comment: 1 quart    Drug use: Never     Review of Systems   Respiratory:  Positive for shortness of breath.    Gastrointestinal:  Positive for abdominal pain.   All other systems reviewed and are negative.      Physical Exam     Initial Vitals [03/19/24 1340]   BP Pulse Resp Temp SpO2   104/65 (!) 114 (!) 30 97.4 °F (36.3 °C) 96 %      MAP       --         Physical Exam    Nursing note and vitals reviewed.  Constitutional: He appears well-developed and well-nourished. He is not diaphoretic. No distress.   HENT:   Head: Normocephalic and atraumatic.   Nose: Nose normal.   Mouth/Throat: Oropharynx is clear and moist.   Eyes: Conjunctivae and EOM are normal. Pupils are equal, round, and reactive to light. No scleral icterus.   Neck: Neck supple. No JVD present.   Normal range of motion.  Cardiovascular:  Normal rate, regular rhythm and normal heart sounds.     Exam reveals no gallop and no friction rub.       No murmur heard.  Pulmonary/Chest: Breath sounds normal. No stridor. No respiratory distress. He has no wheezes. He exhibits no tenderness.   Abdominal: Abdomen is soft. Bowel sounds are normal. He exhibits no distension and no mass. There is no  abdominal tenderness.   Dressing on lower abdomen from draining post op surgical wound from cholecystectomy  There is no rebound and no guarding.   Musculoskeletal:         General: Edema (bilateral leg edema) present. No tenderness. Normal range of motion.      Cervical back: Normal range of motion and neck supple. Normal.      Thoracic back: Normal.      Lumbar back: Normal.     Lymphadenopathy:     He has no cervical adenopathy.   Neurological: He is alert and oriented to person, place, and time. He has normal strength. No cranial nerve deficit.   Aphasia    Skin: Skin is warm and dry. No rash noted. No pallor.   Psychiatric: He has a normal mood and affect. Thought content normal.         ED Course   Procedures  Labs Reviewed   COMPREHENSIVE METABOLIC PANEL - Abnormal; Notable for the following components:       Result Value    CO2 34 (*)     Glucose 138 (*)     Albumin 3.1 (*)     Alk Phos 158 (*)     All other components within normal limits   NT-PRO NATRIURETIC PEPTIDE - Abnormal; Notable for the following components:    ProBNP 7,486 (*)     All other components within normal limits   MAGNESIUM - Abnormal; Notable for the following components:    Magnesium 2.7 (*)     All other components within normal limits   PROTIME-INR - Abnormal; Notable for the following components:    PT 16.2 (*)     All other components within normal limits   CBC WITH DIFFERENTIAL - Abnormal; Notable for the following components:    RBC 4.44 (*)     Hemoglobin 11.9 (*)     Hematocrit 39.1 (*)     MCH 26.8 (*)     MCHC 30.4 (*)     RDW 15.8 (*)     MPV 9.3 (*)     Neutrophils % 66.3 (*)     Lymphocytes % 22.9 (*)     Monocytes % 7.3 (*)     All other components within normal limits   APTT - Normal   TROPONIN I - Normal   CBC W/ AUTO DIFFERENTIAL    Narrative:     The following orders were created for panel order CBC auto differential.  Procedure                               Abnormality         Status                     ---------                                -----------         ------                     CBC with Differential[[id number]]       Abnormal            Final result                 Please view results for these tests on the individual orders.        ECG Results              EKG 12-lead (Final result)        Collection Time Result Time QRS Duration OHS QTC Calculation    03/19/24 13:35:51 03/20/24 07:49:17 104 432                     Final result by Interface, Lab In [facility] (03/20/24 07:49:20)                   Narrative:    Test Reason : R06.00,    Vent. Rate : 110 BPM     Atrial Rate : 000 BPM     P-R Int : 188 ms          QRS Dur : 104 ms      QT Int : 344 ms       P-R-T Axes : 036 -37 139 degrees     QTc Int : 432 ms    Sinus tachycardia with multifocal PVCs  Possible sequence error: V1,V2 omitted  Left axis deviation  Widespread ST-T abnormality may be due to myocardial ischemia  Abnormal ECG    Confirmed by Hardeep Escobar MD ([id number]) on 3/20/2024 7:49:16 AM    Referred By: [id number]   SELF           Confirmed By:Hardeep Escobar MD                                  Imaging Results              CT Abdomen Pelvis  Without Contrast (Final result)  Result time 03/19/24 17:36:43      Final result by Taye Bolivar DO (03/19/24 17:36:43)                   Impression:      Study limited secondary to lack of intravenous contrast. Interval cholecystectomy. There is a small air-fluid collection within the gallbladder fossa measuring up to 3.7 cm which may reflect postoperative fluid collection or abscess. There is mild adjacent stranding. Interval increased diffuse body wall edema.    Cardiomegaly and coronary artery calcifications.  Scattered interlobular septal thickening of the lung bases suggestive of interstitial pulmonary edema with trace bilateral pleural effusions.  There is some calcification of the left ventricle apex which may reflect sequela of remote infarct.    The CT exam was performed using one or more of the following  dose    reduction techniques- Automated exposure control, adjustment of the mA    and/or kV according to patient size, and/or use of iterative    reconstructed technique.    Point of Service: [facility]      Electronically signed by: Taye Bolivar  Date:    03/19/2024  Time:    17:36               Narrative:    EXAMINATION:  CT ABDOMEN PELVIS WITHOUT CONTRAST    CLINICAL HISTORY:  Abdominal abscess/infection suspected;POSSIBLE ABSCESS VS SEROMA ANTERIOR ABDOMINAL WALL;    COMPARISON:  CT abdomen pelvis March 9 2024    TECHNIQUE:  Multiple axial tomographic images of the abdomen and pelvis were obtained without the use of intravenous contrast.    FINDINGS:  Cardiomegaly and coronary artery calcifications.  Scattered interlobular septal thickening of the lung bases suggestive of interstitial pulmonary edema with trace bilateral pleural effusions.  There is some calcification of the left ventricle apex which may reflect sequela of remote infarct.    Study limited secondary to lack of intravenous contrast.  Interval cholecystectomy.  There is a small air-fluid collection within the gallbladder fossa measuring up to 3.7 cm which may reflect postoperative fluid collection or abscess.  There is mild adjacent stranding.  Interval increased diffuse body wall edema.  No worrisome focal hepatic abnormality demonstrated on submitted images.  Visualized pancreas appears unremarkable.  Spleen grossly unremarkable.    Bilateral adrenal glands grossly unremarkable.  No evidence of hydronephrosis.  Urinary bladder incompletely distended.    No convincing evidence of gastrointestinal obstruction or acute appendicitis.  Atherosclerotic calcification demonstrated.  Scattered skeletal degenerative change.  Prior sternotomy.  Total right hip arthroplasty.                                       X-Ray Chest AP Portable (Final result)  Result time 03/19/24 17:01:22      Final result by Taye Bolivar DO (03/19/24 17:01:22)                    Impression:      As above.    Point of Service: [facility]      Electronically signed by: Taye Bolivar  Date:    03/19/2024  Time:    17:01               Narrative:    EXAMINATION:  XR CHEST AP PORTABLE    CLINICAL HISTORY:  Dyspnea, unspecified    COMPARISON:  Chest x-ray March 2, 2024    TECHNIQUE:  Frontal view/views of the chest.    FINDINGS:  Prior sternotomy. Continued cardiomegaly. Patchy ground-glass and reticular infiltrate/edema throughout the bilateral lungs. Small bilateral pleural fluid.  Constellation of findings suspicious for CHF. Underlying infection not excluded.  Findings appear improved when compared to comparison exam.  Visualized osseous and surrounding soft tissue structures appear grossly unchanged.                                    X-Rays:   Independently Interpreted Readings:   Other Readings:  Xray Chest AP Portable:  Prior sternotomy. Continued cardiomegaly. Patchy ground-glass and reticular infiltrate/edema throughout the bilateral lungs. Small bilateral pleural fluid.  Constellation of findings suspicious for CHF. Underlying infection not excluded.  Findings appear improved when compared to comparison exam.  Visualized osseous and surrounding soft tissue structures appear grossly unchanged.    CT Abdomen Pelvis w/o contrast:  Study limited secondary to lack of intravenous contrast. Interval cholecystectomy. There is a small air-fluid collection within the gallbladder fossa measuring up to 3.7 cm which may reflect postoperative fluid collection or abscess. There is mild adjacent stranding. Interval increased diffuse body wall edema.     Cardiomegaly and coronary artery calcifications.  Scattered interlobular septal thickening of the lung bases suggestive of interstitial pulmonary edema with trace bilateral pleural effusions.  There is some calcification of the left ventricle apex which may reflect sequela of remote infarct.    Medications   bisacodyL EC  tablet 10 mg (10 mg Oral Given 3/20/24 1748)   dextromethorphan-guaiFENesin  mg/5 ml liquid 10 mL (10 mLs Oral Given 3/21/24 2140)   melatonin tablet 6 mg (has no administration in time range)   ondansetron injection 8 mg (8 mg Intravenous Given 3/21/24 1511)   simethicone chewable tablet 80 mg (has no administration in time range)   traZODone tablet 50 mg (50 mg Oral Given 3/25/24 2019)   vancomycin (VANCOCIN) 2,000 mg in dextrose 5 % (D5W) 500 mL IVPB (2,000 mg Intravenous New Bag 3/25/24 2319)   vancomycin - pharmacy to dose (has no administration in time range)   enoxaparin injection 40 mg (40 mg Subcutaneous Given 3/25/24 1636)   aspirin EC tablet 81 mg (81 mg Oral Given 3/25/24 0901)   pantoprazole EC tablet 40 mg (40 mg Oral Given 3/25/24 0901)   empagliflozin (Jardiance) tablet 10 mg (10 mg Oral Given 3/25/24 0900)   atorvastatin tablet 40 mg (40 mg Oral Given 3/25/24 2020)   docusate sodium capsule 100 mg (100 mg Oral Given 3/25/24 2019)   zolpidem tablet 5 mg (5 mg Oral Given 3/25/24 2019)   sodium chloride 0.9% flush 3 mL (has no administration in time range)   naloxone 0.4 mg/mL injection 0.02 mg (has no administration in time range)   glucose chewable tablet 16 g (has no administration in time range)   glucose chewable tablet 24 g (has no administration in time range)   glucagon (human recombinant) injection 1 mg (has no administration in time range)   dextrose 10% bolus 125 mL 125 mL (has no administration in time range)   dextrose 10% bolus 250 mL 250 mL (has no administration in time range)   insulin aspart U-100 injection 0-5 Units (1 Units Subcutaneous Given 3/25/24 2056)   budesonide nebulizer solution 0.5 mg (0.5 mg Nebulization Given 3/25/24 1957)   albuterol-ipratropium 2.5 mg-0.5 mg/3 mL nebulizer solution 3 mL (3 mLs Nebulization Given 3/25/24 1949)   potassium chloride SA CR tablet 40 mEq (40 mEq Oral Given 3/25/24 2019)   DOBUtamine 1000 mg in D5W 250 mL infusion (2.5 mcg/kg/min ×  102.5 kg Intravenous Verify Only 3/24/24 1853)   furosemide injection 80 mg (80 mg Intravenous Given 3/25/24 1635)   mupirocin 2 % ointment ( Nasal Given 3/25/24 2020)   guaiFENesin-codeine 100-10 mg/5 ml syrup 5 mL (5 mLs Oral Given 3/25/24 0235)   oxyCODONE immediate release tablet 10 mg (10 mg Oral Given 3/25/24 2056)   losartan tablet 25 mg (has no administration in time range)   hydrALAZINE tablet 10 mg (10 mg Oral Given 3/25/24 2020)   digoxin tablet 0.125 mg (0.125 mg Oral Given 3/25/24 1635)   amiodarone 360 mg/200 mL (1.8 mg/mL) infusion (1 mg/min Intravenous Verify Only 3/25/24 1600)   amiodarone 360 mg/200 mL (1.8 mg/mL) infusion (0.5 mg/min Intravenous New Bag 3/25/24 2056)   milrinone 20mg in D5W 100 mL infusion (0.25 mcg/kg/min × 110.1 kg Intravenous New Bag 3/25/24 1815)   furosemide injection 60 mg (60 mg Intravenous Given 3/19/24 1619)   iopamidoL (ISOVUE-370) injection 100 mL (100 mLs Intravenous Given 3/20/24 0234)   perflutren lipid microspheres injection 1.5 mL (1.5 mLs Intravenous Given 3/21/24 1112)   dexAMETHasone injection 8 mg (8 mg Intravenous Given 3/21/24 1324)   furosemide injection 40 mg (40 mg Intravenous Given 3/22/24 0945)   albumin human 25% bottle 25 g (0 g Intravenous Stopped 3/22/24 1639)   digoxin injection 500 mcg (500 mcg Intravenous Given 3/25/24 1130)   amiodarone in dextrose 150 mg/100 mL (1.5 mg/mL) loading dose 150 mg (0 mg Intravenous Stopped 3/25/24 1506)     Medical Decision Making  Amount and/or Complexity of Data Reviewed  Labs: ordered.  Radiology: ordered.    Risk  Prescription drug management.  Decision regarding hospitalization.              Attending Attestation:           Physician Attestation for Scribe:  Physician Attestation Statement for Scribe #1: I, Tavo Schafer MD, reviewed documentation, as scribed by Scarlett Bosch in my presence, and it is both accurate and complete.             ED Course as of 03/26/24 0050   Tue Mar 19, 2024   4782 Xray Chest AP  Portable:  Prior sternotomy. Continued cardiomegaly. Patchy ground-glass and reticular infiltrate/edema throughout the bilateral lungs. Small bilateral pleural fluid.  Constellation of findings suspicious for CHF. Underlying infection not excluded.  Findings appear improved when compared to comparison exam.  Visualized osseous and surrounding soft tissue structures appear grossly unchanged.   [LP]   1749 CT Abdomen Pelvis w/o contrast:  Study limited secondary to lack of intravenous contrast. Interval cholecystectomy. There is a small air-fluid collection within the gallbladder fossa measuring up to 3.7 cm which may reflect postoperative fluid collection or abscess. There is mild adjacent stranding. Interval increased diffuse body wall edema.     Cardiomegaly and coronary artery calcifications.  Scattered interlobular septal thickening of the lung bases suggestive of interstitial pulmonary edema with trace bilateral pleural effusions.  There is some calcification of the left ventricle apex which may reflect sequela of remote infarct.   [LP]      ED Course User Index  [LP] Scarlett Bosch                 Medical Decision Making:   Initial Assessment:   The pt is a 54 y/o male coming into the ED via EMS with complaints of SOB and abdominal pain. The pt was unable to speak well but wrote that he had a recent cholecystectomy. According to triage notes, this was on 03/09/24 with Dr. Bennett. The pt also complains of surgical site drainage. On file, the pt has a Hx of COPD, CHF, and Type 2 diabetes. The pt also has a Hx of a stroke and expressive aphasia. This limited some of the information obtained from the pt. There are no other complaints at this time.    The history is provided by the patient. No  was used.     Differential Diagnosis:   Hypoxia and shortness of breath with abdominal pain  ED Management:  Will admit             Clinical Impression:  Final diagnoses:  [R06.00] Dyspnea  [L03.119]  Cellulitis of leg  [L03.115] Cellulitis of right lower extremity [L03.115] (Primary)  [I10] Essential (primary) hypertension [I10]  [J43.9] Pulmonary emphysema, unspecified emphysema type [J43.9]  [I25.810] Coronary artery disease involving coronary bypass graft of native heart without angina pectoris [I25.810]  [A41.9] Sepsis, due to unspecified organism, unspecified whether acute organ dysfunction present [A41.9]  [K70.0] Alcoholic fatty liver [K70.0]  [E11.59] Type 2 diabetes mellitus with other circulatory complication, without long-term current use of insulin [E11.59]  [I27.20] Pulmonary hypertension [I27.20]  [G47.33] ROSEMARIE (obstructive sleep apnea) [G47.33]  [I69.351] Hemiparesis affecting right side as late effect of cerebrovascular accident          ED Disposition Condition    Admit                 Kwame Avery,   03/26/24 0050

## 2024-03-20 PROBLEM — J44.9 COPD (CHRONIC OBSTRUCTIVE PULMONARY DISEASE): Status: RESOLVED | Noted: 2024-03-09 | Resolved: 2024-03-20

## 2024-03-20 PROBLEM — G47.09 OTHER INSOMNIA: Status: ACTIVE | Noted: 2024-03-20

## 2024-03-20 PROBLEM — K70.0 ALCOHOLIC FATTY LIVER: Status: RESOLVED | Noted: 2024-03-09 | Resolved: 2024-03-20

## 2024-03-20 PROBLEM — Z90.49 S/P CHOLECYSTECTOMY: Status: ACTIVE | Noted: 2024-03-20

## 2024-03-20 PROBLEM — I50.22 CHRONIC SYSTOLIC HEART FAILURE: Status: RESOLVED | Noted: 2024-03-19 | Resolved: 2024-03-20

## 2024-03-20 PROBLEM — G47.33 OSA (OBSTRUCTIVE SLEEP APNEA): Status: ACTIVE | Noted: 2024-03-20

## 2024-03-20 PROBLEM — I50.42 CHRONIC COMBINED SYSTOLIC AND DIASTOLIC HEART FAILURE: Status: ACTIVE | Noted: 2024-03-19

## 2024-03-20 PROBLEM — E87.6 HYPOKALEMIA: Status: ACTIVE | Noted: 2024-03-20

## 2024-03-20 PROBLEM — J96.01 ACUTE HYPOXEMIC RESPIRATORY FAILURE: Status: ACTIVE | Noted: 2024-03-20

## 2024-03-20 PROBLEM — K21.00 GERD WITH ESOPHAGITIS: Status: RESOLVED | Noted: 2018-01-01 | Resolved: 2024-03-20

## 2024-03-20 PROBLEM — Z90.49 S/P CHOLECYSTECTOMY: Status: RESOLVED | Noted: 2024-03-20 | Resolved: 2024-03-20

## 2024-03-20 PROBLEM — A41.9 SEPSIS: Status: ACTIVE | Noted: 2024-03-20

## 2024-03-20 PROBLEM — G47.09 OTHER INSOMNIA: Status: RESOLVED | Noted: 2024-03-20 | Resolved: 2024-03-20

## 2024-03-20 PROBLEM — I27.20 PULMONARY HYPERTENSION: Status: RESOLVED | Noted: 2024-03-09 | Resolved: 2024-03-20

## 2024-03-20 LAB
ANION GAP SERPL CALCULATED.3IONS-SCNC: 10 MMOL/L (ref 7–16)
BASOPHILS # BLD AUTO: 0.06 K/UL (ref 0–0.2)
BASOPHILS NFR BLD AUTO: 0.9 % (ref 0–1)
BUN SERPL-MCNC: 10 MG/DL (ref 7–18)
BUN/CREAT SERPL: 9 (ref 6–20)
CALCIUM SERPL-MCNC: 8.4 MG/DL (ref 8.5–10.1)
CHLORIDE SERPL-SCNC: 97 MMOL/L (ref 98–107)
CO2 SERPL-SCNC: 30 MMOL/L (ref 21–32)
CREAT SERPL-MCNC: 1.12 MG/DL (ref 0.7–1.3)
DIFFERENTIAL METHOD BLD: ABNORMAL
EGFR (NO RACE VARIABLE) (RUSH/TITUS): 79 ML/MIN/1.73M2
EOSINOPHIL # BLD AUTO: 0.11 K/UL (ref 0–0.5)
EOSINOPHIL NFR BLD AUTO: 1.6 % (ref 1–4)
ERYTHROCYTE [DISTWIDTH] IN BLOOD BY AUTOMATED COUNT: 15.8 % (ref 11.5–14.5)
ERYTHROCYTE [SEDIMENTATION RATE] IN BLOOD BY WESTERGREN METHOD: 20 MM/HR (ref 0–20)
GLUCOSE SERPL-MCNC: 126 MG/DL (ref 70–105)
GLUCOSE SERPL-MCNC: 133 MG/DL (ref 70–105)
GLUCOSE SERPL-MCNC: 135 MG/DL (ref 70–105)
GLUCOSE SERPL-MCNC: 163 MG/DL (ref 74–106)
GLUCOSE SERPL-MCNC: 213 MG/DL (ref 70–105)
GRAM STN SPEC: NORMAL
GRAM STN SPEC: NORMAL
HCT VFR BLD AUTO: 37.8 % (ref 40–54)
HGB BLD-MCNC: 11.5 G/DL (ref 13.5–18)
IMM GRANULOCYTES # BLD AUTO: 0.03 K/UL (ref 0–0.04)
IMM GRANULOCYTES NFR BLD: 0.4 % (ref 0–0.4)
LACTATE SERPL-SCNC: 1.8 MMOL/L (ref 0.4–2)
LACTATE SERPL-SCNC: 2.3 MMOL/L (ref 0.4–2)
LYMPHOCYTES # BLD AUTO: 1.19 K/UL (ref 1–4.8)
LYMPHOCYTES NFR BLD AUTO: 17.7 % (ref 27–41)
MCH RBC QN AUTO: 26.7 PG (ref 27–31)
MCHC RBC AUTO-ENTMCNC: 30.4 G/DL (ref 32–36)
MCV RBC AUTO: 87.7 FL (ref 80–96)
MONOCYTES # BLD AUTO: 0.44 K/UL (ref 0–0.8)
MONOCYTES NFR BLD AUTO: 6.5 % (ref 2–6)
MPC BLD CALC-MCNC: 9.1 FL (ref 9.4–12.4)
NEUTROPHILS # BLD AUTO: 4.91 K/UL (ref 1.8–7.7)
NEUTROPHILS NFR BLD AUTO: 72.9 % (ref 53–65)
NRBC # BLD AUTO: 0 X10E3/UL
NRBC, AUTO (.00): 0 %
OHS QRS DURATION: 104 MS
OHS QTC CALCULATION: 432 MS
PLATELET # BLD AUTO: 225 K/UL (ref 150–400)
POTASSIUM SERPL-SCNC: 3 MMOL/L (ref 3.5–5.1)
RBC # BLD AUTO: 4.31 M/UL (ref 4.6–6.2)
SODIUM SERPL-SCNC: 134 MMOL/L (ref 136–145)
VERIGENE RESULT: ABNORMAL
VERIGENE RESULT: ABNORMAL
WBC # BLD AUTO: 6.74 K/UL (ref 4.5–11)

## 2024-03-20 PROCEDURE — 82962 GLUCOSE BLOOD TEST: CPT

## 2024-03-20 PROCEDURE — 27000221 HC OXYGEN, UP TO 24 HOURS

## 2024-03-20 PROCEDURE — 25000003 PHARM REV CODE 250: Performed by: HOSPITALIST

## 2024-03-20 PROCEDURE — 94761 N-INVAS EAR/PLS OXIMETRY MLT: CPT

## 2024-03-20 PROCEDURE — 25000242 PHARM REV CODE 250 ALT 637 W/ HCPCS

## 2024-03-20 PROCEDURE — 63600175 PHARM REV CODE 636 W HCPCS: Performed by: HOSPITALIST

## 2024-03-20 PROCEDURE — 85025 COMPLETE CBC W/AUTO DIFF WBC: CPT | Performed by: HOSPITALIST

## 2024-03-20 PROCEDURE — 25500020 PHARM REV CODE 255: Performed by: HOSPITALIST

## 2024-03-20 PROCEDURE — 63600175 PHARM REV CODE 636 W HCPCS

## 2024-03-20 PROCEDURE — 25000003 PHARM REV CODE 250

## 2024-03-20 PROCEDURE — 80048 BASIC METABOLIC PNL TOTAL CA: CPT | Performed by: HOSPITALIST

## 2024-03-20 PROCEDURE — 94640 AIRWAY INHALATION TREATMENT: CPT

## 2024-03-20 PROCEDURE — 99900035 HC TECH TIME PER 15 MIN (STAT)

## 2024-03-20 PROCEDURE — 99223 1ST HOSP IP/OBS HIGH 75: CPT | Mod: 24,,, | Performed by: STUDENT IN AN ORGANIZED HEALTH CARE EDUCATION/TRAINING PROGRAM

## 2024-03-20 PROCEDURE — 27000190 HC CPAP FULL FACE MASK W/VALVE

## 2024-03-20 PROCEDURE — 94660 CPAP INITIATION&MGMT: CPT

## 2024-03-20 PROCEDURE — 83605 ASSAY OF LACTIC ACID: CPT | Performed by: HOSPITALIST

## 2024-03-20 PROCEDURE — 99232 SBSQ HOSP IP/OBS MODERATE 35: CPT | Mod: ,,, | Performed by: HOSPITALIST

## 2024-03-20 PROCEDURE — 11000001 HC ACUTE MED/SURG PRIVATE ROOM

## 2024-03-20 PROCEDURE — 25000242 PHARM REV CODE 250 ALT 637 W/ HCPCS: Performed by: HOSPITALIST

## 2024-03-20 PROCEDURE — 63600175 PHARM REV CODE 636 W HCPCS: Mod: JZ,JG | Performed by: HOSPITALIST

## 2024-03-20 PROCEDURE — 0W9G3ZZ DRAINAGE OF PERITONEAL CAVITY, PERCUTANEOUS APPROACH: ICD-10-PCS | Performed by: RADIOLOGY

## 2024-03-20 PROCEDURE — 5A09357 ASSISTANCE WITH RESPIRATORY VENTILATION, LESS THAN 24 CONSECUTIVE HOURS, CONTINUOUS POSITIVE AIRWAY PRESSURE: ICD-10-PCS | Performed by: STUDENT IN AN ORGANIZED HEALTH CARE EDUCATION/TRAINING PROGRAM

## 2024-03-20 PROCEDURE — 87070 CULTURE OTHR SPECIMN AEROBIC: CPT | Performed by: RADIOLOGY

## 2024-03-20 PROCEDURE — 87205 SMEAR GRAM STAIN: CPT | Performed by: RADIOLOGY

## 2024-03-20 RX ORDER — INSULIN ASPART 100 [IU]/ML
0-5 INJECTION, SOLUTION INTRAVENOUS; SUBCUTANEOUS
Status: DISCONTINUED | OUTPATIENT
Start: 2024-03-20 | End: 2024-04-03 | Stop reason: HOSPADM

## 2024-03-20 RX ORDER — OXYCODONE AND ACETAMINOPHEN 5; 325 MG/1; MG/1
1 TABLET ORAL EVERY 6 HOURS PRN
Status: DISCONTINUED | OUTPATIENT
Start: 2024-03-20 | End: 2024-03-20

## 2024-03-20 RX ORDER — OXYCODONE HYDROCHLORIDE 5 MG/1
5 TABLET ORAL EVERY 6 HOURS PRN
Status: DISCONTINUED | OUTPATIENT
Start: 2024-03-20 | End: 2024-03-24

## 2024-03-20 RX ORDER — ALBUTEROL SULFATE 0.83 MG/ML
2.5 SOLUTION RESPIRATORY (INHALATION) EVERY 6 HOURS
Status: DISCONTINUED | OUTPATIENT
Start: 2024-03-20 | End: 2024-03-20

## 2024-03-20 RX ORDER — ZOLPIDEM TARTRATE 5 MG/1
5 TABLET ORAL NIGHTLY
Status: DISCONTINUED | OUTPATIENT
Start: 2024-03-20 | End: 2024-03-26

## 2024-03-20 RX ORDER — LEVALBUTEROL INHALATION SOLUTION 0.63 MG/3ML
0.63 SOLUTION RESPIRATORY (INHALATION) EVERY 12 HOURS
Status: DISCONTINUED | OUTPATIENT
Start: 2024-03-20 | End: 2024-03-20

## 2024-03-20 RX ORDER — ATORVASTATIN CALCIUM 40 MG/1
40 TABLET, FILM COATED ORAL NIGHTLY
Status: DISCONTINUED | OUTPATIENT
Start: 2024-03-20 | End: 2024-04-03 | Stop reason: HOSPADM

## 2024-03-20 RX ORDER — IBUPROFEN 200 MG
24 TABLET ORAL
Status: DISCONTINUED | OUTPATIENT
Start: 2024-03-20 | End: 2024-04-03 | Stop reason: HOSPADM

## 2024-03-20 RX ORDER — ASPIRIN 81 MG/1
81 TABLET ORAL DAILY
Status: DISCONTINUED | OUTPATIENT
Start: 2024-03-20 | End: 2024-04-03 | Stop reason: HOSPADM

## 2024-03-20 RX ORDER — LEVALBUTEROL INHALATION SOLUTION 0.63 MG/3ML
0.63 SOLUTION RESPIRATORY (INHALATION) EVERY 8 HOURS
Status: DISCONTINUED | OUTPATIENT
Start: 2024-03-20 | End: 2024-03-20

## 2024-03-20 RX ORDER — PREDNISONE 20 MG/1
40 TABLET ORAL DAILY
Status: DISCONTINUED | OUTPATIENT
Start: 2024-03-20 | End: 2024-03-20

## 2024-03-20 RX ORDER — PANTOPRAZOLE SODIUM 40 MG/1
40 TABLET, DELAYED RELEASE ORAL DAILY
Status: DISCONTINUED | OUTPATIENT
Start: 2024-03-20 | End: 2024-03-30

## 2024-03-20 RX ORDER — LABETALOL HYDROCHLORIDE 5 MG/ML
10 INJECTION, SOLUTION INTRAVENOUS EVERY 6 HOURS PRN
Status: DISCONTINUED | OUTPATIENT
Start: 2024-03-20 | End: 2024-03-22

## 2024-03-20 RX ORDER — LEVALBUTEROL INHALATION SOLUTION 1.25 MG/3ML
1.25 SOLUTION RESPIRATORY (INHALATION) EVERY 12 HOURS
Status: DISCONTINUED | OUTPATIENT
Start: 2024-03-20 | End: 2024-03-20

## 2024-03-20 RX ORDER — BUDESONIDE 0.5 MG/2ML
0.5 INHALANT ORAL EVERY 12 HOURS
Status: DISCONTINUED | OUTPATIENT
Start: 2024-03-20 | End: 2024-04-03 | Stop reason: HOSPADM

## 2024-03-20 RX ORDER — FUROSEMIDE 10 MG/ML
40 INJECTION INTRAMUSCULAR; INTRAVENOUS EVERY 12 HOURS
Status: DISCONTINUED | OUTPATIENT
Start: 2024-03-20 | End: 2024-03-20

## 2024-03-20 RX ORDER — FLUTICASONE FUROATE AND VILANTEROL 100; 25 UG/1; UG/1
1 POWDER RESPIRATORY (INHALATION) DAILY
Status: DISCONTINUED | OUTPATIENT
Start: 2024-03-20 | End: 2024-03-20 | Stop reason: CLARIF

## 2024-03-20 RX ORDER — NALOXONE HCL 0.4 MG/ML
0.02 VIAL (ML) INJECTION
Status: DISCONTINUED | OUTPATIENT
Start: 2024-03-20 | End: 2024-04-03 | Stop reason: HOSPADM

## 2024-03-20 RX ORDER — IPRATROPIUM BROMIDE AND ALBUTEROL SULFATE 2.5; .5 MG/3ML; MG/3ML
3 SOLUTION RESPIRATORY (INHALATION) EVERY 6 HOURS
Status: DISCONTINUED | OUTPATIENT
Start: 2024-03-20 | End: 2024-04-03 | Stop reason: HOSPADM

## 2024-03-20 RX ORDER — IBUPROFEN 200 MG
16 TABLET ORAL
Status: DISCONTINUED | OUTPATIENT
Start: 2024-03-20 | End: 2024-04-03 | Stop reason: HOSPADM

## 2024-03-20 RX ORDER — FUROSEMIDE 10 MG/ML
40 INJECTION INTRAMUSCULAR; INTRAVENOUS DAILY
Status: DISCONTINUED | OUTPATIENT
Start: 2024-03-20 | End: 2024-03-20

## 2024-03-20 RX ORDER — DOCUSATE SODIUM 100 MG/1
100 CAPSULE, LIQUID FILLED ORAL 2 TIMES DAILY
Status: DISCONTINUED | OUTPATIENT
Start: 2024-03-20 | End: 2024-04-03 | Stop reason: HOSPADM

## 2024-03-20 RX ORDER — METRONIDAZOLE 500 MG/100ML
500 INJECTION, SOLUTION INTRAVENOUS
Status: DISCONTINUED | OUTPATIENT
Start: 2024-03-20 | End: 2024-03-25

## 2024-03-20 RX ORDER — GLUCAGON 1 MG
1 KIT INJECTION
Status: DISCONTINUED | OUTPATIENT
Start: 2024-03-20 | End: 2024-04-03 | Stop reason: HOSPADM

## 2024-03-20 RX ORDER — POTASSIUM CHLORIDE 20 MEQ/1
40 TABLET, EXTENDED RELEASE ORAL 2 TIMES DAILY
Status: DISCONTINUED | OUTPATIENT
Start: 2024-03-20 | End: 2024-03-27

## 2024-03-20 RX ORDER — ACETAMINOPHEN 500 MG
1000 TABLET ORAL 3 TIMES DAILY
Status: DISCONTINUED | OUTPATIENT
Start: 2024-03-20 | End: 2024-03-24

## 2024-03-20 RX ORDER — FUROSEMIDE 10 MG/ML
40 INJECTION INTRAMUSCULAR; INTRAVENOUS EVERY 12 HOURS
Status: DISCONTINUED | OUTPATIENT
Start: 2024-03-20 | End: 2024-03-22

## 2024-03-20 RX ORDER — SODIUM CHLORIDE 0.9 % (FLUSH) 0.9 %
3 SYRINGE (ML) INJECTION EVERY 12 HOURS PRN
Status: DISCONTINUED | OUTPATIENT
Start: 2024-03-20 | End: 2024-04-03 | Stop reason: HOSPADM

## 2024-03-20 RX ADMIN — EMPAGLIFLOZIN 10 MG: 10 TABLET, FILM COATED ORAL at 12:03

## 2024-03-20 RX ADMIN — DOCUSATE SODIUM 100 MG: 100 CAPSULE, LIQUID FILLED ORAL at 12:03

## 2024-03-20 RX ADMIN — CEFEPIME 1 G: 1 INJECTION, POWDER, FOR SOLUTION INTRAMUSCULAR; INTRAVENOUS at 05:03

## 2024-03-20 RX ADMIN — CEFEPIME 1 G: 1 INJECTION, POWDER, FOR SOLUTION INTRAMUSCULAR; INTRAVENOUS at 04:03

## 2024-03-20 RX ADMIN — IPRATROPIUM BROMIDE AND ALBUTEROL SULFATE 3 ML: 2.5; .5 SOLUTION RESPIRATORY (INHALATION) at 07:03

## 2024-03-20 RX ADMIN — ZOLPIDEM TARTRATE 5 MG: 5 TABLET ORAL at 01:03

## 2024-03-20 RX ADMIN — BUDESONIDE INHALATION 0.5 MG: 0.5 SUSPENSION RESPIRATORY (INHALATION) at 07:03

## 2024-03-20 RX ADMIN — ENOXAPARIN SODIUM 40 MG: 40 INJECTION SUBCUTANEOUS at 05:03

## 2024-03-20 RX ADMIN — BISACODYL 10 MG: 5 TABLET, COATED ORAL at 05:03

## 2024-03-20 RX ADMIN — METOPROLOL SUCCINATE 12.5 MG: 25 TABLET, EXTENDED RELEASE ORAL at 12:03

## 2024-03-20 RX ADMIN — METRONIDAZOLE 500 MG: 5 INJECTION, SOLUTION INTRAVENOUS at 09:03

## 2024-03-20 RX ADMIN — METRONIDAZOLE 500 MG: 5 INJECTION, SOLUTION INTRAVENOUS at 04:03

## 2024-03-20 RX ADMIN — IPRATROPIUM BROMIDE AND ALBUTEROL SULFATE 3 ML: 2.5; .5 SOLUTION RESPIRATORY (INHALATION) at 12:03

## 2024-03-20 RX ADMIN — ZOLPIDEM TARTRATE 5 MG: 5 TABLET ORAL at 09:03

## 2024-03-20 RX ADMIN — FUROSEMIDE 40 MG: 10 INJECTION, SOLUTION INTRAMUSCULAR; INTRAVENOUS at 05:03

## 2024-03-20 RX ADMIN — ACETAMINOPHEN 1000 MG: 500 TABLET ORAL at 02:03

## 2024-03-20 RX ADMIN — VANCOMYCIN HYDROCHLORIDE 2000 MG: 500 INJECTION, POWDER, LYOPHILIZED, FOR SOLUTION INTRAVENOUS at 11:03

## 2024-03-20 RX ADMIN — FUROSEMIDE 40 MG: 10 INJECTION, SOLUTION INTRAMUSCULAR; INTRAVENOUS at 04:03

## 2024-03-20 RX ADMIN — DOCUSATE SODIUM 100 MG: 100 CAPSULE, LIQUID FILLED ORAL at 09:03

## 2024-03-20 RX ADMIN — IOPAMIDOL 100 ML: 755 INJECTION, SOLUTION INTRAVENOUS at 02:03

## 2024-03-20 RX ADMIN — OXYCODONE HYDROCHLORIDE AND ACETAMINOPHEN 1 TABLET: 5; 325 TABLET ORAL at 01:03

## 2024-03-20 RX ADMIN — POTASSIUM CHLORIDE 40 MEQ: 1500 TABLET, EXTENDED RELEASE ORAL at 09:03

## 2024-03-20 RX ADMIN — METRONIDAZOLE 500 MG: 5 INJECTION, SOLUTION INTRAVENOUS at 02:03

## 2024-03-20 RX ADMIN — ATORVASTATIN CALCIUM 40 MG: 40 TABLET, FILM COATED ORAL at 09:03

## 2024-03-20 RX ADMIN — OXYCODONE 5 MG: 5 TABLET ORAL at 12:03

## 2024-03-20 RX ADMIN — OXYCODONE 5 MG: 5 TABLET ORAL at 05:03

## 2024-03-20 RX ADMIN — CEFEPIME 1 G: 1 INJECTION, POWDER, FOR SOLUTION INTRAMUSCULAR; INTRAVENOUS at 10:03

## 2024-03-20 RX ADMIN — ONDANSETRON 8 MG: 2 INJECTION INTRAMUSCULAR; INTRAVENOUS at 01:03

## 2024-03-20 RX ADMIN — INSULIN ASPART 2 UNITS: 100 INJECTION, SOLUTION INTRAVENOUS; SUBCUTANEOUS at 05:03

## 2024-03-20 RX ADMIN — CEFEPIME 1 G: 1 INJECTION, POWDER, FOR SOLUTION INTRAMUSCULAR; INTRAVENOUS at 12:03

## 2024-03-20 RX ADMIN — PANTOPRAZOLE SODIUM 40 MG: 40 TABLET, DELAYED RELEASE ORAL at 12:03

## 2024-03-20 RX ADMIN — ASPIRIN 81 MG: 81 TABLET, COATED ORAL at 12:03

## 2024-03-20 NOTE — ASSESSMENT & PLAN NOTE
No leukocytosis.      There is an air fluid collection under the gallbladder fossa; Surgicel was used during surgery, which can account for gas in the material; patient did have ascites and this fluid collection could be ascites.      I would recommend getting an IR aspiration of the site and if it has no purulent drainage, will send the fluid off for culture; if purulent drainage is aspirated, place IR drain at the site.    No purulent drainage or infection at any of the surgical incisions   Detail Level: Zone Render In Strict Bullet Format?: No Initiate Treatment: Start clindamycin 1% lotion daily

## 2024-03-20 NOTE — PROGRESS NOTES
[facility]  Progress Note    Patient Name: Karin Carrera  MRN: [id number]  Patient Class: IP- Inpatient   Admission Date: 3/19/2024  Length of Stay: 1 days  Attending Physician: Martita Dawn DO  Primary Care Provider: Walker Smith MD        Subjective:     Principal Problem:Sepsis        HPI:  Patient is a 52yo male who presents to [facility] ED via EMS with SOB and abdominal pain for 2 days. He reports recently having a cholecystectomy on 3/9/24 here at [place] by Dr. Bennett. He endorses RUQ abdominal pain and SOB. SOB is similar to his previous COPD episodes. Endorses orthopnea and INFANTE. He endorses surgical site drainage mostly pus that started 2 days ago. Endorses decreased appetite around the same time. Endorses bilateral leg swelling and right leg redness for about 1 month and now swelling has gone up his thighs and abdomen in the past 2 days. Denies f/c/cp/n/v/d. Denies urinary, or bowel habit changes. Reports compliance with all home medications.    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 Patient sees Dr. Escobar Cardiology and Dr. Smith PCP. Patient uses 2L NC O2 at home. He communicates through writing on paper and texts due to his expressive aphasia 2/2 CVA.    Upon presentation, VS remarkable for 104/65, 114, 30. Labs remarkable for H/H 11.9/39.1, HCO3 34, glucose 138. Mg 2.7. BNP 7486, troponin 22.2, 19.5. PT 16.2, INR 1.32, PTT 30.9. COVID negative. ABG 7.46, 50, 20, 35.6. EKG sinus tach 110 with multifocal PVCs. CXR showed continued cardiomegaly. Patchy ground-glass and reticular infiltrate/edema throughout the bilateral lungs. Small bilateral pleural fluid. Constellation of findings suspicious for CHF. Underlying  infection not excluded. CT abdomen and pelvis w/o contrast showed study limited secondary to lack of IV contrast. Interval cholecystectomy. There is a small air-fluid collection within the gallbladder fossa measuring up to 3.7 cm which may reflect postoperative fluid collection or abscess. Interval increased diffuse body wall edema. Cardiomegaly and coronary artery calcifications. Scattered interlobular septal thickening of the lung bases suggestive of interstitial pulmonary edema with trace bilateral pleural effusions. There is some calcification of the left ventricle apex which may reflect sequela of remote infarct. Patient received Lasix 60 IV and is admitted to hospital medicine for further management and care.    Overview/Hospital Course:  53 year old male with an extensive PMH presents with abdominal pain; he had recent cholecystectomy.  He is complaining of abdominal pain and SOB.      Objective:     Vital Signs (Most Recent):  Temp: 97.4 °F (36.3 °C) (03/20/24 1119)  Pulse: 107 (03/20/24 1215)  Resp: (!) 24 (03/20/24 1215)  BP: 122/79 (03/20/24 1204)  SpO2: 96 % (03/20/24 1215) Vital Signs (24h Range):  Temp:  [97 °F (36.1 °C)-97.9 °F (36.6 °C)] 97.4 °F (36.3 °C)  Pulse:  [] 107  Resp:  [17-36] 24  SpO2:  [87 %-100 %] 96 %  BP: (100-122)/(60-79) 122/79     Weight: 102.5 kg (226 lb)  Body mass index is 29.02 kg/m².    Intake/Output Summary (Last 24 hours) at 3/20/2024 1350  Last data filed at 3/20/2024 1000  Gross per 24 hour   Intake --   Output 1975 ml   Net -1975 ml         PHYSICAL EXAM:    GEN: NAD; alert   CVS: regular rate and rhythm; no murmurs  RESP: clear to auscultation bilaterally; no rhonchi, rales, or wheezes noted  GI:  +tender to palpation; + bowel sounds  EXTR: no clubbing, cyanosis, or edema; bilateral chronic venous stasis changes      Assessment/Plan:      * Sepsis  He is s/p recent cholecystectomy.  Blood cultures show G+ cocci and G- bacilli; on Vancomycin and Maxipime and Flagyl;  will await ID of organisms in blood cultures; followed by Surgery    Acute hypoxemic respiratory failure  Due to multiple etiologies; he is a smoker; he has ROSEMARIE and is non-compliant with CPAP at home; he has CHF and is non-compliant with meds at home.  On nebs; he is on 5L n/c; cannot use steroids for his COPD due to active infection; negative COVID; CT chest does not show PE; CXR shows patchy ground-glass and reticular infiltrate/edema throughout the bilateral lungs -- on IV Lasix    Cellulitis of lower extremity  He has chronic venous stasis changes on his LE; on Vancomycin and Maxipime for post op infection from abscess in GB fossa    Chronic combined systolic and diastolic heart failure  Continue home meds; he is always SOB and has ROSEMARIE non-compliant with CPAP, smokes, and does not take home meds; CXR shows patchy ground-glass and reticular infiltrate/edema throughout the bilateral lungs; on IV Lasix      Coronary artery disease involving coronary bypass graft of native heart without angina pectoris  He has a history of CAD s/p CABG; continue home meds    Hypokalemia  Will supplement    ROSEMARIE (obstructive sleep apnea)  -Noncompliant with CPAP at home; CPAP in hospital ordered    Type 2 diabetes mellitus  BS stable; continue home DM meds; on SSI; will continue to monitor    Hemiparesis affecting right side as late effect of cerebrovascular accident  On ASA and statin; he is at baseline; has aphasia    Essential (primary) hypertension  BP stable; continue home meds          Martita Dawn DO  Department of Hospital Medicine   [facility] Tele[facility]

## 2024-03-20 NOTE — ASSESSMENT & PLAN NOTE
- IV vancomycin  - lactic acid 2.7, 2.3  - CRP 1.96, ESR 20  - blood cx x2 pending  - monitor CBC, lactic acid  - wound care consulted  - Well's DVT = 2 pts-moderate risk with recent cholecystectomy.  - US doppler bilateral LE pending.  - Well's PE = 3 pts-moderate risk  - CT-PE pending

## 2024-03-20 NOTE — PROGRESS NOTES
03/20/24 1219   Wound Care Follow Up   Wound Care Follow-up? Yes   Wound Care- Next Visit Date 03/27/24   Follow Up Plan Reji POC

## 2024-03-20 NOTE — ASSESSMENT & PLAN NOTE
Chronic, controlled. Latest blood pressure and vitals reviewed-     Temp:  [97.4 °F (36.3 °C)-97.8 °F (36.6 °C)]   Pulse:  [101-126]   Resp:  [17-32]   BP: (101-120)/(60-78)   SpO2:  [87 %-100 %] .   Home meds for hypertension were reviewed and noted below.   Hypertension Medications               amLODIPine (NORVASC) 10 MG tablet Take 10 mg by mouth once daily.    furosemide (LASIX) 40 MG tablet Take 1 tablet (40 mg total) by mouth 2 (two) times a day.    losartan (COZAAR) 25 MG tablet Take 0.5 tablets (12.5 mg total) by mouth once daily.    metoprolol succinate (TOPROL-XL) 25 MG 24 hr tablet Take 12.5 mg by mouth once daily. Take 1/2 tablet by mouth daily            While in the hospital, will manage blood pressure as follows; Adjust home antihypertensive regimen as follows- see below    Will utilize p.r.n. blood pressure medication only if patient's blood pressure greater than 180/110 and he develops symptoms such as worsening chest pain or shortness of breath.    - holding home norvasc due to leg swelling, holding Losartan d/t soft BP, restart as tolerated  - lasix 40 iv qd  - home toprol-xl 12.5 qd  - labetalol q6h prn with parameters

## 2024-03-20 NOTE — NURSING
0000: PIV left wrist is positional and alarming pump. Dr Solomon remains at bedside and aware. New IV started to left ac x2 sticks.   0100: New PIV infiltrated. Mahnaz RN at bedside to attempt new IV. 20g to RUE placed x2 sticks.  0130: Newest PIV infiltrated. Calling ICU for new IV placement so that pt can have CTA PE study. Radiology aware.

## 2024-03-20 NOTE — ASSESSMENT & PLAN NOTE
He has chronic venous stasis changes on his LE; on Vancomycin and Maxipime for post op infection from abscess in GB fossa

## 2024-03-20 NOTE — ASSESSMENT & PLAN NOTE
Patient's COPD is with exacerbation noted by continued dyspnea and worsening of baseline hypoxia currently.  Patient is currently off COPD Pathway. Continue scheduled inhalers Steroids, Antibiotics, and Supplemental oxygen and monitor respiratory status closely.     - home budesonide and xopenex q12h  - blood cx x2 pending  - IV vanc  - prednisone 40 qd  - home NC O2 2L

## 2024-03-20 NOTE — NURSING
Pt took CPAP off and is refusing to wear at this time. Pt is SOB. NC 3L applied. Will update RT and recheck sats.   Never smoker

## 2024-03-20 NOTE — ASSESSMENT & PLAN NOTE
He is s/p recent cholecystectomy.  Blood cultures show G+ cocci and G- bacilli; on Vancomycin and Maxipime and Flagyl; will await ID of organisms in blood cultures; followed by Surgery

## 2024-03-20 NOTE — PROGRESS NOTES
[facility] - [address] [place], [place] [address]etry  Wound Care    Patient Name:  Karin Carrera   MRN:  [id number]  Date: 3/20/2024  Diagnosis: Cellulitis of lower extremity    History:     Past Medical History:   Diagnosis Date    Alcoholic fatty liver     CHF (congestive heart failure) 02/20/2024    EF 25%    COPD (chronic obstructive pulmonary disease)     Coronary artery disease involving coronary bypass graft of native heart without angina pectoris 01/01/2022    Dyslipidemia 12/16/2023    Essential (primary) hypertension 01/01/2022    Expressive aphasia 05/05/2023    GERD with esophagitis 2018    EGD    Hemiparesis affecting right side as late effect of cerebrovascular accident     ROSEMARIE (obstructive sleep apnea)     Pulmonary hypertension     Stroke     right hand contracted    Tricuspid regurgitation     Type 2 diabetes mellitus        Social History     Socioeconomic History    Marital status:    Tobacco Use    Smoking status: Former     Current packs/day: 0.50     Types: Cigarettes    Smokeless tobacco: Current     Types: Chew   Substance and Sexual Activity    Alcohol use: Not Currently     Comment: 1 quart    Drug use: Never     Social Determinants of Health     Financial Resource Strain: Low Risk  (2/20/2024)    Overall Financial Resource Strain (CARDIA)     Difficulty of Paying Living Expenses: Not hard at all   Food Insecurity: No Food Insecurity (2/20/2024)    Hunger Vital Sign     Worried About Running Out of Food in the Last Year: Never true     Ran Out of Food in the Last Year: Never true   Transportation Needs: No Transportation Needs (2/20/2024)    PRAPARE - Transportation     Lack of Transportation (Medical): No     Lack of Transportation (Non-Medical): No   Physical Activity: Inactive (2/20/2024)    Exercise Vital Sign     Days of Exercise per Week: 0 days     Minutes of Exercise per Session: 0 min   Stress: No Stress Concern Present (2/20/2024)    [employer] - Occupational Stress Questionnaire     Feeling of Stress : Not at all   Social Connections: Unknown (2/20/2024)    Social Connection and Isolation Panel [NHANES]     Frequency of Communication with Friends and Family: More than three times a week     Frequency of Social Gatherings with Friends and Family: More than three times a week     Attends Mosque Services: Never     Active Member of Clubs or Organizations: No     Attends Club or Organization Meetings: Never   Housing Stability: Low Risk  (2/20/2024)    Housing Stability Vital Sign     Unable to Pay for Housing in the Last Year: No     Number of Places Lived in the Last Year: 1     Unstable Housing in the Last Year: No       Precautions:     Allergies as of 03/19/2024    (No Known Allergies)       WOC Assessment Details/Treatment        03/20/24 1203   WOCN Assessment   WOCN Total Time (mins) 75   Visit Date 03/20/24   Visit Time 0915   Consult Type New   WOCN Speciality Wound   Wound cellulitis   Number of Wounds 4   Intervention chart review;assessed;orders;applied        Altered Skin Integrity 03/19/24 2344 Right anterior;lower Leg Other (comment)   Date First Assessed/Time First Assessed: 03/19/24 2344   Altered Skin Integrity Present on Admission - Did Patient arrive to the hospital with altered skin?: yes  Side: Right  Orientation: anterior;lower  Location: Leg  Primary Wound Type: (c) Other (...   Wound Image     Dressing Appearance Open to air   Drainage Amount None   Appearance Red;Dry   Care Cleansed with:;Antimicrobial agent;Wound cleanser;Applied:;Skin Barrier        Altered Skin Integrity 03/20/24 0920 Left anterior;medial Ankle Abrasion(s)   Date First Assessed/Time First Assessed: 03/20/24 0920   Altered Skin Integrity Present on Admission - Did Patient arrive to the hospital with altered skin?: yes  Side: Left  Orientation: anterior;medial  Location: Ankle  Primary Wound Type: Abrasion(s)   Wound Image    Dressing Appearance  "Intact;Clean   Drainage Amount None   Appearance Canela;Eschar   Periwound Area Intact;Dry   Wound Edges Defined   Wound Length (cm) 1 cm   Wound Width (cm) 1 cm   Wound Surface Area (cm^2) 1 cm^2   Care Cleansed with:;Antimicrobial agent;Wound cleanser;Applied:   Dressing Silicone;Foam     WOC Team consulted for "Altered Skin Integrity - RLE    Narrative: Pt alert and oriented.  Tolerates assessment without complaints.     Active Wounds and Recommendations:     Goals for Wound Healing: Apply antimicrobial, Reduce bioburden, and Educate on proper wound management post D/C     Barriers to Wound Healing: multiple co-morbidities poor vascular supply diabetes large surface area fragile skin no protective sensation    Orders placed.    Thank you for the consult.     We will continue to follow. See additional note under Notes Tab for tentative f/u plan/dates.    03/20/2024  "

## 2024-03-20 NOTE — HOSPITAL COURSE
53 year old male with an extensive PMH presents with abdominal pain; he had recent cholecystectomy.  He is complaining of abdominal pain and SOB.  3/30 breathing much better.  Volume status improved.  3/31-home o2 eval pending  4/1 qualify for home oxygen.  Once sodium corrects can discharge he is chronically hyponatremic  4/2 qualifies for home oxygen.  Still waiting on workup.  Given his clinical history I suspect this was related to over-diuresis versus reset Osmo stat from his chronic heart failure.  This is near his baseline and can likely be follow up with his PCP.  We will wait 1 more day for workup to result  4/3 patient is stable for discharge.  Discharge with Entresto, digoxin, amiodarone, Jardiance, torsemide, Toprol-XL.  Follow up with Cardiology in 2 weeks.  Follow up with PCP in 1 week.  His sodium is new baseline.  He has no symptoms.  Serum osmolality low.  He is euvolemic.  Suspect recurrent hyponatremia is due to reset Osmo stat.  Can follow up with PCP to follow pending workup

## 2024-03-20 NOTE — HPI
53-year-old  male admitted to the hospital for shortness of breath and abdominal pain.  Recent cholecystectomy for acute cholecystitis on 03/09/2024; intraoperative cholangiogram negative.  History of alcoholic fatty liver with ascites, ejection fraction 25% with CHF, pulmonary hypertension; history of CVA in 2016 with right hemiparesis and expressive aphasia; hypertension, hyperlipidemia, diabetes, GERD, sleep apnea.  Patient admitted for CHF exacerbation; BNP 7486.  Patient also with cellulitis of the right lower extremity.  CT scan the abdomen pelvis done without contrast showed a small air-fluid collection in the gallbladder fossa measuring up to 3.7 cm, could reflect postoperative fluid collection versus abscess.  No leukocytosis.  Patient complain of some drainage coming from around umbilicus.  Currently, no drainage coming from any incisions.  Patient states he is having some drainage from inside as the umbilicus, but none draining currently.  Complain of abdominal soreness around the incisions and some right upper quadrant abdominal pain.  Liver enzymes unremarkable.  No current nausea or vomiting.  Patient wanting to eat.  Currently NPO.  Denies any chest pain.  Shortness of breath has resolving.  Denies any fevers or chills

## 2024-03-20 NOTE — ASSESSMENT & PLAN NOTE
- On 3/9/24 by Dr. Bennett, will f/u OP on 3/26/24  - bandage on with some drainage, continue to monitor  - continue home colace  - home percocet 5 q6h prn

## 2024-03-20 NOTE — SUBJECTIVE & OBJECTIVE
Objective:     Vital Signs (Most Recent):  Temp: 97.4 °F (36.3 °C) (03/20/24 1119)  Pulse: 107 (03/20/24 1215)  Resp: (!) 24 (03/20/24 1215)  BP: 122/79 (03/20/24 1204)  SpO2: 96 % (03/20/24 1215) Vital Signs (24h Range):  Temp:  [97 °F (36.1 °C)-97.9 °F (36.6 °C)] 97.4 °F (36.3 °C)  Pulse:  [] 107  Resp:  [17-36] 24  SpO2:  [87 %-100 %] 96 %  BP: (100-122)/(60-79) 122/79     Weight: 102.5 kg (226 lb)  Body mass index is 29.02 kg/m².    Intake/Output Summary (Last 24 hours) at 3/20/2024 1350  Last data filed at 3/20/2024 1000  Gross per 24 hour   Intake --   Output 1975 ml   Net -1975 ml         PHYSICAL EXAM:    GEN: NAD; alert   CVS: regular rate and rhythm; no murmurs  RESP: clear to auscultation bilaterally; no rhonchi, rales, or wheezes noted  GI:  +tender to palpation; + bowel sounds  EXTR: no clubbing, cyanosis, or edema; bilateral chronic venous stasis changes

## 2024-03-20 NOTE — ASSESSMENT & PLAN NOTE
"Patient is identified as having Systolic (HFrEF) heart failure that is Chronic. CHF is currently controlled. Latest ECHO performed and demonstrates- Results for orders placed during the hospital encounter of 02/19/24    Echo    Interpretation Summary    Left Ventricle: The left ventricle is severely dilated. Normal wall thickness. There is eccentric hypertrophy. There is severely reduced systolic function with a visually estimated ejection fraction of 20 - 25%. Grade III diastolic dysfunction.    Right Ventricle: Moderate right ventricular enlargement. Systolic function is moderately reduced.    Left Atrium: Left atrium is moderately dilated.    Right Atrium: Right atrium is mildly dilated.    Aortic Valve: The aortic valve is a trileaflet valve. Mildly calcified cusps.    Mitral Valve: There is mild bileaflet sclerosis. Mildly thickened leaflets. There is no stenosis. The mean pressure gradient across the mitral valve is 3 mmHg at a heart rate of  bpm. There is severe regurgitation with a posterolateral eccentriccally directed jet.    Tricuspid Valve: There is moderate to severe regurgitation with a centrally directed jet.    Pulmonary Artery: The estimated pulmonary artery systolic pressure is 65 mmHg.    IVC/SVC: Elevated venous pressure at 15 mmHg.    Pericardium: There is a trivial effusion.  . Continue Furosemide and monitor clinical status closely. Monitor on telemetry. Patient is off CHF pathway.  Monitor strict Is&Os and daily weights.  Place on fluid restriction of 1.5 L. Cardiology has not been consulted. Continue to stress to patient importance of self efficacy and  on diet for CHF. Last BNP reviewed- and noted below No results for input(s): "BNP", "BNPTRIAGEBLO" in the last 168 hours.     - BNP 7486 but baseline 0609-5104  - last echo above on 2/19/24 with EF 20-25% and severely reduced systolic function  - home O2 NC 2L  - Lasix 40 IV qd, consider switching to oral as symptoms improve  - home " Toprol-XL 12.5 qd  - holding home ARB d/t soft BP, restart as BP improves  - telemetry  - monitor CBC, bmp  - daily weights, strict I/Os

## 2024-03-20 NOTE — ASSESSMENT & PLAN NOTE
Patient's FSGs are controlled on current medication regimen.  Last A1c reviewed-   Lab Results   Component Value Date    HGBA1C 6.0 03/08/2024     Most recent fingerstick glucose reviewed-   Recent Labs   Lab 03/19/24 2133   POCTGLUCOSE 177*     Current correctional scale  Low  Maintain anti-hyperglycemic dose as follows-   Antihyperglycemics (From admission, onward)      Start     Stop Route Frequency Ordered    03/20/24 0900  empagliflozin (Jardiance) tablet 10 mg        Question Answer Comment   Does this patient have a diagnosis of heart failure? Yes    Does this patient have type 1 diabetes or diabetic ketoacidosis? No    Does this patient have symptomatic hypotension? No    Is the patient NPO or pending major surgery in next 3 days or less? No        -- Oral Daily 03/20/24 0019    03/20/24 0124  insulin aspart U-100 injection 0-5 Units         -- SubQ Before meals & nightly PRN 03/20/24 0025          Hold Oral hypoglycemics while patient is in the hospital.

## 2024-03-20 NOTE — PLAN OF CARE
Resting in bed with eyes closed - bipap continues. Safety measures in place.    Problem: Adult Inpatient Plan of Care  Goal: Plan of Care Review  Outcome: Ongoing, Progressing  Goal: Patient-Specific Goal (Individualized)  Outcome: Ongoing, Progressing  Goal: Absence of Hospital-Acquired Illness or Injury  Outcome: Ongoing, Progressing  Goal: Optimal Comfort and Wellbeing  Outcome: Ongoing, Progressing  Goal: Readiness for Transition of Care  Outcome: Ongoing, Progressing     Problem: Diabetes Comorbidity  Goal: Blood Glucose Level Within Targeted Range  Outcome: Ongoing, Progressing     Problem: Impaired Wound Healing  Goal: Optimal Wound Healing  Outcome: Ongoing, Progressing

## 2024-03-20 NOTE — SUBJECTIVE & OBJECTIVE
No current facility-administered medications on file prior to encounter.     Current Outpatient Medications on File Prior to Encounter   Medication Sig    albuterol (PROVENTIL/VENTOLIN HFA) 90 mcg/actuation inhaler SMARTSI-2 Puff(s) By Mouth Every 4 Hours PRN    amLODIPine (NORVASC) 10 MG tablet Take 10 mg by mouth once daily.    amoxicillin-clavulanate 875-125mg (AUGMENTIN) 875-125 mg per tablet Take 1 tablet by mouth 2 (two) times daily.    aspirin (ECOTRIN) 81 MG EC tablet Take 1 tablet (81 mg total) by mouth once daily.    atorvastatin (LIPITOR) 40 MG tablet Take 1 tablet (40 mg total) by mouth every evening.    budesonide-formoterol 160-4.5 mcg (SYMBICORT) 160-4.5 mcg/actuation HFAA Inhale 2 puffs into the lungs every 12 (twelve) hours. Controller    dicyclomine (BENTYL) 20 mg tablet Take 1 tablet (20 mg total) by mouth 2 (two) times daily.    docusate sodium (COLACE) 100 MG capsule Take 1 capsule (100 mg total) by mouth 2 (two) times daily.    empagliflozin (JARDIANCE) 10 mg tablet Take 10 mg by mouth once daily.    furosemide (LASIX) 40 MG tablet Take 1 tablet (40 mg total) by mouth 2 (two) times a day.    insulin aspart U-100 (NOVOLOG) 100 unit/mL injection Inject 0-5 Units into the skin 3 (three) times daily before meals.    insulin detemir U-100, Levemir, (LEVEMIR FLEXTOUCH U100 INSULIN) 100 unit/mL (3 mL) InPn pen Inject 10 Units into the skin every evening.    losartan (COZAAR) 25 MG tablet Take 0.5 tablets (12.5 mg total) by mouth once daily.    metoprolol succinate (TOPROL-XL) 25 MG 24 hr tablet Take 12.5 mg by mouth once daily. Take 1/2 tablet by mouth daily    oxyCODONE-acetaminophen (PERCOCET) 5-325 mg per tablet Take 1 tablet by mouth every 6 (six) hours as needed for Pain.    pantoprazole (PROTONIX) 40 MG tablet Take 1 tablet (40 mg total) by mouth once daily.    potassium chloride (KLOR-CON) 10 MEQ TbSR Take 10 mEq by mouth once daily.    promethazine (PHENERGAN) 25 MG tablet Take 1 tablet (25  mg total) by mouth every 6 (six) hours as needed for Nausea.    THERMOTABS 287-180-15 mg Tab Take 1 tablet by mouth once daily.    TRUE METRIX GLUCOSE TEST STRIP Strp USE TO CHECK BLOOD SUGAR AS DIRECTED    TRUEPLUS LANCETS 33 gauge Misc 1 lancet  by Misc.(Non-Drug; Combo Route) route once daily. use as directed    zolpidem (AMBIEN) 5 MG Tab TAKE ONE TABLET BY MOUTH EVERY EVENING       Review of patient's allergies indicates:  No Known Allergies    Past Medical History:   Diagnosis Date    Alcoholic fatty liver     CHF (congestive heart failure) 02/20/2024    EF 25%    COPD (chronic obstructive pulmonary disease)     Coronary artery disease involving coronary bypass graft of native heart without angina pectoris 01/01/2022    Dyslipidemia 12/16/2023    Essential (primary) hypertension 01/01/2022    Expressive aphasia 05/05/2023    GERD with esophagitis 2018    EGD    Hemiparesis affecting right side as late effect of cerebrovascular accident     ROSEMARIE (obstructive sleep apnea)     Pulmonary hypertension     Stroke     right hand contracted    Tricuspid regurgitation     Type 2 diabetes mellitus      Past Surgical History:   Procedure Laterality Date    CARDIAC SURGERY      CABG    CORONARY ANGIOPLASTY WITH STENT PLACEMENT      bare metal stent    HEMIARTHROPLASTY OF HIP Right 01/02/2022    Procedure: HEMIARTHROPLASTY, HIP;  Surgeon: Ramses Chua MD;  Location: [facility] OR;  Service: Orthopedics;  Laterality: Right;    LAPAROSCOPIC CHOLECYSTECTOMY  03/09/2024    Dr. Bennett    LAPAROSCOPIC CHOLECYSTECTOMY WITH CHOLANGIOGRAPHY N/A 3/9/2024    Procedure: CHOLECYSTECTOMY, LAPAROSCOPIC, WITH CHOLANGIOGRAM;  Surgeon: Karson Bennett DO;  Location: [facility] OR;  Service: General;  Laterality: N/A;    RIGHT HEART CATHETERIZATION Right 02/22/2024    Procedure: INSERTION, CATHETER, RIGHT HEART;  Surgeon: Ruiz Pacheco MD;  Location: [facility] CATH LAB;  Service: Cardiology;  Laterality: Right;     Family  History       Problem Relation (Age of Onset)    Diabetes Mother    Heart disease Mother    Hypertension Mother          Tobacco Use    Smoking status: Former     Current packs/day: 0.50     Types: Cigarettes    Smokeless tobacco: Current     Types: Chew   Substance and Sexual Activity    Alcohol use: Not Currently     Comment: 1 quart    Drug use: Never    Sexual activity: Not on file     Review of Systems   Constitutional: Negative.  Negative for appetite change, chills, fever and unexpected weight change.   HENT: Negative.  Negative for trouble swallowing.    Eyes: Negative.    Respiratory: Negative.  Negative for chest tightness and shortness of breath.    Cardiovascular: Negative.  Negative for chest pain.   Gastrointestinal:  Positive for abdominal pain. Negative for abdominal distention, blood in stool and nausea.   Endocrine: Negative.    Genitourinary: Negative.  Negative for hematuria.   Musculoskeletal: Negative.  Negative for back pain and myalgias.   Skin: Negative.  Negative for color change.   Allergic/Immunologic: Negative.    Neurological: Negative.  Negative for dizziness, syncope, weakness and light-headedness.   Psychiatric/Behavioral: Negative.  Negative for agitation.      Objective:     Vital Signs (Most Recent):  Temp: 97.9 °F (36.6 °C) (03/20/24 0757)  Pulse: 100 (03/20/24 0757)  Resp: 18 (03/20/24 0757)  BP: 115/73 (03/20/24 0757)  SpO2: (!) 90 % (03/20/24 0757) Vital Signs (24h Range):  Temp:  [97 °F (36.1 °C)-97.9 °F (36.6 °C)] 97.9 °F (36.6 °C)  Pulse:  [] 100  Resp:  [17-32] 18  SpO2:  [87 %-100 %] 90 %  BP: (101-120)/(60-78) 115/73     Weight: 102.5 kg (226 lb)  Body mass index is 29.02 kg/m².     Physical Exam  Constitutional:       General: He is not in acute distress.     Appearance: Normal appearance.   HENT:      Head: Normocephalic.   Cardiovascular:      Rate and Rhythm: Normal rate.   Pulmonary:      Effort: Pulmonary effort is normal. No respiratory distress.    Abdominal:      General: There is no distension.      Tenderness: There is generalized abdominal tenderness. There is no guarding or rebound.          Comments: Incisions clean dry and intact; Steri-Strips still on in the right upper quadrant, no drainage.  Incision above the umbilicus intact with no opening and no drainage.  No drainage coming from the umbilicus.  On gauze, there was some small amount of serous fluid, but no purulent drainage   Musculoskeletal:         General: Normal range of motion.   Skin:     General: Skin is warm.      Coloration: Skin is not jaundiced.   Neurological:      General: No focal deficit present.      Mental Status: He is alert and oriented to person, place, and time.      Cranial Nerves: No cranial nerve deficit.            I have reviewed all pertinent lab results within the past 24 hours.  CBC:   Recent Labs   Lab 03/20/24  0457   WBC 6.74   RBC 4.31*   HGB 11.5*   HCT 37.8*      MCV 87.7   MCH 26.7*   MCHC 30.4*     BMP:   Recent Labs   Lab 03/19/24  1440 03/20/24  0457   * 163*    134*   K 4.2 3.0*   CL 99 97*   CO2 34* 30   BUN 12 10   CREATININE 1.18 1.12   CALCIUM 8.8 8.4*   MG 2.7*  --      LFTs:   Recent Labs   Lab 03/19/24  1440   ALT 47   AST 33   ALKPHOS 158*   BILITOT 1.2   PROT 7.1   ALBUMIN 3.1*       Significant Diagnostics:  I have reviewed all pertinent imaging results/findings within the past 24 hours.

## 2024-03-20 NOTE — NURSING
Pt now has allowed me to take admission photos of his bilateral heels, bilateral hips, and sacral area. Mepilex applied for protection. Pt rolls well.

## 2024-03-20 NOTE — ASSESSMENT & PLAN NOTE
- On 3/9/24 by Dr. Bennett, will f/u OP on 3/26/24  - bandage on with some drainage, continue to monitor  - continue home colace

## 2024-03-20 NOTE — HPI
Patient is a 54yo male who presents to [facility] ED via EMS with SOB and abdominal pain for 2 days. He reports recently having a cholecystectomy on 3/9/24 here at [place] by Dr. Bennett. He endorses RUQ abdominal pain and SOB. SOB is similar to his previous COPD episodes. Endorses orthopnea and INFANTE. He endorses surgical site drainage mostly pus that started 2 days ago. Endorses decreased appetite around the same time. Endorses bilateral leg swelling and right leg redness for about 1 month and now swelling has gone up his thighs and abdomen in the past 2 days. Denies f/c/cp/n/v/d. Denies urinary, or bowel habit changes. Reports compliance with all home medications.    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 Patient sees Dr. Escobar Cardiology and Dr. Smith PCP. Patient uses 2L NC O2 at home. He communicates through writing on paper and texts due to his expressive aphasia 2/2 CVA.    Upon presentation, VS remarkable for 104/65, 114, 30. Labs remarkable for H/H 11.9/39.1, HCO3 34, glucose 138. Mg 2.7. BNP 7486, troponin 22.2, 19.5. PT 16.2, INR 1.32, PTT 30.9. COVID negative. ABG 7.46, 50, 20, 35.6. EKG sinus tach 110 with multifocal PVCs. CXR showed continued cardiomegaly. Patchy ground-glass and reticular infiltrate/edema throughout the bilateral lungs. Small bilateral pleural fluid. Constellation of findings suspicious for CHF. Underlying infection not excluded. CT abdomen and pelvis w/o contrast showed study limited secondary to lack of IV contrast. Interval cholecystectomy. There is a small air-fluid collection within the gallbladder fossa measuring up to 3.7 cm which may reflect postoperative fluid collection or abscess. Interval increased diffuse body wall edema. Cardiomegaly and coronary artery  calcifications. Scattered interlobular septal thickening of the lung bases suggestive of interstitial pulmonary edema with trace bilateral pleural effusions. There is some calcification of the left ventricle apex which may reflect sequela of remote infarct. Patient received Lasix 60 IV and is admitted to hospital medicine for further management and care.

## 2024-03-20 NOTE — ASSESSMENT & PLAN NOTE
Due to multiple etiologies; he is a smoker; he has ROSEMARIE and is non-compliant with CPAP at home; he has CHF and is non-compliant with meds at home.  On nebs; he is on 5L n/c; cannot use steroids for his COPD due to active infection; negative COVID; CT chest does not show PE; CXR shows patchy ground-glass and reticular infiltrate/edema throughout the bilateral lungs -- on IV Lasix

## 2024-03-20 NOTE — CONSULTS
[facility] - [facility] Medical Telemetry  General Surgery  Consult Note    Patient Name: Karin Carrera  MRN: [id number]  Code Status: Full Code  Admission Date: 3/19/2024  Hospital Length of Stay: 1 days  Attending Physician: Martita Dawn DO  Primary Care Provider: Walker Smith MD    Patient information was obtained from patient and ER records.     Inpatient consult to General Surgery  Consult performed by: Karson Bennett DO  Consult ordered by: Yadi Solomon DO        Subjective:     Principal Problem: Cellulitis of lower extremity    History of Present Illness: 53-year-old  male admitted to the hospital for shortness of breath and abdominal pain.  Recent cholecystectomy for acute cholecystitis on 03/09/2024; intraoperative cholangiogram negative.  History of alcoholic fatty liver with ascites, ejection fraction 25% with CHF, pulmonary hypertension; history of CVA in 2016 with right hemiparesis and expressive aphasia; hypertension, hyperlipidemia, diabetes, GERD, sleep apnea.  Patient admitted for CHF exacerbation; BNP 7486.  Patient also with cellulitis of the right lower extremity.  CT scan the abdomen pelvis done without contrast showed a small air-fluid collection in the gallbladder fossa measuring up to 3.7 cm, could reflect postoperative fluid collection versus abscess.  No leukocytosis.  Patient complain of some drainage coming from around umbilicus.  Currently, no drainage coming from any incisions.  Patient states he is having some drainage from inside as the umbilicus, but none draining currently.  Complain of abdominal soreness around the incisions and some right upper quadrant abdominal pain.  Liver enzymes unremarkable.  No current nausea or vomiting.  Patient wanting to eat.  Currently NPO.  Denies any chest pain.  Shortness of breath has resolving.  Denies any fevers or chills    No current facility-administered medications on file prior to encounter.      Current Outpatient Medications on File Prior to Encounter   Medication Sig    albuterol (PROVENTIL/VENTOLIN HFA) 90 mcg/actuation inhaler SMARTSI-2 Puff(s) By Mouth Every 4 Hours PRN    amLODIPine (NORVASC) 10 MG tablet Take 10 mg by mouth once daily.    amoxicillin-clavulanate 875-125mg (AUGMENTIN) 875-125 mg per tablet Take 1 tablet by mouth 2 (two) times daily.    aspirin (ECOTRIN) 81 MG EC tablet Take 1 tablet (81 mg total) by mouth once daily.    atorvastatin (LIPITOR) 40 MG tablet Take 1 tablet (40 mg total) by mouth every evening.    budesonide-formoterol 160-4.5 mcg (SYMBICORT) 160-4.5 mcg/actuation HFAA Inhale 2 puffs into the lungs every 12 (twelve) hours. Controller    dicyclomine (BENTYL) 20 mg tablet Take 1 tablet (20 mg total) by mouth 2 (two) times daily.    docusate sodium (COLACE) 100 MG capsule Take 1 capsule (100 mg total) by mouth 2 (two) times daily.    empagliflozin (JARDIANCE) 10 mg tablet Take 10 mg by mouth once daily.    furosemide (LASIX) 40 MG tablet Take 1 tablet (40 mg total) by mouth 2 (two) times a day.    insulin aspart U-100 (NOVOLOG) 100 unit/mL injection Inject 0-5 Units into the skin 3 (three) times daily before meals.    insulin detemir U-100, Levemir, (LEVEMIR FLEXTOUCH U100 INSULIN) 100 unit/mL (3 mL) InPn pen Inject 10 Units into the skin every evening.    losartan (COZAAR) 25 MG tablet Take 0.5 tablets (12.5 mg total) by mouth once daily.    metoprolol succinate (TOPROL-XL) 25 MG 24 hr tablet Take 12.5 mg by mouth once daily. Take 1/2 tablet by mouth daily    oxyCODONE-acetaminophen (PERCOCET) 5-325 mg per tablet Take 1 tablet by mouth every 6 (six) hours as needed for Pain.    pantoprazole (PROTONIX) 40 MG tablet Take 1 tablet (40 mg total) by mouth once daily.    potassium chloride (KLOR-CON) 10 MEQ TbSR Take 10 mEq by mouth once daily.    promethazine (PHENERGAN) 25 MG tablet Take 1 tablet (25 mg total) by mouth every 6 (six) hours as needed for Nausea.     THERMOTABS 287-180-15 mg Tab Take 1 tablet by mouth once daily.    TRUE METRIX GLUCOSE TEST STRIP Strp USE TO CHECK BLOOD SUGAR AS DIRECTED    TRUEPLUS LANCETS 33 gauge Misc 1 lancet  by Misc.(Non-Drug; Combo Route) route once daily. use as directed    zolpidem (AMBIEN) 5 MG Tab TAKE ONE TABLET BY MOUTH EVERY EVENING       Review of patient's allergies indicates:  No Known Allergies    Past Medical History:   Diagnosis Date    Alcoholic fatty liver     CHF (congestive heart failure) 02/20/2024    EF 25%    COPD (chronic obstructive pulmonary disease)     Coronary artery disease involving coronary bypass graft of native heart without angina pectoris 01/01/2022    Dyslipidemia 12/16/2023    Essential (primary) hypertension 01/01/2022    Expressive aphasia 05/05/2023    GERD with esophagitis 2018    EGD    Hemiparesis affecting right side as late effect of cerebrovascular accident     ROSEMARIE (obstructive sleep apnea)     Pulmonary hypertension     Stroke     right hand contracted    Tricuspid regurgitation     Type 2 diabetes mellitus      Past Surgical History:   Procedure Laterality Date    CARDIAC SURGERY      CABG    CORONARY ANGIOPLASTY WITH STENT PLACEMENT      bare metal stent    HEMIARTHROPLASTY OF HIP Right 01/02/2022    Procedure: HEMIARTHROPLASTY, HIP;  Surgeon: Ramses Chua MD;  Location: [facility] OR;  Service: Orthopedics;  Laterality: Right;    LAPAROSCOPIC CHOLECYSTECTOMY  03/09/2024    Dr. Bennett    LAPAROSCOPIC CHOLECYSTECTOMY WITH CHOLANGIOGRAPHY N/A 3/9/2024    Procedure: CHOLECYSTECTOMY, LAPAROSCOPIC, WITH CHOLANGIOGRAM;  Surgeon: Karson Bennett DO;  Location: [facility] OR;  Service: General;  Laterality: N/A;    RIGHT HEART CATHETERIZATION Right 02/22/2024    Procedure: INSERTION, CATHETER, RIGHT HEART;  Surgeon: Ruiz Pacheco MD;  Location: [facility] CATH LAB;  Service: Cardiology;  Laterality: Right;     Family History       Problem Relation (Age of Onset)    Diabetes Mother     Heart disease Mother    Hypertension Mother          Tobacco Use    Smoking status: Former     Current packs/day: 0.50     Types: Cigarettes    Smokeless tobacco: Current     Types: Chew   Substance and Sexual Activity    Alcohol use: Not Currently     Comment: 1 quart    Drug use: Never    Sexual activity: Not on file     Review of Systems   Constitutional: Negative.  Negative for appetite change, chills, fever and unexpected weight change.   HENT: Negative.  Negative for trouble swallowing.    Eyes: Negative.    Respiratory: Negative.  Negative for chest tightness and shortness of breath.    Cardiovascular: Negative.  Negative for chest pain.   Gastrointestinal:  Positive for abdominal pain. Negative for abdominal distention, blood in stool and nausea.   Endocrine: Negative.    Genitourinary: Negative.  Negative for hematuria.   Musculoskeletal: Negative.  Negative for back pain and myalgias.   Skin: Negative.  Negative for color change.   Allergic/Immunologic: Negative.    Neurological: Negative.  Negative for dizziness, syncope, weakness and light-headedness.   Psychiatric/Behavioral: Negative.  Negative for agitation.      Objective:     Vital Signs (Most Recent):  Temp: 97.9 °F (36.6 °C) (03/20/24 0757)  Pulse: 100 (03/20/24 0757)  Resp: 18 (03/20/24 0757)  BP: 115/73 (03/20/24 0757)  SpO2: (!) 90 % (03/20/24 0757) Vital Signs (24h Range):  Temp:  [97 °F (36.1 °C)-97.9 °F (36.6 °C)] 97.9 °F (36.6 °C)  Pulse:  [] 100  Resp:  [17-32] 18  SpO2:  [87 %-100 %] 90 %  BP: (101-120)/(60-78) 115/73     Weight: 102.5 kg (226 lb)  Body mass index is 29.02 kg/m².     Physical Exam  Constitutional:       General: He is not in acute distress.     Appearance: Normal appearance.   HENT:      Head: Normocephalic.   Cardiovascular:      Rate and Rhythm: Normal rate.   Pulmonary:      Effort: Pulmonary effort is normal. No respiratory distress.   Abdominal:      General: There is no distension.      Tenderness: There is  generalized abdominal tenderness. There is no guarding or rebound.          Comments: Incisions clean dry and intact; Steri-Strips still on in the right upper quadrant, no drainage.  Incision above the umbilicus intact with no opening and no drainage.  No drainage coming from the umbilicus.  On gauze, there was some small amount of serous fluid, but no purulent drainage   Musculoskeletal:         General: Normal range of motion.   Skin:     General: Skin is warm.      Coloration: Skin is not jaundiced.   Neurological:      General: No focal deficit present.      Mental Status: He is alert and oriented to person, place, and time.      Cranial Nerves: No cranial nerve deficit.            I have reviewed all pertinent lab results within the past 24 hours.  CBC:   Recent Labs   Lab 03/20/24  0457   WBC 6.74   RBC 4.31*   HGB 11.5*   HCT 37.8*      MCV 87.7   MCH 26.7*   MCHC 30.4*     BMP:   Recent Labs   Lab 03/19/24  1440 03/20/24  0457   * 163*    134*   K 4.2 3.0*   CL 99 97*   CO2 34* 30   BUN 12 10   CREATININE 1.18 1.12   CALCIUM 8.8 8.4*   MG 2.7*  --      LFTs:   Recent Labs   Lab 03/19/24  1440   ALT 47   AST 33   ALKPHOS 158*   BILITOT 1.2   PROT 7.1   ALBUMIN 3.1*       Significant Diagnostics:  I have reviewed all pertinent imaging results/findings within the past 24 hours.    Assessment/Plan:     S/P cholecystectomy  No leukocytosis.      There is an air fluid collection under the gallbladder fossa; Surgicel was used during surgery, which can account for gas in the material; patient did have ascites and this fluid collection could be ascites.      I would recommend getting an IR aspiration of the site and if it has no purulent drainage, will send the fluid off for culture; if purulent drainage is aspirated, place IR drain at the site.    No purulent drainage or infection at any of the surgical incisions      VTE Risk Mitigation (From admission, onward)           Ordered     enoxaparin  injection 40 mg  Every 24 hours         03/19/24 6743                    Thank you for your consult. I will follow-up with patient. Please contact us if you have any additional questions.    Karson Martinez, DO  General Surgery  [facility] - [address] [place] [address]

## 2024-03-20 NOTE — SUBJECTIVE & OBJECTIVE
Past Medical History:   Diagnosis Date    Alcoholic fatty liver     CHF (congestive heart failure) 2024    EF 25%    COPD (chronic obstructive pulmonary disease)     Coronary artery disease involving coronary bypass graft of native heart without angina pectoris 2022    Dyslipidemia 2023    Essential (primary) hypertension 2022    Expressive aphasia 2023    GERD with esophagitis 2018    EGD    Hemiparesis affecting right side as late effect of cerebrovascular accident     Pulmonary hypertension     Stroke     right hand contracted    Tricuspid regurgitation     Type 2 diabetes mellitus        Past Surgical History:   Procedure Laterality Date    CARDIAC SURGERY      CABG    CORONARY ANGIOPLASTY WITH STENT PLACEMENT      bare metal stent    HEMIARTHROPLASTY OF HIP Right 2022    Procedure: HEMIARTHROPLASTY, HIP;  Surgeon: Ramses Chua MD;  Location: [facility] OR;  Service: Orthopedics;  Laterality: Right;    LAPAROSCOPIC CHOLECYSTECTOMY  2024    Dr. Bennett    LAPAROSCOPIC CHOLECYSTECTOMY WITH CHOLANGIOGRAPHY N/A 3/9/2024    Procedure: CHOLECYSTECTOMY, LAPAROSCOPIC, WITH CHOLANGIOGRAM;  Surgeon: Karson Bennett DO;  Location: [facility] OR;  Service: General;  Laterality: N/A;    RIGHT HEART CATHETERIZATION Right 2024    Procedure: INSERTION, CATHETER, RIGHT HEART;  Surgeon: Ruiz Pacheco MD;  Location: [facility] CATH LAB;  Service: Cardiology;  Laterality: Right;       Review of patient's allergies indicates:  No Known Allergies    No current facility-administered medications on file prior to encounter.     Current Outpatient Medications on File Prior to Encounter   Medication Sig    albuterol (PROVENTIL/VENTOLIN HFA) 90 mcg/actuation inhaler SMARTSI-2 Puff(s) By Mouth Every 4 Hours PRN    amLODIPine (NORVASC) 10 MG tablet Take 10 mg by mouth once daily.    amoxicillin-clavulanate 875-125mg (AUGMENTIN) 875-125 mg per tablet Take 1 tablet by mouth 2  (two) times daily.    aspirin (ECOTRIN) 81 MG EC tablet Take 1 tablet (81 mg total) by mouth once daily.    atorvastatin (LIPITOR) 40 MG tablet Take 1 tablet (40 mg total) by mouth every evening.    budesonide-formoterol 160-4.5 mcg (SYMBICORT) 160-4.5 mcg/actuation HFAA Inhale 2 puffs into the lungs every 12 (twelve) hours. Controller    dicyclomine (BENTYL) 20 mg tablet Take 1 tablet (20 mg total) by mouth 2 (two) times daily.    docusate sodium (COLACE) 100 MG capsule Take 1 capsule (100 mg total) by mouth 2 (two) times daily.    empagliflozin (JARDIANCE) 10 mg tablet Take 10 mg by mouth once daily.    furosemide (LASIX) 40 MG tablet Take 1 tablet (40 mg total) by mouth 2 (two) times a day.    insulin aspart U-100 (NOVOLOG) 100 unit/mL injection Inject 0-5 Units into the skin 3 (three) times daily before meals.    insulin detemir U-100, Levemir, (LEVEMIR FLEXTOUCH U100 INSULIN) 100 unit/mL (3 mL) InPn pen Inject 10 Units into the skin every evening.    losartan (COZAAR) 25 MG tablet Take 0.5 tablets (12.5 mg total) by mouth once daily.    metoprolol succinate (TOPROL-XL) 25 MG 24 hr tablet Take 12.5 mg by mouth once daily. Take 1/2 tablet by mouth daily    oxyCODONE-acetaminophen (PERCOCET) 5-325 mg per tablet Take 1 tablet by mouth every 6 (six) hours as needed for Pain.    pantoprazole (PROTONIX) 40 MG tablet Take 1 tablet (40 mg total) by mouth once daily.    potassium chloride (KLOR-CON) 10 MEQ TbSR Take 10 mEq by mouth once daily.    promethazine (PHENERGAN) 25 MG tablet Take 1 tablet (25 mg total) by mouth every 6 (six) hours as needed for Nausea.    THERMOTABS 287-180-15 mg Tab Take 1 tablet by mouth once daily.    TRUE METRIX GLUCOSE TEST STRIP Strp USE TO CHECK BLOOD SUGAR AS DIRECTED    TRUEPLUS LANCETS 33 gauge Misc 1 lancet  by Misc.(Non-Drug; Combo Route) route once daily. use as directed    zolpidem (AMBIEN) 5 MG Tab TAKE ONE TABLET BY MOUTH EVERY EVENING     Family History       Problem Relation  (Age of Onset)    Hypertension Mother          Tobacco Use    Smoking status: Every Day     Current packs/day: 0.50     Types: Cigarettes    Smokeless tobacco: Current     Types: Chew   Substance and Sexual Activity    Alcohol use: Yes     Comment: 1 quart    Drug use: Never    Sexual activity: Not on file     Review of Systems   Constitutional:  Negative for appetite change, chills and fever.   HENT:  Negative for trouble swallowing.    Eyes:  Negative for visual disturbance.   Respiratory:  Positive for cough and shortness of breath. Negative for wheezing.    Cardiovascular:  Positive for leg swelling. Negative for chest pain.   Gastrointestinal:  Positive for abdominal pain. Negative for constipation, diarrhea, nausea and vomiting.   Genitourinary:  Negative for difficulty urinating and hematuria.   Musculoskeletal:  Negative for back pain.   Skin:  Positive for color change and rash (right leg). Negative for wound.   Neurological:  Positive for dizziness. Negative for syncope.   Psychiatric/Behavioral:  Positive for sleep disturbance.      Objective:     Vital Signs (Most Recent):  Temp: 97.8 °F (36.6 °C) (03/19/24 2205)  Pulse: (!) 126 (03/19/24 2205)  Resp: (!) 22 (03/19/24 2205)  BP: 119/78 (03/19/24 2205)  SpO2: (!) 94 % (03/19/24 2205) Vital Signs (24h Range):  Temp:  [97.4 °F (36.3 °C)-97.8 °F (36.6 °C)] 97.8 °F (36.6 °C)  Pulse:  [101-126] 126  Resp:  [17-32] 22  SpO2:  [94 %-100 %] 94 %  BP: (101-120)/(60-78) 119/78     Weight: 102.5 kg (226 lb)  Body mass index is 29.02 kg/m².     Physical Exam  Vitals and nursing note reviewed.   Constitutional:       General: He is not in acute distress.     Appearance: He is not toxic-appearing or diaphoretic.   HENT:      Head: Normocephalic and atraumatic.      Right Ear: External ear normal.      Left Ear: External ear normal.      Nose: Nose normal.      Mouth/Throat:      Mouth: Mucous membranes are dry.      Pharynx: Oropharynx is clear. No posterior  oropharyngeal erythema.   Eyes:      General: No scleral icterus.        Right eye: No discharge.         Left eye: No discharge.      Extraocular Movements: Extraocular movements intact.      Pupils: Pupils are equal, round, and reactive to light.   Cardiovascular:      Rate and Rhythm: Regular rhythm. Tachycardia present.      Pulses: Normal pulses.      Heart sounds: Normal heart sounds. No murmur heard.  Pulmonary:      Effort: Pulmonary effort is normal. No respiratory distress.      Breath sounds: Wheezing present.   Abdominal:      General: Bowel sounds are normal. There is no distension.      Palpations: Abdomen is soft.      Tenderness: There is abdominal tenderness. There is guarding. There is no rebound.   Musculoskeletal:         General: Swelling and tenderness present. No deformity.      Cervical back: Neck supple.      Right lower leg: Edema present.      Left lower leg: Edema present.      Comments: see media photos. Swelling up to thighs   Skin:     General: Skin is warm and dry.      Coloration: Skin is not jaundiced.      Findings: Erythema (right leg - see media photos) present. No lesion or rash.   Neurological:      Mental Status: He is alert.      Sensory: No sensory deficit.   Psychiatric:         Behavior: Behavior normal.              CRANIAL NERVES     CN III, IV, VI   Pupils are equal, round, and reactive to light.       Significant Labs: All pertinent labs within the past 24 hours have been reviewed.    Significant Imaging: I have reviewed all pertinent imaging results/findings within the past 24 hours.

## 2024-03-20 NOTE — PROCEDURES
CT guided abscess aspiration performed by Dr. Roque and assisted by Joseph RUEDA.  Patient was prepped with Betadine and draped in sterile fashion.  Formal time-out was called with all present in agreement.  Seven cc of 1% lidocaine infused.  An 18 gauge needle was advanced into the fluid collection with CT guidance.  6 cc  dark yellow fluid aspirated.  Specimen sent to the lab for analysis.  Specimen transport to the lab by Binta SEGURA.  Patient tolerated procedure well with no immediate complication.

## 2024-03-20 NOTE — ASSESSMENT & PLAN NOTE
Continue home meds; he is always SOB and has ROSEMARIE non-compliant with CPAP, smokes, and does not take home meds; CXR shows patchy ground-glass and reticular infiltrate/edema throughout the bilateral lungs; on IV Lasix

## 2024-03-20 NOTE — ASSESSMENT & PLAN NOTE
Patient's COPD is with exacerbation noted by continued dyspnea and worsening of baseline hypoxia currently.  Patient is currently off COPD Pathway. Continue scheduled inhalers Antibiotics and Supplemental oxygen and monitor respiratory status closely.     - home budesonide and xopenex q12h  - blood cx x2 pending  - IV vanc  - prednisone 40 qd  - home NC O2 2L

## 2024-03-20 NOTE — NURSING
20g PIV started in RFA x1 stick with ultrasound. Pt transported per Wolff and Silver to radiology via monitored bed.

## 2024-03-20 NOTE — CONSULTS
Pharmacy consulted to dose Vancomycin.    Based on pt wt of 102.5 kg and Scr 1.18, the following therapy will be initiated:    Vancomycin 2000 mg IV q 24 hrs.    Trough prior 4th dose. Goal trough of 10-15.     Pharmacy to follow daily and make necessary adjustments.    Thank you.

## 2024-03-20 NOTE — ASSESSMENT & PLAN NOTE
- Holding home norvasc d/t leg swelling  - home NC O2 2L  - Echo 2/20/24 RVSP 64 mmHg and mod to severe TR

## 2024-03-20 NOTE — PROGRESS NOTES
Interventional radiology consulted for an abscess drain.  Informed consent has been obtained.  History and physical has been reviewed.

## 2024-03-20 NOTE — PLAN OF CARE
[facility] - [facility] Telemetry  Initial Discharge Assessment       Primary Care Provider: Walker Smith MD    Admission Diagnosis: Cellulitis of leg [L03.119]  Dyspnea [R06.00]    Admission Date: 3/19/2024  Expected Discharge Date:     Transition of Care Barriers: None    Pay[place] / Plan[place] / Product Type: Government /     Extended Emergency Contact Information  Primary Emergency Contact: Zeyad Whipple  Mobile Phone: [phone]  Relation: Relative  Preferred language: English   needed? No  Secondary Emergency Contact: zahraa childers  Mobile Phone: [phone]  Relation: Brother    Discharge Plan A: Skilled Nursing Facility  Discharge Plan B: Home Health      [facility] [address]  [address]  [facility] [address]  Phone: [phone] Fax: [phone]    [facility] [address]  [address] [facility] [address]  Phone: [phone] Fax: [phone]    [address] [place]  [address] [facility] [address]  Phone: [phone] Fax: [phone]      Initial Assessment (most recent)       Adult Discharge Assessment - 03/20/24 1345          Discharge Assessment    Assessment Type Discharge Planning Assessment     Confirmed/corrected address, phone number and insurance Yes     Source of Information patient     Communicated DEANNA with patient/caregiver Date not available/Unable to determine     People in Home significant other     Do you expect to return to your current living situation? Yes     Do you have help at home or someone to help you manage your care at home? Yes     Who are your caregiver(s) and their phone number(s)? [facility]     Prior to hospitilization cognitive status: Unable to Assess     Current cognitive status: Alert/Oriented     Dressing/Bathing Difficulty no     Equipment Currently Used at  Home power chair;walker, rolling;oxygen     Patient currently being followed by outpatient case management? No     Do you currently have service(s) that help you manage your care at home? Yes     Name and Contact number of agency [facility]     Is the pt/caregiver preference to resume services with current agency Yes     Do you take prescription medications? Yes     Do you have prescription coverage? Yes     Coverage medicaid     Do you have any problems affording any of your prescribed medications? No     Is the patient taking medications as prescribed? yes     Who is going to help you get home at discharge? s/o     How do you get to doctors appointments? car, drives self;family or friend will provide     Are you on dialysis? No     Discharge Plan A Skilled Nursing Facility     Discharge Plan B Home Health     DME Needed Upon Discharge  none     Discharge Plan discussed with: Patient     Transition of Care Barriers None                   Consult for snf. Ss spoke with pt and pt was living at home with s/o and current with [facility]. Pt agreeable to maranda [facility]. Referral faxed. Pt not willing to go to snf at thsi time. Ss following.

## 2024-03-20 NOTE — NURSING
0000: Pt c/o discomfort in prashanth area. Myself and Dr Solomon assessing at bedside. Slight redness noted in the intergluteal cleft. Pink in colore, hemorroids, and stool noted. Pt cleansed. A&D, zinc ointment applied. Pt says discomfort is relieved.

## 2024-03-20 NOTE — ASSESSMENT & PLAN NOTE
RUE with contraction noted and 0/5, RLE 4/5  - home ASA and statin  - restart ARB as BP improves

## 2024-03-20 NOTE — ASSESSMENT & PLAN NOTE
This patient does have evidence of infective focus  My overall impression is sepsis.  Source: Skin and Soft Tissue (location right leg)  Antibiotics given-   Antibiotics (72h ago, onward)      Start     Stop Route Frequency Ordered    03/19/24 2327  vancomycin - pharmacy to dose         -- IV pharmacy to manage frequency 03/19/24 2227 03/19/24 2300  vancomycin (VANCOCIN) 2,000 mg in dextrose 5 % (D5W) 500 mL IVPB         -- IV Every 24 hours (non-standard times) 03/19/24 2227          Latest lactate reviewed-  Recent Labs   Lab 03/19/24 2133 03/19/24 2140 03/19/24  2338   LACTATE  --    < > 2.3*   POCLAC 2.2*  --   --     < > = values in this interval not displayed.     Organ dysfunction indicated by  none    Fluid challenge Not needed - patient is not hypotensive      Post- resuscitation assessment No - Post resuscitation assessment not needed       Will Not start Pressors- Levophed for MAP of 65  Source control achieved by: IV vancomycin    - qSOFA = 1pt-not high risk  - Home O2 NC 2L  - blood cx x2 pending, monitor lactic acid  - monitor CBC, BMP

## 2024-03-20 NOTE — H&P
[facility] - [address] [place] [address] Medicine  History & Physical    Patient Name: Karin Carrera  MRN: [id number]  Patient Class: IP- Inpatient  Admission Date: 3/19/2024  Attending Physician: Martita Dawn DO   Primary Care Provider: Walker Smith MD         Patient information was obtained from patient, past medical records, and ER records.     Subjective:     Principal Problem:Cellulitis of lower extremity    Chief Complaint:   Chief Complaint   Patient presents with    Shortness of Breath    Abdominal Pain        HPI: Patient is a 52yo male who presents to [facility] ED via EMS with SOB and abdominal pain for 2 days. He reports recently having a cholecystectomy on 3/9/24 here at [facility] by Dr. Bennett. He endorses RUQ abdominal pain and SOB. SOB is similar to his previous COPD episodes. Endorses orthopnea and INFANTE. He endorses surgical site drainage mostly pus that started 2 days ago. Endorses decreased appetite around the same time. Endorses bilateral leg swelling and right leg redness for about 1 month and now swelling has gone up his thighs and abdomen in the past 2 days. Denies f/c/cp/n/v/d. Denies urinary, or bowel habit changes. Reports compliance with all home medications.    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 Patient sees Dr. Escobar Cardiology and Dr. Smith PCP. Patient uses 2L NC O2 at home. He communicates through writing on paper and texts due to his expressive aphasia 2/2 CVA.    Upon presentation, VS remarkable for 104/65, 114, 30. Labs remarkable for H/H 11.9/39.1, HCO3 34, glucose 138. Mg 2.7. BNP 7486, troponin 22.2, 19.5. PT 16.2, INR 1.32, PTT 30.9. COVID negative. ABG 7.46, 50, 20, 35.6. EKG sinus tach 110 with multifocal PVCs. CXR  showed continued cardiomegaly. Patchy ground-glass and reticular infiltrate/edema throughout the bilateral lungs. Small bilateral pleural fluid. Constellation of findings suspicious for CHF. Underlying infection not excluded. CT abdomen and pelvis w/o contrast showed study limited secondary to lack of IV contrast. Interval cholecystectomy. There is a small air-fluid collection within the gallbladder fossa measuring up to 3.7 cm which may reflect postoperative fluid collection or abscess. Interval increased diffuse body wall edema. Cardiomegaly and coronary artery calcifications. Scattered interlobular septal thickening of the lung bases suggestive of interstitial pulmonary edema with trace bilateral pleural effusions. There is some calcification of the left ventricle apex which may reflect sequela of remote infarct. Patient received Lasix 60 IV and is admitted to hospital medicine for further management and care.    Past Medical History:   Diagnosis Date    Alcoholic fatty liver     CHF (congestive heart failure) 02/20/2024    EF 25%    COPD (chronic obstructive pulmonary disease)     Coronary artery disease involving coronary bypass graft of native heart without angina pectoris 01/01/2022    Dyslipidemia 12/16/2023    Essential (primary) hypertension 01/01/2022    Expressive aphasia 05/05/2023    GERD with esophagitis 2018    EGD    Hemiparesis affecting right side as late effect of cerebrovascular accident     Pulmonary hypertension     Stroke     right hand contracted    Tricuspid regurgitation     Type 2 diabetes mellitus       ROSEMARIE       Past Surgical History:   Procedure Laterality Date    CARDIAC SURGERY      CABG    CORONARY ANGIOPLASTY WITH STENT PLACEMENT      bare metal stent    HEMIARTHROPLASTY OF HIP Right 01/02/2022    Procedure: HEMIARTHROPLASTY, HIP;  Surgeon: Ramses Chua MD;  Location: [facility];  Service: Orthopedics;  Laterality: Right;    LAPAROSCOPIC CHOLECYSTECTOMY  03/09/2024     Dr. Bennett    LAPAROSCOPIC CHOLECYSTECTOMY WITH CHOLANGIOGRAPHY N/A 3/9/2024    Procedure: CHOLECYSTECTOMY, LAPAROSCOPIC, WITH CHOLANGIOGRAM;  Surgeon: Karson Bennett DO;  Location: [facility] OR;  Service: General;  Laterality: N/A;    RIGHT HEART CATHETERIZATION Right 2024    Procedure: INSERTION, CATHETER, RIGHT HEART;  Surgeon: Ruiz Pacheco MD;  Location: [facility] CATH LAB;  Service: Cardiology;  Laterality: Right;       Review of patient's allergies indicates:  No Known Allergies    No current facility-administered medications on file prior to encounter.     Current Outpatient Medications on File Prior to Encounter   Medication Sig    albuterol (PROVENTIL/VENTOLIN HFA) 90 mcg/actuation inhaler SMARTSI-2 Puff(s) By Mouth Every 4 Hours PRN    amLODIPine (NORVASC) 10 MG tablet Take 10 mg by mouth once daily.    amoxicillin-clavulanate 875-125mg (AUGMENTIN) 875-125 mg per tablet Take 1 tablet by mouth 2 (two) times daily.    aspirin (ECOTRIN) 81 MG EC tablet Take 1 tablet (81 mg total) by mouth once daily.    atorvastatin (LIPITOR) 40 MG tablet Take 1 tablet (40 mg total) by mouth every evening.    budesonide-formoterol 160-4.5 mcg (SYMBICORT) 160-4.5 mcg/actuation HFAA Inhale 2 puffs into the lungs every 12 (twelve) hours. Controller    dicyclomine (BENTYL) 20 mg tablet Take 1 tablet (20 mg total) by mouth 2 (two) times daily.    docusate sodium (COLACE) 100 MG capsule Take 1 capsule (100 mg total) by mouth 2 (two) times daily.    empagliflozin (JARDIANCE) 10 mg tablet Take 10 mg by mouth once daily.    furosemide (LASIX) 40 MG tablet Take 1 tablet (40 mg total) by mouth 2 (two) times a day.    insulin aspart U-100 (NOVOLOG) 100 unit/mL injection Inject 0-5 Units into the skin 3 (three) times daily before meals.    insulin detemir U-100, Levemir, (LEVEMIR FLEXTOUCH U100 INSULIN) 100 unit/mL (3 mL) InPn pen Inject 10 Units into the skin every evening.    losartan (COZAAR) 25 MG tablet Take 0.5  tablets (12.5 mg total) by mouth once daily.    metoprolol succinate (TOPROL-XL) 25 MG 24 hr tablet Take 12.5 mg by mouth once daily. Take 1/2 tablet by mouth daily    oxyCODONE-acetaminophen (PERCOCET) 5-325 mg per tablet Take 1 tablet by mouth every 6 (six) hours as needed for Pain.    pantoprazole (PROTONIX) 40 MG tablet Take 1 tablet (40 mg total) by mouth once daily.    potassium chloride (KLOR-CON) 10 MEQ TbSR Take 10 mEq by mouth once daily.    promethazine (PHENERGAN) 25 MG tablet Take 1 tablet (25 mg total) by mouth every 6 (six) hours as needed for Nausea.    THERMOTABS 287-180-15 mg Tab Take 1 tablet by mouth once daily.    TRUE METRIX GLUCOSE TEST STRIP Strp USE TO CHECK BLOOD SUGAR AS DIRECTED    TRUEPLUS LANCETS 33 gauge Misc 1 lancet  by Misc.(Non-Drug; Combo Route) route once daily. use as directed    zolpidem (AMBIEN) 5 MG Tab TAKE ONE TABLET BY MOUTH EVERY EVENING     Family History       Problem Relation (Age of Onset)    Hypertension Mother          Tobacco Use    Smoking status: Every Day     Current packs/day: 0.50     Types: Cigarettes    Smokeless tobacco: Current     Types: Chew   Substance and Sexual Activity    Alcohol use: Yes     Comment: 1 quart    Drug use: Never    Sexual activity: Not on file     Review of Systems   Constitutional:  Negative for appetite change, chills and fever.   HENT:  Negative for trouble swallowing.    Eyes:  Negative for visual disturbance.   Respiratory:  Positive for cough and shortness of breath. Negative for wheezing.    Cardiovascular:  Positive for leg swelling. Negative for chest pain.   Gastrointestinal:  Positive for abdominal pain. Negative for constipation, diarrhea, nausea and vomiting.   Genitourinary:  Negative for difficulty urinating and hematuria.   Musculoskeletal:  Negative for back pain.   Skin:  Positive for color change and rash (right leg). Negative for wound.   Neurological:  Positive for dizziness. Negative for syncope.    Psychiatric/Behavioral:  Positive for sleep disturbance.      Objective:     Vital Signs (Most Recent):  Temp: 97.8 °F (36.6 °C) (03/19/24 2205)  Pulse: (!) 126 (03/19/24 2205)  Resp: (!) 22 (03/19/24 2205)  BP: 119/78 (03/19/24 2205)  SpO2: (!) 94 % (03/19/24 2205) Vital Signs (24h Range):  Temp:  [97.4 °F (36.3 °C)-97.8 °F (36.6 °C)] 97.8 °F (36.6 °C)  Pulse:  [101-126] 126  Resp:  [17-32] 22  SpO2:  [94 %-100 %] 94 %  BP: (101-120)/(60-78) 119/78     Weight: 102.5 kg (226 lb)  Body mass index is 29.02 kg/m².     Physical Exam  Vitals and nursing note reviewed.   Constitutional:       General: He is not in acute distress.     Appearance: He is not toxic-appearing or diaphoretic.   HENT:      Head: Normocephalic and atraumatic.      Right Ear: External ear normal.      Left Ear: External ear normal.      Nose: Nose normal.      Mouth/Throat:      Mouth: Mucous membranes are dry.      Pharynx: Oropharynx is clear. No posterior oropharyngeal erythema.   Eyes:      General: No scleral icterus.        Right eye: No discharge.         Left eye: No discharge.      Extraocular Movements: Extraocular movements intact.      Pupils: Pupils are equal, round, and reactive to light.   Cardiovascular:      Rate and Rhythm: Regular rhythm. Tachycardia present.      Pulses: Normal pulses.      Heart sounds: Normal heart sounds. No murmur heard.  Pulmonary:      Effort: Pulmonary effort is normal. No respiratory distress.      Breath sounds: Wheezing present.   Abdominal:      General: Bowel sounds are normal. There is no distension.      Palpations: Abdomen is soft.      Tenderness: There is abdominal tenderness. There is guarding. There is no rebound.   Musculoskeletal:         General: Swelling and tenderness present. No deformity.      Cervical back: Neck supple.      Right lower leg: Edema present.      Left lower leg: Edema present.      Comments: see media photos. Swelling up to thighs   Skin:     General: Skin is warm and  dry.      Coloration: Skin is not jaundiced.      Findings: Erythema (right leg - see media photos) present. No lesion or rash.   Neurological:      Mental Status: He is alert.      Sensory: No sensory deficit.   Psychiatric:         Behavior: Behavior normal.              CRANIAL NERVES     CN III, IV, VI   Pupils are equal, round, and reactive to light.       Significant Labs: All pertinent labs within the past 24 hours have been reviewed.    Significant Imaging: I have reviewed all pertinent imaging results/findings within the past 24 hours.  Assessment/Plan:     * Cellulitis of lower extremity  - IV vancomycin  - lactic acid 2.7, 2.3  - CRP 1.96, ESR 20  - blood cx x2 pending  - monitor CBC, lactic acid  - wound care consulted  - Well's DVT = 2 pts-moderate risk with recent cholecystectomy.  - US doppler bilateral LE pending.  - Well's PE = 3 pts-moderate risk  - CT-PE pending        Sepsis  This patient does have evidence of infective focus  My overall impression is sepsis.  Source: Skin and Soft Tissue (location right leg)  Antibiotics given-   Antibiotics (72h ago, onward)      Start     Stop Route Frequency Ordered    03/19/24 2327  vancomycin - pharmacy to dose         -- IV pharmacy to manage frequency 03/19/24 2227    03/19/24 2300  vancomycin (VANCOCIN) 2,000 mg in dextrose 5 % (D5W) 500 mL IVPB         -- IV Every 24 hours (non-standard times) 03/19/24 2227          Latest lactate reviewed-  Recent Labs   Lab 03/19/24  2133 03/19/24  2140 03/19/24  2338   LACTATE  --    < > 2.3*   POCLAC 2.2*  --   --     < > = values in this interval not displayed.     Organ dysfunction indicated by  none    Fluid challenge Not needed - patient is not hypotensive      Post- resuscitation assessment No - Post resuscitation assessment not needed       Will Not start Pressors- Levophed for MAP of 65  Source control achieved by: IV vancomycin    - qSOFA = 1pt-not high risk  - Home O2 NC 2L  - blood cx x2 pending, monitor  "lactic acid  - monitor CBC, BMP    Chronic systolic heart failure  Patient is identified as having Systolic (HFrEF) heart failure that is Chronic. CHF is currently controlled. Latest ECHO performed and demonstrates- Results for orders placed during the hospital encounter of 02/19/24    Echo    Interpretation Summary    Left Ventricle: The left ventricle is severely dilated. Normal wall thickness. There is eccentric hypertrophy. There is severely reduced systolic function with a visually estimated ejection fraction of 20 - 25%. Grade III diastolic dysfunction.    Right Ventricle: Moderate right ventricular enlargement. Systolic function is moderately reduced.    Left Atrium: Left atrium is moderately dilated.    Right Atrium: Right atrium is mildly dilated.    Aortic Valve: The aortic valve is a trileaflet valve. Mildly calcified cusps.    Mitral Valve: There is mild bileaflet sclerosis. Mildly thickened leaflets. There is no stenosis. The mean pressure gradient across the mitral valve is 3 mmHg at a heart rate of  bpm. There is severe regurgitation with a posterolateral eccentriccally directed jet.    Tricuspid Valve: There is moderate to severe regurgitation with a centrally directed jet.    Pulmonary Artery: The estimated pulmonary artery systolic pressure is 65 mmHg.    IVC/SVC: Elevated venous pressure at 15 mmHg.    Pericardium: There is a trivial effusion.  . Continue Furosemide and monitor clinical status closely. Monitor on telemetry. Patient is off CHF pathway.  Monitor strict Is&Os and daily weights.  Place on fluid restriction of 1.5 L. Cardiology has not been consulted. Continue to stress to patient importance of self efficacy and  on diet for CHF. Last BNP reviewed- and noted below No results for input(s): "BNP", "BNPTRIAGEBLO" in the last 168 hours.     - BNP 7486 but baseline 4730-5820  - last echo above on 2/19/24 with EF 20-25% and severely reduced systolic function  - home O2 NC 2L  - Lasix " 40 IV qd, consider switching to oral as symptoms improve  - home Toprol-XL 12.5 qd  - holding home ARB d/t soft BP, restart as BP improves  - telemetry  - monitor CBC, bmp  - daily weights, strict I/Os    COPD (chronic obstructive pulmonary disease)  Patient's COPD is with exacerbation noted by continued dyspnea and worsening of baseline hypoxia currently.  Patient is currently off COPD Pathway. Continue scheduled inhalers Steroids, Antibiotics, and Supplemental oxygen and monitor respiratory status closely.     - home budesonide and xopenex q12h  - blood cx x2 pending  - IV vanc  - prednisone 40 qd  - home NC O2 2L    Coronary artery disease involving coronary bypass graft of native heart without angina pectoris  Patient with known CAD s/p stent placement and CABG, which is controlled Will continue ASA and Statin and monitor for S/Sx of angina/ACS. Continue to monitor on telemetry.     Essential (primary) hypertension  Chronic, controlled. Latest blood pressure and vitals reviewed-     Temp:  [97.4 °F (36.3 °C)-97.8 °F (36.6 °C)]   Pulse:  [101-126]   Resp:  [17-32]   BP: (101-120)/(60-78)   SpO2:  [87 %-100 %] .   Home meds for hypertension were reviewed and noted below.   Hypertension Medications               amLODIPine (NORVASC) 10 MG tablet Take 10 mg by mouth once daily.    furosemide (LASIX) 40 MG tablet Take 1 tablet (40 mg total) by mouth 2 (two) times a day.    losartan (COZAAR) 25 MG tablet Take 0.5 tablets (12.5 mg total) by mouth once daily.    metoprolol succinate (TOPROL-XL) 25 MG 24 hr tablet Take 12.5 mg by mouth once daily. Take 1/2 tablet by mouth daily            While in the hospital, will manage blood pressure as follows; Adjust home antihypertensive regimen as follows- see below    Will utilize p.r.n. blood pressure medication only if patient's blood pressure greater than 180/110 and he develops symptoms such as worsening chest pain or shortness of breath.    - holding home norvasc due to leg  swelling, holding Losartan d/t soft BP, restart as tolerated  - lasix 40 iv qd  - home toprol-xl 12.5 qd  - labetalol q6h prn with parameters    Pulmonary hypertension  - Holding home norvasc d/t leg swelling  - home NC O2 2L  - Echo 2/20/24 RVSP 64 mmHg and mod to severe TR      Type 2 diabetes mellitus  Patient's FSGs are controlled on current medication regimen.  Last A1c reviewed-   Lab Results   Component Value Date    HGBA1C 6.0 03/08/2024     Most recent fingerstick glucose reviewed-   Recent Labs   Lab 03/19/24 2133   POCTGLUCOSE 177*     Current correctional scale  Low  Maintain anti-hyperglycemic dose as follows-   Antihyperglycemics (From admission, onward)      Start     Stop Route Frequency Ordered    03/20/24 0900  empagliflozin (Jardiance) tablet 10 mg        Question Answer Comment   Does this patient have a diagnosis of heart failure? Yes    Does this patient have type 1 diabetes or diabetic ketoacidosis? No    Does this patient have symptomatic hypotension? No    Is the patient NPO or pending major surgery in next 3 days or less? No        -- Oral Daily 03/20/24 0019    03/20/24 0124  insulin aspart U-100 injection 0-5 Units         -- SubQ Before meals & nightly PRN 03/20/24 0025          Hold Oral hypoglycemics while patient is in the hospital.    GERD with esophagitis  Home pantoprazole      Hemiparesis affecting right side as late effect of cerebrovascular accident  RUE with contraction noted and 0/5, RLE 4/5  - home ASA and statin  - restart ARB as BP improves      S/P cholecystectomy  - On 3/9/24 by Dr. Bennett, will f/u OP on 3/26/24  - bandage on with some drainage, continue to monitor  - continue home colace  - home percocet 5 q6h prn      Other insomnia  - home zolpidem 5 qhs    ROSEMARIE (obstructive sleep apnea)  - noncompliant with CPAP at home but willing to use during admission  - CPAP qhs         VTE Risk Mitigation (From admission, onward)           Ordered     enoxaparin injection 40  mg  Every 24 hours         03/19/24 1674                                    Yadi Solomon DO  Department of Hospital Medicine  [facility] - [address] [place] [address]

## 2024-03-21 LAB
ANION GAP SERPL CALCULATED.3IONS-SCNC: 13 MMOL/L (ref 7–16)
BSA FOR ECHO PROCEDURE: 2.31 M2
BUN SERPL-MCNC: 13 MG/DL (ref 7–18)
BUN/CREAT SERPL: 11 (ref 6–20)
CALCIUM SERPL-MCNC: 8.9 MG/DL (ref 8.5–10.1)
CHLORIDE SERPL-SCNC: 95 MMOL/L (ref 98–107)
CO2 SERPL-SCNC: 28 MMOL/L (ref 21–32)
CREAT SERPL-MCNC: 1.21 MG/DL (ref 0.7–1.3)
CV ECHO LV RWT: 0.28 CM
D DIMER PPP FEU-MCNC: 3.18 ΜG/ML (ref 0–0.47)
E WAVE DECELERATION TIME: 79.46 MSEC
E/A RATIO: 2.97
E/E' RATIO: 7.6 M/S
ECHO LV POSTERIOR WALL: 0.83 CM (ref 0.6–1.1)
EGFR (NO RACE VARIABLE) (RUSH/TITUS): 72 ML/MIN/1.73M2
EJECTION FRACTION: 20 %
FRACTIONAL SHORTENING: 12 % (ref 28–44)
GLUCOSE SERPL-MCNC: 137 MG/DL (ref 70–105)
GLUCOSE SERPL-MCNC: 155 MG/DL (ref 74–106)
GLUCOSE SERPL-MCNC: 203 MG/DL (ref 70–105)
GLUCOSE SERPL-MCNC: 205 MG/DL (ref 70–105)
GLUCOSE SERPL-MCNC: 361 MG/DL (ref 70–105)
INTERVENTRICULAR SEPTUM: 1.01 CM (ref 0.6–1.1)
IVC DIAMETER: 2.54 CM
LA MAJOR: 6.19 CM
LACTATE SERPL-SCNC: 2.6 MMOL/L (ref 0.4–2)
LACTATE SERPL-SCNC: 3.1 MMOL/L (ref 0.4–2)
LACTATE SERPL-SCNC: 3.7 MMOL/L (ref 0.4–2)
LACTATE SERPL-SCNC: 5 MMOL/L (ref 0.4–2)
LEFT ATRIUM SIZE: 5.44 CM
LEFT INTERNAL DIMENSION IN SYSTOLE: 5.32 CM (ref 2.1–4)
LEFT VENTRICLE DIASTOLIC VOLUME INDEX: 79.19 ML/M2
LEFT VENTRICLE DIASTOLIC VOLUME: 181.34 ML
LEFT VENTRICLE MASS INDEX: 97 G/M2
LEFT VENTRICLE SYSTOLIC VOLUME INDEX: 59.7 ML/M2
LEFT VENTRICLE SYSTOLIC VOLUME: 136.82 ML
LEFT VENTRICULAR INTERNAL DIMENSION IN DIASTOLE: 6.02 CM (ref 3.5–6)
LEFT VENTRICULAR MASS: 223.09 G
LV LATERAL E/E' RATIO: 6.33 M/S
LV SEPTAL E/E' RATIO: 9.5 M/S
MV PEAK A VEL: 0.32 M/S
MV PEAK E VEL: 0.95 M/S
MV STENOSIS PRESSURE HALF TIME: 23.04 MS
MV VALVE AREA P 1/2 METHOD: 9.55 CM2
POTASSIUM SERPL-SCNC: 3.6 MMOL/L (ref 3.5–5.1)
RA MAJOR: 4.29 CM
RA PRESSURE ESTIMATED: 15 MMHG
RIGHT VENTRICULAR END-DIASTOLIC DIMENSION: 4.41 CM
SODIUM SERPL-SCNC: 132 MMOL/L (ref 136–145)
TDI LATERAL: 0.15 M/S
TDI SEPTAL: 0.1 M/S
TDI: 0.13 M/S
TRICUSPID ANNULAR PLANE SYSTOLIC EXCURSION: 1.3 CM
Z-SCORE OF LEFT VENTRICULAR DIMENSION IN END DIASTOLE: -3.74
Z-SCORE OF LEFT VENTRICULAR DIMENSION IN END SYSTOLE: 0.06

## 2024-03-21 PROCEDURE — 36600 WITHDRAWAL OF ARTERIAL BLOOD: CPT

## 2024-03-21 PROCEDURE — 27000221 HC OXYGEN, UP TO 24 HOURS

## 2024-03-21 PROCEDURE — 25000003 PHARM REV CODE 250: Performed by: HOSPITALIST

## 2024-03-21 PROCEDURE — 63600175 PHARM REV CODE 636 W HCPCS: Performed by: HOSPITALIST

## 2024-03-21 PROCEDURE — 25000242 PHARM REV CODE 250 ALT 637 W/ HCPCS

## 2024-03-21 PROCEDURE — 99233 SBSQ HOSP IP/OBS HIGH 50: CPT | Mod: ,,, | Performed by: INTERNAL MEDICINE

## 2024-03-21 PROCEDURE — 63600175 PHARM REV CODE 636 W HCPCS: Mod: JZ,JG | Performed by: HOSPITALIST

## 2024-03-21 PROCEDURE — 94761 N-INVAS EAR/PLS OXIMETRY MLT: CPT

## 2024-03-21 PROCEDURE — 63600175 PHARM REV CODE 636 W HCPCS: Performed by: INTERNAL MEDICINE

## 2024-03-21 PROCEDURE — 25000003 PHARM REV CODE 250: Performed by: INTERNAL MEDICINE

## 2024-03-21 PROCEDURE — 80048 BASIC METABOLIC PNL TOTAL CA: CPT | Performed by: HOSPITALIST

## 2024-03-21 PROCEDURE — 99900035 HC TECH TIME PER 15 MIN (STAT)

## 2024-03-21 PROCEDURE — 87040 BLOOD CULTURE FOR BACTERIA: CPT | Performed by: INTERNAL MEDICINE

## 2024-03-21 PROCEDURE — 25500020 PHARM REV CODE 255: Performed by: INTERNAL MEDICINE

## 2024-03-21 PROCEDURE — 63600175 PHARM REV CODE 636 W HCPCS

## 2024-03-21 PROCEDURE — 25000003 PHARM REV CODE 250

## 2024-03-21 PROCEDURE — 25000242 PHARM REV CODE 250 ALT 637 W/ HCPCS: Performed by: HOSPITALIST

## 2024-03-21 PROCEDURE — 82803 BLOOD GASES ANY COMBINATION: CPT

## 2024-03-21 PROCEDURE — 82962 GLUCOSE BLOOD TEST: CPT

## 2024-03-21 PROCEDURE — 94640 AIRWAY INHALATION TREATMENT: CPT

## 2024-03-21 PROCEDURE — 5A0945A ASSISTANCE WITH RESPIRATORY VENTILATION, 24-96 CONSECUTIVE HOURS, HIGH NASAL FLOW/VELOCITY: ICD-10-PCS | Performed by: STUDENT IN AN ORGANIZED HEALTH CARE EDUCATION/TRAINING PROGRAM

## 2024-03-21 PROCEDURE — 83605 ASSAY OF LACTIC ACID: CPT | Performed by: INTERNAL MEDICINE

## 2024-03-21 PROCEDURE — 85379 FIBRIN DEGRADATION QUANT: CPT | Performed by: INTERNAL MEDICINE

## 2024-03-21 PROCEDURE — 11000001 HC ACUTE MED/SURG PRIVATE ROOM

## 2024-03-21 RX ORDER — DEXAMETHASONE SODIUM PHOSPHATE 4 MG/ML
8 INJECTION, SOLUTION INTRA-ARTICULAR; INTRALESIONAL; INTRAMUSCULAR; INTRAVENOUS; SOFT TISSUE ONCE
Status: COMPLETED | OUTPATIENT
Start: 2024-03-21 | End: 2024-03-21

## 2024-03-21 RX ADMIN — METRONIDAZOLE 500 MG: 5 INJECTION, SOLUTION INTRAVENOUS at 12:03

## 2024-03-21 RX ADMIN — MEROPENEM 1 G: 1 INJECTION, POWDER, FOR SOLUTION INTRAVENOUS at 10:03

## 2024-03-21 RX ADMIN — METOPROLOL SUCCINATE 12.5 MG: 25 TABLET, EXTENDED RELEASE ORAL at 08:03

## 2024-03-21 RX ADMIN — OXYCODONE 5 MG: 5 TABLET ORAL at 09:03

## 2024-03-21 RX ADMIN — ONDANSETRON 8 MG: 2 INJECTION INTRAMUSCULAR; INTRAVENOUS at 03:03

## 2024-03-21 RX ADMIN — OXYCODONE 5 MG: 5 TABLET ORAL at 12:03

## 2024-03-21 RX ADMIN — IPRATROPIUM BROMIDE AND ALBUTEROL SULFATE 3 ML: 2.5; .5 SOLUTION RESPIRATORY (INHALATION) at 01:03

## 2024-03-21 RX ADMIN — POTASSIUM CHLORIDE 40 MEQ: 1500 TABLET, EXTENDED RELEASE ORAL at 08:03

## 2024-03-21 RX ADMIN — INSULIN ASPART 5 UNITS: 100 INJECTION, SOLUTION INTRAVENOUS; SUBCUTANEOUS at 07:03

## 2024-03-21 RX ADMIN — ZOLPIDEM TARTRATE 5 MG: 5 TABLET ORAL at 09:03

## 2024-03-21 RX ADMIN — ACETAMINOPHEN 1000 MG: 500 TABLET ORAL at 08:03

## 2024-03-21 RX ADMIN — MEROPENEM 1 G: 1 INJECTION, POWDER, FOR SOLUTION INTRAVENOUS at 04:03

## 2024-03-21 RX ADMIN — METRONIDAZOLE 500 MG: 5 INJECTION, SOLUTION INTRAVENOUS at 05:03

## 2024-03-21 RX ADMIN — CEFEPIME 1 G: 1 INJECTION, POWDER, FOR SOLUTION INTRAMUSCULAR; INTRAVENOUS at 04:03

## 2024-03-21 RX ADMIN — ATORVASTATIN CALCIUM 40 MG: 40 TABLET, FILM COATED ORAL at 09:03

## 2024-03-21 RX ADMIN — DOCUSATE SODIUM 100 MG: 100 CAPSULE, LIQUID FILLED ORAL at 09:03

## 2024-03-21 RX ADMIN — BUDESONIDE INHALATION 0.5 MG: 0.5 SUSPENSION RESPIRATORY (INHALATION) at 07:03

## 2024-03-21 RX ADMIN — VANCOMYCIN HYDROCHLORIDE 2000 MG: 500 INJECTION, POWDER, LYOPHILIZED, FOR SOLUTION INTRAVENOUS at 11:03

## 2024-03-21 RX ADMIN — IPRATROPIUM BROMIDE AND ALBUTEROL SULFATE 3 ML: 2.5; .5 SOLUTION RESPIRATORY (INHALATION) at 08:03

## 2024-03-21 RX ADMIN — ASPIRIN 81 MG: 81 TABLET, COATED ORAL at 08:03

## 2024-03-21 RX ADMIN — PERFLUTREN 1.5 ML: 6.52 INJECTION, SUSPENSION INTRAVENOUS at 11:03

## 2024-03-21 RX ADMIN — MEROPENEM 1 G: 1 INJECTION, POWDER, FOR SOLUTION INTRAVENOUS at 11:03

## 2024-03-21 RX ADMIN — CEFEPIME 1 G: 1 INJECTION, POWDER, FOR SOLUTION INTRAMUSCULAR; INTRAVENOUS at 09:03

## 2024-03-21 RX ADMIN — INSULIN ASPART 1 UNITS: 100 INJECTION, SOLUTION INTRAVENOUS; SUBCUTANEOUS at 09:03

## 2024-03-21 RX ADMIN — IPRATROPIUM BROMIDE AND ALBUTEROL SULFATE 3 ML: 2.5; .5 SOLUTION RESPIRATORY (INHALATION) at 07:03

## 2024-03-21 RX ADMIN — DEXAMETHASONE SODIUM PHOSPHATE 8 MG: 4 INJECTION, SOLUTION INTRA-ARTICULAR; INTRALESIONAL; INTRAMUSCULAR; INTRAVENOUS; SOFT TISSUE at 01:03

## 2024-03-21 RX ADMIN — ENOXAPARIN SODIUM 40 MG: 40 INJECTION SUBCUTANEOUS at 04:03

## 2024-03-21 RX ADMIN — INSULIN ASPART 2 UNITS: 100 INJECTION, SOLUTION INTRAVENOUS; SUBCUTANEOUS at 04:03

## 2024-03-21 RX ADMIN — PANTOPRAZOLE SODIUM 40 MG: 40 TABLET, DELAYED RELEASE ORAL at 08:03

## 2024-03-21 RX ADMIN — METRONIDAZOLE 500 MG: 5 INJECTION, SOLUTION INTRAVENOUS at 09:03

## 2024-03-21 RX ADMIN — FUROSEMIDE 40 MG: 10 INJECTION, SOLUTION INTRAMUSCULAR; INTRAVENOUS at 04:03

## 2024-03-21 RX ADMIN — POTASSIUM CHLORIDE 40 MEQ: 1500 TABLET, EXTENDED RELEASE ORAL at 09:03

## 2024-03-21 RX ADMIN — EMPAGLIFLOZIN 10 MG: 10 TABLET, FILM COATED ORAL at 08:03

## 2024-03-21 RX ADMIN — FUROSEMIDE 40 MG: 10 INJECTION, SOLUTION INTRAMUSCULAR; INTRAVENOUS at 03:03

## 2024-03-21 RX ADMIN — BUDESONIDE INHALATION 0.5 MG: 0.5 SUSPENSION RESPIRATORY (INHALATION) at 08:03

## 2024-03-21 RX ADMIN — GUAIFENESIN AND DEXTROMETHORPHAN 10 ML: 100; 10 SYRUP ORAL at 09:03

## 2024-03-21 RX ADMIN — DOCUSATE SODIUM 100 MG: 100 CAPSULE, LIQUID FILLED ORAL at 08:03

## 2024-03-21 NOTE — SUBJECTIVE & OBJECTIVE
Interval History:     Pt appears quite sick, on 6 L NC, will get abg and cxr  Repeat blood cx  Change cefepime to merem  Overall he is quite sick.     Review of Systems   Respiratory:  Positive for shortness of breath.    Cardiovascular:  Positive for leg swelling.   Gastrointestinal:  Positive for abdominal pain.     Objective:     Vital Signs (Most Recent):  Temp: 97.5 °F (36.4 °C) (03/21/24 1136)  Pulse: 108 (03/21/24 1136)  Resp: 16 (03/21/24 1136)  BP: 117/76 (03/21/24 1136)  SpO2: 98 % (03/21/24 1136) Vital Signs (24h Range):  Temp:  [68 °F (20 °C)-98.4 °F (36.9 °C)] 97.5 °F (36.4 °C)  Pulse:  [] 108  Resp:  [16-25] 16  SpO2:  [82 %-98 %] 98 %  BP: (104-133)/(63-82) 117/76     Weight: 102.5 kg (226 lb)  Body mass index is 29.02 kg/m².    Intake/Output Summary (Last 24 hours) at 3/21/2024 1204  Last data filed at 3/21/2024 0922  Gross per 24 hour   Intake 960 ml   Output 1050 ml   Net -90 ml         Physical Exam  Vitals and nursing note reviewed.   Constitutional:       General: He is not in acute distress.     Appearance: He is not toxic-appearing or diaphoretic.   HENT:      Head: Normocephalic and atraumatic.      Right Ear: External ear normal.      Left Ear: External ear normal.      Nose: Nose normal.      Mouth/Throat:      Mouth: Mucous membranes are dry.      Pharynx: Oropharynx is clear. No posterior oropharyngeal erythema.   Eyes:      General: No scleral icterus.        Right eye: No discharge.         Left eye: No discharge.      Extraocular Movements: Extraocular movements intact.      Pupils: Pupils are equal, round, and reactive to light.   Cardiovascular:      Rate and Rhythm: Regular rhythm. Tachycardia present.      Pulses: Normal pulses.      Heart sounds: Normal heart sounds. No murmur heard.  Pulmonary:      Effort: Pulmonary effort is normal. No respiratory distress.      Breath sounds: Wheezing present.   Abdominal:      General: Bowel sounds are normal. There is no distension.       Palpations: Abdomen is soft.      Tenderness: There is abdominal tenderness. There is guarding. There is no rebound.   Musculoskeletal:         General: Swelling and tenderness present. No deformity.      Cervical back: Neck supple.      Right lower leg: Edema present.      Left lower leg: Edema present.      Comments: see media photos. Swelling up to thighs   Skin:     General: Skin is warm and dry.      Coloration: Skin is not jaundiced.      Findings: Erythema (right leg - see media photos) present. No lesion or rash.   Neurological:      Mental Status: He is alert.      Sensory: No sensory deficit.   Psychiatric:         Behavior: Behavior normal.             Significant Labs: All pertinent labs within the past 24 hours have been reviewed.    Significant Imaging: I have reviewed all pertinent imaging results/findings within the past 24 hours.

## 2024-03-21 NOTE — PLAN OF CARE
Problem: Adult Inpatient Plan of Care  Goal: Plan of Care Review  Outcome: Ongoing, Progressing  Goal: Patient-Specific Goal (Individualized)  Outcome: Ongoing, Progressing  Goal: Absence of Hospital-Acquired Illness or Injury  Outcome: Ongoing, Progressing  Goal: Optimal Comfort and Wellbeing  Outcome: Ongoing, Progressing     Problem: Impaired Wound Healing  Goal: Optimal Wound Healing  Outcome: Ongoing, Progressing     Problem: Skin Injury Risk Increased  Goal: Skin Health and Integrity  Outcome: Ongoing, Progressing     Problem: Adjustment to Illness (Sepsis/Septic Shock)  Goal: Optimal Coping  Outcome: Ongoing, Progressing     Problem: Infection Progression (Sepsis/Septic Shock)  Goal: Absence of Infection Signs and Symptoms  Outcome: Ongoing, Progressing     Problem: Nutrition Impaired (Sepsis/Septic Shock)  Goal: Optimal Nutrition Intake  Outcome: Ongoing, Progressing

## 2024-03-21 NOTE — PLAN OF CARE
Problem: Adult Inpatient Plan of Care  Goal: Plan of Care Review  Outcome: Ongoing, Progressing  Flowsheets (Taken 3/21/2024 1756)  Plan of Care Reviewed With: patient  Goal: Patient-Specific Goal (Individualized)  Outcome: Ongoing, Progressing  Goal: Absence of Hospital-Acquired Illness or Injury  Outcome: Ongoing, Progressing  Intervention: Identify and Manage Fall Risk  Flowsheets (Taken 3/21/2024 1756)  Safety Promotion/Fall Prevention: assistive device/personal item within reach  Intervention: Prevent Skin Injury  Flowsheets (Taken 3/21/2024 1756)  Skin Protection: adhesive use limited     Problem: Skin Injury Risk Increased  Goal: Skin Health and Integrity  Outcome: Ongoing, Progressing  Intervention: Optimize Skin Protection  Flowsheets (Taken 3/21/2024 1756)  Pressure Reduction Techniques:   frequent weight shift encouraged   positioned off wounds   pressure points protected  Pressure Reduction Devices: foam padding utilized  Skin Protection: adhesive use limited  Head of Bed (HOB) Positioning: HOB at 30-45 degrees

## 2024-03-21 NOTE — PLAN OF CARE
Rec'd call from liz at [facility] and pt is too acute for them at this time but will re-assess pt and referral once pt more stable, spoke with Dr. Chávez and pt is not ready for dc at this time, ss to make new referral to [facility] once more stable

## 2024-03-21 NOTE — HPI
Patient is a 52 y/o M w/ Pmhx of alcoholic fatty liver, HFrEF with EF 25% per Echo on 2/20/24, pulmonary HTN, CAD s/p CABG in 2022, RHC, and stent placement, TR, COPD, CVA in 2016 with expressive aphasia and right hemiparesis, HTN, HLD, T2DM, GERD with esophagitis, ROSEMARIE who presents to presents to [facility] ED via EMS with SOB and abdominal pain for 2 days. He reports recently having a cholecystectomy on 3/9/24 here at [place] by Dr. Bennett. He endorses RUQ abdominal pain and SOB. SOB is similar to his previous COPD episodes. Endorses orthopnea and INFANTE. He endorses surgical site drainage mostly pus that started 2 days ago. Endorses decreased appetite around the same time. Endorses bilateral leg swelling and right leg redness for about 1 month and now swelling has gone up his thighs and abdomen in the past 2 days. Denies f/c/cp/n/v/d. Denies urinary, or bowel habit changes. Reports compliance with all home medications. Patient uses 2L NC O2 at home. He communicates through writing on paper and texts due to his expressive aphasia 2/2 CVA. Labs were significant for BNP 7486. EKG sinus tach 110 with multifocal PVCs. CXR showed continued  Cardiomegaly. CTA PE protocol was significant for no pulmonary embolus, four-chamber cardiomegaly, pulmonary edema and small pleural effusions. Cardiology has been consulted for the continued care and management of his cardiac conditions.

## 2024-03-21 NOTE — ASSESSMENT & PLAN NOTE
Echo reveals Ejection fraction by visual approximation is 20%. There is diastolic dysfunction.   BNP 7486  Continue IV lasix 40 mg QD  Continue toprol XL 25 mg 1/2 tab QD  Continue jardiance 10 mg QD  Resume losartan 25 mg 1/2 tab   Daily weights  Strict I/Os

## 2024-03-21 NOTE — CONSULTS
[facility] - [address] [place] [address] Telemetry  Cardiology  Consult Note    Patient Name: Karin Carrera  MRN: [id number]  Admission Date: 3/19/2024  Hospital Length of Stay: 2 days  Code Status: Full Code   Attending Provider: Tyree Chávez MD   Consulting Provider: Alysa Rizvi MD  Primary Care Physician: Walker Smith MD  Principal Problem:Sepsis    Patient information was obtained from patient, past medical records, and ER records.     Consults  Subjective:     Chief Complaint:   Shortness of breath and abdominal pain    HPI:   Patient is a 54 y/o M w/ Pmhx of alcoholic fatty liver, HFrEF with EF 25% per Echo on 2/20/24, pulmonary HTN, CAD s/p CABG in 2022, RHC, and stent placement, TR, COPD, CVA in 2016 with expressive aphasia and right hemiparesis, HTN, HLD, T2DM, GERD with esophagitis, ROSEMARIE who presents to presents to [facility] ED via EMS with SOB and abdominal pain for 2 days. He reports recently having a cholecystectomy on 3/9/24 here at [place] by Dr. Bennett. He endorses RUQ abdominal pain and SOB. SOB is similar to his previous COPD episodes. Endorses orthopnea and INFANTE. He endorses surgical site drainage mostly pus that started 2 days ago. Endorses decreased appetite around the same time. Endorses bilateral leg swelling and right leg redness for about 1 month and now swelling has gone up his thighs and abdomen in the past 2 days. Denies f/c/cp/n/v/d. Denies urinary, or bowel habit changes. Reports compliance with all home medications. Patient uses 2L NC O2 at home. He communicates through writing on paper and texts due to his expressive aphasia 2/2 CVA. Labs were significant for BNP 7486. EKG sinus tach 110 with multifocal PVCs. CXR showed continued  Cardiomegaly. CTA PE protocol was significant for no pulmonary embolus, four-chamber cardiomegaly, pulmonary edema and small pleural effusions. Cardiology has been consulted for the continued care and management of his cardiac conditions.     Past  Medical History:   Diagnosis Date    Alcoholic fatty liver     CHF (congestive heart failure) 2024    EF 25%    COPD (chronic obstructive pulmonary disease)     Coronary artery disease involving coronary bypass graft of native heart without angina pectoris 2022    Dyslipidemia 2023    Essential (primary) hypertension 2022    Expressive aphasia 2023    GERD with esophagitis 2018    EGD    Hemiparesis affecting right side as late effect of cerebrovascular accident     ROSEMARIE (obstructive sleep apnea)     Pulmonary hypertension     Stroke     right hand contracted    Tricuspid regurgitation     Type 2 diabetes mellitus        Past Surgical History:   Procedure Laterality Date    CARDIAC SURGERY      CABG    CORONARY ANGIOPLASTY WITH STENT PLACEMENT      bare metal stent    HEMIARTHROPLASTY OF HIP Right 2022    Procedure: HEMIARTHROPLASTY, HIP;  Surgeon: Ramses Chua MD;  Location: [facility] OR;  Service: Orthopedics;  Laterality: Right;    LAPAROSCOPIC CHOLECYSTECTOMY  2024    Dr. Bennett    LAPAROSCOPIC CHOLECYSTECTOMY WITH CHOLANGIOGRAPHY N/A 3/9/2024    Procedure: CHOLECYSTECTOMY, LAPAROSCOPIC, WITH CHOLANGIOGRAM;  Surgeon: Karson Bennett DO;  Location: [facility] OR;  Service: General;  Laterality: N/A;    RIGHT HEART CATHETERIZATION Right 2024    Procedure: INSERTION, CATHETER, RIGHT HEART;  Surgeon: Ruiz Pacheco MD;  Location: [facility] CATH LAB;  Service: Cardiology;  Laterality: Right;       Review of patient's allergies indicates:  No Known Allergies    No current facility-administered medications on file prior to encounter.     Current Outpatient Medications on File Prior to Encounter   Medication Sig    albuterol (PROVENTIL/VENTOLIN HFA) 90 mcg/actuation inhaler SMARTSI-2 Puff(s) By Mouth Every 4 Hours PRN    amLODIPine (NORVASC) 10 MG tablet Take 10 mg by mouth once daily.    amoxicillin-clavulanate 875-125mg (AUGMENTIN) 875-125 mg per tablet  Take 1 tablet by mouth 2 (two) times daily.    aspirin (ECOTRIN) 81 MG EC tablet Take 1 tablet (81 mg total) by mouth once daily.    atorvastatin (LIPITOR) 40 MG tablet Take 1 tablet (40 mg total) by mouth every evening.    budesonide-formoterol 160-4.5 mcg (SYMBICORT) 160-4.5 mcg/actuation HFAA Inhale 2 puffs into the lungs every 12 (twelve) hours. Controller    dicyclomine (BENTYL) 20 mg tablet Take 1 tablet (20 mg total) by mouth 2 (two) times daily.    docusate sodium (COLACE) 100 MG capsule Take 1 capsule (100 mg total) by mouth 2 (two) times daily.    empagliflozin (JARDIANCE) 10 mg tablet Take 10 mg by mouth once daily.    furosemide (LASIX) 40 MG tablet Take 1 tablet (40 mg total) by mouth 2 (two) times a day.    insulin aspart U-100 (NOVOLOG) 100 unit/mL injection Inject 0-5 Units into the skin 3 (three) times daily before meals.    insulin detemir U-100, Levemir, (LEVEMIR FLEXTOUCH U100 INSULIN) 100 unit/mL (3 mL) InPn pen Inject 10 Units into the skin every evening.    losartan (COZAAR) 25 MG tablet Take 0.5 tablets (12.5 mg total) by mouth once daily.    metoprolol succinate (TOPROL-XL) 25 MG 24 hr tablet Take 12.5 mg by mouth once daily. Take 1/2 tablet by mouth daily    oxyCODONE-acetaminophen (PERCOCET) 5-325 mg per tablet Take 1 tablet by mouth every 6 (six) hours as needed for Pain.    pantoprazole (PROTONIX) 40 MG tablet Take 1 tablet (40 mg total) by mouth once daily.    potassium chloride (KLOR-CON) 10 MEQ TbSR Take 10 mEq by mouth once daily.    promethazine (PHENERGAN) 25 MG tablet Take 1 tablet (25 mg total) by mouth every 6 (six) hours as needed for Nausea.    THERMOTABS 287-180-15 mg Tab Take 1 tablet by mouth once daily.    TRUE METRIX GLUCOSE TEST STRIP Strp USE TO CHECK BLOOD SUGAR AS DIRECTED    TRUEPLUS LANCETS 33 gauge Misc 1 lancet  by Misc.(Non-Drug; Combo Route) route once daily. use as directed    zolpidem (AMBIEN) 5 MG Tab TAKE ONE TABLET BY MOUTH EVERY EVENING     Family History        Problem Relation (Age of Onset)    Diabetes Mother    Heart disease Mother    Hypertension Mother          Tobacco Use    Smoking status: Former     Current packs/day: 0.50     Types: Cigarettes    Smokeless tobacco: Current     Types: Chew   Substance and Sexual Activity    Alcohol use: Not Currently     Comment: 1 quart    Drug use: Never    Sexual activity: Not on file     Review of Systems   Constitutional: Negative for fever.   Cardiovascular:  Positive for leg swelling and orthopnea.   Respiratory:  Positive for shortness of breath.      Objective:     Vital Signs (Most Recent):  Temp: 97.5 °F (36.4 °C) (03/21/24 1610)  Pulse: (!) 117 (03/21/24 1610)  Resp: 16 (03/21/24 1610)  BP: 120/77 (03/21/24 1610)  SpO2: 98 % (03/21/24 1610) Vital Signs (24h Range):  Temp:  [68 °F (20 °C)-98.4 °F (36.9 °C)] 97.5 °F (36.4 °C)  Pulse:  [] 117  Resp:  [16-25] 16  SpO2:  [82 %-98 %] 98 %  BP: (104-133)/(63-82) 120/77     Weight: 102.5 kg (226 lb)  Body mass index is 29.02 kg/m².    SpO2: 98 %         Intake/Output Summary (Last 24 hours) at 3/21/2024 1702  Last data filed at 3/21/2024 1646  Gross per 24 hour   Intake 720 ml   Output 1250 ml   Net -530 ml       Lines/Drains/Airways       Peripheral Intravenous Line  Duration                  Peripheral IV - Single Lumen 03/19/24 20 G Anterior;Distal;Left Forearm 2 days         Peripheral IV - Single Lumen 03/20/24 0225 20 G Posterior;Right Forearm 1 day                     Physical Exam  Constitutional:       Appearance: He is ill-appearing.   Cardiovascular:      Rate and Rhythm: Tachycardia present.   Pulmonary:      Effort: Pulmonary effort is normal.   Musculoskeletal:      Right lower leg: Edema present.      Left lower leg: Edema present.   Skin:     General: Skin is dry.   Neurological:      Mental Status: He is alert.          Significant Labs: All pertinent lab results from the last 24 hours have been reviewed.    Significant Imaging: Echocardiogram:  Transthoracic echo (TTE) complete (Cupid Only):   Results for orders placed or performed during the hospital encounter of 03/19/24   Echo Saline Bubble? Yes   Result Value Ref Range    BSA 2.31 m2    LVIDd 6.02 (A) 3.5 - 6.0 cm    LV Systolic Volume 136.82 mL    LV Systolic Volume Index 59.7 mL/m2    LVIDs 5.32 (A) 2.1 - 4.0 cm    LV Diastolic Volume 181.34 mL    LV Diastolic Volume Index 79.19 mL/m2    IVS 1.01 0.6 - 1.1 cm    FS 12 (A) 28 - 44 %    Left Ventricle Relative Wall Thickness 0.28 cm    Posterior Wall 0.83 0.6 - 1.1 cm    LV mass 223.09 g    LV Mass Index 97 g/m2    MV Peak E Rohit 0.95 m/s    TDI LATERAL 0.15 m/s    TDI SEPTAL 0.10 m/s    E/E' ratio 7.60 m/s    MV Peak A Rohit 0.32 m/s    E/A ratio 2.97     E wave deceleration time 79.46 msec    LV SEPTAL E/E' RATIO 9.50 m/s    LV LATERAL E/E' RATIO 6.33 m/s    RVDD 4.41 cm    TAPSE 1.30 cm    LA size 5.44 cm    Left Atrium Major Axis 6.19 cm    RA Major Axis 4.29 cm    MV stenosis pressure 1/2 time 23.04 ms    MV valve area p 1/2 method 9.55 cm2    IVC diameter 2.54 cm    Mean e' 0.13 m/s    ZLVIDS 0.06     ZLVIDD -3.74     EF 20 %    Est. RA pres 15 mmHg    Narrative      Left Ventricle: The left ventricle is moderately dilated. Normal wall   thickness. Regional wall motion abnormalities present.  Anterior wall and   apex are akinetic.  Inferior wall and lateral wall are hypokinetic. There   is severely reduced systolic function with a visually estimated ejection   fraction of less than 30%. Ejection fraction by visual approximation is   20%. There is diastolic dysfunction.    Right Ventricle: Mild right ventricular enlargement.    Left Atrium: Left atrium is severely dilated.    Right Atrium: Right atrium is mildly dilated.    IVC/SVC: Elevated venous pressure at 15 mmHg.    Pericardium: There is a trivial effusion.       Assessment and Plan:     Chronic combined systolic and diastolic heart failure  Echo reveals Ejection fraction by visual  approximation is 20%. There is diastolic dysfunction.   BNP 7486  Continue IV lasix 40 mg QD  Continue toprol XL 25 mg 1/2 tab QD  Continue jardiance 10 mg QD  Resume losartan 25 mg 1/2 tab   Daily weights  Strict I/Os          VTE Risk Mitigation (From admission, onward)           Ordered     enoxaparin injection 40 mg  Every 24 hours         03/19/24 8685                    Thank you for your consult. I will follow-up with patient. Please contact us if you have any additional questions.    Alysa Rizvi MD  Cardiology   [facility] - [address] [place] [address]

## 2024-03-21 NOTE — PROGRESS NOTES
[facility] Medicine  Progress Note    Patient Name: Karin Carrera  MRN: [id number]  Patient Class: IP- Inpatient   Admission Date: 3/19/2024  Length of Stay: 2 days  Attending Physician: Tyree Chávez MD  Primary Care Provider: Walker Smith MD        Subjective:     Principal Problem:Sepsis        HPI:  Patient is a 54yo male who presents to [facility] ED via EMS with SOB and abdominal pain for 2 days. He reports recently having a cholecystectomy on 3/9/24 here at [place] by Dr. Bennett. He endorses RUQ abdominal pain and SOB. SOB is similar to his previous COPD episodes. Endorses orthopnea and INFANTE. He endorses surgical site drainage mostly pus that started 2 days ago. Endorses decreased appetite around the same time. Endorses bilateral leg swelling and right leg redness for about 1 month and now swelling has gone up his thighs and abdomen in the past 2 days. Denies f/c/cp/n/v/d. Denies urinary, or bowel habit changes. Reports compliance with all home medications.    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 Patient sees Dr. Escobar Cardiology and Dr. Smith PCP. Patient uses 2L NC O2 at home. He communicates through writing on paper and texts due to his expressive aphasia 2/2 CVA.    Upon presentation, VS remarkable for 104/65, 114, 30. Labs remarkable for H/H 11.9/39.1, HCO3 34, glucose 138. Mg 2.7. BNP 7486, troponin 22.2, 19.5. PT 16.2, INR 1.32, PTT 30.9. COVID negative. ABG 7.46, 50, 20, 35.6. EKG sinus tach 110 with multifocal PVCs. CXR showed continued cardiomegaly. Patchy ground-glass and reticular infiltrate/edema throughout the bilateral lungs. Small bilateral pleural fluid. Constellation of findings suspicious for CHF. Underlying  infection not excluded. CT abdomen and pelvis w/o contrast showed study limited secondary to lack of IV contrast. Interval cholecystectomy. There is a small air-fluid collection within the gallbladder fossa measuring up to 3.7 cm which may reflect postoperative fluid collection or abscess. Interval increased diffuse body wall edema. Cardiomegaly and coronary artery calcifications. Scattered interlobular septal thickening of the lung bases suggestive of interstitial pulmonary edema with trace bilateral pleural effusions. There is some calcification of the left ventricle apex which may reflect sequela of remote infarct. Patient received Lasix 60 IV and is admitted to hospital medicine for further management and care.    Overview/Hospital Course:  53 year old male with an extensive PMH presents with abdominal pain; he had recent cholecystectomy.  He is complaining of abdominal pain and SOB.    Interval History:     Pt appears quite sick, on 6 L NC, will get abg and cxr  Repeat blood cx  Change cefepime to merem  Overall he is quite sick.     Review of Systems   Respiratory:  Positive for shortness of breath.    Cardiovascular:  Positive for leg swelling.   Gastrointestinal:  Positive for abdominal pain.     Objective:     Vital Signs (Most Recent):  Temp: 97.5 °F (36.4 °C) (03/21/24 1136)  Pulse: 108 (03/21/24 1136)  Resp: 16 (03/21/24 1136)  BP: 117/76 (03/21/24 1136)  SpO2: 98 % (03/21/24 1136) Vital Signs (24h Range):  Temp:  [68 °F (20 °C)-98.4 °F (36.9 °C)] 97.5 °F (36.4 °C)  Pulse:  [] 108  Resp:  [16-25] 16  SpO2:  [82 %-98 %] 98 %  BP: (104-133)/(63-82) 117/76     Weight: 102.5 kg (226 lb)  Body mass index is 29.02 kg/m².    Intake/Output Summary (Last 24 hours) at 3/21/2024 1204  Last data filed at 3/21/2024 0922  Gross per 24 hour   Intake 960 ml   Output 1050 ml   Net -90 ml         Physical Exam  Vitals and nursing note reviewed.   Constitutional:       General: He is not in acute distress.      Appearance: He is not toxic-appearing or diaphoretic.   HENT:      Head: Normocephalic and atraumatic.      Right Ear: External ear normal.      Left Ear: External ear normal.      Nose: Nose normal.      Mouth/Throat:      Mouth: Mucous membranes are dry.      Pharynx: Oropharynx is clear. No posterior oropharyngeal erythema.   Eyes:      General: No scleral icterus.        Right eye: No discharge.         Left eye: No discharge.      Extraocular Movements: Extraocular movements intact.      Pupils: Pupils are equal, round, and reactive to light.   Cardiovascular:      Rate and Rhythm: Regular rhythm. Tachycardia present.      Pulses: Normal pulses.      Heart sounds: Normal heart sounds. No murmur heard.  Pulmonary:      Effort: Pulmonary effort is normal. No respiratory distress.      Breath sounds: Wheezing present.   Abdominal:      General: Bowel sounds are normal. There is no distension.      Palpations: Abdomen is soft.      Tenderness: There is abdominal tenderness. There is guarding. There is no rebound.   Musculoskeletal:         General: Swelling and tenderness present. No deformity.      Cervical back: Neck supple.      Right lower leg: Edema present.      Left lower leg: Edema present.      Comments: see media photos. Swelling up to thighs   Skin:     General: Skin is warm and dry.      Coloration: Skin is not jaundiced.      Findings: Erythema (right leg - see media photos) present. No lesion or rash.   Neurological:      Mental Status: He is alert.      Sensory: No sensory deficit.   Psychiatric:         Behavior: Behavior normal.             Significant Labs: All pertinent labs within the past 24 hours have been reviewed.    Significant Imaging: I have reviewed all pertinent imaging results/findings within the past 24 hours.    Assessment/Plan:      * Sepsis  He is s/p recent cholecystectomy.  Blood cultures show G+ cocci and G- bacilli; on Vancomycin and Maxipime and Flagyl; will await ID of  organisms in blood cultures; followed by Surgery    Hypokalemia  Will supplement    Acute hypoxemic respiratory failure  Due to multiple etiologies; he is a smoker; he has ROSEMARIE and is non-compliant with CPAP at home; he has CHF and is non-compliant with meds at home.  On nebs; he is on 5L n/c; cannot use steroids for his COPD due to active infection; negative COVID; CT chest does not show PE; CXR shows patchy ground-glass and reticular infiltrate/edema throughout the bilateral lungs -- on IV Lasix    ROSEMARIE (obstructive sleep apnea)  -Noncompliant with CPAP at home; CPAP in hospital ordered    Cellulitis of lower extremity  He has chronic venous stasis changes on his LE; on Vancomycin and Maxipime for post op infection from abscess in GB fossa    Chronic combined systolic and diastolic heart failure  Continue home meds; he is always SOB and has ROSEMARIE non-compliant with CPAP, smokes, and does not take home meds; CXR shows patchy ground-glass and reticular infiltrate/edema throughout the bilateral lungs; on IV Lasix      Type 2 diabetes mellitus  BS stable; continue home DM meds; on SSI; will continue to monitor    Hemiparesis affecting right side as late effect of cerebrovascular accident  On ASA and statin; he is at baseline; has aphasia    Coronary artery disease involving coronary bypass graft of native heart without angina pectoris  He has a history of CAD s/p CABG; continue home meds    Essential (primary) hypertension  BP stable; continue home meds      VTE Risk Mitigation (From admission, onward)           Ordered     enoxaparin injection 40 mg  Every 24 hours         03/19/24 8671                    Discharge Planning   DEANNA:      Code Status: Full Code   Is the patient medically ready for discharge?:     Reason for patient still in hospital (select all that apply): Treatment  Discharge Plan A: Skilled Nursing Facility                  Rehmat ANUSHKA Chávez MD  Department of Hospital Medicine   [facility] - [facility]  [facility]

## 2024-03-21 NOTE — SUBJECTIVE & OBJECTIVE
Past Medical History:   Diagnosis Date    Alcoholic fatty liver     CHF (congestive heart failure) 2024    EF 25%    COPD (chronic obstructive pulmonary disease)     Coronary artery disease involving coronary bypass graft of native heart without angina pectoris 2022    Dyslipidemia 2023    Essential (primary) hypertension 2022    Expressive aphasia 2023    GERD with esophagitis 2018    EGD    Hemiparesis affecting right side as late effect of cerebrovascular accident     ROSEMARIE (obstructive sleep apnea)     Pulmonary hypertension     Stroke     right hand contracted    Tricuspid regurgitation     Type 2 diabetes mellitus        Past Surgical History:   Procedure Laterality Date    CARDIAC SURGERY      CABG    CORONARY ANGIOPLASTY WITH STENT PLACEMENT      bare metal stent    HEMIARTHROPLASTY OF HIP Right 2022    Procedure: HEMIARTHROPLASTY, HIP;  Surgeon: Ramses Chua MD;  Location: [facility] OR;  Service: Orthopedics;  Laterality: Right;    LAPAROSCOPIC CHOLECYSTECTOMY  2024    Dr. Bennett    LAPAROSCOPIC CHOLECYSTECTOMY WITH CHOLANGIOGRAPHY N/A 3/9/2024    Procedure: CHOLECYSTECTOMY, LAPAROSCOPIC, WITH CHOLANGIOGRAM;  Surgeon: Karson Bennett DO;  Location: [facility] OR;  Service: General;  Laterality: N/A;    RIGHT HEART CATHETERIZATION Right 2024    Procedure: INSERTION, CATHETER, RIGHT HEART;  Surgeon: Ruiz Pacheco MD;  Location: [facility] CATH LAB;  Service: Cardiology;  Laterality: Right;       Review of patient's allergies indicates:  No Known Allergies    No current facility-administered medications on file prior to encounter.     Current Outpatient Medications on File Prior to Encounter   Medication Sig    albuterol (PROVENTIL/VENTOLIN HFA) 90 mcg/actuation inhaler SMARTSI-2 Puff(s) By Mouth Every 4 Hours PRN    amLODIPine (NORVASC) 10 MG tablet Take 10 mg by mouth once daily.    amoxicillin-clavulanate 875-125mg (AUGMENTIN) 875-125 mg per  tablet Take 1 tablet by mouth 2 (two) times daily.    aspirin (ECOTRIN) 81 MG EC tablet Take 1 tablet (81 mg total) by mouth once daily.    atorvastatin (LIPITOR) 40 MG tablet Take 1 tablet (40 mg total) by mouth every evening.    budesonide-formoterol 160-4.5 mcg (SYMBICORT) 160-4.5 mcg/actuation HFAA Inhale 2 puffs into the lungs every 12 (twelve) hours. Controller    dicyclomine (BENTYL) 20 mg tablet Take 1 tablet (20 mg total) by mouth 2 (two) times daily.    docusate sodium (COLACE) 100 MG capsule Take 1 capsule (100 mg total) by mouth 2 (two) times daily.    empagliflozin (JARDIANCE) 10 mg tablet Take 10 mg by mouth once daily.    furosemide (LASIX) 40 MG tablet Take 1 tablet (40 mg total) by mouth 2 (two) times a day.    insulin aspart U-100 (NOVOLOG) 100 unit/mL injection Inject 0-5 Units into the skin 3 (three) times daily before meals.    insulin detemir U-100, Levemir, (LEVEMIR FLEXTOUCH U100 INSULIN) 100 unit/mL (3 mL) InPn pen Inject 10 Units into the skin every evening.    losartan (COZAAR) 25 MG tablet Take 0.5 tablets (12.5 mg total) by mouth once daily.    metoprolol succinate (TOPROL-XL) 25 MG 24 hr tablet Take 12.5 mg by mouth once daily. Take 1/2 tablet by mouth daily    oxyCODONE-acetaminophen (PERCOCET) 5-325 mg per tablet Take 1 tablet by mouth every 6 (six) hours as needed for Pain.    pantoprazole (PROTONIX) 40 MG tablet Take 1 tablet (40 mg total) by mouth once daily.    potassium chloride (KLOR-CON) 10 MEQ TbSR Take 10 mEq by mouth once daily.    promethazine (PHENERGAN) 25 MG tablet Take 1 tablet (25 mg total) by mouth every 6 (six) hours as needed for Nausea.    THERMOTABS 287-180-15 mg Tab Take 1 tablet by mouth once daily.    TRUE METRIX GLUCOSE TEST STRIP Strp USE TO CHECK BLOOD SUGAR AS DIRECTED    TRUEPLUS LANCETS 33 gauge Misc 1 lancet  by Misc.(Non-Drug; Combo Route) route once daily. use as directed    zolpidem (AMBIEN) 5 MG Tab TAKE ONE TABLET BY MOUTH EVERY EVENING     Family  History       Problem Relation (Age of Onset)    Diabetes Mother    Heart disease Mother    Hypertension Mother          Tobacco Use    Smoking status: Former     Current packs/day: 0.50     Types: Cigarettes    Smokeless tobacco: Current     Types: Chew   Substance and Sexual Activity    Alcohol use: Not Currently     Comment: 1 quart    Drug use: Never    Sexual activity: Not on file     Review of Systems   Constitutional: Negative for fever.   Cardiovascular:  Positive for leg swelling and orthopnea.   Respiratory:  Positive for shortness of breath.      Objective:     Vital Signs (Most Recent):  Temp: 97.5 °F (36.4 °C) (03/21/24 1610)  Pulse: (!) 117 (03/21/24 1610)  Resp: 16 (03/21/24 1610)  BP: 120/77 (03/21/24 1610)  SpO2: 98 % (03/21/24 1610) Vital Signs (24h Range):  Temp:  [68 °F (20 °C)-98.4 °F (36.9 °C)] 97.5 °F (36.4 °C)  Pulse:  [] 117  Resp:  [16-25] 16  SpO2:  [82 %-98 %] 98 %  BP: (104-133)/(63-82) 120/77     Weight: 102.5 kg (226 lb)  Body mass index is 29.02 kg/m².    SpO2: 98 %         Intake/Output Summary (Last 24 hours) at 3/21/2024 1702  Last data filed at 3/21/2024 1646  Gross per 24 hour   Intake 720 ml   Output 1250 ml   Net -530 ml       Lines/Drains/Airways       Peripheral Intravenous Line  Duration                  Peripheral IV - Single Lumen 03/19/24 20 G Anterior;Distal;Left Forearm 2 days         Peripheral IV - Single Lumen 03/20/24 0225 20 G Posterior;Right Forearm 1 day                     Physical Exam  Constitutional:       Appearance: He is ill-appearing.   Cardiovascular:      Rate and Rhythm: Tachycardia present.   Pulmonary:      Effort: Pulmonary effort is normal.   Musculoskeletal:      Right lower leg: Edema present.      Left lower leg: Edema present.   Skin:     General: Skin is dry.   Neurological:      Mental Status: He is alert.          Significant Labs: All pertinent lab results from the last 24 hours have been reviewed.    Significant Imaging:  Echocardiogram: Transthoracic echo (TTE) complete (Cupid Only):   Results for orders placed or performed during the hospital encounter of 03/19/24   Echo Saline Bubble? Yes   Result Value Ref Range    BSA 2.31 m2    LVIDd 6.02 (A) 3.5 - 6.0 cm    LV Systolic Volume 136.82 mL    LV Systolic Volume Index 59.7 mL/m2    LVIDs 5.32 (A) 2.1 - 4.0 cm    LV Diastolic Volume 181.34 mL    LV Diastolic Volume Index 79.19 mL/m2    IVS 1.01 0.6 - 1.1 cm    FS 12 (A) 28 - 44 %    Left Ventricle Relative Wall Thickness 0.28 cm    Posterior Wall 0.83 0.6 - 1.1 cm    LV mass 223.09 g    LV Mass Index 97 g/m2    MV Peak E Rohit 0.95 m/s    TDI LATERAL 0.15 m/s    TDI SEPTAL 0.10 m/s    E/E' ratio 7.60 m/s    MV Peak A Rohit 0.32 m/s    E/A ratio 2.97     E wave deceleration time 79.46 msec    LV SEPTAL E/E' RATIO 9.50 m/s    LV LATERAL E/E' RATIO 6.33 m/s    RVDD 4.41 cm    TAPSE 1.30 cm    LA size 5.44 cm    Left Atrium Major Axis 6.19 cm    RA Major Axis 4.29 cm    MV stenosis pressure 1/2 time 23.04 ms    MV valve area p 1/2 method 9.55 cm2    IVC diameter 2.54 cm    Mean e' 0.13 m/s    ZLVIDS 0.06     ZLVIDD -3.74     EF 20 %    Est. RA pres 15 mmHg    Narrative      Left Ventricle: The left ventricle is moderately dilated. Normal wall   thickness. Regional wall motion abnormalities present.  Anterior wall and   apex are akinetic.  Inferior wall and lateral wall are hypokinetic. There   is severely reduced systolic function with a visually estimated ejection   fraction of less than 30%. Ejection fraction by visual approximation is   20%. There is diastolic dysfunction.    Right Ventricle: Mild right ventricular enlargement.    Left Atrium: Left atrium is severely dilated.    Right Atrium: Right atrium is mildly dilated.    IVC/SVC: Elevated venous pressure at 15 mmHg.    Pericardium: There is a trivial effusion.

## 2024-03-22 PROBLEM — R78.81 BACTEREMIA: Status: ACTIVE | Noted: 2024-03-22

## 2024-03-22 PROBLEM — I50.43 ACUTE ON CHRONIC COMBINED SYSTOLIC AND DIASTOLIC HEART FAILURE: Status: ACTIVE | Noted: 2024-03-19

## 2024-03-22 PROBLEM — R57.9 SHOCK: Status: ACTIVE | Noted: 2024-03-22

## 2024-03-22 LAB
ALBUMIN SERPL BCP-MCNC: 2.9 G/DL (ref 3.5–5)
ALBUMIN/GLOB SERPL: 0.7 {RATIO}
ALP SERPL-CCNC: 129 U/L (ref 45–115)
ALT SERPL W P-5'-P-CCNC: 25 U/L (ref 16–61)
ANION GAP SERPL CALCULATED.3IONS-SCNC: 13 MMOL/L (ref 7–16)
AST SERPL W P-5'-P-CCNC: 27 U/L (ref 15–37)
BASOPHILS # BLD AUTO: 0 K/UL (ref 0–0.2)
BASOPHILS NFR BLD AUTO: 0 % (ref 0–1)
BILIRUB SERPL-MCNC: 1.2 MG/DL (ref ?–1.2)
BUN SERPL-MCNC: 17 MG/DL (ref 7–18)
BUN/CREAT SERPL: 14 (ref 6–20)
CALCIUM SERPL-MCNC: 8.8 MG/DL (ref 8.5–10.1)
CHLORIDE SERPL-SCNC: 95 MMOL/L (ref 98–107)
CO2 SERPL-SCNC: 26 MMOL/L (ref 21–32)
CREAT SERPL-MCNC: 1.21 MG/DL (ref 0.7–1.3)
DIFFERENTIAL METHOD BLD: ABNORMAL
EGFR (NO RACE VARIABLE) (RUSH/TITUS): 72 ML/MIN/1.73M2
EOSINOPHIL # BLD AUTO: 0 K/UL (ref 0–0.5)
EOSINOPHIL NFR BLD AUTO: 0 % (ref 1–4)
ERYTHROCYTE [DISTWIDTH] IN BLOOD BY AUTOMATED COUNT: 15.9 % (ref 11.5–14.5)
GLOBULIN SER-MCNC: 4.2 G/DL (ref 2–4)
GLUCOSE SERPL-MCNC: 183 MG/DL (ref 70–105)
GLUCOSE SERPL-MCNC: 190 MG/DL (ref 70–105)
GLUCOSE SERPL-MCNC: 212 MG/DL (ref 74–106)
GLUCOSE SERPL-MCNC: 216 MG/DL (ref 70–105)
GLUCOSE SERPL-MCNC: 218 MG/DL (ref 70–105)
HCO3 UR-SCNC: 30.2 MMOL/L (ref 18–23)
HCO3 UR-SCNC: 31.3 MMOL/L (ref 18–23)
HCT VFR BLD AUTO: 36.9 % (ref 40–54)
HGB BLD-MCNC: 11.6 G/DL (ref 13.5–18)
IMM GRANULOCYTES # BLD AUTO: 0.01 K/UL (ref 0–0.04)
IMM GRANULOCYTES NFR BLD: 0.2 % (ref 0–0.4)
LACTATE SERPL-SCNC: 1.7 MMOL/L (ref 0.4–2)
LACTATE SERPL-SCNC: 2.1 MMOL/L (ref 0.4–2)
LACTATE SERPL-SCNC: 2.3 MMOL/L (ref 0.4–2)
LACTATE SERPL-SCNC: 2.4 MMOL/L (ref 0.4–2)
LACTATE SERPL-SCNC: 2.9 MMOL/L (ref 0.4–2)
LYMPHOCYTES # BLD AUTO: 0.53 K/UL (ref 1–4.8)
LYMPHOCYTES NFR BLD AUTO: 11 % (ref 27–41)
MCH RBC QN AUTO: 26.6 PG (ref 27–31)
MCHC RBC AUTO-ENTMCNC: 31.4 G/DL (ref 32–36)
MCV RBC AUTO: 84.6 FL (ref 80–96)
MONOCYTES # BLD AUTO: 0.41 K/UL (ref 0–0.8)
MONOCYTES NFR BLD AUTO: 8.5 % (ref 2–6)
MPC BLD CALC-MCNC: 10 FL (ref 9.4–12.4)
NEUTROPHILS # BLD AUTO: 3.88 K/UL (ref 1.8–7.7)
NEUTROPHILS NFR BLD AUTO: 80.3 % (ref 53–65)
NRBC # BLD AUTO: 0 X10E3/UL
NRBC, AUTO (.00): 0 %
PCO2 BLDA: 37 MMHG
PCO2 BLDA: 45 MMHG
PH SMN: 7.45 [PH]
PH SMN: 7.52 [PH] (ref 7.35–7.45)
PLATELET # BLD AUTO: 223 K/UL (ref 150–400)
PO2 BLDA: 34 MMHG
PO2 BLDA: 45 MMHG
POC A-ADO2: 222 MMHG
POC A-ADO2: ABNORMAL MMHG
POC BASE EXCESS: 6.4 MMOL/L (ref -2–3)
POC BASE EXCESS: 7 MMOL/L (ref -2–3)
POC SATURATED O2: 58.6 % (ref 95–98)
POC SATURATED O2: 84.2 % (ref 95–98)
POTASSIUM SERPL-SCNC: 4.4 MMOL/L (ref 3.5–5.1)
PROT SERPL-MCNC: 7.1 G/DL (ref 6.4–8.2)
RBC # BLD AUTO: 4.36 M/UL (ref 4.6–6.2)
SODIUM SERPL-SCNC: 130 MMOL/L (ref 136–145)
VANCOMYCIN TROUGH SERPL-MCNC: 11.3 ΜG/ML (ref 10–20)
WBC # BLD AUTO: 4.83 K/UL (ref 4.5–11)

## 2024-03-22 PROCEDURE — 25000242 PHARM REV CODE 250 ALT 637 W/ HCPCS: Performed by: HOSPITALIST

## 2024-03-22 PROCEDURE — 25000003 PHARM REV CODE 250: Performed by: HOSPITALIST

## 2024-03-22 PROCEDURE — 63600175 PHARM REV CODE 636 W HCPCS: Mod: JZ,JG | Performed by: INTERNAL MEDICINE

## 2024-03-22 PROCEDURE — 94761 N-INVAS EAR/PLS OXIMETRY MLT: CPT

## 2024-03-22 PROCEDURE — 25000242 PHARM REV CODE 250 ALT 637 W/ HCPCS

## 2024-03-22 PROCEDURE — 63600175 PHARM REV CODE 636 W HCPCS: Performed by: NURSE PRACTITIONER

## 2024-03-22 PROCEDURE — 80202 ASSAY OF VANCOMYCIN: CPT | Performed by: HOSPITALIST

## 2024-03-22 PROCEDURE — 27000221 HC OXYGEN, UP TO 24 HOURS

## 2024-03-22 PROCEDURE — 99900035 HC TECH TIME PER 15 MIN (STAT)

## 2024-03-22 PROCEDURE — 99233 SBSQ HOSP IP/OBS HIGH 50: CPT | Mod: ,,, | Performed by: NURSE PRACTITIONER

## 2024-03-22 PROCEDURE — 25000003 PHARM REV CODE 250

## 2024-03-22 PROCEDURE — 83605 ASSAY OF LACTIC ACID: CPT | Performed by: INTERNAL MEDICINE

## 2024-03-22 PROCEDURE — P9047 ALBUMIN (HUMAN), 25%, 50ML: HCPCS | Mod: JZ,JG | Performed by: INTERNAL MEDICINE

## 2024-03-22 PROCEDURE — 20000000 HC ICU ROOM

## 2024-03-22 PROCEDURE — 63600175 PHARM REV CODE 636 W HCPCS: Mod: JZ,JG | Performed by: HOSPITALIST

## 2024-03-22 PROCEDURE — 82962 GLUCOSE BLOOD TEST: CPT

## 2024-03-22 PROCEDURE — 94640 AIRWAY INHALATION TREATMENT: CPT

## 2024-03-22 PROCEDURE — 02HV33Z INSERTION OF INFUSION DEVICE INTO SUPERIOR VENA CAVA, PERCUTANEOUS APPROACH: ICD-10-PCS | Performed by: RADIOLOGY

## 2024-03-22 PROCEDURE — 80053 COMPREHEN METABOLIC PANEL: CPT | Performed by: INTERNAL MEDICINE

## 2024-03-22 PROCEDURE — 25000003 PHARM REV CODE 250: Performed by: INTERNAL MEDICINE

## 2024-03-22 PROCEDURE — 63600175 PHARM REV CODE 636 W HCPCS: Performed by: HOSPITALIST

## 2024-03-22 PROCEDURE — 99233 SBSQ HOSP IP/OBS HIGH 50: CPT | Mod: ,,, | Performed by: INTERNAL MEDICINE

## 2024-03-22 PROCEDURE — 63600175 PHARM REV CODE 636 W HCPCS

## 2024-03-22 PROCEDURE — 85025 COMPLETE CBC W/AUTO DIFF WBC: CPT | Performed by: INTERNAL MEDICINE

## 2024-03-22 RX ORDER — ALBUMIN HUMAN 250 G/1000ML
25 SOLUTION INTRAVENOUS ONCE
Status: COMPLETED | OUTPATIENT
Start: 2024-03-22 | End: 2024-03-22

## 2024-03-22 RX ORDER — DOBUTAMINE HYDROCHLORIDE 400 MG/100ML
2.5 INJECTION INTRAVENOUS CONTINUOUS
Status: DISPENSED | OUTPATIENT
Start: 2024-03-22 | End: 2024-03-24

## 2024-03-22 RX ORDER — MUPIROCIN 20 MG/G
OINTMENT TOPICAL 2 TIMES DAILY
Status: DISPENSED | OUTPATIENT
Start: 2024-03-22 | End: 2024-03-27

## 2024-03-22 RX ORDER — FUROSEMIDE 10 MG/ML
40 INJECTION INTRAMUSCULAR; INTRAVENOUS ONCE
Status: COMPLETED | OUTPATIENT
Start: 2024-03-22 | End: 2024-03-22

## 2024-03-22 RX ORDER — FUROSEMIDE 10 MG/ML
80 INJECTION INTRAMUSCULAR; INTRAVENOUS 2 TIMES DAILY WITH MEALS
Status: DISCONTINUED | OUTPATIENT
Start: 2024-03-22 | End: 2024-03-26

## 2024-03-22 RX ADMIN — MEROPENEM 1 G: 1 INJECTION, POWDER, FOR SOLUTION INTRAVENOUS at 04:03

## 2024-03-22 RX ADMIN — IPRATROPIUM BROMIDE AND ALBUTEROL SULFATE 3 ML: 2.5; .5 SOLUTION RESPIRATORY (INHALATION) at 08:03

## 2024-03-22 RX ADMIN — IPRATROPIUM BROMIDE AND ALBUTEROL SULFATE 3 ML: 2.5; .5 SOLUTION RESPIRATORY (INHALATION) at 01:03

## 2024-03-22 RX ADMIN — ATORVASTATIN CALCIUM 40 MG: 40 TABLET, FILM COATED ORAL at 09:03

## 2024-03-22 RX ADMIN — METOPROLOL SUCCINATE 12.5 MG: 25 TABLET, EXTENDED RELEASE ORAL at 08:03

## 2024-03-22 RX ADMIN — OXYCODONE 5 MG: 5 TABLET ORAL at 03:03

## 2024-03-22 RX ADMIN — BUDESONIDE INHALATION 0.5 MG: 0.5 SUSPENSION RESPIRATORY (INHALATION) at 08:03

## 2024-03-22 RX ADMIN — VANCOMYCIN HYDROCHLORIDE 2000 MG: 500 INJECTION, POWDER, LYOPHILIZED, FOR SOLUTION INTRAVENOUS at 11:03

## 2024-03-22 RX ADMIN — METRONIDAZOLE 500 MG: 5 INJECTION, SOLUTION INTRAVENOUS at 03:03

## 2024-03-22 RX ADMIN — EMPAGLIFLOZIN 10 MG: 10 TABLET, FILM COATED ORAL at 08:03

## 2024-03-22 RX ADMIN — LOSARTAN POTASSIUM 12.5 MG: 25 TABLET, FILM COATED ORAL at 08:03

## 2024-03-22 RX ADMIN — DOBUTAMINE HYDROCHLORIDE 2.5 MCG/KG/MIN: 400 INJECTION INTRAVENOUS at 09:03

## 2024-03-22 RX ADMIN — ENOXAPARIN SODIUM 40 MG: 40 INJECTION SUBCUTANEOUS at 05:03

## 2024-03-22 RX ADMIN — PANTOPRAZOLE SODIUM 40 MG: 40 TABLET, DELAYED RELEASE ORAL at 08:03

## 2024-03-22 RX ADMIN — POTASSIUM CHLORIDE 40 MEQ: 1500 TABLET, EXTENDED RELEASE ORAL at 09:03

## 2024-03-22 RX ADMIN — METRONIDAZOLE 500 MG: 5 INJECTION, SOLUTION INTRAVENOUS at 10:03

## 2024-03-22 RX ADMIN — POTASSIUM CHLORIDE 40 MEQ: 1500 TABLET, EXTENDED RELEASE ORAL at 08:03

## 2024-03-22 RX ADMIN — METRONIDAZOLE 500 MG: 5 INJECTION, SOLUTION INTRAVENOUS at 04:03

## 2024-03-22 RX ADMIN — DOCUSATE SODIUM 100 MG: 100 CAPSULE, LIQUID FILLED ORAL at 08:03

## 2024-03-22 RX ADMIN — INSULIN ASPART 2 UNITS: 100 INJECTION, SOLUTION INTRAVENOUS; SUBCUTANEOUS at 07:03

## 2024-03-22 RX ADMIN — FUROSEMIDE 40 MG: 10 INJECTION, SOLUTION INTRAMUSCULAR; INTRAVENOUS at 04:03

## 2024-03-22 RX ADMIN — FUROSEMIDE 80 MG: 10 INJECTION, SOLUTION INTRAVENOUS at 04:03

## 2024-03-22 RX ADMIN — ACETAMINOPHEN 1000 MG: 500 TABLET ORAL at 08:03

## 2024-03-22 RX ADMIN — ASPIRIN 81 MG: 81 TABLET, COATED ORAL at 08:03

## 2024-03-22 RX ADMIN — ALBUMIN (HUMAN) 25 G: 12.5 SOLUTION INTRAVENOUS at 03:03

## 2024-03-22 RX ADMIN — FUROSEMIDE 40 MG: 10 INJECTION, SOLUTION INTRAMUSCULAR; INTRAVENOUS at 09:03

## 2024-03-22 RX ADMIN — OXYCODONE 5 MG: 5 TABLET ORAL at 09:03

## 2024-03-22 RX ADMIN — ZOLPIDEM TARTRATE 5 MG: 5 TABLET ORAL at 09:03

## 2024-03-22 NOTE — PLAN OF CARE
Problem: Gas Exchange Impaired  Goal: Optimal Gas Exchange  3/21/2024 1933 by Joellen Gloria, RRT  Outcome: Ongoing, Progressing  3/21/2024 0549 by Joellen Gloria, RRT  Outcome: Ongoing, Progressing

## 2024-03-22 NOTE — SUBJECTIVE & OBJECTIVE
Interval History: Patient seen today, now on high flow NC. CXR yesterday with increased pulmonary densities and small pleural effusion. Lactate remains elevated. Start dobutamine 5mcg/kg/min. Increased lasix to 80mg BID.    Review of Systems   Constitutional: Positive for malaise/fatigue. Negative for fever.   Cardiovascular:  Positive for dyspnea on exertion, leg swelling and orthopnea.   Respiratory:  Positive for shortness of breath.    Neurological:  Positive for weakness.     Objective:     Vital Signs (Most Recent):  Temp: 97.7 °F (36.5 °C) (03/22/24 1114)  Pulse: 107 (03/22/24 1114)  Resp: 20 (03/22/24 1114)  BP: 112/67 (03/22/24 1114)  SpO2: 100 % (03/22/24 1114) Vital Signs (24h Range):  Temp:  [97.4 °F (36.3 °C)-97.9 °F (36.6 °C)] 97.7 °F (36.5 °C)  Pulse:  [] 107  Resp:  [16-28] 20  SpO2:  [95 %-100 %] 100 %  BP: (102-129)/(67-77) 112/67     Weight: 102.5 kg (226 lb)  Body mass index is 29.02 kg/m².     SpO2: 100 %         Intake/Output Summary (Last 24 hours) at 3/22/2024 1255  Last data filed at 3/22/2024 0927  Gross per 24 hour   Intake 1500 ml   Output 1500 ml   Net 0 ml       Lines/Drains/Airways       Peripheral Intravenous Line  Duration                  Peripheral IV - Single Lumen 03/20/24 0225 20 G Posterior;Right Forearm 2 days         Peripheral IV - Single Lumen 03/21/24 2300 20 G Left;Posterior Forearm <1 day                       Physical Exam  Constitutional:       Appearance: He is ill-appearing.   Neck:      Vascular: JVD present.   Cardiovascular:      Rate and Rhythm: Tachycardia present.   Pulmonary:      Effort: Tachypnea present.      Breath sounds: Wheezing and rales present.   Abdominal:      General: Bowel sounds are normal.      Palpations: Abdomen is soft.   Musculoskeletal:      Right lower leg: Edema present.      Left lower leg: Edema present.   Skin:     Coloration: Skin is pale.   Neurological:      Mental Status: He is alert and oriented to person, place, and time.  "           Significant Labs: ABG:   Recent Labs   Lab 03/21/24  1139 03/21/24  1329   PH 7.45 7.52*   PCO2 45 37   HCO3 31.3* 30.2*   POCSATURATED 58.6* 84.2*   , Blood Culture: No results for input(s): "LABBLOO" in the last 48 hours., BMP:   Recent Labs   Lab 03/21/24  0546 03/22/24  0924   * 212*   * 130*   K 3.6 4.4   CL 95* 95*   CO2 28 26   BUN 13 17   CREATININE 1.21 1.21   CALCIUM 8.9 8.8   , CMP   Recent Labs   Lab 03/21/24  0546 03/22/24  0924   * 130*   K 3.6 4.4   CL 95* 95*   CO2 28 26   * 212*   BUN 13 17   CREATININE 1.21 1.21   CALCIUM 8.9 8.8   PROT  --  7.1   ALBUMIN  --  2.9*   BILITOT  --  1.2   ALKPHOS  --  129*   AST  --  27   ALT  --  25   ANIONGAP 13 13   , CBC   Recent Labs   Lab 03/22/24  0716   WBC 4.83   HGB 11.6*   HCT 36.9*      , INR No results for input(s): "INR", "PROTIME" in the last 48 hours., Lipid Panel No results for input(s): "CHOL", "HDL", "LDLCALC", "TRIG", "CHOLHDL" in the last 48 hours., and Troponin No results for input(s): "TROPONINI" in the last 48 hours.    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 >5L/min, index >2.2 indexed to BSA); MAP 75, : 0.85 Paez, Rajinder: 2.33    Pulmonary Wedge A Wave Pressure: 32 mmHg ; Pulmonary Wedge V Wave Pressure: 44 mmHg ; Pulmonary Wedge Mean Pressure: 27 mmHg    Pulmonary Artery Systolic Pressure: 57 mmHg ; Pulmonary Artery Diastolic Pressure: 22 mmHg ; Pulmonary Artery Mean Pressure: 38 mmHg    Recommend clinical correlation, continued diuresis w/ titration +/- dobutamine to goal net negative 1-2ml/kg/hr    Using US guidance, tried to place PIV in bascilic vein of RUE pre-procedure, however patient has flaccid hemiparesis and R sided aphasia and hemineglect; able to cannulate basilic vein x 2 " preprocedure in addition to nurse attempts via US guidance, but could not advance PIV, micropuncture wire, etc for planned upgrade to 7F sheath intraprocedure for use as access due to flaccid paralysis (no resting muscle tone). Explained to patient, who was amenable to R femoral vein access which was performed without difficulty following lidocaine administration, in sterile fashion once timeout performed and patient prepped/draped.    The procedure log was documented by Documenter: Isabel Alcaraz RN and verified by Ruiz Pacheco MD.    Date: 2/23/2024  Time: 10:27 AM     , Echocardiogram: Transthoracic echo (TTE) complete (Cupid Only):   Results for orders placed or performed during the hospital encounter of 03/19/24   Echo Saline Bubble? Yes   Result Value Ref Range    BSA 2.31 m2    LVIDd 6.02 (A) 3.5 - 6.0 cm    LV Systolic Volume 136.82 mL    LV Systolic Volume Index 59.7 mL/m2    LVIDs 5.32 (A) 2.1 - 4.0 cm    LV Diastolic Volume 181.34 mL    LV Diastolic Volume Index 79.19 mL/m2    IVS 1.01 0.6 - 1.1 cm    FS 12 (A) 28 - 44 %    Left Ventricle Relative Wall Thickness 0.28 cm    Posterior Wall 0.83 0.6 - 1.1 cm    LV mass 223.09 g    LV Mass Index 97 g/m2    MV Peak E Rohit 0.95 m/s    TDI LATERAL 0.15 m/s    TDI SEPTAL 0.10 m/s    E/E' ratio 7.60 m/s    MV Peak A Rohit 0.32 m/s    E/A ratio 2.97     E wave deceleration time 79.46 msec    LV SEPTAL E/E' RATIO 9.50 m/s    LV LATERAL E/E' RATIO 6.33 m/s    RVDD 4.41 cm    TAPSE 1.30 cm    LA size 5.44 cm    Left Atrium Major Axis 6.19 cm    RA Major Axis 4.29 cm    MV stenosis pressure 1/2 time 23.04 ms    MV valve area p 1/2 method 9.55 cm2    IVC diameter 2.54 cm    Mean e' 0.13 m/s    ZLVIDS 0.06     ZLVIDD -3.74     EF 20 %    Est. RA pres 15 mmHg    Narrative      Left Ventricle: The left ventricle is moderately dilated. Normal wall   thickness. Regional wall motion abnormalities present.  Anterior wall and   apex are akinetic.  Inferior wall and lateral  wall are hypokinetic. There   is severely reduced systolic function with a visually estimated ejection   fraction of less than 30%. Ejection fraction by visual approximation is   20%. There is diastolic dysfunction.    Right Ventricle: Mild right ventricular enlargement.    Left Atrium: Left atrium is severely dilated.    Right Atrium: Right atrium is mildly dilated.    IVC/SVC: Elevated venous pressure at 15 mmHg.    Pericardium: There is a trivial effusion.     , and X-Ray: CXR: X-Ray Chest 1 View (CXR): X-Ray Chest AP Portable  Order: [id number]  Status: Final result       Visible to patient: No (inaccessible in Patient Portal)       Next appt: 03/26/2024 at 09:00 AM in General Surgery (Karson Martinez DO)    0 Result Notes  Details    Reading Physician Reading Date Result Priority   Addison Garzon II, MD  [phone] 3/21/2024 STAT     Narrative & Impression  EXAMINATION:  XR CHEST AP PORTABLE     CLINICAL HISTORY:  PULMONARY EDEMA;     COMPARISON:  19 March 2024     TECHNIQUE:  XR CHEST AP PORTABLE     FINDINGS:  The heart and mediastinum are normal in size and configuration.  The pulmonary vascularity is prompt.  The bilateral increasing pulmonary density in small effusions and from previous.  No other lung infiltrates, effusions, pneumothorax or other abnormality is demonstrated.     Impression:     Increased pulmonary density and small effusions.        Electronically signed by: Addison Garzon  Date:                                            03/21/2024  Time:                                           09:18

## 2024-03-22 NOTE — PROGRESS NOTES
Pharmacist Renal Dose Adjustment Note    Karin Carrera is a 53 y.o. male being treated with the medication meropenem    Patient Data:    Vital Signs (Most Recent):  Temp: 97.7 °F (36.5 °C) (03/22/24 1114)  Pulse: (!) 113 (03/22/24 1348)  Resp: 20 (03/22/24 1348)  BP: 115/81 (03/22/24 1330)  SpO2: 99 % (03/22/24 1348) Vital Signs (72h Range):  Temp:  [68 °F (20 °C)-98.4 °F (36.9 °C)]   Pulse:  []   Resp:  [16-36]   BP: (100-133)/(60-82)   SpO2:  [82 %-100 %]      Recent Labs   Lab 03/20/24  0457 03/21/24  0546 03/22/24  0924   CREATININE 1.12 1.21 1.21     Serum creatinine: 1.21 mg/dL 03/22/24 0924  Estimated creatinine clearance: 90.2 mL/min    Medication:meropenem dose: 1 gram frequency q6h will be changed to medication:meropenem dose:1 gram frequency:q8h    Pharmacist's Name: Berna Perry  Pharmacist's Extension: [phone]

## 2024-03-22 NOTE — ASSESSMENT & PLAN NOTE
Echo reveals Ejection fraction by visual approximation is 20%. There is diastolic dysfunction.   BNP 7486  Continue IV lasix 40 mg QD  Continue toprol XL 25 mg 1/2 tab QD  Continue jardiance 10 mg QD  Resume losartan 25 mg 1/2 tab   Daily weights  Strict I/Os    3/22/2024:  - Patient seen and evaluated by Dr. Escobar  - Mixed shock: Cardiogenic/sepsis  - Discontinue BB  - Start IV dobutamine 5mcg/kg/min. Increase lasix to 80mg BID  - Strict I&O; strict daily weight  - Will up titrate afterload reduction as BP allows  - Unable to tolerate MRAs due to hyponatremia

## 2024-03-22 NOTE — ASSESSMENT & PLAN NOTE
Continue home meds; he is always SOB and has ROSEMARIE non-compliant with CPAP, smokes, and does not take home meds; CXR shows patchy ground-glass and reticular infiltrate/edema throughout the bilateral lungs; on IV Lasix  -cardiology consulted, now on inotrope infusion along w/ iv lasix. Cont GDMT per cardiology

## 2024-03-22 NOTE — PROCEDURES
Procedure performed by Joseph RUEDA under the supervision of Dr. Sepulveda . 5 [place] PICC line placed in basilic vein in right upper extremity. Informed consent obtained including risks and possible complications. Patient pepped with chloro prep and draped in a sterile fashion. 2 cc 1% lidocaine infused. Utilizing U/S guidance a 47 cm 5 [place] PICC line placed and position verified by fluoroscopy. PICC line flushed with heparinized saline.Statlock and bandage applied. Patient tolerated procedure well with no immediate complication.

## 2024-03-22 NOTE — PROGRESS NOTES
[facility] - [facility] Tele[facility]  Cardiology  Progress Note    Patient Name: Karin Carrera  MRN: [id number]  Admission Date: 3/19/2024  Hospital Length of Stay: 3 days  Code Status: Full Code   Attending Physician: Tyree Chávez MD   Primary Care Physician: Walker Smith MD  Expected Discharge Date:   Principal Problem:Sepsis    Subjective:     Hospital Course:   No notes on file    Interval History: Patient seen today, now on high flow NC. CXR yesterday with increased pulmonary densities and small pleural effusion. Lactate remains elevated. Start dobutamine 5mcg/kg/min. Increased lasix to 80mg BID.    Review of Systems   Constitutional: Positive for malaise/fatigue. Negative for fever.   Cardiovascular:  Positive for dyspnea on exertion, leg swelling and orthopnea.   Respiratory:  Positive for shortness of breath.    Neurological:  Positive for weakness.     Objective:     Vital Signs (Most Recent):  Temp: 97.7 °F (36.5 °C) (03/22/24 1114)  Pulse: 107 (03/22/24 1114)  Resp: 20 (03/22/24 1114)  BP: 112/67 (03/22/24 1114)  SpO2: 100 % (03/22/24 1114) Vital Signs (24h Range):  Temp:  [97.4 °F (36.3 °C)-97.9 °F (36.6 °C)] 97.7 °F (36.5 °C)  Pulse:  [] 107  Resp:  [16-28] 20  SpO2:  [95 %-100 %] 100 %  BP: (102-129)/(67-77) 112/67     Weight: 102.5 kg (226 lb)  Body mass index is 29.02 kg/m².     SpO2: 100 %         Intake/Output Summary (Last 24 hours) at 3/22/2024 1255  Last data filed at 3/22/2024 0927  Gross per 24 hour   Intake 1500 ml   Output 1500 ml   Net 0 ml       Lines/Drains/Airways       Peripheral Intravenous Line  Duration                  Peripheral IV - Single Lumen 03/20/24 0225 20 G Posterior;Right Forearm 2 days         Peripheral IV - Single Lumen 03/21/24 2300 20 G Left;Posterior Forearm <1 day                       Physical Exam  Constitutional:       Appearance: He is ill-appearing.   Neck:      Vascular: JVD present.   Cardiovascular:      Rate and Rhythm:  "Tachycardia present.   Pulmonary:      Effort: Tachypnea present.      Breath sounds: Wheezing and rales present.   Abdominal:      General: Bowel sounds are normal.      Palpations: Abdomen is soft.   Musculoskeletal:      Right lower leg: Edema present.      Left lower leg: Edema present.   Skin:     Coloration: Skin is pale.   Neurological:      Mental Status: He is alert and oriented to person, place, and time.            Significant Labs: ABG:   Recent Labs   Lab 03/21/24  1139 03/21/24  1329   PH 7.45 7.52*   PCO2 45 37   HCO3 31.3* 30.2*   POCSATURATED 58.6* 84.2*   , Blood Culture: No results for input(s): "LABBLOO" in the last 48 hours., BMP:   Recent Labs   Lab 03/21/24  0546 03/22/24  0924   * 212*   * 130*   K 3.6 4.4   CL 95* 95*   CO2 28 26   BUN 13 17   CREATININE 1.21 1.21   CALCIUM 8.9 8.8   , CMP   Recent Labs   Lab 03/21/24  0546 03/22/24  0924   * 130*   K 3.6 4.4   CL 95* 95*   CO2 28 26   * 212*   BUN 13 17   CREATININE 1.21 1.21   CALCIUM 8.9 8.8   PROT  --  7.1   ALBUMIN  --  2.9*   BILITOT  --  1.2   ALKPHOS  --  129*   AST  --  27   ALT  --  25   ANIONGAP 13 13   , CBC   Recent Labs   Lab 03/22/24  0716   WBC 4.83   HGB 11.6*   HCT 36.9*      , INR No results for input(s): "INR", "PROTIME" in the last 48 hours., Lipid Panel No results for input(s): "CHOL", "HDL", "LDLCALC", "TRIG", "CHOLHDL" in the last 48 hours., and Troponin No results for input(s): "TROPONINI" in the last 48 hours.    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 >5L/min, index >2.2 indexed to BSA); MAP 75, : 0.85 Paez, Rajinder: 2.33    Pulmonary Wedge A Wave Pressure: 32 mmHg ; Pulmonary Wedge V Wave Pressure: 44 mmHg ; Pulmonary Wedge Mean Pressure: 27 mmHg    Pulmonary Artery Systolic " Pressure: 57 mmHg ; Pulmonary Artery Diastolic Pressure: 22 mmHg ; Pulmonary Artery Mean Pressure: 38 mmHg    Recommend clinical correlation, continued diuresis w/ titration +/- dobutamine to goal net negative 1-2ml/kg/hr    Using US guidance, tried to place PIV in bascilic vein of RUE pre-procedure, however patient has flaccid hemiparesis and R sided aphasia and hemineglect; able to cannulate basilic vein x 2 preprocedure in addition to nurse attempts via US guidance, but could not advance PIV, micropuncture wire, etc for planned upgrade to 7F sheath intraprocedure for use as access due to flaccid paralysis (no resting muscle tone). Explained to patient, who was amenable to R femoral vein access which was performed without difficulty following lidocaine administration, in sterile fashion once timeout performed and patient prepped/draped.    The procedure log was documented by Documenter: Isabel Alcaraz RN and verified by Ruiz Pacheco MD.    Date: 2/23/2024  Time: 10:27 AM     , Echocardiogram: Transthoracic echo (TTE) complete (Cupid Only):   Results for orders placed or performed during the hospital encounter of 03/19/24   Echo Saline Bubble? Yes   Result Value Ref Range    BSA 2.31 m2    LVIDd 6.02 (A) 3.5 - 6.0 cm    LV Systolic Volume 136.82 mL    LV Systolic Volume Index 59.7 mL/m2    LVIDs 5.32 (A) 2.1 - 4.0 cm    LV Diastolic Volume 181.34 mL    LV Diastolic Volume Index 79.19 mL/m2    IVS 1.01 0.6 - 1.1 cm    FS 12 (A) 28 - 44 %    Left Ventricle Relative Wall Thickness 0.28 cm    Posterior Wall 0.83 0.6 - 1.1 cm    LV mass 223.09 g    LV Mass Index 97 g/m2    MV Peak E Rohit 0.95 m/s    TDI LATERAL 0.15 m/s    TDI SEPTAL 0.10 m/s    E/E' ratio 7.60 m/s    MV Peak A Rohit 0.32 m/s    E/A ratio 2.97     E wave deceleration time 79.46 msec    LV SEPTAL E/E' RATIO 9.50 m/s    LV LATERAL E/E' RATIO 6.33 m/s    RVDD 4.41 cm    TAPSE 1.30 cm    LA size 5.44 cm    Left Atrium Major Axis 6.19 cm    RA Major Axis  4.29 cm    MV stenosis pressure 1/2 time 23.04 ms    MV valve area p 1/2 method 9.55 cm2    IVC diameter 2.54 cm    Mean e' 0.13 m/s    ZLVIDS 0.06     ZLVIDD -3.74     EF 20 %    Est. RA pres 15 mmHg    Narrative      Left Ventricle: The left ventricle is moderately dilated. Normal wall   thickness. Regional wall motion abnormalities present.  Anterior wall and   apex are akinetic.  Inferior wall and lateral wall are hypokinetic. There   is severely reduced systolic function with a visually estimated ejection   fraction of less than 30%. Ejection fraction by visual approximation is   20%. There is diastolic dysfunction.    Right Ventricle: Mild right ventricular enlargement.    Left Atrium: Left atrium is severely dilated.    Right Atrium: Right atrium is mildly dilated.    IVC/SVC: Elevated venous pressure at 15 mmHg.    Pericardium: There is a trivial effusion.     , and X-Ray: CXR: X-Ray Chest 1 View (CXR): X-Ray Chest AP Portable  Order: [id number]  Status: Final result       Visible to patient: No (inaccessible in Patient Portal)       Next appt: 03/26/2024 at 09:00 AM in General Surgery (Karson Martinez, )    0 Result Notes  Details    Reading Physician Reading Date Result Priority   Addison Garzon II, MD  [phone] 3/21/2024 STAT     Narrative & Impression  EXAMINATION:  XR CHEST AP PORTABLE     CLINICAL HISTORY:  PULMONARY EDEMA;     COMPARISON:  19 March 2024     TECHNIQUE:  XR CHEST AP PORTABLE     FINDINGS:  The heart and mediastinum are normal in size and configuration.  The pulmonary vascularity is prompt.  The bilateral increasing pulmonary density in small effusions and from previous.  No other lung infiltrates, effusions, pneumothorax or other abnormality is demonstrated.     Impression:     Increased pulmonary density and small effusions.        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 He reports recently having a cholecystectomy on 3/9/24 here at [place] by Dr. Bennett. He endorses RUQ abdominal pain and SOB. SOB is similar to his previous COPD episodes. Endorses orthopnea and INFANTE. He endorses surgical site drainage mostly pus that started 2 days ago. Endorses decreased appetite around the same time. Endorses bilateral leg swelling and right leg redness for about 1 month and now swelling has gone up his thighs and abdomen in the past 2 days. Denies f/c/cp/n/v/d. Denies urinary, or bowel habit changes. Reports compliance with all home medications. Patient uses 2L NC O2 at home. He communicates through writing on paper and texts due to his expressive aphasia 2/2 CVA. Labs were significant for BNP 7486. EKG sinus tach 110 with multifocal PVCs. CXR showed continued  Cardiomegaly. CTA PE protocol was significant for no pulmonary embolus, four-chamber cardiomegaly, pulmonary edema and small pleural effusions. Cardiology has been consulted for the continued care and management of his cardiac conditions.     * Sepsis  - Recent gallbladder removal (3/9/2024)  - Being followed by primary medical team    Acute on chronic combined systolic and diastolic heart failure  Echo reveals Ejection fraction by visual approximation is 20%. There is diastolic dysfunction.   BNP 7486  Continue IV lasix 40 mg QD  Continue toprol XL 25 mg 1/2 tab QD  Continue jardiance 10 mg QD  Resume losartan 25 mg 1/2 tab   Daily weights  Strict I/Os    3/22/2024:  - Patient seen and evaluated by Dr. Escobar  - Mixed shock:  Cardiogenic/sepsis  - Discontinue BB  - Start IV dobutamine 5mcg/kg/min. Increase lasix to 80mg BID  - Strict I&O; strict daily weight  - Will up titrate afterload reduction as BP allows  - Unable to tolerate MRAs due to hyponatremia        VTE Risk Mitigation (From admission, onward)           Ordered     enoxaparin injection 40 mg  Every 24 hours         03/19/24 8846                    ISADORA Hendrickson  Cardiology  [facility] - [address] [place] [address]

## 2024-03-22 NOTE — NURSING
2946 SS received order for transfer to ICU per Dr. Chávez. SS called and spoke with Emma in ICU at this time. They do not have any available beds at this time, patient on wait list for a bed. Dr. Chávez notified. Order received to place patient on high flow, respiratory therapist notified at bedside.

## 2024-03-22 NOTE — PLAN OF CARE
Problem: Infection  Goal: Absence of Infection Signs and Symptoms  Outcome: Ongoing, Progressing  Intervention: Prevent or Manage Infection  Flowsheets (Taken 3/22/2024 9029)  Fever Reduction/Comfort Measures: lightweight bedding  Infection Management: aseptic technique maintained  Isolation Precautions: precautions maintained

## 2024-03-22 NOTE — PROGRESS NOTES
[facility] - [facility] Tele[facility]  General Surgery  Progress Note    Subjective:     History of Present Illness:  53-year-old  male admitted to the hospital for shortness of breath and abdominal pain.  Recent cholecystectomy for acute cholecystitis on 03/09/2024; intraoperative cholangiogram negative.  History of alcoholic fatty liver with ascites, ejection fraction 25% with CHF, pulmonary hypertension; history of CVA in 2016 with right hemiparesis and expressive aphasia; hypertension, hyperlipidemia, diabetes, GERD, sleep apnea.  Patient admitted for CHF exacerbation; BNP 7486.  Patient also with cellulitis of the right lower extremity.  CT scan the abdomen pelvis done without contrast showed a small air-fluid collection in the gallbladder fossa measuring up to 3.7 cm, could reflect postoperative fluid collection versus abscess.  No leukocytosis.  Patient complain of some drainage coming from around umbilicus.  Currently, no drainage coming from any incisions.  Patient states he is having some drainage from inside as the umbilicus, but none draining currently.  Complain of abdominal soreness around the incisions and some right upper quadrant abdominal pain.  Liver enzymes unremarkable.  No current nausea or vomiting.  Patient wanting to eat.  Currently NPO.  Denies any chest pain.  Shortness of breath has resolving.  Denies any fevers or chills    Post-Op Info:  * No surgery found *         Interval History:  Presently on high-flow nasal cannula.  Concerned for ARDS, pulmonary consulted.  ICU bed pending    Medications:  Continuous Infusions:   DOBUTamine IV infusion (non-titrating) 5 mcg/kg/min (03/22/24 8682)     Scheduled Meds:   acetaminophen  1,000 mg Oral TID    albumin human 25%  25 g Intravenous Once    albuterol-ipratropium  3 mL Nebulization Q6H    aspirin  81 mg Oral Daily    atorvastatin  40 mg Oral QHS    budesonide  0.5 mg Nebulization Q12H    docusate sodium  100 mg Oral BID     empagliflozin  10 mg Oral Daily    enoxparin  40 mg Subcutaneous Q24H (prophylaxis, 1700)    furosemide (LASIX) injection  80 mg Intravenous BID WM    losartan  12.5 mg Oral Daily    meropenem (MERREM) IVPB  1 g Intravenous Q8H    metronidazole  500 mg Intravenous Q8H    mupirocin   Nasal BID    pantoprazole  40 mg Oral Daily    potassium chloride  40 mEq Oral BID    vancomycin (VANCOCIN) IV (PEDS and ADULTS)  2,000 mg Intravenous Q24H    zolpidem  5 mg Oral QHS     PRN Meds:bisacodyL, dextromethorphan-guaiFENesin  mg/5 ml, dextrose 10%, dextrose 10%, glucagon (human recombinant), glucose, glucose, insulin aspart U-100, melatonin, naloxone, ondansetron, oxyCODONE, simethicone, sodium chloride 0.9%, traZODone, vancomycin - pharmacy to dose     Review of patient's allergies indicates:  No Known Allergies  Objective:     Vital Signs (Most Recent):  Temp: 97.4 °F (36.3 °C) (03/22/24 1506)  Pulse: 110 (03/22/24 1506)  Resp: (!) 22 (03/22/24 1540)  BP: 116/83 (03/22/24 1506)  SpO2: 99 % (03/22/24 1506) Vital Signs (24h Range):  Temp:  [97.4 °F (36.3 °C)-97.9 °F (36.6 °C)] 97.4 °F (36.3 °C)  Pulse:  [] 110  Resp:  [16-28] 22  SpO2:  [96 %-100 %] 99 %  BP: (102-129)/(67-83) 116/83     Weight: 102.5 kg (226 lb)  Body mass index is 29.02 kg/m².    Intake/Output - Last 3 Shifts         03/20 0700  03/21 0659 03/21 0700 03/22 0659 03/22 0700  03/23 0659    P.O. 720 720 360    IV Piggyback  900     Total Intake(mL/kg) 720 (7) 1620 (15.8) 360 (3.5)    Urine (mL/kg/hr) 1300 (0.5) 1600 (0.7) 250 (0.3)    Other  0     Stool  0     Total Output 1300 1600 250    Net -580 +20 +110           Urine Occurrence  200 x     Stool Occurrence  2 x              Physical Exam  Vitals and nursing note reviewed.   Constitutional:       General: He is not in acute distress.     Appearance: He is not toxic-appearing or diaphoretic.   HENT:      Head: Normocephalic and atraumatic.      Mouth/Throat:      Mouth: Mucous membranes are  dry.      Pharynx: No posterior oropharyngeal erythema.   Eyes:      General: No scleral icterus.        Right eye: No discharge.         Left eye: No discharge.      Extraocular Movements: Extraocular movements intact.   Cardiovascular:      Rate and Rhythm: Regular rhythm. Tachycardia present.      Pulses: Normal pulses.      Heart sounds: Normal heart sounds. No murmur heard.  Pulmonary:      Effort: Pulmonary effort is normal. No respiratory distress.      Breath sounds: Wheezing present.   Abdominal:      General: Bowel sounds are normal. There is no distension.      Palpations: Abdomen is soft.      Tenderness: There is abdominal tenderness. There is no rebound.   Musculoskeletal:         General: No deformity.      Cervical back: Neck supple.      Right lower leg: Edema present.      Left lower leg: Edema present.      Comments: see media photos. Swelling up to thighs   Skin:     General: Skin is warm and dry.      Coloration: Skin is not jaundiced.      Findings: Erythema (right leg - see media photos) present. No lesion or rash.   Neurological:      Mental Status: He is alert.   Psychiatric:         Behavior: Behavior normal.          Significant Labs:  I have reviewed all pertinent lab results within the past 24 hours.    Significant Diagnostics:  I have reviewed all pertinent imaging results/findings within the past 24 hours.  Assessment/Plan:     No notes have been filed under this hospital service.  Service: General Surgery      Pierre Mccall, ALINA  General Surgery  [facility] - [address] [place] [address]

## 2024-03-22 NOTE — SUBJECTIVE & OBJECTIVE
Interval History:     Pt still hypoxic, now on [place] NC, c/f pt going into ARDS, pulmonary consulted appreciate recs , requested ICU bed however d/t no bed availability he has been placed on icu waiting list per icu staff lead.      Review of Systems   Respiratory:  Positive for shortness of breath.    Cardiovascular:  Positive for leg swelling.   Gastrointestinal:  Positive for abdominal pain.     Objective:     Vital Signs (Most Recent):  Temp: 97.7 °F (36.5 °C) (03/22/24 1114)  Pulse: 107 (03/22/24 1114)  Resp: 20 (03/22/24 1114)  BP: 112/67 (03/22/24 1114)  SpO2: 100 % (03/22/24 1114) Vital Signs (24h Range):  Temp:  [97.4 °F (36.3 °C)-97.9 °F (36.6 °C)] 97.7 °F (36.5 °C)  Pulse:  [] 107  Resp:  [16-28] 20  SpO2:  [95 %-100 %] 100 %  BP: (102-129)/(67-77) 112/67     Weight: 102.5 kg (226 lb)  Body mass index is 29.02 kg/m².    Intake/Output Summary (Last 24 hours) at 3/22/2024 1233  Last data filed at 3/22/2024 0927  Gross per 24 hour   Intake 1500 ml   Output 1500 ml   Net 0 ml           Physical Exam  Vitals and nursing note reviewed.   Constitutional:       General: He is not in acute distress.     Appearance: He is not toxic-appearing or diaphoretic.   HENT:      Head: Normocephalic and atraumatic.      Right Ear: External ear normal.      Left Ear: External ear normal.      Nose: Nose normal.      Mouth/Throat:      Mouth: Mucous membranes are dry.      Pharynx: Oropharynx is clear. No posterior oropharyngeal erythema.   Eyes:      General: No scleral icterus.        Right eye: No discharge.         Left eye: No discharge.      Extraocular Movements: Extraocular movements intact.      Pupils: Pupils are equal, round, and reactive to light.   Cardiovascular:      Rate and Rhythm: Regular rhythm. Tachycardia present.      Pulses: Normal pulses.      Heart sounds: Normal heart sounds. No murmur heard.  Pulmonary:      Effort: Pulmonary effort is normal. No respiratory distress.      Breath sounds:  Wheezing present.   Abdominal:      General: Bowel sounds are normal. There is no distension.      Palpations: Abdomen is soft.      Tenderness: There is abdominal tenderness. There is guarding. There is no rebound.   Musculoskeletal:         General: Swelling and tenderness present. No deformity.      Cervical back: Neck supple.      Right lower leg: Edema present.      Left lower leg: Edema present.      Comments: see media photos. Swelling up to thighs   Skin:     General: Skin is warm and dry.      Coloration: Skin is not jaundiced.      Findings: Erythema (right leg - see media photos) present. No lesion or rash.   Neurological:      Mental Status: He is alert.      Sensory: No sensory deficit.   Psychiatric:         Behavior: Behavior normal.             Significant Labs: All pertinent labs within the past 24 hours have been reviewed.    Significant Imaging: I have reviewed all pertinent imaging results/findings within the past 24 hours.

## 2024-03-22 NOTE — ASSESSMENT & PLAN NOTE
Acute hypoxic respiratory failure due to many etiologies :  Pulmonary edema 2/2 HFrEF  COPD  Pneumonia>consolidation  -now c/f pt developing ARDS  -cont broad spectrum iv abx  -cont aggressive diuersis  -cont inhalers  -oxygen support, currently on HFNC  -Pulmonary and crtical care team consulted, appreicate recs.

## 2024-03-22 NOTE — ASSESSMENT & PLAN NOTE
No leukocytosis.      There is an air fluid collection under the gallbladder fossa; Surgicel was used during surgery, which can account for gas in the material; patient did have ascites and this fluid collection could be ascites.      I would recommend getting an IR aspiration of the site and if it has no purulent drainage, will send the fluid off for culture; if purulent drainage is aspirated, place IR drain at the site.    No purulent drainage or infection at any of the surgical incisions    3/22: CT aspiration 3/20 -  6 cc dark yellow fluid aspirated. fluid cultures negative as of today

## 2024-03-22 NOTE — SUBJECTIVE & OBJECTIVE
Interval History:  Presently on high-flow nasal cannula.  Concerned for ARDS, pulmonary consulted.  ICU bed pending    Medications:  Continuous Infusions:   DOBUTamine IV infusion (non-titrating) 5 mcg/kg/min (03/22/24 1529)     Scheduled Meds:   acetaminophen  1,000 mg Oral TID    albumin human 25%  25 g Intravenous Once    albuterol-ipratropium  3 mL Nebulization Q6H    aspirin  81 mg Oral Daily    atorvastatin  40 mg Oral QHS    budesonide  0.5 mg Nebulization Q12H    docusate sodium  100 mg Oral BID    empagliflozin  10 mg Oral Daily    enoxparin  40 mg Subcutaneous Q24H (prophylaxis, 1700)    furosemide (LASIX) injection  80 mg Intravenous BID WM    losartan  12.5 mg Oral Daily    meropenem (MERREM) IVPB  1 g Intravenous Q8H    metronidazole  500 mg Intravenous Q8H    mupirocin   Nasal BID    pantoprazole  40 mg Oral Daily    potassium chloride  40 mEq Oral BID    vancomycin (VANCOCIN) IV (PEDS and ADULTS)  2,000 mg Intravenous Q24H    zolpidem  5 mg Oral QHS     PRN Meds:bisacodyL, dextromethorphan-guaiFENesin  mg/5 ml, dextrose 10%, dextrose 10%, glucagon (human recombinant), glucose, glucose, insulin aspart U-100, melatonin, naloxone, ondansetron, oxyCODONE, simethicone, sodium chloride 0.9%, traZODone, vancomycin - pharmacy to dose     Review of patient's allergies indicates:  No Known Allergies  Objective:     Vital Signs (Most Recent):  Temp: 97.4 °F (36.3 °C) (03/22/24 1506)  Pulse: 110 (03/22/24 1506)  Resp: (!) 22 (03/22/24 1540)  BP: 116/83 (03/22/24 1506)  SpO2: 99 % (03/22/24 1506) Vital Signs (24h Range):  Temp:  [97.4 °F (36.3 °C)-97.9 °F (36.6 °C)] 97.4 °F (36.3 °C)  Pulse:  [] 110  Resp:  [16-28] 22  SpO2:  [96 %-100 %] 99 %  BP: (102-129)/(67-83) 116/83     Weight: 102.5 kg (226 lb)  Body mass index is 29.02 kg/m².    Intake/Output - Last 3 Shifts         03/20 0700 03/21 0659 03/21 0700 03/22 0659 03/22 0700 03/23 0659    P.O. 720 720 360    IV Piggyback  900     Total  Intake(mL/kg) 720 (7) 1620 (15.8) 360 (3.5)    Urine (mL/kg/hr) 1300 (0.5) 1600 (0.7) 250 (0.3)    Other  0     Stool  0     Total Output 1300 1600 250    Net -580 +20 +110           Urine Occurrence  200 x     Stool Occurrence  2 x              Physical Exam  Vitals and nursing note reviewed.   Constitutional:       General: He is not in acute distress.     Appearance: He is not toxic-appearing or diaphoretic.   HENT:      Head: Normocephalic and atraumatic.      Mouth/Throat:      Mouth: Mucous membranes are dry.      Pharynx: No posterior oropharyngeal erythema.   Eyes:      General: No scleral icterus.        Right eye: No discharge.         Left eye: No discharge.      Extraocular Movements: Extraocular movements intact.   Cardiovascular:      Rate and Rhythm: Regular rhythm. Tachycardia present.      Pulses: Normal pulses.      Heart sounds: Normal heart sounds. No murmur heard.  Pulmonary:      Effort: Pulmonary effort is normal. No respiratory distress.      Breath sounds: Wheezing present.   Abdominal:      General: Bowel sounds are normal. There is no distension.      Palpations: Abdomen is soft.      Tenderness: There is abdominal tenderness. There is no rebound.   Musculoskeletal:         General: No deformity.      Cervical back: Neck supple.      Right lower leg: Edema present.      Left lower leg: Edema present.      Comments: see media photos. Swelling up to thighs   Skin:     General: Skin is warm and dry.      Coloration: Skin is not jaundiced.      Findings: Erythema (right leg - see media photos) present. No lesion or rash.   Neurological:      Mental Status: He is alert.   Psychiatric:         Behavior: Behavior normal.          Significant Labs:  I have reviewed all pertinent lab results within the past 24 hours.    Significant Diagnostics:  I have reviewed all pertinent imaging results/findings within the past 24 hours.

## 2024-03-22 NOTE — PROGRESS NOTES
[facility] - 6 [facility] Medicine  Progress Note    Patient Name: Karin Carrera  MRN: [id number]  Patient Class: IP- Inpatient   Admission Date: 3/19/2024  Length of Stay: 3 days  Attending Physician: Tyree Chávez MD  Primary Care Provider: Walker Smith MD        Subjective:     Principal Problem:Sepsis        HPI:  Patient is a 54yo male who presents to [facility] ED via EMS with SOB and abdominal pain for 2 days. He reports recently having a cholecystectomy on 3/9/24 here at [facility] by Dr. Bennett. He endorses RUQ abdominal pain and SOB. SOB is similar to his previous COPD episodes. Endorses orthopnea and INFANTE. He endorses surgical site drainage mostly pus that started 2 days ago. Endorses decreased appetite around the same time. Endorses bilateral leg swelling and right leg redness for about 1 month and now swelling has gone up his thighs and abdomen in the past 2 days. Denies f/c/cp/n/v/d. Denies urinary, or bowel habit changes. Reports compliance with all home medications.    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 Patient sees Dr. Escobar Cardiology and Dr. Smith PCP. Patient uses 2L NC O2 at home. He communicates through writing on paper and texts due to his expressive aphasia 2/2 CVA.    Upon presentation, VS remarkable for 104/65, 114, 30. Labs remarkable for H/H 11.9/39.1, HCO3 34, glucose 138. Mg 2.7. BNP 7486, troponin 22.2, 19.5. PT 16.2, INR 1.32, PTT 30.9. COVID negative. ABG 7.46, 50, 20, 35.6. EKG sinus tach 110 with multifocal PVCs. CXR showed continued cardiomegaly. Patchy ground-glass and reticular infiltrate/edema throughout the bilateral lungs. Small bilateral pleural fluid. Constellation of findings suspicious for CHF. Underlying  infection not excluded. CT abdomen and pelvis w/o contrast showed study limited secondary to lack of IV contrast. Interval cholecystectomy. There is a small air-fluid collection within the gallbladder fossa measuring up to 3.7 cm which may reflect postoperative fluid collection or abscess. Interval increased diffuse body wall edema. Cardiomegaly and coronary artery calcifications. Scattered interlobular septal thickening of the lung bases suggestive of interstitial pulmonary edema with trace bilateral pleural effusions. There is some calcification of the left ventricle apex which may reflect sequela of remote infarct. Patient received Lasix 60 IV and is admitted to hospital medicine for further management and care.    Overview/Hospital Course:  53 year old male with an extensive PMH presents with abdominal pain; he had recent cholecystectomy.  He is complaining of abdominal pain and SOB.    Interval History:     Pt still hypoxic, now on [place] NC, c/f pt going into ARDS, pulmonary consulted appreciate recs , requested ICU bed however d/t no bed availability he has been placed on icu waiting list per icu staff lead.      Review of Systems   Respiratory:  Positive for shortness of breath.    Cardiovascular:  Positive for leg swelling.   Gastrointestinal:  Positive for abdominal pain.     Objective:     Vital Signs (Most Recent):  Temp: 97.7 °F (36.5 °C) (03/22/24 1114)  Pulse: 107 (03/22/24 1114)  Resp: 20 (03/22/24 1114)  BP: 112/67 (03/22/24 1114)  SpO2: 100 % (03/22/24 1114) Vital Signs (24h Range):  Temp:  [97.4 °F (36.3 °C)-97.9 °F (36.6 °C)] 97.7 °F (36.5 °C)  Pulse:  [] 107  Resp:  [16-28] 20  SpO2:  [95 %-100 %] 100 %  BP: (102-129)/(67-77) 112/67     Weight: 102.5 kg (226 lb)  Body mass index is 29.02 kg/m².    Intake/Output Summary (Last 24 hours) at 3/22/2024 1232  Last data filed at 3/22/2024 0941  Gross per 24 hour   Intake 1500 ml   Output 1500 ml   Net 0 ml           Physical Exam  Vitals and  nursing note reviewed.   Constitutional:       General: He is not in acute distress.     Appearance: He is not toxic-appearing or diaphoretic.   HENT:      Head: Normocephalic and atraumatic.      Right Ear: External ear normal.      Left Ear: External ear normal.      Nose: Nose normal.      Mouth/Throat:      Mouth: Mucous membranes are dry.      Pharynx: Oropharynx is clear. No posterior oropharyngeal erythema.   Eyes:      General: No scleral icterus.        Right eye: No discharge.         Left eye: No discharge.      Extraocular Movements: Extraocular movements intact.      Pupils: Pupils are equal, round, and reactive to light.   Cardiovascular:      Rate and Rhythm: Regular rhythm. Tachycardia present.      Pulses: Normal pulses.      Heart sounds: Normal heart sounds. No murmur heard.  Pulmonary:      Effort: Pulmonary effort is normal. No respiratory distress.      Breath sounds: Wheezing present.   Abdominal:      General: Bowel sounds are normal. There is no distension.      Palpations: Abdomen is soft.      Tenderness: There is abdominal tenderness. There is guarding. There is no rebound.   Musculoskeletal:         General: Swelling and tenderness present. No deformity.      Cervical back: Neck supple.      Right lower leg: Edema present.      Left lower leg: Edema present.      Comments: see media photos. Swelling up to thighs   Skin:     General: Skin is warm and dry.      Coloration: Skin is not jaundiced.      Findings: Erythema (right leg - see media photos) present. No lesion or rash.   Neurological:      Mental Status: He is alert.      Sensory: No sensory deficit.   Psychiatric:         Behavior: Behavior normal.             Significant Labs: All pertinent labs within the past 24 hours have been reviewed.    Significant Imaging: I have reviewed all pertinent imaging results/findings within the past 24 hours.    Assessment/Plan:      * Sepsis  He is s/p recent cholecystectomy.  Blood cultures  show G+ cocci and G- bacilli; on Vancomycin and Maxipime and Flagyl; will await ID of organisms in blood cultures; followed by Surgery    Hypokalemia  Will supplement    Acute hypoxemic respiratory failure  Acute hypoxic respiratory failure due to many etiologies :  Pulmonary edema 2/2 HFrEF  COPD  Pneumonia>consolidation  -now c/f pt developing ARDS  -cont broad spectrum iv abx  -cont aggressive diuersis  -cont inhalers  -oxygen support, currently on HFNC  -Pulmonary and crtical care team consulted, appreicate recs.         ROSEMARIE (obstructive sleep apnea)  -Noncompliant with CPAP at home; CPAP in hospital ordered    Cellulitis of lower extremity  Cont iv abx     Chronic combined systolic and diastolic heart failure  Continue home meds; he is always SOB and has ROSEMARIE non-compliant with CPAP, smokes, and does not take home meds; CXR shows patchy ground-glass and reticular infiltrate/edema throughout the bilateral lungs; on IV Lasix  -cardiology consulted, now on inotrope infusion along w/ iv lasix. Cont GDMT per cardiology      Type 2 diabetes mellitus  BS stable; continue home DM meds; on SSI; will continue to monitor    Hemiparesis affecting right side as late effect of cerebrovascular accident  On ASA and statin; he is at baseline; has aphasia    Coronary artery disease involving coronary bypass graft of native heart without angina pectoris  He has a history of CAD s/p CABG; continue home meds    Essential (primary) hypertension  BP stable; continue home meds      VTE Risk Mitigation (From admission, onward)           Ordered     enoxaparin injection 40 mg  Every 24 hours         03/19/24 3401                    Discharge Planning   DEANNA:      Code Status: Full Code   Is the patient medically ready for discharge?:     Reason for patient still in hospital (select all that apply): Treatment  Discharge Plan A: Skilled Nursing Facility                  Rehmat ANUSHKA Chávez MD  Department of Hospital Medicine   [facility]  Medical - 6 [facility]

## 2024-03-23 LAB
ALBUMIN SERPL BCP-MCNC: 3.1 G/DL (ref 3.5–5)
ALBUMIN/GLOB SERPL: 0.9 {RATIO}
ALP SERPL-CCNC: 107 U/L (ref 45–115)
ALT SERPL W P-5'-P-CCNC: 22 U/L (ref 16–61)
ANION GAP SERPL CALCULATED.3IONS-SCNC: 8 MMOL/L (ref 7–16)
AST SERPL W P-5'-P-CCNC: 17 U/L (ref 15–37)
BACTERIA BLD CULT: ABNORMAL
BACTERIA SPEC BFLD CULT: NORMAL
BASOPHILS # BLD AUTO: 0.02 K/UL (ref 0–0.2)
BASOPHILS NFR BLD AUTO: 0.3 % (ref 0–1)
BILIRUB SERPL-MCNC: 0.8 MG/DL (ref ?–1.2)
BUN SERPL-MCNC: 19 MG/DL (ref 7–18)
BUN/CREAT SERPL: 20 (ref 6–20)
CALCIUM SERPL-MCNC: 8.6 MG/DL (ref 8.5–10.1)
CHLORIDE SERPL-SCNC: 98 MMOL/L (ref 98–107)
CO2 SERPL-SCNC: 31 MMOL/L (ref 21–32)
CREAT SERPL-MCNC: 0.94 MG/DL (ref 0.7–1.3)
DIFFERENTIAL METHOD BLD: ABNORMAL
EGFR (NO RACE VARIABLE) (RUSH/TITUS): 97 ML/MIN/1.73M2
EOSINOPHIL # BLD AUTO: 0 K/UL (ref 0–0.5)
EOSINOPHIL NFR BLD AUTO: 0 % (ref 1–4)
ERYTHROCYTE [DISTWIDTH] IN BLOOD BY AUTOMATED COUNT: 16.1 % (ref 11.5–14.5)
GLOBULIN SER-MCNC: 3.4 G/DL (ref 2–4)
GLUCOSE SERPL-MCNC: 141 MG/DL (ref 70–105)
GLUCOSE SERPL-MCNC: 158 MG/DL (ref 74–106)
GLUCOSE SERPL-MCNC: 167 MG/DL (ref 70–105)
GLUCOSE SERPL-MCNC: 206 MG/DL (ref 70–105)
GLUCOSE SERPL-MCNC: 292 MG/DL (ref 70–105)
GRAM STN SPEC: ABNORMAL
HCT VFR BLD AUTO: 33.7 % (ref 40–54)
HGB BLD-MCNC: 10.3 G/DL (ref 13.5–18)
IMM GRANULOCYTES # BLD AUTO: 0.04 K/UL (ref 0–0.04)
IMM GRANULOCYTES NFR BLD: 0.5 % (ref 0–0.4)
LYMPHOCYTES # BLD AUTO: 0.82 K/UL (ref 1–4.8)
LYMPHOCYTES NFR BLD AUTO: 10.7 % (ref 27–41)
MCH RBC QN AUTO: 26.5 PG (ref 27–31)
MCHC RBC AUTO-ENTMCNC: 30.6 G/DL (ref 32–36)
MCV RBC AUTO: 86.6 FL (ref 80–96)
MONOCYTES # BLD AUTO: 0.66 K/UL (ref 0–0.8)
MONOCYTES NFR BLD AUTO: 8.6 % (ref 2–6)
MPC BLD CALC-MCNC: 9.7 FL (ref 9.4–12.4)
NEUTROPHILS # BLD AUTO: 6.13 K/UL (ref 1.8–7.7)
NEUTROPHILS NFR BLD AUTO: 79.9 % (ref 53–65)
NRBC # BLD AUTO: 0 X10E3/UL
NRBC, AUTO (.00): 0 %
PLATELET # BLD AUTO: 214 K/UL (ref 150–400)
POTASSIUM SERPL-SCNC: 3.9 MMOL/L (ref 3.5–5.1)
PROT SERPL-MCNC: 6.5 G/DL (ref 6.4–8.2)
RBC # BLD AUTO: 3.89 M/UL (ref 4.6–6.2)
SODIUM SERPL-SCNC: 133 MMOL/L (ref 136–145)
WBC # BLD AUTO: 7.67 K/UL (ref 4.5–11)

## 2024-03-23 PROCEDURE — 25000242 PHARM REV CODE 250 ALT 637 W/ HCPCS: Performed by: HOSPITALIST

## 2024-03-23 PROCEDURE — 80053 COMPREHEN METABOLIC PANEL: CPT | Performed by: INTERNAL MEDICINE

## 2024-03-23 PROCEDURE — 25000003 PHARM REV CODE 250: Performed by: HOSPITALIST

## 2024-03-23 PROCEDURE — 63600175 PHARM REV CODE 636 W HCPCS: Performed by: INTERNAL MEDICINE

## 2024-03-23 PROCEDURE — 25000003 PHARM REV CODE 250: Performed by: INTERNAL MEDICINE

## 2024-03-23 PROCEDURE — 99900035 HC TECH TIME PER 15 MIN (STAT)

## 2024-03-23 PROCEDURE — 63600175 PHARM REV CODE 636 W HCPCS: Performed by: NURSE PRACTITIONER

## 2024-03-23 PROCEDURE — 85025 COMPLETE CBC W/AUTO DIFF WBC: CPT | Performed by: INTERNAL MEDICINE

## 2024-03-23 PROCEDURE — 82962 GLUCOSE BLOOD TEST: CPT

## 2024-03-23 PROCEDURE — 25000242 PHARM REV CODE 250 ALT 637 W/ HCPCS

## 2024-03-23 PROCEDURE — 63600175 PHARM REV CODE 636 W HCPCS

## 2024-03-23 PROCEDURE — 11000001 HC ACUTE MED/SURG PRIVATE ROOM

## 2024-03-23 PROCEDURE — 25000003 PHARM REV CODE 250

## 2024-03-23 PROCEDURE — 94761 N-INVAS EAR/PLS OXIMETRY MLT: CPT

## 2024-03-23 PROCEDURE — 99223 1ST HOSP IP/OBS HIGH 75: CPT | Mod: ,,, | Performed by: INTERNAL MEDICINE

## 2024-03-23 PROCEDURE — 63600175 PHARM REV CODE 636 W HCPCS: Performed by: HOSPITALIST

## 2024-03-23 PROCEDURE — 27000221 HC OXYGEN, UP TO 24 HOURS

## 2024-03-23 PROCEDURE — 94640 AIRWAY INHALATION TREATMENT: CPT

## 2024-03-23 RX ADMIN — ZOLPIDEM TARTRATE 5 MG: 5 TABLET ORAL at 08:03

## 2024-03-23 RX ADMIN — METRONIDAZOLE 500 MG: 5 INJECTION, SOLUTION INTRAVENOUS at 05:03

## 2024-03-23 RX ADMIN — MUPIROCIN: 20 OINTMENT TOPICAL at 01:03

## 2024-03-23 RX ADMIN — TRAZODONE HYDROCHLORIDE 50 MG: 50 TABLET ORAL at 09:03

## 2024-03-23 RX ADMIN — FUROSEMIDE 80 MG: 10 INJECTION, SOLUTION INTRAVENOUS at 07:03

## 2024-03-23 RX ADMIN — ENOXAPARIN SODIUM 40 MG: 40 INJECTION SUBCUTANEOUS at 05:03

## 2024-03-23 RX ADMIN — METRONIDAZOLE 500 MG: 5 INJECTION, SOLUTION INTRAVENOUS at 01:03

## 2024-03-23 RX ADMIN — BUDESONIDE INHALATION 0.5 MG: 0.5 SUSPENSION RESPIRATORY (INHALATION) at 07:03

## 2024-03-23 RX ADMIN — POTASSIUM CHLORIDE 40 MEQ: 1500 TABLET, EXTENDED RELEASE ORAL at 09:03

## 2024-03-23 RX ADMIN — IPRATROPIUM BROMIDE AND ALBUTEROL SULFATE 3 ML: 2.5; .5 SOLUTION RESPIRATORY (INHALATION) at 08:03

## 2024-03-23 RX ADMIN — ACETAMINOPHEN 1000 MG: 500 TABLET ORAL at 08:03

## 2024-03-23 RX ADMIN — DOCUSATE SODIUM 100 MG: 100 CAPSULE, LIQUID FILLED ORAL at 09:03

## 2024-03-23 RX ADMIN — FUROSEMIDE 80 MG: 10 INJECTION, SOLUTION INTRAVENOUS at 05:03

## 2024-03-23 RX ADMIN — METRONIDAZOLE 500 MG: 5 INJECTION, SOLUTION INTRAVENOUS at 08:03

## 2024-03-23 RX ADMIN — LOSARTAN POTASSIUM 12.5 MG: 25 TABLET, FILM COATED ORAL at 09:03

## 2024-03-23 RX ADMIN — IPRATROPIUM BROMIDE AND ALBUTEROL SULFATE 3 ML: 2.5; .5 SOLUTION RESPIRATORY (INHALATION) at 07:03

## 2024-03-23 RX ADMIN — ATORVASTATIN CALCIUM 40 MG: 40 TABLET, FILM COATED ORAL at 08:03

## 2024-03-23 RX ADMIN — MEROPENEM 1 G: 1 INJECTION, POWDER, FOR SOLUTION INTRAVENOUS at 09:03

## 2024-03-23 RX ADMIN — BUDESONIDE INHALATION 0.5 MG: 0.5 SUSPENSION RESPIRATORY (INHALATION) at 08:03

## 2024-03-23 RX ADMIN — DOCUSATE SODIUM 100 MG: 100 CAPSULE, LIQUID FILLED ORAL at 08:03

## 2024-03-23 RX ADMIN — POTASSIUM CHLORIDE 40 MEQ: 1500 TABLET, EXTENDED RELEASE ORAL at 08:03

## 2024-03-23 RX ADMIN — VANCOMYCIN HYDROCHLORIDE 2000 MG: 500 INJECTION, POWDER, LYOPHILIZED, FOR SOLUTION INTRAVENOUS at 11:03

## 2024-03-23 RX ADMIN — EMPAGLIFLOZIN 10 MG: 10 TABLET, FILM COATED ORAL at 09:03

## 2024-03-23 RX ADMIN — MEROPENEM 1 G: 1 INJECTION, POWDER, FOR SOLUTION INTRAVENOUS at 11:03

## 2024-03-23 RX ADMIN — ACETAMINOPHEN 1000 MG: 500 TABLET ORAL at 04:03

## 2024-03-23 RX ADMIN — PANTOPRAZOLE SODIUM 40 MG: 40 TABLET, DELAYED RELEASE ORAL at 09:03

## 2024-03-23 RX ADMIN — INSULIN ASPART 2 UNITS: 100 INJECTION, SOLUTION INTRAVENOUS; SUBCUTANEOUS at 05:03

## 2024-03-23 RX ADMIN — MUPIROCIN: 20 OINTMENT TOPICAL at 08:03

## 2024-03-23 RX ADMIN — IPRATROPIUM BROMIDE AND ALBUTEROL SULFATE 3 ML: 2.5; .5 SOLUTION RESPIRATORY (INHALATION) at 01:03

## 2024-03-23 RX ADMIN — IPRATROPIUM BROMIDE AND ALBUTEROL SULFATE 3 ML: 2.5; .5 SOLUTION RESPIRATORY (INHALATION) at 12:03

## 2024-03-23 RX ADMIN — DOBUTAMINE HYDROCHLORIDE 5 MCG/KG/MIN: 400 INJECTION INTRAVENOUS at 11:03

## 2024-03-23 RX ADMIN — MEROPENEM 1 G: 1 INJECTION, POWDER, FOR SOLUTION INTRAVENOUS at 12:03

## 2024-03-23 RX ADMIN — ASPIRIN 81 MG: 81 TABLET, COATED ORAL at 09:03

## 2024-03-23 RX ADMIN — ACETAMINOPHEN 1000 MG: 500 TABLET ORAL at 09:03

## 2024-03-23 RX ADMIN — MEROPENEM 1 G: 1 INJECTION, POWDER, FOR SOLUTION INTRAVENOUS at 04:03

## 2024-03-23 RX ADMIN — OXYCODONE 5 MG: 5 TABLET ORAL at 09:03

## 2024-03-23 RX ADMIN — INSULIN ASPART 1 UNITS: 100 INJECTION, SOLUTION INTRAVENOUS; SUBCUTANEOUS at 08:03

## 2024-03-23 RX ADMIN — OXYCODONE 5 MG: 5 TABLET ORAL at 01:03

## 2024-03-23 NOTE — SUBJECTIVE & OBJECTIVE
Interval History: Pt examined at bedside today. Pt is now on NC at 5L with O2 at 96%. Pt to continue dobutamin at 5mcg/kg/min. Pt endorses feeling better.   Review of Systems   Constitutional: Negative for fever.   Cardiovascular:  Positive for dyspnea on exertion, leg swelling and orthopnea.   Respiratory:  Positive for shortness of breath.    Neurological:  Positive for weakness.     Objective:     Vital Signs (Most Recent):  Temp: 97.6 °F (36.4 °C) (03/22/24 1915)  Pulse: (!) 111 (03/23/24 0715)  Resp: 18 (03/23/24 0715)  BP: 112/67 (03/23/24 0908)  SpO2: (!) 93 % (03/23/24 0715) Vital Signs (24h Range):  Temp:  [97.4 °F (36.3 °C)-97.7 °F (36.5 °C)] 97.6 °F (36.4 °C)  Pulse:  [] 111  Resp:  [13-26] 18  SpO2:  [72 %-100 %] 93 %  BP: ()/(57-83) 112/67     Weight: 107.7 kg (237 lb 6.4 oz)  Body mass index is 30.48 kg/m².     SpO2: (!) 93 %         Intake/Output Summary (Last 24 hours) at 3/23/2024 1040  Last data filed at 3/23/2024 0424  Gross per 24 hour   Intake 661.23 ml   Output 2175 ml   Net -1513.77 ml       Lines/Drains/Airways       Peripherally Inserted Central Catheter Line  Duration             PICC Double Lumen 03/22/24 1330 right basilic <1 day              Peripheral Intravenous Line  Duration                  Peripheral IV - Single Lumen 03/21/24 2300 20 G Left;Posterior Forearm 1 day                       Physical Exam  Neck:      Vascular: JVD present.   Cardiovascular:      Rate and Rhythm: Tachycardia present.   Pulmonary:      Breath sounds: Wheezing and rales present.   Abdominal:      General: Bowel sounds are normal.      Palpations: Abdomen is soft.   Musculoskeletal:      Right lower leg: Edema present.      Left lower leg: Edema present.   Neurological:      Mental Status: He is alert and oriented to person, place, and time.            Significant Labs: All pertinent lab results from the last 24 hours have been reviewed.    Significant Imaging: Echocardiogram: Transthoracic echo  (TTE) complete (Cupid Only):   Results for orders placed or performed during the hospital encounter of 03/19/24   Echo Saline Bubble? Yes   Result Value Ref Range    BSA 2.31 m2    LVIDd 6.02 (A) 3.5 - 6.0 cm    LV Systolic Volume 136.82 mL    LV Systolic Volume Index 59.7 mL/m2    LVIDs 5.32 (A) 2.1 - 4.0 cm    LV Diastolic Volume 181.34 mL    LV Diastolic Volume Index 79.19 mL/m2    IVS 1.01 0.6 - 1.1 cm    FS 12 (A) 28 - 44 %    Left Ventricle Relative Wall Thickness 0.28 cm    Posterior Wall 0.83 0.6 - 1.1 cm    LV mass 223.09 g    LV Mass Index 97 g/m2    MV Peak E Rohit 0.95 m/s    TDI LATERAL 0.15 m/s    TDI SEPTAL 0.10 m/s    E/E' ratio 7.60 m/s    MV Peak A Rohit 0.32 m/s    E/A ratio 2.97     E wave deceleration time 79.46 msec    LV SEPTAL E/E' RATIO 9.50 m/s    LV LATERAL E/E' RATIO 6.33 m/s    RVDD 4.41 cm    TAPSE 1.30 cm    LA size 5.44 cm    Left Atrium Major Axis 6.19 cm    RA Major Axis 4.29 cm    MV stenosis pressure 1/2 time 23.04 ms    MV valve area p 1/2 method 9.55 cm2    IVC diameter 2.54 cm    Mean e' 0.13 m/s    ZLVIDS 0.06     ZLVIDD -3.74     EF 20 %    Est. RA pres 15 mmHg    Narrative      Left Ventricle: The left ventricle is moderately dilated. Normal wall   thickness. Regional wall motion abnormalities present.  Anterior wall and   apex are akinetic.  Inferior wall and lateral wall are hypokinetic. There   is severely reduced systolic function with a visually estimated ejection   fraction of less than 30%. Ejection fraction by visual approximation is   20%. There is diastolic dysfunction.    Right Ventricle: Mild right ventricular enlargement.    Left Atrium: Left atrium is severely dilated.    Right Atrium: Right atrium is mildly dilated.    IVC/SVC: Elevated venous pressure at 15 mmHg.    Pericardium: There is a trivial effusion.

## 2024-03-23 NOTE — ASSESSMENT & PLAN NOTE
Echo reveals Ejection fraction by visual approximation is 20%. There is diastolic dysfunction.   BNP 7486  Continue IV lasix 40 mg QD  Continue toprol XL 25 mg 1/2 tab QD  Continue jardiance 10 mg QD  Resume losartan 25 mg 1/2 tab   Daily weights  Strict I/Os    3/22/2024:  - Patient seen and evaluated by Dr. Escobar  - Mixed shock: Cardiogenic/sepsis  - Discontinue BB  - Start IV dobutamine 5mcg/kg/min. Increase lasix to 80mg BID  - Strict I&O; strict daily weight  - Will up titrate afterload reduction as BP allows  - Unable to tolerate MRAs due to hyponatremia    3/23/2024  Continue IV dobutamine 5mcg/kg/min.   Continue IV lasix 80mg BID  - Strict I&O; strict daily weight  - Will up titrate afterload reduction as BP allows  - Unable to tolerate MRAs due to hyponatremia

## 2024-03-23 NOTE — SUBJECTIVE & OBJECTIVE
Past Medical History:   Diagnosis Date    Alcoholic fatty liver     CHF (congestive heart failure) 02/20/2024    EF 25%    COPD (chronic obstructive pulmonary disease)     Coronary artery disease involving coronary bypass graft of native heart without angina pectoris 01/01/2022    Dyslipidemia 12/16/2023    Essential (primary) hypertension 01/01/2022    Expressive aphasia 05/05/2023    GERD with esophagitis 2018    EGD    Hemiparesis affecting right side as late effect of cerebrovascular accident     ROSEMARIE (obstructive sleep apnea)     Pulmonary hypertension     Stroke     right hand contracted    Tricuspid regurgitation     Type 2 diabetes mellitus        Past Surgical History:   Procedure Laterality Date    CARDIAC SURGERY      CABG    CORONARY ANGIOPLASTY WITH STENT PLACEMENT      bare metal stent    HEMIARTHROPLASTY OF HIP Right 01/02/2022    Procedure: HEMIARTHROPLASTY, HIP;  Surgeon: Ramses Chua MD;  Location: [facility] OR;  Service: Orthopedics;  Laterality: Right;    LAPAROSCOPIC CHOLECYSTECTOMY  03/09/2024    Dr. Bennett    LAPAROSCOPIC CHOLECYSTECTOMY WITH CHOLANGIOGRAPHY N/A 3/9/2024    Procedure: CHOLECYSTECTOMY, LAPAROSCOPIC, WITH CHOLANGIOGRAM;  Surgeon: Karson Bennett DO;  Location: [facility] OR;  Service: General;  Laterality: N/A;    RIGHT HEART CATHETERIZATION Right 02/22/2024    Procedure: INSERTION, CATHETER, RIGHT HEART;  Surgeon: Ruiz Pacheco MD;  Location: [facility] CATH LAB;  Service: Cardiology;  Laterality: Right;       Review of patient's allergies indicates:  No Known Allergies    Family History       Problem Relation (Age of Onset)    Diabetes Mother    Heart disease Mother    Hypertension Mother          Tobacco Use    Smoking status: Former     Current packs/day: 0.50     Types: Cigarettes    Smokeless tobacco: Current     Types: Chew   Substance and Sexual Activity    Alcohol use: Not Currently     Comment: 1 quart    Drug use: Never    Sexual activity: Not on file       Review of Systems  Objective:     Vital Signs (Most Recent):  Temp: 97.6 °F (36.4 °C) (03/22/24 1915)  Pulse: (!) 111 (03/23/24 0715)  Resp: 18 (03/23/24 0715)  BP: 118/80 (03/23/24 0645)  SpO2: (!) 93 % (03/23/24 0715) Vital Signs (24h Range):  Temp:  [97.4 °F (36.3 °C)-97.7 °F (36.5 °C)] 97.6 °F (36.4 °C)  Pulse:  [] 111  Resp:  [13-26] 18  SpO2:  [72 %-100 %] 93 %  BP: ()/(57-83) 118/80   Weight: 102.5 kg (226 lb)  Body mass index is 29.02 kg/m².      Intake/Output Summary (Last 24 hours) at 3/23/2024 0729  Last data filed at 3/23/2024 0424  Gross per 24 hour   Intake 1021.23 ml   Output 2425 ml   Net -1403.77 ml          Physical Exam  Vitals reviewed.   Constitutional:       Appearance: Normal appearance.      Interventions: He is not intubated.  HENT:      Head: Normocephalic and atraumatic.      Nose: Nose normal.      Mouth/Throat:      Mouth: Mucous membranes are dry.      Pharynx: Oropharynx is clear.   Eyes:      Extraocular Movements: Extraocular movements intact.      Conjunctiva/sclera: Conjunctivae normal.      Pupils: Pupils are equal, round, and reactive to light.   Cardiovascular:      Rate and Rhythm: Normal rate.      Heart sounds: Normal heart sounds. No murmur heard.  Pulmonary:      Effort: Pulmonary effort is normal. He is not intubated.      Breath sounds: Rales present.   Abdominal:      General: Abdomen is flat. Bowel sounds are normal.      Palpations: Abdomen is soft.   Musculoskeletal:         General: Normal range of motion.      Cervical back: Normal range of motion and neck supple.      Right lower leg: No edema.      Left lower leg: No edema.   Skin:     General: Skin is warm and dry.      Capillary Refill: Capillary refill takes less than 2 seconds.   Neurological:      General: No focal deficit present.      Mental Status: He is alert and oriented to person, place, and time.   Psychiatric:         Mood and Affect: Mood normal.         Behavior: Behavior normal.             Vents:  Oxygen Concentration (%): 60 (03/23/24 0715)  Lines/Drains/Airways       Peripherally Inserted Central Catheter Line  Duration             PICC Double Lumen 03/22/24 1330 right basilic <1 day              Peripheral Intravenous Line  Duration                  Peripheral IV - Single Lumen 03/21/24 2300 20 G Left;Posterior Forearm 1 day                  Significant Labs:    CBC/Anemia Profile:  Recent Labs   Lab 03/22/24  0716 03/23/24  0414   WBC 4.83 7.67   HGB 11.6* 10.3*   HCT 36.9* 33.7*    214   MCV 84.6 86.6   RDW 15.9* 16.1*        Chemistries:  Recent Labs   Lab 03/22/24  0924 03/23/24  0414   * 133*   K 4.4 3.9   CL 95* 98   CO2 26 31   BUN 17 19*   CREATININE 1.21 0.94   CALCIUM 8.8 8.6   ALBUMIN 2.9* 3.1*   PROT 7.1 6.5   BILITOT 1.2 0.8   ALKPHOS 129* 107   ALT 25 22   AST 27 17       Recent Lab Results  (Last 5 results in the past 24 hours)        03/23/24  0414   03/22/24  2150   03/22/24 2012 03/22/24  1822   03/22/24  1508        Albumin/Globulin Ratio 0.9               Albumin 3.1                              ALT 22               Anion Gap 8               AST 17               Baso # 0.02               Basophil % 0.3               BILIRUBIN TOTAL 0.8               BUN 19               BUN/CREAT RATIO 20               Calcium 8.6               Chloride 98               CO2 31               Creatinine 0.94               Differential Method Auto               eGFR 97               Eos # 0.00               Eos % 0.0               Globulin, Total 3.4               Glucose 158               Hematocrit 33.7               Hemoglobin 10.3               Immature Grans (Abs) 0.04               Immature Granulocytes 0.5               Lactic Acid Level         1.7       Lymph # 0.82               Lymph % 10.7               MCH 26.5               MCHC 30.6               MCV 86.6               Mono # 0.66               Mono % 8.6               MPV 9.7                Neutrophils, Abs 6.13               Neutrophils Relative 79.9               nRBC 0.0               NUCLEATED RBC ABSOLUTE 0.00               Platelet Count 214               POC Glucose     183   190         Potassium 3.9               PROTEIN TOTAL 6.5               RBC 3.89               RDW 16.1               Sodium 133               Vancomycin-Trough   11.3             WBC 7.67                                      Significant Imaging: I have reviewed all pertinent imaging results/findings within the past 24 hours.

## 2024-03-23 NOTE — PLAN OF CARE
Problem: Adult Inpatient Plan of Care  Goal: Plan of Care Review  Outcome: Ongoing, Progressing  Goal: Patient-Specific Goal (Individualized)  Outcome: Ongoing, Progressing  Goal: Absence of Hospital-Acquired Illness or Injury  Outcome: Ongoing, Progressing  Goal: Optimal Comfort and Wellbeing  Outcome: Ongoing, Progressing  Goal: Readiness for Transition of Care  Outcome: Ongoing, Progressing     Problem: Diabetes Comorbidity  Goal: Blood Glucose Level Within Targeted Range  Outcome: Ongoing, Progressing     Problem: Impaired Wound Healing  Goal: Optimal Wound Healing  Outcome: Ongoing, Progressing     Problem: Skin Injury Risk Increased  Goal: Skin Health and Integrity  Outcome: Ongoing, Progressing     Problem: Adjustment to Illness (Sepsis/Septic Shock)  Goal: Optimal Coping  Outcome: Ongoing, Progressing     Problem: Bleeding (Sepsis/Septic Shock)  Goal: Absence of Bleeding  Outcome: Ongoing, Progressing     Problem: Glycemic Control Impaired (Sepsis/Septic Shock)  Goal: Blood Glucose Level Within Desired Range  Outcome: Ongoing, Progressing     Problem: Infection Progression (Sepsis/Septic Shock)  Goal: Absence of Infection Signs and Symptoms  Outcome: Ongoing, Progressing     Problem: Nutrition Impaired (Sepsis/Septic Shock)  Goal: Optimal Nutrition Intake  Outcome: Ongoing, Progressing     Problem: Gas Exchange Impaired  Goal: Optimal Gas Exchange  Outcome: Ongoing, Progressing     Problem: Infection  Goal: Absence of Infection Signs and Symptoms  Outcome: Ongoing, Progressing

## 2024-03-23 NOTE — ASSESSMENT & PLAN NOTE
I believe this is mostly related to congestive heart failure systolic possibly pneumonia does have a right pleural effusion continue antibiotics continue dobutamine continue diuresis

## 2024-03-23 NOTE — ASSESSMENT & PLAN NOTE
Do not believe this is sepsis is growing Gram-negative and Gram-positive Ativan his cultures will continue broad-spectrum antibiotics

## 2024-03-23 NOTE — HPI
53-year-old white male presents with shortness of breath and abdominal pain for 2 days recently had a cholecystectomy 03/09/2024.  Patient has a history of COPD he complains of increased leg swelling.  Patient has a history ejection fraction 25% previous stroke he has been hypoxic during this admission grew out Gram-positive Gram-negative bacilli out of his blood he has been brought down to the unit for worsening blood pressure and hypoxemia

## 2024-03-23 NOTE — CONSULTS
[facility] ICU  Critical Care Medicine  Consult Note    Patient Name: Karin Carrera  MRN: [id number]  Admission Date: 3/19/2024  Hospital Length of Stay: 4 days  Code Status: Full Code  Attending Physician: Kai Peck MD   Primary Care Provider: Walker Smith MD   Principal Problem: Sepsis    Consults  Subjective:     HPI:  53-year-old white male presents with shortness of breath and abdominal pain for 2 days recently had a cholecystectomy 03/09/2024.  Patient has a history of COPD he complains of increased leg swelling.  Patient has a history ejection fraction 25% previous stroke he has been hypoxic during this admission grew out Gram-positive Gram-negative bacilli out of his blood he has been brought down to the unit for worsening blood pressure and hypoxemia    Hospital/ICU Course:  No notes on file    Past Medical History:   Diagnosis Date    Alcoholic fatty liver     CHF (congestive heart failure) 02/20/2024    EF 25%    COPD (chronic obstructive pulmonary disease)     Coronary artery disease involving coronary bypass graft of native heart without angina pectoris 01/01/2022    Dyslipidemia 12/16/2023    Essential (primary) hypertension 01/01/2022    Expressive aphasia 05/05/2023    GERD with esophagitis 2018    EGD    Hemiparesis affecting right side as late effect of cerebrovascular accident     ROSEMARIE (obstructive sleep apnea)     Pulmonary hypertension     Stroke     right hand contracted    Tricuspid regurgitation     Type 2 diabetes mellitus        Past Surgical History:   Procedure Laterality Date    CARDIAC SURGERY      CABG    CORONARY ANGIOPLASTY WITH STENT PLACEMENT      bare metal stent    HEMIARTHROPLASTY OF HIP Right 01/02/2022    Procedure: HEMIARTHROPLASTY, HIP;  Surgeon: Ramses Chua MD;  Location: [facility];  Service: Orthopedics;  Laterality: Right;    LAPAROSCOPIC CHOLECYSTECTOMY  03/09/2024    Dr. Bennett    LAPAROSCOPIC CHOLECYSTECTOMY WITH  CHOLANGIOGRAPHY N/A 3/9/2024    Procedure: CHOLECYSTECTOMY, LAPAROSCOPIC, WITH CHOLANGIOGRAM;  Surgeon: Karson Bennett DO;  Location: [facility] OR;  Service: General;  Laterality: N/A;    RIGHT HEART CATHETERIZATION Right 02/22/2024    Procedure: INSERTION, CATHETER, RIGHT HEART;  Surgeon: Ruiz Pacheco MD;  Location: [facility] CATH LAB;  Service: Cardiology;  Laterality: Right;       Review of patient's allergies indicates:  No Known Allergies    Family History       Problem Relation (Age of Onset)    Diabetes Mother    Heart disease Mother    Hypertension Mother          Tobacco Use    Smoking status: Former     Current packs/day: 0.50     Types: Cigarettes    Smokeless tobacco: Current     Types: Chew   Substance and Sexual Activity    Alcohol use: Not Currently     Comment: 1 quart    Drug use: Never    Sexual activity: Not on file      Review of Systems  Objective:     Vital Signs (Most Recent):  Temp: 97.6 °F (36.4 °C) (03/22/24 1915)  Pulse: (!) 111 (03/23/24 0715)  Resp: 18 (03/23/24 0715)  BP: 118/80 (03/23/24 0645)  SpO2: (!) 93 % (03/23/24 0715) Vital Signs (24h Range):  Temp:  [97.4 °F (36.3 °C)-97.7 °F (36.5 °C)] 97.6 °F (36.4 °C)  Pulse:  [] 111  Resp:  [13-26] 18  SpO2:  [72 %-100 %] 93 %  BP: ()/(57-83) 118/80   Weight: 102.5 kg (226 lb)  Body mass index is 29.02 kg/m².      Intake/Output Summary (Last 24 hours) at 3/23/2024 0729  Last data filed at 3/23/2024 0424  Gross per 24 hour   Intake 1021.23 ml   Output 2425 ml   Net -1403.77 ml          Physical Exam  Vitals reviewed.   Constitutional:       Appearance: Normal appearance.      Interventions: He is not intubated.  HENT:      Head: Normocephalic and atraumatic.      Nose: Nose normal.      Mouth/Throat:      Mouth: Mucous membranes are dry.      Pharynx: Oropharynx is clear.   Eyes:      Extraocular Movements: Extraocular movements intact.      Conjunctiva/sclera: Conjunctivae normal.      Pupils: Pupils are equal, round,  and reactive to light.   Cardiovascular:      Rate and Rhythm: Normal rate.      Heart sounds: Normal heart sounds. No murmur heard.  Pulmonary:      Effort: Pulmonary effort is normal. He is not intubated.      Breath sounds: Rales present.   Abdominal:      General: Abdomen is flat. Bowel sounds are normal.      Palpations: Abdomen is soft.   Musculoskeletal:         General: Normal range of motion.      Cervical back: Normal range of motion and neck supple.      Right lower leg: No edema.      Left lower leg: No edema.   Skin:     General: Skin is warm and dry.      Capillary Refill: Capillary refill takes less than 2 seconds.   Neurological:      General: No focal deficit present.      Mental Status: He is alert and oriented to person, place, and time.   Psychiatric:         Mood and Affect: Mood normal.         Behavior: Behavior normal.            Vents:  Oxygen Concentration (%): 60 (03/23/24 0715)  Lines/Drains/Airways       Peripherally Inserted Central Catheter Line  Duration             PICC Double Lumen 03/22/24 1330 right basilic <1 day              Peripheral Intravenous Line  Duration                  Peripheral IV - Single Lumen 03/21/24 2300 20 G Left;Posterior Forearm 1 day                  Significant Labs:    CBC/Anemia Profile:  Recent Labs   Lab 03/22/24  0716 03/23/24  0414   WBC 4.83 7.67   HGB 11.6* 10.3*   HCT 36.9* 33.7*    214   MCV 84.6 86.6   RDW 15.9* 16.1*        Chemistries:  Recent Labs   Lab 03/22/24  0924 03/23/24  0414   * 133*   K 4.4 3.9   CL 95* 98   CO2 26 31   BUN 17 19*   CREATININE 1.21 0.94   CALCIUM 8.8 8.6   ALBUMIN 2.9* 3.1*   PROT 7.1 6.5   BILITOT 1.2 0.8   ALKPHOS 129* 107   ALT 25 22   AST 27 17       Recent Lab Results  (Last 5 results in the past 24 hours)        03/23/24  0414   03/22/24  2150   03/22/24 2012 03/22/24  1822   03/22/24  1508        Albumin/Globulin Ratio 0.9               Albumin 3.1                              ALT 22                Anion Gap 8               AST 17               Baso # 0.02               Basophil % 0.3               BILIRUBIN TOTAL 0.8               BUN 19               BUN/CREAT RATIO 20               Calcium 8.6               Chloride 98               CO2 31               Creatinine 0.94               Differential Method Auto               eGFR 97               Eos # 0.00               Eos % 0.0               Globulin, Total 3.4               Glucose 158               Hematocrit 33.7               Hemoglobin 10.3               Immature Grans (Abs) 0.04               Immature Granulocytes 0.5               Lactic Acid Level         1.7       Lymph # 0.82               Lymph % 10.7               MCH 26.5               MCHC 30.6               MCV 86.6               Mono # 0.66               Mono % 8.6               MPV 9.7               Neutrophils, Abs 6.13               Neutrophils Relative 79.9               nRBC 0.0               NUCLEATED RBC ABSOLUTE 0.00               Platelet Count 214               POC Glucose     183   190         Potassium 3.9               PROTEIN TOTAL 6.5               RBC 3.89               RDW 16.1               Sodium 133               Vancomycin-Trough   11.3             WBC 7.67                                      Significant Imaging: I have reviewed all pertinent imaging results/findings within the past 24 hours.    ABG  Recent Labs   Lab 03/21/24  1329   PH 7.52*   PO2 45   PCO2 37   HCO3 30.2*     Assessment/Plan:     Neuro  Hemiparesis affecting right side as late effect of cerebrovascular accident  His his baseline has aphasia    Pulmonary  Acute hypoxemic respiratory failure  I believe this is mostly related to congestive heart failure systolic possibly pneumonia does have a right pleural effusion continue antibiotics continue dobutamine continue diuresis    Cardiac/Vascular  Acute on chronic combined systolic and diastolic heart failure  Ejection fraction 20% on dobutamine  drip day to diuresing aggressively hypoxemia improved    Coronary artery disease involving coronary bypass graft of native heart without angina pectoris  Continue meds    Renal/  Hypokalemia  Supplement    ID  * Sepsis  Do not believe this is sepsis is growing Gram-negative and Gram-positive Ativan his cultures will continue broad-spectrum antibiotics    Endocrine  Type 2 diabetes mellitus  Currently sugars our goal on sliding scale    Other  ROSEMARIE (obstructive sleep apnea)  CPAP             Thank you for your consult. I will follow-up with patient. Please contact us if you have any additional questions.     Kai Peck MD  Critical Care Medicine  [facility] ICU

## 2024-03-23 NOTE — PROGRESS NOTES
[facility] ICU  Cardiology  Progress Note    Patient Name: Karin Carrera  MRN: [id number]  Admission Date: 3/19/2024  Hospital Length of Stay: 4 days  Code Status: Full Code   Attending Physician: Kai Peck MD   Primary Care Physician: Walker Smith MD  Expected Discharge Date:   Principal Problem:Sepsis    Subjective:     Hospital Course:   No notes on file    Interval History: Pt examined at bedside today. Pt is now on NC at 5L with O2 at 96%. Pt to continue dobutamin at 5mcg/kg/min. Pt endorses feeling better.   Review of Systems   Constitutional: Negative for fever.   Cardiovascular:  Positive for dyspnea on exertion, leg swelling and orthopnea.   Respiratory:  Positive for shortness of breath.    Neurological:  Positive for weakness.     Objective:     Vital Signs (Most Recent):  Temp: 97.6 °F (36.4 °C) (03/22/24 1915)  Pulse: (!) 111 (03/23/24 0715)  Resp: 18 (03/23/24 0715)  BP: 112/67 (03/23/24 0908)  SpO2: (!) 93 % (03/23/24 0715) Vital Signs (24h Range):  Temp:  [97.4 °F (36.3 °C)-97.7 °F (36.5 °C)] 97.6 °F (36.4 °C)  Pulse:  [] 111  Resp:  [13-26] 18  SpO2:  [72 %-100 %] 93 %  BP: ()/(57-83) 112/67     Weight: 107.7 kg (237 lb 6.4 oz)  Body mass index is 30.48 kg/m².     SpO2: (!) 93 %         Intake/Output Summary (Last 24 hours) at 3/23/2024 1040  Last data filed at 3/23/2024 0424  Gross per 24 hour   Intake 661.23 ml   Output 2175 ml   Net -1513.77 ml       Lines/Drains/Airways       Peripherally Inserted Central Catheter Line  Duration             PICC Double Lumen 03/22/24 1330 right basilic <1 day              Peripheral Intravenous Line  Duration                  Peripheral IV - Single Lumen 03/21/24 2300 20 G Left;Posterior Forearm 1 day                       Physical Exam  Neck:      Vascular: JVD present.   Cardiovascular:      Rate and Rhythm: Tachycardia present.   Pulmonary:      Breath sounds: Wheezing and rales present.   Abdominal:       General: Bowel sounds are normal.      Palpations: Abdomen is soft.   Musculoskeletal:      Right lower leg: Edema present.      Left lower leg: Edema present.   Neurological:      Mental Status: He is alert and oriented to person, place, and time.            Significant Labs: All pertinent lab results from the last 24 hours have been reviewed.    Significant Imaging: Echocardiogram: Transthoracic echo (TTE) complete (Cupid Only):   Results for orders placed or performed during the hospital encounter of 03/19/24   Echo Saline Bubble? Yes   Result Value Ref Range    BSA 2.31 m2    LVIDd 6.02 (A) 3.5 - 6.0 cm    LV Systolic Volume 136.82 mL    LV Systolic Volume Index 59.7 mL/m2    LVIDs 5.32 (A) 2.1 - 4.0 cm    LV Diastolic Volume 181.34 mL    LV Diastolic Volume Index 79.19 mL/m2    IVS 1.01 0.6 - 1.1 cm    FS 12 (A) 28 - 44 %    Left Ventricle Relative Wall Thickness 0.28 cm    Posterior Wall 0.83 0.6 - 1.1 cm    LV mass 223.09 g    LV Mass Index 97 g/m2    MV Peak E Rohit 0.95 m/s    TDI LATERAL 0.15 m/s    TDI SEPTAL 0.10 m/s    E/E' ratio 7.60 m/s    MV Peak A Rohit 0.32 m/s    E/A ratio 2.97     E wave deceleration time 79.46 msec    LV SEPTAL E/E' RATIO 9.50 m/s    LV LATERAL E/E' RATIO 6.33 m/s    RVDD 4.41 cm    TAPSE 1.30 cm    LA size 5.44 cm    Left Atrium Major Axis 6.19 cm    RA Major Axis 4.29 cm    MV stenosis pressure 1/2 time 23.04 ms    MV valve area p 1/2 method 9.55 cm2    IVC diameter 2.54 cm    Mean e' 0.13 m/s    ZLVIDS 0.06     ZLVIDD -3.74     EF 20 %    Est. RA pres 15 mmHg    Narrative      Left Ventricle: The left ventricle is moderately dilated. Normal wall   thickness. Regional wall motion abnormalities present.  Anterior wall and   apex are akinetic.  Inferior wall and lateral wall are hypokinetic. There   is severely reduced systolic function with a visually estimated ejection   fraction of less than 30%. Ejection fraction by visual approximation is   20%. There is diastolic  dysfunction.    Right Ventricle: Mild right ventricular enlargement.    Left Atrium: Left atrium is severely dilated.    Right Atrium: Right atrium is mildly dilated.    IVC/SVC: Elevated venous pressure at 15 mmHg.    Pericardium: There is a trivial effusion.       Assessment and Plan:     Brief HPI:  Patient is a 52 y/o M w/ Pmhx of alcoholic fatty liver, HFrEF with EF 25% per Echo on 2/20/24, pulmonary HTN, CAD s/p CABG in 2022, RHC, and stent placement, TR, COPD, CVA in 2016 with expressive aphasia and right hemiparesis, HTN, HLD, T2DM, GERD with esophagitis, ROSEMARIE who presents to presents [facility] ED via EMS with SOB and abdominal pain for 2 days. He reports recently having a cholecystectomy on 3/9/24 here at [place] by Dr. Bennett. He endorses RUQ abdominal pain and SOB. SOB is similar to his previous COPD episodes. Endorses orthopnea and INFANTE. He endorses surgical site drainage mostly pus that started 2 days ago. Endorses decreased appetite around the same time. Endorses bilateral leg swelling and right leg redness for about 1 month and now swelling has gone up his thighs and abdomen in the past 2 days. Denies f/c/cp/n/v/d. Denies urinary, or bowel habit changes. Reports compliance with all home medications. Patient uses 2L NC O2 at home. He communicates through writing on paper and texts due to his expressive aphasia 2/2 CVA. Labs were significant for BNP 7486. EKG sinus tach 110 with multifocal PVCs. CXR showed continued  Cardiomegaly. CTA PE protocol was significant for no pulmonary embolus, four-chamber cardiomegaly, pulmonary edema and small pleural effusions. Cardiology has been consulted for the continued care and management of his cardiac conditions.     * Sepsis  - Recent gallbladder removal (3/9/2024)  - Being followed by primary medical team    Acute on chronic combined systolic and diastolic heart failure  Echo reveals Ejection fraction by visual approximation is 20%. There is diastolic dysfunction.    BNP 7486  Continue IV lasix 40 mg QD  Continue toprol XL 25 mg 1/2 tab QD  Continue jardiance 10 mg QD  Resume losartan 25 mg 1/2 tab   Daily weights  Strict I/Os    3/22/2024:  - Patient seen and evaluated by Dr. Escobar  - Mixed shock: Cardiogenic/sepsis  - Discontinue BB  - Start IV dobutamine 5mcg/kg/min. Increase lasix to 80mg BID  - Strict I&O; strict daily weight  - Will up titrate afterload reduction as BP allows  - Unable to tolerate MRAs due to hyponatremia    3/23/2024  Continue IV dobutamine 5mcg/kg/min.   Continue IV lasix 80mg BID  - Strict I&O; strict daily weight  - Will up titrate afterload reduction as BP allows  - Unable to tolerate MRAs due to hyponatremia         VTE Risk Mitigation (From admission, onward)           Ordered     enoxaparin injection 40 mg  Every 24 hours         03/19/24 2048                    Alysa Rizvi MD  Cardiology  [facility]

## 2024-03-24 PROBLEM — J43.9 PULMONARY EMPHYSEMA: Status: ACTIVE | Noted: 2024-03-24

## 2024-03-24 LAB
ALBUMIN SERPL BCP-MCNC: 3.1 G/DL (ref 3.5–5)
ALBUMIN/GLOB SERPL: 0.9 {RATIO}
ALP SERPL-CCNC: 103 U/L (ref 45–115)
ALT SERPL W P-5'-P-CCNC: 24 U/L (ref 16–61)
ANION GAP SERPL CALCULATED.3IONS-SCNC: 8 MMOL/L (ref 7–16)
AST SERPL W P-5'-P-CCNC: 19 U/L (ref 15–37)
BASOPHILS # BLD AUTO: 0.02 K/UL (ref 0–0.2)
BASOPHILS NFR BLD AUTO: 0.3 % (ref 0–1)
BILIRUB SERPL-MCNC: 1 MG/DL (ref ?–1.2)
BUN SERPL-MCNC: 16 MG/DL (ref 7–18)
BUN/CREAT SERPL: 22 (ref 6–20)
CALCIUM SERPL-MCNC: 8.6 MG/DL (ref 8.5–10.1)
CHLORIDE SERPL-SCNC: 98 MMOL/L (ref 98–107)
CO2 SERPL-SCNC: 31 MMOL/L (ref 21–32)
CREAT SERPL-MCNC: 0.74 MG/DL (ref 0.7–1.3)
DIFFERENTIAL METHOD BLD: ABNORMAL
EGFR (NO RACE VARIABLE) (RUSH/TITUS): 108 ML/MIN/1.73M2
EOSINOPHIL # BLD AUTO: 0.11 K/UL (ref 0–0.5)
EOSINOPHIL NFR BLD AUTO: 1.4 % (ref 1–4)
ERYTHROCYTE [DISTWIDTH] IN BLOOD BY AUTOMATED COUNT: 16.1 % (ref 11.5–14.5)
GLOBULIN SER-MCNC: 3.3 G/DL (ref 2–4)
GLUCOSE SERPL-MCNC: 122 MG/DL (ref 70–105)
GLUCOSE SERPL-MCNC: 129 MG/DL (ref 74–106)
GLUCOSE SERPL-MCNC: 154 MG/DL (ref 70–105)
GLUCOSE SERPL-MCNC: 162 MG/DL (ref 70–105)
GLUCOSE SERPL-MCNC: 165 MG/DL (ref 70–105)
HCT VFR BLD AUTO: 34.1 % (ref 40–54)
HGB BLD-MCNC: 11.2 G/DL (ref 13.5–18)
IMM GRANULOCYTES # BLD AUTO: 0.06 K/UL (ref 0–0.04)
IMM GRANULOCYTES NFR BLD: 0.8 % (ref 0–0.4)
LYMPHOCYTES # BLD AUTO: 1.24 K/UL (ref 1–4.8)
LYMPHOCYTES NFR BLD AUTO: 15.8 % (ref 27–41)
MCH RBC QN AUTO: 27.1 PG (ref 27–31)
MCHC RBC AUTO-ENTMCNC: 32.8 G/DL (ref 32–36)
MCV RBC AUTO: 82.4 FL (ref 80–96)
MONOCYTES # BLD AUTO: 0.53 K/UL (ref 0–0.8)
MONOCYTES NFR BLD AUTO: 6.8 % (ref 2–6)
MPC BLD CALC-MCNC: 9.6 FL (ref 9.4–12.4)
NEUTROPHILS # BLD AUTO: 5.89 K/UL (ref 1.8–7.7)
NEUTROPHILS NFR BLD AUTO: 74.9 % (ref 53–65)
NRBC # BLD AUTO: 0 X10E3/UL
NRBC, AUTO (.00): 0 %
PLATELET # BLD AUTO: 235 K/UL (ref 150–400)
POTASSIUM SERPL-SCNC: 3.4 MMOL/L (ref 3.5–5.1)
PROT SERPL-MCNC: 6.4 G/DL (ref 6.4–8.2)
RBC # BLD AUTO: 4.14 M/UL (ref 4.6–6.2)
SODIUM SERPL-SCNC: 134 MMOL/L (ref 136–145)
WBC # BLD AUTO: 7.85 K/UL (ref 4.5–11)

## 2024-03-24 PROCEDURE — 25000003 PHARM REV CODE 250: Performed by: HOSPITALIST

## 2024-03-24 PROCEDURE — 25000003 PHARM REV CODE 250: Performed by: INTERNAL MEDICINE

## 2024-03-24 PROCEDURE — 80053 COMPREHEN METABOLIC PANEL: CPT | Performed by: INTERNAL MEDICINE

## 2024-03-24 PROCEDURE — 25000003 PHARM REV CODE 250: Performed by: NURSE PRACTITIONER

## 2024-03-24 PROCEDURE — 94761 N-INVAS EAR/PLS OXIMETRY MLT: CPT

## 2024-03-24 PROCEDURE — 25000242 PHARM REV CODE 250 ALT 637 W/ HCPCS: Performed by: HOSPITALIST

## 2024-03-24 PROCEDURE — 94640 AIRWAY INHALATION TREATMENT: CPT

## 2024-03-24 PROCEDURE — 63600175 PHARM REV CODE 636 W HCPCS: Performed by: HOSPITALIST

## 2024-03-24 PROCEDURE — 85025 COMPLETE CBC W/AUTO DIFF WBC: CPT | Performed by: INTERNAL MEDICINE

## 2024-03-24 PROCEDURE — 20000000 HC ICU ROOM

## 2024-03-24 PROCEDURE — 82962 GLUCOSE BLOOD TEST: CPT

## 2024-03-24 PROCEDURE — 97161 PT EVAL LOW COMPLEX 20 MIN: CPT

## 2024-03-24 PROCEDURE — 11000001 HC ACUTE MED/SURG PRIVATE ROOM

## 2024-03-24 PROCEDURE — 99291 CRITICAL CARE FIRST HOUR: CPT | Mod: ,,, | Performed by: NURSE PRACTITIONER

## 2024-03-24 PROCEDURE — 99900035 HC TECH TIME PER 15 MIN (STAT)

## 2024-03-24 PROCEDURE — 25000242 PHARM REV CODE 250 ALT 637 W/ HCPCS

## 2024-03-24 PROCEDURE — 25000003 PHARM REV CODE 250

## 2024-03-24 PROCEDURE — 97166 OT EVAL MOD COMPLEX 45 MIN: CPT

## 2024-03-24 PROCEDURE — 27000221 HC OXYGEN, UP TO 24 HOURS

## 2024-03-24 PROCEDURE — 63600175 PHARM REV CODE 636 W HCPCS: Performed by: NURSE PRACTITIONER

## 2024-03-24 PROCEDURE — 63600175 PHARM REV CODE 636 W HCPCS: Performed by: INTERNAL MEDICINE

## 2024-03-24 RX ORDER — OXYCODONE HYDROCHLORIDE 5 MG/1
10 TABLET ORAL EVERY 6 HOURS PRN
Status: DISCONTINUED | OUTPATIENT
Start: 2024-03-24 | End: 2024-04-03 | Stop reason: HOSPADM

## 2024-03-24 RX ORDER — CODEINE PHOSPHATE AND GUAIFENESIN 10; 100 MG/5ML; MG/5ML
5 SOLUTION ORAL EVERY 4 HOURS PRN
Status: DISCONTINUED | OUTPATIENT
Start: 2024-03-24 | End: 2024-04-03 | Stop reason: HOSPADM

## 2024-03-24 RX ADMIN — MUPIROCIN: 20 OINTMENT TOPICAL at 09:03

## 2024-03-24 RX ADMIN — OXYCODONE 10 MG: 5 TABLET ORAL at 03:03

## 2024-03-24 RX ADMIN — EMPAGLIFLOZIN 10 MG: 10 TABLET, FILM COATED ORAL at 09:03

## 2024-03-24 RX ADMIN — BUDESONIDE INHALATION 0.5 MG: 0.5 SUSPENSION RESPIRATORY (INHALATION) at 07:03

## 2024-03-24 RX ADMIN — POTASSIUM CHLORIDE 40 MEQ: 1500 TABLET, EXTENDED RELEASE ORAL at 08:03

## 2024-03-24 RX ADMIN — VANCOMYCIN HYDROCHLORIDE 2000 MG: 500 INJECTION, POWDER, LYOPHILIZED, FOR SOLUTION INTRAVENOUS at 11:03

## 2024-03-24 RX ADMIN — LOSARTAN POTASSIUM 12.5 MG: 25 TABLET, FILM COATED ORAL at 09:03

## 2024-03-24 RX ADMIN — METRONIDAZOLE 500 MG: 5 INJECTION, SOLUTION INTRAVENOUS at 02:03

## 2024-03-24 RX ADMIN — MUPIROCIN: 20 OINTMENT TOPICAL at 08:03

## 2024-03-24 RX ADMIN — METRONIDAZOLE 500 MG: 5 INJECTION, SOLUTION INTRAVENOUS at 08:03

## 2024-03-24 RX ADMIN — FUROSEMIDE 80 MG: 10 INJECTION, SOLUTION INTRAVENOUS at 05:03

## 2024-03-24 RX ADMIN — BUDESONIDE INHALATION 0.5 MG: 0.5 SUSPENSION RESPIRATORY (INHALATION) at 08:03

## 2024-03-24 RX ADMIN — DOCUSATE SODIUM 100 MG: 100 CAPSULE, LIQUID FILLED ORAL at 08:03

## 2024-03-24 RX ADMIN — IPRATROPIUM BROMIDE AND ALBUTEROL SULFATE 3 ML: 2.5; .5 SOLUTION RESPIRATORY (INHALATION) at 07:03

## 2024-03-24 RX ADMIN — ATORVASTATIN CALCIUM 40 MG: 40 TABLET, FILM COATED ORAL at 08:03

## 2024-03-24 RX ADMIN — OXYCODONE 5 MG: 5 TABLET ORAL at 05:03

## 2024-03-24 RX ADMIN — MEROPENEM 1 G: 1 INJECTION, POWDER, FOR SOLUTION INTRAVENOUS at 09:03

## 2024-03-24 RX ADMIN — POTASSIUM CHLORIDE 40 MEQ: 1500 TABLET, EXTENDED RELEASE ORAL at 09:03

## 2024-03-24 RX ADMIN — ENOXAPARIN SODIUM 40 MG: 40 INJECTION SUBCUTANEOUS at 05:03

## 2024-03-24 RX ADMIN — ZOLPIDEM TARTRATE 5 MG: 5 TABLET ORAL at 08:03

## 2024-03-24 RX ADMIN — METRONIDAZOLE 500 MG: 5 INJECTION, SOLUTION INTRAVENOUS at 05:03

## 2024-03-24 RX ADMIN — IPRATROPIUM BROMIDE AND ALBUTEROL SULFATE 3 ML: 2.5; .5 SOLUTION RESPIRATORY (INHALATION) at 12:03

## 2024-03-24 RX ADMIN — MEROPENEM 1 G: 1 INJECTION, POWDER, FOR SOLUTION INTRAVENOUS at 03:03

## 2024-03-24 RX ADMIN — IPRATROPIUM BROMIDE AND ALBUTEROL SULFATE 3 ML: 2.5; .5 SOLUTION RESPIRATORY (INHALATION) at 08:03

## 2024-03-24 RX ADMIN — OXYCODONE 5 MG: 5 TABLET ORAL at 11:03

## 2024-03-24 RX ADMIN — DOCUSATE SODIUM 100 MG: 100 CAPSULE, LIQUID FILLED ORAL at 09:03

## 2024-03-24 RX ADMIN — ASPIRIN 81 MG: 81 TABLET, COATED ORAL at 09:03

## 2024-03-24 RX ADMIN — PANTOPRAZOLE SODIUM 40 MG: 40 TABLET, DELAYED RELEASE ORAL at 09:03

## 2024-03-24 RX ADMIN — MEROPENEM 1 G: 1 INJECTION, POWDER, FOR SOLUTION INTRAVENOUS at 11:03

## 2024-03-24 RX ADMIN — OXYCODONE 10 MG: 5 TABLET ORAL at 08:03

## 2024-03-24 RX ADMIN — FUROSEMIDE 80 MG: 10 INJECTION, SOLUTION INTRAVENOUS at 08:03

## 2024-03-24 NOTE — PT/OT/SLP EVAL
Occupational Therapy   Evaluation    Name: Karin Carrera  MRN: [id number]  Admitting Diagnosis: Sepsis  Recent Surgery: * No surgery found *      Recommendations:     Discharge Recommendations:  (depending on rate of recovery, moderate to low intensity therapy)  Discharge Equipment Recommendations:     Barriers to discharge:   (on going medical treatment)    Assessment:     Karin Carrera is a 53 y.o. male with a medical diagnosis of Sepsis.  He presents with severe edema (B) LE and (R) UE and abdomen. Performance deficits affecting function: weakness, impaired endurance, impaired self care skills, impaired functional mobility, gait instability, decreased upper extremity function, pain, decreased ROM, abnormal tone, impaired skin, edema, impaired cardiopulmonary response to activity.      Pt recently underwent cholecystectomy (3/19/24) and returned to hospital with (R) upper quadrant pain, SOB and surgical site drainage. Pt has (R) pleural effusion and increased edema in (R) UE and (B) LE and abdomen. Pt started using O2 at home about 3 weeks ago at 2L/min. Pt has had desaturations requiring him to rotate between 5L/min nasal cannula and High Flow. Pt is easily SOB, and HR was between 120 and 125 bpm during evaluation. OT will treat pt while he is hospitalized. Depending on his progress, pt may benefit from swingbed or home health at D/C.     Rehab Prognosis: Good; patient would benefit from acute skilled OT services to address these deficits and reach maximum level of function.       Plan:     Patient to be seen 5 x/week to address the above listed problems via self-care/home management, therapeutic activities, therapeutic exercises  Plan of Care Expires: 04/22/24  Plan of Care Reviewed with: patient    Subjective     Chief Complaint: Pt with increased SOB, increased edema, pain  Patient/Family Comments/goals: Pt is agreeable to evaluation    Occupational Profile:  Living Environment: Pt lives with  significant other in a single level home with 1 step to enter.  Previous level of function: Pt performed his own ADLs, showered on shower chair and wore slip on shoes. Pt ambulated with cane.  Roles and Routines: Pt takes care of himself  Equipment Used at Home: walker, rolling, cane, straight, power chair, oxygen, shower chair  Assistance upon Discharge: Pt will have facility assistance at D/C.    Pain/Comfort:  Pain Rating 1: 5/10  Location - Side 1: Bilateral  Location 1: leg (and abdomen)  Pain Addressed 1: Reposition, Distraction, Cessation of Activity  Pain Rating Post-Intervention 1: 5/10    Patients cultural, spiritual, Methodist conflicts given the current situation: no    Objective:     Communicated with: COLTON Chun prior to session.  Patient found HOB elevated with blood pressure cuff, telemetry, pulse ox (continuous), oxygen upon OT entry to room.    General Precautions: Standard, fall, respiratory  Orthopedic Precautions: N/A  Braces: N/A  Respiratory Status: High flow, flow 35 L/min, concentration 50%    Occupational Performance:    Bed Mobility:    Patient completed Supine to Sit with stand by assistance    Functional Mobility/Transfers:  Patient completed Sit <> Stand Transfer with contact guard assistance  with  straight cane and bed elevated   Patient completed Bed <> Chair Transfer using Step Transfer technique with contact guard assistance with straight cane  Functional Mobility: Pt was able to step bed to chair with CGA with SPC and with therapist guarding lines. Pt taking small, slow steps and demonstrated SOB.    Activities of Daily Living:  Upper Body Dressing: total assistance gown as robe due to multiple IV lines  Lower Body Dressing: maximal assistance shoes    Cognitive/Visual Perceptual:  Cognitive/Psychosocial Skills:     -       Oriented to: Person, Place, and Situation   -       Follows Commands/attention:Follows one-step commands  -       Communication: expressive aphasia and  able to write to communicate  -       Safety awareness/insight to disability: intact   -       Mood/Affect/Coping skills/emotional control: Cooperative  Visual/Perceptual:      -Intact  wears glasses for reading      Physical Exam:  Balance:    -       sitting- SBA, standing CGA with straight cane  Edema:  Severe (R) UE, (B) LE and abdomen  Sensation:    -       Intact  Dominant hand:    -       Left since his CVA   Upper Extremity Range of Motion:     -       Right Upper Extremity: AROM is severely impaired, PROM is impaired, in part due to edema, and inpart due to increased tone  -       Left Upper Extremity: WNL  Upper Extremity Strength:    -       Right Upper Extremity: severely impaired  -       Left Upper Extremity: WNL   Strength:    -       Right Upper Extremity: severely impaired  -       Left Upper Extremity: WFL  Fine Motor Coordination:    -       Intact  Left hand thumb/finger opposition skills    AMPAC 6 Click ADL:  AMPAC Total Score: 15    Treatment & Education:  Pt educated on OT role/POC.   Importance of OOB activity with staff assistance.  Importance of sitting up in the chair throughout the day as tolerated, especially for meals   Safety during functional t/f and mobility with use of SPC  Importance of assisting with self-care activities   All questions/concerns answered within OT scope of practice      Patient left up in chair with all lines intact, call button in reach, and RN Noni notified    GOALS:   Multidisciplinary Problems       Occupational Therapy Goals          Problem: Occupational Therapy    Goal Priority Disciplines Outcome Interventions   Occupational Therapy Goal     OT, PT/OT Ongoing, Progressing    Description: STG: (in 1 week)  Pt will perform grooming with setup  Pt will bathe with setup and min(A)  Pt will perform UE dressing with min(A)  Pt will perform LE dressing with min(A)  Pt will transfer bed/chair/bsc with SBA with cane  Pt will perform standing task x 3 min with  CGA   Pt will tolerate 10 minutes of tx without fatigue      LTG: (in 5 weeks)  1.Restore to max I with self care and mobility.                        History:     Past Medical History:   Diagnosis Date    Alcoholic fatty liver     CHF (congestive heart failure) 02/20/2024    EF 25%    COPD (chronic obstructive pulmonary disease)     Coronary artery disease involving coronary bypass graft of native heart without angina pectoris 01/01/2022    Dyslipidemia 12/16/2023    Essential (primary) hypertension 01/01/2022    Expressive aphasia 05/05/2023    GERD with esophagitis 2018    EGD    Hemiparesis affecting right side as late effect of cerebrovascular accident     ROSEMARIE (obstructive sleep apnea)     Pulmonary hypertension     Stroke     right hand contracted    Tricuspid regurgitation     Type 2 diabetes mellitus          Past Surgical History:   Procedure Laterality Date    CARDIAC SURGERY      CABG    CORONARY ANGIOPLASTY WITH STENT PLACEMENT      bare metal stent    HEMIARTHROPLASTY OF HIP Right 01/02/2022    Procedure: HEMIARTHROPLASTY, HIP;  Surgeon: Ramses Chua MD;  Location: [facility] OR;  Service: Orthopedics;  Laterality: Right;    LAPAROSCOPIC CHOLECYSTECTOMY  03/09/2024    Dr. Bennett    LAPAROSCOPIC CHOLECYSTECTOMY WITH CHOLANGIOGRAPHY N/A 3/9/2024    Procedure: CHOLECYSTECTOMY, LAPAROSCOPIC, WITH CHOLANGIOGRAM;  Surgeon: Karson Bennett DO;  Location: [facility] OR;  Service: General;  Laterality: N/A;    RIGHT HEART CATHETERIZATION Right 02/22/2024    Procedure: INSERTION, CATHETER, RIGHT HEART;  Surgeon: Ruiz Pacheco MD;  Location: [facility] CATH LAB;  Service: Cardiology;  Laterality: Right;       Time Tracking:     OT Date of Treatment: 03/24/24  OT Start Time: 0900  OT Stop Time:    OT Total Time (min):      Billable Minutes:Evaluation moderate complexity    3/24/2024   General

## 2024-03-24 NOTE — HOSPITAL COURSE
3/24- placed on HFNC overnight - will continue to wean,day 3 of Dobutamine   03/29/2024 patient has been on Milrinone drip and is still on Lasix drip he is diuresed nicely is down to 96 kg he is ready for discharge to the floor his swollen arms improved.  Home a couple of days

## 2024-03-24 NOTE — PLAN OF CARE
Problem: Adult Inpatient Plan of Care  Goal: Plan of Care Review  Outcome: Ongoing, Progressing  Goal: Patient-Specific Goal (Individualized)  Outcome: Ongoing, Progressing  Goal: Absence of Hospital-Acquired Illness or Injury  Outcome: Ongoing, Progressing  Goal: Optimal Comfort and Wellbeing  Outcome: Ongoing, Progressing  Goal: Readiness for Transition of Care  Outcome: Ongoing, Progressing     Problem: Diabetes Comorbidity  Goal: Blood Glucose Level Within Targeted Range  Outcome: Ongoing, Progressing     Problem: Impaired Wound Healing  Goal: Optimal Wound Healing  Outcome: Ongoing, Progressing     Problem: Skin Injury Risk Increased  Goal: Skin Health and Integrity  Outcome: Ongoing, Progressing     Problem: Adjustment to Illness (Sepsis/Septic Shock)  Goal: Optimal Coping  Outcome: Ongoing, Progressing     Problem: Bleeding (Sepsis/Septic Shock)  Goal: Absence of Bleeding  Outcome: Ongoing, Progressing     Problem: Glycemic Control Impaired (Sepsis/Septic Shock)  Goal: Blood Glucose Level Within Desired Range  Outcome: Ongoing, Progressing     Problem: Infection Progression (Sepsis/Septic Shock)  Goal: Absence of Infection Signs and Symptoms  Outcome: Ongoing, Progressing     Problem: Nutrition Impaired (Sepsis/Septic Shock)  Goal: Optimal Nutrition Intake  Outcome: Ongoing, Progressing     Problem: Gas Exchange Impaired  Goal: Optimal Gas Exchange  Outcome: Ongoing, Progressing     Problem: Infection  Goal: Absence of Infection Signs and Symptoms  Outcome: Ongoing, Progressing     Problem: Airway Clearance Ineffective  Goal: Effective Airway Clearance  Outcome: Ongoing, Progressing

## 2024-03-24 NOTE — PROGRESS NOTES
[facility]  Critical Care Medicine  Progress Note    Patient Name: Karin Carrera  MRN: [id number]  Admission Date: 3/19/2024  Hospital Length of Stay: 5 days  Code Status: Full Code  Attending Provider: Kai Peck MD  Primary Care Provider: Walker Smith MD   Principal Problem: Sepsis    Subjective:     HPI:  53-year-old white male presents with shortness of breath and abdominal pain for 2 days recently had a cholecystectomy 03/09/2024.  Patient has a history of COPD he complains of increased leg swelling.  Patient has a history ejection fraction 25% previous stroke he has been hypoxic during this admission grew out Gram-positive Gram-negative bacilli out of his blood he has been brought down to the unit for worsening blood pressure and hypoxemia    Hospital/ICU Course:  3/24- placed on HFNC overnight - will continue to wean,day 3 of Dobutamine     Interval History/Significant Events:  placed on HFNC overnight.     Review of Systems   All other systems reviewed and are negative.    Objective:     Vital Signs (Most Recent):  Temp: 98 °F (36.7 °C) (03/24/24 0330)  Pulse: (!) 116 (03/24/24 1030)  Resp: (!) 28 (03/24/24 0900)  BP: 128/75 (03/24/24 1030)  SpO2: 95 % (03/24/24 0718) Vital Signs (24h Range):  Temp:  [96.7 °F (35.9 °C)-98.7 °F (37.1 °C)] 98 °F (36.7 °C)  Pulse:  [] 116  Resp:  [14-33] 28  SpO2:  [89 %-100 %] 95 %  BP: ()/() 128/75   Weight: 108 kg (238 lb 1.6 oz)  Body mass index is 30.57 kg/m².      Intake/Output Summary (Last 24 hours) at 3/24/2024 1106  Last data filed at 3/24/2024 1055  Gross per 24 hour   Intake 282.46 ml   Output 4010 ml   Net -3727.54 ml          Physical Exam  Vitals reviewed.   HENT:      Right Ear: External ear normal.      Left Ear: External ear normal.      Mouth/Throat:      Mouth: Mucous membranes are moist.   Eyes:      Extraocular Movements: Extraocular movements intact.      Conjunctiva/sclera: Conjunctivae  normal.   Cardiovascular:      Rate and Rhythm: Normal rate and regular rhythm.   Pulmonary:      Effort: Pulmonary effort is normal.      Breath sounds: Normal breath sounds.   Abdominal:      General: Bowel sounds are normal.      Palpations: Abdomen is soft.   Musculoskeletal:      Cervical back: Normal range of motion.      Comments: Right upper ext edema -- slowly improving    Skin:     Capillary Refill: Capillary refill takes less than 2 seconds.   Neurological:      Mental Status: He is alert. Mental status is at baseline.      Comments: Right side weakness from previous CVA    Psychiatric:         Mood and Affect: Mood normal.            Vents:  Oxygen Concentration (%): 36 (03/24/24 0054)  Lines/Drains/Airways       Peripherally Inserted Central Catheter Line  Duration             PICC Double Lumen 03/22/24 1330 right basilic 1 day              Peripheral Intravenous Line  Duration                  Peripheral IV - Single Lumen 03/21/24 2300 20 G Left;Posterior Forearm 2 days                  Significant Labs:    CBC/Anemia Profile:  Recent Labs   Lab 03/23/24  0414 03/24/24  0503   WBC 7.67 7.85   HGB 10.3* 11.2*   HCT 33.7* 34.1*    235   MCV 86.6 82.4   RDW 16.1* 16.1*        Chemistries:  Recent Labs   Lab 03/23/24  0414 03/24/24  0503   * 134*   K 3.9 3.4*   CL 98 98   CO2 31 31   BUN 19* 16   CREATININE 0.94 0.74   CALCIUM 8.6 8.6   ALBUMIN 3.1* 3.1*   PROT 6.5 6.4   BILITOT 0.8 1.0   ALKPHOS 107 103   ALT 22 24   AST 17 19       All pertinent labs within the past 24 hours have been reviewed.    Significant Imaging:  I have reviewed all pertinent imaging results/findings within the past 24 hours.    ABG  Recent Labs   Lab 03/21/24  1329   PH 7.52*   PO2 45   PCO2 37   HCO3 30.2*     Assessment/Plan:     Neuro  Hemiparesis affecting right side as late effect of cerebrovascular accident  His his baseline has aphasia    Pulmonary  Acute hypoxemic respiratory failure  Likely related to  congestive heart failure systolic   possibly pneumonia; does have a right pleural effusion - POCUS 3/23 - will hold on thora and continue continue diuresis  Wean FIO2 on HFNC    Cardiac/Vascular  Shock  Improving     Acute on chronic combined systolic and diastolic heart failure  Ejection fraction 20% on dobutamine drip day 3/5    Continue diuresing aggressively   Cardiology following     Coronary artery disease involving coronary bypass graft of native heart without angina pectoris  Continue meds    Renal/  Hypokalemia  Supplement    ID  * Sepsis  Do not believe this is sepsis;  BC are likely contaminates - will continue broad spectrum abx and following repeat cultures     Bacteremia  Likely contaminate - following repeat cultures     Endocrine  Type 2 diabetes mellitus  SSI     Other  ROSEMARIE (obstructive sleep apnea)  CPAP at night        Critical Care Daily Checklist:    A: Awake: RASS Goal/Actual Goal:    Actual:     B: Spontaneous Breathing Trial Performed?     C: SAT & SBT Coordinated?                   D: Delirium: CAM-ICU     E: Early Mobility Performed? Yes   F: Feeding Goal:    Status:     Current Diet Order   Procedures    Diet Cardiac      AS: Analgesia/Sedation na   T: Thromboembolic Prophylaxis yes   H: HOB > 300 Yes   U: Stress Ulcer Prophylaxis (if needed)    G: Glucose Control Fasting 129    B: Bowel Function Stool Occurrence: 1   I: Indwelling Catheter (Lines & Meehan) Necessity no   D: De-escalation of Antimicrobials/Pharmacotherapies no    Plan for the day/ETD     Code Status:  Family/Goals of Care: Full Code       Critical Care Time: 35 minutes  Critical secondary to Patient has a condition that poses threat to life and bodily function: Severe Respiratory Distress and cardiogenic shock       Critical care was time spent personally by me on the following activities: development of treatment plan with patient or surrogate and bedside caregivers, discussions with consultants, evaluation of patient's  response to treatment, examination of patient, ordering and performing treatments and interventions, ordering and review of laboratory studies, ordering and review of radiographic studies, pulse oximetry, re-evaluation of patient's condition. This critical care time did not overlap with that of any other provider or involve time for any procedures.     Nneka Kurtz, TAMMY-ACNP  Critical Care Medicine  [facility]

## 2024-03-24 NOTE — PLAN OF CARE
Problem: Occupational Therapy  Goal: Occupational Therapy Goal  Description: STG: (in 1 week)  Pt will perform grooming with setup  Pt will bathe with setup and min(A)  Pt will perform UE dressing with min(A)  Pt will perform LE dressing with min(A)  Pt will transfer bed/chair/bsc with SBA with cane  Pt will perform standing task x 3 min with CGA   Pt will tolerate 10 minutes of tx without fatigue      LTG: (in 5 weeks)  1.Restore to max I with self care and mobility.   Outcome: Ongoing, Progressing     Pt recently underwent cholecystectomy (3/19/24) and returned to hospital with (R) upper quadrant pain, SOB and surgical site drainage. Pt has (R) pleural effusion and increased edema in (R) UE and (B) LE and abdomen. Pt started using O2 at home about 3 weeks ago at 2L/min. Pt has had desaturations requiring him to rotate between 5L/min nasal cannula and High Flow. Pt is easily SOB, and HR was between 120 and 125 bpm  during evaluation. OT will treat pt while he is hospitalized. Depending on his progress, pt may benefit from swingbed or home health at D/C.

## 2024-03-24 NOTE — PT/OT/SLP EVAL
"Physical Therapy Evaluation and Treatment    Patient Name: Karin Carrera   MRN: [id number]  Recent Surgery: * No surgery found *      Recommendations:     Discharge Recommendations: High Intensity Therapy (to low intensity depending on rate of recovery)   Discharge Equipment Recommendations: none   Barriers to discharge: Increased level of assist and Ongoing medical treatment    Assessment:     Karin Carrera is a 53 y.o. male admitted with a medical diagnosis of Sepsis. B LE cellulitis He presents with the following impairments/functional limitations: impaired endurance, weakness, impaired self care skills, impaired functional mobility, gait instability, impaired balance, decreased upper extremity function, decreased lower extremity function, abnormal tone, impaired skin, edema, impaired cardiopulmonary response to activity.      Patient demonstrates a decline in functional mobility from baseline with easy fatigue and SOB during mobility despite high flow O2. Patient will benefit from skilled PT to safely grade and advance activity and promote return to baseline. Patient has Medicaid insurance therefore will not qualify for swing bed and is currently too ill for acute inpatient rehab. D/c plan to be refined as patient becomes more medically stable.    Rehab Prognosis: Good; patient would benefit from acute PT services to address these deficits and reach maximum level of function.    Plan:     During this hospitalization, patient to be seen 5 x/week to address the above listed problems via gait training, therapeutic activities, therapeutic exercises    Plan of Care Expires: 04/24/24    Subjective     Chief Complaint: Sepsis ; B LE cellulitis  Patient Comments/Goals: Patient mildly frustrated by nursing care but agreeable to get OOB to the chair."  Pain/Comfort:  Pain Rating 1: 5/10  Location - Side 1: Bilateral  Location 1: leg (and abdomen)  Pain Addressed 1: Reposition, Distraction, Cessation of " Activity  Pain Rating Post-Intervention 1: 5/10    Social History:  Living Environment: Patient lives with their significant other in a single story home with number of outside stair(s): 1  Prior Level of Function: Prior to admission, patient was modified independent with ADLs using straight cane for mobility; [facility] care home health per  note  Equipment Used at Home: walker, rolling, cane, straight, power chair, oxygen, shower chair  DME owned (not currently used):  power chair and rolling walker  Assistance Upon Discharge: significant other and home health vs inpatient rehab    Objective:     Communicated with Noni Chun RN prior to session. Patient found HOB elevated with blood pressure cuff, telemetry, pulse ox (continuous), oxygen upon PT entry to room.    General Precautions: Standard, fall, respiratory   Orthopedic Precautions: N/A   Braces: N/A    Respiratory Status: High flow, flow 35 L/min, concentration 50%; O2 sats 97-98%    Exams:  Cognition: Patient is oriented to Person, Place, Time, Situation  RLE ROM: Deficits: hip WFL ;knee WFL; ankle DF to neutral in stance  RLE Strength: Deficits: hip 4/5; knee 3+/5; ankle 0/5  LLE ROM: WFL  LLE Strength: Deficits: 4+/5  Sensation:    -       Intact  Skin Integrity/Edema:     -       Skin integrity: erythema R>L LE  -       Edema: Moderate B LE     Functional Mobility:  Gait belt applied - Yes  Bed Mobility  Supine to Sit: stand by assistance and increased time and effort for LE management and trunk management  Transfers  Sit to Stand: contact guard assistance with straight cane and with cues for efficiency  Bed to Chair: contact guard assistance with straight cane and with cues for safety using Step Transfer  Gait  Patient ambulated bed to chair only due to high flow tubing and easy fatigue  Balance  Sitting: modified independence  Standing: contact guard assistance and SPC      Therapeutic Activities and Exercises:   Patient educated on role of  acute care PT and PT POC, safety while in hospital including calling nurse for mobility, and call light usage  Patient educated about importance of OOB mobility and remaining up in chair most of the day.    AM-PAC 6 CLICK MOBILITY  Total Score:16    Patient left up in chair with all lines intact, call button in reach, and RN notified.    GOALS:   Multidisciplinary Problems       Physical Therapy Goals          Problem: Physical Therapy    Goal Priority Disciplines Outcome Goal Variances Interventions   Physical Therapy Goal     PT, PT/OT Ongoing, Progressing     Description: Short Term Goals to be met by: 2024    Patient will increase functional independence with mobility by performin. Supine to sit with independently  2. Sit to stand transfer with independently using straight cane  3. Bed to chair transfer with independently using straight cane  4. Gait  x 100 feet with contact guard assist using straight cane and O2 PRN  5. Lower extremity exercise program x30 reps per handout, with assistance as needed    Long Term Goals to be met by: 2024    Pt will regain full independent functional mobility with straight cane to return to home situation and prior activities of daily living.                        History:     Past Medical History:   Diagnosis Date    Alcoholic fatty liver     CHF (congestive heart failure) 2024    EF 25%    COPD (chronic obstructive pulmonary disease)     Coronary artery disease involving coronary bypass graft of native heart without angina pectoris 2022    Dyslipidemia 2023    Essential (primary) hypertension 2022    Expressive aphasia 2023    GERD with esophagitis 2018    EGD    Hemiparesis affecting right side as late effect of cerebrovascular accident     ROSEMARIE (obstructive sleep apnea)     Pulmonary hypertension     Stroke     right hand contracted    Tricuspid regurgitation     Type 2 diabetes mellitus        Past Surgical History:   Procedure  Laterality Date    CARDIAC SURGERY      CABG    CORONARY ANGIOPLASTY WITH STENT PLACEMENT      bare metal stent    HEMIARTHROPLASTY OF HIP Right 01/02/2022    Procedure: HEMIARTHROPLASTY, HIP;  Surgeon: Ramses Chua MD;  Location: [facility] OR;  Service: Orthopedics;  Laterality: Right;    LAPAROSCOPIC CHOLECYSTECTOMY  03/09/2024    Dr. Bennett    LAPAROSCOPIC CHOLECYSTECTOMY WITH CHOLANGIOGRAPHY N/A 3/9/2024    Procedure: CHOLECYSTECTOMY, LAPAROSCOPIC, WITH CHOLANGIOGRAM;  Surgeon: Karson Bennett DO;  Location: [facility] OR;  Service: General;  Laterality: N/A;    RIGHT HEART CATHETERIZATION Right 02/22/2024    Procedure: INSERTION, CATHETER, RIGHT HEART;  Surgeon: Ruiz Pacheco MD;  Location: [facility] CATH LAB;  Service: Cardiology;  Laterality: Right;       Time Tracking:     PT Received On: 03/24/24  PT Start Time: 0856  PT Stop Time: 0920  PT Total Time (min): 24 min     Billable Minutes: Evaluation Low complexity    3/24/2024

## 2024-03-24 NOTE — SUBJECTIVE & OBJECTIVE
Interval History/Significant Events:  placed on HFNC overnight.     Review of Systems   All other systems reviewed and are negative.    Objective:     Vital Signs (Most Recent):  Temp: 98 °F (36.7 °C) (03/24/24 0330)  Pulse: (!) 116 (03/24/24 1030)  Resp: (!) 28 (03/24/24 0900)  BP: 128/75 (03/24/24 1030)  SpO2: 95 % (03/24/24 0718) Vital Signs (24h Range):  Temp:  [96.7 °F (35.9 °C)-98.7 °F (37.1 °C)] 98 °F (36.7 °C)  Pulse:  [] 116  Resp:  [14-33] 28  SpO2:  [89 %-100 %] 95 %  BP: ()/() 128/75   Weight: 108 kg (238 lb 1.6 oz)  Body mass index is 30.57 kg/m².      Intake/Output Summary (Last 24 hours) at 3/24/2024 1106  Last data filed at 3/24/2024 1055  Gross per 24 hour   Intake 282.46 ml   Output 4010 ml   Net -3727.54 ml          Physical Exam  Vitals reviewed.   HENT:      Right Ear: External ear normal.      Left Ear: External ear normal.      Mouth/Throat:      Mouth: Mucous membranes are moist.   Eyes:      Extraocular Movements: Extraocular movements intact.      Conjunctiva/sclera: Conjunctivae normal.   Cardiovascular:      Rate and Rhythm: Normal rate and regular rhythm.   Pulmonary:      Effort: Pulmonary effort is normal.      Breath sounds: Normal breath sounds.   Abdominal:      General: Bowel sounds are normal.      Palpations: Abdomen is soft.   Musculoskeletal:      Cervical back: Normal range of motion.      Comments: Right upper ext edema -- slowly improving    Skin:     Capillary Refill: Capillary refill takes less than 2 seconds.   Neurological:      Mental Status: He is alert. Mental status is at baseline.      Comments: Right side weakness from previous CVA    Psychiatric:         Mood and Affect: Mood normal.            Vents:  Oxygen Concentration (%): 36 (03/24/24 0054)  Lines/Drains/Airways       Peripherally Inserted Central Catheter Line  Duration             PICC Double Lumen 03/22/24 1330 right basilic 1 day              Peripheral Intravenous Line  Duration                   Peripheral IV - Single Lumen 03/21/24 2300 20 G Left;Posterior Forearm 2 days                  Significant Labs:    CBC/Anemia Profile:  Recent Labs   Lab 03/23/24  0414 03/24/24  0503   WBC 7.67 7.85   HGB 10.3* 11.2*   HCT 33.7* 34.1*    235   MCV 86.6 82.4   RDW 16.1* 16.1*        Chemistries:  Recent Labs   Lab 03/23/24  0414 03/24/24  0503   * 134*   K 3.9 3.4*   CL 98 98   CO2 31 31   BUN 19* 16   CREATININE 0.94 0.74   CALCIUM 8.6 8.6   ALBUMIN 3.1* 3.1*   PROT 6.5 6.4   BILITOT 0.8 1.0   ALKPHOS 107 103   ALT 22 24   AST 17 19       All pertinent labs within the past 24 hours have been reviewed.    Significant Imaging:  I have reviewed all pertinent imaging results/findings within the past 24 hours.

## 2024-03-24 NOTE — ASSESSMENT & PLAN NOTE
Ejection fraction 20% on dobutamine drip day 3/5    Continue diuresing aggressively   Cardiology following

## 2024-03-24 NOTE — PROGRESS NOTES
Dysarthric but indicates he does not feel well in his abdomen and lower extremities.  Still has between 1+ and 2+ edema in both lower extremities with mild redness of the left leg although no longer feels febrile (cellulitis). Abdomen is nontender.  Diuresis has been quite effective over the last 2 days.  We will wean dobutamine off today and re-evaluate in the morning.  Seems to be improving from his heart perspective.  Abdominal discomfort maybe due to his heavy use of chewing tobacco.  This is the largest can of chewing tobacco I have ever seen.    We will re-evaluate tomorrow.      Hardeep Escobar MD  Cardiology  [facility]

## 2024-03-24 NOTE — ASSESSMENT & PLAN NOTE
Do not believe this is sepsis;  BC are likely contaminates - will continue broad spectrum abx and following repeat cultures

## 2024-03-24 NOTE — ASSESSMENT & PLAN NOTE
Likely related to congestive heart failure systolic   possibly pneumonia; does have a right pleural effusion - POCUS 3/23 - will hold on thora and continue continue diuresis  Wean FIO2 on HFNC

## 2024-03-24 NOTE — PLAN OF CARE
Problem: Impaired Wound Healing  Goal: Optimal Wound Healing  Outcome: Ongoing, Progressing  Intervention: Promote Wound Healing  Flowsheets (Taken 3/23/2024 1924)  Oral Nutrition Promotion: social interaction promoted  Sleep/Rest Enhancement:   relaxation techniques promoted   awakenings minimized  Activity Management:   Arm raise - L1   Ankle pumps - L1  Pain Management Interventions:   warm blanket provided   care clustered     Problem: Skin Injury Risk Increased  Goal: Skin Health and Integrity  Outcome: Ongoing, Progressing  Intervention: Optimize Skin Protection  Flowsheets (Taken 3/23/2024 1924)  Pressure Reduction Techniques:   frequent weight shift encouraged   weight shift assistance provided  Pressure Reduction Devices: specialty bed utilized  Skin Protection: tubing/devices free from skin contact  Head of Bed (HOB) Positioning: HOB at 20-30 degrees  Intervention: Promote and Optimize Oral Intake  Flowsheets (Taken 3/23/2024 1924)  Oral Nutrition Promotion: social interaction promoted

## 2024-03-24 NOTE — PLAN OF CARE
Problem: Physical Therapy  Goal: Physical Therapy Goal  Description: Short Term Goals to be met by: 2024    Patient will increase functional independence with mobility by performin. Supine to sit with independently  2. Sit to stand transfer with independently using straight cane  3. Bed to chair transfer with independently using straight cane  4. Gait  x 100 feet with contact guard assist using straight cane and O2 PRN  5. Lower extremity exercise program x30 reps per handout, with assistance as needed    Long Term Goals to be met by: 2024    Pt will regain full independent functional mobility with straight cane to return to home situation and prior activities of daily living.   Outcome: Ongoing, Progressing     Patient demonstrates a decline in functional mobility from baseline with easy fatigue and SOB during mobility despite high flow O2. Patient will benefit from skilled PT to safely grade and advance activity and promote return to baseline. Patient has Medicaid insurance therefore will not qualify for swing bed and is currently too ill for acute inpatient rehab. D/c plan to be refined as patient becomes more medically stable.

## 2024-03-25 PROBLEM — I50.43 ACUTE ON CHRONIC COMBINED SYSTOLIC AND DIASTOLIC HEART FAILURE: Status: ACTIVE | Noted: 2024-03-25

## 2024-03-25 PROBLEM — I50.84 END STAGE HEART FAILURE: Status: ACTIVE | Noted: 2024-03-19

## 2024-03-25 LAB
ALBUMIN SERPL BCP-MCNC: 3.2 G/DL (ref 3.5–5)
ALBUMIN/GLOB SERPL: 0.9 {RATIO}
ALP SERPL-CCNC: 106 U/L (ref 45–115)
ALT SERPL W P-5'-P-CCNC: 18 U/L (ref 16–61)
ANION GAP SERPL CALCULATED.3IONS-SCNC: 13 MMOL/L (ref 7–16)
AST SERPL W P-5'-P-CCNC: 21 U/L (ref 15–37)
BASOPHILS # BLD AUTO: 0.06 K/UL (ref 0–0.2)
BASOPHILS NFR BLD AUTO: 0.7 % (ref 0–1)
BILIRUB SERPL-MCNC: 1.3 MG/DL (ref ?–1.2)
BUN SERPL-MCNC: 17 MG/DL (ref 7–18)
BUN/CREAT SERPL: 16 (ref 6–20)
CALCIUM SERPL-MCNC: 8.8 MG/DL (ref 8.5–10.1)
CHLORIDE SERPL-SCNC: 96 MMOL/L (ref 98–107)
CO2 SERPL-SCNC: 28 MMOL/L (ref 21–32)
CREAT SERPL-MCNC: 1.06 MG/DL (ref 0.7–1.3)
DIFFERENTIAL METHOD BLD: ABNORMAL
EGFR (NO RACE VARIABLE) (RUSH/TITUS): 84 ML/MIN/1.73M2
EOSINOPHIL # BLD AUTO: 0.07 K/UL (ref 0–0.5)
EOSINOPHIL NFR BLD AUTO: 0.8 % (ref 1–4)
ERYTHROCYTE [DISTWIDTH] IN BLOOD BY AUTOMATED COUNT: 16.3 % (ref 11.5–14.5)
GLOBULIN SER-MCNC: 3.6 G/DL (ref 2–4)
GLUCOSE SERPL-MCNC: 141 MG/DL (ref 70–105)
GLUCOSE SERPL-MCNC: 156 MG/DL (ref 74–106)
GLUCOSE SERPL-MCNC: 168 MG/DL (ref 70–105)
GLUCOSE SERPL-MCNC: 179 MG/DL (ref 70–105)
GLUCOSE SERPL-MCNC: 263 MG/DL (ref 70–105)
HCT VFR BLD AUTO: 37.4 % (ref 40–54)
HGB BLD-MCNC: 11.9 G/DL (ref 13.5–18)
IMM GRANULOCYTES # BLD AUTO: 0.06 K/UL (ref 0–0.04)
IMM GRANULOCYTES NFR BLD: 0.7 % (ref 0–0.4)
LACTATE SERPL-SCNC: 1.6 MMOL/L (ref 0.4–2)
LACTATE SERPL-SCNC: 2.3 MMOL/L (ref 0.4–2)
LACTATE SERPL-SCNC: 3.4 MMOL/L (ref 0.4–2)
LACTATE SERPL-SCNC: 4 MMOL/L (ref 0.4–2)
LYMPHOCYTES # BLD AUTO: 1.32 K/UL (ref 1–4.8)
LYMPHOCYTES NFR BLD AUTO: 14.9 % (ref 27–41)
MCH RBC QN AUTO: 26.7 PG (ref 27–31)
MCHC RBC AUTO-ENTMCNC: 31.8 G/DL (ref 32–36)
MCV RBC AUTO: 83.9 FL (ref 80–96)
MONOCYTES # BLD AUTO: 0.67 K/UL (ref 0–0.8)
MONOCYTES NFR BLD AUTO: 7.6 % (ref 2–6)
MPC BLD CALC-MCNC: 9.3 FL (ref 9.4–12.4)
NEUTROPHILS # BLD AUTO: 6.68 K/UL (ref 1.8–7.7)
NEUTROPHILS NFR BLD AUTO: 75.3 % (ref 53–65)
NRBC # BLD AUTO: 0 X10E3/UL
NRBC, AUTO (.00): 0 %
OHS QRS DURATION: 102 MS
OHS QTC CALCULATION: 461 MS
PLATELET # BLD AUTO: 290 K/UL (ref 150–400)
POTASSIUM SERPL-SCNC: 4.2 MMOL/L (ref 3.5–5.1)
PROT SERPL-MCNC: 6.8 G/DL (ref 6.4–8.2)
RBC # BLD AUTO: 4.46 M/UL (ref 4.6–6.2)
SODIUM SERPL-SCNC: 133 MMOL/L (ref 136–145)
VANCOMYCIN TROUGH SERPL-MCNC: 10.5 ΜG/ML (ref 10–20)
WBC # BLD AUTO: 8.86 K/UL (ref 4.5–11)

## 2024-03-25 PROCEDURE — 25000242 PHARM REV CODE 250 ALT 637 W/ HCPCS

## 2024-03-25 PROCEDURE — 63600175 PHARM REV CODE 636 W HCPCS: Performed by: NURSE PRACTITIONER

## 2024-03-25 PROCEDURE — 25000003 PHARM REV CODE 250: Performed by: STUDENT IN AN ORGANIZED HEALTH CARE EDUCATION/TRAINING PROGRAM

## 2024-03-25 PROCEDURE — 25000003 PHARM REV CODE 250: Performed by: HOSPITALIST

## 2024-03-25 PROCEDURE — 99900035 HC TECH TIME PER 15 MIN (STAT)

## 2024-03-25 PROCEDURE — 63600175 PHARM REV CODE 636 W HCPCS: Performed by: STUDENT IN AN ORGANIZED HEALTH CARE EDUCATION/TRAINING PROGRAM

## 2024-03-25 PROCEDURE — 25000003 PHARM REV CODE 250: Performed by: NURSE PRACTITIONER

## 2024-03-25 PROCEDURE — 63600175 PHARM REV CODE 636 W HCPCS: Performed by: HOSPITALIST

## 2024-03-25 PROCEDURE — 97110 THERAPEUTIC EXERCISES: CPT | Mod: CO

## 2024-03-25 PROCEDURE — 25000003 PHARM REV CODE 250: Performed by: INTERNAL MEDICINE

## 2024-03-25 PROCEDURE — 97530 THERAPEUTIC ACTIVITIES: CPT

## 2024-03-25 PROCEDURE — 63600175 PHARM REV CODE 636 W HCPCS

## 2024-03-25 PROCEDURE — 85025 COMPLETE CBC W/AUTO DIFF WBC: CPT | Performed by: INTERNAL MEDICINE

## 2024-03-25 PROCEDURE — 97110 THERAPEUTIC EXERCISES: CPT

## 2024-03-25 PROCEDURE — 94761 N-INVAS EAR/PLS OXIMETRY MLT: CPT

## 2024-03-25 PROCEDURE — 99233 SBSQ HOSP IP/OBS HIGH 50: CPT | Mod: ,,, | Performed by: INTERNAL MEDICINE

## 2024-03-25 PROCEDURE — 82962 GLUCOSE BLOOD TEST: CPT

## 2024-03-25 PROCEDURE — 99233 SBSQ HOSP IP/OBS HIGH 50: CPT | Mod: ,,, | Performed by: STUDENT IN AN ORGANIZED HEALTH CARE EDUCATION/TRAINING PROGRAM

## 2024-03-25 PROCEDURE — 83605 ASSAY OF LACTIC ACID: CPT | Performed by: STUDENT IN AN ORGANIZED HEALTH CARE EDUCATION/TRAINING PROGRAM

## 2024-03-25 PROCEDURE — 27000221 HC OXYGEN, UP TO 24 HOURS

## 2024-03-25 PROCEDURE — 80202 ASSAY OF VANCOMYCIN: CPT | Performed by: INTERNAL MEDICINE

## 2024-03-25 PROCEDURE — 94640 AIRWAY INHALATION TREATMENT: CPT

## 2024-03-25 PROCEDURE — 80053 COMPREHEN METABOLIC PANEL: CPT | Performed by: INTERNAL MEDICINE

## 2024-03-25 PROCEDURE — 25000242 PHARM REV CODE 250 ALT 637 W/ HCPCS: Performed by: HOSPITALIST

## 2024-03-25 PROCEDURE — 25000003 PHARM REV CODE 250

## 2024-03-25 PROCEDURE — 11000001 HC ACUTE MED/SURG PRIVATE ROOM

## 2024-03-25 PROCEDURE — 63600175 PHARM REV CODE 636 W HCPCS: Performed by: INTERNAL MEDICINE

## 2024-03-25 RX ORDER — LOSARTAN POTASSIUM 25 MG/1
25 TABLET ORAL DAILY
Status: DISCONTINUED | OUTPATIENT
Start: 2024-03-26 | End: 2024-03-26

## 2024-03-25 RX ORDER — MILRINONE LACTATE 0.2 MG/ML
0.25 INJECTION, SOLUTION INTRAVENOUS CONTINUOUS
Status: DISCONTINUED | OUTPATIENT
Start: 2024-03-25 | End: 2024-03-26

## 2024-03-25 RX ORDER — HYDRALAZINE HYDROCHLORIDE 10 MG/1
10 TABLET, FILM COATED ORAL EVERY 12 HOURS
Status: DISCONTINUED | OUTPATIENT
Start: 2024-03-25 | End: 2024-03-26

## 2024-03-25 RX ORDER — DIGOXIN 0.25 MG/ML
500 INJECTION INTRAMUSCULAR; INTRAVENOUS ONCE
Status: COMPLETED | OUTPATIENT
Start: 2024-03-25 | End: 2024-03-25

## 2024-03-25 RX ORDER — DIGOXIN 125 MCG
0.12 TABLET ORAL DAILY
Status: DISCONTINUED | OUTPATIENT
Start: 2024-03-25 | End: 2024-04-03 | Stop reason: HOSPADM

## 2024-03-25 RX ADMIN — IPRATROPIUM BROMIDE AND ALBUTEROL SULFATE 3 ML: 2.5; .5 SOLUTION RESPIRATORY (INHALATION) at 07:03

## 2024-03-25 RX ADMIN — OXYCODONE 10 MG: 5 TABLET ORAL at 08:03

## 2024-03-25 RX ADMIN — IPRATROPIUM BROMIDE AND ALBUTEROL SULFATE 3 ML: 2.5; .5 SOLUTION RESPIRATORY (INHALATION) at 12:03

## 2024-03-25 RX ADMIN — DOCUSATE SODIUM 100 MG: 100 CAPSULE, LIQUID FILLED ORAL at 08:03

## 2024-03-25 RX ADMIN — BUDESONIDE INHALATION 0.5 MG: 0.5 SUSPENSION RESPIRATORY (INHALATION) at 07:03

## 2024-03-25 RX ADMIN — METRONIDAZOLE 500 MG: 5 INJECTION, SOLUTION INTRAVENOUS at 04:03

## 2024-03-25 RX ADMIN — HYDRALAZINE HYDROCHLORIDE 10 MG: 10 TABLET ORAL at 11:03

## 2024-03-25 RX ADMIN — LOSARTAN POTASSIUM 12.5 MG: 25 TABLET, FILM COATED ORAL at 09:03

## 2024-03-25 RX ADMIN — ZOLPIDEM TARTRATE 5 MG: 5 TABLET ORAL at 08:03

## 2024-03-25 RX ADMIN — OXYCODONE 10 MG: 5 TABLET ORAL at 07:03

## 2024-03-25 RX ADMIN — VANCOMYCIN HYDROCHLORIDE 2000 MG: 500 INJECTION, POWDER, LYOPHILIZED, FOR SOLUTION INTRAVENOUS at 11:03

## 2024-03-25 RX ADMIN — ASPIRIN 81 MG: 81 TABLET, COATED ORAL at 09:03

## 2024-03-25 RX ADMIN — ATORVASTATIN CALCIUM 40 MG: 40 TABLET, FILM COATED ORAL at 08:03

## 2024-03-25 RX ADMIN — GUAIFENESIN AND CODEINE PHOSPHATE 5 ML: 100; 10 SOLUTION ORAL at 02:03

## 2024-03-25 RX ADMIN — ENOXAPARIN SODIUM 40 MG: 40 INJECTION SUBCUTANEOUS at 04:03

## 2024-03-25 RX ADMIN — AMIODARONE HYDROCHLORIDE 0.5 MG/MIN: 1.8 INJECTION, SOLUTION INTRAVENOUS at 08:03

## 2024-03-25 RX ADMIN — MUPIROCIN: 20 OINTMENT TOPICAL at 08:03

## 2024-03-25 RX ADMIN — DIGOXIN 500 MCG: 0.25 INJECTION INTRAMUSCULAR; INTRAVENOUS at 11:03

## 2024-03-25 RX ADMIN — DOCUSATE SODIUM 100 MG: 100 CAPSULE, LIQUID FILLED ORAL at 09:03

## 2024-03-25 RX ADMIN — FUROSEMIDE 80 MG: 10 INJECTION, SOLUTION INTRAVENOUS at 04:03

## 2024-03-25 RX ADMIN — TRAZODONE HYDROCHLORIDE 50 MG: 50 TABLET ORAL at 08:03

## 2024-03-25 RX ADMIN — OXYCODONE 10 MG: 5 TABLET ORAL at 02:03

## 2024-03-25 RX ADMIN — AMIODARONE HYDROCHLORIDE 1 MG/MIN: 1.8 INJECTION, SOLUTION INTRAVENOUS at 03:03

## 2024-03-25 RX ADMIN — DIGOXIN 0.12 MG: 125 TABLET ORAL at 04:03

## 2024-03-25 RX ADMIN — MILRINONE LACTATE IN DEXTROSE 0.25 MCG/KG/MIN: 200 INJECTION, SOLUTION INTRAVENOUS at 06:03

## 2024-03-25 RX ADMIN — MEROPENEM 1 G: 1 INJECTION, POWDER, FOR SOLUTION INTRAVENOUS at 07:03

## 2024-03-25 RX ADMIN — HYDRALAZINE HYDROCHLORIDE 10 MG: 10 TABLET ORAL at 08:03

## 2024-03-25 RX ADMIN — POTASSIUM CHLORIDE 40 MEQ: 1500 TABLET, EXTENDED RELEASE ORAL at 08:03

## 2024-03-25 RX ADMIN — MUPIROCIN: 20 OINTMENT TOPICAL at 09:03

## 2024-03-25 RX ADMIN — FUROSEMIDE 80 MG: 10 INJECTION, SOLUTION INTRAVENOUS at 07:03

## 2024-03-25 RX ADMIN — PANTOPRAZOLE SODIUM 40 MG: 40 TABLET, DELAYED RELEASE ORAL at 09:03

## 2024-03-25 RX ADMIN — INSULIN ASPART 1 UNITS: 100 INJECTION, SOLUTION INTRAVENOUS; SUBCUTANEOUS at 08:03

## 2024-03-25 RX ADMIN — POTASSIUM CHLORIDE 40 MEQ: 1500 TABLET, EXTENDED RELEASE ORAL at 09:03

## 2024-03-25 RX ADMIN — AMIODARONE HYDROCHLORIDE 150 MG: 1.5 INJECTION, SOLUTION INTRAVENOUS at 02:03

## 2024-03-25 RX ADMIN — EMPAGLIFLOZIN 10 MG: 10 TABLET, FILM COATED ORAL at 09:03

## 2024-03-25 NOTE — CARE UPDATE
Done by RT student Rosa Fox       03/25/24 0770   Patient Assessment/Suction   Level of Consciousness (AVPU) alert   Respiratory Effort Normal   Expansion/Accessory Muscles/Retractions no use of accessory muscles   All Lung Fields Breath Sounds Anterior:;diminished   Rhythm/Pattern, Respiratory tachypneic   Cough Frequency frequent   Cough Type nonproductive   PRE-TX-O2   Device (Oxygen Therapy) high flow nasal cannula   Flow (L/min) 35   Oxygen Concentration (%) 40   SpO2 (!) 91 %   Pulse (!) 122   Resp (!) 23   Aerosol Therapy   $ Aerosol Therapy Charges Aerosol Treatment   Daily Review of Necessity (SVN) completed   Respiratory Treatment Status (SVN) given   Treatment Route (SVN) mask   Patient Position (SVN) HOB elevated   Post Treatment Assessment (SVN) breath sounds unchanged   Signs of Intolerance (SVN) none   Breath Sounds Post-Respiratory Treatment   Throughout All Fields Post-Treatment All Fields   Throughout All Fields Post-Treatment no change   Post-treatment Heart Rate (beats/min) 117   Post-treatment Resp Rate (breaths/min) 16   Ready to Wean/Extubation Screen   FIO2<=50 (chart decimal) 0.4

## 2024-03-25 NOTE — CARE UPDATE
Done by RT student Rosa Fox       03/25/24 1201   Patient Assessment/Suction   Level of Consciousness (AVPU) alert   Respiratory Effort Normal   Expansion/Accessory Muscles/Retractions no use of accessory muscles   All Lung Fields Breath Sounds Anterior:;coarse;diminished   Cough Frequency no cough   PRE-TX-O2   Device (Oxygen Therapy) high flow nasal cannula   Humidification temp set 31   Humidification temp actual 31   Flow (L/min) 35   Oxygen Concentration (%) 40   SpO2 95 %   Pulse Oximetry Type Continuous   Pulse (!) 118   Resp (!) 24   Aerosol Therapy   $ Aerosol Therapy Charges Aerosol Treatment   Daily Review of Necessity (SVN) completed   Respiratory Treatment Status (SVN) given   Treatment Route (SVN) mask   Patient Position (SVN) sitting in chair   Post Treatment Assessment (SVN) breath sounds unchanged   Signs of Intolerance (SVN) none   Breath Sounds Post-Respiratory Treatment   Throughout All Fields Post-Treatment All Fields   Throughout All Fields Post-Treatment no change   Post-treatment Heart Rate (beats/min) 120   Post-treatment Resp Rate (breaths/min) 22   Ready to Wean/Extubation Screen   FIO2<=50 (chart decimal) 0.4

## 2024-03-25 NOTE — PLAN OF CARE
Care plan ongoing       Problem: Adult Inpatient Plan of Care  Goal: Plan of Care Review  Outcome: Ongoing, Progressing  Goal: Patient-Specific Goal (Individualized)  Outcome: Ongoing, Progressing  Goal: Absence of Hospital-Acquired Illness or Injury  Outcome: Ongoing, Progressing  Goal: Optimal Comfort and Wellbeing  Outcome: Ongoing, Progressing  Goal: Readiness for Transition of Care  Outcome: Ongoing, Progressing     Problem: Diabetes Comorbidity  Goal: Blood Glucose Level Within Targeted Range  Outcome: Ongoing, Progressing     Problem: Impaired Wound Healing  Goal: Optimal Wound Healing  Outcome: Ongoing, Progressing     Problem: Skin Injury Risk Increased  Goal: Skin Health and Integrity  Outcome: Ongoing, Progressing     Problem: Adjustment to Illness (Sepsis/Septic Shock)  Goal: Optimal Coping  Outcome: Ongoing, Progressing     Problem: Bleeding (Sepsis/Septic Shock)  Goal: Absence of Bleeding  Outcome: Ongoing, Progressing     Problem: Glycemic Control Impaired (Sepsis/Septic Shock)  Goal: Blood Glucose Level Within Desired Range  Outcome: Ongoing, Progressing     Problem: Infection Progression (Sepsis/Septic Shock)  Goal: Absence of Infection Signs and Symptoms  Outcome: Ongoing, Progressing     Problem: Nutrition Impaired (Sepsis/Septic Shock)  Goal: Optimal Nutrition Intake  Outcome: Ongoing, Progressing     Problem: Gas Exchange Impaired  Goal: Optimal Gas Exchange  Outcome: Ongoing, Progressing     Problem: Infection  Goal: Absence of Infection Signs and Symptoms  Outcome: Ongoing, Progressing     Problem: Airway Clearance Ineffective  Goal: Effective Airway Clearance  Outcome: Ongoing, Progressing

## 2024-03-25 NOTE — PT/OT/SLP PROGRESS
Physical Therapy Treatment    Patient Name:  Karin Carrera   MRN:  [id number]    Recommendations:     Discharge Recommendations: High Intensity Therapy (to low intensity depending on rate of recovery)  Discharge Equipment Recommendations: none  Barriers to discharge: Decreased caregiver support    Assessment:     Karin Carrera is a 53 y.o. male admitted with a medical diagnosis of Sepsis.  He presents with the following impairments/functional limitations: impaired endurance, weakness, impaired self care skills, impaired functional mobility, gait instability, impaired balance, decreased upper extremity function, decreased lower extremity function, abnormal tone, impaired skin, edema, impaired cardiopulmonary response to activity Patient still on high flow. Patient with elevated HR. Does well oob. Can get a little sob with activity.    Rehab Prognosis: Good; patient would benefit from acute skilled PT services to address these deficits and reach maximum level of function.    Recent Surgery: * No surgery found *      Plan:     During this hospitalization, patient to be seen 5 x/week to address the identified rehab impairments via gait training, therapeutic activities, therapeutic exercises and progress toward the following goals:    Plan of Care Expires:  04/24/24    Subjective     Chief Complaint: sob, edema  Patient/Family Comments/goals: Patient agreeable to therapy  Pain/Comfort:  Pain Rating 1: 0/10      Objective:     Communicated with nurse prior to session.  Patient found supine with   upon PT entry to room.     General Precautions: Standard, fall, respiratory  Orthopedic Precautions: N/A  Braces: N/A  Respiratory Status: High flow, flow 35 L/min, concentration 40%     Functional Mobility:  Bed Mobility:     Supine to Sit: minimum assistance  Sit to Supine: minimum assistance  Transfers:     Sit to Stand:  minimum assistance with straight cane      AM-PAC 6 CLICK MOBILITY  Turning over in bed  (including adjusting bedclothes, sheets and blankets)?: 4  Sitting down on and standing up from a chair with arms (e.g., wheelchair, bedside commode, etc.): 4  Moving from lying on back to sitting on the side of the bed?: 4  Moving to and from a bed to a chair (including a wheelchair)?: 4  Need to walk in hospital room?: 4  Climbing 3-5 steps with a railing?: 4  Basic Mobility Total Score: 24       Treatment & Education:  BLE: aps, hs, saq, abd-add, qs 3x10  Transferred eob. Sat eob x5 minutes  Transferred to chair   Remained sitting for exercies  Instructed in peforming rom during the day for edema control.     Patient left up in chair with all lines intact..    GOALS:   Multidisciplinary Problems       Physical Therapy Goals          Problem: Physical Therapy    Goal Priority Disciplines Outcome Goal Variances Interventions   Physical Therapy Goal     PT, PT/OT Ongoing, Progressing     Description: Short Term Goals to be met by: 2024    Patient will increase functional independence with mobility by performin. Supine to sit with independently  2. Sit to stand transfer with independently using straight cane  3. Bed to chair transfer with independently using straight cane  4. Gait  x 100 feet with contact guard assist using straight cane and O2 PRN  5. Lower extremity exercise program x30 reps per handout, with assistance as needed    Long Term Goals to be met by: 2024    Pt will regain full independent functional mobility with straight cane to return to home situation and prior activities of daily living.                        Time Tracking:     PT Received On: 24  PT Start Time: 930     PT Stop Time: 955  PT Total Time (min): 25 min     Billable Minutes: Therapeutic Activity 10 and Therapeutic Exercise 15    Treatment Type: Treatment  PT/PTA: PT     Number of PTA visits since last PT visit: 0     2024

## 2024-03-25 NOTE — PROGRESS NOTES
[facility]  Critical Care Medicine  Progress Note    Patient Name: Karin Carrera  MRN: [id number]  Admission Date: 3/19/2024  Hospital Length of Stay: 6 days  Code Status: Full Code  Attending Provider: Kai Peck MD  Primary Care Provider: Walker Smith MD   Principal Problem: Sepsis    Subjective:     HPI:  53-year-old white male presents with shortness of breath and abdominal pain for 2 days recently had a cholecystectomy 03/09/2024.  Patient has a history of COPD he complains of increased leg swelling.  Patient has a history ejection fraction 25% previous stroke he has been hypoxic during this admission grew out Gram-positive Gram-negative bacilli out of his blood he has been brought down to the unit for worsening blood pressure and hypoxemia    Hospital/ICU Course:  3/24- placed on HFNC overnight - will continue to wean,day 3 of Dobutamine     Interval History/Significant Events:  Patient without complaints    Review of Systems  Objective:     Vital Signs (Most Recent):  Temp: 98.9 °F (37.2 °C) (03/25/24 0301)  Pulse: (!) 122 (03/25/24 0538)  Resp: (!) 29 (03/25/24 0538)  BP: 114/83 (03/25/24 0538)  SpO2: (!) 91 % (03/25/24 0538) Vital Signs (24h Range):  Temp:  [97.5 °F (36.4 °C)-98.9 °F (37.2 °C)] 98.9 °F (37.2 °C)  Pulse:  [105-126] 122  Resp:  [17-36] 29  SpO2:  [90 %-97 %] 91 %  BP: (103-148)/() 114/83   Weight: 110.1 kg (242 lb 11.6 oz)  Body mass index is 31.16 kg/m².      Intake/Output Summary (Last 24 hours) at 3/25/2024 0603  Last data filed at 3/25/2024 0549  Gross per 24 hour   Intake 1284.07 ml   Output 5395 ml   Net -4110.93 ml          Physical Exam  Vitals reviewed.   Constitutional:       Appearance: Normal appearance.      Interventions: He is not intubated.  HENT:      Head: Normocephalic and atraumatic.      Nose: Nose normal.      Mouth/Throat:      Mouth: Mucous membranes are dry.      Pharynx: Oropharynx is clear.   Eyes:      Extraocular  Movements: Extraocular movements intact.      Conjunctiva/sclera: Conjunctivae normal.      Pupils: Pupils are equal, round, and reactive to light.   Cardiovascular:      Rate and Rhythm: Normal rate.      Heart sounds: Normal heart sounds. No murmur heard.  Pulmonary:      Effort: Pulmonary effort is normal. He is not intubated.      Breath sounds: Normal breath sounds.   Abdominal:      General: Abdomen is flat. Bowel sounds are normal.      Palpations: Abdomen is soft.   Musculoskeletal:         General: Normal range of motion.      Cervical back: Normal range of motion and neck supple.      Right lower leg: No edema.      Left lower leg: No edema.   Skin:     General: Skin is warm and dry.      Capillary Refill: Capillary refill takes less than 2 seconds.   Neurological:      General: No focal deficit present.      Mental Status: He is alert and oriented to person, place, and time.   Psychiatric:         Mood and Affect: Mood normal.         Behavior: Behavior normal.            Vents:  Oxygen Concentration (%): 40 (03/25/24 0301)  Lines/Drains/Airways       Peripherally Inserted Central Catheter Line  Duration             PICC Double Lumen 03/22/24 1330 right basilic 2 days              Peripheral Intravenous Line  Duration                  Peripheral IV - Single Lumen 20 G Anterior;Left;Proximal Forearm -- days                  Significant Labs:    CBC/Anemia Profile:  Recent Labs   Lab 03/24/24  0503 03/25/24  0425   WBC 7.85 8.86   HGB 11.2* 11.9*   HCT 34.1* 37.4*    290   MCV 82.4 83.9   RDW 16.1* 16.3*        Chemistries:  Recent Labs   Lab 03/24/24  0503 03/25/24  0425   * 133*   K 3.4* 4.2   CL 98 96*   CO2 31 28   BUN 16 17   CREATININE 0.74 1.06   CALCIUM 8.6 8.8   ALBUMIN 3.1* 3.2*   PROT 6.4 6.8   BILITOT 1.0 1.3*   ALKPHOS 103 106   ALT 24 18   AST 19 21       Recent Lab Results  (Last 5 results in the past 24 hours)        03/25/24  0531   03/25/24  0425   03/24/24 2013    03/24/24  1810   03/24/24  1201        Albumin/Globulin Ratio   0.9             Albumin   3.2             ALP   106             ALT   18             Anion Gap   13             AST   21             Baso #   0.06             Basophil %   0.7             BILIRUBIN TOTAL   1.3             BUN   17             BUN/CREAT RATIO   16             Calcium   8.8             Chloride   96             CO2   28             Creatinine   1.06             Differential Method   Auto             eGFR   84             Eos #   0.07             Eos %   0.8             Globulin, Total   3.6             Glucose   156             Hematocrit   37.4             Hemoglobin   11.9             Immature Grans (Abs)   0.06             Immature Granulocytes   0.7             Lymph #   1.32             Lymph %   14.9             MCH   26.7             MCHC   31.8             MCV   83.9             Mono #   0.67             Mono %   7.6             MPV   9.3             Neutrophils, Abs   6.68             Neutrophils Relative   75.3             nRBC   0.0             NUCLEATED RBC ABSOLUTE   0.00             Platelet Count   290             POC Glucose 141     165   162   154       Potassium   4.2             PROTEIN TOTAL   6.8             RBC   4.46             RDW   16.3             Sodium   133             WBC   8.86                                    Significant Imaging:  I have reviewed all pertinent imaging results/findings within the past 24 hours.    ABG  Recent Labs   Lab 03/21/24  1329   PH 7.52*   PO2 45   PCO2 37   HCO3 30.2*     Assessment/Plan:     Neuro  Hemiparesis affecting right side as late effect of cerebrovascular accident  His his baseline has aphasia    Pulmonary  Acute hypoxemic respiratory failure  Likely related to congestive heart failure systolic   possibly pneumonia; does have a right pleural effusion - POCUS 3/23 - will hold on thora and continue continue diuresis  Wean FIO2 on HFNC    Cardiac/Vascular  Shock  Improving      Acute on chronic combined systolic and diastolic heart failure  Ejection fraction 20% on dobutamine drip day 4/5    Continue diuresing aggressively   Cardiology following can    Coronary artery disease involving coronary bypass graft of native heart without angina pectoris  Continue meds    Essential (primary) hypertension  Well-controlled    Renal/  Hypokalemia  Resolved    ID  * Sepsis  Repeat blood cultures are negative.  Review antibiotics    Bacteremia  Likely contaminate - following repeat cultures     Endocrine  Type 2 diabetes mellitus  SSI   Sugars now goal    Other  ROSEMARIE (obstructive sleep apnea)  CPAP at night              Kai Peck MD  Critical Care Medicine  [facility]

## 2024-03-25 NOTE — PLAN OF CARE
Per chart review, pt remains on HFNC 35/40L, receiving PT/OT,on IV Lasix and Vancomycin. Cardiology consulted for tachycardia and new meds started. Pt is not medically ready for d/c at this time. SS following

## 2024-03-25 NOTE — ASSESSMENT & PLAN NOTE
Echo reveals Ejection fraction by visual approximation is 20%. There is diastolic dysfunction.   BNP 7486  Continue IV lasix 40 mg QD  Continue toprol XL 25 mg 1/2 tab QD  Continue jardiance 10 mg QD  Resume losartan 25 mg 1/2 tab   Daily weights  Strict I/Os    3/22/2024:  - Patient seen and evaluated by Dr. Escobar  - Mixed shock: Cardiogenic/sepsis  - Discontinue BB  - Start IV dobutamine 5mcg/kg/min. Increase lasix to 80mg BID  - Strict I&O; strict daily weight  - Will up titrate afterload reduction as BP allows  - Unable to tolerate MRAs due to hyponatremia    3/23/2024  Continue IV dobutamine 5mcg/kg/min.   Continue IV lasix 80mg BID  - Strict I&O; strict daily weight  - Will up titrate afterload reduction as BP allows  - Unable to tolerate MRAs due to hyponatremia     3/25/2024  Continue IV lasix 80 BID  Start hydralazine 10 mg BID  Increase losartan 25 mg   Start IV digoxin 500 mcg 1x --> then digoxin 0.125 mg PO QD  Check lactate  Continue strict I&O and monitor daily weights  - Unable to tolerate MRAs due to hyponatremia

## 2024-03-25 NOTE — PROGRESS NOTES
[facility]  Cardiology  Progress Note    Patient Name: Karin Carrera  MRN: [id number]  Admission Date: 3/19/2024  Hospital Length of Stay: 6 days  Code Status: Full Code   Attending Physician: Kai Peck MD   Primary Care Physician: Walker Smith MD  Expected Discharge Date:   Principal Problem:Sepsis    Subjective:     Hospital Course:   No notes on file    Interval History: 3/25/2024: Pt examined at bedside. Now on HFNC. Pt remains tachycardic with SOB. Will start pt on digoxin, inc losartan to 25 mg and check lactate.     Review of Systems   Constitutional: Negative for fever.   Cardiovascular:  Positive for dyspnea on exertion, leg swelling and orthopnea.   Respiratory:  Positive for shortness of breath.    Neurological:  Positive for weakness.     Objective:     Vital Signs (Most Recent):  Temp: 97.2 °F (36.2 °C) (03/25/24 0731)  Pulse: (!) 126 (03/25/24 1030)  Resp: (!) 32 (03/25/24 1030)  BP: 125/85 (03/25/24 1000)  SpO2: 97 % (03/25/24 1000) Vital Signs (24h Range):  Temp:  [97.2 °F (36.2 °C)-98.9 °F (37.2 °C)] 97.2 °F (36.2 °C)  Pulse:  [105-126] 126  Resp:  [17-41] 32  SpO2:  [90 %-97 %] 97 %  BP: (103-129)/(63-85) 125/85     Weight: 110.1 kg (242 lb 11.6 oz)  Body mass index is 31.16 kg/m².     SpO2: 97 %         Intake/Output Summary (Last 24 hours) at 3/25/2024 1049  Last data filed at 3/25/2024 1036  Gross per 24 hour   Intake 1384.07 ml   Output 3845 ml   Net -2460.93 ml       Lines/Drains/Airways       Peripheral Intravenous Line  Duration                  Peripheral IV - Single Lumen 03/23/24 0100 Anterior;Left;Proximal Forearm 2 days                       Physical Exam  Cardiovascular:      Rate and Rhythm: Tachycardia present.   Pulmonary:      Breath sounds: Wheezing and rales present.   Abdominal:      General: Bowel sounds are normal.      Palpations: Abdomen is soft.   Musculoskeletal:      Right lower leg: Edema present.      Left lower leg: Edema  present.   Neurological:      Mental Status: He is alert and oriented to person, place, and time.            Significant Labs: All pertinent lab results from the last 24 hours have been reviewed.    Significant Imaging: Echocardiogram: Transthoracic echo (TTE) complete (Cupid Only):   Results for orders placed or performed during the hospital encounter of 03/19/24   Echo Saline Bubble? Yes   Result Value Ref Range    BSA 2.31 m2    LVIDd 6.02 (A) 3.5 - 6.0 cm    LV Systolic Volume 136.82 mL    LV Systolic Volume Index 59.7 mL/m2    LVIDs 5.32 (A) 2.1 - 4.0 cm    LV Diastolic Volume 181.34 mL    LV Diastolic Volume Index 79.19 mL/m2    IVS 1.01 0.6 - 1.1 cm    FS 12 (A) 28 - 44 %    Left Ventricle Relative Wall Thickness 0.28 cm    Posterior Wall 0.83 0.6 - 1.1 cm    LV mass 223.09 g    LV Mass Index 97 g/m2    MV Peak E Rohit 0.95 m/s    TDI LATERAL 0.15 m/s    TDI SEPTAL 0.10 m/s    E/E' ratio 7.60 m/s    MV Peak A Rohit 0.32 m/s    E/A ratio 2.97     E wave deceleration time 79.46 msec    LV SEPTAL E/E' RATIO 9.50 m/s    LV LATERAL E/E' RATIO 6.33 m/s    RVDD 4.41 cm    TAPSE 1.30 cm    LA size 5.44 cm    Left Atrium Major Axis 6.19 cm    RA Major Axis 4.29 cm    MV stenosis pressure 1/2 time 23.04 ms    MV valve area p 1/2 method 9.55 cm2    IVC diameter 2.54 cm    Mean e' 0.13 m/s    ZLVIDS 0.06     ZLVIDD -3.74     EF 20 %    Est. RA pres 15 mmHg    Narrative      Left Ventricle: The left ventricle is moderately dilated. Normal wall   thickness. Regional wall motion abnormalities present.  Anterior wall and   apex are akinetic.  Inferior wall and lateral wall are hypokinetic. There   is severely reduced systolic function with a visually estimated ejection   fraction of less than 30%. Ejection fraction by visual approximation is   20%. There is diastolic dysfunction.    Right Ventricle: Mild right ventricular enlargement.    Left Atrium: Left atrium is severely dilated.    Right Atrium: Right atrium is mildly  dilated.    IVC/SVC: Elevated venous pressure at 15 mmHg.    Pericardium: There is a trivial effusion.       Assessment and Plan:     Brief HPI: Patient is a 52 y/o M w/ Pmhx of alcoholic fatty liver, HFrEF with EF 25% per Echo on 2/20/24, pulmonary HTN, CAD s/p CABG in 2022, RHC, and stent placement, TR, COPD, CVA in 2016 with expressive aphasia and right hemiparesis, HTN, HLD, T2DM, GERD with esophagitis, ROSEMARIE who presents to presents to [facility] ED via EMS with SOB and abdominal pain for 2 days. He reports recently having a cholecystectomy on 3/9/24 here at [place] by Dr. Bennett. He endorses RUQ abdominal pain and SOB. SOB is similar to his previous COPD episodes. Endorses orthopnea and INFANTE. He endorses surgical site drainage mostly pus that started 2 days ago. Endorses decreased appetite around the same time. Endorses bilateral leg swelling and right leg redness for about 1 month and now swelling has gone up his thighs and abdomen in the past 2 days. Denies f/c/cp/n/v/d. Denies urinary, or bowel habit changes. Reports compliance with all home medications. Patient uses 2L NC O2 at home. He communicates through writing on paper and texts due to his expressive aphasia 2/2 CVA. Labs were significant for BNP 7486. EKG sinus tach 110 with multifocal PVCs. CXR showed continued  Cardiomegaly. CTA PE protocol was significant for no pulmonary embolus, four-chamber cardiomegaly, pulmonary edema and small pleural effusions. Cardiology has been consulted for the continued care and management of his cardiac conditions.     * Sepsis  - Recent gallbladder removal (3/9/2024)  - Being followed by primary medical team    End stage heart failure  Echo reveals Ejection fraction by visual approximation is 20%. There is diastolic dysfunction.   BNP 7486  Continue IV lasix 40 mg QD  Continue toprol XL 25 mg 1/2 tab QD  Continue jardiance 10 mg QD  Resume losartan 25 mg 1/2 tab   Daily weights  Strict I/Os    3/22/2024:  - Patient seen and  evaluated by Dr. Escobar  - Mixed shock: Cardiogenic/sepsis  - Discontinue BB  - Start IV dobutamine 5mcg/kg/min. Increase lasix to 80mg BID  - Strict I&O; strict daily weight  - Will up titrate afterload reduction as BP allows  - Unable to tolerate MRAs due to hyponatremia    3/23/2024  Continue IV dobutamine 5mcg/kg/min.   Continue IV lasix 80mg BID  - Strict I&O; strict daily weight  - Will up titrate afterload reduction as BP allows  - Unable to tolerate MRAs due to hyponatremia     3/25/2024  Continue IV lasix 80 BID  Start hydralazine 10 mg BID  Increase losartan 25 mg   Start IV digoxin 500 mcg 1x --> then digoxin 0.125 mg PO QD  Check lactate  Continue strict I&O and monitor daily weights  - Unable to tolerate MRAs due to hyponatremia           VTE Risk Mitigation (From admission, onward)           Ordered     enoxaparin injection 40 mg  Every 24 hours         03/19/24 3549                    Alysa Rizvi MD  Cardiology  [facility]

## 2024-03-25 NOTE — SUBJECTIVE & OBJECTIVE
Interval History/Significant Events:  Patient without complaints    Review of Systems  Objective:     Vital Signs (Most Recent):  Temp: 98.9 °F (37.2 °C) (03/25/24 0301)  Pulse: (!) 122 (03/25/24 0538)  Resp: (!) 29 (03/25/24 0538)  BP: 114/83 (03/25/24 0538)  SpO2: (!) 91 % (03/25/24 0538) Vital Signs (24h Range):  Temp:  [97.5 °F (36.4 °C)-98.9 °F (37.2 °C)] 98.9 °F (37.2 °C)  Pulse:  [105-126] 122  Resp:  [17-36] 29  SpO2:  [90 %-97 %] 91 %  BP: (103-148)/() 114/83   Weight: 110.1 kg (242 lb 11.6 oz)  Body mass index is 31.16 kg/m².      Intake/Output Summary (Last 24 hours) at 3/25/2024 0603  Last data filed at 3/25/2024 0549  Gross per 24 hour   Intake 1284.07 ml   Output 5395 ml   Net -4110.93 ml          Physical Exam  Vitals reviewed.   Constitutional:       Appearance: Normal appearance.      Interventions: He is not intubated.  HENT:      Head: Normocephalic and atraumatic.      Nose: Nose normal.      Mouth/Throat:      Mouth: Mucous membranes are dry.      Pharynx: Oropharynx is clear.   Eyes:      Extraocular Movements: Extraocular movements intact.      Conjunctiva/sclera: Conjunctivae normal.      Pupils: Pupils are equal, round, and reactive to light.   Cardiovascular:      Rate and Rhythm: Normal rate.      Heart sounds: Normal heart sounds. No murmur heard.  Pulmonary:      Effort: Pulmonary effort is normal. He is not intubated.      Breath sounds: Normal breath sounds.   Abdominal:      General: Abdomen is flat. Bowel sounds are normal.      Palpations: Abdomen is soft.   Musculoskeletal:         General: Normal range of motion.      Cervical back: Normal range of motion and neck supple.      Right lower leg: No edema.      Left lower leg: No edema.   Skin:     General: Skin is warm and dry.      Capillary Refill: Capillary refill takes less than 2 seconds.   Neurological:      General: No focal deficit present.      Mental Status: He is alert and oriented to person, place, and time.    Psychiatric:         Mood and Affect: Mood normal.         Behavior: Behavior normal.            Vents:  Oxygen Concentration (%): 40 (03/25/24 0301)  Lines/Drains/Airways       Peripherally Inserted Central Catheter Line  Duration             PICC Double Lumen 03/22/24 1330 right basilic 2 days              Peripheral Intravenous Line  Duration                  Peripheral IV - Single Lumen 20 G Anterior;Left;Proximal Forearm -- days                  Significant Labs:    CBC/Anemia Profile:  Recent Labs   Lab 03/24/24  0503 03/25/24  0425   WBC 7.85 8.86   HGB 11.2* 11.9*   HCT 34.1* 37.4*    290   MCV 82.4 83.9   RDW 16.1* 16.3*        Chemistries:  Recent Labs   Lab 03/24/24  0503 03/25/24  0425   * 133*   K 3.4* 4.2   CL 98 96*   CO2 31 28   BUN 16 17   CREATININE 0.74 1.06   CALCIUM 8.6 8.8   ALBUMIN 3.1* 3.2*   PROT 6.4 6.8   BILITOT 1.0 1.3*   ALKPHOS 103 106   ALT 24 18   AST 19 21       Recent Lab Results  (Last 5 results in the past 24 hours)        03/25/24  0531   03/25/24  0425   03/24/24  2013   03/24/24  1810   03/24/24  1201        Albumin/Globulin Ratio   0.9             Albumin   3.2             ALP   106             ALT   18             Anion Gap   13             AST   21             Baso #   0.06             Basophil %   0.7             BILIRUBIN TOTAL   1.3             BUN   17             BUN/CREAT RATIO   16             Calcium   8.8             Chloride   96             CO2   28             Creatinine   1.06             Differential Method   Auto             eGFR   84             Eos #   0.07             Eos %   0.8             Globulin, Total   3.6             Glucose   156             Hematocrit   37.4             Hemoglobin   11.9             Immature Grans (Abs)   0.06             Immature Granulocytes   0.7             Lymph #   1.32             Lymph %   14.9             MCH   26.7             MCHC   31.8             MCV   83.9             Mono #   0.67             Mono %    7.6             MPV   9.3             Neutrophils, Abs   6.68             Neutrophils Relative   75.3             nRBC   0.0             NUCLEATED RBC ABSOLUTE   0.00             Platelet Count   290             POC Glucose 141     165   162   154       Potassium   4.2             PROTEIN TOTAL   6.8             RBC   4.46             RDW   16.3             Sodium   133             WBC   8.86                                    Significant Imaging:  I have reviewed all pertinent imaging results/findings within the past 24 hours.

## 2024-03-25 NOTE — PT/OT/SLP PROGRESS
Occupational Therapy   Treatment    Name: Karin Carrera  MRN: [id number]  Admitting Diagnosis:  Sepsis       Recommendations:     Discharge Recommendations:  (depending on rate of recovery, moderate to low intensity therapy)  Discharge Equipment Recommendations:     Barriers to discharge:       Assessment:     Karin Carrera is a 53 y.o. male with a medical diagnosis of Sepsis.. Performance deficits affecting function are weakness, impaired sensation, impaired self care skills, impaired cardiopulmonary response to activity, decreased upper extremity function.     Rehab Prognosis:  Good; patient would benefit from acute skilled OT services to address these deficits and reach maximum level of function.       Plan:     Patient to be seen 5 x/week to address the above listed problems via self-care/home management, therapeutic activities, therapeutic exercises  Plan of Care Expires: 04/22/24  Plan of Care Reviewed with: patient    Subjective     Chief Complaint:   Patient/Family Comments/goals:   Pain/Comfort:  Pain Rating 1: 0/10    Objective:     Communicated with: Shelbie Harding RN  prior to session.  Patient found up in chair with blood pressure cuff, peripheral IV, pulse ox (continuous), telemetry, oxygen upon OT entry to room.    General Precautions: Standard, fall, respiratory    Orthopedic Precautions:N/A  Braces: N/A  Respiratory Status: High flow, flow 20 L/min, concentration 40%     Occupational Performance:     Bed Mobility:         Functional Mobility/Transfers:    Functional Mobility:     Activities of Daily Living:        [facility] 6 Click ADL:      Treatment & Education:  Pt performed hand helper with 3 rubber band resistances on l 20 reps x 2, blue t ball ex's on L 20 reps x 2, rom ex's with 1 lb wt 10 reps x 2 L shld flex, abd/add,elbow flex/ext   Patient left up in chair with all lines intact and call button in reach    GOALS:   Multidisciplinary Problems       Occupational Therapy Goals           Problem: Occupational Therapy    Goal Priority Disciplines Outcome Interventions   Occupational Therapy Goal     OT, PT/OT Ongoing, Progressing    Description: STG: (in 1 week)  Pt will perform grooming with setup  Pt will bathe with setup and min(A)  Pt will perform UE dressing with min(A)  Pt will perform LE dressing with min(A)  Pt will transfer bed/chair/bsc with SBA with cane  Pt will perform standing task x 3 min with CGA   Pt will tolerate 10 minutes of tx without fatigue      LTG: (in 5 weeks)  1.Restore to max I with self care and mobility.                        Time Tracking:     OT Date of Treatment: 03/25/24  OT Start Time: 1605  OT Stop Time: 1617  OT Total Time (min): 12 min    Billable Minutes:Therapeutic Exercise 10    OT/ANGELI: ANGELI          3/25/2024

## 2024-03-25 NOTE — CARE UPDATE
Done by RT student Rosa Fox       03/25/24 0718   Patient Assessment/Suction   Level of Consciousness (AVPU) alert   Respiratory Effort Normal   Expansion/Accessory Muscles/Retractions no use of accessory muscles   All Lung Fields Breath Sounds Anterior:;diminished   Rhythm/Pattern, Respiratory tachypneic   Cough Frequency frequent   Cough Type nonproductive   Skin Integrity   $ Wound Care Tech Time 15 min   Area Observed Left;Right;Cheek   Skin Appearance without discoloration   Barrier used? Liquid Filled Cushion   PRE-TX-O2   Device (Oxygen Therapy) high flow nasal cannula   $ Is the patient on Low Flow Oxygen? Yes   Flow (L/min) 35   Oxygen Concentration (%) 40   SpO2 (!) 91 %   Pulse (!) 120   Resp (!) 31   Aerosol Therapy   $ Aerosol Therapy Charges Aerosol Treatment   Daily Review of Necessity (SVN) completed   Respiratory Treatment Status (SVN) given   Treatment Route (SVN) mask   Patient Position (SVN) HOB elevated   Post Treatment Assessment (SVN) breath sounds unchanged   Signs of Intolerance (SVN) none   Breath Sounds Post-Respiratory Treatment   Throughout All Fields Post-Treatment All Fields   Throughout All Fields Post-Treatment no change   Post-treatment Heart Rate (beats/min) 120   Post-treatment Resp Rate (breaths/min) 24   Ready to Wean/Extubation Screen   FIO2<=50 (chart decimal) 0.4

## 2024-03-25 NOTE — SUBJECTIVE & OBJECTIVE
Interval History: 3/25/2024: Pt examined at bedside. Now on HFNC. Pt remains tachycardic with SOB. Will start pt on digoxin, inc losartan to 25 mg and check lactate.     Review of Systems   Constitutional: Negative for fever.   Cardiovascular:  Positive for dyspnea on exertion, leg swelling and orthopnea.   Respiratory:  Positive for shortness of breath.    Neurological:  Positive for weakness.     Objective:     Vital Signs (Most Recent):  Temp: 97.2 °F (36.2 °C) (03/25/24 0731)  Pulse: (!) 126 (03/25/24 1030)  Resp: (!) 32 (03/25/24 1030)  BP: 125/85 (03/25/24 1000)  SpO2: 97 % (03/25/24 1000) Vital Signs (24h Range):  Temp:  [97.2 °F (36.2 °C)-98.9 °F (37.2 °C)] 97.2 °F (36.2 °C)  Pulse:  [105-126] 126  Resp:  [17-41] 32  SpO2:  [90 %-97 %] 97 %  BP: (103-129)/(63-85) 125/85     Weight: 110.1 kg (242 lb 11.6 oz)  Body mass index is 31.16 kg/m².     SpO2: 97 %         Intake/Output Summary (Last 24 hours) at 3/25/2024 1049  Last data filed at 3/25/2024 1036  Gross per 24 hour   Intake 1384.07 ml   Output 3845 ml   Net -2460.93 ml       Lines/Drains/Airways       Peripheral Intravenous Line  Duration                  Peripheral IV - Single Lumen 03/23/24 0100 Anterior;Left;Proximal Forearm 2 days                       Physical Exam  Cardiovascular:      Rate and Rhythm: Tachycardia present.   Pulmonary:      Breath sounds: Wheezing and rales present.   Abdominal:      General: Bowel sounds are normal.      Palpations: Abdomen is soft.   Musculoskeletal:      Right lower leg: Edema present.      Left lower leg: Edema present.   Neurological:      Mental Status: He is alert and oriented to person, place, and time.            Significant Labs: All pertinent lab results from the last 24 hours have been reviewed.    Significant Imaging: Echocardiogram: Transthoracic echo (TTE) complete (Cupid Only):   Results for orders placed or performed during the hospital encounter of 03/19/24   Echo Saline Bubble? Yes   Result  Value Ref Range    BSA 2.31 m2    LVIDd 6.02 (A) 3.5 - 6.0 cm    LV Systolic Volume 136.82 mL    LV Systolic Volume Index 59.7 mL/m2    LVIDs 5.32 (A) 2.1 - 4.0 cm    LV Diastolic Volume 181.34 mL    LV Diastolic Volume Index 79.19 mL/m2    IVS 1.01 0.6 - 1.1 cm    FS 12 (A) 28 - 44 %    Left Ventricle Relative Wall Thickness 0.28 cm    Posterior Wall 0.83 0.6 - 1.1 cm    LV mass 223.09 g    LV Mass Index 97 g/m2    MV Peak E Rohit 0.95 m/s    TDI LATERAL 0.15 m/s    TDI SEPTAL 0.10 m/s    E/E' ratio 7.60 m/s    MV Peak A Rohit 0.32 m/s    E/A ratio 2.97     E wave deceleration time 79.46 msec    LV SEPTAL E/E' RATIO 9.50 m/s    LV LATERAL E/E' RATIO 6.33 m/s    RVDD 4.41 cm    TAPSE 1.30 cm    LA size 5.44 cm    Left Atrium Major Axis 6.19 cm    RA Major Axis 4.29 cm    MV stenosis pressure 1/2 time 23.04 ms    MV valve area p 1/2 method 9.55 cm2    IVC diameter 2.54 cm    Mean e' 0.13 m/s    ZLVIDS 0.06     ZLVIDD -3.74     EF 20 %    Est. RA pres 15 mmHg    Narrative      Left Ventricle: The left ventricle is moderately dilated. Normal wall   thickness. Regional wall motion abnormalities present.  Anterior wall and   apex are akinetic.  Inferior wall and lateral wall are hypokinetic. There   is severely reduced systolic function with a visually estimated ejection   fraction of less than 30%. Ejection fraction by visual approximation is   20%. There is diastolic dysfunction.    Right Ventricle: Mild right ventricular enlargement.    Left Atrium: Left atrium is severely dilated.    Right Atrium: Right atrium is mildly dilated.    IVC/SVC: Elevated venous pressure at 15 mmHg.    Pericardium: There is a trivial effusion.        [age] is 6y10[age] presenting to ED for asthma exacerbation. pt denies SOB, dyspnea and difficulty breathing. no distress noted.

## 2024-03-25 NOTE — ASSESSMENT & PLAN NOTE
Ejection fraction 20% on dobutamine drip day 4/5    Continue diuresing aggressively   Cardiology following can

## 2024-03-26 PROBLEM — I47.29 NSVT (NONSUSTAINED VENTRICULAR TACHYCARDIA): Status: ACTIVE | Noted: 2024-03-26

## 2024-03-26 LAB
ALBUMIN SERPL BCP-MCNC: 2.9 G/DL (ref 3.5–5)
ALBUMIN/GLOB SERPL: 0.8 {RATIO}
ALP SERPL-CCNC: 97 U/L (ref 45–115)
ALT SERPL W P-5'-P-CCNC: 33 U/L (ref 16–61)
ANION GAP SERPL CALCULATED.3IONS-SCNC: 12 MMOL/L (ref 7–16)
AST SERPL W P-5'-P-CCNC: 44 U/L (ref 15–37)
BASOPHILS # BLD AUTO: 0.05 K/UL (ref 0–0.2)
BASOPHILS NFR BLD AUTO: 0.7 % (ref 0–1)
BILIRUB SERPL-MCNC: 1.1 MG/DL (ref ?–1.2)
BUN SERPL-MCNC: 21 MG/DL (ref 7–18)
BUN/CREAT SERPL: 24 (ref 6–20)
CALCIUM SERPL-MCNC: 8.9 MG/DL (ref 8.5–10.1)
CHLORIDE SERPL-SCNC: 95 MMOL/L (ref 98–107)
CO2 SERPL-SCNC: 29 MMOL/L (ref 21–32)
CREAT SERPL-MCNC: 0.88 MG/DL (ref 0.7–1.3)
DIFFERENTIAL METHOD BLD: ABNORMAL
EGFR (NO RACE VARIABLE) (RUSH/TITUS): 103 ML/MIN/1.73M2
EOSINOPHIL # BLD AUTO: 0.14 K/UL (ref 0–0.5)
EOSINOPHIL NFR BLD AUTO: 1.9 % (ref 1–4)
ERYTHROCYTE [DISTWIDTH] IN BLOOD BY AUTOMATED COUNT: 16.2 % (ref 11.5–14.5)
GLOBULIN SER-MCNC: 3.5 G/DL (ref 2–4)
GLUCOSE SERPL-MCNC: 144 MG/DL (ref 70–105)
GLUCOSE SERPL-MCNC: 147 MG/DL (ref 70–105)
GLUCOSE SERPL-MCNC: 155 MG/DL (ref 70–105)
GLUCOSE SERPL-MCNC: 162 MG/DL (ref 74–106)
GLUCOSE SERPL-MCNC: 239 MG/DL (ref 70–105)
HCT VFR BLD AUTO: 34.8 % (ref 40–54)
HGB BLD-MCNC: 11 G/DL (ref 13.5–18)
IMM GRANULOCYTES # BLD AUTO: 0.05 K/UL (ref 0–0.04)
IMM GRANULOCYTES NFR BLD: 0.7 % (ref 0–0.4)
LYMPHOCYTES # BLD AUTO: 1.23 K/UL (ref 1–4.8)
LYMPHOCYTES NFR BLD AUTO: 16.5 % (ref 27–41)
MCH RBC QN AUTO: 26.9 PG (ref 27–31)
MCHC RBC AUTO-ENTMCNC: 31.6 G/DL (ref 32–36)
MCV RBC AUTO: 85.1 FL (ref 80–96)
MONOCYTES # BLD AUTO: 0.54 K/UL (ref 0–0.8)
MONOCYTES NFR BLD AUTO: 7.2 % (ref 2–6)
MPC BLD CALC-MCNC: 9.9 FL (ref 9.4–12.4)
NEUTROPHILS # BLD AUTO: 5.45 K/UL (ref 1.8–7.7)
NEUTROPHILS NFR BLD AUTO: 73 % (ref 53–65)
NRBC # BLD AUTO: 0 X10E3/UL
NRBC, AUTO (.00): 0 %
PLATELET # BLD AUTO: 261 K/UL (ref 150–400)
POTASSIUM SERPL-SCNC: 4.3 MMOL/L (ref 3.5–5.1)
PROT SERPL-MCNC: 6.4 G/DL (ref 6.4–8.2)
RBC # BLD AUTO: 4.09 M/UL (ref 4.6–6.2)
SODIUM SERPL-SCNC: 132 MMOL/L (ref 136–145)
WBC # BLD AUTO: 7.46 K/UL (ref 4.5–11)

## 2024-03-26 PROCEDURE — 25000242 PHARM REV CODE 250 ALT 637 W/ HCPCS

## 2024-03-26 PROCEDURE — 97110 THERAPEUTIC EXERCISES: CPT | Mod: CO

## 2024-03-26 PROCEDURE — 25000003 PHARM REV CODE 250: Performed by: INTERNAL MEDICINE

## 2024-03-26 PROCEDURE — 80053 COMPREHEN METABOLIC PANEL: CPT | Performed by: INTERNAL MEDICINE

## 2024-03-26 PROCEDURE — 94640 AIRWAY INHALATION TREATMENT: CPT

## 2024-03-26 PROCEDURE — 25000242 PHARM REV CODE 250 ALT 637 W/ HCPCS: Performed by: HOSPITALIST

## 2024-03-26 PROCEDURE — 63600175 PHARM REV CODE 636 W HCPCS: Performed by: STUDENT IN AN ORGANIZED HEALTH CARE EDUCATION/TRAINING PROGRAM

## 2024-03-26 PROCEDURE — C1751 CATH, INF, PER/CENT/MIDLINE: HCPCS

## 2024-03-26 PROCEDURE — 99233 SBSQ HOSP IP/OBS HIGH 50: CPT | Mod: ,,, | Performed by: INTERNAL MEDICINE

## 2024-03-26 PROCEDURE — 99233 SBSQ HOSP IP/OBS HIGH 50: CPT | Mod: ,,, | Performed by: NURSE PRACTITIONER

## 2024-03-26 PROCEDURE — 94761 N-INVAS EAR/PLS OXIMETRY MLT: CPT

## 2024-03-26 PROCEDURE — 25000003 PHARM REV CODE 250: Performed by: STUDENT IN AN ORGANIZED HEALTH CARE EDUCATION/TRAINING PROGRAM

## 2024-03-26 PROCEDURE — 11000001 HC ACUTE MED/SURG PRIVATE ROOM

## 2024-03-26 PROCEDURE — 36410 VNPNXR 3YR/> PHY/QHP DX/THER: CPT

## 2024-03-26 PROCEDURE — 97535 SELF CARE MNGMENT TRAINING: CPT | Mod: CO

## 2024-03-26 PROCEDURE — 97116 GAIT TRAINING THERAPY: CPT

## 2024-03-26 PROCEDURE — 27000221 HC OXYGEN, UP TO 24 HOURS

## 2024-03-26 PROCEDURE — 25000003 PHARM REV CODE 250

## 2024-03-26 PROCEDURE — 99024 POSTOP FOLLOW-UP VISIT: CPT | Mod: ,,, | Performed by: STUDENT IN AN ORGANIZED HEALTH CARE EDUCATION/TRAINING PROGRAM

## 2024-03-26 PROCEDURE — 63600175 PHARM REV CODE 636 W HCPCS: Performed by: NURSE PRACTITIONER

## 2024-03-26 PROCEDURE — 63600175 PHARM REV CODE 636 W HCPCS: Performed by: HOSPITALIST

## 2024-03-26 PROCEDURE — 25000003 PHARM REV CODE 250: Performed by: HOSPITALIST

## 2024-03-26 PROCEDURE — 63600175 PHARM REV CODE 636 W HCPCS

## 2024-03-26 PROCEDURE — 99900035 HC TECH TIME PER 15 MIN (STAT)

## 2024-03-26 PROCEDURE — 82962 GLUCOSE BLOOD TEST: CPT

## 2024-03-26 PROCEDURE — 97110 THERAPEUTIC EXERCISES: CPT

## 2024-03-26 PROCEDURE — 76937 US GUIDE VASCULAR ACCESS: CPT

## 2024-03-26 PROCEDURE — 25000003 PHARM REV CODE 250: Performed by: NURSE PRACTITIONER

## 2024-03-26 PROCEDURE — 85025 COMPLETE CBC W/AUTO DIFF WBC: CPT | Performed by: INTERNAL MEDICINE

## 2024-03-26 RX ORDER — AMIODARONE HYDROCHLORIDE 200 MG/1
400 TABLET ORAL DAILY
Status: DISCONTINUED | OUTPATIENT
Start: 2024-03-26 | End: 2024-03-28

## 2024-03-26 RX ORDER — HYDRALAZINE HYDROCHLORIDE 25 MG/1
25 TABLET, FILM COATED ORAL EVERY 12 HOURS
Status: DISCONTINUED | OUTPATIENT
Start: 2024-03-26 | End: 2024-03-27

## 2024-03-26 RX ORDER — METOLAZONE 5 MG/1
5 TABLET ORAL DAILY
Status: DISCONTINUED | OUTPATIENT
Start: 2024-03-26 | End: 2024-03-27

## 2024-03-26 RX ORDER — MILRINONE LACTATE 0.2 MG/ML
0.12 INJECTION, SOLUTION INTRAVENOUS CONTINUOUS
Status: DISCONTINUED | OUTPATIENT
Start: 2024-03-26 | End: 2024-03-27

## 2024-03-26 RX ADMIN — HYDRALAZINE HYDROCHLORIDE 10 MG: 10 TABLET ORAL at 08:03

## 2024-03-26 RX ADMIN — EMPAGLIFLOZIN 10 MG: 10 TABLET, FILM COATED ORAL at 08:03

## 2024-03-26 RX ADMIN — HYDRALAZINE HYDROCHLORIDE 25 MG: 25 TABLET, FILM COATED ORAL at 08:03

## 2024-03-26 RX ADMIN — LOSARTAN POTASSIUM 25 MG: 25 TABLET, FILM COATED ORAL at 08:03

## 2024-03-26 RX ADMIN — OXYCODONE 10 MG: 5 TABLET ORAL at 06:03

## 2024-03-26 RX ADMIN — IPRATROPIUM BROMIDE AND ALBUTEROL SULFATE 3 ML: 2.5; .5 SOLUTION RESPIRATORY (INHALATION) at 07:03

## 2024-03-26 RX ADMIN — ATORVASTATIN CALCIUM 40 MG: 40 TABLET, FILM COATED ORAL at 08:03

## 2024-03-26 RX ADMIN — PANTOPRAZOLE SODIUM 40 MG: 40 TABLET, DELAYED RELEASE ORAL at 08:03

## 2024-03-26 RX ADMIN — IPRATROPIUM BROMIDE AND ALBUTEROL SULFATE 3 ML: 2.5; .5 SOLUTION RESPIRATORY (INHALATION) at 01:03

## 2024-03-26 RX ADMIN — DOCUSATE SODIUM 100 MG: 100 CAPSULE, LIQUID FILLED ORAL at 08:03

## 2024-03-26 RX ADMIN — MUPIROCIN: 20 OINTMENT TOPICAL at 08:03

## 2024-03-26 RX ADMIN — GUAIFENESIN AND CODEINE PHOSPHATE 5 ML: 100; 10 SOLUTION ORAL at 06:03

## 2024-03-26 RX ADMIN — POTASSIUM CHLORIDE 40 MEQ: 1500 TABLET, EXTENDED RELEASE ORAL at 08:03

## 2024-03-26 RX ADMIN — ASPIRIN 81 MG: 81 TABLET, COATED ORAL at 08:03

## 2024-03-26 RX ADMIN — BUDESONIDE INHALATION 0.5 MG: 0.5 SUSPENSION RESPIRATORY (INHALATION) at 07:03

## 2024-03-26 RX ADMIN — IPRATROPIUM BROMIDE AND ALBUTEROL SULFATE 3 ML: 2.5; .5 SOLUTION RESPIRATORY (INHALATION) at 12:03

## 2024-03-26 RX ADMIN — AMIODARONE HYDROCHLORIDE 0.5 MG/MIN: 1.8 INJECTION, SOLUTION INTRAVENOUS at 08:03

## 2024-03-26 RX ADMIN — BUDESONIDE INHALATION 0.5 MG: 0.5 SUSPENSION RESPIRATORY (INHALATION) at 08:03

## 2024-03-26 RX ADMIN — INSULIN ASPART 2 UNITS: 100 INJECTION, SOLUTION INTRAVENOUS; SUBCUTANEOUS at 11:03

## 2024-03-26 RX ADMIN — METOLAZONE 5 MG: 5 TABLET ORAL at 08:03

## 2024-03-26 RX ADMIN — FUROSEMIDE 80 MG: 10 INJECTION, SOLUTION INTRAVENOUS at 08:03

## 2024-03-26 RX ADMIN — SACUBITRIL AND VALSARTAN 1 TABLET: 24; 26 TABLET, FILM COATED ORAL at 08:03

## 2024-03-26 RX ADMIN — GUAIFENESIN AND CODEINE PHOSPHATE 5 ML: 100; 10 SOLUTION ORAL at 08:03

## 2024-03-26 RX ADMIN — DIGOXIN 0.12 MG: 125 TABLET ORAL at 08:03

## 2024-03-26 RX ADMIN — MILRINONE LACTATE IN DEXTROSE 0.12 MCG/KG/MIN: 200 INJECTION, SOLUTION INTRAVENOUS at 07:03

## 2024-03-26 RX ADMIN — OXYCODONE 10 MG: 5 TABLET ORAL at 08:03

## 2024-03-26 RX ADMIN — MILRINONE LACTATE IN DEXTROSE 0.25 MCG/KG/MIN: 200 INJECTION, SOLUTION INTRAVENOUS at 04:03

## 2024-03-26 RX ADMIN — FUROSEMIDE 10 MG/HR: 10 INJECTION, SOLUTION INTRAMUSCULAR; INTRAVENOUS at 11:03

## 2024-03-26 RX ADMIN — AMIODARONE HYDROCHLORIDE 400 MG: 200 TABLET ORAL at 11:03

## 2024-03-26 RX ADMIN — ENOXAPARIN SODIUM 40 MG: 40 INJECTION SUBCUTANEOUS at 04:03

## 2024-03-26 RX ADMIN — SACUBITRIL AND VALSARTAN 1 TABLET: 24; 26 TABLET, FILM COATED ORAL at 11:03

## 2024-03-26 NOTE — ASSESSMENT & PLAN NOTE
Likely related to congestive heart failure systolic   Seems to be improving x-ray will be repeated today the other issue is oxygen were going to try Venturi mask get off high-flow today

## 2024-03-26 NOTE — PROGRESS NOTES
[facility]  Cardiology  Progress Note    Patient Name: Karin Carrera  MRN: [id number]  Admission Date: 3/19/2024  Hospital Length of Stay: 7 days  Code Status: Full Code   Attending Physician: Kai Peck MD   Primary Care Physician: Walker Smith MD  Expected Discharge Date:   Principal Problem:Sepsis    Subjective:     Hospital Course:   No notes on file    Interval History: Patient seen today, IV amio initiated last night for NSVT, tele reviewed with improvement, continued PVCs. Transition to PO amiodarone 400mg daily. CXR today with no improvement. Decrease milrinone to 0.125 today, start IV lasix gtt. BP stable, dc Losartan and start Entresto 24-26.    Review of Systems   Constitutional: Positive for malaise/fatigue. Negative for fever.   Cardiovascular:  Positive for dyspnea on exertion, leg swelling, orthopnea and palpitations.   Respiratory:  Positive for shortness of breath.    Gastrointestinal:  Positive for abdominal pain.   Neurological:  Positive for weakness.     Objective:     Vital Signs (Most Recent):  Temp: 96.8 °F (36 °C) (03/26/24 1101)  Pulse: (!) 113 (03/26/24 1206)  Resp: 18 (03/26/24 1206)  BP: 123/72 (03/26/24 1122)  SpO2: 98 % (03/26/24 1206) Vital Signs (24h Range):  Temp:  [96.8 °F (36 °C)-98.4 °F (36.9 °C)] 96.8 °F (36 °C)  Pulse:  [] 113  Resp:  [13-41] 18  SpO2:  [76 %-100 %] 98 %  BP: ()/(58-84) 123/72     Weight: 108.1 kg (238 lb 6.4 oz)  Body mass index is 30.61 kg/m².     SpO2: 98 %         Intake/Output Summary (Last 24 hours) at 3/26/2024 1530  Last data filed at 3/26/2024 1044  Gross per 24 hour   Intake 548.06 ml   Output 2250 ml   Net -1701.94 ml       Lines/Drains/Airways       Peripheral Intravenous Line  Duration                  Peripheral IV - Single Lumen 03/23/24 0100 Anterior;Left;Proximal Forearm 3 days         Midline Catheter - Double Lumen 03/26/24 1507 Left basilic vein (medial side of arm)  <1 day          "              Physical Exam  Vitals reviewed.   Constitutional:       Appearance: He is ill-appearing.   Neck:      Vascular: JVD present.   Cardiovascular:      Rate and Rhythm: Tachycardia present.   Pulmonary:      Effort: Tachypnea present.      Breath sounds: Wheezing and rales present.   Abdominal:      General: Bowel sounds are normal.      Palpations: Abdomen is soft.   Musculoskeletal:      Right lower leg: Edema present.      Left lower leg: Edema present.   Skin:     Coloration: Skin is pale.   Neurological:      Mental Status: He is alert and oriented to person, place, and time.            Significant Labs: ABG: No results for input(s): "PH", "PCO2", "HCO3", "POCSATURATED", "BE" in the last 48 hours., Blood Culture: No results for input(s): "LABBLOO" in the last 48 hours., BMP:   Recent Labs   Lab 03/25/24 0425 03/26/24 0427   * 162*   * 132*   K 4.2 4.3   CL 96* 95*   CO2 28 29   BUN 17 21*   CREATININE 1.06 0.88   CALCIUM 8.8 8.9   , CMP   Recent Labs   Lab 03/25/24 0425 03/26/24 0427   * 132*   K 4.2 4.3   CL 96* 95*   CO2 28 29   * 162*   BUN 17 21*   CREATININE 1.06 0.88   CALCIUM 8.8 8.9   PROT 6.8 6.4   ALBUMIN 3.2* 2.9*   BILITOT 1.3* 1.1   ALKPHOS 106 97   AST 21 44*   ALT 18 33   ANIONGAP 13 12   , CBC   Recent Labs   Lab 03/25/24 0425 03/26/24 0427   WBC 8.86 7.46   HGB 11.9* 11.0*   HCT 37.4* 34.8*    261   , INR No results for input(s): "INR", "PROTIME" in the last 48 hours., Lipid Panel No results for input(s): "CHOL", "HDL", "LDLCALC", "TRIG", "CHOLHDL" in the last 48 hours., and Troponin No results for input(s): "TROPONINI" in the last 48 hours.    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 >5L/min, index >2.2 indexed to BSA); MAP 75, : 0.85 " Paez, Rajinder: 2.33    Pulmonary Wedge A Wave Pressure: 32 mmHg ; Pulmonary Wedge V Wave Pressure: 44 mmHg ; Pulmonary Wedge Mean Pressure: 27 mmHg    Pulmonary Artery Systolic Pressure: 57 mmHg ; Pulmonary Artery Diastolic Pressure: 22 mmHg ; Pulmonary Artery Mean Pressure: 38 mmHg    Recommend clinical correlation, continued diuresis w/ titration +/- dobutamine to goal net negative 1-2ml/kg/hr    Using US guidance, tried to place PIV in bascilic vein of RUE pre-procedure, however patient has flaccid hemiparesis and R sided aphasia and hemineglect; able to cannulate basilic vein x 2 preprocedure in addition to nurse attempts via US guidance, but could not advance PIV, micropuncture wire, etc for planned upgrade to 7F sheath intraprocedure for use as access due to flaccid paralysis (no resting muscle tone). Explained to patient, who was amenable to R femoral vein access which was performed without difficulty following lidocaine administration, in sterile fashion once timeout performed and patient prepped/draped.    The procedure log was documented by Documenter: Isabel Alcaraz RN and verified by Ruiz Pacheco MD.    Date: 2/23/2024  Time: 10:27 AM     , Echocardiogram: Transthoracic echo (TTE) complete (Cupid Only):   Results for orders placed or performed during the hospital encounter of 03/19/24   Echo Saline Bubble? Yes   Result Value Ref Range    BSA 2.31 m2    LVIDd 6.02 (A) 3.5 - 6.0 cm    LV Systolic Volume 136.82 mL    LV Systolic Volume Index 59.7 mL/m2    LVIDs 5.32 (A) 2.1 - 4.0 cm    LV Diastolic Volume 181.34 mL    LV Diastolic Volume Index 79.19 mL/m2    IVS 1.01 0.6 - 1.1 cm    FS 12 (A) 28 - 44 %    Left Ventricle Relative Wall Thickness 0.28 cm    Posterior Wall 0.83 0.6 - 1.1 cm    LV mass 223.09 g    LV Mass Index 97 g/m2    MV Peak E Rohit 0.95 m/s    TDI LATERAL 0.15 m/s    TDI SEPTAL 0.10 m/s    E/E' ratio 7.60 m/s    MV Peak A Rohit 0.32 m/s    E/A ratio 2.97     E wave deceleration time 79.46  msec    LV SEPTAL E/E' RATIO 9.50 m/s    LV LATERAL E/E' RATIO 6.33 m/s    RVDD 4.41 cm    TAPSE 1.30 cm    LA size 5.44 cm    Left Atrium Major Axis 6.19 cm    RA Major Axis 4.29 cm    MV stenosis pressure 1/2 time 23.04 ms    MV valve area p 1/2 method 9.55 cm2    IVC diameter 2.54 cm    Mean e' 0.13 m/s    ZLVIDS 0.06     ZLVIDD -3.74     EF 20 %    Est. RA pres 15 mmHg    Narrative      Left Ventricle: The left ventricle is moderately dilated. Normal wall   thickness. Regional wall motion abnormalities present.  Anterior wall and   apex are akinetic.  Inferior wall and lateral wall are hypokinetic. There   is severely reduced systolic function with a visually estimated ejection   fraction of less than 30%. Ejection fraction by visual approximation is   20%. There is diastolic dysfunction.    Right Ventricle: Mild right ventricular enlargement.    Left Atrium: Left atrium is severely dilated.    Right Atrium: Right atrium is mildly dilated.    IVC/SVC: Elevated venous pressure at 15 mmHg.    Pericardium: There is a trivial effusion.     , and X-Ray: CXR: X-Ray Chest 1 View (CXR):   Results for orders placed or performed during the hospital encounter of 03/19/24   X-Ray Chest 1 View    Narrative    EXAMINATION:  XR CHEST 1 VIEW    CLINICAL HISTORY:  Pneumonia.    TECHNIQUE:  XR CHEST 1 VIEW    COMPARISON:  3/23/24    FINDINGS:  No lines or tubes.    Patchy airspace opacities scattered throughout the lungs, similar.    Bilateral pleural effusions, similar.    Cardiac silhouette is similar to comparison exam.    No obvious acute bone findings.      Impression    Patchy airspace opacities scattered throughout the lungs, similar.    Bilateral pleural effusions, similar.      Electronically signed by: Pavan Carmichael  Date:    03/26/2024  Time:    07:42     Assessment and Plan:     Brief HPI: Patient is a 54 y/o M w/ Pmhx of alcoholic fatty liver, HFrEF with EF 25% per Echo on 2/20/24, pulmonary HTN, CAD s/p CABG in  2022, RHC, and stent placement, TR, COPD, CVA in 2016 with expressive aphasia and right hemiparesis, HTN, HLD, T2DM, GERD with esophagitis, ROSEMARIE who presents to presents to [facility] ED via EMS with SOB and abdominal pain for 2 days. He reports recently having a cholecystectomy on 3/9/24 here at [place] by Dr. Bennett. He endorses RUQ abdominal pain and SOB. SOB is similar to his previous COPD episodes. Endorses orthopnea and INFANTE. He endorses surgical site drainage mostly pus that started 2 days ago. Endorses decreased appetite around the same time. Endorses bilateral leg swelling and right leg redness for about 1 month and now swelling has gone up his thighs and abdomen in the past 2 days. Denies f/c/cp/n/v/d. Denies urinary, or bowel habit changes. Reports compliance with all home medications. Patient uses 2L NC O2 at home. He communicates through writing on paper and texts due to his expressive aphasia 2/2 CVA. Labs were significant for BNP 7486. EKG sinus tach 110 with multifocal PVCs. CXR showed continued  Cardiomegaly. CTA PE protocol was significant for no pulmonary embolus, four-chamber cardiomegaly, pulmonary edema and small pleural effusions. Cardiology has been consulted for the continued care and management of his cardiac conditions.     * Sepsis  - Recent gallbladder removal (3/9/2024)  - Being followed by primary medical team    NSVT (nonsustained ventricular tachycardia)  3/26/2024:  - IV amiodarone started yesterday, improved ectopy on tele  - Transition to PO amio 400mg daily per Dr. Ceron  - Continue monitoring    End stage heart failure  Echo reveals Ejection fraction by visual approximation is 20%. There is diastolic dysfunction.   BNP 7486  Continue IV lasix 40 mg QD  Continue toprol XL 25 mg 1/2 tab QD  Continue jardiance 10 mg QD  Resume losartan 25 mg 1/2 tab   Daily weights  Strict I/Os    3/22/2024:  - Patient seen and evaluated by Dr. Escobar  - Mixed shock: Cardiogenic/sepsis  - Discontinue  BB  - Start IV dobutamine 5mcg/kg/min. Increase lasix to 80mg BID  - Strict I&O; strict daily weight  - Will up titrate afterload reduction as BP allows  - Unable to tolerate MRAs due to hyponatremia    3/23/2024  Continue IV dobutamine 5mcg/kg/min.   Continue IV lasix 80mg BID  - Strict I&O; strict daily weight  - Will up titrate afterload reduction as BP allows  - Unable to tolerate MRAs due to hyponatremia     3/25/2024  Continue IV lasix 80 BID  Start hydralazine 10 mg BID  Increase losartan 25 mg   Start IV digoxin 500 mcg 1x --> then digoxin 0.125 mg PO QD  Check lactate  Continue strict I&O and monitor daily weights  Unable to tolerate MRAs due to hyponatremia     3/26/2024:  - Patient seen and evaluated by Dr. Ceron  - DC losartan, start Entresto 24-26 BID, increase hydralazine 25 BID  - Lactate 4.0 yesterday, milrinone 0.25 started. Decreased milrinone to 0.125   - Remains volume overloaded, no improvement in repeat CXR, continued edema and sob  - Start lasix gtt at 10mg/hr  - CMP and lactate in am        VTE Risk Mitigation (From admission, onward)           Ordered     enoxaparin injection 40 mg  Every 24 hours         03/19/24 5091                    ISADORA Hendrickson  Cardiology  [facility]

## 2024-03-26 NOTE — PROGRESS NOTES
Fluid from gallbladder fossa benign; cultures negative.  Blood cultures considered contamination; repeat negative.    Right upper extremity swelling; Venous doppler negative for DVT    Keep right arm elevated above the heart; alternate warm and cold compresses.If swelling doesn't improve, patient may need a CT Thorax/Venogram to rule out any central stenosis from previous PICC line.    No surgical intervention at this time

## 2024-03-26 NOTE — SUBJECTIVE & OBJECTIVE
Interval History/Significant Events:  Patient without complaints    Review of Systems  Objective:     Vital Signs (Most Recent):  Temp: 98 °F (36.7 °C) (03/26/24 0302)  Pulse: 110 (03/26/24 0600)  Resp: 20 (03/26/24 0600)  BP: (!) 146/84 (03/26/24 0600)  SpO2: (!) 92 % (03/26/24 0600) Vital Signs (24h Range):  Temp:  [97.2 °F (36.2 °C)-98 °F (36.7 °C)] 98 °F (36.7 °C)  Pulse:  [] 110  Resp:  [13-41] 20  SpO2:  [90 %-100 %] 92 %  BP: ()/(58-85) 146/84   Weight: 108.1 kg (238 lb 6.4 oz)  Body mass index is 30.61 kg/m².      Intake/Output Summary (Last 24 hours) at 3/26/2024 0629  Last data filed at 3/26/2024 0617  Gross per 24 hour   Intake 648.06 ml   Output 1300 ml   Net -651.94 ml          Physical Exam  Vitals reviewed.   Constitutional:       Appearance: Normal appearance.      Interventions: He is not intubated.  HENT:      Head: Normocephalic and atraumatic.      Nose: Nose normal.      Mouth/Throat:      Mouth: Mucous membranes are dry.      Pharynx: Oropharynx is clear.   Eyes:      Extraocular Movements: Extraocular movements intact.      Conjunctiva/sclera: Conjunctivae normal.      Pupils: Pupils are equal, round, and reactive to light.   Cardiovascular:      Rate and Rhythm: Normal rate.      Heart sounds: Normal heart sounds. No murmur heard.  Pulmonary:      Effort: Pulmonary effort is normal. He is not intubated.      Breath sounds: Normal breath sounds.   Abdominal:      General: Abdomen is flat. Bowel sounds are normal.      Palpations: Abdomen is soft.   Musculoskeletal:         General: Normal range of motion.      Cervical back: Normal range of motion and neck supple.      Right lower leg: No edema.      Left lower leg: No edema.   Skin:     General: Skin is warm and dry.      Capillary Refill: Capillary refill takes less than 2 seconds.   Neurological:      General: No focal deficit present.      Mental Status: He is alert and oriented to person, place, and time.   Psychiatric:          Mood and Affect: Mood normal.         Behavior: Behavior normal.            Vents:  Oxygen Concentration (%): 41 (03/26/24 0413)  Lines/Drains/Airways       Peripheral Intravenous Line  Duration                  Peripheral IV - Single Lumen 03/23/24 0100 Anterior;Left;Proximal Forearm 3 days                  Significant Labs:    CBC/Anemia Profile:  Recent Labs   Lab 03/25/24  0425 03/26/24 0427   WBC 8.86 7.46   HGB 11.9* 11.0*   HCT 37.4* 34.8*    261   MCV 83.9 85.1   RDW 16.3* 16.2*        Chemistries:  Recent Labs   Lab 03/25/24  0425 03/26/24 0427   * 132*   K 4.2 4.3   CL 96* 95*   CO2 28 29   BUN 17 21*   CREATININE 1.06 0.88   CALCIUM 8.8 8.9   ALBUMIN 3.2* 2.9*   PROT 6.8 6.4   BILITOT 1.3* 1.1   ALKPHOS 106 97   ALT 18 33   AST 21 44*       Recent Lab Results  (Last 5 results in the past 24 hours)        03/26/24  0525   03/26/24  0427   03/25/24  2221   03/25/24 2015 03/25/24  1705        Albumin/Globulin Ratio   0.8             Albumin   2.9             ALP   97             ALT   33             Anion Gap   12             AST   44             Baso #   0.05             Basophil %   0.7             BILIRUBIN TOTAL   1.1             BUN   21             BUN/CREAT RATIO   24             Calcium   8.9             Chloride   95             CO2   29             Creatinine   0.88             Differential Method   Auto             eGFR   103             Eos #   0.14             Eos %   1.9             Globulin, Total   3.5             Glucose   162             Hematocrit   34.8             Hemoglobin   11.0             Immature Grans (Abs)   0.05             Immature Granulocytes   0.7             Lactic Acid Level               Lymph #   1.23             Lymph %   16.5             MCH   26.9             MCHC   31.6             MCV   85.1             Mono #   0.54             Mono %   7.2             MPV   9.9             Neutrophils, Abs   5.45             Neutrophils Relative   73.0              nRBC   0.0             NUCLEATED RBC ABSOLUTE   0.00             Platelet Count   261             POC Glucose 144       263   168       Potassium   4.3             PROTEIN TOTAL   6.4             RBC   4.09             RDW   16.2             Sodium   132             Vancomycin-Trough     10.5           WBC   7.46                                    Significant Imaging:  I have reviewed all pertinent imaging results/findings within the past 24 hours.

## 2024-03-26 NOTE — PT/OT/SLP PROGRESS
Occupational Therapy   Treatment    Name: Karni Carrera  MRN: [id number]  Admitting Diagnosis:  Sepsis       Recommendations:     Discharge Recommendations:  (depending on rate of recovery, moderate to low intensity therapy)  Discharge Equipment Recommendations:     Barriers to discharge:       Assessment:     Karin Carrera is a 53 y.o. male with a medical diagnosis of Sepsis.. Performance deficits affecting function are weakness, impaired endurance, impaired self care skills, impaired cardiopulmonary response to activity, decreased upper extremity function.     Rehab Prognosis:  Good; patient would benefit from acute skilled OT services to address these deficits and reach maximum level of function.       Plan:     Patient to be seen 5 x/week to address the above listed problems via self-care/home management, therapeutic activities, therapeutic exercises  Plan of Care Expires: 04/22/24  Plan of Care Reviewed with: patient    Subjective     Chief Complaint:   Patient/Family Comments/goals:   Pain/Comfort:  Pain Rating 1: 0/10    Objective:     Communicated with: Noni Chun RN   prior to session.  Patient found HOB elevated with peripheral IV, telemetry, blood pressure cuff, pulse ox (continuous) upon OT entry to room.    General Precautions: Standard, fall, respiratory    Orthopedic Precautions:N/A  Braces: N/A  Respiratory Status:  venti mask      Occupational Performance:     Bed Mobility:    Supine to sit mod I     Functional Mobility/Transfers:  cga  Functional Mobility:     Activities of Daily Living:  Min a to av gown as robe   S to av shoes  Set up to wash his face,neck(front and back),and head  Sat eob I ly 3 mins      AMPAC 6 Click ADL:      Treatment & Education:  Pt performed hand helper with 3 rubber band resistances 20 reps x 2 on L,green t band on L 15 reps x 2 elbow  flex/ext ,green t ball ex's 20 reps x 2 on L, rom ex's with 2 lb wt on L 15 reps x 2 elbow flex/ext,abd/add,shld flex,  wrist flex/ext,   Patient left up in chair with all lines intact and call button in reach    GOALS:   Multidisciplinary Problems       Occupational Therapy Goals          Problem: Occupational Therapy    Goal Priority Disciplines Outcome Interventions   Occupational Therapy Goal     OT, PT/OT Ongoing, Progressing    Description: STG: (in 1 week)  Pt will perform grooming with setup  Pt will bathe with setup and min(A)  Pt will perform UE dressing with min(A)  Pt will perform LE dressing with min(A)  Pt will transfer bed/chair/bsc with SBA with cane  Pt will perform standing task x 3 min with CGA   Pt will tolerate 10 minutes of tx without fatigue      LTG: (in 5 weeks)  1.Restore to max I with self care and mobility.                        Time Tracking:     OT Date of Treatment: 03/26/24  OT Start Time: 1528  OT Stop Time: 1614  OT Total Time (min): 46 min    Billable Minutes:Self Care/Home Management 15  Therapeutic Exercise 30    OT/ANGELI: ANGELI          3/26/2024

## 2024-03-26 NOTE — ASSESSMENT & PLAN NOTE
Ejection fraction 20% on dobutamine drip day 4/5    Continue diuresing aggressively   Add metolazone to Lasix continue Milrinone

## 2024-03-26 NOTE — ASSESSMENT & PLAN NOTE
Echo reveals Ejection fraction by visual approximation is 20%. There is diastolic dysfunction.   BNP 7486  Continue IV lasix 40 mg QD  Continue toprol XL 25 mg 1/2 tab QD  Continue jardiance 10 mg QD  Resume losartan 25 mg 1/2 tab   Daily weights  Strict I/Os    3/22/2024:  - Patient seen and evaluated by Dr. Escobar  - Mixed shock: Cardiogenic/sepsis  - Discontinue BB  - Start IV dobutamine 5mcg/kg/min. Increase lasix to 80mg BID  - Strict I&O; strict daily weight  - Will up titrate afterload reduction as BP allows  - Unable to tolerate MRAs due to hyponatremia    3/23/2024  Continue IV dobutamine 5mcg/kg/min.   Continue IV lasix 80mg BID  - Strict I&O; strict daily weight  - Will up titrate afterload reduction as BP allows  - Unable to tolerate MRAs due to hyponatremia     3/25/2024  Continue IV lasix 80 BID  Start hydralazine 10 mg BID  Increase losartan 25 mg   Start IV digoxin 500 mcg 1x --> then digoxin 0.125 mg PO QD  Check lactate  Continue strict I&O and monitor daily weights  Unable to tolerate MRAs due to hyponatremia     3/26/2024:  - Patient seen and evaluated by Dr. Ceron  - DC losartan, start Entresto 24-26 BID, increase hydralazine 25 BID  - Lactate 4.0 yesterday, milrinone 0.25 started. Decreased milrinone to 0.125   - Remains volume overloaded, no improvement in repeat CXR, continued edema and sob  - Start lasix gtt at 10mg/hr  - CMP and lactate in am

## 2024-03-26 NOTE — SUBJECTIVE & OBJECTIVE
Interval History: Patient seen today, IV amio initiated last night for NSVT, tele reviewed with improvement, continued PVCs. Transition to PO amiodarone 400mg daily. CXR today with no improvement. Decrease milrinone to 0.125 today, start IV lasix gtt. BP stable, dc Losartan and start Entresto 24-26.    Review of Systems   Constitutional: Positive for malaise/fatigue. Negative for fever.   Cardiovascular:  Positive for dyspnea on exertion, leg swelling, orthopnea and palpitations.   Respiratory:  Positive for shortness of breath.    Gastrointestinal:  Positive for abdominal pain.   Neurological:  Positive for weakness.     Objective:     Vital Signs (Most Recent):  Temp: 96.8 °F (36 °C) (03/26/24 1101)  Pulse: (!) 113 (03/26/24 1206)  Resp: 18 (03/26/24 1206)  BP: 123/72 (03/26/24 1122)  SpO2: 98 % (03/26/24 1206) Vital Signs (24h Range):  Temp:  [96.8 °F (36 °C)-98.4 °F (36.9 °C)] 96.8 °F (36 °C)  Pulse:  [] 113  Resp:  [13-41] 18  SpO2:  [76 %-100 %] 98 %  BP: ()/(58-84) 123/72     Weight: 108.1 kg (238 lb 6.4 oz)  Body mass index is 30.61 kg/m².     SpO2: 98 %         Intake/Output Summary (Last 24 hours) at 3/26/2024 1530  Last data filed at 3/26/2024 1044  Gross per 24 hour   Intake 548.06 ml   Output 2250 ml   Net -1701.94 ml       Lines/Drains/Airways       Peripheral Intravenous Line  Duration                  Peripheral IV - Single Lumen 03/23/24 0100 Anterior;Left;Proximal Forearm 3 days         Midline Catheter - Double Lumen 03/26/24 1507 Left basilic vein (medial side of arm)  <1 day                       Physical Exam  Vitals reviewed.   Constitutional:       Appearance: He is ill-appearing.   Neck:      Vascular: JVD present.   Cardiovascular:      Rate and Rhythm: Tachycardia present.   Pulmonary:      Effort: Tachypnea present.      Breath sounds: Wheezing and rales present.   Abdominal:      General: Bowel sounds are normal.      Palpations: Abdomen is soft.   Musculoskeletal:      Right  "lower leg: Edema present.      Left lower leg: Edema present.   Skin:     Coloration: Skin is pale.   Neurological:      Mental Status: He is alert and oriented to person, place, and time.            Significant Labs: ABG: No results for input(s): "PH", "PCO2", "HCO3", "POCSATURATED", "BE" in the last 48 hours., Blood Culture: No results for input(s): "LABBLOO" in the last 48 hours., BMP:   Recent Labs   Lab 03/25/24 0425 03/26/24 0427   * 162*   * 132*   K 4.2 4.3   CL 96* 95*   CO2 28 29   BUN 17 21*   CREATININE 1.06 0.88   CALCIUM 8.8 8.9   , CMP   Recent Labs   Lab 03/25/24 0425 03/26/24 0427   * 132*   K 4.2 4.3   CL 96* 95*   CO2 28 29   * 162*   BUN 17 21*   CREATININE 1.06 0.88   CALCIUM 8.8 8.9   PROT 6.8 6.4   ALBUMIN 3.2* 2.9*   BILITOT 1.3* 1.1   ALKPHOS 106 97   AST 21 44*   ALT 18 33   ANIONGAP 13 12   , CBC   Recent Labs   Lab 03/25/24 0425 03/26/24 0427   WBC 8.86 7.46   HGB 11.9* 11.0*   HCT 37.4* 34.8*    261   , INR No results for input(s): "INR", "PROTIME" in the last 48 hours., Lipid Panel No results for input(s): "CHOL", "HDL", "LDLCALC", "TRIG", "CHOLHDL" in the last 48 hours., and Troponin No results for input(s): "TROPONINI" in the last 48 hours.    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 >5L/min, index >2.2 indexed to BSA); MAP 75, : 0.85 Paez, Rajinder: 2.33    Pulmonary Wedge A Wave Pressure: 32 mmHg ; Pulmonary Wedge V Wave Pressure: 44 mmHg ; Pulmonary Wedge Mean Pressure: 27 mmHg    Pulmonary Artery Systolic Pressure: 57 mmHg ; Pulmonary Artery Diastolic Pressure: 22 mmHg ; Pulmonary Artery Mean Pressure: 38 mmHg    Recommend clinical correlation, continued diuresis w/ titration +/- dobutamine to goal net negative 1-2ml/kg/hr    Using US " guidance, tried to place PIV in bascilic vein of RUE pre-procedure, however patient has flaccid hemiparesis and R sided aphasia and hemineglect; able to cannulate basilic vein x 2 preprocedure in addition to nurse attempts via US guidance, but could not advance PIV, micropuncture wire, etc for planned upgrade to 7F sheath intraprocedure for use as access due to flaccid paralysis (no resting muscle tone). Explained to patient, who was amenable to R femoral vein access which was performed without difficulty following lidocaine administration, in sterile fashion once timeout performed and patient prepped/draped.    The procedure log was documented by Documenter: Isabel Alcaraz RN and verified by Ruiz Pacheco MD.    Date: 2/23/2024  Time: 10:27 AM     , Echocardiogram: Transthoracic echo (TTE) complete (Cupid Only):   Results for orders placed or performed during the hospital encounter of 03/19/24   Echo Saline Bubble? Yes   Result Value Ref Range    BSA 2.31 m2    LVIDd 6.02 (A) 3.5 - 6.0 cm    LV Systolic Volume 136.82 mL    LV Systolic Volume Index 59.7 mL/m2    LVIDs 5.32 (A) 2.1 - 4.0 cm    LV Diastolic Volume 181.34 mL    LV Diastolic Volume Index 79.19 mL/m2    IVS 1.01 0.6 - 1.1 cm    FS 12 (A) 28 - 44 %    Left Ventricle Relative Wall Thickness 0.28 cm    Posterior Wall 0.83 0.6 - 1.1 cm    LV mass 223.09 g    LV Mass Index 97 g/m2    MV Peak E Rohit 0.95 m/s    TDI LATERAL 0.15 m/s    TDI SEPTAL 0.10 m/s    E/E' ratio 7.60 m/s    MV Peak A Rohit 0.32 m/s    E/A ratio 2.97     E wave deceleration time 79.46 msec    LV SEPTAL E/E' RATIO 9.50 m/s    LV LATERAL E/E' RATIO 6.33 m/s    RVDD 4.41 cm    TAPSE 1.30 cm    LA size 5.44 cm    Left Atrium Major Axis 6.19 cm    RA Major Axis 4.29 cm    MV stenosis pressure 1/2 time 23.04 ms    MV valve area p 1/2 method 9.55 cm2    IVC diameter 2.54 cm    Mean e' 0.13 m/s    ZLVIDS 0.06     ZLVIDD -3.74     EF 20 %    Est. RA pres 15 mmHg    Narrative      Left Ventricle:  The left ventricle is moderately dilated. Normal wall   thickness. Regional wall motion abnormalities present.  Anterior wall and   apex are akinetic.  Inferior wall and lateral wall are hypokinetic. There   is severely reduced systolic function with a visually estimated ejection   fraction of less than 30%. Ejection fraction by visual approximation is   20%. There is diastolic dysfunction.    Right Ventricle: Mild right ventricular enlargement.    Left Atrium: Left atrium is severely dilated.    Right Atrium: Right atrium is mildly dilated.    IVC/SVC: Elevated venous pressure at 15 mmHg.    Pericardium: There is a trivial effusion.     , and X-Ray: CXR: X-Ray Chest 1 View (CXR):   Results for orders placed or performed during the hospital encounter of 03/19/24   X-Ray Chest 1 View    Narrative    EXAMINATION:  XR CHEST 1 VIEW    CLINICAL HISTORY:  Pneumonia.    TECHNIQUE:  XR CHEST 1 VIEW    COMPARISON:  3/23/24    FINDINGS:  No lines or tubes.    Patchy airspace opacities scattered throughout the lungs, similar.    Bilateral pleural effusions, similar.    Cardiac silhouette is similar to comparison exam.    No obvious acute bone findings.      Impression    Patchy airspace opacities scattered throughout the lungs, similar.    Bilateral pleural effusions, similar.      Electronically signed by: Pavan Carmichael  Date:    03/26/2024  Time:    07:42      No bruits; no thyromegaly or nodules

## 2024-03-26 NOTE — PROGRESS NOTES
[facility]  Critical Care Medicine  Progress Note    Patient Name: Karin Carrera  MRN: [id number]  Admission Date: 3/19/2024  Hospital Length of Stay: 7 days  Code Status: Full Code  Attending Provider: Kai Peck MD  Primary Care Provider: Walker Smith MD   Principal Problem: Sepsis    Subjective:     HPI:  53-year-old white male presents with shortness of breath and abdominal pain for 2 days recently had a cholecystectomy 03/09/2024.  Patient has a history of COPD he complains of increased leg swelling.  Patient has a history ejection fraction 25% previous stroke he has been hypoxic during this admission grew out Gram-positive Gram-negative bacilli out of his blood he has been brought down to the unit for worsening blood pressure and hypoxemia    Hospital/ICU Course:  3/24- placed on HFNC overnight - will continue to wean,day 3 of Dobutamine     Interval History/Significant Events:  Patient without complaints    Review of Systems  Objective:     Vital Signs (Most Recent):  Temp: 98 °F (36.7 °C) (03/26/24 0302)  Pulse: 110 (03/26/24 0600)  Resp: 20 (03/26/24 0600)  BP: (!) 146/84 (03/26/24 0600)  SpO2: (!) 92 % (03/26/24 0600) Vital Signs (24h Range):  Temp:  [97.2 °F (36.2 °C)-98 °F (36.7 °C)] 98 °F (36.7 °C)  Pulse:  [] 110  Resp:  [13-41] 20  SpO2:  [90 %-100 %] 92 %  BP: ()/(58-85) 146/84   Weight: 108.1 kg (238 lb 6.4 oz)  Body mass index is 30.61 kg/m².      Intake/Output Summary (Last 24 hours) at 3/26/2024 0629  Last data filed at 3/26/2024 0617  Gross per 24 hour   Intake 648.06 ml   Output 1300 ml   Net -651.94 ml          Physical Exam  Vitals reviewed.   Constitutional:       Appearance: Normal appearance.      Interventions: He is not intubated.  HENT:      Head: Normocephalic and atraumatic.      Nose: Nose normal.      Mouth/Throat:      Mouth: Mucous membranes are dry.      Pharynx: Oropharynx is clear.   Eyes:      Extraocular Movements:  Extraocular movements intact.      Conjunctiva/sclera: Conjunctivae normal.      Pupils: Pupils are equal, round, and reactive to light.   Cardiovascular:      Rate and Rhythm: Normal rate.      Heart sounds: Normal heart sounds. No murmur heard.  Pulmonary:      Effort: Pulmonary effort is normal. He is not intubated.      Breath sounds: Normal breath sounds.   Abdominal:      General: Abdomen is flat. Bowel sounds are normal.      Palpations: Abdomen is soft.   Musculoskeletal:         General: Normal range of motion.      Cervical back: Normal range of motion and neck supple.      Right lower leg: No edema.      Left lower leg: No edema.   Skin:     General: Skin is warm and dry.      Capillary Refill: Capillary refill takes less than 2 seconds.   Neurological:      General: No focal deficit present.      Mental Status: He is alert and oriented to person, place, and time.   Psychiatric:         Mood and Affect: Mood normal.         Behavior: Behavior normal.            Vents:  Oxygen Concentration (%): 41 (03/26/24 0413)  Lines/Drains/Airways       Peripheral Intravenous Line  Duration                  Peripheral IV - Single Lumen 03/23/24 0100 Anterior;Left;Proximal Forearm 3 days                  Significant Labs:    CBC/Anemia Profile:  Recent Labs   Lab 03/25/24  0425 03/26/24 0427   WBC 8.86 7.46   HGB 11.9* 11.0*   HCT 37.4* 34.8*    261   MCV 83.9 85.1   RDW 16.3* 16.2*        Chemistries:  Recent Labs   Lab 03/25/24  0425 03/26/24 0427   * 132*   K 4.2 4.3   CL 96* 95*   CO2 28 29   BUN 17 21*   CREATININE 1.06 0.88   CALCIUM 8.8 8.9   ALBUMIN 3.2* 2.9*   PROT 6.8 6.4   BILITOT 1.3* 1.1   ALKPHOS 106 97   ALT 18 33   AST 21 44*       Recent Lab Results  (Last 5 results in the past 24 hours)        03/26/24  0525   03/26/24 0427 03/25/24 2221 03/25/24 2015 03/25/24  1705        Albumin/Globulin Ratio   0.8             Albumin   2.9             ALP   97             ALT   33              Anion Gap   12             AST   44             Baso #   0.05             Basophil %   0.7             BILIRUBIN TOTAL   1.1             BUN   21             BUN/CREAT RATIO   24             Calcium   8.9             Chloride   95             CO2   29             Creatinine   0.88             Differential Method   Auto             eGFR   103             Eos #   0.14             Eos %   1.9             Globulin, Total   3.5             Glucose   162             Hematocrit   34.8             Hemoglobin   11.0             Immature Grans (Abs)   0.05             Immature Granulocytes   0.7             Lactic Acid Level               Lymph #   1.23             Lymph %   16.5             MCH   26.9             MCHC   31.6             MCV   85.1             Mono #   0.54             Mono %   7.2             MPV   9.9             Neutrophils, Abs   5.45             Neutrophils Relative   73.0             nRBC   0.0             NUCLEATED RBC ABSOLUTE   0.00             Platelet Count   261             POC Glucose 144       263   168       Potassium   4.3             PROTEIN TOTAL   6.4             RBC   4.09             RDW   16.2             Sodium   132             Vancomycin-Trough     10.5           WBC   7.46                                    Significant Imaging:  I have reviewed all pertinent imaging results/findings within the past 24 hours.    ABG  Recent Labs   Lab 03/21/24  1329   PH 7.52*   PO2 45   PCO2 37   HCO3 30.2*     Assessment/Plan:     Neuro  Hemiparesis affecting right side as late effect of cerebrovascular accident  His his baseline has aphasia    Pulmonary  Acute hypoxemic respiratory failure  Likely related to congestive heart failure systolic   Seems to be improving x-ray will be repeated today the other issue is oxygen were going to try Venturi mask get off high-flow today    Cardiac/Vascular  Shock  Resolved    End stage heart failure  Ejection fraction 20% on dobutamine drip day 4/5     Continue diuresing aggressively   Add metolazone to Lasix continue Milrinone    Coronary artery disease involving coronary bypass graft of native heart without angina pectoris  Continue meds    Essential (primary) hypertension  Blood pressure stable despite being on vasodilators continue diurese    Renal/  Hypokalemia  Resolved    ID  * Sepsis  Repeat blood cultures are negative.  Discontinue antibiotics    Bacteremia  Likely contaminate - following repeat cultures, stop antibiotics     Endocrine  Type 2 diabetes mellitus  SSI   Sugars now goal    Other  ROSEMARIE (obstructive sleep apnea)  CPAP at night              Kai Peck MD  Critical Care Medicine  [facility]

## 2024-03-26 NOTE — ASSESSMENT & PLAN NOTE
3/26/2024:  - IV amiodarone started yesterday, improved ectopy on tele  - Transition to PO amio 400mg daily per Dr. Ceron  - Continue monitoring

## 2024-03-26 NOTE — PROGRESS NOTES
[facility]  Adult Nutrition  First Assessment Note         Reason for Assessment  Reason For Assessment: length of stay   Nutrition Risk Screen: no indicators present    Assessment and Plan    Patient is a 54yo male admitted 3/22 for sepsis. He is assessed for length of stay.     Patient is 108.1kg with a BMI of 30.61 and is obese. He is ordered a cardiac diet. Per nursing he is eating well. No intake documented in flowsheet since 3/24. Recommend continue current diet as tolerated. Encourage good PO intakes.     Last BM 3/26 per flowsheet.    Medications/labs reviewed. RD following.      Learning Needs/Social Determinants of Health  Learning Assessment       03/20/2024 0242 [facility] (3/19/2024 - Present)   Created by Tiffany Celaya, RN - RN (Nurse) Status: Complete                 PRIMARY LEARNER     Primary Learner Name:  Karin Carrera BY - 03/20/2024 0242    Relationship:  Patient BY - 03/20/2024 0242    Does the primary learner have any barriers to learning?:  No Barriers BY - 03/20/2024 0242    What is the preferred language of the primary learner?:  English BY - 03/20/2024 0242    Is an  required?:  No BY - 03/20/2024 0242    How does the primary learner prefer to learn new concepts?:  Listening BY - 03/20/2024 0242    How often do you need to have someone help you read instructions, pamphlets, or written material from your doctor or pharmacy?:  Sometimes BY - 03/20/2024 0242        CO-LEARNER #1     No question answered        CO-LEARNER #2     No question answered        SPECIAL TOPICS     No question answered        ANSWERED BY:     No question answered        Edit History       Tiffany Celaya, RN - RN (Nurse)   03/20/2024 0242                           Social Determinants of Health     Tobacco Use: High Risk (3/20/2024)    Patient History     Smoking Tobacco Use: Former     Smokeless Tobacco Use: Current     Passive Exposure: Not on file   Alcohol Use:  Not At Risk (2/20/2024)    AUDIT-C     Frequency of Alcohol Consumption: Never     Average Number of Drinks: Patient does not drink     Frequency of Binge Drinking: Never   Financial Resource Strain: Low Risk  (3/21/2024)    Overall Financial Resource Strain (CARDIA)     Difficulty of Paying Living Expenses: Not hard at all   Food Insecurity: No Food Insecurity (3/21/2024)    Hunger Vital Sign     Worried About Running Out of Food in the Last Year: Never true     Ran Out of Food in the Last Year: Never true   Transportation Needs: No Transportation Needs (3/21/2024)    PRAPARE - Transportation     Lack of Transportation (Medical): No     Lack of Transportation (Non-Medical): No   Physical Activity: Inactive (2/20/2024)    Exercise Vital Sign     Days of Exercise per Week: 0 days     Minutes of Exercise per Session: 0 min   Stress: No Stress Concern Present (3/21/2024)    [place] [employer] Occupational [employer] - Occupational Stress Questionnaire     Feeling of Stress : Not at all   Social Connections: Unknown (2/20/2024)    Social Connection and Isolation Panel [NHANES]     Frequency of Communication with Friends and Family: More than three times a week     Frequency of Social Gatherings with Friends and Family: More than three times a week     Attends Caodaism Services: Never     Active Member of Clubs or Organizations: No     Attends Club or Organization Meetings: Never     Marital Status: Not on file   Housing Stability: Low Risk  (3/21/2024)    Housing Stability Vital Sign     Unable to Pay for Housing in the Last Year: No     Number of Places Lived in the Last Year: 1     Unstable Housing in the Last Year: No   Depression: Low Risk  (2/29/2024)    Depression     Last PHQ-4: Flowsheet Data: 0            Malnutrition  Is Patient Malnourished: No    Nutrition Diagnosis  Overweight related to Excessive Caloric intake as evidenced by elevated BMI      Recent Labs   Lab 03/26/24  0427 03/26/24  0525   *  --     POCGLU  --  144*     Comments on Glucose: Glucose elevated. PMH DM2, likely exacerbated by stress response to sepsis.      Nutrition Prescription / Recommendations  Recommendation/Intervention: Recommend continue current diet as tolerated. Encourage good PO Intakes.  Goals: Weight maintenance during admission, intake % of meals during admission  Nutrition Goal Status: new  Current Diet Order: Cardiac  Chewing or Swallowing Difficulty?: No Chewing or swallowing difficulty  Recommended Diet: Heart Healthy  Recommended Oral Supplement: No Oral Supplements  Is Nutrition Support Recommended: [facility] Support: No  Is Nutrition Education Recommended: No    Monitor and Evaluation  % current Intake: P.O. intake of 0 - 10%  % intake to meet estimated needs: 75 - 100 %  Food and Nutrient Intake: food and beverage intake  Food and Nutrient Adminstration: diet order  Anthropometric Measurements: weight change, weight  Biochemical Data, Medical Tests and Procedures: electrolyte and renal panel, gastrointestinal profile, glucose/endocrine profile, inflammatory profile, lipid profile       Current Medical Diagnosis and Past Medical History     Past Medical History:   Diagnosis Date    Alcoholic fatty liver     CHF (congestive heart failure) 02/20/2024    EF 25%    COPD (chronic obstructive pulmonary disease)     Coronary artery disease involving coronary bypass graft of native heart without angina pectoris 01/01/2022    Dyslipidemia 12/16/2023    Essential (primary) hypertension 01/01/2022    Expressive aphasia 05/05/2023    GERD with esophagitis 2018    EGD    Hemiparesis affecting right side as late effect of cerebrovascular accident     ROSEMARIE (obstructive sleep apnea)     Pulmonary hypertension     Stroke     right hand contracted    Tricuspid regurgitation     Type 2 diabetes mellitus        Nutrition/Diet History  Spiritual, Cultural Beliefs, Baptism Practices, Values that Affect Care:  "no    Lab/Procedures/Meds  Recent Labs   Lab 03/26/24  0427   *   K 4.3   BUN 21*   CREATININE 0.88   CALCIUM 8.9   ALBUMIN 2.9*   CL 95*   ALT 33   AST 44*   Note: Na+, Cl- low. Recommend consider replete to WNL as appropriate. BUN elevated. Alb low, likely related to stress response to sepsis or elevated AST. AST elevated.   Last A1c:   Lab Results   Component Value Date    HGBA1C 6.0 03/08/2024     Lab Results   Component Value Date    RBC 4.09 (L) 03/26/2024    HGB 11.0 (L) 03/26/2024    HCT 34.8 (L) 03/26/2024    MCV 85.1 03/26/2024    MCH 26.9 (L) 03/26/2024    MCHC 31.6 (L) 03/26/2024    TIBC 281 12/21/2022   Note: H&H low    Pertinent Labs Reviewed: reviewed  Pertinent Medications Reviewed: reviewed  Scheduled Meds:   albuterol-ipratropium  3 mL Nebulization Q6H    aspirin  81 mg Oral Daily    atorvastatin  40 mg Oral QHS    budesonide  0.5 mg Nebulization Q12H    digoxin  0.125 mg Oral Daily    docusate sodium  100 mg Oral BID    empagliflozin  10 mg Oral Daily    enoxparin  40 mg Subcutaneous Q24H (prophylaxis, 1700)    furosemide (LASIX) injection  80 mg Intravenous BID WM    hydrALAZINE  10 mg Oral Q12H    losartan  25 mg Oral Daily    metOLazone  5 mg Oral Daily    mupirocin   Nasal BID    pantoprazole  40 mg Oral Daily    potassium chloride  40 mEq Oral BID     Continuous Infusions:   amiodarone in dextrose 5% 0.5 mg/min (03/26/24 0836)    milrinone 0.25 mcg/kg/min (03/26/24 0404)     PRN Meds:.bisacodyL, dextromethorphan-guaiFENesin  mg/5 ml, dextrose 10%, dextrose 10%, glucagon (human recombinant), glucose, glucose, guaiFENesin-codeine 100-10 mg/5 ml, insulin aspart U-100, melatonin, naloxone, ondansetron, oxyCODONE, simethicone, sodium chloride 0.9%, traZODone    Anthropometrics  Temp: 98 °F (36.7 °C)  Height: 6' 2" (188 cm)  Height (inches): 74 in  Weight Method: Bed Scale  Weight: 108.1 kg (238 lb 6.4 oz)  Weight (lb): 238.4 lb  Ideal Body Weight (IBW), Male: 190 lb  % Ideal Body " Weight, Male (lb): 118.95 %  BMI (Calculated): 30.6       Estimated/Assessed Needs  RMR ([place]-St. Jeor Equation): 1996.13     Temp: 98 °F (36.7 °C)Oral  Weight Used For Calorie Calculations: 108.1 kg (238 lb 5.1 oz)     Energy Calorie Requirements (kcal): 2162-2703kcal(20-25kcal/kg)  Weight Used For Protein Calculations: 108.1 kg (238 lb 5.1 oz)  Protein Requirements: 87-108g (0.8-1.0g/kg)       RDA Method (mL): 2162       Nutrition by Nursing  Diet/Nutrition Received: consistent carb/diabetic diet  Intake (%): 0%  Diet/Feeding Assistance: none  Diet/Feeding Tolerance: good  Last Bowel Movement: 03/23/24                Nutrition Follow-Up  RD Follow-up?: Yes      Nutrition Discharge Planning: Unsure of discharge plans at this time. Patient in ICU. Will monitor and assess closer to discharge.          Carley Campo, MS, RD, LD  Available via Secure Chat

## 2024-03-27 LAB
ALBUMIN SERPL BCP-MCNC: 3.3 G/DL (ref 3.5–5)
ALBUMIN/GLOB SERPL: 0.8 {RATIO}
ALP SERPL-CCNC: 117 U/L (ref 45–115)
ALT SERPL W P-5'-P-CCNC: 30 U/L (ref 16–61)
ANION GAP SERPL CALCULATED.3IONS-SCNC: 9 MMOL/L (ref 7–16)
AST SERPL W P-5'-P-CCNC: 32 U/L (ref 15–37)
BACTERIA BLD CULT: NORMAL
BACTERIA BLD CULT: NORMAL
BASOPHILS # BLD AUTO: 0.04 K/UL (ref 0–0.2)
BASOPHILS NFR BLD AUTO: 0.4 % (ref 0–1)
BILIRUB SERPL-MCNC: 1.2 MG/DL (ref ?–1.2)
BUN SERPL-MCNC: 16 MG/DL (ref 7–18)
BUN/CREAT SERPL: 18 (ref 6–20)
CALCIUM SERPL-MCNC: 9.6 MG/DL (ref 8.5–10.1)
CHLORIDE SERPL-SCNC: 87 MMOL/L (ref 98–107)
CO2 SERPL-SCNC: 37 MMOL/L (ref 21–32)
CREAT SERPL-MCNC: 0.89 MG/DL (ref 0.7–1.3)
DIFFERENTIAL METHOD BLD: ABNORMAL
EGFR (NO RACE VARIABLE) (RUSH/TITUS): 102 ML/MIN/1.73M2
EOSINOPHIL # BLD AUTO: 0.14 K/UL (ref 0–0.5)
EOSINOPHIL NFR BLD AUTO: 1.3 % (ref 1–4)
ERYTHROCYTE [DISTWIDTH] IN BLOOD BY AUTOMATED COUNT: 15.9 % (ref 11.5–14.5)
GLOBULIN SER-MCNC: 4.1 G/DL (ref 2–4)
GLUCOSE SERPL-MCNC: 148 MG/DL (ref 74–106)
GLUCOSE SERPL-MCNC: 155 MG/DL (ref 70–105)
GLUCOSE SERPL-MCNC: 194 MG/DL (ref 70–105)
GLUCOSE SERPL-MCNC: 202 MG/DL (ref 70–105)
GLUCOSE SERPL-MCNC: 272 MG/DL (ref 70–105)
HCT VFR BLD AUTO: 40.4 % (ref 40–54)
HGB BLD-MCNC: 12.9 G/DL (ref 13.5–18)
IMM GRANULOCYTES # BLD AUTO: 0.06 K/UL (ref 0–0.04)
IMM GRANULOCYTES NFR BLD: 0.6 % (ref 0–0.4)
LACTATE SERPL-SCNC: 1.1 MMOL/L (ref 0.4–2)
LACTATE SERPL-SCNC: 2.2 MMOL/L (ref 0.4–2)
LYMPHOCYTES # BLD AUTO: 1.09 K/UL (ref 1–4.8)
LYMPHOCYTES NFR BLD AUTO: 10.2 % (ref 27–41)
MCH RBC QN AUTO: 26.3 PG (ref 27–31)
MCHC RBC AUTO-ENTMCNC: 31.9 G/DL (ref 32–36)
MCV RBC AUTO: 82.4 FL (ref 80–96)
MONOCYTES # BLD AUTO: 0.87 K/UL (ref 0–0.8)
MONOCYTES NFR BLD AUTO: 8.2 % (ref 2–6)
MPC BLD CALC-MCNC: 9.4 FL (ref 9.4–12.4)
NEUTROPHILS # BLD AUTO: 8.45 K/UL (ref 1.8–7.7)
NEUTROPHILS NFR BLD AUTO: 79.3 % (ref 53–65)
NRBC # BLD AUTO: 0 X10E3/UL
NRBC, AUTO (.00): 0 %
PLATELET # BLD AUTO: 270 K/UL (ref 150–400)
POTASSIUM SERPL-SCNC: 2.7 MMOL/L (ref 3.5–5.1)
POTASSIUM SERPL-SCNC: 3.3 MMOL/L (ref 3.5–5.1)
PROT SERPL-MCNC: 7.4 G/DL (ref 6.4–8.2)
RBC # BLD AUTO: 4.9 M/UL (ref 4.6–6.2)
SODIUM SERPL-SCNC: 130 MMOL/L (ref 136–145)
WBC # BLD AUTO: 10.65 K/UL (ref 4.5–11)

## 2024-03-27 PROCEDURE — 99024 POSTOP FOLLOW-UP VISIT: CPT | Mod: ,,, | Performed by: STUDENT IN AN ORGANIZED HEALTH CARE EDUCATION/TRAINING PROGRAM

## 2024-03-27 PROCEDURE — 63600175 PHARM REV CODE 636 W HCPCS: Performed by: NURSE PRACTITIONER

## 2024-03-27 PROCEDURE — 99233 SBSQ HOSP IP/OBS HIGH 50: CPT | Mod: ,,, | Performed by: INTERNAL MEDICINE

## 2024-03-27 PROCEDURE — 25000003 PHARM REV CODE 250: Performed by: STUDENT IN AN ORGANIZED HEALTH CARE EDUCATION/TRAINING PROGRAM

## 2024-03-27 PROCEDURE — 97110 THERAPEUTIC EXERCISES: CPT

## 2024-03-27 PROCEDURE — 84132 ASSAY OF SERUM POTASSIUM: CPT | Performed by: NURSE PRACTITIONER

## 2024-03-27 PROCEDURE — 25000242 PHARM REV CODE 250 ALT 637 W/ HCPCS

## 2024-03-27 PROCEDURE — 25000003 PHARM REV CODE 250: Performed by: INTERNAL MEDICINE

## 2024-03-27 PROCEDURE — 82962 GLUCOSE BLOOD TEST: CPT

## 2024-03-27 PROCEDURE — 63600175 PHARM REV CODE 636 W HCPCS: Performed by: STUDENT IN AN ORGANIZED HEALTH CARE EDUCATION/TRAINING PROGRAM

## 2024-03-27 PROCEDURE — 94761 N-INVAS EAR/PLS OXIMETRY MLT: CPT

## 2024-03-27 PROCEDURE — 99233 SBSQ HOSP IP/OBS HIGH 50: CPT | Mod: ,,, | Performed by: NURSE PRACTITIONER

## 2024-03-27 PROCEDURE — 80053 COMPREHEN METABOLIC PANEL: CPT | Performed by: INTERNAL MEDICINE

## 2024-03-27 PROCEDURE — 99900035 HC TECH TIME PER 15 MIN (STAT)

## 2024-03-27 PROCEDURE — 11000001 HC ACUTE MED/SURG PRIVATE ROOM

## 2024-03-27 PROCEDURE — 94640 AIRWAY INHALATION TREATMENT: CPT

## 2024-03-27 PROCEDURE — 83605 ASSAY OF LACTIC ACID: CPT | Performed by: NURSE PRACTITIONER

## 2024-03-27 PROCEDURE — 25000003 PHARM REV CODE 250: Performed by: NURSE PRACTITIONER

## 2024-03-27 PROCEDURE — 25000003 PHARM REV CODE 250

## 2024-03-27 PROCEDURE — 85025 COMPLETE CBC W/AUTO DIFF WBC: CPT | Performed by: INTERNAL MEDICINE

## 2024-03-27 PROCEDURE — 63600175 PHARM REV CODE 636 W HCPCS

## 2024-03-27 PROCEDURE — 25000242 PHARM REV CODE 250 ALT 637 W/ HCPCS: Performed by: HOSPITALIST

## 2024-03-27 PROCEDURE — 63600175 PHARM REV CODE 636 W HCPCS: Performed by: HOSPITALIST

## 2024-03-27 PROCEDURE — 25000003 PHARM REV CODE 250: Performed by: HOSPITALIST

## 2024-03-27 PROCEDURE — 97116 GAIT TRAINING THERAPY: CPT

## 2024-03-27 PROCEDURE — 27000221 HC OXYGEN, UP TO 24 HOURS

## 2024-03-27 RX ORDER — MILRINONE LACTATE 0.2 MG/ML
0.12 INJECTION, SOLUTION INTRAVENOUS CONTINUOUS
Status: DISCONTINUED | OUTPATIENT
Start: 2024-03-27 | End: 2024-03-28

## 2024-03-27 RX ORDER — POTASSIUM CHLORIDE 7.45 MG/ML
10 INJECTION INTRAVENOUS
Status: COMPLETED | OUTPATIENT
Start: 2024-03-27 | End: 2024-03-27

## 2024-03-27 RX ORDER — POTASSIUM CHLORIDE 20 MEQ/1
60 TABLET, EXTENDED RELEASE ORAL 3 TIMES DAILY
Status: DISCONTINUED | OUTPATIENT
Start: 2024-03-27 | End: 2024-03-28

## 2024-03-27 RX ADMIN — ATORVASTATIN CALCIUM 40 MG: 40 TABLET, FILM COATED ORAL at 08:03

## 2024-03-27 RX ADMIN — POTASSIUM CHLORIDE 60 MEQ: 1500 TABLET, EXTENDED RELEASE ORAL at 08:03

## 2024-03-27 RX ADMIN — IPRATROPIUM BROMIDE AND ALBUTEROL SULFATE 3 ML: 2.5; .5 SOLUTION RESPIRATORY (INHALATION) at 01:03

## 2024-03-27 RX ADMIN — EMPAGLIFLOZIN 10 MG: 10 TABLET, FILM COATED ORAL at 09:03

## 2024-03-27 RX ADMIN — ONDANSETRON 8 MG: 2 INJECTION INTRAMUSCULAR; INTRAVENOUS at 08:03

## 2024-03-27 RX ADMIN — ASPIRIN 81 MG: 81 TABLET, COATED ORAL at 08:03

## 2024-03-27 RX ADMIN — POTASSIUM CHLORIDE 60 MEQ: 1500 TABLET, EXTENDED RELEASE ORAL at 03:03

## 2024-03-27 RX ADMIN — PANTOPRAZOLE SODIUM 40 MG: 40 TABLET, DELAYED RELEASE ORAL at 08:03

## 2024-03-27 RX ADMIN — BUDESONIDE INHALATION 0.5 MG: 0.5 SUSPENSION RESPIRATORY (INHALATION) at 07:03

## 2024-03-27 RX ADMIN — POTASSIUM CHLORIDE 10 MEQ: 7.46 INJECTION, SOLUTION INTRAVENOUS at 10:03

## 2024-03-27 RX ADMIN — BUDESONIDE INHALATION 0.5 MG: 0.5 SUSPENSION RESPIRATORY (INHALATION) at 08:03

## 2024-03-27 RX ADMIN — DOCUSATE SODIUM 100 MG: 100 CAPSULE, LIQUID FILLED ORAL at 08:03

## 2024-03-27 RX ADMIN — DIGOXIN 0.12 MG: 125 TABLET ORAL at 08:03

## 2024-03-27 RX ADMIN — FUROSEMIDE 5 MG/HR: 10 INJECTION, SOLUTION INTRAMUSCULAR; INTRAVENOUS at 03:03

## 2024-03-27 RX ADMIN — SACUBITRIL AND VALSARTAN 1 TABLET: 24; 26 TABLET, FILM COATED ORAL at 08:03

## 2024-03-27 RX ADMIN — POTASSIUM CHLORIDE 10 MEQ: 7.46 INJECTION, SOLUTION INTRAVENOUS at 12:03

## 2024-03-27 RX ADMIN — OXYCODONE 10 MG: 5 TABLET ORAL at 03:03

## 2024-03-27 RX ADMIN — IPRATROPIUM BROMIDE AND ALBUTEROL SULFATE 3 ML: 2.5; .5 SOLUTION RESPIRATORY (INHALATION) at 08:03

## 2024-03-27 RX ADMIN — MUPIROCIN: 20 OINTMENT TOPICAL at 08:03

## 2024-03-27 RX ADMIN — POTASSIUM CHLORIDE 10 MEQ: 7.46 INJECTION, SOLUTION INTRAVENOUS at 08:03

## 2024-03-27 RX ADMIN — MILRINONE LACTATE IN DEXTROSE 0.12 MCG/KG/MIN: 200 INJECTION, SOLUTION INTRAVENOUS at 08:03

## 2024-03-27 RX ADMIN — IPRATROPIUM BROMIDE AND ALBUTEROL SULFATE 3 ML: 2.5; .5 SOLUTION RESPIRATORY (INHALATION) at 07:03

## 2024-03-27 RX ADMIN — POTASSIUM CHLORIDE 10 MEQ: 7.46 INJECTION, SOLUTION INTRAVENOUS at 01:03

## 2024-03-27 RX ADMIN — MILRINONE LACTATE IN DEXTROSE 0.12 MCG/KG/MIN: 200 INJECTION, SOLUTION INTRAVENOUS at 09:03

## 2024-03-27 RX ADMIN — ONDANSETRON 8 MG: 2 INJECTION INTRAMUSCULAR; INTRAVENOUS at 03:03

## 2024-03-27 RX ADMIN — ENOXAPARIN SODIUM 40 MG: 40 INJECTION SUBCUTANEOUS at 05:03

## 2024-03-27 RX ADMIN — INSULIN ASPART 1 UNITS: 100 INJECTION, SOLUTION INTRAVENOUS; SUBCUTANEOUS at 08:03

## 2024-03-27 RX ADMIN — OXYCODONE 10 MG: 5 TABLET ORAL at 08:03

## 2024-03-27 RX ADMIN — AMIODARONE HYDROCHLORIDE 400 MG: 200 TABLET ORAL at 08:03

## 2024-03-27 RX ADMIN — OXYCODONE 10 MG: 5 TABLET ORAL at 09:03

## 2024-03-27 RX ADMIN — TRAZODONE HYDROCHLORIDE 50 MG: 50 TABLET ORAL at 09:03

## 2024-03-27 NOTE — ASSESSMENT & PLAN NOTE
Echo reveals Ejection fraction by visual approximation is 20%. There is diastolic dysfunction.   BNP 7486  Continue IV lasix 40 mg QD  Continue toprol XL 25 mg 1/2 tab QD  Continue jardiance 10 mg QD  Resume losartan 25 mg 1/2 tab   Daily weights  Strict I/Os    3/22/2024:  - Patient seen and evaluated by Dr. Escboar  - Mixed shock: Cardiogenic/sepsis  - Discontinue BB  - Start IV dobutamine 5mcg/kg/min. Increase lasix to 80mg BID  - Strict I&O; strict daily weight  - Will up titrate afterload reduction as BP allows  - Unable to tolerate MRAs due to hyponatremia    3/23/2024  Continue IV dobutamine 5mcg/kg/min.   Continue IV lasix 80mg BID  - Strict I&O; strict daily weight  - Will up titrate afterload reduction as BP allows  - Unable to tolerate MRAs due to hyponatremia     3/25/2024  Continue IV lasix 80 BID  Start hydralazine 10 mg BID  Increase losartan 25 mg   Start IV digoxin 500 mcg 1x --> then digoxin 0.125 mg PO QD  Check lactate  Continue strict I&O and monitor daily weights  Unable to tolerate MRAs due to hyponatremia     3/26/2024:  - Patient seen and evaluated by Dr. Ceron  - DC losartan, start Entresto 24-26 BID, increase hydralazine 25 BID  - Lactate 4.0 yesterday, milrinone 0.25 started. Decreased milrinone to 0.125   - Remains volume overloaded, no improvement in repeat CXR, continued edema and sob  - Start lasix gtt at 10mg/hr  - CMP and lactate in am    3/27/2024:  - Lactate 2.2 this morning, improving  - 10L UOP/24 hours, decrease lasix gtt 5mg/hr; continue milrinone 0.125  - Increase PO potassium to 60meq TID  - BMP in am

## 2024-03-27 NOTE — ASSESSMENT & PLAN NOTE
3/27/2024:  Patient has hypokalemia which is Acute and currently uncontrolled. Most recent potassium levels reviewed-   Lab Results   Component Value Date    K 2.7 (L) 03/27/2024   Will continue potassium replacement per protocol and recheck repeat levels after replacement completed.

## 2024-03-27 NOTE — PT/OT/SLP PROGRESS
Occupational Therapy   Treatment    Name: Karin Carrera  MRN: [id number]  Admitting Diagnosis:  Sepsis       Recommendations:     Discharge Recommendations:  (depending on rate of recovery, moderate to low intensity therapy)  Discharge Equipment Recommendations:  none  Barriers to discharge:  None    Assessment:     Karin Carrera is a 53 y.o. male with a medical diagnosis of Sepsis.  He presents with no complaints. Pt agreed to OT treatment. Performance deficits affecting function are weakness, impaired endurance, decreased upper extremity function.     Rehab Prognosis:  Good; patient would benefit from acute skilled OT services to address these deficits and reach maximum level of function.       Plan:     Patient to be seen 5 x/week to address the above listed problems via self-care/home management, therapeutic activities, therapeutic exercises  Plan of Care Expires: 04/22/24  Plan of Care Reviewed with: patient    Subjective     Chief Complaint:   Patient/Family Comments/goals: Rehab  Pain/Comfort:  Pain Rating 1: 0/10  Pain Rating Post-Intervention 1: 0/10    Objective:     Communicated with: COLTON Chambers prior to session.  Patient found up in chair with   upon OT entry to room.    General Precautions: Standard, fall, respiratory    Orthopedic Precautions:N/A  Braces: N/A  Respiratory Status:        Occupational Performance:     Bed Mobility:         Functional Mobility/Transfers:    Functional Mobility:     Activities of Daily Living:        [facility] 6 Click ADL:      Treatment & Education:  Pt performed UE exercises.  ROM to (R) UE x 15 reps followed by repositioning on a pillow in elevation  3 lb hand wt x 2 sets of 15 reps (L) shoulder flexion/extension, adduction/abduction and elbow and wrist flexion/extension. Blue theraband x 2 sets of 15 reps (L) elbow flexion and extension. Hand exerciser x 2 sets of 25 reps with moderate resistance.    Patient left up in chair with all lines intact, call  button in reach, and nurse notified    GOALS:   Multidisciplinary Problems       Occupational Therapy Goals          Problem: Occupational Therapy    Goal Priority Disciplines Outcome Interventions   Occupational Therapy Goal     OT, PT/OT Ongoing, Progressing    Description: STG: (in 1 week)  Pt will perform grooming with setup  Pt will bathe with setup and min(A)  Pt will perform UE dressing with min(A)  Pt will perform LE dressing with min(A)  Pt will transfer bed/chair/bsc with SBA with cane  Pt will perform standing task x 3 min with CGA   Pt will tolerate 10 minutes of tx without fatigue      LTG: (in 5 weeks)  1.Restore to max I with self care and mobility.                        Time Tracking:     OT Date of Treatment: 03/27/24  OT Start Time: 1512  OT Stop Time: 1541  OT Total Time (min): 29 min    Billable Minutes:Therapeutic Exercise 24    OT/ANGELI: OT          3/27/2024

## 2024-03-27 NOTE — PLAN OF CARE
Care plan ongoing         Problem: Adult Inpatient Plan of Care  Goal: Plan of Care Review  3/26/2024 1936 by Sujey Ibrahim, RN  Outcome: Ongoing, Progressing  3/26/2024 0616 by Sujey Ibrahim RN  Outcome: Ongoing, Progressing  Goal: Patient-Specific Goal (Individualized)  3/26/2024 1936 by Sujey Ibrahim, COLTON  Outcome: Ongoing, Progressing  3/26/2024 0616 by Sujey Ibrahim RN  Outcome: Ongoing, Progressing  Goal: Absence of Hospital-Acquired Illness or Injury  3/26/2024 1936 by Sujey Ibrahim, COLTON  Outcome: Ongoing, Progressing  3/26/2024 0616 by Sujey Ibrahim RN  Outcome: Ongoing, Progressing  Goal: Optimal Comfort and Wellbeing  3/26/2024 1936 by Sujey Ibrahim RN  Outcome: Ongoing, Progressing  3/26/2024 0616 by Sujey Ibrahim RN  Outcome: Ongoing, Progressing  Goal: Readiness for Transition of Care  3/26/2024 1936 by Sujey Ibrahim RN  Outcome: Ongoing, Progressing  3/26/2024 0616 by Sujey Ibrahim RN  Outcome: Ongoing, Progressing     Problem: Diabetes Comorbidity  Goal: Blood Glucose Level Within Targeted Range  3/26/2024 1936 by Sujey Ibrahim, RN  Outcome: Ongoing, Progressing  3/26/2024 0616 by Sujey Ibrahim RN  Outcome: Ongoing, Progressing     Problem: Impaired Wound Healing  Goal: Optimal Wound Healing  3/26/2024 1936 by Sujey Ibrahim, COLTON  Outcome: Ongoing, Progressing  3/26/2024 0616 by Sujey Ibrahim RN  Outcome: Ongoing, Progressing     Problem: Skin Injury Risk Increased  Goal: Skin Health and Integrity  3/26/2024 1936 by Sujey Ibrahim, COLTON  Outcome: Ongoing, Progressing  3/26/2024 0616 by Sujey Ibrahim RN  Outcome: Ongoing, Progressing     Problem: Adjustment to Illness (Sepsis/Septic Shock)  Goal: Optimal Coping  3/26/2024 1936 by Sujey Ibrahim, COLTON  Outcome: Ongoing, Progressing  3/26/2024 0616 by Sujey Ibrahim RN  Outcome: Ongoing, Progressing     Problem: Bleeding (Sepsis/Septic Shock)  Goal: Absence of Bleeding  3/26/2024 1936 by Sujey Ibrahim, COLTON  Outcome: Ongoing, Progressing  3/26/2024 0616 by  Sujey Ibrahim, COLTON  Outcome: Ongoing, Progressing     Problem: Glycemic Control Impaired (Sepsis/Septic Shock)  Goal: Blood Glucose Level Within Desired Range  3/26/2024 1936 by Sujey Ibrahim RN  Outcome: Ongoing, Progressing  3/26/2024 0616 by Sujey Ibrahim RN  Outcome: Ongoing, Progressing     Problem: Infection Progression (Sepsis/Septic Shock)  Goal: Absence of Infection Signs and Symptoms  3/26/2024 1936 by Sujey Ibrahim RN  Outcome: Ongoing, Progressing  3/26/2024 0616 by Sujey Ibrahim RN  Outcome: Ongoing, Progressing     Problem: Nutrition Impaired (Sepsis/Septic Shock)  Goal: Optimal Nutrition Intake  3/26/2024 1936 by Sujey Ibrahim RN  Outcome: Ongoing, Progressing  3/26/2024 0616 by Sujey Ibrahim RN  Outcome: Ongoing, Progressing     Problem: Gas Exchange Impaired  Goal: Optimal Gas Exchange  3/26/2024 1936 by Sujey Ibrahim RN  Outcome: Ongoing, Progressing  3/26/2024 0616 by Sujey Ibrahim RN  Outcome: Ongoing, Progressing     Problem: Infection  Goal: Absence of Infection Signs and Symptoms  3/26/2024 1936 by Sujey Ibrahim RN  Outcome: Ongoing, Progressing  3/26/2024 0616 by Sujey Ibrahim RN  Outcome: Ongoing, Progressing     Problem: Airway Clearance Ineffective  Goal: Effective Airway Clearance  3/26/2024 1936 by Sujey Ibrahim, COLTON  Outcome: Ongoing, Progressing  3/26/2024 0616 by Sujey Ibrahim RN  Outcome: Ongoing, Progressing

## 2024-03-27 NOTE — PROGRESS NOTES
[facility]  Critical Care Medicine  Progress Note    Patient Name: Karin Carrera  MRN: [id number]  Admission Date: 3/19/2024  Hospital Length of Stay: 8 days  Code Status: Full Code  Attending Provider: Kai Peck MD  Primary Care Provider: Walker Smith MD   Principal Problem: Sepsis    Subjective:     HPI:  53-year-old white male presents with shortness of breath and abdominal pain for 2 days recently had a cholecystectomy 03/09/2024.  Patient has a history of COPD he complains of increased leg swelling.  Patient has a history ejection fraction 25% previous stroke he has been hypoxic during this admission grew out Gram-positive Gram-negative bacilli out of his blood he has been brought down to the unit for worsening blood pressure and hypoxemia    Hospital/ICU Course:  3/24- placed on HFNC overnight - will continue to wean,day 3 of Dobutamine     Interval History/Significant Events:  Patient without complaints    Review of Systems  Objective:     Vital Signs (Most Recent):  Temp: 98.6 °F (37 °C) (03/27/24 0301)  Pulse: 98 (03/27/24 0600)  Resp: 14 (03/27/24 0600)  BP: (!) 115/50 (03/27/24 0600)  SpO2: 100 % (03/27/24 0600) Vital Signs (24h Range):  Temp:  [96.8 °F (36 °C)-98.6 °F (37 °C)] 98.6 °F (37 °C)  Pulse:  [] 98  Resp:  [12-41] 14  SpO2:  [76 %-100 %] 100 %  BP: ()/(50-80) 115/50   Weight: 108.1 kg (238 lb 6.4 oz)  Body mass index is 30.61 kg/m².      Intake/Output Summary (Last 24 hours) at 3/27/2024 0641  Last data filed at 3/27/2024 0534  Gross per 24 hour   Intake 115.16 ml   Output 47140 ml   Net -24733.84 ml          Physical Exam  Vitals reviewed.   Constitutional:       Appearance: Normal appearance.      Interventions: He is not intubated.  HENT:      Head: Normocephalic and atraumatic.      Nose: Nose normal.      Mouth/Throat:      Mouth: Mucous membranes are dry.      Pharynx: Oropharynx is clear.   Eyes:      Extraocular Movements:  Extraocular movements intact.      Conjunctiva/sclera: Conjunctivae normal.      Pupils: Pupils are equal, round, and reactive to light.   Cardiovascular:      Rate and Rhythm: Normal rate.      Heart sounds: Normal heart sounds. No murmur heard.  Pulmonary:      Effort: Pulmonary effort is normal. He is not intubated.      Breath sounds: Normal breath sounds.   Abdominal:      General: Abdomen is flat. Bowel sounds are normal.      Palpations: Abdomen is soft.   Musculoskeletal:         General: Normal range of motion.      Cervical back: Normal range of motion and neck supple.      Right lower leg: No edema.      Left lower leg: No edema.   Skin:     General: Skin is warm and dry.      Capillary Refill: Capillary refill takes less than 2 seconds.   Neurological:      General: No focal deficit present.      Mental Status: He is alert and oriented to person, place, and time.   Psychiatric:         Mood and Affect: Mood normal.         Behavior: Behavior normal.            Vents:  Oxygen Concentration (%): 50 (03/27/24 0301)  Lines/Drains/Airways       Peripheral Intravenous Line  Duration                  Midline Catheter - Double Lumen 03/26/24 1507 Left basilic vein (medial side of arm)  <1 day                  Significant Labs:    CBC/Anemia Profile:  Recent Labs   Lab 03/26/24  0427 03/27/24  0545   WBC 7.46 10.65   HGB 11.0* 12.9*   HCT 34.8* 40.4    270   MCV 85.1 82.4   RDW 16.2* 15.9*        Chemistries:  Recent Labs   Lab 03/26/24  0427 03/27/24  0545   * 130*   K 4.3 2.7*   CL 95* 87*   CO2 29 37*   BUN 21* 16   CREATININE 0.88 0.89   CALCIUM 8.9 9.6   ALBUMIN 2.9* 3.3*   PROT 6.4 7.4   BILITOT 1.1 1.2   ALKPHOS 97 117*   ALT 33 30   AST 44* 32       Recent Lab Results  (Last 5 results in the past 24 hours)        03/27/24  0545   03/27/24  0522   03/27/24  0445   03/26/24  2208   03/26/24  1846        Albumin/Globulin Ratio 0.8               Albumin 3.3                              ALT  30               Anion Gap 9               AST 32               Baso # 0.04               Basophil % 0.4               BILIRUBIN TOTAL 1.2               BUN 16               BUN/CREAT RATIO 18               Calcium 9.6               Chloride 87               CO2 37               Creatinine 0.89               Differential Method Auto               eGFR 102               Eos # 0.14               Eos % 1.3               Globulin, Total 4.1               Glucose 148               Hematocrit 40.4               Hemoglobin 12.9               Immature Grans (Abs) 0.06               Immature Granulocytes 0.6               Lactic Acid Level     2.2  Comment: A repeat order for Lactic Acid has been placed for collection in 2 hours.           Lymph # 1.09               Lymph % 10.2               MCH 26.3               MCHC 31.9               MCV 82.4               Mono # 0.87               Mono % 8.2               MPV 9.4               Neutrophils, Abs 8.45               Neutrophils Relative 79.3               nRBC 0.0               NUCLEATED RBC ABSOLUTE 0.00               Platelet Count 270               POC Glucose   155     155   147       Potassium 2.7               PROTEIN TOTAL 7.4               RBC 4.90               RDW 15.9               Sodium 130               WBC 10.65                                      Significant Imaging:  I have reviewed all pertinent imaging results/findings within the past 24 hours.    ABG  Recent Labs   Lab 03/21/24  1329   PH 7.52*   PO2 45   PCO2 37   HCO3 30.2*     Assessment/Plan:     Neuro  Hemiparesis affecting right side as late effect of cerebrovascular accident  His his baseline has aphasia, today's the best he has been since I have seen him he is more alert trying to talk can move better his right arm swelling is markedly improved    Pulmonary  Acute hypoxemic respiratory failure  Improving have been able to wean FiO2    Cardiac/Vascular  Shock  Resolved    End stage heart  failure  Ejection fraction 20%   Patient much better with Milrinone and Lasix drip    Coronary artery disease involving coronary bypass graft of native heart without angina pectoris  Continue meds    Essential (primary) hypertension  Blood pressure adequate and in good range    Renal/  Hypokalemia  Worse today increase supplementation check Mag level tomorrow.  DC daily CBC and go to a BNP    ID  * Sepsis  Not an issue    Bacteremia  Likely contaminate - following repeat cultures, stop antibiotics     Endocrine  Type 2 diabetes mellitus  SSI   Sugars now goal    Other  ROSEMARIE (obstructive sleep apnea)  CPAP at night          procedures.     Kai Peck MD  Critical Care Medicine  [facility]

## 2024-03-27 NOTE — PLAN OF CARE
Chart review. Patient is on venturi mask oxygen 15l/50 percent. Iv meds noted. Cm will continue to follow. Jaden amin is an option for rehab if needed. Pt and Ot are treating patient.

## 2024-03-27 NOTE — ASSESSMENT & PLAN NOTE
His his baseline has aphasia, today's the best he has been since I have seen him he is more alert trying to talk can move better his right arm swelling is markedly improved

## 2024-03-27 NOTE — SUBJECTIVE & OBJECTIVE
Interval History/Significant Events:  Patient without complaints    Review of Systems  Objective:     Vital Signs (Most Recent):  Temp: 98.6 °F (37 °C) (03/27/24 0301)  Pulse: 98 (03/27/24 0600)  Resp: 14 (03/27/24 0600)  BP: (!) 115/50 (03/27/24 0600)  SpO2: 100 % (03/27/24 0600) Vital Signs (24h Range):  Temp:  [96.8 °F (36 °C)-98.6 °F (37 °C)] 98.6 °F (37 °C)  Pulse:  [] 98  Resp:  [12-41] 14  SpO2:  [76 %-100 %] 100 %  BP: ()/(50-80) 115/50   Weight: 108.1 kg (238 lb 6.4 oz)  Body mass index is 30.61 kg/m².      Intake/Output Summary (Last 24 hours) at 3/27/2024 0641  Last data filed at 3/27/2024 0534  Gross per 24 hour   Intake 115.16 ml   Output 40524 ml   Net -23806.84 ml          Physical Exam  Vitals reviewed.   Constitutional:       Appearance: Normal appearance.      Interventions: He is not intubated.  HENT:      Head: Normocephalic and atraumatic.      Nose: Nose normal.      Mouth/Throat:      Mouth: Mucous membranes are dry.      Pharynx: Oropharynx is clear.   Eyes:      Extraocular Movements: Extraocular movements intact.      Conjunctiva/sclera: Conjunctivae normal.      Pupils: Pupils are equal, round, and reactive to light.   Cardiovascular:      Rate and Rhythm: Normal rate.      Heart sounds: Normal heart sounds. No murmur heard.  Pulmonary:      Effort: Pulmonary effort is normal. He is not intubated.      Breath sounds: Normal breath sounds.   Abdominal:      General: Abdomen is flat. Bowel sounds are normal.      Palpations: Abdomen is soft.   Musculoskeletal:         General: Normal range of motion.      Cervical back: Normal range of motion and neck supple.      Right lower leg: No edema.      Left lower leg: No edema.   Skin:     General: Skin is warm and dry.      Capillary Refill: Capillary refill takes less than 2 seconds.   Neurological:      General: No focal deficit present.      Mental Status: He is alert and oriented to person, place, and time.   Psychiatric:          Mood and Affect: Mood normal.         Behavior: Behavior normal.            Vents:  Oxygen Concentration (%): 50 (03/27/24 0301)  Lines/Drains/Airways       Peripheral Intravenous Line  Duration                  Midline Catheter - Double Lumen 03/26/24 1507 Left basilic vein (medial side of arm)  <1 day                  Significant Labs:    CBC/Anemia Profile:  Recent Labs   Lab 03/26/24  0427 03/27/24  0545   WBC 7.46 10.65   HGB 11.0* 12.9*   HCT 34.8* 40.4    270   MCV 85.1 82.4   RDW 16.2* 15.9*        Chemistries:  Recent Labs   Lab 03/26/24  0427 03/27/24  0545   * 130*   K 4.3 2.7*   CL 95* 87*   CO2 29 37*   BUN 21* 16   CREATININE 0.88 0.89   CALCIUM 8.9 9.6   ALBUMIN 2.9* 3.3*   PROT 6.4 7.4   BILITOT 1.1 1.2   ALKPHOS 97 117*   ALT 33 30   AST 44* 32       Recent Lab Results  (Last 5 results in the past 24 hours)        03/27/24  0545   03/27/24  0522   03/27/24  0445   03/26/24  2208   03/26/24  1846        Albumin/Globulin Ratio 0.8               Albumin 3.3                              ALT 30               Anion Gap 9               AST 32               Baso # 0.04               Basophil % 0.4               BILIRUBIN TOTAL 1.2               BUN 16               BUN/CREAT RATIO 18               Calcium 9.6               Chloride 87               CO2 37               Creatinine 0.89               Differential Method Auto               eGFR 102               Eos # 0.14               Eos % 1.3               Globulin, Total 4.1               Glucose 148               Hematocrit 40.4               Hemoglobin 12.9               Immature Grans (Abs) 0.06               Immature Granulocytes 0.6               Lactic Acid Level     2.2  Comment: A repeat order for Lactic Acid has been placed for collection in 2 hours.           Lymph # 1.09               Lymph % 10.2               MCH 26.3               MCHC 31.9               MCV 82.4               Mono # 0.87               Mono % 8.2                MPV 9.4               Neutrophils, Abs 8.45               Neutrophils Relative 79.3               nRBC 0.0               NUCLEATED RBC ABSOLUTE 0.00               Platelet Count 270               POC Glucose   155     155   147       Potassium 2.7               PROTEIN TOTAL 7.4               RBC 4.90               RDW 15.9               Sodium 130               WBC 10.65                                      Significant Imaging:  I have reviewed all pertinent imaging results/findings within the past 24 hours.

## 2024-03-27 NOTE — PT/OT/SLP PROGRESS
Physical Therapy Treatment    Patient Name:  Karin Carrera   MRN:  [id number]    Recommendations:     Discharge Recommendations: High Intensity Therapy (to low intensity depending on rate of recovery)  Discharge Equipment Recommendations: none  Barriers to discharge: Decreased caregiver support    Assessment:     Karin Carrera is a 53 y.o. male admitted with a medical diagnosis of Sepsis.  He presents with the following impairments/functional limitations: impaired endurance, weakness, impaired self care skills, impaired functional mobility, gait instability, impaired balance, decreased upper extremity function, decreased lower extremity function, abnormal tone, impaired skin, edema, impaired cardiopulmonary response to activity Patient able to begin ambulation today. No off high flow. Did okay with o2 mask.    Rehab Prognosis: Good; patient would benefit from acute skilled PT services to address these deficits and reach maximum level of function.    Recent Surgery: * No surgery found *      Plan:     During this hospitalization, patient to be seen 5 x/week to address the identified rehab impairments via gait training, therapeutic activities, therapeutic exercises and progress toward the following goals:    Plan of Care Expires:  04/24/24    Subjective     Chief Complaint: sob  Patient/Family Comments/goals: agreeable to oob activity  Pain/Comfort:  Pain Rating 1: 4/10      Objective:     Communicated with nurse prior to session.  Patient found supine with   upon PT entry to room.     General Precautions: Standard, fall, respiratory  Orthopedic Precautions: N/A  Braces: N/A  Respiratory Status:  venti mask     Functional Mobility:  Bed Mobility:     Supine to Sit: minimum assistance  Sit to Supine: minimum assistance  Transfers:     Sit to Stand:  contact guard assistance with straight cane  Gait: ambulated 40 feet with straight cane      AM-PAC 6 CLICK MOBILITY  Turning over in bed (including adjusting  bedclothes, sheets and blankets)?: 3  Sitting down on and standing up from a chair with arms (e.g., wheelchair, bedside commode, etc.): 3  Moving from lying on back to sitting on the side of the bed?: 3  Moving to and from a bed to a chair (including a wheelchair)?: 3  Need to walk in hospital room?: 3  Climbing 3-5 steps with a railing?: 3  Basic Mobility Total Score: 18       Treatment & Education:  BLE: aps, hs, saq, abd-add, mre flexion ext 3x10    Patient left supine with all lines intact..    GOALS:   Multidisciplinary Problems       Physical Therapy Goals          Problem: Physical Therapy    Goal Priority Disciplines Outcome Goal Variances Interventions   Physical Therapy Goal     PT, PT/OT Ongoing, Progressing     Description: Short Term Goals to be met by: 2024    Patient will increase functional independence with mobility by performin. Supine to sit with independently  2. Sit to stand transfer with independently using straight cane  3. Bed to chair transfer with independently using straight cane  4. Gait  x 100 feet with contact guard assist using straight cane and O2 PRN  5. Lower extremity exercise program x30 reps per handout, with assistance as needed    Long Term Goals to be met by: 2024    Pt will regain full independent functional mobility with straight cane to return to home situation and prior activities of daily living.                        Time Tracking:     PT Received On: 24  PT Start Time: 1600     PT Stop Time: 1630  PT Total Time (min): 30 min     Billable Minutes: Gait Training 10 and Therapeutic Exercise 15    Treatment Type: Treatment  PT/PTA: PT     Number of PTA visits since last PT visit: 0     2024

## 2024-03-27 NOTE — SUBJECTIVE & OBJECTIVE
Interval History: Patient started on Lasix gtt yesterday, has had 10L UOP in 24 hours. Breathing and lung sounds improved. Continued JVD and lower ext edema. Decreased lasix gtt to 5mg/hr, continue milrinone 0.125. Tolerating Entresto 24-26 and Jardiance. Hydralazine held this morning due to low BP, will discontinue. Potassium 2.7 this morning, increase PO potassium to 60meq TID (pt unable to tolerate spironolactone due to hyponatremia).     Review of Systems   Constitutional: Positive for malaise/fatigue. Negative for fever.   Cardiovascular:  Positive for dyspnea on exertion, leg swelling, orthopnea and palpitations.   Respiratory:  Positive for shortness of breath.    Gastrointestinal:  Positive for abdominal pain.   Neurological:  Positive for weakness.     Objective:     Vital Signs (Most Recent):  Temp: 97.3 °F (36.3 °C) (03/27/24 0730)  Pulse: 97 (03/27/24 1333)  Resp: 12 (03/27/24 1333)  BP: (!) 81/51 (03/27/24 0900)  SpO2: 97 % (03/27/24 1333) Vital Signs (24h Range):  Temp:  [97.3 °F (36.3 °C)-98.6 °F (37 °C)] 97.3 °F (36.3 °C)  Pulse:  [] 97  Resp:  [11-31] 12  SpO2:  [89 %-100 %] 97 %  BP: ()/(47-79) 81/51     Weight: 108.1 kg (238 lb 6.4 oz)  Body mass index is 30.61 kg/m².     SpO2: 97 %         Intake/Output Summary (Last 24 hours) at 3/27/2024 1421  Last data filed at 3/27/2024 0900  Gross per 24 hour   Intake 378.34 ml   Output 9625 ml   Net -9246.66 ml       Lines/Drains/Airways       Peripheral Intravenous Line  Duration                  Midline Catheter - Double Lumen 03/26/24 1507 Left basilic vein (medial side of arm)  <1 day                       Physical Exam  Vitals reviewed.   Constitutional:       Appearance: He is ill-appearing.   Neck:      Vascular: JVD present.   Cardiovascular:      Rate and Rhythm: Regular rhythm. Tachycardia present.   Pulmonary:      Effort: Pulmonary effort is normal. Tachypnea present.      Breath sounds: No wheezing or rales.   Abdominal:       "General: Bowel sounds are normal.      Palpations: Abdomen is soft.   Musculoskeletal:      Right lower leg: Edema present.      Left lower leg: Edema present.   Skin:     Coloration: Skin is pale.   Neurological:      Mental Status: He is alert and oriented to person, place, and time.            Significant Labs: ABG: No results for input(s): "PH", "PCO2", "HCO3", "POCSATURATED", "BE" in the last 48 hours., Blood Culture: No results for input(s): "LABBLOO" in the last 48 hours., BMP:   Recent Labs   Lab 03/26/24 0427 03/27/24  0545   * 148*   * 130*   K 4.3 2.7*   CL 95* 87*   CO2 29 37*   BUN 21* 16   CREATININE 0.88 0.89   CALCIUM 8.9 9.6   , CMP   Recent Labs   Lab 03/26/24 0427 03/27/24  0545   * 130*   K 4.3 2.7*   CL 95* 87*   CO2 29 37*   * 148*   BUN 21* 16   CREATININE 0.88 0.89   CALCIUM 8.9 9.6   PROT 6.4 7.4   ALBUMIN 2.9* 3.3*   BILITOT 1.1 1.2   ALKPHOS 97 117*   AST 44* 32   ALT 33 30   ANIONGAP 12 9   , CBC   Recent Labs   Lab 03/26/24 0427 03/27/24  0545   WBC 7.46 10.65   HGB 11.0* 12.9*   HCT 34.8* 40.4    270   , Lipid Panel No results for input(s): "CHOL", "HDL", "LDLCALC", "TRIG", "CHOLHDL" in the last 48 hours., and Troponin No results for input(s): "TROPONINI" in the last 48 hours.    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 >5L/min, index >2.2 indexed to BSA); MAP 75, : 0.85 Paez, Rajinder: 2.33    Pulmonary Wedge A Wave Pressure: 32 mmHg ; Pulmonary Wedge V Wave Pressure: 44 mmHg ; Pulmonary Wedge Mean Pressure: 27 mmHg    Pulmonary Artery Systolic Pressure: 57 mmHg ; Pulmonary Artery Diastolic Pressure: 22 mmHg ; Pulmonary Artery Mean Pressure: 38 mmHg    Recommend clinical correlation, continued diuresis w/ titration +/- dobutamine to goal net " negative 1-2ml/kg/hr    Using US guidance, tried to place PIV in bascilic vein of RUE pre-procedure, however patient has flaccid hemiparesis and R sided aphasia and hemineglect; able to cannulate basilic vein x 2 preprocedure in addition to nurse attempts via US guidance, but could not advance PIV, micropuncture wire, etc for planned upgrade to 7F sheath intraprocedure for use as access due to flaccid paralysis (no resting muscle tone). Explained to patient, who was amenable to R femoral vein access which was performed without difficulty following lidocaine administration, in sterile fashion once timeout performed and patient prepped/draped.    The procedure log was documented by Documenter: Isabel Alcaraz RN and verified by Ruiz Pacheco MD.    Date: 2/23/2024  Time: 10:27 AM     , Echocardiogram: Transthoracic echo (TTE) complete (Cupid Only):   Results for orders placed or performed during the hospital encounter of 03/19/24   Echo Saline Bubble? Yes   Result Value Ref Range    BSA 2.31 m2    LVIDd 6.02 (A) 3.5 - 6.0 cm    LV Systolic Volume 136.82 mL    LV Systolic Volume Index 59.7 mL/m2    LVIDs 5.32 (A) 2.1 - 4.0 cm    LV Diastolic Volume 181.34 mL    LV Diastolic Volume Index 79.19 mL/m2    IVS 1.01 0.6 - 1.1 cm    FS 12 (A) 28 - 44 %    Left Ventricle Relative Wall Thickness 0.28 cm    Posterior Wall 0.83 0.6 - 1.1 cm    LV mass 223.09 g    LV Mass Index 97 g/m2    MV Peak E Rohit 0.95 m/s    TDI LATERAL 0.15 m/s    TDI SEPTAL 0.10 m/s    E/E' ratio 7.60 m/s    MV Peak A Rohit 0.32 m/s    E/A ratio 2.97     E wave deceleration time 79.46 msec    LV SEPTAL E/E' RATIO 9.50 m/s    LV LATERAL E/E' RATIO 6.33 m/s    RVDD 4.41 cm    TAPSE 1.30 cm    LA size 5.44 cm    Left Atrium Major Axis 6.19 cm    RA Major Axis 4.29 cm    MV stenosis pressure 1/2 time 23.04 ms    MV valve area p 1/2 method 9.55 cm2    IVC diameter 2.54 cm    Mean e' 0.13 m/s    ZLVIDS 0.06     ZLVIDD -3.74     EF 20 %    Est. RA pres 15 mmHg     Narrative      Left Ventricle: The left ventricle is moderately dilated. Normal wall   thickness. Regional wall motion abnormalities present.  Anterior wall and   apex are akinetic.  Inferior wall and lateral wall are hypokinetic. There   is severely reduced systolic function with a visually estimated ejection   fraction of less than 30%. Ejection fraction by visual approximation is   20%. There is diastolic dysfunction.    Right Ventricle: Mild right ventricular enlargement.    Left Atrium: Left atrium is severely dilated.    Right Atrium: Right atrium is mildly dilated.    IVC/SVC: Elevated venous pressure at 15 mmHg.    Pericardium: There is a trivial effusion.     , and X-Ray: CXR: X-Ray Chest 1 View (CXR):   Results for orders placed or performed during the hospital encounter of 03/19/24   X-Ray Chest 1 View    Narrative    EXAMINATION:  XR CHEST 1 VIEW    CLINICAL HISTORY:  Pneumonia.    TECHNIQUE:  XR CHEST 1 VIEW    COMPARISON:  3/23/24    FINDINGS:  No lines or tubes.    Patchy airspace opacities scattered throughout the lungs, similar.    Bilateral pleural effusions, similar.    Cardiac silhouette is similar to comparison exam.    No obvious acute bone findings.      Impression    Patchy airspace opacities scattered throughout the lungs, similar.    Bilateral pleural effusions, similar.      Electronically signed by: Pavan Carmichael  Date:    03/26/2024  Time:    07:42

## 2024-03-27 NOTE — PROGRESS NOTES
Right arm swelling drastically improved.  Suspect IV infiltration; improving.    No surgical intervention at this time. General Surgery will sign off.

## 2024-03-27 NOTE — PROGRESS NOTES
[facility]  Cardiology  Progress Note    Patient Name: Karin Carrera  MRN: [id number]  Admission Date: 3/19/2024  Hospital Length of Stay: 8 days  Code Status: Full Code   Attending Physician: Kai Peck MD   Primary Care Physician: Walker Smith MD  Expected Discharge Date:   Principal Problem:Sepsis    Subjective:     Hospital Course:   No notes on file    Interval History: Patient started on Lasix gtt yesterday, has had 10L UOP in 24 hours. Breathing and lung sounds improved. Continued JVD and lower ext edema. Decreased lasix gtt to 5mg/hr, continue milrinone 0.125. Tolerating Entresto 24-26 and Jardiance. Hydralazine held this morning due to low BP, will discontinue. Potassium 2.7 this morning, increase PO potassium to 60meq TID (pt unable to tolerate spironolactone due to hyponatremia).     Review of Systems   Constitutional: Positive for malaise/fatigue. Negative for fever.   Cardiovascular:  Positive for dyspnea on exertion, leg swelling, orthopnea and palpitations.   Respiratory:  Positive for shortness of breath.    Gastrointestinal:  Positive for abdominal pain.   Neurological:  Positive for weakness.     Objective:     Vital Signs (Most Recent):  Temp: 97.3 °F (36.3 °C) (03/27/24 0730)  Pulse: 97 (03/27/24 1333)  Resp: 12 (03/27/24 1333)  BP: (!) 81/51 (03/27/24 0900)  SpO2: 97 % (03/27/24 1333) Vital Signs (24h Range):  Temp:  [97.3 °F (36.3 °C)-98.6 °F (37 °C)] 97.3 °F (36.3 °C)  Pulse:  [] 97  Resp:  [11-31] 12  SpO2:  [89 %-100 %] 97 %  BP: ()/(47-79) 81/51     Weight: 108.1 kg (238 lb 6.4 oz)  Body mass index is 30.61 kg/m².     SpO2: 97 %         Intake/Output Summary (Last 24 hours) at 3/27/2024 1421  Last data filed at 3/27/2024 0900  Gross per 24 hour   Intake 378.34 ml   Output 9625 ml   Net -9246.66 ml       Lines/Drains/Airways       Peripheral Intravenous Line  Duration                  Midline Catheter - Double Lumen 03/26/24 1507 Left  "basilic vein (medial side of arm)  <1 day                       Physical Exam  Vitals reviewed.   Constitutional:       Appearance: He is ill-appearing.   Neck:      Vascular: JVD present.   Cardiovascular:      Rate and Rhythm: Regular rhythm. Tachycardia present.   Pulmonary:      Effort: Pulmonary effort is normal. Tachypnea present.      Breath sounds: No wheezing or rales.   Abdominal:      General: Bowel sounds are normal.      Palpations: Abdomen is soft.   Musculoskeletal:      Right lower leg: Edema present.      Left lower leg: Edema present.   Skin:     Coloration: Skin is pale.   Neurological:      Mental Status: He is alert and oriented to person, place, and time.            Significant Labs: ABG: No results for input(s): "PH", "PCO2", "HCO3", "POCSATURATED", "BE" in the last 48 hours., Blood Culture: No results for input(s): "LABBLOO" in the last 48 hours., BMP:   Recent Labs   Lab 03/26/24  0427 03/27/24  0545   * 148*   * 130*   K 4.3 2.7*   CL 95* 87*   CO2 29 37*   BUN 21* 16   CREATININE 0.88 0.89   CALCIUM 8.9 9.6   , CMP   Recent Labs   Lab 03/26/24  0427 03/27/24  0545   * 130*   K 4.3 2.7*   CL 95* 87*   CO2 29 37*   * 148*   BUN 21* 16   CREATININE 0.88 0.89   CALCIUM 8.9 9.6   PROT 6.4 7.4   ALBUMIN 2.9* 3.3*   BILITOT 1.1 1.2   ALKPHOS 97 117*   AST 44* 32   ALT 33 30   ANIONGAP 12 9   , CBC   Recent Labs   Lab 03/26/24  0427 03/27/24  0545   WBC 7.46 10.65   HGB 11.0* 12.9*   HCT 34.8* 40.4    270   , Lipid Panel No results for input(s): "CHOL", "HDL", "LDLCALC", "TRIG", "CHOLHDL" in the last 48 hours., and Troponin No results for input(s): "TROPONINI" in the last 48 hours.    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 >5L/min, index >2.2 indexed to BSA); " MAP 75, : 0.85 Paez, Rajinder: 2.33    Pulmonary Wedge A Wave Pressure: 32 mmHg ; Pulmonary Wedge V Wave Pressure: 44 mmHg ; Pulmonary Wedge Mean Pressure: 27 mmHg    Pulmonary Artery Systolic Pressure: 57 mmHg ; Pulmonary Artery Diastolic Pressure: 22 mmHg ; Pulmonary Artery Mean Pressure: 38 mmHg    Recommend clinical correlation, continued diuresis w/ titration +/- dobutamine to goal net negative 1-2ml/kg/hr    Using US guidance, tried to place PIV in bascilic vein of RUE pre-procedure, however patient has flaccid hemiparesis and R sided aphasia and hemineglect; able to cannulate basilic vein x 2 preprocedure in addition to nurse attempts via US guidance, but could not advance PIV, micropuncture wire, etc for planned upgrade to 7F sheath intraprocedure for use as access due to flaccid paralysis (no resting muscle tone). Explained to patient, who was amenable to R femoral vein access which was performed without difficulty following lidocaine administration, in sterile fashion once timeout performed and patient prepped/draped.    The procedure log was documented by Documenter: Isabel Alcaraz RN and verified by Ruiz Pacheco MD.    Date: 2/23/2024  Time: 10:27 AM     , Echocardiogram: Transthoracic echo (TTE) complete (Cupid Only):   Results for orders placed or performed during the hospital encounter of 03/19/24   Echo Saline Bubble? Yes   Result Value Ref Range    BSA 2.31 m2    LVIDd 6.02 (A) 3.5 - 6.0 cm    LV Systolic Volume 136.82 mL    LV Systolic Volume Index 59.7 mL/m2    LVIDs 5.32 (A) 2.1 - 4.0 cm    LV Diastolic Volume 181.34 mL    LV Diastolic Volume Index 79.19 mL/m2    IVS 1.01 0.6 - 1.1 cm    FS 12 (A) 28 - 44 %    Left Ventricle Relative Wall Thickness 0.28 cm    Posterior Wall 0.83 0.6 - 1.1 cm    LV mass 223.09 g    LV Mass Index 97 g/m2    MV Peak E Rohit 0.95 m/s    TDI LATERAL 0.15 m/s    TDI SEPTAL 0.10 m/s    E/E' ratio 7.60 m/s    MV Peak A Rohit 0.32 m/s    E/A ratio 2.97     E wave  deceleration time 79.46 msec    LV SEPTAL E/E' RATIO 9.50 m/s    LV LATERAL E/E' RATIO 6.33 m/s    RVDD 4.41 cm    TAPSE 1.30 cm    LA size 5.44 cm    Left Atrium Major Axis 6.19 cm    RA Major Axis 4.29 cm    MV stenosis pressure 1/2 time 23.04 ms    MV valve area p 1/2 method 9.55 cm2    IVC diameter 2.54 cm    Mean e' 0.13 m/s    ZLVIDS 0.06     ZLVIDD -3.74     EF 20 %    Est. RA pres 15 mmHg    Narrative      Left Ventricle: The left ventricle is moderately dilated. Normal wall   thickness. Regional wall motion abnormalities present.  Anterior wall and   apex are akinetic.  Inferior wall and lateral wall are hypokinetic. There   is severely reduced systolic function with a visually estimated ejection   fraction of less than 30%. Ejection fraction by visual approximation is   20%. There is diastolic dysfunction.    Right Ventricle: Mild right ventricular enlargement.    Left Atrium: Left atrium is severely dilated.    Right Atrium: Right atrium is mildly dilated.    IVC/SVC: Elevated venous pressure at 15 mmHg.    Pericardium: There is a trivial effusion.     , and X-Ray: CXR: X-Ray Chest 1 View (CXR):   Results for orders placed or performed during the hospital encounter of 03/19/24   X-Ray Chest 1 View    Narrative    EXAMINATION:  XR CHEST 1 VIEW    CLINICAL HISTORY:  Pneumonia.    TECHNIQUE:  XR CHEST 1 VIEW    COMPARISON:  3/23/24    FINDINGS:  No lines or tubes.    Patchy airspace opacities scattered throughout the lungs, similar.    Bilateral pleural effusions, similar.    Cardiac silhouette is similar to comparison exam.    No obvious acute bone findings.      Impression    Patchy airspace opacities scattered throughout the lungs, similar.    Bilateral pleural effusions, similar.      Electronically signed by: Pavan Carmichael  Date:    03/26/2024  Time:    07:42     Assessment and Plan:     Brief HPI:   Patient is a 52 y/o M w/ Pmhx of alcoholic fatty liver, HFrEF with EF 25% per Echo on 2/20/24,  pulmonary HTN, CAD s/p CABG in 2022, RHC, and stent placement, TR, COPD, CVA in 2016 with expressive aphasia and right hemiparesis, HTN, HLD, T2DM, GERD with esophagitis, ROSEMARIE who presents to presents to [facility] ED via EMS with SOB and abdominal pain for 2 days. He reports recently having a cholecystectomy on 3/9/24 here at [place] by Dr. Bennett. He endorses RUQ abdominal pain and SOB. SOB is similar to his previous COPD episodes. Endorses orthopnea and INFANTE. He endorses surgical site drainage mostly pus that started 2 days ago. Endorses decreased appetite around the same time. Endorses bilateral leg swelling and right leg redness for about 1 month and now swelling has gone up his thighs and abdomen in the past 2 days. Denies f/c/cp/n/v/d. Denies urinary, or bowel habit changes. Reports compliance with all home medications. Patient uses 2L NC O2 at home. He communicates through writing on paper and texts due to his expressive aphasia 2/2 CVA. Labs were significant for BNP 7486. EKG sinus tach 110 with multifocal PVCs. CXR showed continued  Cardiomegaly. CTA PE protocol was significant for no pulmonary embolus, four-chamber cardiomegaly, pulmonary edema and small pleural effusions. Cardiology has been consulted for the continued care and management of his cardiac conditions.       * Sepsis  - Recent gallbladder removal (3/9/2024)  - Being followed by primary medical team    NSVT (nonsustained ventricular tachycardia)  3/26/2024:  - IV amiodarone started yesterday, improved ectopy on tele  - Transition to PO amio 400mg daily per Dr. Ceron  - Continue monitoring    Hypokalemia  3/27/2024:  Patient has hypokalemia which is Acute and currently uncontrolled. Most recent potassium levels reviewed-   Lab Results   Component Value Date    K 2.7 (L) 03/27/2024   Will continue potassium replacement per protocol and recheck repeat levels after replacement completed.         End stage heart failure  Echo reveals Ejection fraction  by visual approximation is 20%. There is diastolic dysfunction.   BNP 7486  Continue IV lasix 40 mg QD  Continue toprol XL 25 mg 1/2 tab QD  Continue jardiance 10 mg QD  Resume losartan 25 mg 1/2 tab   Daily weights  Strict I/Os    3/22/2024:  - Patient seen and evaluated by Dr. Escobar  - Mixed shock: Cardiogenic/sepsis  - Discontinue BB  - Start IV dobutamine 5mcg/kg/min. Increase lasix to 80mg BID  - Strict I&O; strict daily weight  - Will up titrate afterload reduction as BP allows  - Unable to tolerate MRAs due to hyponatremia    3/23/2024  Continue IV dobutamine 5mcg/kg/min.   Continue IV lasix 80mg BID  - Strict I&O; strict daily weight  - Will up titrate afterload reduction as BP allows  - Unable to tolerate MRAs due to hyponatremia     3/25/2024  Continue IV lasix 80 BID  Start hydralazine 10 mg BID  Increase losartan 25 mg   Start IV digoxin 500 mcg 1x --> then digoxin 0.125 mg PO QD  Check lactate  Continue strict I&O and monitor daily weights  Unable to tolerate MRAs due to hyponatremia     3/26/2024:  - Patient seen and evaluated by Dr. Ceron  - DC losartan, start Entresto 24-26 BID, increase hydralazine 25 BID  - Lactate 4.0 yesterday, milrinone 0.25 started. Decreased milrinone to 0.125   - Remains volume overloaded, no improvement in repeat CXR, continued edema and sob  - Start lasix gtt at 10mg/hr  - CMP and lactate in am    3/27/2024:  - Lactate 2.2 this morning, improving  - 10L UOP/24 hours, decrease lasix gtt 5mg/hr; continue milrinone 0.125  - Increase PO potassium to 60meq TID  - BMP in am        VTE Risk Mitigation (From admission, onward)           Ordered     enoxaparin injection 40 mg  Every 24 hours         03/19/24 7185                    Samantha Rhodes, ISADORA  Cardiology  [facility]

## 2024-03-28 LAB
ANION GAP SERPL CALCULATED.3IONS-SCNC: 9 MMOL/L (ref 7–16)
BUN SERPL-MCNC: 14 MG/DL (ref 7–18)
BUN/CREAT SERPL: 16 (ref 6–20)
CALCIUM SERPL-MCNC: 9.3 MG/DL (ref 8.5–10.1)
CHLORIDE SERPL-SCNC: 85 MMOL/L (ref 98–107)
CO2 SERPL-SCNC: 40 MMOL/L (ref 21–32)
CREAT SERPL-MCNC: 0.9 MG/DL (ref 0.7–1.3)
DIGOXIN SERPL-MCNC: 0.7 NG/ML (ref 0.8–2)
EGFR (NO RACE VARIABLE) (RUSH/TITUS): 102 ML/MIN/1.73M2
GLUCOSE SERPL-MCNC: 137 MG/DL (ref 74–106)
GLUCOSE SERPL-MCNC: 144 MG/DL (ref 70–105)
GLUCOSE SERPL-MCNC: 179 MG/DL (ref 70–105)
GLUCOSE SERPL-MCNC: 220 MG/DL (ref 70–105)
MAGNESIUM SERPL-MCNC: 2 MG/DL (ref 1.7–2.3)
POTASSIUM SERPL-SCNC: 3.6 MMOL/L (ref 3.5–5.1)
SODIUM SERPL-SCNC: 130 MMOL/L (ref 136–145)

## 2024-03-28 PROCEDURE — 82962 GLUCOSE BLOOD TEST: CPT

## 2024-03-28 PROCEDURE — 11000001 HC ACUTE MED/SURG PRIVATE ROOM

## 2024-03-28 PROCEDURE — 80048 BASIC METABOLIC PNL TOTAL CA: CPT | Performed by: INTERNAL MEDICINE

## 2024-03-28 PROCEDURE — 99900035 HC TECH TIME PER 15 MIN (STAT)

## 2024-03-28 PROCEDURE — 25000003 PHARM REV CODE 250: Performed by: NURSE PRACTITIONER

## 2024-03-28 PROCEDURE — 27000221 HC OXYGEN, UP TO 24 HOURS

## 2024-03-28 PROCEDURE — 63600175 PHARM REV CODE 636 W HCPCS

## 2024-03-28 PROCEDURE — 83735 ASSAY OF MAGNESIUM: CPT | Performed by: INTERNAL MEDICINE

## 2024-03-28 PROCEDURE — 25000242 PHARM REV CODE 250 ALT 637 W/ HCPCS

## 2024-03-28 PROCEDURE — 25000003 PHARM REV CODE 250

## 2024-03-28 PROCEDURE — 99233 SBSQ HOSP IP/OBS HIGH 50: CPT | Mod: ,,, | Performed by: NURSE PRACTITIONER

## 2024-03-28 PROCEDURE — 94761 N-INVAS EAR/PLS OXIMETRY MLT: CPT

## 2024-03-28 PROCEDURE — 97116 GAIT TRAINING THERAPY: CPT

## 2024-03-28 PROCEDURE — 63600175 PHARM REV CODE 636 W HCPCS: Performed by: HOSPITALIST

## 2024-03-28 PROCEDURE — 25000003 PHARM REV CODE 250: Performed by: STUDENT IN AN ORGANIZED HEALTH CARE EDUCATION/TRAINING PROGRAM

## 2024-03-28 PROCEDURE — 99233 SBSQ HOSP IP/OBS HIGH 50: CPT | Mod: ,,, | Performed by: INTERNAL MEDICINE

## 2024-03-28 PROCEDURE — 97110 THERAPEUTIC EXERCISES: CPT

## 2024-03-28 PROCEDURE — 25000242 PHARM REV CODE 250 ALT 637 W/ HCPCS: Performed by: HOSPITALIST

## 2024-03-28 PROCEDURE — 80162 ASSAY OF DIGOXIN TOTAL: CPT | Performed by: INTERNAL MEDICINE

## 2024-03-28 PROCEDURE — 25000003 PHARM REV CODE 250: Performed by: INTERNAL MEDICINE

## 2024-03-28 PROCEDURE — 94640 AIRWAY INHALATION TREATMENT: CPT

## 2024-03-28 RX ORDER — AMIODARONE HYDROCHLORIDE 200 MG/1
200 TABLET ORAL DAILY
Status: DISCONTINUED | OUTPATIENT
Start: 2024-03-29 | End: 2024-04-03 | Stop reason: HOSPADM

## 2024-03-28 RX ORDER — POTASSIUM CHLORIDE 20 MEQ/1
20 TABLET, EXTENDED RELEASE ORAL 2 TIMES DAILY
Status: DISCONTINUED | OUTPATIENT
Start: 2024-03-28 | End: 2024-03-28

## 2024-03-28 RX ORDER — POTASSIUM CHLORIDE 20 MEQ/1
40 TABLET, EXTENDED RELEASE ORAL ONCE
Status: COMPLETED | OUTPATIENT
Start: 2024-03-28 | End: 2024-03-28

## 2024-03-28 RX ORDER — POTASSIUM CHLORIDE 20 MEQ/1
40 TABLET, EXTENDED RELEASE ORAL 2 TIMES DAILY
Status: DISCONTINUED | OUTPATIENT
Start: 2024-03-28 | End: 2024-04-03 | Stop reason: HOSPADM

## 2024-03-28 RX ADMIN — ENOXAPARIN SODIUM 40 MG: 40 INJECTION SUBCUTANEOUS at 04:03

## 2024-03-28 RX ADMIN — PANTOPRAZOLE SODIUM 40 MG: 40 TABLET, DELAYED RELEASE ORAL at 09:03

## 2024-03-28 RX ADMIN — ASPIRIN 81 MG: 81 TABLET, COATED ORAL at 09:03

## 2024-03-28 RX ADMIN — EMPAGLIFLOZIN 10 MG: 10 TABLET, FILM COATED ORAL at 09:03

## 2024-03-28 RX ADMIN — DIGOXIN 0.12 MG: 125 TABLET ORAL at 09:03

## 2024-03-28 RX ADMIN — IPRATROPIUM BROMIDE AND ALBUTEROL SULFATE 3 ML: 2.5; .5 SOLUTION RESPIRATORY (INHALATION) at 07:03

## 2024-03-28 RX ADMIN — SACUBITRIL AND VALSARTAN 1 TABLET: 24; 26 TABLET, FILM COATED ORAL at 08:03

## 2024-03-28 RX ADMIN — DOCUSATE SODIUM 100 MG: 100 CAPSULE, LIQUID FILLED ORAL at 09:03

## 2024-03-28 RX ADMIN — BUDESONIDE INHALATION 0.5 MG: 0.5 SUSPENSION RESPIRATORY (INHALATION) at 07:03

## 2024-03-28 RX ADMIN — IPRATROPIUM BROMIDE AND ALBUTEROL SULFATE 3 ML: 2.5; .5 SOLUTION RESPIRATORY (INHALATION) at 12:03

## 2024-03-28 RX ADMIN — OXYCODONE 10 MG: 5 TABLET ORAL at 01:03

## 2024-03-28 RX ADMIN — POTASSIUM CHLORIDE 20 MEQ: 1500 TABLET, EXTENDED RELEASE ORAL at 09:03

## 2024-03-28 RX ADMIN — BUDESONIDE INHALATION 0.5 MG: 0.5 SUSPENSION RESPIRATORY (INHALATION) at 08:03

## 2024-03-28 RX ADMIN — ATORVASTATIN CALCIUM 40 MG: 40 TABLET, FILM COATED ORAL at 08:03

## 2024-03-28 RX ADMIN — INSULIN ASPART 2 UNITS: 100 INJECTION, SOLUTION INTRAVENOUS; SUBCUTANEOUS at 04:03

## 2024-03-28 RX ADMIN — ONDANSETRON 8 MG: 2 INJECTION INTRAMUSCULAR; INTRAVENOUS at 03:03

## 2024-03-28 RX ADMIN — POTASSIUM CHLORIDE 40 MEQ: 1500 TABLET, EXTENDED RELEASE ORAL at 04:03

## 2024-03-28 RX ADMIN — POTASSIUM CHLORIDE 40 MEQ: 1500 TABLET, EXTENDED RELEASE ORAL at 08:03

## 2024-03-28 RX ADMIN — OXYCODONE 10 MG: 5 TABLET ORAL at 08:03

## 2024-03-28 RX ADMIN — ONDANSETRON 8 MG: 2 INJECTION INTRAMUSCULAR; INTRAVENOUS at 08:03

## 2024-03-28 RX ADMIN — SACUBITRIL AND VALSARTAN 1 TABLET: 24; 26 TABLET, FILM COATED ORAL at 09:03

## 2024-03-28 RX ADMIN — DOCUSATE SODIUM 100 MG: 100 CAPSULE, LIQUID FILLED ORAL at 08:03

## 2024-03-28 NOTE — PROGRESS NOTES
[facility]  Cardiology  Progress Note    Patient Name: Karin Carrera  MRN: [id number]  Admission Date: 3/19/2024  Hospital Length of Stay: 9 days  Code Status: Full Code   Attending Physician: Kai Peck MD   Primary Care Physician: Walker Smith MD  Expected Discharge Date:   Principal Problem:Sepsis    Subjective:     Hospital Course:   No notes on file    Interval History: Patient seen today, 5L UOP/24hrs. Requiring less O2, now on 5L NC. Discontinue milrinone today, continue lasix gtt @ 5mg/hr. Lactate in am.    Review of Systems   Constitutional: Positive for malaise/fatigue. Negative for fever.   Cardiovascular:  Positive for dyspnea on exertion (improving), leg swelling (improving), orthopnea and palpitations.   Respiratory:  Positive for shortness of breath (improving).    Gastrointestinal:  Positive for abdominal pain (improving).   Neurological:  Positive for weakness.     Objective:     Vital Signs (Most Recent):  Temp: 98.4 °F (36.9 °C) (03/28/24 0700)  Pulse: 109 (03/28/24 1401)  Resp: 18 (03/28/24 1401)  BP: (!) 138/90 (03/28/24 1401)  SpO2: 97 % (03/28/24 1401) Vital Signs (24h Range):  Temp:  [98.1 °F (36.7 °C)-98.4 °F (36.9 °C)] 98.4 °F (36.9 °C)  Pulse:  [] 109  Resp:  [9-24] 18  SpO2:  [91 %-100 %] 97 %  BP: ()/() 138/90     Weight: 96.8 kg (213 lb 6.5 oz)  Body mass index is 27.4 kg/m².     SpO2: 97 %         Intake/Output Summary (Last 24 hours) at 3/28/2024 1519  Last data filed at 3/28/2024 0547  Gross per 24 hour   Intake 13.8 ml   Output 4025 ml   Net -4011.2 ml       Lines/Drains/Airways       Peripheral Intravenous Line  Duration                  Midline Catheter - Double Lumen 03/26/24 1507 Left basilic vein (medial side of arm)  2 days                       Physical Exam  Vitals reviewed.   Constitutional:       General: He is not in acute distress.  Neck:      Vascular: JVD present.   Cardiovascular:      Rate and Rhythm:  "Regular rhythm. Tachycardia present.   Pulmonary:      Effort: Pulmonary effort is normal. Tachypnea present.      Breath sounds: No wheezing or rales.   Abdominal:      General: Bowel sounds are normal.      Palpations: Abdomen is soft.   Musculoskeletal:      Right lower leg: Edema present.      Left lower leg: Edema present.   Skin:     Coloration: Skin is pale.   Neurological:      Mental Status: He is alert and oriented to person, place, and time.            Significant Labs: ABG: No results for input(s): "PH", "PCO2", "HCO3", "POCSATURATED", "BE" in the last 48 hours., Blood Culture: No results for input(s): "LABBLOO" in the last 48 hours., BMP:   Recent Labs   Lab 03/27/24  0545 03/27/24  1532 03/28/24  0341   *  --  137*   *  --  130*   K 2.7* 3.3* 3.6   CL 87*  --  85*   CO2 37*  --  40*   BUN 16  --  14   CREATININE 0.89  --  0.90   CALCIUM 9.6  --  9.3   MG  --   --  2.0   , CMP   Recent Labs   Lab 03/27/24  0545 03/27/24  1532 03/28/24  0341   *  --  130*   K 2.7* 3.3* 3.6   CL 87*  --  85*   CO2 37*  --  40*   *  --  137*   BUN 16  --  14   CREATININE 0.89  --  0.90   CALCIUM 9.6  --  9.3   PROT 7.4  --   --    ALBUMIN 3.3*  --   --    BILITOT 1.2  --   --    ALKPHOS 117*  --   --    AST 32  --   --    ALT 30  --   --    ANIONGAP 9  --  9   , CBC   Recent Labs   Lab 03/27/24  0545   WBC 10.65   HGB 12.9*   HCT 40.4      , INR No results for input(s): "INR", "PROTIME" in the last 48 hours., Lipid Panel No results for input(s): "CHOL", "HDL", "LDLCALC", "TRIG", "CHOLHDL" in the last 48 hours., and Troponin No results for input(s): "TROPONINI" in the last 48 hours.    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 >5L/min, index >2.2 indexed to BSA); MAP 75, : 0.85 Paez, " Rajinder: 2.33    Pulmonary Wedge A Wave Pressure: 32 mmHg ; Pulmonary Wedge V Wave Pressure: 44 mmHg ; Pulmonary Wedge Mean Pressure: 27 mmHg    Pulmonary Artery Systolic Pressure: 57 mmHg ; Pulmonary Artery Diastolic Pressure: 22 mmHg ; Pulmonary Artery Mean Pressure: 38 mmHg    Recommend clinical correlation, continued diuresis w/ titration +/- dobutamine to goal net negative 1-2ml/kg/hr    Using US guidance, tried to place PIV in bascilic vein of RUE pre-procedure, however patient has flaccid hemiparesis and R sided aphasia and hemineglect; able to cannulate basilic vein x 2 preprocedure in addition to nurse attempts via US guidance, but could not advance PIV, micropuncture wire, etc for planned upgrade to 7F sheath intraprocedure for use as access due to flaccid paralysis (no resting muscle tone). Explained to patient, who was amenable to R femoral vein access which was performed without difficulty following lidocaine administration, in sterile fashion once timeout performed and patient prepped/draped.    The procedure log was documented by Documenter: Isabel Alcaraz RN and verified by Ruiz Pacheco MD.    Date: 2/23/2024  Time: 10:27 AM      and Echocardiogram: Transthoracic echo (TTE) complete (Cupid Only):   Results for orders placed or performed during the hospital encounter of 03/19/24   Echo Saline Bubble? Yes   Result Value Ref Range    BSA 2.31 m2    LVIDd 6.02 (A) 3.5 - 6.0 cm    LV Systolic Volume 136.82 mL    LV Systolic Volume Index 59.7 mL/m2    LVIDs 5.32 (A) 2.1 - 4.0 cm    LV Diastolic Volume 181.34 mL    LV Diastolic Volume Index 79.19 mL/m2    IVS 1.01 0.6 - 1.1 cm    FS 12 (A) 28 - 44 %    Left Ventricle Relative Wall Thickness 0.28 cm    Posterior Wall 0.83 0.6 - 1.1 cm    LV mass 223.09 g    LV Mass Index 97 g/m2    MV Peak E Rohit 0.95 m/s    TDI LATERAL 0.15 m/s    TDI SEPTAL 0.10 m/s    E/E' ratio 7.60 m/s    MV Peak A Rohit 0.32 m/s    E/A ratio 2.97     E wave deceleration time 79.46 msec     LV SEPTAL E/E' RATIO 9.50 m/s    LV LATERAL E/E' RATIO 6.33 m/s    RVDD 4.41 cm    TAPSE 1.30 cm    LA size 5.44 cm    Left Atrium Major Axis 6.19 cm    RA Major Axis 4.29 cm    MV stenosis pressure 1/2 time 23.04 ms    MV valve area p 1/2 method 9.55 cm2    IVC diameter 2.54 cm    Mean e' 0.13 m/s    ZLVIDS 0.06     ZLVIDD -3.74     EF 20 %    Est. RA pres 15 mmHg    Narrative      Left Ventricle: The left ventricle is moderately dilated. Normal wall   thickness. Regional wall motion abnormalities present.  Anterior wall and   apex are akinetic.  Inferior wall and lateral wall are hypokinetic. There   is severely reduced systolic function with a visually estimated ejection   fraction of less than 30%. Ejection fraction by visual approximation is   20%. There is diastolic dysfunction.    Right Ventricle: Mild right ventricular enlargement.    Left Atrium: Left atrium is severely dilated.    Right Atrium: Right atrium is mildly dilated.    IVC/SVC: Elevated venous pressure at 15 mmHg.    Pericardium: There is a trivial effusion.       Assessment and Plan:     Brief HPI:   Patient is a 52 y/o M w/ Pmhx of alcoholic fatty liver, HFrEF with EF 25% per Echo on 2/20/24, pulmonary HTN, CAD s/p CABG in 2022, RHC, and stent placement, TR, COPD, CVA in 2016 with expressive aphasia and right hemiparesis, HTN, HLD, T2DM, GERD with esophagitis, ROSEMARIE who presents to presents to [facility] ED via EMS with SOB and abdominal pain for 2 days. He reports recently having a cholecystectomy on 3/9/24 here at [facility] by Dr. Bennett. He endorses RUQ abdominal pain and SOB. SOB is similar to his previous COPD episodes. Endorses orthopnea and INFANTE. He endorses surgical site drainage mostly pus that started 2 days ago. Endorses decreased appetite around the same time. Endorses bilateral leg swelling and right leg redness for about 1 month and now swelling has gone up his thighs and abdomen in the past 2 days. Denies f/c/cp/n/v/d. Denies urinary, or  bowel habit changes. Reports compliance with all home medications. Patient uses 2L NC O2 at home. He communicates through writing on paper and texts due to his expressive aphasia 2/2 CVA. Labs were significant for BNP 7486. EKG sinus tach 110 with multifocal PVCs. CXR showed continued  Cardiomegaly. CTA PE protocol was significant for no pulmonary embolus, four-chamber cardiomegaly, pulmonary edema and small pleural effusions. Cardiology has been consulted for the continued care and management of his cardiac conditions.        * Sepsis  - Recent gallbladder removal (3/9/2024)  - Being followed by primary medical team    NSVT (nonsustained ventricular tachycardia)  3/26/2024:  - IV amiodarone started yesterday, improved ectopy on tele  - Transition to PO amio 400mg daily per Dr. Ceron  - Continue monitoring    3/28/2024:  - Decreased PO amio to 200mg daily    Hypokalemia  3/28/2024:  - Potassium 3.6 today (after IV 40meq and PO 180meq yesterday)  - Continue 40meq PO TID while on IV lasix gtt  - BMP in am       End stage heart failure  Echo reveals Ejection fraction by visual approximation is 20%. There is diastolic dysfunction.   BNP 7486  Continue IV lasix 40 mg QD  Continue toprol XL 25 mg 1/2 tab QD  Continue jardiance 10 mg QD  Resume losartan 25 mg 1/2 tab   Daily weights  Strict I/Os    3/22/2024:  - Patient seen and evaluated by Dr. Escobar  - Mixed shock: Cardiogenic/sepsis  - Discontinue BB  - Start IV dobutamine 5mcg/kg/min. Increase lasix to 80mg BID  - Strict I&O; strict daily weight  - Will up titrate afterload reduction as BP allows  - Unable to tolerate MRAs due to hyponatremia    3/23/2024  Continue IV dobutamine 5mcg/kg/min.   Continue IV lasix 80mg BID  - Strict I&O; strict daily weight  - Will up titrate afterload reduction as BP allows  - Unable to tolerate MRAs due to hyponatremia     3/25/2024  Continue IV lasix 80 BID  Start hydralazine 10 mg BID  Increase losartan 25 mg   Start IV  digoxin 500 mcg 1x --> then digoxin 0.125 mg PO QD  Check lactate  Continue strict I&O and monitor daily weights  Unable to tolerate MRAs due to hyponatremia     3/26/2024:  - Patient seen and evaluated by Dr. Ceron  - SARAH losartan, start Entresto 24-26 BID, increase hydralazine 25 BID  - Lactate 4.0 yesterday, milrinone 0.25 started. Decreased milrinone to 0.125   - Remains volume overloaded, no improvement in repeat CXR, continued edema and sob  - Start lasix gtt at 10mg/hr  - CMP and lactate in am    3/27/2024:  - Lactate 2.2 this morning, improving  - 10L UOP/24 hours, decrease lasix gtt 5mg/hr; continue milrinone 0.125  - Increase PO potassium to 60meq TID  - BMP in am    3/28/2024:  - 5L UOP/24hrs. Requiring less O2, now on 5L NC.   - Discontinue milrinone today, continue lasix gtt @ 5mg/hr.   - BP improving, may consider increasing Entresto prior to discharge  - Lactate in am.        VTE Risk Mitigation (From admission, onward)           Ordered     enoxaparin injection 40 mg  Every 24 hours         03/19/24 0493                    ISADORA Hendrickson  Cardiology  [facility]

## 2024-03-28 NOTE — PT/OT/SLP PROGRESS
Physical Therapy Treatment    Patient Name:  Karin Carrera   MRN:  [id number]    Recommendations:     Discharge Recommendations: High Intensity Therapy (to low intensity depending on rate of recovery)  Discharge Equipment Recommendations: none  Barriers to discharge: Decreased caregiver support    Assessment:     Karin Carrera is a 53 y.o. male admitted with a medical diagnosis of Sepsis.  He presents with the following impairments/functional limitations: impaired endurance, weakness, impaired self care skills, impaired functional mobility, gait instability, impaired balance, decreased upper extremity function, decreased lower extremity function, abnormal tone, impaired skin, edema, impaired cardiopulmonary response to activity Patient progesssing endurance. On nasal canula now. Plan is to progress as able.    Rehab Prognosis: Good; patient would benefit from acute skilled PT services to address these deficits and reach maximum level of function.    Recent Surgery: * No surgery found *      Plan:     During this hospitalization, patient to be seen 5 x/week to address the identified rehab impairments via gait training, therapeutic activities, therapeutic exercises and progress toward the following goals:    Plan of Care Expires:  04/24/24    Subjective     Chief Complaint: generalized weakness, sob  Patient/Family Comments/goals:   Pain/Comfort:         Objective:     Communicated with nurse prior to session.  Patient found supine with   upon PT entry to room.     General Precautions: Standard, fall, respiratory  Orthopedic Precautions: N/A  Braces: N/A  Respiratory Status: Room air     Functional Mobility:  Bed Mobility:     Supine to Sit: minimum assistance  Sit to Supine: minimum assistance  Transfers:     Sit to Stand:  minimum assistance with straight cane  Gait: ambulated 40 feet with cane, 4 L/min, 75% decreased oniel      AM-PAC 6 CLICK MOBILITY          Treatment & Education:  BLE: aps, hs, saq,  abd-add, mre flexion ext 3x10    Patient left supine with call button in reach..    GOALS:   Multidisciplinary Problems       Physical Therapy Goals          Problem: Physical Therapy    Goal Priority Disciplines Outcome Goal Variances Interventions   Physical Therapy Goal     PT, PT/OT Ongoing, Progressing     Description: Short Term Goals to be met by: 2024    Patient will increase functional independence with mobility by performin. Supine to sit with independently  2. Sit to stand transfer with independently using straight cane  3. Bed to chair transfer with independently using straight cane  4. Gait  x 100 feet with contact guard assist using straight cane and O2 PRN  5. Lower extremity exercise program x30 reps per handout, with assistance as needed    Long Term Goals to be met by: 2024    Pt will regain full independent functional mobility with straight cane to return to home situation and prior activities of daily living.                        Time Tracking:     PT Received On: 24  PT Start Time: 930     PT Stop Time: 0955  PT Total Time (min): 25 min     Billable Minutes: Gait Training 10 and Therapeutic Exercise 15    Treatment Type: Treatment  PT/PTA: PT     Number of PTA visits since last PT visit: 0     2024

## 2024-03-28 NOTE — PROGRESS NOTES
03/28/24 1018   Wound Care Follow Up   Wound Care Follow-up? Yes   Wound Care- Next Visit Date 04/03/24   Follow Up Plan Reji POC

## 2024-03-28 NOTE — PT/OT/SLP PROGRESS
Occupational Therapy      Patient Name:  Karin Carrera   MRN:  [id number]    Patient not seen today secondary to  (feeling nauseated and not feeling well enough to participate). 2 atytempts made for OT treatment . Will follow-up on next treatment day.    3/28/2024

## 2024-03-28 NOTE — ASSESSMENT & PLAN NOTE
3/28/2024:  - Potassium 3.6 today (after IV 40meq and PO 180meq yesterday)  - Continue 40meq PO TID while on IV lasix gtt  - BMP in am

## 2024-03-28 NOTE — ASSESSMENT & PLAN NOTE
Echo reveals Ejection fraction by visual approximation is 20%. There is diastolic dysfunction.   BNP 7486  Continue IV lasix 40 mg QD  Continue toprol XL 25 mg 1/2 tab QD  Continue jardiance 10 mg QD  Resume losartan 25 mg 1/2 tab   Daily weights  Strict I/Os    3/22/2024:  - Patient seen and evaluated by Dr. Escobar  - Mixed shock: Cardiogenic/sepsis  - Discontinue BB  - Start IV dobutamine 5mcg/kg/min. Increase lasix to 80mg BID  - Strict I&O; strict daily weight  - Will up titrate afterload reduction as BP allows  - Unable to tolerate MRAs due to hyponatremia    3/23/2024  Continue IV dobutamine 5mcg/kg/min.   Continue IV lasix 80mg BID  - Strict I&O; strict daily weight  - Will up titrate afterload reduction as BP allows  - Unable to tolerate MRAs due to hyponatremia     3/25/2024  Continue IV lasix 80 BID  Start hydralazine 10 mg BID  Increase losartan 25 mg   Start IV digoxin 500 mcg 1x --> then digoxin 0.125 mg PO QD  Check lactate  Continue strict I&O and monitor daily weights  Unable to tolerate MRAs due to hyponatremia     3/26/2024:  - Patient seen and evaluated by Dr. Ceron  - DC losartan, start Entresto 24-26 BID, increase hydralazine 25 BID  - Lactate 4.0 yesterday, milrinone 0.25 started. Decreased milrinone to 0.125   - Remains volume overloaded, no improvement in repeat CXR, continued edema and sob  - Start lasix gtt at 10mg/hr  - CMP and lactate in am    3/27/2024:  - Lactate 2.2 this morning, improving  - 10L UOP/24 hours, decrease lasix gtt 5mg/hr; continue milrinone 0.125  - Increase PO potassium to 60meq TID  - BMP in am    3/28/2024:  - 5L UOP/24hrs. Requiring less O2, now on 5L NC.   - Discontinue milrinone today, continue lasix gtt @ 5mg/hr.   - BP improving, may consider increasing Entresto prior to discharge  - Lactate in am.

## 2024-03-28 NOTE — PROGRESS NOTES
[facility]  Wound Care    Patient Name:  Karin Carrera   MRN:  [id number]  Date: 3/28/2024  Diagnosis: Sepsis    History:     Past Medical History:   Diagnosis Date    Alcoholic fatty liver     CHF (congestive heart failure) 02/20/2024    EF 25%    COPD (chronic obstructive pulmonary disease)     Coronary artery disease involving coronary bypass graft of native heart without angina pectoris 01/01/2022    Dyslipidemia 12/16/2023    Essential (primary) hypertension 01/01/2022    Expressive aphasia 05/05/2023    GERD with esophagitis 2018    EGD    Hemiparesis affecting right side as late effect of cerebrovascular accident     ROSEMARIE (obstructive sleep apnea)     Pulmonary hypertension     Stroke     right hand contracted    Tricuspid regurgitation     Type 2 diabetes mellitus        Social History     Socioeconomic History    Marital status:    Tobacco Use    Smoking status: Former     Current packs/day: 0.50     Types: Cigarettes    Smokeless tobacco: Current     Types: Chew   Substance and Sexual Activity    Alcohol use: Not Currently     Comment: 1 quart    Drug use: Never     Social Determinants of Health     Financial Resource Strain: Low Risk  (3/21/2024)    Overall Financial Resource Strain (CARDIA)     Difficulty of Paying Living Expenses: Not hard at all   Food Insecurity: No Food Insecurity (3/21/2024)    Hunger Vital Sign     Worried About Running Out of Food in the Last Year: Never true     Ran Out of Food in the Last Year: Never true   Transportation Needs: No Transportation Needs (3/21/2024)    PRAPARE - Transportation     Lack of Transportation (Medical): No     Lack of Transportation (Non-Medical): No   Physical Activity: Inactive (2/20/2024)    Exercise Vital Sign     Days of Exercise per Week: 0 days     Minutes of Exercise per Session: 0 min   Stress: No Stress Concern Present (3/21/2024)    [employer] [place] [employer] - Occupational Stress Questionnaire      Feeling of Stress : Not at all   Social Connections: Unknown (2/20/2024)    Social Connection and Isolation Panel [NHANES]     Frequency of Communication with Friends and Family: More than three times a week     Frequency of Social Gatherings with Friends and Family: More than three times a week     Attends Mosque Services: Never     Active Member of Clubs or Organizations: No     Attends Club or Organization Meetings: Never   Housing Stability: Low Risk  (3/21/2024)    Housing Stability Vital Sign     Unable to Pay for Housing in the Last Year: No     Number of Places Lived in the Last Year: 1     Unstable Housing in the Last Year: No       Precautions:     Allergies as of 03/19/2024    (No Known Allergies)       WOC Assessment Details/Treatment        03/28/24 0853   WOCN Assessment  WOCN Total Time (mins) 45   Visit Date 03/28/24   Visit Time 0845   Consult Type Follow Up   WOCN Speciality Wound   Wound cellulitis   Number of Wounds 2   Intervention chart review;assessed;applied;coordination of care   Positioning   Body Position position changed independently   Head of Bed (HOB) Positioning HOB at 30-45 degrees   Positioning/Transfer Devices pillows;in use   Pressure Injury Prevention    Heel preventative measures Peel back dressing/boot, assess skin and reapply        Altered Skin Integrity 03/19/24 2344 Right anterior;lower Leg Other (comment)   Date First Assessed/Time First Assessed: 03/19/24 2344   Altered Skin Integrity Present on Admission - Did Patient arrive to the hospital with altered skin?: yes  Side: Right  Orientation: anterior;lower  Location: Leg  Primary Wound Type: (c) Other (...   Wound Image    Dressing Appearance Open to air   Drainage Amount None   Appearance Red;Dry   Periwound Area Dry        Altered Skin Integrity 03/20/24 0920 Left anterior;medial Ankle Abrasion(s)   Date First Assessed/Time First Assessed: 03/20/24 0920   Altered Skin Integrity Present on Admission - Did Patient  "arrive to the hospital with altered skin?: yes  Side: Left  Orientation: anterior;medial  Location: Ankle  Primary Wound Type: Abrasion(s)   Wound Image    Dressing Appearance Intact;Clean   Drainage Amount None   Appearance Red;Eschar   Periwound Area Dry   Wound Edges Defined   Dressing Reinforced;Silicone;Foam     WOC Team consulted for "Altered Skin Integrity - RLE     Narrative: Pt alert and oriented. Pt tolerated wound care well with no complaints    Active Wounds and Recommendations: Continue POC    Goals for Wound Healing: Reduce bioburden and Educate on proper wound management post D/C     Barriers to Wound Healing: multiple co-morbidities diabetes    Orders placed.    Thank you for the consult.     We will continue to follow. See additional note under Notes Tab for tentative f/u plan/dates.    03/28/2024  "

## 2024-03-28 NOTE — PLAN OF CARE
Problem: Diabetes Comorbidity  Goal: Blood Glucose Level Within Targeted Range  Intervention: Monitor and Manage Glycemia  Flowsheets (Taken 3/27/2024 1918)  Glycemic Management: blood glucose monitored     Problem: Skin Injury Risk Increased  Goal: Skin Health and Integrity  Intervention: Optimize Skin Protection  Flowsheets (Taken 3/27/2024 1918)  Pressure Reduction Techniques: frequent weight shift encouraged  Pressure Reduction Devices: foam padding utilized  Head of Bed (HOB) Positioning: HOB at 30 degrees

## 2024-03-28 NOTE — SUBJECTIVE & OBJECTIVE
Interval History/Significant Events:  Patient without complaints    Review of Systems  Objective:     Vital Signs (Most Recent):  Temp: 98.1 °F (36.7 °C) (03/28/24 0315)  Pulse: 99 (03/28/24 0600)  Resp: 12 (03/28/24 0600)  BP: (!) 99/31 (03/28/24 0600)  SpO2: 100 % (03/28/24 0600) Vital Signs (24h Range):  Temp:  [98.1 °F (36.7 °C)-98.4 °F (36.9 °C)] 98.1 °F (36.7 °C)  Pulse:  [] 99  Resp:  [9-24] 12  SpO2:  [84 %-100 %] 100 %  BP: ()/(31-99) 99/31   Weight: 96.8 kg (213 lb 6.5 oz)  Body mass index is 27.4 kg/m².      Intake/Output Summary (Last 24 hours) at 3/28/2024 0732  Last data filed at 3/28/2024 0547  Gross per 24 hour   Intake 752.91 ml   Output 5800 ml   Net -5047.09 ml          Physical Exam  Vitals reviewed.   Constitutional:       Appearance: Normal appearance.      Interventions: He is not intubated.  HENT:      Head: Normocephalic and atraumatic.      Nose: Nose normal.      Mouth/Throat:      Mouth: Mucous membranes are dry.      Pharynx: Oropharynx is clear.   Eyes:      Extraocular Movements: Extraocular movements intact.      Conjunctiva/sclera: Conjunctivae normal.      Pupils: Pupils are equal, round, and reactive to light.   Cardiovascular:      Rate and Rhythm: Normal rate.      Heart sounds: Normal heart sounds. No murmur heard.  Pulmonary:      Effort: Pulmonary effort is normal. He is not intubated.      Breath sounds: Normal breath sounds.   Abdominal:      General: Abdomen is flat. Bowel sounds are normal.      Palpations: Abdomen is soft.   Musculoskeletal:         General: Normal range of motion.      Cervical back: Normal range of motion and neck supple.      Right lower leg: No edema.      Left lower leg: No edema.   Skin:     General: Skin is warm and dry.      Capillary Refill: Capillary refill takes less than 2 seconds.   Neurological:      General: No focal deficit present.      Mental Status: He is alert and oriented to person, place, and time.   Psychiatric:          Mood and Affect: Mood normal.         Behavior: Behavior normal.            Vents:  Oxygen Concentration (%): 50 (03/28/24 0344)  Lines/Drains/Airways       Peripheral Intravenous Line  Duration                  Midline Catheter - Double Lumen 03/26/24 1507 Left basilic vein (medial side of arm)  1 day                  Significant Labs:    CBC/Anemia Profile:  Recent Labs   Lab 03/27/24  0545   WBC 10.65   HGB 12.9*   HCT 40.4      MCV 82.4   RDW 15.9*        Chemistries:  Recent Labs   Lab 03/27/24  0545 03/27/24  1532 03/28/24  0341   *  --  130*   K 2.7* 3.3* 3.6   CL 87*  --  85*   CO2 37*  --  40*   BUN 16  --  14   CREATININE 0.89  --  0.90   CALCIUM 9.6  --  9.3   ALBUMIN 3.3*  --   --    PROT 7.4  --   --    BILITOT 1.2  --   --    ALKPHOS 117*  --   --    ALT 30  --   --    AST 32  --   --    MG  --   --  2.0       Recent Lab Results  (Last 5 results in the past 24 hours)        03/28/24  0532   03/28/24  0341   03/27/24  2018   03/27/24  1735   03/27/24  1532        Anion Gap   9             BUN   14             BUN/CREAT RATIO   16             Calcium   9.3             Chloride   85             CO2   40             Creatinine   0.90             Digoxin Level   0.7             eGFR   102             Glucose   137             Magnesium    2.0             POC Glucose 179     272   202         Potassium   3.6       3.3       Sodium   130                                    Significant Imaging:  I have reviewed all pertinent imaging results/findings within the past 24 hours.

## 2024-03-28 NOTE — PT/OT/SLP PROGRESS
Physical Therapy Treatment    Patient Name:  Karin Carrera   MRN:  [id number]    Recommendations:     Discharge Recommendations: High Intensity Therapy (to low intensity depending on rate of recovery)  Discharge Equipment Recommendations: none  Barriers to discharge: Decreased caregiver support    Assessment:     Karin Carrera is a 53 y.o. male admitted with a medical diagnosis of Sepsis.  He presents with the following impairments/functional limitations: impaired endurance, weakness, impaired self care skills, impaired functional mobility, gait instability, impaired balance, decreased upper extremity function, decreased lower extremity function, abnormal tone, impaired skin, edema, impaired cardiopulmonary response to activity Patient able to begin ambulation today. No off high flow. Did okay with o2 mask.    Rehab Prognosis: Good; patient would benefit from acute skilled PT services to address these deficits and reach maximum level of function.    Recent Surgery: * No surgery found *      Plan:     During this hospitalization, patient to be seen 5 x/week to address the identified rehab impairments via gait training, therapeutic activities, therapeutic exercises and progress toward the following goals:    Plan of Care Expires:  04/24/24    Subjective     Chief Complaint: sob  Patient/Family Comments/goals: agreeable to oob activity  Pain/Comfort:         Objective:     Communicated with nurse prior to session.  Patient found supine with   upon PT entry to room.     General Precautions: Standard, fall, respiratory  Orthopedic Precautions: N/A  Braces: N/A  Respiratory Status:  venti mask     Functional Mobility:  Bed Mobility:     Supine to Sit: minimum assistance  Sit to Supine: minimum assistance  Transfers:     Sit to Stand:  contact guard assistance with straight cane  Gait: ambulated 80feet with straight cane      AM-PAC 6 CLICK MOBILITY  Turning over in bed (including adjusting bedclothes, sheets and  blankets)?: 3  Sitting down on and standing up from a chair with arms (e.g., wheelchair, bedside commode, etc.): 3  Moving from lying on back to sitting on the side of the bed?: 3  Moving to and from a bed to a chair (including a wheelchair)?: 3  Need to walk in hospital room?: 3  Climbing 3-5 steps with a railing?: 3  Basic Mobility Total Score: 18       Treatment & Education:  BLE: aps, hs, saq, abd-add, mre flexion ext 3x10    Patient left supine with all lines intact..    GOALS:   Multidisciplinary Problems       Physical Therapy Goals          Problem: Physical Therapy    Goal Priority Disciplines Outcome Goal Variances Interventions   Physical Therapy Goal     PT, PT/OT Ongoing, Progressing     Description: Short Term Goals to be met by: 2024    Patient will increase functional independence with mobility by performin. Supine to sit with independently  2. Sit to stand transfer with independently using straight cane  3. Bed to chair transfer with independently using straight cane  4. Gait  x 100 feet with contact guard assist using straight cane and O2 PRN  5. Lower extremity exercise program x30 reps per handout, with assistance as needed    Long Term Goals to be met by: 2024    Pt will regain full independent functional mobility with straight cane to return to home situation and prior activities of daily living.                        Time Tracking:     PT Received On: 24  PT Start Time: 930     PT Stop Time: 955  PT Total Time (min): 25 min     Billable Minutes: Gait Training 10 and Therapeutic Exercise 15    Treatment Type: Treatment  PT/PTA: PT     Number of PTA visits since last PT visit: 0     2024

## 2024-03-28 NOTE — PLAN OF CARE
Problem: Diabetes Comorbidity  Goal: Blood Glucose Level Within Targeted Range  Intervention: Monitor and Manage Glycemia  Flowsheets (Taken 3/28/2024 1829)  Glycemic Management: blood glucose monitored     Problem: Skin Injury Risk Increased  Goal: Skin Health and Integrity  Intervention: Optimize Skin Protection  Flowsheets (Taken 3/28/2024 1829)  Pressure Reduction Techniques: frequent weight shift encouraged  Head of Bed (HOB) Positioning: HOB at 30-45 degrees     Problem: Infection Progression (Sepsis/Septic Shock)  Goal: Absence of Infection Signs and Symptoms  Intervention: Promote Recovery  Flowsheets (Taken 3/28/2024 1829)  Sleep/Rest Enhancement: awakenings minimized  Airway/Ventilation Support: comfort measures provided  Activity Management: Arm raise - L1

## 2024-03-28 NOTE — PLAN OF CARE
Problem: Diabetes Comorbidity  Goal: Blood Glucose Level Within Targeted Range  Outcome: Ongoing, Progressing  Intervention: Monitor and Manage Glycemia  Flowsheets (Taken 3/28/2024 0417)  Glycemic Management: blood glucose monitored     Problem: Impaired Wound Healing  Goal: Optimal Wound Healing  Outcome: Ongoing, Progressing  Intervention: Promote Wound Healing  Flowsheets (Taken 3/28/2024 0417)  Oral Nutrition Promotion: calorie-dense foods provided  Sleep/Rest Enhancement:   awakenings minimized   regular sleep/rest pattern promoted  Activity Management: Arm raise - L1  Pain Management Interventions: medication offered

## 2024-03-28 NOTE — SUBJECTIVE & OBJECTIVE
Interval History: Patient seen today, 5L UOP/24hrs. Requiring less O2, now on 5L NC. Discontinue milrinone today, continue lasix gtt @ 5mg/hr. Lactate in am.    Review of Systems   Constitutional: Positive for malaise/fatigue. Negative for fever.   Cardiovascular:  Positive for dyspnea on exertion (improving), leg swelling (improving), orthopnea and palpitations.   Respiratory:  Positive for shortness of breath (improving).    Gastrointestinal:  Positive for abdominal pain (improving).   Neurological:  Positive for weakness.     Objective:     Vital Signs (Most Recent):  Temp: 98.4 °F (36.9 °C) (03/28/24 0700)  Pulse: 109 (03/28/24 1401)  Resp: 18 (03/28/24 1401)  BP: (!) 138/90 (03/28/24 1401)  SpO2: 97 % (03/28/24 1401) Vital Signs (24h Range):  Temp:  [98.1 °F (36.7 °C)-98.4 °F (36.9 °C)] 98.4 °F (36.9 °C)  Pulse:  [] 109  Resp:  [9-24] 18  SpO2:  [91 %-100 %] 97 %  BP: ()/() 138/90     Weight: 96.8 kg (213 lb 6.5 oz)  Body mass index is 27.4 kg/m².     SpO2: 97 %         Intake/Output Summary (Last 24 hours) at 3/28/2024 1519  Last data filed at 3/28/2024 0547  Gross per 24 hour   Intake 13.8 ml   Output 4025 ml   Net -4011.2 ml       Lines/Drains/Airways       Peripheral Intravenous Line  Duration                  Midline Catheter - Double Lumen 03/26/24 1507 Left basilic vein (medial side of arm)  2 days                       Physical Exam  Vitals reviewed.   Constitutional:       General: He is not in acute distress.  Neck:      Vascular: JVD present.   Cardiovascular:      Rate and Rhythm: Regular rhythm. Tachycardia present.   Pulmonary:      Effort: Pulmonary effort is normal. Tachypnea present.      Breath sounds: No wheezing or rales.   Abdominal:      General: Bowel sounds are normal.      Palpations: Abdomen is soft.   Musculoskeletal:      Right lower leg: Edema present.      Left lower leg: Edema present.   Skin:     Coloration: Skin is pale.   Neurological:      Mental Status: He  "is alert and oriented to person, place, and time.            Significant Labs: ABG: No results for input(s): "PH", "PCO2", "HCO3", "POCSATURATED", "BE" in the last 48 hours., Blood Culture: No results for input(s): "LABBLOO" in the last 48 hours., BMP:   Recent Labs   Lab 03/27/24 0545 03/27/24  1532 03/28/24  0341   *  --  137*   *  --  130*   K 2.7* 3.3* 3.6   CL 87*  --  85*   CO2 37*  --  40*   BUN 16  --  14   CREATININE 0.89  --  0.90   CALCIUM 9.6  --  9.3   MG  --   --  2.0   , CMP   Recent Labs   Lab 03/27/24 0545 03/27/24  1532 03/28/24  0341   *  --  130*   K 2.7* 3.3* 3.6   CL 87*  --  85*   CO2 37*  --  40*   *  --  137*   BUN 16  --  14   CREATININE 0.89  --  0.90   CALCIUM 9.6  --  9.3   PROT 7.4  --   --    ALBUMIN 3.3*  --   --    BILITOT 1.2  --   --    ALKPHOS 117*  --   --    AST 32  --   --    ALT 30  --   --    ANIONGAP 9  --  9   , CBC   Recent Labs   Lab 03/27/24  0545   WBC 10.65   HGB 12.9*   HCT 40.4      , INR No results for input(s): "INR", "PROTIME" in the last 48 hours., Lipid Panel No results for input(s): "CHOL", "HDL", "LDLCALC", "TRIG", "CHOLHDL" in the last 48 hours., and Troponin No results for input(s): "TROPONINI" in the last 48 hours.    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 >5L/min, index >2.2 indexed to BSA); MAP 75, : 0.85 Paez, Rajinder: 2.33    Pulmonary Wedge A Wave Pressure: 32 mmHg ; Pulmonary Wedge V Wave Pressure: 44 mmHg ; Pulmonary Wedge Mean Pressure: 27 mmHg    Pulmonary Artery Systolic Pressure: 57 mmHg ; Pulmonary Artery Diastolic Pressure: 22 mmHg ; Pulmonary Artery Mean Pressure: 38 mmHg    Recommend clinical correlation, continued diuresis w/ titration +/- dobutamine to goal net negative 1-2ml/kg/hr    Using US " guidance, tried to place PIV in bascilic vein of RUE pre-procedure, however patient has flaccid hemiparesis and R sided aphasia and hemineglect; able to cannulate basilic vein x 2 preprocedure in addition to nurse attempts via US guidance, but could not advance PIV, micropuncture wire, etc for planned upgrade to 7F sheath intraprocedure for use as access due to flaccid paralysis (no resting muscle tone). Explained to patient, who was amenable to R femoral vein access which was performed without difficulty following lidocaine administration, in sterile fashion once timeout performed and patient prepped/draped.    The procedure log was documented by Documenter: Isabel Alcaraz RN and verified by Ruiz Pacheco MD.    Date: 2/23/2024  Time: 10:27 AM      and Echocardiogram: Transthoracic echo (TTE) complete (Cupid Only):   Results for orders placed or performed during the hospital encounter of 03/19/24   Echo Saline Bubble? Yes   Result Value Ref Range    BSA 2.31 m2    LVIDd 6.02 (A) 3.5 - 6.0 cm    LV Systolic Volume 136.82 mL    LV Systolic Volume Index 59.7 mL/m2    LVIDs 5.32 (A) 2.1 - 4.0 cm    LV Diastolic Volume 181.34 mL    LV Diastolic Volume Index 79.19 mL/m2    IVS 1.01 0.6 - 1.1 cm    FS 12 (A) 28 - 44 %    Left Ventricle Relative Wall Thickness 0.28 cm    Posterior Wall 0.83 0.6 - 1.1 cm    LV mass 223.09 g    LV Mass Index 97 g/m2    MV Peak E Rohit 0.95 m/s    TDI LATERAL 0.15 m/s    TDI SEPTAL 0.10 m/s    E/E' ratio 7.60 m/s    MV Peak A Rohit 0.32 m/s    E/A ratio 2.97     E wave deceleration time 79.46 msec    LV SEPTAL E/E' RATIO 9.50 m/s    LV LATERAL E/E' RATIO 6.33 m/s    RVDD 4.41 cm    TAPSE 1.30 cm    LA size 5.44 cm    Left Atrium Major Axis 6.19 cm    RA Major Axis 4.29 cm    MV stenosis pressure 1/2 time 23.04 ms    MV valve area p 1/2 method 9.55 cm2    IVC diameter 2.54 cm    Mean e' 0.13 m/s    ZLVIDS 0.06     ZLVIDD -3.74     EF 20 %    Est. RA pres 15 mmHg    Narrative      Left  Ventricle: The left ventricle is moderately dilated. Normal wall   thickness. Regional wall motion abnormalities present.  Anterior wall and   apex are akinetic.  Inferior wall and lateral wall are hypokinetic. There   is severely reduced systolic function with a visually estimated ejection   fraction of less than 30%. Ejection fraction by visual approximation is   20%. There is diastolic dysfunction.    Right Ventricle: Mild right ventricular enlargement.    Left Atrium: Left atrium is severely dilated.    Right Atrium: Right atrium is mildly dilated.    IVC/SVC: Elevated venous pressure at 15 mmHg.    Pericardium: There is a trivial effusion.

## 2024-03-28 NOTE — ASSESSMENT & PLAN NOTE
3/26/2024:  - IV amiodarone started yesterday, improved ectopy on tele  - Transition to PO amio 400mg daily per Dr. Ceron  - Continue monitoring    3/28/2024:  - Decreased PO amio to 200mg daily

## 2024-03-28 NOTE — PROGRESS NOTES
[facility]  Critical Care Medicine  Progress Note    Patient Name: Karin Carrera  MRN: [id number]  Admission Date: 3/19/2024  Hospital Length of Stay: 9 days  Code Status: Full Code  Attending Provider: Kai Peck MD  Primary Care Provider: Walker Smith MD   Principal Problem: Sepsis    Subjective:     HPI:  53-year-old white male presents with shortness of breath and abdominal pain for 2 days recently had a cholecystectomy 03/09/2024.  Patient has a history of COPD he complains of increased leg swelling.  Patient has a history ejection fraction 25% previous stroke he has been hypoxic during this admission grew out Gram-positive Gram-negative bacilli out of his blood he has been brought down to the unit for worsening blood pressure and hypoxemia    Hospital/ICU Course:  3/24- placed on HFNC overnight - will continue to wean,day 3 of Dobutamine     Interval History/Significant Events:  Patient without complaints    Review of Systems  Objective:     Vital Signs (Most Recent):  Temp: 98.1 °F (36.7 °C) (03/28/24 0315)  Pulse: 99 (03/28/24 0600)  Resp: 12 (03/28/24 0600)  BP: (!) 99/31 (03/28/24 0600)  SpO2: 100 % (03/28/24 0600) Vital Signs (24h Range):  Temp:  [98.1 °F (36.7 °C)-98.4 °F (36.9 °C)] 98.1 °F (36.7 °C)  Pulse:  [] 99  Resp:  [9-24] 12  SpO2:  [84 %-100 %] 100 %  BP: ()/(31-99) 99/31   Weight: 96.8 kg (213 lb 6.5 oz)  Body mass index is 27.4 kg/m².      Intake/Output Summary (Last 24 hours) at 3/28/2024 0732  Last data filed at 3/28/2024 0547  Gross per 24 hour   Intake 752.91 ml   Output 5800 ml   Net -5047.09 ml          Physical Exam  Vitals reviewed.   Constitutional:       Appearance: Normal appearance.      Interventions: He is not intubated.  HENT:      Head: Normocephalic and atraumatic.      Nose: Nose normal.      Mouth/Throat:      Mouth: Mucous membranes are dry.      Pharynx: Oropharynx is clear.   Eyes:      Extraocular Movements:  Extraocular movements intact.      Conjunctiva/sclera: Conjunctivae normal.      Pupils: Pupils are equal, round, and reactive to light.   Cardiovascular:      Rate and Rhythm: Normal rate.      Heart sounds: Normal heart sounds. No murmur heard.  Pulmonary:      Effort: Pulmonary effort is normal. He is not intubated.      Breath sounds: Normal breath sounds.   Abdominal:      General: Abdomen is flat. Bowel sounds are normal.      Palpations: Abdomen is soft.   Musculoskeletal:         General: Normal range of motion.      Cervical back: Normal range of motion and neck supple.      Right lower leg: No edema.      Left lower leg: No edema.   Skin:     General: Skin is warm and dry.      Capillary Refill: Capillary refill takes less than 2 seconds.   Neurological:      General: No focal deficit present.      Mental Status: He is alert and oriented to person, place, and time.   Psychiatric:         Mood and Affect: Mood normal.         Behavior: Behavior normal.            Vents:  Oxygen Concentration (%): 50 (03/28/24 0344)  Lines/Drains/Airways       Peripheral Intravenous Line  Duration                  Midline Catheter - Double Lumen 03/26/24 1507 Left basilic vein (medial side of arm)  1 day                  Significant Labs:    CBC/Anemia Profile:  Recent Labs   Lab 03/27/24  0545   WBC 10.65   HGB 12.9*   HCT 40.4      MCV 82.4   RDW 15.9*        Chemistries:  Recent Labs   Lab 03/27/24  0545 03/27/24  1532 03/28/24  0341   *  --  130*   K 2.7* 3.3* 3.6   CL 87*  --  85*   CO2 37*  --  40*   BUN 16  --  14   CREATININE 0.89  --  0.90   CALCIUM 9.6  --  9.3   ALBUMIN 3.3*  --   --    PROT 7.4  --   --    BILITOT 1.2  --   --    ALKPHOS 117*  --   --    ALT 30  --   --    AST 32  --   --    MG  --   --  2.0       Recent Lab Results  (Last 5 results in the past 24 hours)        03/28/24  0532   03/28/24  0341   03/27/24 2018 03/27/24  1735   03/27/24  1532        Anion Gap   9             BUN    14             BUN/CREAT RATIO   16             Calcium   9.3             Chloride   85             CO2   40             Creatinine   0.90             Digoxin Level   0.7             eGFR   102             Glucose   137             Magnesium    2.0             POC Glucose 179     272   202         Potassium   3.6       3.3       Sodium   130                                    Significant Imaging:  I have reviewed all pertinent imaging results/findings within the past 24 hours.    ABG  Recent Labs   Lab 03/21/24  1329   PH 7.52*   PO2 45   PCO2 37   HCO3 30.2*     Assessment/Plan:     Neuro  Hemiparesis affecting right side as late effect of cerebrovascular accident  His his baseline has aphasia, today's the best he has been since I have seen him he is more alert trying to talk can move better his right arm swelling is markedly improved    Pulmonary  Acute hypoxemic respiratory failure  Continue wean O2 as tolerated    Cardiac/Vascular  Shock  Resolved    End stage heart failure  Ejection fraction 20%   Markedly better managed by Dr. Ceron    Coronary artery disease involving coronary bypass graft of native heart without angina pectoris  Continue meds    Essential (primary) hypertension  On low side for offloading    Renal/  Hypokalemia  Mag level okay potassium better    ID  * Sepsis  Not an issue    Bacteremia  Likely contaminate - following repeat cultures, stop antibiotics     Endocrine  Type 2 diabetes mellitus  SSI   Sugars now goal    Other  ROSEMARIE (obstructive sleep apnea)  CPAP at night              Kai Peck MD  Critical Care Medicine  [facility]

## 2024-03-29 PROBLEM — R78.81 BACTEREMIA: Status: RESOLVED | Noted: 2024-03-22 | Resolved: 2024-03-29

## 2024-03-29 PROBLEM — A41.9 SEPSIS: Status: RESOLVED | Noted: 2024-03-20 | Resolved: 2024-03-29

## 2024-03-29 LAB
ANION GAP SERPL CALCULATED.3IONS-SCNC: 7 MMOL/L (ref 7–16)
BUN SERPL-MCNC: 15 MG/DL (ref 7–18)
BUN/CREAT SERPL: 14 (ref 6–20)
CALCIUM SERPL-MCNC: 9.9 MG/DL (ref 8.5–10.1)
CHLORIDE SERPL-SCNC: 81 MMOL/L (ref 98–107)
CO2 SERPL-SCNC: 41 MMOL/L (ref 21–32)
CREAT SERPL-MCNC: 1.09 MG/DL (ref 0.7–1.3)
EGFR (NO RACE VARIABLE) (RUSH/TITUS): 81 ML/MIN/1.73M2
GLUCOSE SERPL-MCNC: 142 MG/DL (ref 70–105)
GLUCOSE SERPL-MCNC: 149 MG/DL (ref 70–105)
GLUCOSE SERPL-MCNC: 154 MG/DL (ref 70–105)
GLUCOSE SERPL-MCNC: 154 MG/DL (ref 74–106)
GLUCOSE SERPL-MCNC: 173 MG/DL (ref 70–105)
POTASSIUM SERPL-SCNC: 3.7 MMOL/L (ref 3.5–5.1)
SODIUM SERPL-SCNC: 125 MMOL/L (ref 136–145)

## 2024-03-29 PROCEDURE — 94640 AIRWAY INHALATION TREATMENT: CPT

## 2024-03-29 PROCEDURE — 25000242 PHARM REV CODE 250 ALT 637 W/ HCPCS: Performed by: HOSPITALIST

## 2024-03-29 PROCEDURE — 25000003 PHARM REV CODE 250

## 2024-03-29 PROCEDURE — 94761 N-INVAS EAR/PLS OXIMETRY MLT: CPT

## 2024-03-29 PROCEDURE — 25000242 PHARM REV CODE 250 ALT 637 W/ HCPCS

## 2024-03-29 PROCEDURE — 25000003 PHARM REV CODE 250: Performed by: NURSE PRACTITIONER

## 2024-03-29 PROCEDURE — 80048 BASIC METABOLIC PNL TOTAL CA: CPT | Performed by: INTERNAL MEDICINE

## 2024-03-29 PROCEDURE — 63600175 PHARM REV CODE 636 W HCPCS: Performed by: HOSPITALIST

## 2024-03-29 PROCEDURE — 27000221 HC OXYGEN, UP TO 24 HOURS

## 2024-03-29 PROCEDURE — 25000003 PHARM REV CODE 250: Performed by: HOSPITALIST

## 2024-03-29 PROCEDURE — 82962 GLUCOSE BLOOD TEST: CPT

## 2024-03-29 PROCEDURE — 25000003 PHARM REV CODE 250: Performed by: STUDENT IN AN ORGANIZED HEALTH CARE EDUCATION/TRAINING PROGRAM

## 2024-03-29 PROCEDURE — 11000001 HC ACUTE MED/SURG PRIVATE ROOM

## 2024-03-29 PROCEDURE — 99233 SBSQ HOSP IP/OBS HIGH 50: CPT | Mod: ,,, | Performed by: INTERNAL MEDICINE

## 2024-03-29 PROCEDURE — 99900035 HC TECH TIME PER 15 MIN (STAT)

## 2024-03-29 RX ORDER — SPIRONOLACTONE 25 MG/1
25 TABLET ORAL DAILY
Status: DISCONTINUED | OUTPATIENT
Start: 2024-03-29 | End: 2024-03-29

## 2024-03-29 RX ORDER — TORSEMIDE 20 MG/1
20 TABLET ORAL 2 TIMES DAILY
Status: DISCONTINUED | OUTPATIENT
Start: 2024-03-30 | End: 2024-04-03 | Stop reason: HOSPADM

## 2024-03-29 RX ADMIN — ONDANSETRON 8 MG: 2 INJECTION INTRAMUSCULAR; INTRAVENOUS at 06:03

## 2024-03-29 RX ADMIN — IPRATROPIUM BROMIDE AND ALBUTEROL SULFATE 3 ML: 2.5; .5 SOLUTION RESPIRATORY (INHALATION) at 07:03

## 2024-03-29 RX ADMIN — AMIODARONE HYDROCHLORIDE 200 MG: 200 TABLET ORAL at 08:03

## 2024-03-29 RX ADMIN — IPRATROPIUM BROMIDE AND ALBUTEROL SULFATE 3 ML: 2.5; .5 SOLUTION RESPIRATORY (INHALATION) at 12:03

## 2024-03-29 RX ADMIN — TRAZODONE HYDROCHLORIDE 50 MG: 50 TABLET ORAL at 08:03

## 2024-03-29 RX ADMIN — SACUBITRIL AND VALSARTAN 1 TABLET: 24; 26 TABLET, FILM COATED ORAL at 08:03

## 2024-03-29 RX ADMIN — POTASSIUM CHLORIDE 40 MEQ: 1500 TABLET, EXTENDED RELEASE ORAL at 08:03

## 2024-03-29 RX ADMIN — ATORVASTATIN CALCIUM 40 MG: 40 TABLET, FILM COATED ORAL at 08:03

## 2024-03-29 RX ADMIN — PANTOPRAZOLE SODIUM 40 MG: 40 TABLET, DELAYED RELEASE ORAL at 08:03

## 2024-03-29 RX ADMIN — ONDANSETRON 8 MG: 2 INJECTION INTRAMUSCULAR; INTRAVENOUS at 08:03

## 2024-03-29 RX ADMIN — ASPIRIN 81 MG: 81 TABLET, COATED ORAL at 08:03

## 2024-03-29 RX ADMIN — DOCUSATE SODIUM 100 MG: 100 CAPSULE, LIQUID FILLED ORAL at 08:03

## 2024-03-29 RX ADMIN — ENOXAPARIN SODIUM 40 MG: 40 INJECTION SUBCUTANEOUS at 05:03

## 2024-03-29 RX ADMIN — BUDESONIDE INHALATION 0.5 MG: 0.5 SUSPENSION RESPIRATORY (INHALATION) at 07:03

## 2024-03-29 RX ADMIN — OXYCODONE 10 MG: 5 TABLET ORAL at 08:03

## 2024-03-29 RX ADMIN — OXYCODONE 10 MG: 5 TABLET ORAL at 06:03

## 2024-03-29 RX ADMIN — DIGOXIN 0.12 MG: 125 TABLET ORAL at 08:03

## 2024-03-29 RX ADMIN — EMPAGLIFLOZIN 10 MG: 10 TABLET, FILM COATED ORAL at 08:03

## 2024-03-29 NOTE — SUBJECTIVE & OBJECTIVE
Interval History/Significant Events:  Patient without complaints resting    Review of Systems  Objective:     Vital Signs (Most Recent):  Temp: 98.4 °F (36.9 °C) (03/29/24 0300)  Pulse: 93 (03/29/24 0615)  Resp: 10 (03/29/24 0615)  BP: (!) 88/39 (03/29/24 0600)  SpO2: 97 % (03/29/24 0615) Vital Signs (24h Range):  Temp:  [98.2 °F (36.8 °C)-98.7 °F (37.1 °C)] 98.4 °F (36.9 °C)  Pulse:  [] 93  Resp:  [9-26] 10  SpO2:  [81 %-100 %] 97 %  BP: ()/() 88/39   Weight: 96.6 kg (212 lb 15.4 oz)  Body mass index is 27.34 kg/m².      Intake/Output Summary (Last 24 hours) at 3/29/2024 0725  Last data filed at 3/29/2024 0605  Gross per 24 hour   Intake --   Output 7900 ml   Net -7900 ml          Physical Exam  Vitals reviewed.   Constitutional:       Appearance: Normal appearance.      Interventions: He is not intubated.  HENT:      Head: Normocephalic and atraumatic.      Nose: Nose normal.      Mouth/Throat:      Mouth: Mucous membranes are dry.      Pharynx: Oropharynx is clear.   Eyes:      Extraocular Movements: Extraocular movements intact.      Conjunctiva/sclera: Conjunctivae normal.      Pupils: Pupils are equal, round, and reactive to light.   Cardiovascular:      Rate and Rhythm: Normal rate.      Heart sounds: Normal heart sounds. No murmur heard.  Pulmonary:      Effort: Pulmonary effort is normal. He is not intubated.      Breath sounds: Normal breath sounds.   Abdominal:      General: Abdomen is flat. Bowel sounds are normal.      Palpations: Abdomen is soft.   Musculoskeletal:         General: Normal range of motion.      Cervical back: Normal range of motion and neck supple.      Right lower leg: No edema.      Left lower leg: No edema.   Skin:     General: Skin is warm and dry.      Capillary Refill: Capillary refill takes less than 2 seconds.   Neurological:      General: No focal deficit present.      Mental Status: He is alert and oriented to person, place, and time.   Psychiatric:          "Mood and Affect: Mood normal.         Behavior: Behavior normal.            Vents:  Oxygen Concentration (%): 32 (03/29/24 0037)  Lines/Drains/Airways       Peripheral Intravenous Line  Duration                  Midline Catheter - Double Lumen 03/26/24 1507 Left basilic vein (medial side of arm)  2 days                  Significant Labs:    CBC/Anemia Profile:  No results for input(s): "WBC", "HGB", "HCT", "PLT", "MCV", "RDW", "IRON", "FERRITIN", "RETIC", "FOLATE", "[id number]", "OCCULTBLOOD" in the last 48 hours.     Chemistries:  Recent Labs   Lab 03/27/24  1532 03/28/24  0341 03/29/24  0303   NA  --  130* 125*   K 3.3* 3.6 3.7   CL  --  85* 81*   CO2  --  40* 41*   BUN  --  14 15   CREATININE  --  0.90 1.09   CALCIUM  --  9.3 9.9   MG  --  2.0  --        Recent Lab Results         03/29/24  0539   03/29/24  0303   03/28/24  2019   03/28/24  1554        Anion Gap   7           BUN   15           BUN/CREAT RATIO   14           Calcium   9.9           Chloride   81           CO2   41           Creatinine   1.09           eGFR   81           Glucose   154           POC Glucose 142     144   220       Potassium   3.7           Sodium   125                   Significant Imaging:  I have reviewed all pertinent imaging results/findings within the past 24 hours.  "

## 2024-03-29 NOTE — PLAN OF CARE
Problem: Diabetes Comorbidity  Goal: Blood Glucose Level Within Targeted Range  Outcome: Ongoing, Progressing  Intervention: Monitor and Manage Glycemia  Flowsheets (Taken 3/29/2024 0423)  Glycemic Management: blood glucose monitored     Problem: Skin Injury Risk Increased  Goal: Skin Health and Integrity  Outcome: Ongoing, Progressing  Intervention: Optimize Skin Protection  Flowsheets (Taken 3/29/2024 0423)  Pressure Reduction Techniques: frequent weight shift encouraged  Pressure Reduction Devices: foam padding utilized  Skin Protection: adhesive use limited  Head of Bed (HOB) Positioning: HOB at 30-45 degrees  Intervention: Promote and Optimize Oral Intake  Flowsheets (Taken 3/29/2024 0423)  Oral Nutrition Promotion: calorie-dense foods provided

## 2024-03-29 NOTE — PROGRESS NOTES
[facility]  Critical Care Medicine  Progress Note    Patient Name: Karin Carrera  MRN: [id number]  Admission Date: 3/19/2024  Hospital Length of Stay: 10 days  Code Status: Full Code  Attending Provider: Kai Peck MD  Primary Care Provider: Walker Smith MD   Principal Problem: Sepsis    Subjective:     HPI:  53-year-old white male presents with shortness of breath and abdominal pain for 2 days recently had a cholecystectomy 03/09/2024.  Patient has a history of COPD he complains of increased leg swelling.  Patient has a history ejection fraction 25% previous stroke he has been hypoxic during this admission grew out Gram-positive Gram-negative bacilli out of his blood he has been brought down to the unit for worsening blood pressure and hypoxemia    Hospital/ICU Course:  3/24- placed on HFNC overnight - will continue to wean,day 3 of Dobutamine   03/29/2024 patient has been on Milrinone drip and is still on Lasix drip he is diuresed nicely is down to 96 kg he is ready for discharge to the floor his swollen arms improved.  Home a couple of days    Interval History/Significant Events:  Patient without complaints resting    Review of Systems  Objective:     Vital Signs (Most Recent):  Temp: 98.4 °F (36.9 °C) (03/29/24 0300)  Pulse: 93 (03/29/24 0615)  Resp: 10 (03/29/24 0615)  BP: (!) 88/39 (03/29/24 0600)  SpO2: 97 % (03/29/24 0615) Vital Signs (24h Range):  Temp:  [98.2 °F (36.8 °C)-98.7 °F (37.1 °C)] 98.4 °F (36.9 °C)  Pulse:  [] 93  Resp:  [9-26] 10  SpO2:  [81 %-100 %] 97 %  BP: ()/() 88/39   Weight: 96.6 kg (212 lb 15.4 oz)  Body mass index is 27.34 kg/m².      Intake/Output Summary (Last 24 hours) at 3/29/2024 07  Last data filed at 3/29/2024 0605  Gross per 24 hour   Intake --   Output 7900 ml   Net -7900 ml          Physical Exam  Vitals reviewed.   Constitutional:       Appearance: Normal appearance.      Interventions: He is not  "intubated.  HENT:      Head: Normocephalic and atraumatic.      Nose: Nose normal.      Mouth/Throat:      Mouth: Mucous membranes are dry.      Pharynx: Oropharynx is clear.   Eyes:      Extraocular Movements: Extraocular movements intact.      Conjunctiva/sclera: Conjunctivae normal.      Pupils: Pupils are equal, round, and reactive to light.   Cardiovascular:      Rate and Rhythm: Normal rate.      Heart sounds: Normal heart sounds. No murmur heard.  Pulmonary:      Effort: Pulmonary effort is normal. He is not intubated.      Breath sounds: Normal breath sounds.   Abdominal:      General: Abdomen is flat. Bowel sounds are normal.      Palpations: Abdomen is soft.   Musculoskeletal:         General: Normal range of motion.      Cervical back: Normal range of motion and neck supple.      Right lower leg: No edema.      Left lower leg: No edema.   Skin:     General: Skin is warm and dry.      Capillary Refill: Capillary refill takes less than 2 seconds.   Neurological:      General: No focal deficit present.      Mental Status: He is alert and oriented to person, place, and time.   Psychiatric:         Mood and Affect: Mood normal.         Behavior: Behavior normal.            Vents:  Oxygen Concentration (%): 32 (03/29/24 0037)  Lines/Drains/Airways       Peripheral Intravenous Line  Duration                  Midline Catheter - Double Lumen 03/26/24 1507 Left basilic vein (medial side of arm)  2 days                  Significant Labs:    CBC/Anemia Profile:  No results for input(s): "WBC", "HGB", "HCT", "PLT", "MCV", "RDW", "IRON", "FERRITIN", "RETIC", "FOLATE", "[id number]", "OCCULTBLOOD" in the last 48 hours.     Chemistries:  Recent Labs   Lab 03/27/24  1532 03/28/24  0341 03/29/24  0303   NA  --  130* 125*   K 3.3* 3.6 3.7   CL  --  85* 81*   CO2  --  40* 41*   BUN  --  14 15   CREATININE  --  0.90 1.09   CALCIUM  --  9.3 9.9   MG  --  2.0  --        Recent Lab Results         03/29/24  0539   03/29/24  0303  " " 03/28/24  2019   03/28/24  1554        Anion Gap   7           BUN   15           BUN/CREAT RATIO   14           Calcium   9.9           Chloride   81           CO2   41           Creatinine   1.09           eGFR   81           Glucose   154           POC Glucose 142     144   220       Potassium   3.7           Sodium   125                   Significant Imaging:  I have reviewed all pertinent imaging results/findings within the past 24 hours.    ABG  No results for input(s): "PH", "PO2", "PCO2", "HCO3", "BE" in the last 168 hours.  Assessment/Plan:     Neuro  Hemiparesis affecting right side as late effect of cerebrovascular accident  His his baseline has aphasia, today's the best he has been since I have seen him he is more alert trying to talk can move better his right arm swelling is markedly improved    Pulmonary  Acute hypoxemic respiratory failure  Nasal cannula oxygen    Cardiac/Vascular  Shock  Resolved    End stage heart failure  Ejection fraction 20%   Markedly better can go to the floor    Coronary artery disease involving coronary bypass graft of native heart without angina pectoris  Continue meds    Essential (primary) hypertension  Blood pressure running around 90 probably where we want to be with his level of heart disease    Renal/  Hypokalemia  Much better    ID  * Sepsis  Not an issue    Bacteremia  Likely contaminate - following repeat cultures, stop antibiotics     Endocrine  Type 2 diabetes mellitus  SSI   Sugars at  goal    Other  ROSEMARIE (obstructive sleep apnea)  CPAP at night              Kai Peck MD  Critical Care Medicine  [facility]  "

## 2024-03-30 PROBLEM — E87.1 HYPONATREMIA: Status: ACTIVE | Noted: 2024-03-30

## 2024-03-30 PROBLEM — R57.0 CARDIOGENIC SHOCK: Status: ACTIVE | Noted: 2024-03-22

## 2024-03-30 LAB
ANION GAP SERPL CALCULATED.3IONS-SCNC: 9 MMOL/L (ref 7–16)
BUN SERPL-MCNC: 18 MG/DL (ref 7–18)
BUN/CREAT SERPL: 21 (ref 6–20)
CALCIUM SERPL-MCNC: 9.4 MG/DL (ref 8.5–10.1)
CHLORIDE SERPL-SCNC: 82 MMOL/L (ref 98–107)
CO2 SERPL-SCNC: 36 MMOL/L (ref 21–32)
CREAT SERPL-MCNC: 0.84 MG/DL (ref 0.7–1.3)
DIGOXIN SERPL-MCNC: 0.8 NG/ML (ref 0.8–2)
EGFR (NO RACE VARIABLE) (RUSH/TITUS): 104 ML/MIN/1.73M2
GLUCOSE SERPL-MCNC: 178 MG/DL (ref 70–105)
GLUCOSE SERPL-MCNC: 183 MG/DL (ref 74–106)
GLUCOSE SERPL-MCNC: 188 MG/DL (ref 70–105)
GLUCOSE SERPL-MCNC: 205 MG/DL (ref 70–105)
LACTATE SERPL-SCNC: 0.9 MMOL/L (ref 0.4–2)
POTASSIUM SERPL-SCNC: 3.6 MMOL/L (ref 3.5–5.1)
SODIUM SERPL-SCNC: 123 MMOL/L (ref 136–145)
SODIUM UR-SCNC: 28 MMOL/L (ref 40–220)

## 2024-03-30 PROCEDURE — 25000242 PHARM REV CODE 250 ALT 637 W/ HCPCS: Performed by: HOSPITALIST

## 2024-03-30 PROCEDURE — 25000003 PHARM REV CODE 250: Performed by: INTERNAL MEDICINE

## 2024-03-30 PROCEDURE — 80162 ASSAY OF DIGOXIN TOTAL: CPT | Performed by: STUDENT IN AN ORGANIZED HEALTH CARE EDUCATION/TRAINING PROGRAM

## 2024-03-30 PROCEDURE — 99900035 HC TECH TIME PER 15 MIN (STAT)

## 2024-03-30 PROCEDURE — 27000221 HC OXYGEN, UP TO 24 HOURS

## 2024-03-30 PROCEDURE — 82962 GLUCOSE BLOOD TEST: CPT

## 2024-03-30 PROCEDURE — 84300 ASSAY OF URINE SODIUM: CPT | Performed by: STUDENT IN AN ORGANIZED HEALTH CARE EDUCATION/TRAINING PROGRAM

## 2024-03-30 PROCEDURE — 94640 AIRWAY INHALATION TREATMENT: CPT

## 2024-03-30 PROCEDURE — 83930 ASSAY OF BLOOD OSMOLALITY: CPT | Mod: 90 | Performed by: STUDENT IN AN ORGANIZED HEALTH CARE EDUCATION/TRAINING PROGRAM

## 2024-03-30 PROCEDURE — 25000242 PHARM REV CODE 250 ALT 637 W/ HCPCS

## 2024-03-30 PROCEDURE — 99233 SBSQ HOSP IP/OBS HIGH 50: CPT | Mod: ,,, | Performed by: STUDENT IN AN ORGANIZED HEALTH CARE EDUCATION/TRAINING PROGRAM

## 2024-03-30 PROCEDURE — 80048 BASIC METABOLIC PNL TOTAL CA: CPT | Performed by: INTERNAL MEDICINE

## 2024-03-30 PROCEDURE — 25000003 PHARM REV CODE 250: Performed by: NURSE PRACTITIONER

## 2024-03-30 PROCEDURE — 25000242 PHARM REV CODE 250 ALT 637 W/ HCPCS: Performed by: INTERNAL MEDICINE

## 2024-03-30 PROCEDURE — 94761 N-INVAS EAR/PLS OXIMETRY MLT: CPT

## 2024-03-30 PROCEDURE — 25000003 PHARM REV CODE 250

## 2024-03-30 PROCEDURE — 25000003 PHARM REV CODE 250: Performed by: STUDENT IN AN ORGANIZED HEALTH CARE EDUCATION/TRAINING PROGRAM

## 2024-03-30 PROCEDURE — 83605 ASSAY OF LACTIC ACID: CPT | Performed by: STUDENT IN AN ORGANIZED HEALTH CARE EDUCATION/TRAINING PROGRAM

## 2024-03-30 PROCEDURE — 83935 ASSAY OF URINE OSMOLALITY: CPT | Mod: 90 | Performed by: STUDENT IN AN ORGANIZED HEALTH CARE EDUCATION/TRAINING PROGRAM

## 2024-03-30 PROCEDURE — 63600175 PHARM REV CODE 636 W HCPCS: Performed by: INTERNAL MEDICINE

## 2024-03-30 PROCEDURE — 11000001 HC ACUTE MED/SURG PRIVATE ROOM

## 2024-03-30 RX ORDER — PANTOPRAZOLE SODIUM 40 MG/1
40 TABLET, DELAYED RELEASE ORAL
Status: DISCONTINUED | OUTPATIENT
Start: 2024-03-31 | End: 2024-04-03 | Stop reason: HOSPADM

## 2024-03-30 RX ADMIN — ASPIRIN 81 MG: 81 TABLET, COATED ORAL at 08:03

## 2024-03-30 RX ADMIN — BUDESONIDE INHALATION 0.5 MG: 0.5 SUSPENSION RESPIRATORY (INHALATION) at 07:03

## 2024-03-30 RX ADMIN — AMIODARONE HYDROCHLORIDE 200 MG: 200 TABLET ORAL at 08:03

## 2024-03-30 RX ADMIN — DOCUSATE SODIUM 100 MG: 100 CAPSULE, LIQUID FILLED ORAL at 08:03

## 2024-03-30 RX ADMIN — POTASSIUM CHLORIDE 40 MEQ: 1500 TABLET, EXTENDED RELEASE ORAL at 08:03

## 2024-03-30 RX ADMIN — ENOXAPARIN SODIUM 40 MG: 40 INJECTION SUBCUTANEOUS at 05:03

## 2024-03-30 RX ADMIN — ATORVASTATIN CALCIUM 40 MG: 40 TABLET, FILM COATED ORAL at 08:03

## 2024-03-30 RX ADMIN — TORSEMIDE 20 MG: 20 TABLET ORAL at 08:03

## 2024-03-30 RX ADMIN — OXYCODONE 10 MG: 5 TABLET ORAL at 08:03

## 2024-03-30 RX ADMIN — OXYCODONE 10 MG: 5 TABLET ORAL at 03:03

## 2024-03-30 RX ADMIN — EMPAGLIFLOZIN 10 MG: 10 TABLET, FILM COATED ORAL at 08:03

## 2024-03-30 RX ADMIN — TRAZODONE HYDROCHLORIDE 50 MG: 50 TABLET ORAL at 08:03

## 2024-03-30 RX ADMIN — OXYCODONE 10 MG: 5 TABLET ORAL at 12:03

## 2024-03-30 RX ADMIN — TORSEMIDE 20 MG: 20 TABLET ORAL at 05:03

## 2024-03-30 RX ADMIN — Medication 6 MG: at 08:03

## 2024-03-30 RX ADMIN — IPRATROPIUM BROMIDE AND ALBUTEROL SULFATE 3 ML: 2.5; .5 SOLUTION RESPIRATORY (INHALATION) at 07:03

## 2024-03-30 RX ADMIN — SACUBITRIL AND VALSARTAN 1 TABLET: 24; 26 TABLET, FILM COATED ORAL at 08:03

## 2024-03-30 RX ADMIN — IPRATROPIUM BROMIDE AND ALBUTEROL SULFATE 3 ML: 2.5; .5 SOLUTION RESPIRATORY (INHALATION) at 01:03

## 2024-03-30 RX ADMIN — ONDANSETRON 8 MG: 2 INJECTION INTRAMUSCULAR; INTRAVENOUS at 05:03

## 2024-03-30 RX ADMIN — DIGOXIN 0.12 MG: 125 TABLET ORAL at 08:03

## 2024-03-30 RX ADMIN — IPRATROPIUM BROMIDE AND ALBUTEROL SULFATE 3 ML: 2.5; .5 SOLUTION RESPIRATORY (INHALATION) at 12:03

## 2024-03-30 NOTE — ASSESSMENT & PLAN NOTE
Acute hypoxic respiratory failure due to many etiologies :  Pulmonary edema 2/2 HFrEF  COPD  Pneumonia>consolidation  -now c/f pt developing ARDS  -cont broad spectrum iv abx  -cont aggressive diuersis  -cont inhalers  -oxygen support, currently on HFNC  -Pulmonary and crtical care team consulted, appreicate recs.   3/30 now euvolemic.  We will test for home oxygen

## 2024-03-30 NOTE — SUBJECTIVE & OBJECTIVE
Interval History:  No acute events overnight    Review of Systems   Constitutional: Negative.  Negative for chills and fever.   HENT: Negative.     Eyes:  Negative for pain and redness.   Respiratory:  Positive for shortness of breath. Negative for cough, chest tightness and wheezing.    Cardiovascular:  Negative for chest pain and palpitations.   Gastrointestinal:  Negative for abdominal pain, diarrhea, nausea and vomiting.   Endocrine: Negative for cold intolerance, heat intolerance and polyuria.   Genitourinary:  Negative for difficulty urinating, dysuria, frequency and hematuria.   Musculoskeletal:  Negative for arthralgias, back pain, myalgias, neck pain and neck stiffness.   Skin:  Negative for pallor and rash.   Allergic/Immunologic: Negative.    Neurological:  Negative for dizziness, syncope, weakness, numbness and headaches.   Hematological:  Negative for adenopathy.   Psychiatric/Behavioral:  Negative for agitation, confusion and hallucinations. The patient is not nervous/anxious.      Objective:     Vital Signs (Most Recent):  Temp: 97.6 °F (36.4 °C) (03/30/24 0650)  Pulse: 92 (03/30/24 0715)  Resp: 18 (03/30/24 0838)  BP: 128/74 (03/30/24 0650)  SpO2: 98 % (03/30/24 0715) Vital Signs (24h Range):  Temp:  [96.3 °F (35.7 °C)-97.7 °F (36.5 °C)] 97.6 °F (36.4 °C)  Pulse:  [] 92  Resp:  [12-20] 18  SpO2:  [93 %-100 %] 98 %  BP: ()/(41-99) 128/74     Weight: 96.6 kg (212 lb 15.4 oz)  Body mass index is 27.34 kg/m².    Intake/Output Summary (Last 24 hours) at 3/30/2024 0942  Last data filed at 3/30/2024 0400  Gross per 24 hour   Intake 240.44 ml   Output 1475 ml   Net -1234.56 ml         Physical Exam  Vitals reviewed.   Constitutional:       Appearance: Normal appearance.   HENT:      Head: Normocephalic and atraumatic.   Eyes:      Pupils: Pupils are equal, round, and reactive to light.   Cardiovascular:      Rate and Rhythm: Normal rate and regular rhythm.      Pulses: Normal pulses.   Pulmonary:       Effort: Pulmonary effort is normal.      Breath sounds: Normal breath sounds.   Abdominal:      General: Abdomen is flat.      Palpations: Abdomen is soft.   Skin:     General: Skin is warm and dry.      Capillary Refill: Capillary refill takes less than 2 seconds.   Neurological:      Mental Status: He is alert. Mental status is at baseline.   Psychiatric:         Mood and Affect: Mood normal.             Significant Labs: All pertinent labs within the past 24 hours have been reviewed.    Significant Imaging: I have reviewed all pertinent imaging results/findings within the past 24 hours.

## 2024-03-30 NOTE — ASSESSMENT & PLAN NOTE
Patient with known CAD s/p CABG, which is controlled Will continue ASA and Statin and monitor for S/Sx of angina/ACS. Continue to monitor on telemetry.     He has a history of CAD s/p CABG; continue home meds

## 2024-03-30 NOTE — ASSESSMENT & PLAN NOTE
Continue home meds; he is always SOB and has ROSEMARIE non-compliant with CPAP, smokes, and does not take home meds; CXR shows patchy ground-glass and reticular infiltrate/edema throughout the bilateral lungs; on IV Lasix  -cardiology consulted, now on inotrope infusion along w/ iv lasix. Cont GDMT per cardiology  3/30 euvolemic.  GD MT at discharge.

## 2024-03-30 NOTE — ASSESSMENT & PLAN NOTE
He is s/p recent cholecystectomy.  Blood cultures show G+ cocci and G- bacilli; on Vancomycin and Maxipime and Flagyl; will await ID of organisms in blood cultures; followed by Surgery  3/30 antibiotics completed

## 2024-03-30 NOTE — ASSESSMENT & PLAN NOTE
Patient has hyponatremia which is uncontrolled,We will aim to correct the sodium by 4-6mEq in 24 hours. We will monitor sodium Daily. The hyponatremia is due to Medications:  Diuretics. We will obtain the following studies: Urine sodium, urine osmolality, serum osmolality. We will treat the hyponatremia with Removal of offending medications. The patient's sodium results have been reviewed and are listed below.  Recent Labs   Lab 03/30/24  0401   *

## 2024-03-30 NOTE — PROGRESS NOTES
[facility]  [facility] Medicine  Progress Note    Patient Name: Karin Carrera  MRN: [id number]  Patient Class: IP- Inpatient   Admission Date: 3/19/2024  Length of Stay: 11 days  Attending Physician: Aleks Whipple DO  Primary Care Provider: Walker Smith MD        Subjective:     Principal Problem:Sepsis without acute organ dysfunction        HPI:  Patient is a 52yo male who presents to [facility] ED via EMS with SOB and abdominal pain for 2 days. He reports recently having a cholecystectomy on 3/9/24 here at [place] by Dr. Bennett. He endorses RUQ abdominal pain and SOB. SOB is similar to his previous COPD episodes. Endorses orthopnea and INFANTE. He endorses surgical site drainage mostly pus that started 2 days ago. Endorses decreased appetite around the same time. Endorses bilateral leg swelling and right leg redness for about 1 month and now swelling has gone up his thighs and abdomen in the past 2 days. Denies f/c/cp/n/v/d. Denies urinary, or bowel habit changes. Reports compliance with all home medications.    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 Patient sees Dr. Escobar Cardiology and Dr. Smith PCP. Patient uses 2L NC O2 at home. He communicates through writing on paper and texts due to his expressive aphasia 2/2 CVA.    Upon presentation, VS remarkable for 104/65, 114, 30. Labs remarkable for H/H 11.9/39.1, HCO3 34, glucose 138. Mg 2.7. BNP 7486, troponin 22.2, 19.5. PT 16.2, INR 1.32, PTT 30.9. COVID negative. ABG 7.46, 50, 20, 35.6. EKG sinus tach 110 with multifocal PVCs. CXR showed continued cardiomegaly. Patchy ground-glass and reticular infiltrate/edema throughout the bilateral lungs. Small bilateral pleural fluid. Constellation of findings suspicious for CHF.  Underlying infection not excluded. CT abdomen and pelvis w/o contrast showed study limited secondary to lack of IV contrast. Interval cholecystectomy. There is a small air-fluid collection within the gallbladder fossa measuring up to 3.7 cm which may reflect postoperative fluid collection or abscess. Interval increased diffuse body wall edema. Cardiomegaly and coronary artery calcifications. Scattered interlobular septal thickening of the lung bases suggestive of interstitial pulmonary edema with trace bilateral pleural effusions. There is some calcification of the left ventricle apex which may reflect sequela of remote infarct. Patient received Lasix 60 IV and is admitted to hospital medicine for further management and care.    Overview/Hospital Course:  53 year old male with an extensive PMH presents with abdominal pain; he had recent cholecystectomy.  He is complaining of abdominal pain and SOB.  3/30 breathing much better.  Volume status improved.    Interval History:  No acute events overnight    Review of Systems   Constitutional: Negative.  Negative for chills and fever.   HENT: Negative.     Eyes:  Negative for pain and redness.   Respiratory:  Positive for shortness of breath. Negative for cough, chest tightness and wheezing.    Cardiovascular:  Negative for chest pain and palpitations.   Gastrointestinal:  Negative for abdominal pain, diarrhea, nausea and vomiting.   Endocrine: Negative for cold intolerance, heat intolerance and polyuria.   Genitourinary:  Negative for difficulty urinating, dysuria, frequency and hematuria.   Musculoskeletal:  Negative for arthralgias, back pain, myalgias, neck pain and neck stiffness.   Skin:  Negative for pallor and rash.   Allergic/Immunologic: Negative.    Neurological:  Negative for dizziness, syncope, weakness, numbness and headaches.   Hematological:  Negative for adenopathy.   Psychiatric/Behavioral:  Negative for agitation, confusion and hallucinations. The  patient is not nervous/anxious.      Objective:     Vital Signs (Most Recent):  Temp: 97.6 °F (36.4 °C) (03/30/24 0650)  Pulse: 92 (03/30/24 0715)  Resp: 18 (03/30/24 0838)  BP: 128/74 (03/30/24 0650)  SpO2: 98 % (03/30/24 0715) Vital Signs (24h Range):  Temp:  [96.3 °F (35.7 °C)-97.7 °F (36.5 °C)] 97.6 °F (36.4 °C)  Pulse:  [] 92  Resp:  [12-20] 18  SpO2:  [93 %-100 %] 98 %  BP: ()/(41-99) 128/74     Weight: 96.6 kg (212 lb 15.4 oz)  Body mass index is 27.34 kg/m².    Intake/Output Summary (Last 24 hours) at 3/30/2024 0942  Last data filed at 3/30/2024 0400  Gross per 24 hour   Intake 240.44 ml   Output 1475 ml   Net -1234.56 ml         Physical Exam  Vitals reviewed.   Constitutional:       Appearance: Normal appearance.   HENT:      Head: Normocephalic and atraumatic.   Eyes:      Pupils: Pupils are equal, round, and reactive to light.   Cardiovascular:      Rate and Rhythm: Normal rate and regular rhythm.      Pulses: Normal pulses.   Pulmonary:      Effort: Pulmonary effort is normal.      Breath sounds: Normal breath sounds.   Abdominal:      General: Abdomen is flat.      Palpations: Abdomen is soft.   Skin:     General: Skin is warm and dry.      Capillary Refill: Capillary refill takes less than 2 seconds.   Neurological:      Mental Status: He is alert. Mental status is at baseline.   Psychiatric:         Mood and Affect: Mood normal.             Significant Labs: All pertinent labs within the past 24 hours have been reviewed.    Significant Imaging: I have reviewed all pertinent imaging results/findings within the past 24 hours.    Assessment/Plan:      * Sepsis without acute organ dysfunction  He is s/p recent cholecystectomy.  Blood cultures show G+ cocci and G- bacilli; on Vancomycin and Maxipime and Flagyl; will await ID of organisms in blood cultures; followed by Surgery  3/30 antibiotics completed    Hyponatremia  Patient has hyponatremia which is uncontrolled,We will aim to correct the  sodium by 4-6mEq in 24 hours. We will monitor sodium Daily. The hyponatremia is due to Medications:  Diuretics. We will obtain the following studies: Urine sodium, urine osmolality, serum osmolality. We will treat the hyponatremia with Removal of offending medications. The patient's sodium results have been reviewed and are listed below.  Recent Labs   Lab 03/30/24  0401   *       NSVT (nonsustained ventricular tachycardia)  Continue tele while here 3/30  3/30 continue dig-monitor for toxicity, continue amio-monitor for toxicity    Acute on chronic combined systolic and diastolic heart failure  Patient is identified as having Combined Systolic and Diastolic heart failure that is Acute on chronic. CHF is currently uncontrolled due to Pulmonary edema/pleural effusion on CXR. Latest ECHO performed and demonstrates- Results for orders placed during the hospital encounter of 03/19/24    Echo Saline Bubble? Yes    Interpretation Summary    Left Ventricle: The left ventricle is moderately dilated. Normal wall thickness. Regional wall motion abnormalities present.  Anterior wall and apex are akinetic.  Inferior wall and lateral wall are hypokinetic. There is severely reduced systolic function with a visually estimated ejection fraction of less than 30%. Ejection fraction by visual approximation is 20%. There is diastolic dysfunction.    Right Ventricle: Mild right ventricular enlargement.    Left Atrium: Left atrium is severely dilated.    Right Atrium: Right atrium is mildly dilated.    IVC/SVC: Elevated venous pressure at 15 mmHg.    Pericardium: There is a trivial effusion.  . Continue Furosemide and monitor clinical status closely. Monitor on telemetry. Patient is off CHF pathway.  Monitor strict Is&Os and daily weights.  Place on fluid restriction of 1.5 L. Cardiology has been consulted. Continue to stress to patient importance of self efficacy and  on diet for CHF. Last BNP reviewed- and noted below No  "results for input(s): "BNP", "BNPTRIAGEBLO" in the last 168 hours.   3/30 heart failure exacerbation much improved.  Starting guideline directed medical therapy.      Pulmonary emphysema  Patient's COPD is controlled currently.  Patient is currently off COPD Pathway. Continue scheduled inhalers Supplemental oxygen and monitor respiratory status closely.     Cardiogenic shock  3/30 now off inotropes.  Now off Lasix infusion.  G DMT at discharge      Hypokalemia  Patient has hypokalemia which is Acute and currently controlled. Most recent potassium levels reviewed-   Lab Results   Component Value Date    K 3.6 03/30/2024   . Will continue potassium replacement per protocol and recheck repeat levels after replacement completed.       Acute hypoxemic respiratory failure  Acute hypoxic respiratory failure due to many etiologies :  Pulmonary edema 2/2 HFrEF  COPD  Pneumonia>consolidation  -now c/f pt developing ARDS  -cont broad spectrum iv abx  -cont aggressive diuersis  -cont inhalers  -oxygen support, currently on HFNC  -Pulmonary and crtical care team consulted, appreicate recs.   3/30 now euvolemic.  We will test for home oxygen      ROSEMARIE (obstructive sleep apnea)  -Noncompliant with CPAP at home; CPAP in hospital ordered    Cellulitis of lower extremity  Cont iv abx   Resolved 3/30    End stage heart failure  Continue home meds; he is always SOB and has ROSEMARIE non-compliant with CPAP, smokes, and does not take home meds; CXR shows patchy ground-glass and reticular infiltrate/edema throughout the bilateral lungs; on IV Lasix  -cardiology consulted, now on inotrope infusion along w/ iv lasix. Cont GDMT per cardiology  3/30 euvolemic.  GD MT at discharge.    Type 2 diabetes mellitus  Patient's FSGs are controlled on current medication regimen.  Last A1c reviewed-   Lab Results   Component Value Date    HGBA1C 6.0 03/08/2024     Most recent fingerstick glucose reviewed- No results for input(s): "POCTGLUCOSE" in the last 24 " hours.  Current correctional scale  Low  Maintain anti-hyperglycemic dose as follows-   Antihyperglycemics (From admission, onward)      Start     Stop Route Frequency Ordered    03/20/24 0900  empagliflozin (Jardiance) tablet 10 mg        Question Answer Comment   Does this patient have a diagnosis of heart failure? Yes    Does this patient have type 1 diabetes or diabetic ketoacidosis? No    Does this patient have symptomatic hypotension? No    Is the patient NPO or pending major surgery in next 3 days or less? No        -- Oral Daily 03/20/24 0019    03/20/24 0124  insulin aspart U-100 injection 0-5 Units         -- SubQ Before meals & nightly PRN 03/20/24 0025          Hold Oral hypoglycemics while patient is in the hospital.    BS stable; continue home DM meds; on SSI; will continue to monitor    Hemiparesis affecting right side as late effect of cerebrovascular accident  On ASA and statin; he is at baseline; has aphasia  3/30 continue aspirin and statin.  Working with PT    Coronary artery disease involving coronary bypass graft of native heart without angina pectoris  Patient with known CAD s/p CABG, which is controlled Will continue ASA and Statin and monitor for S/Sx of angina/ACS. Continue to monitor on telemetry.     He has a history of CAD s/p CABG; continue home meds    Essential (primary) hypertension  BP stable; continue home meds  Chronic, controlled. Latest blood pressure and vitals reviewed-     Temp:  [96.3 °F (35.7 °C)-97.7 °F (36.5 °C)]   Pulse:  []   Resp:  [12-20]   BP: ()/(41-99)   SpO2:  [93 %-100 %] .   Home meds for hypertension were reviewed and noted below.   Hypertension Medications               amLODIPine (NORVASC) 10 MG tablet Take 10 mg by mouth once daily.    furosemide (LASIX) 40 MG tablet Take 1 tablet (40 mg total) by mouth 2 (two) times a day.    losartan (COZAAR) 25 MG tablet Take 0.5 tablets (12.5 mg total) by mouth once daily.    metoprolol succinate (TOPROL-XL)  25 MG 24 hr tablet Take 12.5 mg by mouth once daily. Take 1/2 tablet by mouth daily            While in the hospital, will manage blood pressure as follows; Continue home antihypertensive regimen    Will utilize p.r.n. blood pressure medication only if patient's blood pressure greater than 180/110 and he develops symptoms such as worsening chest pain or shortness of breath.       VTE Risk Mitigation (From admission, onward)           Ordered     enoxaparin injection 40 mg  Every 24 hours         03/19/24 0460                    Discharge Planning   DEANNA:      Code Status: Full Code   Is the patient medically ready for discharge?:     Reason for patient still in hospital (select all that apply): Treatment  Discharge Plan A: Skilled Nursing Facility                  Aleks Whipple DO  Department of Hospital Medicine   [facility]

## 2024-03-30 NOTE — PROGRESS NOTES
Brief cardiology note:    End stage systolic HF - patient is off inotrope therapy and s/p 20 + L of diuresis.   - he is euvolemic; however having issues with hyponatremia.   - GDMT for discharge - entresto 24/mg bid, digoxin mcg qd, jardiance 10mg qd, torsemide 20mg bid, amiodarone 200mg qd  - Ongoing issues with hyponatremia once euvolemic  - Cardiology will sign off; please call with questions  - Follow up with me in 1-2 weeks    Osmar Ceron  3/30/2024

## 2024-03-30 NOTE — ASSESSMENT & PLAN NOTE
BP stable; continue home meds  Chronic, controlled. Latest blood pressure and vitals reviewed-     Temp:  [96.3 °F (35.7 °C)-97.7 °F (36.5 °C)]   Pulse:  []   Resp:  [12-20]   BP: ()/(41-99)   SpO2:  [93 %-100 %] .   Home meds for hypertension were reviewed and noted below.   Hypertension Medications               amLODIPine (NORVASC) 10 MG tablet Take 10 mg by mouth once daily.    furosemide (LASIX) 40 MG tablet Take 1 tablet (40 mg total) by mouth 2 (two) times a day.    losartan (COZAAR) 25 MG tablet Take 0.5 tablets (12.5 mg total) by mouth once daily.    metoprolol succinate (TOPROL-XL) 25 MG 24 hr tablet Take 12.5 mg by mouth once daily. Take 1/2 tablet by mouth daily            While in the hospital, will manage blood pressure as follows; Continue home antihypertensive regimen    Will utilize p.r.n. blood pressure medication only if patient's blood pressure greater than 180/110 and he develops symptoms such as worsening chest pain or shortness of breath.

## 2024-03-30 NOTE — ASSESSMENT & PLAN NOTE
"Patient is identified as having Combined Systolic and Diastolic heart failure that is Acute on chronic. CHF is currently uncontrolled due to Pulmonary edema/pleural effusion on CXR. Latest ECHO performed and demonstrates- Results for orders placed during the hospital encounter of 03/19/24    Echo Saline Bubble? Yes    Interpretation Summary    Left Ventricle: The left ventricle is moderately dilated. Normal wall thickness. Regional wall motion abnormalities present.  Anterior wall and apex are akinetic.  Inferior wall and lateral wall are hypokinetic. There is severely reduced systolic function with a visually estimated ejection fraction of less than 30%. Ejection fraction by visual approximation is 20%. There is diastolic dysfunction.    Right Ventricle: Mild right ventricular enlargement.    Left Atrium: Left atrium is severely dilated.    Right Atrium: Right atrium is mildly dilated.    IVC/SVC: Elevated venous pressure at 15 mmHg.    Pericardium: There is a trivial effusion.  . Continue Furosemide and monitor clinical status closely. Monitor on telemetry. Patient is off CHF pathway.  Monitor strict Is&Os and daily weights.  Place on fluid restriction of 1.5 L. Cardiology has been consulted. Continue to stress to patient importance of self efficacy and  on diet for CHF. Last BNP reviewed- and noted below No results for input(s): "BNP", "BNPTRIAGEBLO" in the last 168 hours.   3/30 heart failure exacerbation much improved.  Starting guideline directed medical therapy.    "

## 2024-03-30 NOTE — ASSESSMENT & PLAN NOTE
Continue tele while here 3/30  3/30 continue dig-monitor for toxicity, continue amio-monitor for toxicity

## 2024-03-30 NOTE — PLAN OF CARE
Problem: Adult Inpatient Plan of Care  Goal: Plan of Care Review  Outcome: Ongoing, Progressing  Goal: Patient-Specific Goal (Individualized)  Outcome: Ongoing, Progressing  Goal: Absence of Hospital-Acquired Illness or Injury  Outcome: Ongoing, Progressing  Goal: Optimal Comfort and Wellbeing  Outcome: Ongoing, Progressing  Goal: Readiness for Transition of Care  Outcome: Ongoing, Progressing     Problem: Diabetes Comorbidity  Goal: Blood Glucose Level Within Targeted Range  Outcome: Ongoing, Progressing     Problem: Impaired Wound Healing  Goal: Optimal Wound Healing  Outcome: Ongoing, Progressing     Problem: Skin Injury Risk Increased  Goal: Skin Health and Integrity  Outcome: Ongoing, Progressing     Problem: Adjustment to Illness (Sepsis/Septic Shock)  Goal: Optimal Coping  Outcome: Ongoing, Progressing     Problem: Bleeding (Sepsis/Septic Shock)  Goal: Absence of Bleeding  Outcome: Ongoing, Progressing     Problem: Glycemic Control Impaired (Sepsis/Septic Shock)  Goal: Blood Glucose Level Within Desired Range  Outcome: Ongoing, Progressing

## 2024-03-30 NOTE — ASSESSMENT & PLAN NOTE
On ASA and statin; he is at baseline; has aphasia  3/30 continue aspirin and statin.  Working with PT

## 2024-03-30 NOTE — ASSESSMENT & PLAN NOTE
Patient has hypokalemia which is Acute and currently controlled. Most recent potassium levels reviewed-   Lab Results   Component Value Date    K 3.6 03/30/2024   . Will continue potassium replacement per protocol and recheck repeat levels after replacement completed.

## 2024-03-30 NOTE — ASSESSMENT & PLAN NOTE
"Patient's FSGs are controlled on current medication regimen.  Last A1c reviewed-   Lab Results   Component Value Date    HGBA1C 6.0 03/08/2024     Most recent fingerstick glucose reviewed- No results for input(s): "POCTGLUCOSE" in the last 24 hours.  Current correctional scale  Low  Maintain anti-hyperglycemic dose as follows-   Antihyperglycemics (From admission, onward)      Start     Stop Route Frequency Ordered    03/20/24 0900  empagliflozin (Jardiance) tablet 10 mg        Question Answer Comment   Does this patient have a diagnosis of heart failure? Yes    Does this patient have type 1 diabetes or diabetic ketoacidosis? No    Does this patient have symptomatic hypotension? No    Is the patient NPO or pending major surgery in next 3 days or less? No        -- Oral Daily 03/20/24 0019    03/20/24 0124  insulin aspart U-100 injection 0-5 Units         -- SubQ Before meals & nightly PRN 03/20/24 0025          Hold Oral hypoglycemics while patient is in the hospital.    BS stable; continue home DM meds; on SSI; will continue to monitor  "

## 2024-03-31 LAB
ANION GAP SERPL CALCULATED.3IONS-SCNC: 9 MMOL/L (ref 7–16)
BUN SERPL-MCNC: 18 MG/DL (ref 7–18)
BUN/CREAT SERPL: 18 (ref 6–20)
CALCIUM SERPL-MCNC: 9.2 MG/DL (ref 8.5–10.1)
CHLORIDE SERPL-SCNC: 84 MMOL/L (ref 98–107)
CO2 SERPL-SCNC: 37 MMOL/L (ref 21–32)
CREAT SERPL-MCNC: 0.98 MG/DL (ref 0.7–1.3)
EGFR (NO RACE VARIABLE) (RUSH/TITUS): 92 ML/MIN/1.73M2
GLUCOSE SERPL-MCNC: 137 MG/DL (ref 70–105)
GLUCOSE SERPL-MCNC: 153 MG/DL (ref 70–105)
GLUCOSE SERPL-MCNC: 170 MG/DL (ref 70–105)
GLUCOSE SERPL-MCNC: 177 MG/DL (ref 74–106)
GLUCOSE SERPL-MCNC: 178 MG/DL (ref 70–105)
POTASSIUM SERPL-SCNC: 3.7 MMOL/L (ref 3.5–5.1)
SODIUM SERPL-SCNC: 126 MMOL/L (ref 136–145)

## 2024-03-31 PROCEDURE — 99233 SBSQ HOSP IP/OBS HIGH 50: CPT | Mod: ,,, | Performed by: STUDENT IN AN ORGANIZED HEALTH CARE EDUCATION/TRAINING PROGRAM

## 2024-03-31 PROCEDURE — 25000003 PHARM REV CODE 250: Performed by: INTERNAL MEDICINE

## 2024-03-31 PROCEDURE — 25000003 PHARM REV CODE 250

## 2024-03-31 PROCEDURE — 94640 AIRWAY INHALATION TREATMENT: CPT

## 2024-03-31 PROCEDURE — 25000242 PHARM REV CODE 250 ALT 637 W/ HCPCS: Performed by: INTERNAL MEDICINE

## 2024-03-31 PROCEDURE — 27000221 HC OXYGEN, UP TO 24 HOURS

## 2024-03-31 PROCEDURE — 11000001 HC ACUTE MED/SURG PRIVATE ROOM

## 2024-03-31 PROCEDURE — 99900035 HC TECH TIME PER 15 MIN (STAT)

## 2024-03-31 PROCEDURE — 25000242 PHARM REV CODE 250 ALT 637 W/ HCPCS

## 2024-03-31 PROCEDURE — 80048 BASIC METABOLIC PNL TOTAL CA: CPT | Performed by: INTERNAL MEDICINE

## 2024-03-31 PROCEDURE — 25000003 PHARM REV CODE 250: Performed by: STUDENT IN AN ORGANIZED HEALTH CARE EDUCATION/TRAINING PROGRAM

## 2024-03-31 PROCEDURE — 82962 GLUCOSE BLOOD TEST: CPT

## 2024-03-31 PROCEDURE — 63600175 PHARM REV CODE 636 W HCPCS: Performed by: INTERNAL MEDICINE

## 2024-03-31 PROCEDURE — 94761 N-INVAS EAR/PLS OXIMETRY MLT: CPT

## 2024-03-31 RX ADMIN — IPRATROPIUM BROMIDE AND ALBUTEROL SULFATE 3 ML: 2.5; .5 SOLUTION RESPIRATORY (INHALATION) at 12:03

## 2024-03-31 RX ADMIN — BUDESONIDE INHALATION 0.5 MG: 0.5 SUSPENSION RESPIRATORY (INHALATION) at 07:03

## 2024-03-31 RX ADMIN — TORSEMIDE 20 MG: 20 TABLET ORAL at 09:03

## 2024-03-31 RX ADMIN — DIGOXIN 0.12 MG: 125 TABLET ORAL at 09:03

## 2024-03-31 RX ADMIN — Medication 6 MG: at 10:03

## 2024-03-31 RX ADMIN — ASPIRIN 81 MG: 81 TABLET, COATED ORAL at 09:03

## 2024-03-31 RX ADMIN — IPRATROPIUM BROMIDE AND ALBUTEROL SULFATE 3 ML: 2.5; .5 SOLUTION RESPIRATORY (INHALATION) at 07:03

## 2024-03-31 RX ADMIN — TRAZODONE HYDROCHLORIDE 50 MG: 50 TABLET ORAL at 10:03

## 2024-03-31 RX ADMIN — SACUBITRIL AND VALSARTAN 1 TABLET: 24; 26 TABLET, FILM COATED ORAL at 10:03

## 2024-03-31 RX ADMIN — TORSEMIDE 20 MG: 20 TABLET ORAL at 05:03

## 2024-03-31 RX ADMIN — DOCUSATE SODIUM 100 MG: 100 CAPSULE, LIQUID FILLED ORAL at 09:03

## 2024-03-31 RX ADMIN — EMPAGLIFLOZIN 10 MG: 10 TABLET, FILM COATED ORAL at 05:03

## 2024-03-31 RX ADMIN — OXYCODONE 10 MG: 5 TABLET ORAL at 09:03

## 2024-03-31 RX ADMIN — ENOXAPARIN SODIUM 40 MG: 40 INJECTION SUBCUTANEOUS at 05:03

## 2024-03-31 RX ADMIN — POTASSIUM CHLORIDE 40 MEQ: 1500 TABLET, EXTENDED RELEASE ORAL at 10:03

## 2024-03-31 RX ADMIN — POTASSIUM CHLORIDE 40 MEQ: 1500 TABLET, EXTENDED RELEASE ORAL at 09:03

## 2024-03-31 RX ADMIN — PANTOPRAZOLE SODIUM 40 MG: 40 TABLET, DELAYED RELEASE ORAL at 06:03

## 2024-03-31 RX ADMIN — OXYCODONE 10 MG: 5 TABLET ORAL at 05:03

## 2024-03-31 RX ADMIN — AMIODARONE HYDROCHLORIDE 200 MG: 200 TABLET ORAL at 09:03

## 2024-03-31 RX ADMIN — SACUBITRIL AND VALSARTAN 1 TABLET: 24; 26 TABLET, FILM COATED ORAL at 09:03

## 2024-03-31 RX ADMIN — ATORVASTATIN CALCIUM 40 MG: 40 TABLET, FILM COATED ORAL at 10:03

## 2024-03-31 RX ADMIN — IPRATROPIUM BROMIDE AND ALBUTEROL SULFATE 3 ML: 2.5; .5 SOLUTION RESPIRATORY (INHALATION) at 01:03

## 2024-04-01 DIAGNOSIS — Z11.4 ENCOUNTER FOR SCREENING FOR HIV: ICD-10-CM

## 2024-04-01 DIAGNOSIS — I10 ESSENTIAL (PRIMARY) HYPERTENSION: Primary | ICD-10-CM

## 2024-04-01 PROBLEM — M79.606 LEG PAIN: Status: ACTIVE | Noted: 2024-04-01

## 2024-04-01 LAB
ANION GAP SERPL CALCULATED.3IONS-SCNC: 10 MMOL/L (ref 7–16)
BUN SERPL-MCNC: 18 MG/DL (ref 7–18)
BUN/CREAT SERPL: 25 (ref 6–20)
CALCIUM SERPL-MCNC: 9 MG/DL (ref 8.5–10.1)
CHLORIDE SERPL-SCNC: 84 MMOL/L (ref 98–107)
CO2 SERPL-SCNC: 34 MMOL/L (ref 21–32)
CREAT SERPL-MCNC: 0.72 MG/DL (ref 0.7–1.3)
EGFR (NO RACE VARIABLE) (RUSH/TITUS): 109 ML/MIN/1.73M2
GLUCOSE SERPL-MCNC: 164 MG/DL (ref 74–106)
GLUCOSE SERPL-MCNC: 182 MG/DL (ref 70–105)
GLUCOSE SERPL-MCNC: 209 MG/DL (ref 70–105)
HIV 1+O+2 AB SERPL QL: NORMAL
NT-PROBNP SERPL-MCNC: 3299 PG/ML (ref 1–125)
POTASSIUM SERPL-SCNC: 3.9 MMOL/L (ref 3.5–5.1)
SODIUM SERPL-SCNC: 124 MMOL/L (ref 136–145)

## 2024-04-01 PROCEDURE — 25000003 PHARM REV CODE 250: Performed by: INTERNAL MEDICINE

## 2024-04-01 PROCEDURE — 25000003 PHARM REV CODE 250: Performed by: STUDENT IN AN ORGANIZED HEALTH CARE EDUCATION/TRAINING PROGRAM

## 2024-04-01 PROCEDURE — 94761 N-INVAS EAR/PLS OXIMETRY MLT: CPT

## 2024-04-01 PROCEDURE — 99233 SBSQ HOSP IP/OBS HIGH 50: CPT | Mod: ,,, | Performed by: STUDENT IN AN ORGANIZED HEALTH CARE EDUCATION/TRAINING PROGRAM

## 2024-04-01 PROCEDURE — 11000001 HC ACUTE MED/SURG PRIVATE ROOM

## 2024-04-01 PROCEDURE — 36415 COLL VENOUS BLD VENIPUNCTURE: CPT | Mod: ,,, | Performed by: CLINICAL MEDICAL LABORATORY

## 2024-04-01 PROCEDURE — 94640 AIRWAY INHALATION TREATMENT: CPT

## 2024-04-01 PROCEDURE — 87389 HIV-1 AG W/HIV-1&-2 AB AG IA: CPT | Mod: ,,, | Performed by: CLINICAL MEDICAL LABORATORY

## 2024-04-01 PROCEDURE — 80048 BASIC METABOLIC PNL TOTAL CA: CPT | Performed by: INTERNAL MEDICINE

## 2024-04-01 PROCEDURE — 82962 GLUCOSE BLOOD TEST: CPT

## 2024-04-01 PROCEDURE — 25000003 PHARM REV CODE 250

## 2024-04-01 PROCEDURE — 83880 ASSAY OF NATRIURETIC PEPTIDE: CPT | Mod: ,,, | Performed by: CLINICAL MEDICAL LABORATORY

## 2024-04-01 PROCEDURE — 63600175 PHARM REV CODE 636 W HCPCS: Performed by: INTERNAL MEDICINE

## 2024-04-01 PROCEDURE — 25000242 PHARM REV CODE 250 ALT 637 W/ HCPCS: Performed by: INTERNAL MEDICINE

## 2024-04-01 PROCEDURE — 27000221 HC OXYGEN, UP TO 24 HOURS

## 2024-04-01 PROCEDURE — 97110 THERAPEUTIC EXERCISES: CPT | Mod: CO

## 2024-04-01 PROCEDURE — 97110 THERAPEUTIC EXERCISES: CPT

## 2024-04-01 PROCEDURE — 99900035 HC TECH TIME PER 15 MIN (STAT)

## 2024-04-01 PROCEDURE — 97535 SELF CARE MNGMENT TRAINING: CPT | Mod: CO

## 2024-04-01 PROCEDURE — 97116 GAIT TRAINING THERAPY: CPT

## 2024-04-01 RX ADMIN — DIGOXIN 0.12 MG: 125 TABLET ORAL at 10:04

## 2024-04-01 RX ADMIN — OXYCODONE 10 MG: 5 TABLET ORAL at 12:04

## 2024-04-01 RX ADMIN — OXYCODONE 10 MG: 5 TABLET ORAL at 10:04

## 2024-04-01 RX ADMIN — SACUBITRIL AND VALSARTAN 1 TABLET: 24; 26 TABLET, FILM COATED ORAL at 08:04

## 2024-04-01 RX ADMIN — TRAZODONE HYDROCHLORIDE 50 MG: 50 TABLET ORAL at 08:04

## 2024-04-01 RX ADMIN — ENOXAPARIN SODIUM 40 MG: 40 INJECTION SUBCUTANEOUS at 04:04

## 2024-04-01 RX ADMIN — TORSEMIDE 20 MG: 20 TABLET ORAL at 04:04

## 2024-04-01 RX ADMIN — AMIODARONE HYDROCHLORIDE 200 MG: 200 TABLET ORAL at 10:04

## 2024-04-01 RX ADMIN — IPRATROPIUM BROMIDE AND ALBUTEROL SULFATE 3 ML: 2.5; .5 SOLUTION RESPIRATORY (INHALATION) at 07:04

## 2024-04-01 RX ADMIN — POTASSIUM CHLORIDE 40 MEQ: 1500 TABLET, EXTENDED RELEASE ORAL at 08:04

## 2024-04-01 RX ADMIN — POTASSIUM CHLORIDE 40 MEQ: 1500 TABLET, EXTENDED RELEASE ORAL at 10:04

## 2024-04-01 RX ADMIN — SACUBITRIL AND VALSARTAN 1 TABLET: 24; 26 TABLET, FILM COATED ORAL at 10:04

## 2024-04-01 RX ADMIN — IPRATROPIUM BROMIDE AND ALBUTEROL SULFATE 3 ML: 2.5; .5 SOLUTION RESPIRATORY (INHALATION) at 01:04

## 2024-04-01 RX ADMIN — DOCUSATE SODIUM 100 MG: 100 CAPSULE, LIQUID FILLED ORAL at 08:04

## 2024-04-01 RX ADMIN — BUDESONIDE INHALATION 0.5 MG: 0.5 SUSPENSION RESPIRATORY (INHALATION) at 07:04

## 2024-04-01 RX ADMIN — OXYCODONE 10 MG: 5 TABLET ORAL at 11:04

## 2024-04-01 RX ADMIN — ONDANSETRON 8 MG: 2 INJECTION INTRAMUSCULAR; INTRAVENOUS at 05:04

## 2024-04-01 RX ADMIN — TORSEMIDE 20 MG: 20 TABLET ORAL at 10:04

## 2024-04-01 RX ADMIN — PANTOPRAZOLE SODIUM 40 MG: 40 TABLET, DELAYED RELEASE ORAL at 05:04

## 2024-04-01 RX ADMIN — INSULIN ASPART 1 UNITS: 100 INJECTION, SOLUTION INTRAVENOUS; SUBCUTANEOUS at 08:04

## 2024-04-01 RX ADMIN — ATORVASTATIN CALCIUM 40 MG: 40 TABLET, FILM COATED ORAL at 08:04

## 2024-04-01 RX ADMIN — OXYCODONE 10 MG: 5 TABLET ORAL at 04:04

## 2024-04-01 RX ADMIN — DOCUSATE SODIUM 100 MG: 100 CAPSULE, LIQUID FILLED ORAL at 10:04

## 2024-04-01 RX ADMIN — ASPIRIN 81 MG: 81 TABLET, COATED ORAL at 10:04

## 2024-04-01 NOTE — PROGRESS NOTES
[facility]  [facility] Medicine  Progress Note    Patient Name: Karin Carrera  MRN: [id number]  Patient Class: IP- Inpatient   Admission Date: 3/19/2024  Length of Stay: 13 days  Attending Physician: Aleks Whipple DO  Primary Care Provider: Walker Smith MD        Subjective:     Principal Problem:Sepsis without acute organ dysfunction        HPI:  Patient is a 52yo male who presents to [facility] ED via EMS with SOB and abdominal pain for 2 days. He reports recently having a cholecystectomy on 3/9/24 here at [place] by Dr. Bennett. He endorses RUQ abdominal pain and SOB. SOB is similar to his previous COPD episodes. Endorses orthopnea and INFANTE. He endorses surgical site drainage mostly pus that started 2 days ago. Endorses decreased appetite around the same time. Endorses bilateral leg swelling and right leg redness for about 1 month and now swelling has gone up his thighs and abdomen in the past 2 days. Denies f/c/cp/n/v/d. Denies urinary, or bowel habit changes. Reports compliance with all home medications.    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 Patient sees Dr. Escobar Cardiology and Dr. Smith PCP. Patient uses 2L [place] O2 at home. He communicates through writing on paper and texts due to his expressive aphasia 2/2 CVA.    Upon presentation, VS remarkable for 104/65, 114, 30. Labs remarkable for H/H 11.9/39.1, HCO3 34, glucose 138. Mg 2.7. BNP 7486, troponin 22.2, 19.5. PT 16.2, INR 1.32, PTT 30.9. COVID negative. ABG 7.46, 50, 20, 35.6. EKG sinus tach 110 with multifocal PVCs. CXR showed continued cardiomegaly. Patchy ground-glass and reticular infiltrate/edema throughout the bilateral lungs. Small bilateral pleural fluid. Constellation of findings suspicious for CHF.  Underlying infection not excluded. CT abdomen and pelvis w/o contrast showed study limited secondary to lack of IV contrast. Interval cholecystectomy. There is a small air-fluid collection within the gallbladder fossa measuring up to 3.7 cm which may reflect postoperative fluid collection or abscess. Interval increased diffuse body wall edema. Cardiomegaly and coronary artery calcifications. Scattered interlobular septal thickening of the lung bases suggestive of interstitial pulmonary edema with trace bilateral pleural effusions. There is some calcification of the left ventricle apex which may reflect sequela of remote infarct. Patient received Lasix 60 IV and is admitted to hospital medicine for further management and care.    Overview/Hospital Course:  53 year old male with an extensive PMH presents with abdominal pain; he had recent cholecystectomy.  He is complaining of abdominal pain and SOB.  3/30 breathing much better.  Volume status improved.  3/31-home o2 eval pending  4/1 qualify for home oxygen.  Once sodium corrects can discharge he is chronically hyponatremic    Interval History:  No acute events overnight    Review of Systems   Constitutional: Negative.  Negative for chills and fever.   HENT: Negative.     Eyes:  Negative for pain and redness.   Respiratory:  Positive for shortness of breath. Negative for cough, chest tightness and wheezing.    Cardiovascular:  Negative for chest pain and palpitations.   Gastrointestinal:  Negative for abdominal pain, diarrhea, nausea and vomiting.   Endocrine: Negative for cold intolerance, heat intolerance and polyuria.   Genitourinary:  Negative for difficulty urinating, dysuria, frequency and hematuria.   Musculoskeletal:  Negative for arthralgias, back pain, myalgias, neck pain and neck stiffness.   Skin:  Negative for pallor and rash.   Allergic/Immunologic: Negative.    Neurological:  Negative for dizziness, syncope, weakness, numbness and headaches.    Hematological:  Negative for adenopathy.   Psychiatric/Behavioral:  Negative for agitation, confusion and hallucinations. The patient is not nervous/anxious.      Objective:     Vital Signs (Most Recent):  Temp: 98 °F (36.7 °C) (04/01/24 1437)  Pulse: 89 (04/01/24 1437)  Resp: 18 (04/01/24 1437)  BP: (!) 98/59 (04/01/24 1437)  SpO2: 100 % (04/01/24 1437) Vital Signs (24h Range):  Temp:  [97.4 °F (36.3 °C)-98 °F (36.7 °C)] 98 °F (36.7 °C)  Pulse:  [] 89  Resp:  [15-20] 18  SpO2:  [86 %-100 %] 100 %  BP: ()/(59-77) 98/59     Weight: 96.6 kg (212 lb 15.4 oz)  Body mass index is 27.34 kg/m².    Intake/Output Summary (Last 24 hours) at 4/1/2024 1623  Last data filed at 4/1/2024 0528  Gross per 24 hour   Intake --   Output 2050 ml   Net -2050 ml           Physical Exam  Vitals reviewed.   Constitutional:       Appearance: Normal appearance.   HENT:      Head: Normocephalic and atraumatic.   Eyes:      Pupils: Pupils are equal, round, and reactive to light.   Cardiovascular:      Rate and Rhythm: Normal rate and regular rhythm.      Pulses: Normal pulses.   Pulmonary:      Effort: Pulmonary effort is normal.      Breath sounds: Normal breath sounds.   Abdominal:      General: Abdomen is flat.      Palpations: Abdomen is soft.   Skin:     General: Skin is warm and dry.      Capillary Refill: Capillary refill takes less than 2 seconds.   Neurological:      Mental Status: He is alert. Mental status is at baseline.   Psychiatric:         Mood and Affect: Mood normal.             Significant Labs: All pertinent labs within the past 24 hours have been reviewed.    Significant Imaging: I have reviewed all pertinent imaging results/findings within the past 24 hours.    Assessment/Plan:      * Sepsis without acute organ dysfunction  He is s/p recent cholecystectomy.  Blood cultures show G+ cocci and G- bacilli; on Vancomycin and Maxipime and Flagyl; will await ID of organisms in blood cultures; followed by  Surgery  3/30 antibiotics completed      Leg pain  4/1 leg pain today left foot is cool we will check arterial Dopplers      Hyponatremia  Patient has hyponatremia which is uncontrolled,We will aim to correct the sodium by 4-6mEq in 24 hours. We will monitor sodium Daily. The hyponatremia is due to Medications:  Diuretics. We will obtain the following studies: Urine sodium, urine osmolality, serum osmolality. We will treat the hyponatremia with Removal of offending medications. The patient's sodium results have been reviewed and are listed below.  Recent Labs   Lab 04/01/24  0509   *     3/31 improved, chronically low. Work up is send out. If stabilizes may be able to follow up with pcp  4/1 proving difficult to control.  Worse today.  Serum Osmo urine Osmo is send out.  This will guide treatment.  However he is chronically hyponatremic.  This may be something he can follow up with his PCP.    NSVT (nonsustained ventricular tachycardia)  Continue tele while here 3/30  3/30 continue dig-monitor for toxicity, continue amio-monitor for toxicity  3/31-continue dig, level looks good today. Monitor for toxicity. Continue amiodarone  4/1 continue dig-monitor for toxicity, continue amiodarone-monitor for toxicity    Acute on chronic combined systolic and diastolic heart failure  Patient is identified as having Combined Systolic and Diastolic heart failure that is Acute on chronic. CHF is currently uncontrolled due to Pulmonary edema/pleural effusion on CXR. Latest ECHO performed and demonstrates- Results for orders placed during the hospital encounter of 03/19/24    Echo Saline Bubble? Yes    Interpretation Summary    Left Ventricle: The left ventricle is moderately dilated. Normal wall thickness. Regional wall motion abnormalities present.  Anterior wall and apex are akinetic.  Inferior wall and lateral wall are hypokinetic. There is severely reduced systolic function with a visually estimated ejection fraction of  "less than 30%. Ejection fraction by visual approximation is 20%. There is diastolic dysfunction.    Right Ventricle: Mild right ventricular enlargement.    Left Atrium: Left atrium is severely dilated.    Right Atrium: Right atrium is mildly dilated.    IVC/SVC: Elevated venous pressure at 15 mmHg.    Pericardium: There is a trivial effusion.  . Continue Furosemide and monitor clinical status closely. Monitor on telemetry. Patient is off CHF pathway.  Monitor strict Is&Os and daily weights.  Place on fluid restriction of 1.5 L. Cardiology has been consulted. Continue to stress to patient importance of self efficacy and  on diet for CHF. Last BNP reviewed- and noted below No results for input(s): "BNP", "BNPTRIAGEBLO" in the last 168 hours.   3/30 heart failure exacerbation much improved.  Starting guideline directed medical therapy.  3/31 GDMT at discharge. Anticipate soon.    4/1 once sodium improves can discharge home.    Pulmonary emphysema  Patient's COPD is controlled currently.  Patient is currently off COPD Pathway. Continue scheduled inhalers Supplemental oxygen and monitor respiratory status closely.   3/31-resp status stable, test for home oxygen  Qualifies for home oxygen 4/1    Cardiogenic shock  3/30 now off inotropes.  Now off Lasix infusion.  G DMT at discharge  3/31-resolved    Hypokalemia  Patient has hypokalemia which is Acute and currently controlled. Most recent potassium levels reviewed-   Lab Results   Component Value Date    K 3.9 04/01/2024   . Will continue potassium replacement per protocol and recheck repeat levels after replacement completed.       Acute hypoxemic respiratory failure  Acute hypoxic respiratory failure due to many etiologies :  Pulmonary edema 2/2 HFrEF  COPD  Pneumonia>consolidation  -now c/f pt developing ARDS  -cont broad spectrum iv abx  -cont aggressive diuersis  -cont inhalers  -oxygen support, currently on HFNC  -Pulmonary and crtical care team consulted, " "appreicate recs.   3/30 now euvolemic.  We will test for home oxygen  3/31-home oxygen eval, euvolemic    ROSEMARIE (obstructive sleep apnea)  -Noncompliant with CPAP at home; CPAP in hospital ordered    Cellulitis of lower extremity  Cont iv abx   Resolved 3/30    End stage heart failure  Continue home meds; he is always SOB and has ROSEMARIE non-compliant with CPAP, smokes, and does not take home meds; CXR shows patchy ground-glass and reticular infiltrate/edema throughout the bilateral lungs; on IV Lasix  -cardiology consulted, now on inotrope infusion along w/ iv lasix. Cont GDMT per cardiology  3/30 euvolemic.  GD MT at discharge.  3/31-euvolemic  4/1 euvolemic continue guideline directed medical therapy    Type 2 diabetes mellitus  Patient's FSGs are controlled on current medication regimen.  Last A1c reviewed-   Lab Results   Component Value Date    HGBA1C 6.0 03/08/2024     Most recent fingerstick glucose reviewed- No results for input(s): "POCTGLUCOSE" in the last 24 hours.  Current correctional scale  Low  Maintain anti-hyperglycemic dose as follows-   Antihyperglycemics (From admission, onward)      Start     Stop Route Frequency Ordered    03/20/24 0900  empagliflozin (Jardiance) tablet 10 mg        Question Answer Comment   Does this patient have a diagnosis of heart failure? Yes    Does this patient have type 1 diabetes or diabetic ketoacidosis? No    Does this patient have symptomatic hypotension? No    Is the patient NPO or pending major surgery in next 3 days or less? No        -- Oral Daily 03/20/24 0019    03/20/24 0124  insulin aspart U-100 injection 0-5 Units         -- SubQ Before meals & nightly PRN 03/20/24 0025          Hold Oral hypoglycemics while patient is in the hospital.  3/31-Follow glucose daily and adjusting insulin as needed    BS stable; continue home DM meds; on SSI; will continue to monitor  4/1 glucose reasonably controlled continue current regimen    Hemiparesis affecting right side as " late effect of cerebrovascular accident  On ASA and statin; he is at baseline; has aphasia  3/30 continue aspirin and statin.  Working with PT  3/31-PT/OT  4/1 PTOT    Coronary artery disease involving coronary bypass graft of native heart without angina pectoris  Patient with known CAD s/p CABG, which is controlled Will continue ASA and Statin and monitor for S/Sx of angina/ACS. Continue to monitor on telemetry.     He has a history of CAD s/p CABG; continue home meds  3/31 no chest pain currently  4/1 no chest pain    Essential (primary) hypertension  BP stable; continue home meds  Chronic, controlled. Latest blood pressure and vitals reviewed-     Temp:  [97.4 °F (36.3 °C)-98 °F (36.7 °C)]   Pulse:  []   Resp:  [15-20]   BP: ()/(59-77)   SpO2:  [86 %-100 %] .   Home meds for hypertension were reviewed and noted below.   Hypertension Medications               amLODIPine (NORVASC) 10 MG tablet Take 10 mg by mouth once daily.    furosemide (LASIX) 40 MG tablet Take 1 tablet (40 mg total) by mouth 2 (two) times a day.    losartan (COZAAR) 25 MG tablet Take 0.5 tablets (12.5 mg total) by mouth once daily.    metoprolol succinate (TOPROL-XL) 25 MG 24 hr tablet Take 12.5 mg by mouth once daily. Take 1/2 tablet by mouth daily            While in the hospital, will manage blood pressure as follows; Continue home antihypertensive regimen    Will utilize p.r.n. blood pressure medication only if patient's blood pressure greater than 180/110 and he develops symptoms such as worsening chest pain or shortness of breath.   3/31 Bp reasonably controlled, continue current regimen  4/1 BP reasonably controlled continue current regimen      VTE Risk Mitigation (From admission, onward)           Ordered     enoxaparin injection 40 mg  Every 24 hours         03/19/24 4418                    Discharge Planning   DEANNA:      Code Status: Full Code   Is the patient medically ready for discharge?:     Reason for patient still in  hospital (select all that apply): Treatment  Discharge Plan A: Skilled Nursing Facility          Greater than 50 minutes spent on face to face patient interaction, discussion with family, ancillary staff, and/or other physicians as well as review of pertinent labs, images, and notes.           Aleks Whipple DO  Department of Hospital Medicine   [facility]

## 2024-04-01 NOTE — SUBJECTIVE & OBJECTIVE
Interval History:  No acute events overnight    Review of Systems   Constitutional: Negative.  Negative for chills and fever.   HENT: Negative.     Eyes:  Negative for pain and redness.   Respiratory:  Positive for shortness of breath. Negative for cough, chest tightness and wheezing.    Cardiovascular:  Negative for chest pain and palpitations.   Gastrointestinal:  Negative for abdominal pain, diarrhea, nausea and vomiting.   Endocrine: Negative for cold intolerance, heat intolerance and polyuria.   Genitourinary:  Negative for difficulty urinating, dysuria, frequency and hematuria.   Musculoskeletal:  Negative for arthralgias, back pain, myalgias, neck pain and neck stiffness.   Skin:  Negative for pallor and rash.   Allergic/Immunologic: Negative.    Neurological:  Negative for dizziness, syncope, weakness, numbness and headaches.   Hematological:  Negative for adenopathy.   Psychiatric/Behavioral:  Negative for agitation, confusion and hallucinations. The patient is not nervous/anxious.      Objective:     Vital Signs (Most Recent):  Temp: 98 °F (36.7 °C) (03/31/24 1450)  Pulse: 97 (03/31/24 1450)  Resp: 18 (03/31/24 1715)  BP: 130/86 (03/31/24 1450)  SpO2: 98 % (03/31/24 1450) Vital Signs (24h Range):  Temp:  [97.6 °F (36.4 °C)-98.1 °F (36.7 °C)] 98 °F (36.7 °C)  Pulse:  [] 97  Resp:  [16-20] 18  SpO2:  [90 %-100 %] 98 %  BP: ()/(58-86) 130/86     Weight: 96.6 kg (212 lb 15.4 oz)  Body mass index is 27.34 kg/m².    Intake/Output Summary (Last 24 hours) at 3/31/2024 1924  Last data filed at 3/31/2024 1450  Gross per 24 hour   Intake --   Output 1700 ml   Net -1700 ml           Physical Exam  Vitals reviewed.   Constitutional:       Appearance: Normal appearance.   HENT:      Head: Normocephalic and atraumatic.   Eyes:      Pupils: Pupils are equal, round, and reactive to light.   Cardiovascular:      Rate and Rhythm: Normal rate and regular rhythm.      Pulses: Normal pulses.   Pulmonary:       Effort: Pulmonary effort is normal.      Breath sounds: Normal breath sounds.   Abdominal:      General: Abdomen is flat.      Palpations: Abdomen is soft.   Skin:     General: Skin is warm and dry.      Capillary Refill: Capillary refill takes less than 2 seconds.   Neurological:      Mental Status: He is alert. Mental status is at baseline.   Psychiatric:         Mood and Affect: Mood normal.             Significant Labs: All pertinent labs within the past 24 hours have been reviewed.    Significant Imaging: I have reviewed all pertinent imaging results/findings within the past 24 hours.

## 2024-04-01 NOTE — ASSESSMENT & PLAN NOTE
BP stable; continue home meds  Chronic, controlled. Latest blood pressure and vitals reviewed-     Temp:  [97.4 °F (36.3 °C)-98 °F (36.7 °C)]   Pulse:  []   Resp:  [15-20]   BP: ()/(59-77)   SpO2:  [86 %-100 %] .   Home meds for hypertension were reviewed and noted below.   Hypertension Medications               amLODIPine (NORVASC) 10 MG tablet Take 10 mg by mouth once daily.    furosemide (LASIX) 40 MG tablet Take 1 tablet (40 mg total) by mouth 2 (two) times a day.    losartan (COZAAR) 25 MG tablet Take 0.5 tablets (12.5 mg total) by mouth once daily.    metoprolol succinate (TOPROL-XL) 25 MG 24 hr tablet Take 12.5 mg by mouth once daily. Take 1/2 tablet by mouth daily            While in the hospital, will manage blood pressure as follows; Continue home antihypertensive regimen    Will utilize p.r.n. blood pressure medication only if patient's blood pressure greater than 180/110 and he develops symptoms such as worsening chest pain or shortness of breath.   3/31 Bp reasonably controlled, continue current regimen  4/1 BP reasonably controlled continue current regimen

## 2024-04-01 NOTE — ASSESSMENT & PLAN NOTE
On ASA and statin; he is at baseline; has aphasia  3/30 continue aspirin and statin.  Working with PT  3/31-PT/OT  4/1 PTOT

## 2024-04-01 NOTE — NURSING
1905 Order for walk trail. Upon attempting walk trial oxygen removed and patient sats dropped to 86% sitting on side of bed. Oxygen placed back on patient.     1928 Dr. Whipple notified on walk trial fail.

## 2024-04-01 NOTE — ASSESSMENT & PLAN NOTE
"Patient's FSGs are controlled on current medication regimen.  Last A1c reviewed-   Lab Results   Component Value Date    HGBA1C 6.0 03/08/2024     Most recent fingerstick glucose reviewed- No results for input(s): "POCTGLUCOSE" in the last 24 hours.  Current correctional scale  Low  Maintain anti-hyperglycemic dose as follows-   Antihyperglycemics (From admission, onward)      Start     Stop Route Frequency Ordered    03/20/24 0900  empagliflozin (Jardiance) tablet 10 mg        Question Answer Comment   Does this patient have a diagnosis of heart failure? Yes    Does this patient have type 1 diabetes or diabetic ketoacidosis? No    Does this patient have symptomatic hypotension? No    Is the patient NPO or pending major surgery in next 3 days or less? No        -- Oral Daily 03/20/24 0019    03/20/24 0124  insulin aspart U-100 injection 0-5 Units         -- SubQ Before meals & nightly PRN 03/20/24 0025          Hold Oral hypoglycemics while patient is in the hospital.  3/31-Follow glucose daily and adjusting insulin as needed    BS stable; continue home DM meds; on SSI; will continue to monitor  4/1 glucose reasonably controlled continue current regimen  "

## 2024-04-01 NOTE — ASSESSMENT & PLAN NOTE
Patient has hypokalemia which is Acute and currently controlled. Most recent potassium levels reviewed-   Lab Results   Component Value Date    K 3.7 03/31/2024   . Will continue potassium replacement per protocol and recheck repeat levels after replacement completed.

## 2024-04-01 NOTE — ASSESSMENT & PLAN NOTE
Patient has hypokalemia which is Acute and currently controlled. Most recent potassium levels reviewed-   Lab Results   Component Value Date    K 3.9 04/01/2024   . Will continue potassium replacement per protocol and recheck repeat levels after replacement completed.

## 2024-04-01 NOTE — PROGRESS NOTES
[facility]  [facility]  Progress Note    Patient Name: Karin Carrera  MRN: [id number]  Patient Class: IP- Inpatient   Admission Date: 3/19/2024  Length of Stay: 12 days  Attending Physician: Aleks Whipple DO  Primary Care Provider: Walker Smith MD        Subjective:     Principal Problem:Sepsis without acute organ dysfunction        HPI:  Patient is a 54yo male who presents to [facility] ED via EMS with SOB and abdominal pain for 2 days. He reports recently having a cholecystectomy on 3/9/24 here at [facility] by Dr. Bennett. He endorses RUQ abdominal pain and SOB. SOB is similar to his previous COPD episodes. Endorses orthopnea and INFANTE. He endorses surgical site drainage mostly pus that started 2 days ago. Endorses decreased appetite around the same time. Endorses bilateral leg swelling and right leg redness for about 1 month and now swelling has gone up his thighs and abdomen in the past 2 days. Denies f/c/cp/n/v/d. Denies urinary, or bowel habit changes. Reports compliance with all home medications.    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 Patient sees Dr. Escobar Cardiology and Dr. Smith PCP. Patient uses 2L NC O2 at home. He communicates through writing on paper and texts due to his expressive aphasia 2/2 CVA.    Upon presentation, VS remarkable for 104/65, 114, 30. Labs remarkable for H/H 11.9/39.1, HCO3 34, glucose 138. Mg 2.7. BNP 7486, troponin 22.2, 19.5. PT 16.2, INR 1.32, PTT 30.9. COVID negative. ABG 7.46, 50, 20, 35.6. EKG sinus tach 110 with multifocal PVCs. CXR showed continued cardiomegaly. Patchy ground-glass and reticular infiltrate/edema throughout the bilateral lungs. Small bilateral pleural fluid. Constellation of findings suspicious for CHF.  Underlying infection not excluded. CT abdomen and pelvis w/o contrast showed study limited secondary to lack of IV contrast. Interval cholecystectomy. There is a small air-fluid collection within the gallbladder fossa measuring up to 3.7 cm which may reflect postoperative fluid collection or abscess. Interval increased diffuse body wall edema. Cardiomegaly and coronary artery calcifications. Scattered interlobular septal thickening of the lung bases suggestive of interstitial pulmonary edema with trace bilateral pleural effusions. There is some calcification of the left ventricle apex which may reflect sequela of remote infarct. Patient received Lasix 60 IV and is admitted to hospital medicine for further management and care.    Overview/Hospital Course:  53 year old male with an extensive PMH presents with abdominal pain; he had recent cholecystectomy.  He is complaining of abdominal pain and SOB.  3/30 breathing much better.  Volume status improved.  3/31-home o2 eval pending    Interval History:  No acute events overnight    Review of Systems   Constitutional: Negative.  Negative for chills and fever.   HENT: Negative.     Eyes:  Negative for pain and redness.   Respiratory:  Positive for shortness of breath. Negative for cough, chest tightness and wheezing.    Cardiovascular:  Negative for chest pain and palpitations.   Gastrointestinal:  Negative for abdominal pain, diarrhea, nausea and vomiting.   Endocrine: Negative for cold intolerance, heat intolerance and polyuria.   Genitourinary:  Negative for difficulty urinating, dysuria, frequency and hematuria.   Musculoskeletal:  Negative for arthralgias, back pain, myalgias, neck pain and neck stiffness.   Skin:  Negative for pallor and rash.   Allergic/Immunologic: Negative.    Neurological:  Negative for dizziness, syncope, weakness, numbness and headaches.   Hematological:  Negative for adenopathy.   Psychiatric/Behavioral:  Negative for agitation, confusion and  hallucinations. The patient is not nervous/anxious.      Objective:     Vital Signs (Most Recent):  Temp: 98 °F (36.7 °C) (03/31/24 1450)  Pulse: 97 (03/31/24 1450)  Resp: 18 (03/31/24 1715)  BP: 130/86 (03/31/24 1450)  SpO2: 98 % (03/31/24 1450) Vital Signs (24h Range):  Temp:  [97.6 °F (36.4 °C)-98.1 °F (36.7 °C)] 98 °F (36.7 °C)  Pulse:  [] 97  Resp:  [16-20] 18  SpO2:  [90 %-100 %] 98 %  BP: ()/(58-86) 130/86     Weight: 96.6 kg (212 lb 15.4 oz)  Body mass index is 27.34 kg/m².    Intake/Output Summary (Last 24 hours) at 3/31/2024 1924  Last data filed at 3/31/2024 1450  Gross per 24 hour   Intake --   Output 1700 ml   Net -1700 ml           Physical Exam  Vitals reviewed.   Constitutional:       Appearance: Normal appearance.   HENT:      Head: Normocephalic and atraumatic.   Eyes:      Pupils: Pupils are equal, round, and reactive to light.   Cardiovascular:      Rate and Rhythm: Normal rate and regular rhythm.      Pulses: Normal pulses.   Pulmonary:      Effort: Pulmonary effort is normal.      Breath sounds: Normal breath sounds.   Abdominal:      General: Abdomen is flat.      Palpations: Abdomen is soft.   Skin:     General: Skin is warm and dry.      Capillary Refill: Capillary refill takes less than 2 seconds.   Neurological:      Mental Status: He is alert. Mental status is at baseline.   Psychiatric:         Mood and Affect: Mood normal.             Significant Labs: All pertinent labs within the past 24 hours have been reviewed.    Significant Imaging: I have reviewed all pertinent imaging results/findings within the past 24 hours.    Assessment/Plan:      * Sepsis without acute organ dysfunction  He is s/p recent cholecystectomy.  Blood cultures show G+ cocci and G- bacilli; on Vancomycin and Maxipime and Flagyl; will await ID of organisms in blood cultures; followed by Surgery  3/30 antibiotics completed    Hyponatremia  Patient has hyponatremia which is uncontrolled,We will aim to  correct the sodium by 4-6mEq in 24 hours. We will monitor sodium Daily. The hyponatremia is due to Medications:  Diuretics. We will obtain the following studies: Urine sodium, urine osmolality, serum osmolality. We will treat the hyponatremia with Removal of offending medications. The patient's sodium results have been reviewed and are listed below.  Recent Labs   Lab 03/31/24  0219   *     3/31 improved, chronically low. Work up is send out. If stabilizes may be able to follow up with pcp    NSVT (nonsustained ventricular tachycardia)  Continue tele while here 3/30  3/30 continue dig-monitor for toxicity, continue amio-monitor for toxicity  3/31-continue dig, level looks good today. Monitor for toxicity. Continue amiodarone    Acute on chronic combined systolic and diastolic heart failure  Patient is identified as having Combined Systolic and Diastolic heart failure that is Acute on chronic. CHF is currently uncontrolled due to Pulmonary edema/pleural effusion on CXR. Latest ECHO performed and demonstrates- Results for orders placed during the hospital encounter of 03/19/24    Echo Saline Bubble? Yes    Interpretation Summary    Left Ventricle: The left ventricle is moderately dilated. Normal wall thickness. Regional wall motion abnormalities present.  Anterior wall and apex are akinetic.  Inferior wall and lateral wall are hypokinetic. There is severely reduced systolic function with a visually estimated ejection fraction of less than 30%. Ejection fraction by visual approximation is 20%. There is diastolic dysfunction.    Right Ventricle: Mild right ventricular enlargement.    Left Atrium: Left atrium is severely dilated.    Right Atrium: Right atrium is mildly dilated.    IVC/SVC: Elevated venous pressure at 15 mmHg.    Pericardium: There is a trivial effusion.  . Continue Furosemide and monitor clinical status closely. Monitor on telemetry. Patient is off CHF pathway.  Monitor strict Is&Os and daily  "weights.  Place on fluid restriction of 1.5 L. Cardiology has been consulted. Continue to stress to patient importance of self efficacy and  on diet for CHF. Last BNP reviewed- and noted below No results for input(s): "BNP", "BNPTRIAGEBLO" in the last 168 hours.   3/30 heart failure exacerbation much improved.  Starting guideline directed medical therapy.  3/31 GDMT at discharge. Anticipate soon.      Pulmonary emphysema  Patient's COPD is controlled currently.  Patient is currently off COPD Pathway. Continue scheduled inhalers Supplemental oxygen and monitor respiratory status closely.   3/31-resp status stable, test for home oxygen    Cardiogenic shock  3/30 now off inotropes.  Now off Lasix infusion.  G DMT at discharge  3/31-resolved    Hypokalemia  Patient has hypokalemia which is Acute and currently controlled. Most recent potassium levels reviewed-   Lab Results   Component Value Date    K 3.7 03/31/2024   . Will continue potassium replacement per protocol and recheck repeat levels after replacement completed.       Acute hypoxemic respiratory failure  Acute hypoxic respiratory failure due to many etiologies :  Pulmonary edema 2/2 HFrEF  COPD  Pneumonia>consolidation  -now c/f pt developing ARDS  -cont broad spectrum iv abx  -cont aggressive diuersis  -cont inhalers  -oxygen support, currently on HFNC  -Pulmonary and crtical care team consulted, appreicate recs.   3/30 now euvolemic.  We will test for home oxygen  3/31-home oxygen eval, euvolemic    ROSEMARIE (obstructive sleep apnea)  -Noncompliant with CPAP at home; CPAP in hospital ordered    Cellulitis of lower extremity  Cont iv abx   Resolved 3/30    End stage heart failure  Continue home meds; he is always SOB and has ROSEMARIE non-compliant with CPAP, smokes, and does not take home meds; CXR shows patchy ground-glass and reticular infiltrate/edema throughout the bilateral lungs; on IV Lasix  -cardiology consulted, now on inotrope infusion along w/ iv " "lasix. Cont GDMT per cardiology  3/30 euvolemic.  GD MT at discharge.  3/31-euvolemic    Type 2 diabetes mellitus  Patient's FSGs are controlled on current medication regimen.  Last A1c reviewed-   Lab Results   Component Value Date    HGBA1C 6.0 03/08/2024     Most recent fingerstick glucose reviewed- No results for input(s): "POCTGLUCOSE" in the last 24 hours.  Current correctional scale  Low  Maintain anti-hyperglycemic dose as follows-   Antihyperglycemics (From admission, onward)      Start     Stop Route Frequency Ordered    03/20/24 0900  empagliflozin (Jardiance) tablet 10 mg        Question Answer Comment   Does this patient have a diagnosis of heart failure? Yes    Does this patient have type 1 diabetes or diabetic ketoacidosis? No    Does this patient have symptomatic hypotension? No    Is the patient NPO or pending major surgery in next 3 days or less? No        -- Oral Daily 03/20/24 0019    03/20/24 0124  insulin aspart U-100 injection 0-5 Units         -- SubQ Before meals & nightly PRN 03/20/24 0025          Hold Oral hypoglycemics while patient is in the hospital.  3/31-Follow glucose daily and adjusting insulin as needed    BS stable; continue home DM meds; on SSI; will continue to monitor    Hemiparesis affecting right side as late effect of cerebrovascular accident  On ASA and statin; he is at baseline; has aphasia  3/30 continue aspirin and statin.  Working with PT  3/31-PT/OT    Coronary artery disease involving coronary bypass graft of native heart without angina pectoris  Patient with known CAD s/p CABG, which is controlled Will continue ASA and Statin and monitor for S/Sx of angina/ACS. Continue to monitor on telemetry.     He has a history of CAD s/p CABG; continue home meds  3/31 no chest pain currently    Essential (primary) hypertension  BP stable; continue home meds  Chronic, controlled. Latest blood pressure and vitals reviewed-     Temp:  [97.6 °F (36.4 °C)-98.1 °F (36.7 °C)]   Pulse: "  []   Resp:  [16-20]   BP: ()/(58-86)   SpO2:  [90 %-100 %] .   Home meds for hypertension were reviewed and noted below.   Hypertension Medications               amLODIPine (NORVASC) 10 MG tablet Take 10 mg by mouth once daily.    furosemide (LASIX) 40 MG tablet Take 1 tablet (40 mg total) by mouth 2 (two) times a day.    losartan (COZAAR) 25 MG tablet Take 0.5 tablets (12.5 mg total) by mouth once daily.    metoprolol succinate (TOPROL-XL) 25 MG 24 hr tablet Take 12.5 mg by mouth once daily. Take 1/2 tablet by mouth daily            While in the hospital, will manage blood pressure as follows; Continue home antihypertensive regimen    Will utilize p.r.n. blood pressure medication only if patient's blood pressure greater than 180/110 and he develops symptoms such as worsening chest pain or shortness of breath.   3/31 Bp reasonably controlled, continue current regimen        VTE Risk Mitigation (From admission, onward)           Ordered     enoxaparin injection 40 mg  Every 24 hours         03/19/24 8515                    Discharge Planning   DEANNA:      Code Status: Full Code   Is the patient medically ready for discharge?:     Reason for patient still in hospital (select all that apply): Treatment  Discharge Plan A: Skilled Nursing Facility                  Aleks Whipple DO  Department of Hospital Medicine   [facility]

## 2024-04-01 NOTE — ASSESSMENT & PLAN NOTE
Patient with known CAD s/p CABG, which is controlled Will continue ASA and Statin and monitor for S/Sx of angina/ACS. Continue to monitor on telemetry.     He has a history of CAD s/p CABG; continue home meds  3/31 no chest pain currently

## 2024-04-01 NOTE — PLAN OF CARE
Rc'd consult for Home Oxygen, no pulse ox on RA noted. Sent secure chat to MD and RN of day for the RA oxygenation sat. Following.   1345  Sent referral to [facility] for home oxygen set up. Following with referral to [facility].   1358  Sent updates to Kevin to re look at referral. Will follow in AM with liz.

## 2024-04-01 NOTE — PT/OT/SLP PROGRESS
Occupational Therapy   Treatment    Name: Karin Carrera  MRN: [id number]  Admitting Diagnosis:  Sepsis without acute organ dysfunction       Recommendations:     Discharge Recommendations:  (depending on rate of recovery, moderate to low intensity therapy)  Discharge Equipment Recommendations:  none  Barriers to discharge:       Assessment:     Karin Carrera is a 53 y.o. male with a medical diagnosis of Sepsis without acute organ dysfunction. Performance deficits affecting function are weakness, impaired endurance.     Rehab Prognosis:  Good; patient would benefit from acute skilled OT services to address these deficits and reach maximum level of function.       Plan:     Patient to be seen 5 x/week to address the above listed problems via self-care/home management, therapeutic activities, therapeutic exercises  Plan of Care Expires: 04/22/24  Plan of Care Reviewed with: patient    Subjective     Chief Complaint:   Patient/Family Comments/goals:   Pain/Comfort:  Pain Rating 1: 0/10    Objective:     Communicated with:Isabel Lr RN  prior to session.  Patient found HOB elevated with peripheral IV, telemetry, blood pressure cuff, oxygen upon OT entry to room.    General Precautions: Standard, fall, respiratory    Orthopedic Precautions:N/A  Braces: N/A  Respiratory Status: Nasal cannula, flow 2 L/min     Occupational Performance:     Bed Mobility:    Supine to sit mod I       Functional Mobility/Transfers:  Sit to stand cga/sba  Bed to chair cga   with straight  cane  Functional Mobility:     Activities of Daily Living:  Deejay shoes Elisa  Min a to deejay gown as robe  I to comb his hair  I to wash his face  Sat eob 40 mins Elisa  Pt straighten his drawl sheet while sitting eob      AMPAC 6 Click ADL:      Treatment & Education:  Pt performed hand helper with 3 rubber band resistances on L 20 reps x 2, green t ball ex's on L 20 reps x 2, rom ex's with 2 lb wt 15 reps x 2 on L shld flex,abd/add,elbow  flex/ext, red t band on L 15 reps x 2 elbow flex     Patient left up in chair with all lines intact and call button in reach    GOALS:   Multidisciplinary Problems       Occupational Therapy Goals          Problem: Occupational Therapy    Goal Priority Disciplines Outcome Interventions   Occupational Therapy Goal     OT, PT/OT Ongoing, Progressing    Description: STG: (in 1 week)  Pt will perform grooming with setup  Pt will bathe with setup and min(A)  Pt will perform UE dressing with min(A)  Pt will perform LE dressing with min(A)  Pt will transfer bed/chair/bsc with SBA with cane  Pt will perform standing task x 3 min with CGA   Pt will tolerate 10 minutes of tx without fatigue      LTG: (in 5 weeks)  1.Restore to max I with self care and mobility.                        Time Tracking:     OT Date of Treatment: 04/01/24  OT Start Time: 1059  OT Stop Time: 1150  OT Total Time (min): 51 min    Billable Minutes:Self Care/Home Management 20  Therapeutic Exercise 25    OT/ANGELI: ANGELI          4/1/2024

## 2024-04-01 NOTE — ASSESSMENT & PLAN NOTE
Continue home meds; he is always SOB and has ROSEMARIE non-compliant with CPAP, smokes, and does not take home meds; CXR shows patchy ground-glass and reticular infiltrate/edema throughout the bilateral lungs; on IV Lasix  -cardiology consulted, now on inotrope infusion along w/ iv lasix. Cont GDMT per cardiology  3/30 euvolemic.  GD MT at discharge.  3/31-euvolemic

## 2024-04-01 NOTE — ASSESSMENT & PLAN NOTE
Patient has hyponatremia which is uncontrolled,We will aim to correct the sodium by 4-6mEq in 24 hours. We will monitor sodium Daily. The hyponatremia is due to Medications:  Diuretics. We will obtain the following studies: Urine sodium, urine osmolality, serum osmolality. We will treat the hyponatremia with Removal of offending medications. The patient's sodium results have been reviewed and are listed below.  Recent Labs   Lab 04/01/24  0509   *     3/31 improved, chronically low. Work up is send out. If stabilizes may be able to follow up with pcp  4/1 proving difficult to control.  Worse today.  Serum Osmo urine Osmo is send out.  This will guide treatment.  However he is chronically hyponatremic.  This may be something he can follow up with his PCP.

## 2024-04-01 NOTE — ASSESSMENT & PLAN NOTE
"Patient's FSGs are controlled on current medication regimen.  Last A1c reviewed-   Lab Results   Component Value Date    HGBA1C 6.0 03/08/2024     Most recent fingerstick glucose reviewed- No results for input(s): "POCTGLUCOSE" in the last 24 hours.  Current correctional scale  Low  Maintain anti-hyperglycemic dose as follows-   Antihyperglycemics (From admission, onward)      Start     Stop Route Frequency Ordered    03/20/24 0900  empagliflozin (Jardiance) tablet 10 mg        Question Answer Comment   Does this patient have a diagnosis of heart failure? Yes    Does this patient have type 1 diabetes or diabetic ketoacidosis? No    Does this patient have symptomatic hypotension? No    Is the patient NPO or pending major surgery in next 3 days or less? No        -- Oral Daily 03/20/24 0019    03/20/24 0124  insulin aspart U-100 injection 0-5 Units         -- SubQ Before meals & nightly PRN 03/20/24 0025          Hold Oral hypoglycemics while patient is in the hospital.  3/31-Follow glucose daily and adjusting insulin as needed    BS stable; continue home DM meds; on SSI; will continue to monitor  "

## 2024-04-01 NOTE — ASSESSMENT & PLAN NOTE
Patient with known CAD s/p CABG, which is controlled Will continue ASA and Statin and monitor for S/Sx of angina/ACS. Continue to monitor on telemetry.     He has a history of CAD s/p CABG; continue home meds  3/31 no chest pain currently  4/1 no chest pain

## 2024-04-01 NOTE — ASSESSMENT & PLAN NOTE
Continue tele while here 3/30  3/30 continue dig-monitor for toxicity, continue amio-monitor for toxicity  3/31-continue dig, level looks good today. Monitor for toxicity. Continue amiodarone  4/1 continue dig-monitor for toxicity, continue amiodarone-monitor for toxicity

## 2024-04-01 NOTE — ASSESSMENT & PLAN NOTE
Continue tele while here 3/30  3/30 continue dig-monitor for toxicity, continue amio-monitor for toxicity  3/31-continue dig, level looks good today. Monitor for toxicity. Continue amiodarone

## 2024-04-01 NOTE — ASSESSMENT & PLAN NOTE
BP stable; continue home meds  Chronic, controlled. Latest blood pressure and vitals reviewed-     Temp:  [97.6 °F (36.4 °C)-98.1 °F (36.7 °C)]   Pulse:  []   Resp:  [16-20]   BP: ()/(58-86)   SpO2:  [90 %-100 %] .   Home meds for hypertension were reviewed and noted below.   Hypertension Medications               amLODIPine (NORVASC) 10 MG tablet Take 10 mg by mouth once daily.    furosemide (LASIX) 40 MG tablet Take 1 tablet (40 mg total) by mouth 2 (two) times a day.    losartan (COZAAR) 25 MG tablet Take 0.5 tablets (12.5 mg total) by mouth once daily.    metoprolol succinate (TOPROL-XL) 25 MG 24 hr tablet Take 12.5 mg by mouth once daily. Take 1/2 tablet by mouth daily            While in the hospital, will manage blood pressure as follows; Continue home antihypertensive regimen    Will utilize p.r.n. blood pressure medication only if patient's blood pressure greater than 180/110 and he develops symptoms such as worsening chest pain or shortness of breath.   3/31 Bp reasonably controlled, continue current regimen

## 2024-04-01 NOTE — SUBJECTIVE & OBJECTIVE
Interval History:  No acute events overnight    Review of Systems   Constitutional: Negative.  Negative for chills and fever.   HENT: Negative.     Eyes:  Negative for pain and redness.   Respiratory:  Positive for shortness of breath. Negative for cough, chest tightness and wheezing.    Cardiovascular:  Negative for chest pain and palpitations.   Gastrointestinal:  Negative for abdominal pain, diarrhea, nausea and vomiting.   Endocrine: Negative for cold intolerance, heat intolerance and polyuria.   Genitourinary:  Negative for difficulty urinating, dysuria, frequency and hematuria.   Musculoskeletal:  Negative for arthralgias, back pain, myalgias, neck pain and neck stiffness.   Skin:  Negative for pallor and rash.   Allergic/Immunologic: Negative.    Neurological:  Negative for dizziness, syncope, weakness, numbness and headaches.   Hematological:  Negative for adenopathy.   Psychiatric/Behavioral:  Negative for agitation, confusion and hallucinations. The patient is not nervous/anxious.      Objective:     Vital Signs (Most Recent):  Temp: 98 °F (36.7 °C) (04/01/24 1437)  Pulse: 89 (04/01/24 1437)  Resp: 18 (04/01/24 1437)  BP: (!) 98/59 (04/01/24 1437)  SpO2: 100 % (04/01/24 1437) Vital Signs (24h Range):  Temp:  [97.4 °F (36.3 °C)-98 °F (36.7 °C)] 98 °F (36.7 °C)  Pulse:  [] 89  Resp:  [15-20] 18  SpO2:  [86 %-100 %] 100 %  BP: ()/(59-77) 98/59     Weight: 96.6 kg (212 lb 15.4 oz)  Body mass index is 27.34 kg/m².    Intake/Output Summary (Last 24 hours) at 4/1/2024 1623  Last data filed at 4/1/2024 0528  Gross per 24 hour   Intake --   Output 2050 ml   Net -2050 ml           Physical Exam  Vitals reviewed.   Constitutional:       Appearance: Normal appearance.   HENT:      Head: Normocephalic and atraumatic.   Eyes:      Pupils: Pupils are equal, round, and reactive to light.   Cardiovascular:      Rate and Rhythm: Normal rate and regular rhythm.      Pulses: Normal pulses.   Pulmonary:       Effort: Pulmonary effort is normal.      Breath sounds: Normal breath sounds.   Abdominal:      General: Abdomen is flat.      Palpations: Abdomen is soft.   Skin:     General: Skin is warm and dry.      Capillary Refill: Capillary refill takes less than 2 seconds.   Neurological:      Mental Status: He is alert. Mental status is at baseline.   Psychiatric:         Mood and Affect: Mood normal.             Significant Labs: All pertinent labs within the past 24 hours have been reviewed.    Significant Imaging: I have reviewed all pertinent imaging results/findings within the past 24 hours.

## 2024-04-01 NOTE — ASSESSMENT & PLAN NOTE
On ASA and statin; he is at baseline; has aphasia  3/30 continue aspirin and statin.  Working with PT  3/31-PT/OT

## 2024-04-01 NOTE — PLAN OF CARE
Problem: Adult Inpatient Plan of Care  Goal: Plan of Care Review  Outcome: Ongoing, Progressing  Goal: Patient-Specific Goal (Individualized)  Outcome: Ongoing, Progressing  Goal: Absence of Hospital-Acquired Illness or Injury  Outcome: Ongoing, Progressing  Goal: Optimal Comfort and Wellbeing  Outcome: Ongoing, Progressing  Goal: Readiness for Transition of Care  Outcome: Ongoing, Progressing     Problem: Diabetes Comorbidity  Goal: Blood Glucose Level Within Targeted Range  Outcome: Ongoing, Progressing     Problem: Impaired Wound Healing  Goal: Optimal Wound Healing  Outcome: Ongoing, Progressing     Problem: Skin Injury Risk Increased  Goal: Skin Health and Integrity  Outcome: Ongoing, Progressing     Problem: Adjustment to Illness (Sepsis/Septic Shock)  Goal: Optimal Coping  Outcome: Ongoing, Progressing     Problem: Bleeding (Sepsis/Septic Shock)  Goal: Absence of Bleeding  Outcome: Ongoing, Progressing     Problem: Glycemic Control Impaired (Sepsis/Septic Shock)  Goal: Blood Glucose Level Within Desired Range  Outcome: Ongoing, Progressing     Problem: Infection Progression (Sepsis/Septic Shock)  Goal: Absence of Infection Signs and Symptoms  Outcome: Ongoing, Progressing     Problem: Nutrition Impaired (Sepsis/Septic Shock)  Goal: Optimal Nutrition Intake  Outcome: Ongoing, Progressing     Problem: Gas Exchange Impaired  Goal: Optimal Gas Exchange  Outcome: Ongoing, Progressing     Problem: Infection  Goal: Absence of Infection Signs and Symptoms  Outcome: Ongoing, Progressing     Problem: Airway Clearance Ineffective  Goal: Effective Airway Clearance  Outcome: Ongoing, Progressing     Problem: Fall Injury Risk  Goal: Absence of Fall and Fall-Related Injury  Outcome: Ongoing, Progressing

## 2024-04-01 NOTE — ASSESSMENT & PLAN NOTE
"Patient is identified as having Combined Systolic and Diastolic heart failure that is Acute on chronic. CHF is currently uncontrolled due to Pulmonary edema/pleural effusion on CXR. Latest ECHO performed and demonstrates- Results for orders placed during the hospital encounter of 03/19/24    Echo Saline Bubble? Yes    Interpretation Summary    Left Ventricle: The left ventricle is moderately dilated. Normal wall thickness. Regional wall motion abnormalities present.  Anterior wall and apex are akinetic.  Inferior wall and lateral wall are hypokinetic. There is severely reduced systolic function with a visually estimated ejection fraction of less than 30%. Ejection fraction by visual approximation is 20%. There is diastolic dysfunction.    Right Ventricle: Mild right ventricular enlargement.    Left Atrium: Left atrium is severely dilated.    Right Atrium: Right atrium is mildly dilated.    IVC/SVC: Elevated venous pressure at 15 mmHg.    Pericardium: There is a trivial effusion.  . Continue Furosemide and monitor clinical status closely. Monitor on telemetry. Patient is off CHF pathway.  Monitor strict Is&Os and daily weights.  Place on fluid restriction of 1.5 L. Cardiology has been consulted. Continue to stress to patient importance of self efficacy and  on diet for CHF. Last BNP reviewed- and noted below No results for input(s): "BNP", "BNPTRIAGEBLO" in the last 168 hours.   3/30 heart failure exacerbation much improved.  Starting guideline directed medical therapy.  3/31 GDMT at discharge. Anticipate soon.    4/1 once sodium improves can discharge home.  "

## 2024-04-01 NOTE — ASSESSMENT & PLAN NOTE
Acute hypoxic respiratory failure due to many etiologies :  Pulmonary edema 2/2 HFrEF  COPD  Pneumonia>consolidation  -now c/f pt developing ARDS  -cont broad spectrum iv abx  -cont aggressive diuersis  -cont inhalers  -oxygen support, currently on HFNC  -Pulmonary and crtical care team consulted, appreicate recs.   3/30 now euvolemic.  We will test for home oxygen  3/31-home oxygen eval, euvolemic

## 2024-04-01 NOTE — ASSESSMENT & PLAN NOTE
Patient has hyponatremia which is uncontrolled,We will aim to correct the sodium by 4-6mEq in 24 hours. We will monitor sodium Daily. The hyponatremia is due to Medications:  Diuretics. We will obtain the following studies: Urine sodium, urine osmolality, serum osmolality. We will treat the hyponatremia with Removal of offending medications. The patient's sodium results have been reviewed and are listed below.  Recent Labs   Lab 03/31/24  0219   *     3/31 improved, chronically low. Work up is send out. If stabilizes may be able to follow up with pcp

## 2024-04-01 NOTE — ASSESSMENT & PLAN NOTE
Patient's COPD is controlled currently.  Patient is currently off COPD Pathway. Continue scheduled inhalers Supplemental oxygen and monitor respiratory status closely.   3/31-resp status stable, test for home oxygen  Qualifies for home oxygen 4/1

## 2024-04-01 NOTE — ASSESSMENT & PLAN NOTE
Patient's COPD is controlled currently.  Patient is currently off COPD Pathway. Continue scheduled inhalers Supplemental oxygen and monitor respiratory status closely.   3/31-resp status stable, test for home oxygen

## 2024-04-01 NOTE — PT/OT/SLP PROGRESS
Physical Therapy Treatment    Patient Name:  Karin Carrera   MRN:  [id number]    Recommendations:     Discharge Recommendations: High Intensity Therapy (to low intensity depending on rate of recovery)  Discharge Equipment Recommendations: none  Barriers to discharge: Decreased caregiver support    Assessment:     Karin Carrera is a 53 y.o. male admitted with a medical diagnosis of Sepsis without acute organ dysfunction.  He presents with the following impairments/functional limitations: impaired endurance, weakness, impaired self care skills, impaired functional mobility, gait instability, impaired balance, decreased upper extremity function, decreased lower extremity function, abnormal tone, impaired skin, edema, impaired cardiopulmonary response to activity Patient progesssing endurance. On nasal canula now. Plan is to progress as able.    Rehab Prognosis: Good; patient would benefit from acute skilled PT services to address these deficits and reach maximum level of function.    Recent Surgery: * No surgery found *      Plan:     During this hospitalization, patient to be seen 5 x/week to address the identified rehab impairments via gait training, therapeutic activities, therapeutic exercises and progress toward the following goals:    Plan of Care Expires:  04/24/24    Subjective     Chief Complaint: generalized weakness, sob  Patient/Family Comments/goals:   Pain/Comfort:  Pain Rating 1: 0/10      Objective:     Communicated with nurse prior to session.  Patient found supine with   upon PT entry to room.     General Precautions: Standard, fall, respiratory  Orthopedic Precautions: N/A  Braces: N/A  Respiratory Status: Room air     Functional Mobility:  Bed Mobility:     Supine to Sit: minimum assistance  Sit to Supine: minimum assistance  Transfers:     Sit to Stand:  minimum assistance with straight cane  Gait: ambulated 80 feet with cane, 4 L/min, 75% decreased oniel      AM-PAC 6 CLICK  MOBILITY  Turning over in bed (including adjusting bedclothes, sheets and blankets)?: 3  Sitting down on and standing up from a chair with arms (e.g., wheelchair, bedside commode, etc.): 3  Moving from lying on back to sitting on the side of the bed?: 3  Moving to and from a bed to a chair (including a wheelchair)?: 3  Need to walk in hospital room?: 3  Climbing 3-5 steps with a railing?: 2  Basic Mobility Total Score: 17       Treatment & Education:  BLE: aps, hs, saq, abd-add, mre flexion ext 3x10    Patient left supine with call button in reach..    GOALS:   Multidisciplinary Problems       Physical Therapy Goals          Problem: Physical Therapy    Goal Priority Disciplines Outcome Goal Variances Interventions   Physical Therapy Goal     PT, PT/OT Ongoing, Progressing     Description: Short Term Goals to be met by: 2024    Patient will increase functional independence with mobility by performin. Supine to sit with independently  2. Sit to stand transfer with independently using straight cane  3. Bed to chair transfer with independently using straight cane  4. Gait  x 100 feet with contact guard assist using straight cane and O2 PRN  5. Lower extremity exercise program x30 reps per handout, with assistance as needed    Long Term Goals to be met by: 2024    Pt will regain full independent functional mobility with straight cane to return to home situation and prior activities of daily living.                        Time Tracking:     PT Received On: 24  PT Start Time: 1430     PT Stop Time: 1455  PT Total Time (min): 25 min     Billable Minutes: Gait Training 10 and Therapeutic Exercise 15    Treatment Type: Treatment  PT/PTA: PT     Number of PTA visits since last PT visit: 0     2024

## 2024-04-01 NOTE — ASSESSMENT & PLAN NOTE
Continue home meds; he is always SOB and has ROSEMARIE non-compliant with CPAP, smokes, and does not take home meds; CXR shows patchy ground-glass and reticular infiltrate/edema throughout the bilateral lungs; on IV Lasix  -cardiology consulted, now on inotrope infusion along w/ iv lasix. Cont GDMT per cardiology  3/30 euvolemic.  GD MT at discharge.  3/31-euvolemic  4/1 euvolemic continue guideline directed medical therapy

## 2024-04-01 NOTE — ASSESSMENT & PLAN NOTE
"Patient is identified as having Combined Systolic and Diastolic heart failure that is Acute on chronic. CHF is currently uncontrolled due to Pulmonary edema/pleural effusion on CXR. Latest ECHO performed and demonstrates- Results for orders placed during the hospital encounter of 03/19/24    Echo Saline Bubble? Yes    Interpretation Summary    Left Ventricle: The left ventricle is moderately dilated. Normal wall thickness. Regional wall motion abnormalities present.  Anterior wall and apex are akinetic.  Inferior wall and lateral wall are hypokinetic. There is severely reduced systolic function with a visually estimated ejection fraction of less than 30%. Ejection fraction by visual approximation is 20%. There is diastolic dysfunction.    Right Ventricle: Mild right ventricular enlargement.    Left Atrium: Left atrium is severely dilated.    Right Atrium: Right atrium is mildly dilated.    IVC/SVC: Elevated venous pressure at 15 mmHg.    Pericardium: There is a trivial effusion.  . Continue Furosemide and monitor clinical status closely. Monitor on telemetry. Patient is off CHF pathway.  Monitor strict Is&Os and daily weights.  Place on fluid restriction of 1.5 L. Cardiology has been consulted. Continue to stress to patient importance of self efficacy and  on diet for CHF. Last BNP reviewed- and noted below No results for input(s): "BNP", "BNPTRIAGEBLO" in the last 168 hours.   3/30 heart failure exacerbation much improved.  Starting guideline directed medical therapy.  3/31 GDMT at discharge. Anticipate soon.    "

## 2024-04-02 ENCOUNTER — EXTERNAL HOME HEALTH (OUTPATIENT)
Dept: HOME HEALTH SERVICES | Facility: HOSPITAL | Age: 54
End: 2024-04-02
Payer: MEDICAID

## 2024-04-02 LAB
ANION GAP SERPL CALCULATED.3IONS-SCNC: 11 MMOL/L (ref 7–16)
BUN SERPL-MCNC: 21 MG/DL (ref 7–18)
BUN/CREAT SERPL: 29 (ref 6–20)
CALCIUM SERPL-MCNC: 9.1 MG/DL (ref 8.5–10.1)
CHLORIDE SERPL-SCNC: 88 MMOL/L (ref 98–107)
CO2 SERPL-SCNC: 31 MMOL/L (ref 21–32)
CREAT SERPL-MCNC: 0.73 MG/DL (ref 0.7–1.3)
EGFR (NO RACE VARIABLE) (RUSH/TITUS): 109 ML/MIN/1.73M2
GLUCOSE SERPL-MCNC: 158 MG/DL (ref 74–106)
GLUCOSE SERPL-MCNC: 180 MG/DL (ref 70–105)
GLUCOSE SERPL-MCNC: 213 MG/DL (ref 70–105)
GLUCOSE SERPL-MCNC: 218 MG/DL (ref 70–105)
GLUCOSE SERPL-MCNC: 223 MG/DL (ref 70–105)
MAYO GENERIC ORDERABLE RESULT: ABNORMAL
MAYO GENERIC ORDERABLE RESULT: NORMAL
POTASSIUM SERPL-SCNC: 3.8 MMOL/L (ref 3.5–5.1)
SODIUM SERPL-SCNC: 126 MMOL/L (ref 136–145)

## 2024-04-02 PROCEDURE — 27000221 HC OXYGEN, UP TO 24 HOURS

## 2024-04-02 PROCEDURE — 25000003 PHARM REV CODE 250: Performed by: INTERNAL MEDICINE

## 2024-04-02 PROCEDURE — 99232 SBSQ HOSP IP/OBS MODERATE 35: CPT | Mod: ,,, | Performed by: STUDENT IN AN ORGANIZED HEALTH CARE EDUCATION/TRAINING PROGRAM

## 2024-04-02 PROCEDURE — 25000242 PHARM REV CODE 250 ALT 637 W/ HCPCS

## 2024-04-02 PROCEDURE — 63600175 PHARM REV CODE 636 W HCPCS: Performed by: INTERNAL MEDICINE

## 2024-04-02 PROCEDURE — 99900035 HC TECH TIME PER 15 MIN (STAT)

## 2024-04-02 PROCEDURE — 97116 GAIT TRAINING THERAPY: CPT

## 2024-04-02 PROCEDURE — 82962 GLUCOSE BLOOD TEST: CPT

## 2024-04-02 PROCEDURE — 97110 THERAPEUTIC EXERCISES: CPT

## 2024-04-02 PROCEDURE — 80048 BASIC METABOLIC PNL TOTAL CA: CPT | Performed by: INTERNAL MEDICINE

## 2024-04-02 PROCEDURE — 25000242 PHARM REV CODE 250 ALT 637 W/ HCPCS: Performed by: INTERNAL MEDICINE

## 2024-04-02 PROCEDURE — 25000003 PHARM REV CODE 250: Performed by: STUDENT IN AN ORGANIZED HEALTH CARE EDUCATION/TRAINING PROGRAM

## 2024-04-02 PROCEDURE — 11000001 HC ACUTE MED/SURG PRIVATE ROOM

## 2024-04-02 PROCEDURE — 94640 AIRWAY INHALATION TREATMENT: CPT

## 2024-04-02 PROCEDURE — 97530 THERAPEUTIC ACTIVITIES: CPT

## 2024-04-02 PROCEDURE — 94761 N-INVAS EAR/PLS OXIMETRY MLT: CPT

## 2024-04-02 PROCEDURE — 25000003 PHARM REV CODE 250

## 2024-04-02 RX ADMIN — TRAZODONE HYDROCHLORIDE 50 MG: 50 TABLET ORAL at 11:04

## 2024-04-02 RX ADMIN — OXYCODONE 10 MG: 5 TABLET ORAL at 05:04

## 2024-04-02 RX ADMIN — OXYCODONE 10 MG: 5 TABLET ORAL at 11:04

## 2024-04-02 RX ADMIN — BUDESONIDE INHALATION 0.5 MG: 0.5 SUSPENSION RESPIRATORY (INHALATION) at 07:04

## 2024-04-02 RX ADMIN — AMIODARONE HYDROCHLORIDE 200 MG: 200 TABLET ORAL at 09:04

## 2024-04-02 RX ADMIN — POTASSIUM CHLORIDE 40 MEQ: 1500 TABLET, EXTENDED RELEASE ORAL at 08:04

## 2024-04-02 RX ADMIN — SACUBITRIL AND VALSARTAN 1 TABLET: 24; 26 TABLET, FILM COATED ORAL at 09:04

## 2024-04-02 RX ADMIN — TORSEMIDE 20 MG: 20 TABLET ORAL at 09:04

## 2024-04-02 RX ADMIN — POTASSIUM CHLORIDE 40 MEQ: 1500 TABLET, EXTENDED RELEASE ORAL at 09:04

## 2024-04-02 RX ADMIN — ATORVASTATIN CALCIUM 40 MG: 40 TABLET, FILM COATED ORAL at 08:04

## 2024-04-02 RX ADMIN — IPRATROPIUM BROMIDE AND ALBUTEROL SULFATE 3 ML: 2.5; .5 SOLUTION RESPIRATORY (INHALATION) at 12:04

## 2024-04-02 RX ADMIN — EMPAGLIFLOZIN 10 MG: 10 TABLET, FILM COATED ORAL at 09:04

## 2024-04-02 RX ADMIN — INSULIN ASPART 1 UNITS: 100 INJECTION, SOLUTION INTRAVENOUS; SUBCUTANEOUS at 08:04

## 2024-04-02 RX ADMIN — IPRATROPIUM BROMIDE AND ALBUTEROL SULFATE 3 ML: 2.5; .5 SOLUTION RESPIRATORY (INHALATION) at 07:04

## 2024-04-02 RX ADMIN — SACUBITRIL AND VALSARTAN 1 TABLET: 24; 26 TABLET, FILM COATED ORAL at 08:04

## 2024-04-02 RX ADMIN — DOCUSATE SODIUM 100 MG: 100 CAPSULE, LIQUID FILLED ORAL at 09:04

## 2024-04-02 RX ADMIN — ASPIRIN 81 MG: 81 TABLET, COATED ORAL at 09:04

## 2024-04-02 RX ADMIN — DIGOXIN 0.12 MG: 125 TABLET ORAL at 09:04

## 2024-04-02 RX ADMIN — TORSEMIDE 20 MG: 20 TABLET ORAL at 05:04

## 2024-04-02 RX ADMIN — DOCUSATE SODIUM 100 MG: 100 CAPSULE, LIQUID FILLED ORAL at 08:04

## 2024-04-02 RX ADMIN — ONDANSETRON 8 MG: 2 INJECTION INTRAMUSCULAR; INTRAVENOUS at 09:04

## 2024-04-02 RX ADMIN — PANTOPRAZOLE SODIUM 40 MG: 40 TABLET, DELAYED RELEASE ORAL at 05:04

## 2024-04-02 RX ADMIN — ENOXAPARIN SODIUM 40 MG: 40 INJECTION SUBCUTANEOUS at 05:04

## 2024-04-02 NOTE — PROGRESS NOTES
[facility]  [facility] Medicine  Progress Note    Patient Name: Karin Carrera  MRN: [id number]  Patient Class: IP- Inpatient   Admission Date: 3/19/2024  Length of Stay: 14 days  Attending Physician: Aleks Whipple DO  Primary Care Provider: Walker Smith MD        Subjective:     Principal Problem:Sepsis without acute organ dysfunction        HPI:  Patient is a 54yo male who presents to [facility] ED via EMS with SOB and abdominal pain for 2 days. He reports recently having a cholecystectomy on 3/9/24 here at [place] by Dr. Bennett. He endorses RUQ abdominal pain and SOB. SOB is similar to his previous COPD episodes. Endorses orthopnea and INFANTE. He endorses surgical site drainage mostly pus that started 2 days ago. Endorses decreased appetite around the same time. Endorses bilateral leg swelling and right leg redness for about 1 month and now swelling has gone up his thighs and abdomen in the past 2 days. Denies f/c/cp/n/v/d. Denies urinary, or bowel habit changes. Reports compliance with all home medications.    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 Patient sees Dr. Escobar Cardiology and Dr. Smith PCP. Patient uses 2L NC O2 at home. He communicates through writing on paper and texts due to his expressive aphasia 2/2 CVA.    Upon presentation, VS remarkable for 104/65, 114, 30. Labs remarkable for H/H 11.9/39.1, HCO3 34, glucose 138. Mg 2.7. BNP 7486, troponin 22.2, 19.5. PT 16.2, INR 1.32, PTT 30.9. COVID negative. ABG 7.46, 50, 20, 35.6. EKG sinus tach 110 with multifocal PVCs. CXR showed continued cardiomegaly. Patchy ground-glass and reticular infiltrate/edema throughout the bilateral lungs. Small bilateral pleural fluid. Constellation of findings suspicious for CHF.  Underlying infection not excluded. CT abdomen and pelvis w/o contrast showed study limited secondary to lack of IV contrast. Interval cholecystectomy. There is a small air-fluid collection within the gallbladder fossa measuring up to 3.7 cm which may reflect postoperative fluid collection or abscess. Interval increased diffuse body wall edema. Cardiomegaly and coronary artery calcifications. Scattered interlobular septal thickening of the lung bases suggestive of interstitial pulmonary edema with trace bilateral pleural effusions. There is some calcification of the left ventricle apex which may reflect sequela of remote infarct. Patient received Lasix 60 IV and is admitted to hospital medicine for further management and care.    Overview/Hospital Course:  53 year old male with an extensive PMH presents with abdominal pain; he had recent cholecystectomy.  He is complaining of abdominal pain and SOB.  3/30 breathing much better.  Volume status improved.  3/31-home o2 eval pending  4/1 qualify for home oxygen.  Once sodium corrects can discharge he is chronically hyponatremic  4/2 qualifies for home oxygen.  Still waiting on workup.  Given his clinical history I suspect this was related to over-diuresis versus reset Osmo stat from his chronic heart failure.  This is near his baseline and can likely be follow up with his PCP.  We will wait 1 more day for workup to result    Interval History:  No acute events overnight    Review of Systems   Constitutional: Negative.  Negative for chills and fever.   HENT: Negative.     Eyes:  Negative for pain and redness.   Respiratory:  Positive for shortness of breath. Negative for cough, chest tightness and wheezing.    Cardiovascular:  Negative for chest pain and palpitations.   Gastrointestinal:  Negative for abdominal pain, diarrhea, nausea and vomiting.   Endocrine: Negative for cold intolerance, heat intolerance and polyuria.   Genitourinary:  Negative for difficulty urinating,  dysuria, frequency and hematuria.   Musculoskeletal:  Negative for arthralgias, back pain, myalgias, neck pain and neck stiffness.   Skin:  Negative for pallor and rash.   Allergic/Immunologic: Negative.    Neurological:  Negative for dizziness, syncope, weakness, numbness and headaches.   Hematological:  Negative for adenopathy.   Psychiatric/Behavioral:  Negative for agitation, confusion and hallucinations. The patient is not nervous/anxious.      Objective:     Vital Signs (Most Recent):  Temp: 97.8 °F (36.6 °C) (04/02/24 1038)  Pulse: 102 (04/02/24 1038)  Resp: 18 (04/02/24 1122)  BP: 112/68 (04/02/24 1038)  SpO2: 99 % (04/02/24 1038) Vital Signs (24h Range):  Temp:  [97.5 °F (36.4 °C)-98.2 °F (36.8 °C)] 97.8 °F (36.6 °C)  Pulse:  [] 102  Resp:  [15-20] 18  SpO2:  [95 %-100 %] 99 %  BP: ()/(59-78) 112/68     Weight: 96.6 kg (212 lb 15.4 oz)  Body mass index is 27.34 kg/m².    Intake/Output Summary (Last 24 hours) at 4/2/2024 1134  Last data filed at 4/1/2024 2023  Gross per 24 hour   Intake 980 ml   Output 1200 ml   Net -220 ml           Physical Exam  Vitals reviewed.   Constitutional:       Appearance: Normal appearance.   HENT:      Head: Normocephalic and atraumatic.   Eyes:      Pupils: Pupils are equal, round, and reactive to light.   Cardiovascular:      Rate and Rhythm: Normal rate and regular rhythm.      Pulses: Normal pulses.   Pulmonary:      Effort: Pulmonary effort is normal.      Breath sounds: Normal breath sounds.   Abdominal:      General: Abdomen is flat.      Palpations: Abdomen is soft.   Skin:     General: Skin is warm and dry.      Capillary Refill: Capillary refill takes less than 2 seconds.   Neurological:      Mental Status: He is alert. Mental status is at baseline.   Psychiatric:         Mood and Affect: Mood normal.             Significant Labs: All pertinent labs within the past 24 hours have been reviewed.    Significant Imaging: I have reviewed all pertinent imaging  results/findings within the past 24 hours.    Assessment/Plan:      * Sepsis without acute organ dysfunction  He is s/p recent cholecystectomy.  Blood cultures show G+ cocci and G- bacilli; on Vancomycin and Maxipime and Flagyl; will await ID of organisms in blood cultures; followed by Surgery  3/30 antibiotics completed      Leg pain  4/1 leg pain today left foot is cool we will check arterial Dopplers  April 2nd leg pain much improved today    Hyponatremia  Patient has hyponatremia which is uncontrolled,We will aim to correct the sodium by 4-6mEq in 24 hours. We will monitor sodium Daily. The hyponatremia is due to Medications:  Diuretics. We will obtain the following studies: Urine sodium, urine osmolality, serum osmolality. We will treat the hyponatremia with Removal of offending medications. The patient's sodium results have been reviewed and are listed below.  Recent Labs   Lab 04/02/24  0612   *     3/31 improved, chronically low. Work up is send out. If stabilizes may be able to follow up with pcp  4/1 proving difficult to control.  Worse today.  Serum Osmo urine Osmo is send out.  This will guide treatment.  However he is chronically hyponatremic.  This may be something he can follow up with his PCP.  4/2 near baseline still waiting on workup    NSVT (nonsustained ventricular tachycardia)  Continue tele while here 3/30  3/30 continue dig-monitor for toxicity, continue amio-monitor for toxicity  3/31-continue dig, level looks good today. Monitor for toxicity. Continue amiodarone  4/1 continue dig-monitor for toxicity, continue amiodarone-monitor for toxicity  4/2 no issues overnight continue digoxin, monitor for toxicity.  Continue amiodarone monitor for toxicity    Acute on chronic combined systolic and diastolic heart failure  Patient is identified as having Combined Systolic and Diastolic heart failure that is Acute on chronic. CHF is currently uncontrolled due to Pulmonary edema/pleural effusion on  "CXR. Latest ECHO performed and demonstrates- Results for orders placed during the hospital encounter of 03/19/24    Echo Saline Bubble? Yes    Interpretation Summary    Left Ventricle: The left ventricle is moderately dilated. Normal wall thickness. Regional wall motion abnormalities present.  Anterior wall and apex are akinetic.  Inferior wall and lateral wall are hypokinetic. There is severely reduced systolic function with a visually estimated ejection fraction of less than 30%. Ejection fraction by visual approximation is 20%. There is diastolic dysfunction.    Right Ventricle: Mild right ventricular enlargement.    Left Atrium: Left atrium is severely dilated.    Right Atrium: Right atrium is mildly dilated.    IVC/SVC: Elevated venous pressure at 15 mmHg.    Pericardium: There is a trivial effusion.  . Continue Furosemide and monitor clinical status closely. Monitor on telemetry. Patient is off CHF pathway.  Monitor strict Is&Os and daily weights.  Place on fluid restriction of 1.5 L. Cardiology has been consulted. Continue to stress to patient importance of self efficacy and  on diet for CHF. Last BNP reviewed- and noted below No results for input(s): "BNP", "BNPTRIAGEBLO" in the last 168 hours.   3/30 heart failure exacerbation much improved.  Starting guideline directed medical therapy.  3/31 GDMT at discharge. Anticipate soon.    4/1 once sodium improves can discharge home.  4/2 continue GD MT cardiology follow up output    Pulmonary emphysema  Patient's COPD is controlled currently.  Patient is currently off COPD Pathway. Continue scheduled inhalers Supplemental oxygen and monitor respiratory status closely.   3/31-resp status stable, test for home oxygen  Qualifies for home oxygen 4/1  Discharge with home O2    Cardiogenic shock  3/30 now off inotropes.  Now off Lasix infusion.  G DMT at discharge  3/31-resolved    Hypokalemia  Patient has hypokalemia which is Acute and currently controlled. Most " "recent potassium levels reviewed-   Lab Results   Component Value Date    K 3.8 04/02/2024   . Will continue potassium replacement per protocol and recheck repeat levels after replacement completed.       Acute hypoxemic respiratory failure  Acute hypoxic respiratory failure due to many etiologies :  Pulmonary edema 2/2 HFrEF  COPD  Pneumonia>consolidation  -now c/f pt developing ARDS  -cont broad spectrum iv abx  -cont aggressive diuersis  -cont inhalers  -oxygen support, currently on HFNC  -Pulmonary and crtical care team consulted, appreicate recs.   3/30 now euvolemic.  We will test for home oxygen  3/31-home oxygen eval, euvolemic  4/2 patient has been optimized still requiring supplemental oxygen.  We will discharge with home O2    ROSEMARIE (obstructive sleep apnea)  -Noncompliant with CPAP at home; CPAP in hospital ordered    Cellulitis of lower extremity  Cont iv abx   Resolved 3/30    End stage heart failure  Continue home meds; he is always SOB and has ROSEMARIE non-compliant with CPAP, smokes, and does not take home meds; CXR shows patchy ground-glass and reticular infiltrate/edema throughout the bilateral lungs; on IV Lasix  -cardiology consulted, now on inotrope infusion along w/ iv lasix. Cont GDMT per cardiology  3/30 euvolemic.  GD MT at discharge.  3/31-euvolemic  4/1 euvolemic continue guideline directed medical therapy  4/2 continue guideline directed medical therapy    Type 2 diabetes mellitus  Patient's FSGs are controlled on current medication regimen.  Last A1c reviewed-   Lab Results   Component Value Date    HGBA1C 6.0 03/08/2024     Most recent fingerstick glucose reviewed- No results for input(s): "POCTGLUCOSE" in the last 24 hours.  Current correctional scale  Low  Maintain anti-hyperglycemic dose as follows-   Antihyperglycemics (From admission, onward)      Start     Stop Route Frequency Ordered    03/20/24 0900  empagliflozin (Jardiance) tablet 10 mg        Question Answer Comment   Does this " patient have a diagnosis of heart failure? Yes    Does this patient have type 1 diabetes or diabetic ketoacidosis? No    Does this patient have symptomatic hypotension? No    Is the patient NPO or pending major surgery in next 3 days or less? No        -- Oral Daily 03/20/24 0019    03/20/24 0124  insulin aspart U-100 injection 0-5 Units         -- SubQ Before meals & nightly PRN 03/20/24 0025          Hold Oral hypoglycemics while patient is in the hospital.  3/31-Follow glucose daily and adjusting insulin as needed    BS stable; continue home DM meds; on SSI; will continue to monitor  4/1 glucose reasonably controlled continue current regimen  4/2 glucose reasonably controlled continue current regimen    Hemiparesis affecting right side as late effect of cerebrovascular accident  On ASA and statin; he is at baseline; has aphasia  3/30 continue aspirin and statin.  Working with PT  3/31-PT/OT  4/1 PTOT  4/2 PTOT.  Plan for discharge home tomorrow    Coronary artery disease involving coronary bypass graft of native heart without angina pectoris  Patient with known CAD s/p CABG, which is controlled Will continue ASA and Statin and monitor for S/Sx of angina/ACS. Continue to monitor on telemetry.     He has a history of CAD s/p CABG; continue home meds  3/31 no chest pain currently  4/1 no chest pain  4/2 no chest pain    Essential (primary) hypertension  BP stable; continue home meds  Chronic, controlled. Latest blood pressure and vitals reviewed-     Temp:  [97.5 °F (36.4 °C)-98.2 °F (36.8 °C)]   Pulse:  []   Resp:  [15-20]   BP: ()/(59-78)   SpO2:  [95 %-100 %] .   Home meds for hypertension were reviewed and noted below.   Hypertension Medications               amLODIPine (NORVASC) 10 MG tablet Take 10 mg by mouth once daily.    furosemide (LASIX) 40 MG tablet Take 1 tablet (40 mg total) by mouth 2 (two) times a day.    losartan (COZAAR) 25 MG tablet Take 0.5 tablets (12.5 mg total) by mouth once  daily.    metoprolol succinate (TOPROL-XL) 25 MG 24 hr tablet Take 12.5 mg by mouth once daily. Take 1/2 tablet by mouth daily            While in the hospital, will manage blood pressure as follows; Continue home antihypertensive regimen    Will utilize p.r.n. blood pressure medication only if patient's blood pressure greater than 180/110 and he develops symptoms such as worsening chest pain or shortness of breath.   3/31 Bp reasonably controlled, continue current regimen  4/1 BP reasonably controlled continue current regimen  4/2 continue current regimen      VTE Risk Mitigation (From admission, onward)           Ordered     enoxaparin injection 40 mg  Every 24 hours         03/19/24 5562                    Discharge Planning   DEANNA:      Code Status: Full Code   Is the patient medically ready for discharge?:     Reason for patient still in hospital (select all that apply): Treatment  Discharge Plan A: Skilled Nursing Facility        Greater than 35 minutes spent on face to face patient interaction, discussion with family, ancillary staff, and/or other physicians as well as review of pertinent labs, images, and notes.             Aleks Whipple DO  Department of Hospital Medicine   [facility]

## 2024-04-02 NOTE — ASSESSMENT & PLAN NOTE
"Patient's FSGs are controlled on current medication regimen.  Last A1c reviewed-   Lab Results   Component Value Date    HGBA1C 6.0 03/08/2024     Most recent fingerstick glucose reviewed- No results for input(s): "POCTGLUCOSE" in the last 24 hours.  Current correctional scale  Low  Maintain anti-hyperglycemic dose as follows-   Antihyperglycemics (From admission, onward)      Start     Stop Route Frequency Ordered    03/20/24 0900  empagliflozin (Jardiance) tablet 10 mg        Question Answer Comment   Does this patient have a diagnosis of heart failure? Yes    Does this patient have type 1 diabetes or diabetic ketoacidosis? No    Does this patient have symptomatic hypotension? No    Is the patient NPO or pending major surgery in next 3 days or less? No        -- Oral Daily 03/20/24 0019    03/20/24 0124  insulin aspart U-100 injection 0-5 Units         -- SubQ Before meals & nightly PRN 03/20/24 0025          Hold Oral hypoglycemics while patient is in the hospital.  3/31-Follow glucose daily and adjusting insulin as needed    BS stable; continue home DM meds; on SSI; will continue to monitor  4/1 glucose reasonably controlled continue current regimen  4/2 glucose reasonably controlled continue current regimen  "

## 2024-04-02 NOTE — ASSESSMENT & PLAN NOTE
Patient's COPD is controlled currently.  Patient is currently off COPD Pathway. Continue scheduled inhalers Supplemental oxygen and monitor respiratory status closely.   3/31-resp status stable, test for home oxygen  Qualifies for home oxygen 4/1  Discharge with home O2

## 2024-04-02 NOTE — ASSESSMENT & PLAN NOTE
Patient has hypokalemia which is Acute and currently controlled. Most recent potassium levels reviewed-   Lab Results   Component Value Date    K 3.8 04/02/2024   . Will continue potassium replacement per protocol and recheck repeat levels after replacement completed.

## 2024-04-02 NOTE — SUBJECTIVE & OBJECTIVE
Interval History:  No acute events overnight    Review of Systems   Constitutional: Negative.  Negative for chills and fever.   HENT: Negative.     Eyes:  Negative for pain and redness.   Respiratory:  Positive for shortness of breath. Negative for cough, chest tightness and wheezing.    Cardiovascular:  Negative for chest pain and palpitations.   Gastrointestinal:  Negative for abdominal pain, diarrhea, nausea and vomiting.   Endocrine: Negative for cold intolerance, heat intolerance and polyuria.   Genitourinary:  Negative for difficulty urinating, dysuria, frequency and hematuria.   Musculoskeletal:  Negative for arthralgias, back pain, myalgias, neck pain and neck stiffness.   Skin:  Negative for pallor and rash.   Allergic/Immunologic: Negative.    Neurological:  Negative for dizziness, syncope, weakness, numbness and headaches.   Hematological:  Negative for adenopathy.   Psychiatric/Behavioral:  Negative for agitation, confusion and hallucinations. The patient is not nervous/anxious.      Objective:     Vital Signs (Most Recent):  Temp: 97.8 °F (36.6 °C) (04/02/24 1038)  Pulse: 102 (04/02/24 1038)  Resp: 18 (04/02/24 1122)  BP: 112/68 (04/02/24 1038)  SpO2: 99 % (04/02/24 1038) Vital Signs (24h Range):  Temp:  [97.5 °F (36.4 °C)-98.2 °F (36.8 °C)] 97.8 °F (36.6 °C)  Pulse:  [] 102  Resp:  [15-20] 18  SpO2:  [95 %-100 %] 99 %  BP: ()/(59-78) 112/68     Weight: 96.6 kg (212 lb 15.4 oz)  Body mass index is 27.34 kg/m².    Intake/Output Summary (Last 24 hours) at 4/2/2024 1134  Last data filed at 4/1/2024 2023  Gross per 24 hour   Intake 980 ml   Output 1200 ml   Net -220 ml           Physical Exam  Vitals reviewed.   Constitutional:       Appearance: Normal appearance.   HENT:      Head: Normocephalic and atraumatic.   Eyes:      Pupils: Pupils are equal, round, and reactive to light.   Cardiovascular:      Rate and Rhythm: Normal rate and regular rhythm.      Pulses: Normal pulses.   Pulmonary:       Effort: Pulmonary effort is normal.      Breath sounds: Normal breath sounds.   Abdominal:      General: Abdomen is flat.      Palpations: Abdomen is soft.   Skin:     General: Skin is warm and dry.      Capillary Refill: Capillary refill takes less than 2 seconds.   Neurological:      Mental Status: He is alert. Mental status is at baseline.   Psychiatric:         Mood and Affect: Mood normal.             Significant Labs: All pertinent labs within the past 24 hours have been reviewed.    Significant Imaging: I have reviewed all pertinent imaging results/findings within the past 24 hours.

## 2024-04-02 NOTE — ASSESSMENT & PLAN NOTE
"Patient is identified as having Combined Systolic and Diastolic heart failure that is Acute on chronic. CHF is currently uncontrolled due to Pulmonary edema/pleural effusion on CXR. Latest ECHO performed and demonstrates- Results for orders placed during the hospital encounter of 03/19/24    Echo Saline Bubble? Yes    Interpretation Summary    Left Ventricle: The left ventricle is moderately dilated. Normal wall thickness. Regional wall motion abnormalities present.  Anterior wall and apex are akinetic.  Inferior wall and lateral wall are hypokinetic. There is severely reduced systolic function with a visually estimated ejection fraction of less than 30%. Ejection fraction by visual approximation is 20%. There is diastolic dysfunction.    Right Ventricle: Mild right ventricular enlargement.    Left Atrium: Left atrium is severely dilated.    Right Atrium: Right atrium is mildly dilated.    IVC/SVC: Elevated venous pressure at 15 mmHg.    Pericardium: There is a trivial effusion.  . Continue Furosemide and monitor clinical status closely. Monitor on telemetry. Patient is off CHF pathway.  Monitor strict Is&Os and daily weights.  Place on fluid restriction of 1.5 L. Cardiology has been consulted. Continue to stress to patient importance of self efficacy and  on diet for CHF. Last BNP reviewed- and noted below No results for input(s): "BNP", "BNPTRIAGEBLO" in the last 168 hours.   3/30 heart failure exacerbation much improved.  Starting guideline directed medical therapy.  3/31 GDMT at discharge. Anticipate soon.    4/1 once sodium improves can discharge home.  4/2 continue GD MT cardiology follow up output  "

## 2024-04-02 NOTE — ASSESSMENT & PLAN NOTE
Patient has hyponatremia which is uncontrolled,We will aim to correct the sodium by 4-6mEq in 24 hours. We will monitor sodium Daily. The hyponatremia is due to Medications:  Diuretics. We will obtain the following studies: Urine sodium, urine osmolality, serum osmolality. We will treat the hyponatremia with Removal of offending medications. The patient's sodium results have been reviewed and are listed below.  Recent Labs   Lab 04/02/24  0612   *     3/31 improved, chronically low. Work up is send out. If stabilizes may be able to follow up with pcp  4/1 proving difficult to control.  Worse today.  Serum Osmo urine Osmo is send out.  This will guide treatment.  However he is chronically hyponatremic.  This may be something he can follow up with his PCP.  4/2 near baseline still waiting on workup

## 2024-04-02 NOTE — ASSESSMENT & PLAN NOTE
Acute hypoxic respiratory failure due to many etiologies :  Pulmonary edema 2/2 HFrEF  COPD  Pneumonia>consolidation  -now c/f pt developing ARDS  -cont broad spectrum iv abx  -cont aggressive diuersis  -cont inhalers  -oxygen support, currently on HFNC  -Pulmonary and crtical care team consulted, appreicate recs.   3/30 now euvolemic.  We will test for home oxygen  3/31-home oxygen eval, euvolemic  4/2 patient has been optimized still requiring supplemental oxygen.  We will discharge with home O2

## 2024-04-02 NOTE — ASSESSMENT & PLAN NOTE
Continue tele while here 3/30  3/30 continue dig-monitor for toxicity, continue amio-monitor for toxicity  3/31-continue dig, level looks good today. Monitor for toxicity. Continue amiodarone  4/1 continue dig-monitor for toxicity, continue amiodarone-monitor for toxicity  4/2 no issues overnight continue digoxin, monitor for toxicity.  Continue amiodarone monitor for toxicity

## 2024-04-02 NOTE — PT/OT/SLP PROGRESS
Occupational Therapy   Treatment    Name: Karin Carrera  MRN: [id number]  Admitting Diagnosis:  Sepsis without acute organ dysfunction       Recommendations:     Discharge Recommendations:  (depending on rate of recovery, moderate to low intensity therapy)  Discharge Equipment Recommendations:  none  Barriers to discharge:       Assessment:     Karin Carrera is a 53 y.o. male with a medical diagnosis of Sepsis without acute organ dysfunction.  He presents with weakness. Performance deficits affecting function are weakness, impaired endurance.     Rehab Prognosis:  Good; patient would benefit from acute skilled OT services to address these deficits and reach maximum level of function.       Plan:     Patient to be seen 5 x/week to address the above listed problems via self-care/home management, therapeutic activities, therapeutic exercises  Plan of Care Expires: 04/22/24  Plan of Care Reviewed with: patient    Subjective     Chief Complaint: Pt had no complaints  Patient/Family Comments/goals: return home  Pain/Comfort:       Objective:     Communicated with: COLTON Hall prior to session.  Patient found up in chair with   upon OT entry to room.    General Precautions: Standard, fall, respiratory    Orthopedic Precautions:N/A  Braces: N/A  Respiratory Status: Nasal cannula, flow 2 L/min     Occupational Performance:     Bed Mobility:         Functional Mobility/Transfers:  Patient completed Sit <> Stand Transfer with modified independence  with  hand-held assist   Functional Mobility: Pt completed sit>stand x3    Activities of Daily Living:        [facility] 6 Click ADL:      Treatment & Education:  Pt used LUE to complete elbow flex, chest press, and sh flex with 2# wt and tricep press and scap pull with yellow Tband 3x15 ea ex to increase strength and endurancefor improved self care skills    Patient left up in chair with all lines intact and call button in reach    GOALS:   Multidisciplinary Problems        Occupational Therapy Goals          Problem: Occupational Therapy    Goal Priority Disciplines Outcome Interventions   Occupational Therapy Goal     OT, PT/OT Ongoing, Progressing    Description: STG: (in 1 week)  Pt will perform grooming with setup  Pt will bathe with setup and min(A)  Pt will perform UE dressing with min(A)  Pt will perform LE dressing with min(A)  Pt will transfer bed/chair/bsc with SBA with cane  Pt will perform standing task x 3 min with CGA   Pt will tolerate 10 minutes of tx without fatigue      LTG: (in 5 weeks)  1.Restore to max I with self care and mobility.                        Time Tracking:     OT Date of Treatment: 04/02/24  OT Start Time: 1035  OT Stop Time: 1103  OT Total Time (min): 28 min    Billable Minutes:Therapeutic Activity 10  Therapeutic Exercise 18               4/2/2024

## 2024-04-02 NOTE — ASSESSMENT & PLAN NOTE
BP stable; continue home meds  Chronic, controlled. Latest blood pressure and vitals reviewed-     Temp:  [97.5 °F (36.4 °C)-98.2 °F (36.8 °C)]   Pulse:  []   Resp:  [15-20]   BP: ()/(59-78)   SpO2:  [95 %-100 %] .   Home meds for hypertension were reviewed and noted below.   Hypertension Medications               amLODIPine (NORVASC) 10 MG tablet Take 10 mg by mouth once daily.    furosemide (LASIX) 40 MG tablet Take 1 tablet (40 mg total) by mouth 2 (two) times a day.    losartan (COZAAR) 25 MG tablet Take 0.5 tablets (12.5 mg total) by mouth once daily.    metoprolol succinate (TOPROL-XL) 25 MG 24 hr tablet Take 12.5 mg by mouth once daily. Take 1/2 tablet by mouth daily            While in the hospital, will manage blood pressure as follows; Continue home antihypertensive regimen    Will utilize p.r.n. blood pressure medication only if patient's blood pressure greater than 180/110 and he develops symptoms such as worsening chest pain or shortness of breath.   3/31 Bp reasonably controlled, continue current regimen  4/1 BP reasonably controlled continue current regimen  4/2 continue current regimen

## 2024-04-02 NOTE — ASSESSMENT & PLAN NOTE
Continue home meds; he is always SOB and has ROSEMARIE non-compliant with CPAP, smokes, and does not take home meds; CXR shows patchy ground-glass and reticular infiltrate/edema throughout the bilateral lungs; on IV Lasix  -cardiology consulted, now on inotrope infusion along w/ iv lasix. Cont GDMT per cardiology  3/30 euvolemic.  GD MT at discharge.  3/31-euvolemic  4/1 euvolemic continue guideline directed medical therapy  4/2 continue guideline directed medical therapy

## 2024-04-02 NOTE — PROGRESS NOTES
[facility]  Adult Nutrition  Follow-up Note         Reason for Assessment  Reason For Assessment: RD follow-up   Nutrition Risk Screen: no indicators present    Assessment and Plan    4/2/2024: RD follow up. Patient on 2g Sodium diet with 1800mL fluid restriction and tolerating well. Documented intake 75%. Recommend continue current diet as tolerated. Encourage good PO intakes. Last BM 3/30 per flowsheet. RD following.    3/26/2024; Patient is a 52yo male admitted 3/22 for sepsis. He is assessed for length of stay.     Patient is 108.1kg with a BMI of 30.61 and is obese. He is ordered a cardiac diet. Per nursing he is eating well. No intake documented in flowsheet since 3/24. Recommend continue current diet as tolerated. Encourage good PO intakes.     Last BM 3/26 per flowsheet.    Medications/labs reviewed. RD following.      Learning Needs/Social Determinants of Health  Learning Assessment       03/20/2024 0242 [facility] (3/19/2024 - Present)   Created by Tiffany Celaya, RN - RN (Nurse) Status: Complete                 PRIMARY LEARNER     Primary Learner Name:  Karin Carrera BY - 03/20/2024 0242    Relationship:  Patient BY - 03/20/2024 0242    Does the primary learner have any barriers to learning?:  No Barriers BY - 03/20/2024 0242    What is the preferred language of the primary learner?:  English BY - 03/20/2024 0242    Is an  required?:  No BY - 03/20/2024 0242    How does the primary learner prefer to learn new concepts?:  Listening BY - 03/20/2024 0242    How often do you need to have someone help you read instructions, pamphlets, or written material from your doctor or pharmacy?:  Sometimes BY - 03/20/2024 0242        CO-LEARNER #1     No question answered        CO-LEARNER #2     No question answered        SPECIAL TOPICS     No question answered        ANSWERED BY:     No question answered        Edit History       Tiffany Celaya, RN - RN (Nurse)    03/20/2024 0242                           Social Determinants of Health     Tobacco Use: High Risk (3/20/2024)    Patient History     Smoking Tobacco Use: Former     Smokeless Tobacco Use: Current     Passive Exposure: Not on file   Alcohol Use: Not At Risk (2/20/2024)    AUDIT-C     Frequency of Alcohol Consumption: Never     Average Number of Drinks: Patient does not drink     Frequency of Binge Drinking: Never   Financial Resource Strain: Low Risk  (3/21/2024)    Overall Financial Resource Strain (CARDIA)     Difficulty of Paying Living Expenses: Not hard at all   Food Insecurity: No Food Insecurity (3/21/2024)    Hunger Vital Sign     Worried About Running Out of Food in the Last Year: Never true     Ran Out of Food in the Last Year: Never true   Transportation Needs: No Transportation Needs (3/21/2024)    PRAPARE - Transportation     Lack of Transportation (Medical): No     Lack of Transportation (Non-Medical): No   Physical Activity: Inactive (2/20/2024)    Exercise Vital Sign     Days of Exercise per Week: 0 days     Minutes of Exercise per Session: 0 min   Stress: No Stress Concern Present (3/21/2024)    [place] [employer] - Occupational Stress Questionnaire     Feeling of Stress : Not at all   Social Connections: Unknown (2/20/2024)    Social Connection and Isolation Panel [NHANES]     Frequency of Communication with Friends and Family: More than three times a week     Frequency of Social Gatherings with Friends and Family: More than three times a week     Attends Ep[employer] Services: Never     Active Member of Clubs or Organizations: No     Attends Club or Organization Meetings: Never     Marital Status: Not on file   Housing Stability: Low Risk  (3/21/2024)    Housing Stability Vital Sign     Unable to Pay for Housing in the Last Year: No     Number of Places Lived in the Last Year: 1     Unstable Housing in the Last Year: No   Depression: Low Risk  (2/29/2024)    Depression     Last  PHQ-4: Flowsheet Data: 0            Malnutrition  Is Patient Malnourished: No    Nutrition Diagnosis  Overweight related to Excessive Caloric intake as evidenced by elevated BMI      Recent Labs   Lab 04/02/24  0612 04/02/24  0618   *  --    POCGLU  --  213*     Comments on Glucose: Glucose elevated. PMH DM2, likely exacerbated by stress response to sepsis.      Nutrition Prescription / Recommendations  Recommendation/Intervention: Recommend continue current diet as tolerated. Encourage good PO Intakes.  Goals: Weight maintenance during admission, intake % of meals during admission  Nutrition Goal Status: goal met  Current Diet Order: 2g Sodium-1800mL fluid restriction  Chewing or Swallowing Difficulty?: No Chewing or swallowing difficulty  Recommended Diet: Low Sodium ( 2g Sodium)  Recommended Oral Supplement: No Oral Supplements  Is Nutrition Support Recommended: [facility] Support: No  Is Nutrition Education Recommended: No    Monitor and Evaluation  % current Intake: P.O. intake of 75 - 100 %  % intake to meet estimated needs: 75 - 100 %  Food and Nutrient Intake: food and beverage intake  Food and Nutrient Adminstration: diet order  Anthropometric Measurements: weight, weight change  Biochemical Data, Medical Tests and Procedures: electrolyte and renal panel, gastrointestinal profile, glucose/endocrine profile, inflammatory profile, lipid profile       Current Medical Diagnosis and Past Medical History     Past Medical History:   Diagnosis Date    Alcoholic fatty liver     CHF (congestive heart failure) 02/20/2024    EF 25%    COPD (chronic obstructive pulmonary disease)     Coronary artery disease involving coronary bypass graft of native heart without angina pectoris 01/01/2022    Dyslipidemia 12/16/2023    Essential (primary) hypertension 01/01/2022    Expressive aphasia 05/05/2023    GERD with esophagitis 2018    EGD    Hemiparesis affecting right side as late effect of  "cerebrovascular accident     ROSEMARIE (obstructive sleep apnea)     Pulmonary hypertension     Stroke     right hand contracted    Tricuspid regurgitation     Type 2 diabetes mellitus        Nutrition/Diet History  Spiritual, Cultural Beliefs, Rastafari Practices, Values that Affect Care: no    Lab/Procedures/Meds  Recent Labs   Lab 04/02/24  0612   *   K 3.8   BUN 21*   CREATININE 0.73   CALCIUM 9.1   CL 88*   Note: Na+, Cl- low. Recommend consider replete to WNL as appropriate. BUN elevated.     Last A1c:   Lab Results   Component Value Date    HGBA1C 6.0 03/08/2024     Lab Results   Component Value Date    RBC 4.90 03/27/2024    HGB 12.9 (L) 03/27/2024    HCT 40.4 03/27/2024    MCV 82.4 03/27/2024    MCH 26.3 (L) 03/27/2024    MCHC 31.9 (L) 03/27/2024    TIBC 281 12/21/2022   Note: Hgb low    Pertinent Labs Reviewed: reviewed  Pertinent Medications Reviewed: reviewed  Scheduled Meds:   albuterol-ipratropium  3 mL Nebulization Q6H    amiodarone  200 mg Oral Daily    aspirin  81 mg Oral Daily    atorvastatin  40 mg Oral QHS    budesonide  0.5 mg Nebulization Q12H    digoxin  0.125 mg Oral Daily    docusate sodium  100 mg Oral BID    empagliflozin  10 mg Oral Daily    enoxparin  40 mg Subcutaneous Q24H (prophylaxis, 1700)    pantoprazole  40 mg Oral Before breakfast    potassium chloride  40 mEq Oral BID    sacubitriL-valsartan  1 tablet Oral BID    torsemide  20 mg Oral BID loop     Continuous Infusions:      PRN Meds:.bisacodyL, dextromethorphan-guaiFENesin  mg/5 ml, dextrose 10%, dextrose 10%, glucagon (human recombinant), glucose, glucose, guaiFENesin-codeine 100-10 mg/5 ml, insulin aspart U-100, melatonin, naloxone, ondansetron, oxyCODONE, simethicone, sodium chloride 0.9%, traZODone    Anthropometrics  Temp: 97.5 °F (36.4 °C)  Height: 6' 2" (188 cm)  Height (inches): 74 in  Weight Method: Bed Scale  Weight: 96.6 kg (212 lb 15.4 oz)  Weight (lb): 212.97 lb  Ideal Body Weight (IBW), Male: 190 lb  % Ideal " Body Weight, Male (lb): 118.95 %  BMI (Calculated): 27.3       Estimated/Assessed Needs  RMR (G[place]on-St. Jeor Equation): 1880.75     Temp: 97.5 °F (36.4 °C)Oral  Weight Used For Calorie Calculations: 108.1 kg (238 lb 5.1 oz)     Energy Calorie Requirements (kcal): 2162-2703kcal(20-25kcal/kg)  Weight Used For Protein Calculations: 108.1 kg (238 lb 5.1 oz)  Protein Requirements: 87-108g (0.8-1.0g/kg)       RDA Method (mL): 2162       Nutrition by Nursing  Diet/Nutrition Received: consistent carb/diabetic diet  Intake (%): 75%  Diet/Feeding Assistance: none  Diet/Feeding Tolerance: good  Last Bowel Movement: 03/30/24                Nutrition Follow-Up  RD Follow-up?: Yes      Nutrition Discharge Planning: Per CM plan to d/c to [facility]. Will benefit from continued low sodium diet on discharge.          Carley Campo, MS, RD, LD  Available via Secure Chat

## 2024-04-02 NOTE — ASSESSMENT & PLAN NOTE
Patient with known CAD s/p CABG, which is controlled Will continue ASA and Statin and monitor for S/Sx of angina/ACS. Continue to monitor on telemetry.     He has a history of CAD s/p CABG; continue home meds  3/31 no chest pain currently  4/1 no chest pain  4/2 no chest pain

## 2024-04-02 NOTE — ASSESSMENT & PLAN NOTE
4/1 leg pain today left foot is cool we will check arterial Dopplers  April 2nd leg pain much improved today

## 2024-04-02 NOTE — ASSESSMENT & PLAN NOTE
On ASA and statin; he is at baseline; has aphasia  3/30 continue aspirin and statin.  Working with PT  3/31-PT/OT  4/1 PTOT  4/2 PTOT.  Plan for discharge home tomorrow

## 2024-04-02 NOTE — PT/OT/SLP PROGRESS
Physical Therapy Treatment    Patient Name:  Karin Carrera   MRN:  [id number]    Recommendations:     Discharge Recommendations: Low Intensity Therapy  Discharge Equipment Recommendations: none  Barriers to discharge: Decreased caregiver support    Assessment:     Karin Carrera is a 53 y.o. male admitted with a medical diagnosis of Sepsis without acute organ dysfunction.  He presents with the following impairments/functional limitations: impaired endurance, weakness, impaired self care skills, impaired functional mobility, gait instability, impaired balance, decreased upper extremity function, decreased lower extremity function, abnormal tone, impaired skin, edema, impaired cardiopulmonary response to activity Patient near baseline. Okay for dc home from PT standpoint    Rehab Prognosis: Good; patient would benefit from acute skilled PT services to address these deficits and reach maximum level of function.    Recent Surgery: * No surgery found *      Plan:     During this hospitalization, patient to be seen 5 x/week to address the identified rehab impairments via gait training, therapeutic activities, therapeutic exercises and progress toward the following goals:    Plan of Care Expires:  04/24/24    Subjective     Chief Complaint: generalized weakness, sob  Patient/Family Comments/goals:   Pain/Comfort:         Objective:     Communicated with nurse prior to session.  Patient found supine with   upon PT entry to room.     General Precautions: Standard, fall, respiratory  Orthopedic Precautions: N/A  Braces: N/A  Respiratory Status: Room air     Functional Mobility:  Supine to sit cga  Sit to stand cga  Ambulated 100 feet with cane nasal canula 2L/min      AM-PAC 6 CLICK MOBILITY  Turning over in bed (including adjusting bedclothes, sheets and blankets)?: 4  Sitting down on and standing up from a chair with arms (e.g., wheelchair, bedside commode, etc.): 4  Moving from lying on back to sitting on the side  of the bed?: 4  Moving to and from a bed to a chair (including a wheelchair)?: 4  Need to walk in hospital room?: 4  Climbing 3-5 steps with a railing?: 4  Basic Mobility Total Score: 24       Treatment & Education:  BLE: aps, hs, saq, abd-add, mre flexion ext 3x10    Patient left supine with call button in reach..    GOALS:   Multidisciplinary Problems       Physical Therapy Goals          Problem: Physical Therapy    Goal Priority Disciplines Outcome Goal Variances Interventions   Physical Therapy Goal     PT, PT/OT Ongoing, Progressing     Description: Short Term Goals to be met by: 2024    Patient will increase functional independence with mobility by performin. Supine to sit with independently  2. Sit to stand transfer with independently using straight cane  3. Bed to chair transfer with independently using straight cane  4. Gait  x 100 feet with contact guard assist using straight cane and O2 PRN  5. Lower extremity exercise program x30 reps per handout, with assistance as needed    Long Term Goals to be met by: 2024    Pt will regain full independent functional mobility with straight cane to return to home situation and prior activities of daily living.                        Time Tracking:     PT Received On: 24  PT Start Time: 1410     PT Stop Time: 1435  PT Total Time (min): 25 min     Billable Minutes: Gait Training 10 and Therapeutic Exercise 15    Treatment Type: Evaluation  PT/PTA: PT     Number of PTA visits since last PT visit: 0     2024

## 2024-04-02 NOTE — PLAN OF CARE
1125  Spoke with MD now pt will dc home possibly tomorrow. No need for SWB/in pt rehab per PT recommendations and evaluation. Sent Home oxygen order to [facility] this day. Following.   1210  Rc'd consult for . Choice obtained [facility] care. 0 further dc needs at this time.   Pt has home oxygen through [facility] medical store. Concentrator at home.

## 2024-04-03 VITALS
SYSTOLIC BLOOD PRESSURE: 93 MMHG | HEART RATE: 101 BPM | DIASTOLIC BLOOD PRESSURE: 52 MMHG | TEMPERATURE: 98 F | RESPIRATION RATE: 20 BRPM | WEIGHT: 212.94 LBS | OXYGEN SATURATION: 96 % | HEIGHT: 74 IN | BODY MASS INDEX: 27.33 KG/M2

## 2024-04-03 LAB
ANION GAP SERPL CALCULATED.3IONS-SCNC: 10 MMOL/L (ref 7–16)
BUN SERPL-MCNC: 19 MG/DL (ref 7–18)
BUN/CREAT SERPL: 26 (ref 6–20)
CALCIUM SERPL-MCNC: 8.9 MG/DL (ref 8.5–10.1)
CHLORIDE SERPL-SCNC: 88 MMOL/L (ref 98–107)
CO2 SERPL-SCNC: 32 MMOL/L (ref 21–32)
CREAT SERPL-MCNC: 0.74 MG/DL (ref 0.7–1.3)
EGFR (NO RACE VARIABLE) (RUSH/TITUS): 108 ML/MIN/1.73M2
GLUCOSE SERPL-MCNC: 149 MG/DL (ref 74–106)
GLUCOSE SERPL-MCNC: 194 MG/DL (ref 70–105)
GLUCOSE SERPL-MCNC: 261 MG/DL (ref 70–105)
POTASSIUM SERPL-SCNC: 3.8 MMOL/L (ref 3.5–5.1)
SODIUM SERPL-SCNC: 126 MMOL/L (ref 136–145)

## 2024-04-03 PROCEDURE — 25000003 PHARM REV CODE 250: Performed by: INTERNAL MEDICINE

## 2024-04-03 PROCEDURE — 27000221 HC OXYGEN, UP TO 24 HOURS

## 2024-04-03 PROCEDURE — 82962 GLUCOSE BLOOD TEST: CPT

## 2024-04-03 PROCEDURE — 97116 GAIT TRAINING THERAPY: CPT

## 2024-04-03 PROCEDURE — 63600175 PHARM REV CODE 636 W HCPCS: Performed by: INTERNAL MEDICINE

## 2024-04-03 PROCEDURE — 99239 HOSP IP/OBS DSCHRG MGMT >30: CPT | Mod: ,,, | Performed by: STUDENT IN AN ORGANIZED HEALTH CARE EDUCATION/TRAINING PROGRAM

## 2024-04-03 PROCEDURE — 25000242 PHARM REV CODE 250 ALT 637 W/ HCPCS: Performed by: INTERNAL MEDICINE

## 2024-04-03 PROCEDURE — 25000242 PHARM REV CODE 250 ALT 637 W/ HCPCS

## 2024-04-03 PROCEDURE — 25000003 PHARM REV CODE 250: Performed by: STUDENT IN AN ORGANIZED HEALTH CARE EDUCATION/TRAINING PROGRAM

## 2024-04-03 PROCEDURE — 94761 N-INVAS EAR/PLS OXIMETRY MLT: CPT

## 2024-04-03 PROCEDURE — 25000003 PHARM REV CODE 250

## 2024-04-03 PROCEDURE — 94640 AIRWAY INHALATION TREATMENT: CPT

## 2024-04-03 PROCEDURE — 99900035 HC TECH TIME PER 15 MIN (STAT)

## 2024-04-03 PROCEDURE — 97110 THERAPEUTIC EXERCISES: CPT

## 2024-04-03 PROCEDURE — 80048 BASIC METABOLIC PNL TOTAL CA: CPT | Performed by: INTERNAL MEDICINE

## 2024-04-03 RX ORDER — AMIODARONE HYDROCHLORIDE 200 MG/1
200 TABLET ORAL DAILY
Qty: 30 TABLET | Refills: 11 | Status: SHIPPED | OUTPATIENT
Start: 2024-04-03 | End: 2025-04-03

## 2024-04-03 RX ORDER — DICYCLOMINE HYDROCHLORIDE 20 MG/1
20 TABLET ORAL 2 TIMES DAILY
Qty: 20 TABLET | Refills: 0 | Status: SHIPPED | OUTPATIENT
Start: 2024-04-03 | End: 2024-04-03

## 2024-04-03 RX ORDER — TRAZODONE HYDROCHLORIDE 50 MG/1
100 TABLET ORAL NIGHTLY PRN
Qty: 60 TABLET | Refills: 0 | Status: SHIPPED | OUTPATIENT
Start: 2024-04-03 | End: 2025-04-03

## 2024-04-03 RX ORDER — TRAZODONE HYDROCHLORIDE 50 MG/1
100 TABLET ORAL NIGHTLY PRN
Qty: 60 TABLET | Refills: 0 | Status: SHIPPED | OUTPATIENT
Start: 2024-04-03 | End: 2024-04-03

## 2024-04-03 RX ORDER — OXYCODONE HYDROCHLORIDE 5 MG/1
5 TABLET ORAL EVERY 6 HOURS PRN
Qty: 12 TABLET | Refills: 0 | Status: SHIPPED | OUTPATIENT
Start: 2024-04-03 | End: 2024-06-17

## 2024-04-03 RX ORDER — METOPROLOL SUCCINATE 25 MG/1
12.5 TABLET, EXTENDED RELEASE ORAL DAILY
Qty: 30 TABLET | Refills: 1 | Status: SHIPPED | OUTPATIENT
Start: 2024-04-03 | End: 2024-04-03

## 2024-04-03 RX ORDER — AMIODARONE HYDROCHLORIDE 200 MG/1
200 TABLET ORAL DAILY
Qty: 30 TABLET | Refills: 11 | Status: SHIPPED | OUTPATIENT
Start: 2024-04-03 | End: 2024-04-03

## 2024-04-03 RX ORDER — DICYCLOMINE HYDROCHLORIDE 20 MG/1
20 TABLET ORAL 2 TIMES DAILY
Qty: 20 TABLET | Refills: 0 | Status: SHIPPED | OUTPATIENT
Start: 2024-04-03 | End: 2024-05-03

## 2024-04-03 RX ORDER — METOPROLOL SUCCINATE 25 MG/1
12.5 TABLET, EXTENDED RELEASE ORAL DAILY
Qty: 30 TABLET | Refills: 1 | Status: SHIPPED | OUTPATIENT
Start: 2024-04-03

## 2024-04-03 RX ORDER — TORSEMIDE 20 MG/1
20 TABLET ORAL 2 TIMES DAILY
Qty: 60 TABLET | Refills: 11 | Status: SHIPPED | OUTPATIENT
Start: 2024-04-03 | End: 2025-04-03

## 2024-04-03 RX ORDER — ALBUTEROL SULFATE 90 UG/1
AEROSOL, METERED RESPIRATORY (INHALATION)
Qty: 18 G | Refills: 6 | Status: SHIPPED | OUTPATIENT
Start: 2024-04-03 | End: 2024-04-03

## 2024-04-03 RX ORDER — DIGOXIN 125 MCG
0.12 TABLET ORAL DAILY
Qty: 30 TABLET | Refills: 11 | Status: SHIPPED | OUTPATIENT
Start: 2024-04-03 | End: 2025-04-03

## 2024-04-03 RX ORDER — TORSEMIDE 20 MG/1
20 TABLET ORAL 2 TIMES DAILY
Qty: 60 TABLET | Refills: 11 | Status: SHIPPED | OUTPATIENT
Start: 2024-04-03 | End: 2024-04-03

## 2024-04-03 RX ORDER — BUDESONIDE AND FORMOTEROL FUMARATE DIHYDRATE 160; 4.5 UG/1; UG/1
2 AEROSOL RESPIRATORY (INHALATION) EVERY 12 HOURS
Qty: 1 G | Refills: 0 | Status: SHIPPED | OUTPATIENT
Start: 2024-04-03 | End: 2024-04-03

## 2024-04-03 RX ORDER — DIGOXIN 125 MCG
0.12 TABLET ORAL DAILY
Qty: 30 TABLET | Refills: 11 | Status: SHIPPED | OUTPATIENT
Start: 2024-04-03 | End: 2024-04-03

## 2024-04-03 RX ORDER — ALBUTEROL SULFATE 90 UG/1
AEROSOL, METERED RESPIRATORY (INHALATION)
Qty: 18 G | Refills: 6 | Status: SHIPPED | OUTPATIENT
Start: 2024-04-03

## 2024-04-03 RX ORDER — BUDESONIDE AND FORMOTEROL FUMARATE DIHYDRATE 160; 4.5 UG/1; UG/1
2 AEROSOL RESPIRATORY (INHALATION) EVERY 12 HOURS
Qty: 1 G | Refills: 0 | Status: SHIPPED | OUTPATIENT
Start: 2024-04-03 | End: 2024-06-17

## 2024-04-03 RX ADMIN — TORSEMIDE 20 MG: 20 TABLET ORAL at 09:04

## 2024-04-03 RX ADMIN — DIGOXIN 0.12 MG: 125 TABLET ORAL at 09:04

## 2024-04-03 RX ADMIN — INSULIN ASPART 3 UNITS: 100 INJECTION, SOLUTION INTRAVENOUS; SUBCUTANEOUS at 12:04

## 2024-04-03 RX ADMIN — AMIODARONE HYDROCHLORIDE 200 MG: 200 TABLET ORAL at 09:04

## 2024-04-03 RX ADMIN — BUDESONIDE INHALATION 0.5 MG: 0.5 SUSPENSION RESPIRATORY (INHALATION) at 07:04

## 2024-04-03 RX ADMIN — IPRATROPIUM BROMIDE AND ALBUTEROL SULFATE 3 ML: 2.5; .5 SOLUTION RESPIRATORY (INHALATION) at 01:04

## 2024-04-03 RX ADMIN — OXYCODONE 10 MG: 5 TABLET ORAL at 05:04

## 2024-04-03 RX ADMIN — PANTOPRAZOLE SODIUM 40 MG: 40 TABLET, DELAYED RELEASE ORAL at 05:04

## 2024-04-03 RX ADMIN — DOCUSATE SODIUM 100 MG: 100 CAPSULE, LIQUID FILLED ORAL at 09:04

## 2024-04-03 RX ADMIN — IPRATROPIUM BROMIDE AND ALBUTEROL SULFATE 3 ML: 2.5; .5 SOLUTION RESPIRATORY (INHALATION) at 07:04

## 2024-04-03 RX ADMIN — EMPAGLIFLOZIN 10 MG: 10 TABLET, FILM COATED ORAL at 09:04

## 2024-04-03 RX ADMIN — ONDANSETRON 8 MG: 2 INJECTION INTRAMUSCULAR; INTRAVENOUS at 12:04

## 2024-04-03 RX ADMIN — OXYCODONE 10 MG: 5 TABLET ORAL at 12:04

## 2024-04-03 RX ADMIN — SACUBITRIL AND VALSARTAN 1 TABLET: 24; 26 TABLET, FILM COATED ORAL at 09:04

## 2024-04-03 RX ADMIN — ASPIRIN 81 MG: 81 TABLET, COATED ORAL at 09:04

## 2024-04-03 RX ADMIN — IPRATROPIUM BROMIDE AND ALBUTEROL SULFATE 3 ML: 2.5; .5 SOLUTION RESPIRATORY (INHALATION) at 12:04

## 2024-04-03 RX ADMIN — POTASSIUM CHLORIDE 40 MEQ: 1500 TABLET, EXTENDED RELEASE ORAL at 09:04

## 2024-04-03 NOTE — ASSESSMENT & PLAN NOTE
Patient has hyponatremia which is uncontrolled,We will aim to correct the sodium by 4-6mEq in 24 hours. We will monitor sodium Daily. The hyponatremia is due to Medications:  Diuretics. We will obtain the following studies: Urine sodium, urine osmolality, serum osmolality. We will treat the hyponatremia with Removal of offending medications. The patient's sodium results have been reviewed and are listed below.  Recent Labs   Lab 04/03/24  0538   *     3/31 improved, chronically low. Work up is send out. If stabilizes may be able to follow up with pcp  4/1 proving difficult to control.  Worse today.  Serum Osmo urine Osmo is send out.  This will guide treatment.  However he is chronically hyponatremic.  This may be something he can follow up with his PCP.  4/2 near baseline still waiting on workup  Chronic and near baseline.  Serum Osmo resulted today confirming hypotonic hyponatremia.  Patient euvolemic given urine sodium results this lends itself to reset Osmo stat.  Continue with fluid restriction at home.

## 2024-04-03 NOTE — ASSESSMENT & PLAN NOTE
Continue tele while here 3/30  3/30 continue dig-monitor for toxicity, continue amio-monitor for toxicity  3/31-continue dig, level looks good today. Monitor for toxicity. Continue amiodarone  4/1 continue dig-monitor for toxicity, continue amiodarone-monitor for toxicity  4/2 no issues overnight continue digoxin, monitor for toxicity.  Continue amiodarone monitor for toxicity  Cardiology follow up

## 2024-04-03 NOTE — PROGRESS NOTES
[facility] - Orthopedic  Wound Care    Patient Name:  Karin Carrera   MRN:  [id number]  Date: 4/3/2024  Diagnosis: Sepsis without acute organ dysfunction    History:     Past Medical History:   Diagnosis Date    Alcoholic fatty liver     CHF (congestive heart failure) 02/20/2024    EF 25%    COPD (chronic obstructive pulmonary disease)     Coronary artery disease involving coronary bypass graft of native heart without angina pectoris 01/01/2022    Dyslipidemia 12/16/2023    Essential (primary) hypertension 01/01/2022    Expressive aphasia 05/05/2023    GERD with esophagitis 2018    EGD    Hemiparesis affecting right side as late effect of cerebrovascular accident     ROSEMARIE (obstructive sleep apnea)     Pulmonary hypertension     Stroke     right hand contracted    Tricuspid regurgitation     Type 2 diabetes mellitus        Social History     Socioeconomic History    Marital status:    Tobacco Use    Smoking status: Former     Current packs/day: 0.50     Types: Cigarettes    Smokeless tobacco: Current     Types: Chew   Substance and Sexual Activity    Alcohol use: Not Currently     Comment: 1 quart    Drug use: Never     Social Determinants of Health     Financial Resource Strain: Low Risk  (3/21/2024)    Overall Financial Resource Strain (CARDIA)     Difficulty of Paying Living Expenses: Not hard at all   Food Insecurity: No Food Insecurity (3/21/2024)    Hunger Vital Sign     Worried About Running Out of Food in the Last Year: Never true     Ran Out of Food in the Last Year: Never true   Transportation Needs: No Transportation Needs (3/21/2024)    PRAPARE - Transportation     Lack of Transportation (Medical): No     Lack of Transportation (Non-Medical): No   Physical Activity: Inactive (2/20/2024)    Exercise Vital Sign     Days of Exercise per Week: 0 days     Minutes of Exercise per Session: 0 min   Stress: No Stress Concern Present (3/21/2024)    [employer] -  Occupational Stress Questionnaire     Feeling of Stress : Not at all   Social Connections: Unknown (2/20/2024)    Social Connection and Isolation Panel [NHANES]     Frequency of Communication with Friends and Family: More than three times a week     Frequency of Social Gatherings with Friends and Family: More than three times a week     Attends [employer] Services: Never     Active Member of Clubs or Organizations: No     Attends Club or Organization Meetings: Never   Housing Stability: Low Risk  (3/21/2024)    Housing Stability Vital Sign     Unable to Pay for Housing in the Last Year: No     Number of Places Lived in the Last Year: 1     Unstable Housing in the Last Year: No       Precautions:     Allergies as of 03/19/2024    (No Known Allergies)       WOC Assessment Details/Treatment        04/03/24 0855   WOCN Assessment   WOCN Total Time (mins) 60   Visit Date 04/03/24   Visit Time 0845   Consult Type Follow Up   WOCN Speciality Wound   Wound cellulitis   Number of Wounds 2   Intervention chart review;applied;assessed;coordination of care   Skin Interventions   Device Skin Pressure Protection absorbent pad utilized/changed;adhesive use limited   Skin Protection adhesive use limited   Positioning   Body Position position changed independently   Head of Bed (HOB) Positioning other (see comments);HOB flat  (Patient not in bed, Patient sitting in chair.)   Positioning/Transfer Devices pillows;in use        Altered Skin Integrity 03/19/24 2344 Right anterior;lower Leg Other (comment)   Date First Assessed/Time First Assessed: 03/19/24 2344   Altered Skin Integrity Present on Admission - Did Patient arrive to the hospital with altered skin?: yes  Side: Right  Orientation: anterior;lower  Location: Leg  Primary Wound Type: (c) Other (...   Wound Image    Dressing Appearance Open to air   Drainage Amount None   Appearance Pink;Red;Dry   Periwound Area Dry   Care Cleansed with:  (Cleansing foam)   Periwound Care  "Cleansed with pH balanced cleanser        Altered Skin Integrity 03/20/24 0920 Left anterior;medial Ankle Abrasion(s)   Date First Assessed/Time First Assessed: 03/20/24 0920   Altered Skin Integrity Present on Admission - Did Patient arrive to the hospital with altered skin?: yes  Side: Left  Orientation: anterior;medial  Location: Ankle  Primary Wound Type: Abrasion(s)   Wound Image    Dressing Appearance Open to air   Appearance Maroon;Black;Eschar;Dry   Periwound Area Dry   Wound Length (cm) 0.6 cm   Wound Width (cm) 0.5 cm   Wound Surface Area (cm^2) 0.3 cm^2   Care Cleansed with:  (Cleansing Foam)   Periwound Care Cleansed with pH balanced cleanser     WOC Team consulted for "Altered Skin Integrity - RLE"    Narrative: Pt alert and oriented.  Pt sitting in chair. Pt speech is not clear but can write commands on paper. Pt complained of pain and nausea, and request pain medication. Inform nurse Kit. Pt tolerate wound care visit with little pain when touching right leg.     Active Wounds and Recommendations: Continue POC    Goals for Wound Healing: Reduce pain, Reduce bioburden, and Educate on proper wound management post D/C     Barriers to Wound Healing: multiple co-morbidities poor vascular supply diabetes    Orders placed.    Thank you for the consult.     We will continue to follow. See additional note under Notes Tab for tentative f/u plan/dates.    04/03/2024  "

## 2024-04-03 NOTE — PLAN OF CARE
Problem: Adult Inpatient Plan of Care  Goal: Plan of Care Review  Outcome: Ongoing, Progressing  Goal: Patient-Specific Goal (Individualized)  Outcome: Ongoing, Progressing  Goal: Absence of Hospital-Acquired Illness or Injury  Outcome: Ongoing, Progressing  Goal: Optimal Comfort and Wellbeing  Outcome: Ongoing, Progressing  Goal: Readiness for Transition of Care  Outcome: Ongoing, Progressing     Problem: Diabetes Comorbidity  Goal: Blood Glucose Level Within Targeted Range  Outcome: Ongoing, Progressing     Problem: Impaired Wound Healing  Goal: Optimal Wound Healing  Outcome: Ongoing, Progressing     Problem: Skin Injury Risk Increased  Goal: Skin Health and Integrity  Outcome: Ongoing, Progressing     Problem: Adjustment to Illness (Sepsis/Septic Shock)  Goal: Optimal Coping  Outcome: Ongoing, Progressing     Problem: Bleeding (Sepsis/Septic Shock)  Goal: Absence of Bleeding  Outcome: Ongoing, Progressing     Problem: Glycemic Control Impaired (Sepsis/Septic Shock)  Goal: Blood Glucose Level Within Desired Range  Outcome: Ongoing, Progressing     Problem: Nutrition Impaired (Sepsis/Septic Shock)  Goal: Optimal Nutrition Intake  Outcome: Ongoing, Progressing     Problem: Gas Exchange Impaired  Goal: Optimal Gas Exchange  Outcome: Ongoing, Progressing     Problem: Infection  Goal: Absence of Infection Signs and Symptoms  Outcome: Ongoing, Progressing     Problem: Fall Injury Risk  Goal: Absence of Fall and Fall-Related Injury  Outcome: Ongoing, Progressing

## 2024-04-03 NOTE — ASSESSMENT & PLAN NOTE
"Patient's FSGs are controlled on current medication regimen.  Last A1c reviewed-   Lab Results   Component Value Date    HGBA1C 6.0 03/08/2024     Most recent fingerstick glucose reviewed- No results for input(s): "POCTGLUCOSE" in the last 24 hours.  Current correctional scale  Low  Maintain anti-hyperglycemic dose as follows-   Antihyperglycemics (From admission, onward)      Start     Stop Route Frequency Ordered    03/20/24 0900  empagliflozin (Jardiance) tablet 10 mg        Question Answer Comment   Does this patient have a diagnosis of heart failure? Yes    Does this patient have type 1 diabetes or diabetic ketoacidosis? No    Does this patient have symptomatic hypotension? No    Is the patient NPO or pending major surgery in next 3 days or less? No        -- Oral Daily 03/20/24 0019    03/20/24 0124  insulin aspart U-100 injection 0-5 Units         -- SubQ Before meals & nightly PRN 03/20/24 0025          Hold Oral hypoglycemics while patient is in the hospital.  3/31-Follow glucose daily and adjusting insulin as needed    BS stable; continue home DM meds; on SSI; will continue to monitor  4/1 glucose reasonably controlled continue current regimen  4/2 glucose reasonably controlled continue current regimen  PCP follow up  "

## 2024-04-03 NOTE — ASSESSMENT & PLAN NOTE
4/1 leg pain today left foot is cool we will check arterial Dopplers  April 2nd leg pain much improved today  No further leg pain

## 2024-04-03 NOTE — ASSESSMENT & PLAN NOTE
On ASA and statin; he is at baseline; has aphasia  3/30 continue aspirin and statin.  Working with PT  3/31-PT/OT  4/1 PTOT  4/2 PTOT.  Plan for discharge home tomorrow  Home health PCP follow up

## 2024-04-03 NOTE — NURSING
Discharge Instructions, Medication Instruction and Follow-up Appt Instruction Information Explained, Copy of information Provided to Patient as well. Pt voiced concern regarding prescription for pain medication, Contacted Dr. Whipple to requeest Presciption for Pain Medication. Dr. Whipple Sent precsription for Pain medication to [facility] along with pt's other presciptions. Pt discharged Home with Home Health Services. PT wheeled out via wheel charr by Tech to personal Vehicle . Midline removed without complications, pt tolerated well. Home O2 via NC noted present. Pt voiced understanding. NAD noted.

## 2024-04-03 NOTE — ASSESSMENT & PLAN NOTE
Patient has hypokalemia which is Acute and currently controlled. Most recent potassium levels reviewed-   Lab Results   Component Value Date    K 3.8 04/03/2024   . Will continue potassium replacement per protocol and recheck repeat levels after replacement completed.

## 2024-04-03 NOTE — ASSESSMENT & PLAN NOTE
BP stable; continue home meds  Chronic, controlled. Latest blood pressure and vitals reviewed-     Temp:  [97.4 °F (36.3 °C)-98.2 °F (36.8 °C)]   Pulse:  []   Resp:  [16-20]   BP: ()/(65-77)   SpO2:  [96 %-100 %] .   Home meds for hypertension were reviewed and noted below.   Hypertension Medications               amLODIPine (NORVASC) 10 MG tablet Take 10 mg by mouth once daily.    furosemide (LASIX) 40 MG tablet Take 1 tablet (40 mg total) by mouth 2 (two) times a day.    losartan (COZAAR) 25 MG tablet Take 0.5 tablets (12.5 mg total) by mouth once daily.    metoprolol succinate (TOPROL-XL) 25 MG 24 hr tablet Take 12.5 mg by mouth once daily. Take 1/2 tablet by mouth daily            While in the hospital, will manage blood pressure as follows; Continue home antihypertensive regimen    Will utilize p.r.n. blood pressure medication only if patient's blood pressure greater than 180/110 and he develops symptoms such as worsening chest pain or shortness of breath.   3/31 Bp reasonably controlled, continue current regimen  4/1 BP reasonably controlled continue current regimen  4/2 continue current regimen  PCP follow up

## 2024-04-03 NOTE — ASSESSMENT & PLAN NOTE
"Patient is identified as having Combined Systolic and Diastolic heart failure that is Acute on chronic. CHF is currently uncontrolled due to Pulmonary edema/pleural effusion on CXR. Latest ECHO performed and demonstrates- Results for orders placed during the hospital encounter of 03/19/24    Echo Saline Bubble? Yes    Interpretation Summary    Left Ventricle: The left ventricle is moderately dilated. Normal wall thickness. Regional wall motion abnormalities present.  Anterior wall and apex are akinetic.  Inferior wall and lateral wall are hypokinetic. There is severely reduced systolic function with a visually estimated ejection fraction of less than 30%. Ejection fraction by visual approximation is 20%. There is diastolic dysfunction.    Right Ventricle: Mild right ventricular enlargement.    Left Atrium: Left atrium is severely dilated.    Right Atrium: Right atrium is mildly dilated.    IVC/SVC: Elevated venous pressure at 15 mmHg.    Pericardium: There is a trivial effusion.  . Continue Furosemide and monitor clinical status closely. Monitor on telemetry. Patient is off CHF pathway.  Monitor strict Is&Os and daily weights.  Place on fluid restriction of 1.5 L. Cardiology has been consulted. Continue to stress to patient importance of self efficacy and  on diet for CHF. Last BNP reviewed- and noted below No results for input(s): "BNP", "BNPTRIAGEBLO" in the last 168 hours.   3/30 heart failure exacerbation much improved.  Starting guideline directed medical therapy.  3/31 GDMT at discharge. Anticipate soon.    4/1 once sodium improves can discharge home.  4/2 continue GD MT cardiology follow up output  Discharge with Toprol, Entresto, digoxin, amiodarone, Jardiance, torsemide.  Follow up Cardiology in 2 weeks  "

## 2024-04-03 NOTE — ASSESSMENT & PLAN NOTE
Patient with known CAD s/p CABG, which is controlled Will continue ASA and Statin and monitor for S/Sx of angina/ACS. Continue to monitor on telemetry.     He has a history of CAD s/p CABG; continue home meds  3/31 no chest pain currently  4/1 no chest pain  4/2 no chest pain  PCP follow up

## 2024-04-03 NOTE — ASSESSMENT & PLAN NOTE
Continue home meds; he is always SOB and has ROSEMARIE non-compliant with CPAP, smokes, and does not take home meds; CXR shows patchy ground-glass and reticular infiltrate/edema throughout the bilateral lungs; on IV Lasix  -cardiology consulted, now on inotrope infusion along w/ iv lasix. Cont GDMT per cardiology  3/30 euvolemic.  GD MT at discharge.  3/31-euvolemic  4/1 euvolemic continue guideline directed medical therapy  4/2 continue guideline directed medical therapy  Cardiology follow up

## 2024-04-03 NOTE — ASSESSMENT & PLAN NOTE
Patient's COPD is controlled currently.  Patient is currently off COPD Pathway. Continue scheduled inhalers Supplemental oxygen and monitor respiratory status closely.   3/31-resp status stable, test for home oxygen  Qualifies for home oxygen 4/1  Discharge with home O2  PCP follow up

## 2024-04-03 NOTE — PROGRESS NOTES
04/03/24 1008   Wound Care Follow Up   Wound Care Follow-up? Yes   Wound Care- Next Visit Date 04/10/24   Follow Up Plan Reji POC

## 2024-04-03 NOTE — PT/OT/SLP PROGRESS
Physical Therapy Treatment    Patient Name:  Karin Carrera   MRN:  [id number]    Recommendations:     Discharge Recommendations: Low Intensity Therapy  Discharge Equipment Recommendations: none  Barriers to discharge: Decreased caregiver support    Assessment:     Karin Carrera is a 53 y.o. male admitted with a medical diagnosis of Sepsis without acute organ dysfunction.  He presents with the following impairments/functional limitations: impaired endurance, weakness, impaired self care skills, impaired functional mobility, gait instability, impaired balance, decreased upper extremity function, decreased lower extremity function, abnormal tone, impaired skin, edema, impaired cardiopulmonary response to activity Patient near baseline. Okay for dc home from PT standpoint    Rehab Prognosis: Good; patient would benefit from acute skilled PT services to address these deficits and reach maximum level of function.    Recent Surgery: * No surgery found *      Plan:     During this hospitalization, patient to be seen 5 x/week to address the identified rehab impairments via gait training, therapeutic activities, therapeutic exercises and progress toward the following goals:    Plan of Care Expires:  04/24/24    Subjective     Chief Complaint: generalized weakness, sob  Patient/Family Comments/goals:   Pain/Comfort:  Pain Rating 1: 0/10      Objective:     Communicated with nurse prior to session.  Patient found supine with   upon PT entry to room.     General Precautions: Standard, fall, respiratory  Orthopedic Precautions: N/A  Braces: N/A  Respiratory Status: Room air     Functional Mobility:  Supine to sit cga  Sit to stand cga  Ambulated 100 feet with cane nasal canula 2L/min      AM-PAC 6 CLICK MOBILITY  Turning over in bed (including adjusting bedclothes, sheets and blankets)?: 4  Sitting down on and standing up from a chair with arms (e.g., wheelchair, bedside commode, etc.): 4  Moving from lying on back to  sitting on the side of the bed?: 4  Moving to and from a bed to a chair (including a wheelchair)?: 4  Need to walk in hospital room?: 4  Climbing 3-5 steps with a railing?: 4  Basic Mobility Total Score: 24       Treatment & Education:  BLE: aps, hs, saq, abd-add, mre flexion ext 3x10    Patient left supine with call button in reach..    GOALS:   Multidisciplinary Problems       Physical Therapy Goals          Problem: Physical Therapy    Goal Priority Disciplines Outcome Goal Variances Interventions   Physical Therapy Goal     PT, PT/OT Ongoing, Progressing     Description: Short Term Goals to be met by: 2024    Patient will increase functional independence with mobility by performin. Supine to sit with independently  2. Sit to stand transfer with independently using straight cane  3. Bed to chair transfer with independently using straight cane  4. Gait  x 100 feet with contact guard assist using straight cane and O2 PRN  5. Lower extremity exercise program x30 reps per handout, with assistance as needed    Long Term Goals to be met by: 2024    Pt will regain full independent functional mobility with straight cane to return to home situation and prior activities of daily living.                        Time Tracking:     PT Received On: 24  PT Start Time: 920     PT Stop Time: 945  PT Total Time (min): 25 min     Billable Minutes: Gait Training 10 and Therapeutic Exercise 15    Treatment Type: Evaluation  PT/PTA: PT     Number of PTA visits since last PT visit: 0     2024

## 2024-04-03 NOTE — DISCHARGE SUMMARY
[facility]  [facility] Medicine  Discharge Summary      Patient Name: Karin Carrera  MRN: [id number]  JOON: [id number]  Patient Class: IP- Inpatient  Admission Date: 3/19/2024  Hospital Length of Stay: 15 days  Discharge Date and Time:  04/03/2024 7:43 AM  Attending Physician: Aleks Whipple DO   Discharging Provider: Aleks Whipple DO  Primary Care Provider: Walker Smith MD    Primary Care Team: Networked reference to record PCT     HPI:   Patient is a 52yo male who presents to [facility] ED via EMS with SOB and abdominal pain for 2 days. He reports recently having a cholecystectomy on 3/9/24 here at [place] by Dr. Bennett. He endorses RUQ abdominal pain and SOB. SOB is similar to his previous COPD episodes. Endorses orthopnea and INFANTE. He endorses surgical site drainage mostly pus that started 2 days ago. Endorses decreased appetite around the same time. Endorses bilateral leg swelling and right leg redness for about 1 month and now swelling has gone up his thighs and abdomen in the past 2 days. Denies f/c/cp/n/v/d. Denies urinary, or bowel habit changes. Reports compliance with all home medications.    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 Patient sees Dr. Escobar Cardiology and Dr. Smith PCP. Patient uses 2L NC O2 at home. He communicates through writing on paper and texts due to his expressive aphasia 2/2 CVA.    Upon presentation, VS remarkable for 104/65, 114, 30. Labs remarkable for H/H 11.9/39.1, HCO3 34, glucose 138. Mg 2.7. BNP 7486, troponin 22.2, 19.5. PT 16.2, INR 1.32, PTT 30.9. COVID negative. ABG 7.46, 50, 20, 35.6. EKG sinus tach 110 with multifocal PVCs. CXR showed continued cardiomegaly. Patchy ground-glass and reticular infiltrate/edema throughout the  bilateral lungs. Small bilateral pleural fluid. Constellation of findings suspicious for CHF. Underlying infection not excluded. CT abdomen and pelvis w/o contrast showed study limited secondary to lack of IV contrast. Interval cholecystectomy. There is a small air-fluid collection within the gallbladder fossa measuring up to 3.7 cm which may reflect postoperative fluid collection or abscess. Interval increased diffuse body wall edema. Cardiomegaly and coronary artery calcifications. Scattered interlobular septal thickening of the lung bases suggestive of interstitial pulmonary edema with trace bilateral pleural effusions. There is some calcification of the left ventricle apex which may reflect sequela of remote infarct. Patient received Lasix 60 IV and is admitted to hospital medicine for further management and care.    * No surgery found *      Hospital Course:   53 year old male with an extensive PMH presents with abdominal pain; he had recent cholecystectomy.  He is complaining of abdominal pain and SOB.  3/30 breathing much better.  Volume status improved.  3/31-home o2 eval pending  4/1 qualify for home oxygen.  Once sodium corrects can discharge he is chronically hyponatremic  4/2 qualifies for home oxygen.  Still waiting on workup.  Given his clinical history I suspect this was related to over-diuresis versus reset Osmo stat from his chronic heart failure.  This is near his baseline and can likely be follow up with his PCP.  We will wait 1 more day for workup to result  4/3 patient is stable for discharge.  Discharge with Entresto, digoxin, amiodarone, Jardiance, torsemide, Toprol-XL.  Follow up with Cardiology in 2 weeks.  Follow up with PCP in 1 week.  His sodium is new baseline.  He has no symptoms.  Serum osmolality low.  He is euvolemic.  Suspect recurrent hyponatremia is due to reset Osmo stat.  Can follow up with PCP to follow pending workup     Goals of Care Treatment Preferences:  Code Status: Full  Code      Consults:   Consults (From admission, onward)          Status Ordering Provider     Inpatient consult to Social Work  Once        Provider:  (Not yet assigned)    Ordered MARY ANNE MCCORMACK     Inpatient consult to Social Work  Once        Provider:  (Not yet assigned)    Completed MARY ANNE MCCORMACK     Inpatient consult to Social Work  Once        Provider:  (Not yet assigned)    Completed MARY ANNE MCCORMACK     Inpatient consult to Cardiology  Once        Provider:  (Not yet assigned)    Completed GENO, REHMAT U     Inpatient consult to Social Work  Once        Provider:  (Not yet assigned)    Completed CINDY DUMONT     Inpatient consult to General Surgery  Once        Provider:  Karson Bennett DO    Completed CARLITA SALVADOR     Pharmacy to dose Vancomycin consult  Once        Provider:  (Not yet assigned)    Completed CINDY DUMONT            Neuro  Hemiparesis affecting right side as late effect of cerebrovascular accident  On ASA and statin; he is at baseline; has aphasia  3/30 continue aspirin and statin.  Working with PT  3/31-PT/OT  4/1 PTOT  4/2 PTOT.  Plan for discharge home tomorrow  Home health PCP follow up    Pulmonary  Pulmonary emphysema  Patient's COPD is controlled currently.  Patient is currently off COPD Pathway. Continue scheduled inhalers Supplemental oxygen and monitor respiratory status closely.   3/31-resp status stable, test for home oxygen  Qualifies for home oxygen 4/1  Discharge with home O2  PCP follow up    Acute hypoxemic respiratory failure  Acute hypoxic respiratory failure due to many etiologies :  Pulmonary edema 2/2 HFrEF  COPD  Pneumonia>consolidation  -now c/f pt developing ARDS  -cont broad spectrum iv abx  -cont aggressive diuersis  -cont inhalers  -oxygen support, currently on HFNC  -Pulmonary and crtical care team consulted, appreicate recs.   3/30 now euvolemic.  We will test for home oxygen  3/31-home oxygen eval, euvolemic  4/2 patient has been optimized  still requiring supplemental oxygen.  We will discharge with home O2  Much improved.  PCP follow up    Cardiac/Vascular  NSVT (nonsustained ventricular tachycardia)  Continue tele while here 3/30  3/30 continue dig-monitor for toxicity, continue amio-monitor for toxicity  3/31-continue dig, level looks good today. Monitor for toxicity. Continue amiodarone  4/1 continue dig-monitor for toxicity, continue amiodarone-monitor for toxicity  4/2 no issues overnight continue digoxin, monitor for toxicity.  Continue amiodarone monitor for toxicity  Cardiology follow up    Acute on chronic combined systolic and diastolic heart failure  Patient is identified as having Combined Systolic and Diastolic heart failure that is Acute on chronic. CHF is currently uncontrolled due to Pulmonary edema/pleural effusion on CXR. Latest ECHO performed and demonstrates- Results for orders placed during the hospital encounter of 03/19/24    Echo Saline Bubble? Yes    Interpretation Summary    Left Ventricle: The left ventricle is moderately dilated. Normal wall thickness. Regional wall motion abnormalities present.  Anterior wall and apex are akinetic.  Inferior wall and lateral wall are hypokinetic. There is severely reduced systolic function with a visually estimated ejection fraction of less than 30%. Ejection fraction by visual approximation is 20%. There is diastolic dysfunction.    Right Ventricle: Mild right ventricular enlargement.    Left Atrium: Left atrium is severely dilated.    Right Atrium: Right atrium is mildly dilated.    IVC/SVC: Elevated venous pressure at 15 mmHg.    Pericardium: There is a trivial effusion.  . Continue Furosemide and monitor clinical status closely. Monitor on telemetry. Patient is off CHF pathway.  Monitor strict Is&Os and daily weights.  Place on fluid restriction of 1.5 L. Cardiology has been consulted. Continue to stress to patient importance of self efficacy and  on diet for CHF. Last BNP  "reviewed- and noted below No results for input(s): "BNP", "BNPTRIAGEBLO" in the last 168 hours.   3/30 heart failure exacerbation much improved.  Starting guideline directed medical therapy.  3/31 GDMT at discharge. Anticipate soon.    4/1 once sodium improves can discharge home.  4/2 continue GD MT cardiology follow up output  Discharge with Toprol, Entresto, digoxin, amiodarone, Jardiance, torsemide.  Follow up Cardiology in 2 weeks    Cardiogenic shock  3/30 now off inotropes.  Now off Lasix infusion.  G DMT at discharge  3/31-resolved    End stage heart failure  Continue home meds; he is always SOB and has ROSEMARIE non-compliant with CPAP, smokes, and does not take home meds; CXR shows patchy ground-glass and reticular infiltrate/edema throughout the bilateral lungs; on IV Lasix  -cardiology consulted, now on inotrope infusion along w/ iv lasix. Cont GDMT per cardiology  3/30 euvolemic.  GD MT at discharge.  3/31-euvolemic  4/1 euvolemic continue guideline directed medical therapy  4/2 continue guideline directed medical therapy  Cardiology follow up    Coronary artery disease involving coronary bypass graft of native heart without angina pectoris  Patient with known CAD s/p CABG, which is controlled Will continue ASA and Statin and monitor for S/Sx of angina/ACS. Continue to monitor on telemetry.     He has a history of CAD s/p CABG; continue home meds  3/31 no chest pain currently  4/1 no chest pain  4/2 no chest pain  PCP follow up    Essential (primary) hypertension  BP stable; continue home meds  Chronic, controlled. Latest blood pressure and vitals reviewed-     Temp:  [97.4 °F (36.3 °C)-98.2 °F (36.8 °C)]   Pulse:  []   Resp:  [16-20]   BP: ()/(65-77)   SpO2:  [96 %-100 %] .   Home meds for hypertension were reviewed and noted below.   Hypertension Medications               amLODIPine (NORVASC) 10 MG tablet Take 10 mg by mouth once daily.    furosemide (LASIX) 40 MG tablet Take 1 tablet (40 mg " total) by mouth 2 (two) times a day.    losartan (COZAAR) 25 MG tablet Take 0.5 tablets (12.5 mg total) by mouth once daily.    metoprolol succinate (TOPROL-XL) 25 MG 24 hr tablet Take 12.5 mg by mouth once daily. Take 1/2 tablet by mouth daily            While in the hospital, will manage blood pressure as follows; Continue home antihypertensive regimen    Will utilize p.r.n. blood pressure medication only if patient's blood pressure greater than 180/110 and he develops symptoms such as worsening chest pain or shortness of breath.   3/31 Bp reasonably controlled, continue current regimen  4/1 BP reasonably controlled continue current regimen  4/2 continue current regimen  PCP follow up    Renal/  Hypokalemia  Patient has hypokalemia which is Acute and currently controlled. Most recent potassium levels reviewed-   Lab Results   Component Value Date    K 3.8 04/03/2024   . Will continue potassium replacement per protocol and recheck repeat levels after replacement completed.       ID  * Sepsis without acute organ dysfunction  He is s/p recent cholecystectomy.  Blood cultures show G+ cocci and G- bacilli; on Vancomycin and Maxipime and Flagyl; will await ID of organisms in blood cultures; followed by Surgery  3/30 antibiotics completed      Cellulitis of lower extremity  Cont iv abx   Resolved 3/30    Endocrine  Hyponatremia  Patient has hyponatremia which is uncontrolled,We will aim to correct the sodium by 4-6mEq in 24 hours. We will monitor sodium Daily. The hyponatremia is due to Medications:  Diuretics. We will obtain the following studies: Urine sodium, urine osmolality, serum osmolality. We will treat the hyponatremia with Removal of offending medications. The patient's sodium results have been reviewed and are listed below.  Recent Labs   Lab 04/03/24  0538   *     3/31 improved, chronically low. Work up is send out. If stabilizes may be able to follow up with pcp  4/1 proving difficult to control.   "Worse today.  Serum Osmo urine Osmo is send out.  This will guide treatment.  However he is chronically hyponatremic.  This may be something he can follow up with his PCP.  4/2 near baseline still waiting on workup  Chronic and near baseline.  Serum Osmo resulted today confirming hypotonic hyponatremia.  Patient euvolemic given urine sodium results this lends itself to reset Osmo stat.  Continue with fluid restriction at home.    Type 2 diabetes mellitus  Patient's FSGs are controlled on current medication regimen.  Last A1c reviewed-   Lab Results   Component Value Date    HGBA1C 6.0 03/08/2024     Most recent fingerstick glucose reviewed- No results for input(s): "POCTGLUCOSE" in the last 24 hours.  Current correctional scale  Low  Maintain anti-hyperglycemic dose as follows-   Antihyperglycemics (From admission, onward)      Start     Stop Route Frequency Ordered    03/20/24 0900  empagliflozin (Jardiance) tablet 10 mg        Question Answer Comment   Does this patient have a diagnosis of heart failure? Yes    Does this patient have type 1 diabetes or diabetic ketoacidosis? No    Does this patient have symptomatic hypotension? No    Is the patient NPO or pending major surgery in next 3 days or less? No        -- Oral Daily 03/20/24 0019    03/20/24 0124  insulin aspart U-100 injection 0-5 Units         -- SubQ Before meals & nightly PRN 03/20/24 0025          Hold Oral hypoglycemics while patient is in the hospital.  3/31-Follow glucose daily and adjusting insulin as needed    BS stable; continue home DM meds; on SSI; will continue to monitor  4/1 glucose reasonably controlled continue current regimen  4/2 glucose reasonably controlled continue current regimen  PCP follow up    Orthopedic  Leg pain  4/1 leg pain today left foot is cool we will check arterial Dopplers  April 2nd leg pain much improved today  No further leg pain    Other  ROSEMARIE (obstructive sleep apnea)  -Noncompliant with CPAP at home; CPAP in " hospital ordered      Final Active Diagnoses:    Diagnosis Date Noted POA    PRINCIPAL PROBLEM:  Sepsis without acute organ dysfunction [A41.9] 03/20/2024 Yes    Leg pain [M79.606] 04/01/2024 No    Hyponatremia [E87.1] 03/30/2024 Yes    NSVT (nonsustained ventricular tachycardia) [I47.29] 03/26/2024 No    Acute on chronic combined systolic and diastolic heart failure [I50.43] 03/25/2024 Yes    Pulmonary emphysema [J43.9] 03/24/2024 Yes    Cardiogenic shock [R57.0] 03/22/2024 Yes    ROSEMARIE (obstructive sleep apnea) [G47.33] 03/20/2024 Yes    Acute hypoxemic respiratory failure [J96.01] 03/20/2024 Yes    Hypokalemia [E87.6] 03/20/2024 Yes    End stage heart failure [I50.84] 03/19/2024 Yes    Cellulitis of lower extremity [L03.119] 03/19/2024 Yes    Type 2 diabetes mellitus [E11.9] 03/09/2024 Yes    Hemiparesis affecting right side as late effect of cerebrovascular accident [I69.351] 03/09/2024 Not Applicable    Essential (primary) hypertension [I10] 01/01/2022 Yes    Coronary artery disease involving coronary bypass graft of native heart without angina pectoris [I25.810] 01/01/2022 Yes      Problems Resolved During this Admission:    Diagnosis Date Noted Date Resolved POA    Bacteremia [R78.81] 03/22/2024 03/29/2024 Yes    S/P cholecystectomy [Z90.49] 03/20/2024 03/20/2024 Not Applicable    Other insomnia [G47.09] 03/20/2024 03/20/2024 Yes    COPD (chronic obstructive pulmonary disease) [J44.9] 03/09/2024 03/20/2024 Yes    Pulmonary hypertension [I27.20] 03/09/2024 03/20/2024 Yes    Alcoholic fatty liver [K70.0] 03/09/2024 03/20/2024 Yes    GERD with esophagitis [K21.00] 2018 03/20/2024 Yes       Discharged Condition: good    Disposition: Home-Health Care c    Follow Up:   Follow-up Information       Walker Smith MD. Schedule an appointment as soon as possible for a visit in 1 week(s).    Specialties: Internal Medicine, Family Medicine, Hospitalist  Contact information:  [address]  [address]  [phone]               sOmar Ceron MD. Schedule an appointment as soon as possible for a visit in 2 week(s).    Specialties: Interventional Cardiology, Cardiology  Contact information:  [address] [place] [address] [place] [address]  [phone]                           Patient Instructions:      Diet Cardiac   Order Comments: 2 L fluid restriction, 2 g sodium restriction     Activity as tolerated       Significant Diagnostic Studies: Labs: All labs within the past 24 hours have been reviewed    Pending Diagnostic Studies:       Procedure Component Value Units Date/Time    EXTRA TUBES [[id number]] Collected: 04/03/24 0538    Order Status: Sent Lab Status: In process Updated: 04/03/24 0548    Specimen: Blood, Venous     Narrative:      The following orders were created for panel order EXTRA TUBES.  Procedure                               Abnormality         Status                     ---------                               -----------         ------                     Lavender Top Hold[[id number]]                               In process                   Please view results for these tests on the individual orders.    EXTRA TUBES [[id number]] Collected: 03/22/24 0931    Order Status: Sent Lab Status: In process Updated: 03/22/24 0931    Specimen: Blood, Venous     Narrative:      The following orders were created for panel order EXTRA TUBES.  Procedure                               Abnormality         Status                     ---------                               -----------         ------                     Lavender Top Hold[[id number]]                               In process                   Please view results for these tests on the individual orders.    EXTRA TUBES [[id number]] Collected: 03/19/24 2338    Order Status: Sent Lab Status: In process Updated: 03/19/24 2343    Specimen: Blood, Venous     Narrative:      The following orders were created for panel order EXTRA TUBES.  Procedure                                Abnormality         Status                     ---------                               -----------         ------                     Light Green Top Hold[[id number]]                            In process                   Please view results for these tests on the individual orders.    VAS US Arterial Legs Bilateral [[id number]]     Order Status: Sent Lab Status: No result            Medications:  Reconciled Home Medications:      Medication List        START taking these medications      amiodarone 200 MG Tab  Commonly known as: PACERONE  Take 1 tablet (200 mg total) by mouth once daily.     digoxin 125 mcg tablet  Commonly known as: LANOXIN  Take 1 tablet (0.125 mg total) by mouth once daily.     sacubitriL-valsartan 24-26 mg per tablet  Commonly known as: ENTRESTO  Take 1 tablet by mouth 2 (two) times daily.     torsemide 20 MG Tab  Commonly known as: DEMADEX  Take 1 tablet (20 mg total) by mouth 2 (two) times a day.     traZODone 50 MG tablet  Commonly known as: DESYREL  Take 2 tablets (100 mg total) by mouth nightly as needed for Insomnia.            CONTINUE taking these medications      albuterol 90 mcg/actuation inhaler  Commonly known as: PROVENTIL/VENTOLIN HFA  SMARTSI-2 Puff(s) By Mouth Every 4 Hours PRN     amLODIPine 10 MG tablet  Commonly known as: NORVASC  Take 10 mg by mouth once daily.     aspirin 81 MG EC tablet  Commonly known as: ECOTRIN  Take 1 tablet (81 mg total) by mouth once daily.     atorvastatin 40 MG tablet  Commonly known as: LIPITOR  Take 1 tablet (40 mg total) by mouth every evening.     budesonide-formoterol 160-4.5 mcg 160-4.5 mcg/actuation Hfaa  Commonly known as: SYMBICORT  Inhale 2 puffs into the lungs every 12 (twelve) hours. Controller     dicyclomine 20 mg tablet  Commonly known as: BENTYL  Take 1 tablet (20 mg total) by mouth 2 (two) times daily.     docusate sodium 100 MG capsule  Commonly known as: COLACE  Take 1 capsule (100 mg total) by mouth 2  (two) times daily.     empagliflozin 10 mg tablet  Commonly known as: Jardiance  Take 1 tablet (10 mg total) by mouth once daily.     insulin aspart U-100 100 unit/mL injection  Commonly known as: NovoLOG  Inject 0-5 Units into the skin 3 (three) times daily before meals.     LEVEMIR FLEXTOUCH U100 INSULIN 100 unit/mL (3 mL) Inpn pen  Generic drug: insulin detemir U-100 (Levemir)  Inject 10 Units into the skin every evening.     metoprolol succinate 25 MG 24 hr tablet  Commonly known as: TOPROL-XL  Take 0.5 tablets (12.5 mg total) by mouth once daily. Take 1/2 tablet by mouth daily     oxyCODONE-acetaminophen 5-325 mg per tablet  Commonly known as: PERCOCET  Take 1 tablet by mouth every 6 (six) hours as needed for Pain.     pantoprazole 40 MG tablet  Commonly known as: PROTONIX  Take 1 tablet (40 mg total) by mouth once daily.     potassium chloride 10 MEQ Tbsr  Commonly known as: KLOR-CON  Take 10 mEq by mouth once daily.     promethazine 25 MG tablet  Commonly known as: PHENERGAN  Take 1 tablet (25 mg total) by mouth every 6 (six) hours as needed for Nausea.     THERMOTABS 287-180-15 mg Tab  Generic drug: sod.chlorid-potassium chloride  Take 1 tablet by mouth once daily.     TRUE METRIX GLUCOSE TEST STRIP Strp  Generic drug: blood sugar diagnostic  USE TO CHECK BLOOD SUGAR AS DIRECTED     TRUEPLUS LANCETS 33 gauge Misc  Generic drug: lancets  1 lancet  by Misc.(Non-Drug; Combo Route) route once daily. use as directed            STOP taking these medications      amoxicillin-clavulanate 875-125mg 875-125 mg per tablet  Commonly known as: AUGMENTIN     furosemide 40 MG tablet  Commonly known as: LASIX     losartan 25 MG tablet  Commonly known as: COZAAR     zolpidem 5 MG Tab  Commonly known as: AMBIEN              Indwelling Lines/Drains at time of discharge:   Lines/Drains/Airways       None                   Time spent on the discharge of patient: >30 minutes         Aleks Whipple DO  Department [facility]  Medicine  [facility]

## 2024-04-03 NOTE — PLAN OF CARE
Rcd consult for cardiac rehab. Pt Is current with , sent referral now. Following dc needs as arise.

## 2024-04-04 NOTE — PT/OT/SLP PROGRESS
Occupational Therapy   Treatment    Name: Karin Carrera  MRN: [id number]  Admitting Diagnosis:  Sepsis without acute organ dysfunction       Recommendations:     Discharge Recommendations: Low Intensity Therapy  Discharge Equipment Recommendations:  none  Barriers to discharge:   (on going medical treatment)    Assessment:     Karin Carrera is a 53 y.o. male with a medical diagnosis of Sepsis without acute organ dysfunction.  He presents with alert and awaiting D/C. Pt is agreeable to Ue ex while he waits. Performance deficits affecting function are weakness, impaired endurance.     Rehab Prognosis:  Good; patient would benefit from acute skilled OT services to address these deficits and reach maximum level of function.       Plan:     Patient to be seen 5 x/week to address the above listed problems via self-care/home management, therapeutic activities, therapeutic exercises  Plan of Care Expires: 04/22/24  Plan of Care Reviewed with: patient    Subjective     Chief Complaint: Sepsis without acute organ dysfunction    Patient/Family Comments/goals: Return home today  Pain/Comfort:  Pain Rating 1: 3/10  Location - Side 1: Right  Location 1: leg (throbbing pain)  Pain Rating Post-Intervention 1: 3/10    Objective:     Communicated with: RN prior to session.  Patient found HOB elevated with peripheral IV, oxygen, telemetry upon OT entry to room.    General Precautions: Standard, fall, respiratory    Orthopedic Precautions:N/A  Braces: N/A  Respiratory Status: Nasal cannula, flow 2 L/min     Occupational Performance:     Bed Mobility:    Patient completed Supine to Sit with modified independence  Patient completed Sit to Supine with modified independence     Functional Mobility/Transfers:  NT    Activities of Daily Living:  NT      [facility] 6 Click ADL:      Treatment & Education:  Pt performed (L) UE ex :  Elbow flexion with 3# db for 2x15 reps  Punches with 3# db x 10 reps  Shoulder flexion with 2# db x 10 reps  and x 16 reps  Shoulder ER/IR with 2# db 2x15 reps  Handhelper with 3 bands of resistance x 50 reps    Patient left HOB elevated with all lines intact and call button in reach    GOALS:   Multidisciplinary Problems       Occupational Therapy Goals          Problem: Occupational Therapy    Goal Priority Disciplines Outcome Interventions   Occupational Therapy Goal     OT, PT/OT Ongoing, Progressing    Description: STG: (in 1 week)  Pt will perform grooming with setup  Pt will bathe with setup and min(A)  Pt will perform UE dressing with min(A)  Pt will perform LE dressing with min(A)  Pt will transfer bed/chair/bsc with SBA with cane  Pt will perform standing task x 3 min with CGA   Pt will tolerate 10 minutes of tx without fatigue      LTG: (in 5 weeks)  1.Restore to max I with self care and mobility.                        Time Tracking:     OT Date of Treatment: 04/03/24  OT Start Time: 1509  OT Stop Time: 1531  OT Total Time (min): 22 min    Billable Minutes:Therapeutic Exercise 20 min    OT/ANGELI: OT          4/3/2024

## 2024-04-04 NOTE — PLAN OF CARE
[facility]  Discharge Final Note    Primary Care Provider: Walker Smith MD    Expected Discharge Date: 4/3/2024    Final Discharge Note (most recent)       Final Note - 04/04/24 1029          Final Note    Assessment Type Final Discharge Note     Anticipated Discharge Disposition Home or Self Care        Post-Acute Status    Post-Acute Authorization Home Health     Home Health Status Set-up Complete/Auth obtained     Patient choice form signed by patient/caregiver List with quality metrics by geographic area provided;List from CMS Compare;List from System Post-Acute Care     Discharge Delays None known at this time                     Important Message from Medicare              Follow-up providers       Walker Smith MD   Specialty: Internal Medicine, Family Medicine, Hospitalist   Relationship: PCP - General    [address] [facility] [address]   Phone: [phone]       Next Steps: Schedule an appointment as soon as possible for a visit in 1 week(s)    Instructions: Please follow up on April 16 at 1:15    Osmar Ceron MD   Specialty: Interventional Cardiology, Cardiology    [address] [place] [address]   Phone: [phone]       Next Steps: Schedule an appointment as soon as possible for a visit in 2 week(s)    Instructions: Please follow up on April 16 at 10:15              After-discharge care                Home Medical Care       [facility]   Service: Home Health Services    [address] [place] [address]   Phone: [phone]                             Pt dc home. Faxed dc info to [facility]. 0 further dc needs

## 2024-04-16 ENCOUNTER — OFFICE VISIT (OUTPATIENT)
Dept: CARDIOLOGY | Facility: CLINIC | Age: 54
End: 2024-04-16
Payer: MEDICAID

## 2024-04-16 VITALS
DIASTOLIC BLOOD PRESSURE: 60 MMHG | SYSTOLIC BLOOD PRESSURE: 120 MMHG | BODY MASS INDEX: 24.79 KG/M2 | HEIGHT: 72 IN | HEART RATE: 106 BPM | WEIGHT: 183 LBS | RESPIRATION RATE: 18 BRPM

## 2024-04-16 DIAGNOSIS — I25.810 CORONARY ARTERY DISEASE INVOLVING CORONARY BYPASS GRAFT OF NATIVE HEART WITHOUT ANGINA PECTORIS: ICD-10-CM

## 2024-04-16 DIAGNOSIS — I47.29 NSVT (NONSUSTAINED VENTRICULAR TACHYCARDIA): ICD-10-CM

## 2024-04-16 DIAGNOSIS — I50.84 END STAGE HEART FAILURE: ICD-10-CM

## 2024-04-16 DIAGNOSIS — I10 ESSENTIAL (PRIMARY) HYPERTENSION: ICD-10-CM

## 2024-04-16 DIAGNOSIS — I50.22 CHRONIC SYSTOLIC CONGESTIVE HEART FAILURE: Primary | ICD-10-CM

## 2024-04-16 PROCEDURE — 99214 OFFICE O/P EST MOD 30 MIN: CPT | Mod: S$PBB,,, | Performed by: STUDENT IN AN ORGANIZED HEALTH CARE EDUCATION/TRAINING PROGRAM

## 2024-04-16 PROCEDURE — 4010F ACE/ARB THERAPY RXD/TAKEN: CPT | Mod: CPTII,,, | Performed by: STUDENT IN AN ORGANIZED HEALTH CARE EDUCATION/TRAINING PROGRAM

## 2024-04-16 PROCEDURE — 1111F DSCHRG MED/CURRENT MED MERGE: CPT | Mod: CPTII,,, | Performed by: STUDENT IN AN ORGANIZED HEALTH CARE EDUCATION/TRAINING PROGRAM

## 2024-04-16 PROCEDURE — 99215 OFFICE O/P EST HI 40 MIN: CPT | Mod: PBBFAC,25 | Performed by: STUDENT IN AN ORGANIZED HEALTH CARE EDUCATION/TRAINING PROGRAM

## 2024-04-16 PROCEDURE — 3044F HG A1C LEVEL LT 7.0%: CPT | Mod: CPTII,,, | Performed by: STUDENT IN AN ORGANIZED HEALTH CARE EDUCATION/TRAINING PROGRAM

## 2024-04-16 PROCEDURE — 3074F SYST BP LT 130 MM HG: CPT | Mod: CPTII,,, | Performed by: STUDENT IN AN ORGANIZED HEALTH CARE EDUCATION/TRAINING PROGRAM

## 2024-04-16 PROCEDURE — 3008F BODY MASS INDEX DOCD: CPT | Mod: CPTII,,, | Performed by: STUDENT IN AN ORGANIZED HEALTH CARE EDUCATION/TRAINING PROGRAM

## 2024-04-16 PROCEDURE — 93005 ELECTROCARDIOGRAM TRACING: CPT | Mod: PBBFAC | Performed by: STUDENT IN AN ORGANIZED HEALTH CARE EDUCATION/TRAINING PROGRAM

## 2024-04-16 PROCEDURE — 93010 ELECTROCARDIOGRAM REPORT: CPT | Mod: S$PBB,,, | Performed by: STUDENT IN AN ORGANIZED HEALTH CARE EDUCATION/TRAINING PROGRAM

## 2024-04-16 PROCEDURE — 3078F DIAST BP <80 MM HG: CPT | Mod: CPTII,,, | Performed by: STUDENT IN AN ORGANIZED HEALTH CARE EDUCATION/TRAINING PROGRAM

## 2024-04-16 RX ORDER — SACUBITRIL AND VALSARTAN 49; 51 MG/1; MG/1
1 TABLET, FILM COATED ORAL 2 TIMES DAILY
Qty: 180 TABLET | Refills: 3 | Status: SHIPPED | OUTPATIENT
Start: 2024-04-16

## 2024-04-16 NOTE — ASSESSMENT & PLAN NOTE
Ischemic cardiomyopathy; EF 20%, NYHA III , Stage C/D  GDMT - increase entresto 49/51mg qd, digoxin 0.125mcg, jardiance 10mg qd  Euvolemic on torsemide 20mg bid  Patient refused ICD

## 2024-04-16 NOTE — PROGRESS NOTES
PCP: Walker Smith MD    Referring Provider:     Subjective:   Karin Carrera is a 53 y.o. male with hx of CAD s/p CABG, ischemic cardiomyopathy (EF 20%), CVA, with expressive aphasia and right arm weakness,  HTN, GERD, ETOH abuse, and nicotine abuse  who presents for follow up    4/16/24 - Patient with recent back to back hospital admission for CHF and cholecystitis. S/p cholecystectomy. Also underwent IV diuresis with lasix gtt. He was discharged GDMT for discharge - entresto 24/mg bid, digoxin mcg qd, jardiance 10mg qd, torsemide 20mg bid, amiodarone 200mg qd     11/16/23 - Doing well. Denies any complaints. Medication list updated (brought med labels).      5/8/23 - Patient is doing well. Appears compensated. Has aphasia from prior CVA. Did not bring his meds. Reports taking 9 pills and does not want to start any new pills.     2/2023- Previously a patient of Dr. Paez; last seen 2015 - lost to follow up.    Patient was admitted to the hospital in 12/2022 for decompensated HF and was discharged after IV diuresis  Today he is here in a wheelchair; has expressive aphasia.   He lives in a NH; denies any symptoms. He does not want to start any new medications. Expresses disappointment and frustration  I explained to him that he needs to be on HF GDMT if he wants to avoid further hospital admissions.     Fhx: non-contributary  Shx: Smoker, ETOH use *3-4 drinks /day, unknown drug use    EKG 2/7/23 - Sinus tachy with PVC, LVH, non-spec ST abnol  ECHO - 01/01/22  · The left ventricle is severely enlarged with mild eccentric hypertrophy and severely decreased systolic function.  · The estimated ejection fraction is 20%.  · There are segmental left ventricular wall motion abnormalities.  · Left ventricular diastolic dysfunction.  · Atrial fibrillation not observed.  · Normal right ventricular size.  · Mild mitral regurgitation.  · Normal central venous pressure (3 mmHg).    [facility] 2017 - Severe 2VCAD - occuled LAD (LIMA  is atretic as well as LAD), LCX patent, RCA  with patent SVG to PDA and patent SVG to Diagonal      Lab Results   Component Value Date     (L) 2024    K 3.8 2024    CL 88 (L) 2024    CO2 32 2024    BUN 19 (H) 2024    CREATININE 0.74 2024    CALCIUM 8.9 2024    ANIONGAP 10 2024    EGFRNONAA 126 2022       Lab Results   Component Value Date    CHOL 106 2024     Lab Results   Component Value Date    HDL 35 (L) 2024     Lab Results   Component Value Date    LDLCALC 53 2024     Lab Results   Component Value Date    TRIG 89 2024     Lab Results   Component Value Date    CHOLHDL 3.0 2024       Lab Results   Component Value Date    WBC 10.65 2024    HGB 12.9 (L) 2024    HCT 40.4 2024    MCV 82.4 2024     2024           Current Outpatient Medications:     albuterol (PROVENTIL/VENTOLIN HFA) 90 mcg/actuation inhaler, SMARTSI-2 Puff(s) By Mouth Every 4 Hours PRN, Disp: 18 g, Rfl: 6    amiodarone (PACERONE) 200 MG Tab, Take 1 tablet (200 mg total) by mouth once daily., Disp: 30 tablet, Rfl: 11    amLODIPine (NORVASC) 10 MG tablet, Take 10 mg by mouth once daily., Disp: , Rfl:     aspirin (ECOTRIN) 81 MG EC tablet, Take 1 tablet (81 mg total) by mouth once daily., Disp: 90 tablet, Rfl: 1    atorvastatin (LIPITOR) 40 MG tablet, Take 1 tablet (40 mg total) by mouth every evening., Disp: 90 tablet, Rfl: 1    budesonide-formoterol 160-4.5 mcg (SYMBICORT) 160-4.5 mcg/actuation HFAA, Inhale 2 puffs into the lungs every 12 (twelve) hours. Controller (Patient not taking: Reported on 2024), Disp: 1 g, Rfl: 0    dicyclomine (BENTYL) 20 mg tablet, Take 1 tablet (20 mg total) by mouth 2 (two) times daily., Disp: 20 tablet, Rfl: 0    digoxin (LANOXIN) 125 mcg tablet, Take 1 tablet (0.125 mg total) by mouth once daily., Disp: 30 tablet, Rfl: 11    docusate sodium (COLACE) 100 MG capsule, Take 1 capsule (100  mg total) by mouth 2 (two) times daily., Disp: 28 capsule, Rfl: 0    empagliflozin (JARDIANCE) 10 mg tablet, Take 1 tablet (10 mg total) by mouth once daily., Disp: 30 tablet, Rfl: 1    insulin aspart U-100 (NOVOLOG) 100 unit/mL injection, Inject 0-5 Units into the skin 3 (three) times daily before meals., Disp: 10 mL, Rfl: 1    insulin detemir U-100, Levemir, (LEVEMIR FLEXTOUCH U100 INSULIN) 100 unit/mL (3 mL) InPn pen, Inject 10 Units into the skin every evening., Disp: 36 mL, Rfl: 0    metoprolol succinate (TOPROL-XL) 25 MG 24 hr tablet, Take 0.5 tablets (12.5 mg total) by mouth once daily. Take 1/2 tablet by mouth daily, Disp: 30 tablet, Rfl: 1    oxyCODONE (ROXICODONE) 5 MG immediate release tablet, Take 1 tablet (5 mg total) by mouth every 6 (six) hours as needed for Pain., Disp: 12 tablet, Rfl: 0    pantoprazole (PROTONIX) 40 MG tablet, Take 1 tablet (40 mg total) by mouth once daily., Disp: 90 tablet, Rfl: 1    potassium chloride (KLOR-CON) 10 MEQ TbSR, Take 10 mEq by mouth once daily., Disp: , Rfl:     promethazine (PHENERGAN) 25 MG tablet, Take 1 tablet (25 mg total) by mouth every 6 (six) hours as needed for Nausea., Disp: 15 tablet, Rfl: 0    sacubitriL-valsartan (ENTRESTO) 49-51 mg per tablet, Take 1 tablet by mouth 2 (two) times daily., Disp: 180 tablet, Rfl: 3    THERMOTABS 287-180-15 mg Tab, Take 1 tablet by mouth once daily., Disp: 90 tablet, Rfl: 0    torsemide (DEMADEX) 20 MG Tab, Take 1 tablet (20 mg total) by mouth 2 (two) times a day., Disp: 60 tablet, Rfl: 11    traZODone (DESYREL) 50 MG tablet, Take 2 tablets (100 mg total) by mouth nightly as needed for Insomnia., Disp: 60 tablet, Rfl: 0    TRUE METRIX GLUCOSE TEST STRIP Strp, USE TO CHECK BLOOD SUGAR AS DIRECTED, Disp: 200 each, Rfl: 1    TRUEPLUS LANCETS 33 gauge Misc, 1 lancet  by Misc.(Non-Drug; Combo Route) route once daily. use as directed, Disp: 200 each, Rfl: 2    Review of Systems   Respiratory:  Negative for cough and shortness of  breath.    Cardiovascular:  Negative for chest pain, palpitations, orthopnea, claudication, leg swelling and PND.         Objective:   /60   Pulse 106   Resp 18   Ht 6' (1.829 m)   Wt 83 kg (183 lb)   BMI 24.82 kg/m²     Physical Exam  Vitals and nursing note reviewed.   Constitutional:       Appearance: Normal appearance.   Cardiovascular:      Rate and Rhythm: Normal rate and regular rhythm.      Pulses: Normal pulses.      Heart sounds: Normal heart sounds.   Pulmonary:      Breath sounds: Normal breath sounds.   Neurological:      Mental Status: He is alert and oriented to person, place, and time. Mental status is at baseline.           Assessment:     1. Chronic systolic congestive heart failure  Basic Metabolic Panel      2. Essential (primary) hypertension  EKG 12-lead      3. Coronary artery disease involving coronary bypass graft of native heart without angina pectoris        4. End stage heart failure        5. NSVT (nonsustained ventricular tachycardia)                Plan:   Coronary artery disease involving coronary bypass graft of native heart without angina pectoris  Severe 2VCAD - occuled LAD (LIMA is atretic as well as LAD), LCX patent, RCA  with patent SVG to PDA and patent SVG to Diagonal  - On aspirin and statin therapy  - LAD is atretic as well as LIMA    End stage heart failure  Ischemic cardiomyopathy; EF 20%, NYHA III , Stage C/D  GDMT - increase entresto 49/51mg qd, digoxin 0.125mcg, jardiance 10mg qd  Euvolemic on torsemide 20mg bid  Patient refused ICD      NSVT (nonsustained ventricular tachycardia)  On amio 200m gqd

## 2024-04-17 ENCOUNTER — OFFICE VISIT (OUTPATIENT)
Dept: FAMILY MEDICINE | Facility: CLINIC | Age: 54
End: 2024-04-17
Payer: MEDICAID

## 2024-04-17 VITALS
OXYGEN SATURATION: 95 % | RESPIRATION RATE: 18 BRPM | HEIGHT: 72 IN | WEIGHT: 183 LBS | BODY MASS INDEX: 24.79 KG/M2 | SYSTOLIC BLOOD PRESSURE: 100 MMHG | TEMPERATURE: 97 F | HEART RATE: 87 BPM | DIASTOLIC BLOOD PRESSURE: 64 MMHG

## 2024-04-17 DIAGNOSIS — I50.9 CONGESTIVE HEART FAILURE, UNSPECIFIED HF CHRONICITY, UNSPECIFIED HEART FAILURE TYPE: Primary | ICD-10-CM

## 2024-04-17 DIAGNOSIS — Z86.73 HISTORY OF CVA (CEREBROVASCULAR ACCIDENT): ICD-10-CM

## 2024-04-17 DIAGNOSIS — G47.00 INSOMNIA, UNSPECIFIED TYPE: ICD-10-CM

## 2024-04-17 PROCEDURE — 99214 OFFICE O/P EST MOD 30 MIN: CPT | Mod: ,,, | Performed by: INTERNAL MEDICINE

## 2024-04-17 PROCEDURE — 4010F ACE/ARB THERAPY RXD/TAKEN: CPT | Mod: CPTII,,, | Performed by: INTERNAL MEDICINE

## 2024-04-17 PROCEDURE — 1111F DSCHRG MED/CURRENT MED MERGE: CPT | Mod: CPTII,,, | Performed by: INTERNAL MEDICINE

## 2024-04-17 PROCEDURE — 3044F HG A1C LEVEL LT 7.0%: CPT | Mod: CPTII,,, | Performed by: INTERNAL MEDICINE

## 2024-04-17 PROCEDURE — 3074F SYST BP LT 130 MM HG: CPT | Mod: CPTII,,, | Performed by: INTERNAL MEDICINE

## 2024-04-17 PROCEDURE — 1159F MED LIST DOCD IN RCRD: CPT | Mod: CPTII,,, | Performed by: INTERNAL MEDICINE

## 2024-04-17 PROCEDURE — 3078F DIAST BP <80 MM HG: CPT | Mod: CPTII,,, | Performed by: INTERNAL MEDICINE

## 2024-04-17 PROCEDURE — 3008F BODY MASS INDEX DOCD: CPT | Mod: CPTII,,, | Performed by: INTERNAL MEDICINE

## 2024-04-17 RX ORDER — ZOLPIDEM TARTRATE 5 MG/1
5 TABLET ORAL NIGHTLY PRN
Qty: 30 TABLET | Refills: 1 | Status: SHIPPED | OUTPATIENT
Start: 2024-04-17

## 2024-04-17 RX ORDER — FUROSEMIDE 40 MG/1
40 TABLET ORAL DAILY PRN
Qty: 30 TABLET | Refills: 1 | Status: SHIPPED | OUTPATIENT
Start: 2024-04-17

## 2024-04-19 PROBLEM — Z86.73 HISTORY OF CVA (CEREBROVASCULAR ACCIDENT): Status: ACTIVE | Noted: 2024-04-19

## 2024-04-19 PROBLEM — I50.9 CONGESTIVE HEART FAILURE: Status: ACTIVE | Noted: 2024-03-19

## 2024-04-19 PROBLEM — G47.00 INSOMNIA: Status: ACTIVE | Noted: 2024-03-20

## 2024-04-19 NOTE — PROGRESS NOTES
Subjective:       Patient ID: Karin Carrera is a 53 y.o. male.    Chief Complaint: Transitional Care (HFU)    HPI  .  Patient presents with a history of CHF which is much improved patient has chronic COPD complains of insomnia and also has a history of right CVA.  He still has difficulty ambulating has difficulty standing and he has difficult with strength on the right side.  Because of the COPD is on chronic O2.  And he does need several things today.  He has requesting a lift chair which will write for a small O2 take.  He wants a wheelchair  Will refill his Lasix and Ambien  Current Medications:    Current Outpatient Medications:     albuterol (PROVENTIL/VENTOLIN HFA) 90 mcg/actuation inhaler, SMARTSI-2 Puff(s) By Mouth Every 4 Hours PRN, Disp: 18 g, Rfl: 6    amiodarone (PACERONE) 200 MG Tab, Take 1 tablet (200 mg total) by mouth once daily., Disp: 30 tablet, Rfl: 11    amLODIPine (NORVASC) 10 MG tablet, Take 10 mg by mouth once daily., Disp: , Rfl:     aspirin (ECOTRIN) 81 MG EC tablet, Take 1 tablet (81 mg total) by mouth once daily., Disp: 90 tablet, Rfl: 1    atorvastatin (LIPITOR) 40 MG tablet, Take 1 tablet (40 mg total) by mouth every evening., Disp: 90 tablet, Rfl: 1    dicyclomine (BENTYL) 20 mg tablet, Take 1 tablet (20 mg total) by mouth 2 (two) times daily., Disp: 20 tablet, Rfl: 0    digoxin (LANOXIN) 125 mcg tablet, Take 1 tablet (0.125 mg total) by mouth once daily., Disp: 30 tablet, Rfl: 11    docusate sodium (COLACE) 100 MG capsule, Take 1 capsule (100 mg total) by mouth 2 (two) times daily., Disp: 28 capsule, Rfl: 0    empagliflozin (JARDIANCE) 10 mg tablet, Take 1 tablet (10 mg total) by mouth once daily., Disp: 30 tablet, Rfl: 1    insulin aspart U-100 (NOVOLOG) 100 unit/mL injection, Inject 0-5 Units into the skin 3 (three) times daily before meals., Disp: 10 mL, Rfl: 1    insulin detemir U-100, Levemir, (LEVEMIR FLEXTOUCH U100 INSULIN) 100 unit/mL (3 mL) InPn pen, Inject 10 Units  into the skin every evening., Disp: 36 mL, Rfl: 0    metoprolol succinate (TOPROL-XL) 25 MG 24 hr tablet, Take 0.5 tablets (12.5 mg total) by mouth once daily. Take 1/2 tablet by mouth daily, Disp: 30 tablet, Rfl: 1    oxyCODONE (ROXICODONE) 5 MG immediate release tablet, Take 1 tablet (5 mg total) by mouth every 6 (six) hours as needed for Pain., Disp: 12 tablet, Rfl: 0    pantoprazole (PROTONIX) 40 MG tablet, Take 1 tablet (40 mg total) by mouth once daily., Disp: 90 tablet, Rfl: 1    potassium chloride (KLOR-CON) 10 MEQ TbSR, Take 10 mEq by mouth once daily., Disp: , Rfl:     promethazine (PHENERGAN) 25 MG tablet, Take 1 tablet (25 mg total) by mouth every 6 (six) hours as needed for Nausea., Disp: 15 tablet, Rfl: 0    sacubitriL-valsartan (ENTRESTO) 49-51 mg per tablet, Take 1 tablet by mouth 2 (two) times daily., Disp: 180 tablet, Rfl: 3    THERMOTABS 287-180-15 mg Tab, Take 1 tablet by mouth once daily., Disp: 90 tablet, Rfl: 0    torsemide (DEMADEX) 20 MG Tab, Take 1 tablet (20 mg total) by mouth 2 (two) times a day., Disp: 60 tablet, Rfl: 11    traZODone (DESYREL) 50 MG tablet, Take 2 tablets (100 mg total) by mouth nightly as needed for Insomnia., Disp: 60 tablet, Rfl: 0    TRUE METRIX GLUCOSE TEST STRIP Strp, USE TO CHECK BLOOD SUGAR AS DIRECTED, Disp: 200 each, Rfl: 1    TRUEPLUS LANCETS 33 gauge Misc, 1 lancet  by Misc.(Non-Drug; Combo Route) route once daily. use as directed, Disp: 200 each, Rfl: 2    budesonide-formoterol 160-4.5 mcg (SYMBICORT) 160-4.5 mcg/actuation HFAA, Inhale 2 puffs into the lungs every 12 (twelve) hours. Controller (Patient not taking: Reported on 4/16/2024), Disp: 1 g, Rfl: 0    furosemide (LASIX) 40 MG tablet, Take 1 tablet (40 mg total) by mouth daily as needed (for swelling)., Disp: 30 tablet, Rfl: 1    zolpidem (AMBIEN) 5 MG Tab, Take 1 tablet (5 mg total) by mouth nightly as needed (for insomnia)., Disp: 30 tablet, Rfl: 1           ROS  Twelve point system reviewed,  unremarkable except for stated above in HPI.        Objective:         Vitals:    04/17/24 0906   BP: 100/64   BP Location: Right arm   Patient Position: Sitting   BP Method: Large (Automatic)   Pulse: 87   Resp: 18   Temp: 97.3 °F (36.3 °C)   TempSrc: Tympanic   SpO2: 95%   Weight: 83 kg (183 lb)   Height: 6' (1.829 m)        Physical Exam     Patient is awake alert oriented person place and  Lungs are clear to auscultation bilaterally no crackles or wheezes   Cardiovascular S1-S2 regular rate and rhythm no murmurs rubs or gallops   Abdomen is soft positive bowel sounds nontender, extremities no clubbing cyanosis edema  Neuro no focal neurological deficits  Skin warm and dry.     Last Labs:     Lab Visit on 04/16/2024   Component Date Value    Sodium 04/16/2024 138     Potassium 04/16/2024 3.8     Chloride 04/16/2024 101     CO2 04/16/2024 31     Anion Gap 04/16/2024 10     Glucose 04/16/2024 111 (H)     BUN 04/16/2024 13     Creatinine 04/16/2024 1.05     BUN/Creatinine Ratio 04/16/2024 12     Calcium 04/16/2024 10.0     eGFR 04/16/2024 85    No results displayed because visit has over 200 results.      Orders Only on 04/01/2024   Component Date Value    HIV 1/2 04/01/2024 Non-Reactive        Last Imaging:  US Lower Extrem Arteries Bilat with VIPIN (xpd)  Narrative: EXAMINATION:  US ARTERIAL LOWER EXTREMITY BILAT WITH VIPIN (XPD)    CLINICAL HISTORY:  leg pain, cold feet;    COMPARISON:  None.    FINDINGS:  An arterial Doppler study was performed with interrogation of the bilateral  common femoral artery, deep femoral artery, superficial femoral artery, popliteal artery, and posterior tibial artery. Interrogation includes grayscale, color-flow, and spectral waveform evaluation.    No arterial occlusions demonstrated.  Mildly elevated velocity within the mid right SFA measuring 135 centimeters/second.  Decreased velocity within the mid right popliteal artery and right DPA measuring 33 and 20 centimeters/second  respectively.  Mildly elevated velocity within the mid and distal left SFA measuring 127 and 129 centimeters/second respectively.  Monophasic waveforms demonstrated within the right SFA, right popliteal artery, right PTA, right DPA, deep left femoral artery, left SFA, left popliteal artery, left PTA, and left DPA.  Grayscale imaging demonstrates atherosclerotic plaque.    Bilateral ankle brachial index was separately requested. Segmental blood pressures were obtained and bilateral ABIs calculated. The ankle to brachial index is 0.89 on the right and 1.01 on the left.  Impression: The ankle to brachial index is 0.89 on the right and 1.01 on the left.  No arterial occlusions.  Increased and decreased arterial velocities, monophasic waveforms, and other detailed findings as above.    Point of Service: [facility]    Electronically signed by: Taye Bolivar  Date:    04/03/2024  Time:    15:22         **Labs and x-rays personally reviewed by me    ** reviewed           Assessment & Plan:       1. Congestive heart failure, unspecified HF chronicity, unspecified heart failure type    2. Insomnia, unspecified type    3. History of CVA (cerebrovascular accident)    Other orders  -     furosemide (LASIX) 40 MG tablet; Take 1 tablet (40 mg total) by mouth daily as needed (for swelling).  Dispense: 30 tablet; Refill: 1  -     zolpidem (AMBIEN) 5 MG Tab; Take 1 tablet (5 mg total) by mouth nightly as needed (for insomnia).  Dispense: 30 tablet; Refill: 1            Walker Smith MD

## 2024-04-25 ENCOUNTER — DOCUMENT SCAN (OUTPATIENT)
Dept: HOME HEALTH SERVICES | Facility: HOSPITAL | Age: 54
End: 2024-04-25
Payer: MEDICAID

## 2024-05-06 ENCOUNTER — HOSPITAL ENCOUNTER (EMERGENCY)
Facility: HOSPITAL | Age: 54
Discharge: HOME | End: 2024-05-06
Payer: MEDICAID

## 2024-05-06 VITALS
DIASTOLIC BLOOD PRESSURE: 71 MMHG | HEART RATE: 107 BPM | OXYGEN SATURATION: 99 % | BODY MASS INDEX: 25.06 KG/M2 | SYSTOLIC BLOOD PRESSURE: 131 MMHG | RESPIRATION RATE: 18 BRPM | TEMPERATURE: 98 F | WEIGHT: 185 LBS | HEIGHT: 72 IN

## 2024-05-06 DIAGNOSIS — K64.9 HEMORRHOIDS, UNSPECIFIED HEMORRHOID TYPE: Primary | ICD-10-CM

## 2024-05-06 DIAGNOSIS — E87.6 HYPOKALEMIA: ICD-10-CM

## 2024-05-06 LAB
ALBUMIN SERPL BCP-MCNC: 4 G/DL (ref 3.5–5)
ALBUMIN/GLOB SERPL: 0.9 {RATIO}
ALP SERPL-CCNC: 191 U/L (ref 45–115)
ALT SERPL W P-5'-P-CCNC: 29 U/L (ref 16–61)
ANION GAP SERPL CALCULATED.3IONS-SCNC: 13 MMOL/L (ref 7–16)
APTT PPP: 26.7 SECONDS (ref 25.2–37.3)
AST SERPL W P-5'-P-CCNC: 24 U/L (ref 15–37)
BASOPHILS # BLD AUTO: 0.04 K/UL (ref 0–0.2)
BASOPHILS NFR BLD AUTO: 0.6 % (ref 0–1)
BILIRUB SERPL-MCNC: 1.1 MG/DL (ref ?–1.2)
BUN SERPL-MCNC: 13 MG/DL (ref 7–18)
BUN/CREAT SERPL: 12 (ref 6–20)
CALCIUM SERPL-MCNC: 9.5 MG/DL (ref 8.5–10.1)
CHLORIDE SERPL-SCNC: 94 MMOL/L (ref 98–107)
CO2 SERPL-SCNC: 32 MMOL/L (ref 21–32)
CREAT SERPL-MCNC: 1.05 MG/DL (ref 0.7–1.3)
DIFFERENTIAL METHOD BLD: ABNORMAL
EGFR (NO RACE VARIABLE) (RUSH/TITUS): 85 ML/MIN/1.73M2
EOSINOPHIL # BLD AUTO: 0.08 K/UL (ref 0–0.5)
EOSINOPHIL NFR BLD AUTO: 1.2 % (ref 1–4)
ERYTHROCYTE [DISTWIDTH] IN BLOOD BY AUTOMATED COUNT: 18.9 % (ref 11.5–14.5)
GLOBULIN SER-MCNC: 4.6 G/DL (ref 2–4)
GLUCOSE SERPL-MCNC: 134 MG/DL (ref 74–106)
HCT VFR BLD AUTO: 46.8 % (ref 40–54)
HGB BLD-MCNC: 14.9 G/DL (ref 13.5–18)
IMM GRANULOCYTES # BLD AUTO: 0.02 K/UL (ref 0–0.04)
IMM GRANULOCYTES NFR BLD: 0.3 % (ref 0–0.4)
INR BLD: 1.03
LYMPHOCYTES # BLD AUTO: 1.02 K/UL (ref 1–4.8)
LYMPHOCYTES NFR BLD AUTO: 14.9 % (ref 27–41)
MCH RBC QN AUTO: 25.8 PG (ref 27–31)
MCHC RBC AUTO-ENTMCNC: 31.8 G/DL (ref 32–36)
MCV RBC AUTO: 81 FL (ref 80–96)
MONOCYTES # BLD AUTO: 0.47 K/UL (ref 0–0.8)
MONOCYTES NFR BLD AUTO: 6.9 % (ref 2–6)
MPC BLD CALC-MCNC: 9.3 FL (ref 9.4–12.4)
NEUTROPHILS # BLD AUTO: 5.21 K/UL (ref 1.8–7.7)
NEUTROPHILS NFR BLD AUTO: 76.1 % (ref 53–65)
NRBC # BLD AUTO: 0 X10E3/UL
NRBC, AUTO (.00): 0 %
PLATELET # BLD AUTO: 224 K/UL (ref 150–400)
POTASSIUM SERPL-SCNC: 3.2 MMOL/L (ref 3.5–5.1)
PROT SERPL-MCNC: 8.6 G/DL (ref 6.4–8.2)
PROTHROMBIN TIME: 13.4 SECONDS (ref 11.7–14.7)
RBC # BLD AUTO: 5.78 M/UL (ref 4.6–6.2)
SODIUM SERPL-SCNC: 136 MMOL/L (ref 136–145)
WBC # BLD AUTO: 6.84 K/UL (ref 4.5–11)

## 2024-05-06 PROCEDURE — 25000003 PHARM REV CODE 250: Performed by: NURSE PRACTITIONER

## 2024-05-06 PROCEDURE — 36415 COLL VENOUS BLD VENIPUNCTURE: CPT | Performed by: NURSE PRACTITIONER

## 2024-05-06 PROCEDURE — 85730 THROMBOPLASTIN TIME PARTIAL: CPT | Performed by: NURSE PRACTITIONER

## 2024-05-06 PROCEDURE — 99284 EMERGENCY DEPT VISIT MOD MDM: CPT | Mod: ,,, | Performed by: NURSE PRACTITIONER

## 2024-05-06 PROCEDURE — 85610 PROTHROMBIN TIME: CPT | Performed by: NURSE PRACTITIONER

## 2024-05-06 PROCEDURE — 99283 EMERGENCY DEPT VISIT LOW MDM: CPT

## 2024-05-06 PROCEDURE — 85025 COMPLETE CBC W/AUTO DIFF WBC: CPT | Performed by: NURSE PRACTITIONER

## 2024-05-06 PROCEDURE — 80053 COMPREHEN METABOLIC PANEL: CPT | Performed by: NURSE PRACTITIONER

## 2024-05-06 RX ORDER — POTASSIUM CHLORIDE 20 MEQ/1
40 TABLET, EXTENDED RELEASE ORAL
Status: COMPLETED | OUTPATIENT
Start: 2024-05-06 | End: 2024-05-06

## 2024-05-06 RX ORDER — HYDROCORTISONE 1 %
CREAM (GRAM) TOPICAL 2 TIMES DAILY
Qty: 15 G | Refills: 0 | Status: SHIPPED | OUTPATIENT
Start: 2024-05-06 | End: 2024-06-17

## 2024-05-06 RX ADMIN — POTASSIUM CHLORIDE 40 MEQ: 1500 TABLET, EXTENDED RELEASE ORAL at 12:05

## 2024-05-06 NOTE — ED TRIAGE NOTES
Chief Complaint   Patient presents with    Rectal Bleeding     Pt presents to ed with c/o having rectal bleeding that he thinks could be hemorrhoids.         negative...

## 2024-05-06 NOTE — DISCHARGE INSTRUCTIONS
Eat fresh fruits and vegetables.  Drink plenty of water.  Take your potassium as directed.Follow up with your primary care provider in 2 days, you need to have your potassium rechecked. Return to the emergency department for any increase in symptoms or for any other new or worrisome symptoms.

## 2024-05-06 NOTE — ED PROVIDER NOTES
Encounter Date: 5/6/2024       History     Chief Complaint   Patient presents with    Rectal Bleeding     Pt presents to ed with c/o having rectal bleeding that he thinks could be hemorrhoids.      53-year-old male presents to the emergency department to be evaluated because he saw a small amount of bright red blood in his stool earlier this morning.  Denies any abdominal pain, fever, chills, shortness of breath, fatigue.    The history is provided by the patient.   Rectal Bleeding   The current episode started just prior to arrival. Pertinent negatives include no anorexia, no fever, no abdominal pain, no diarrhea, no hematemesis, no hemorrhoids, no nausea, no rectal pain, no vomiting, no hematuria, no chest pain, no headaches, no coughing, no difficulty breathing and no rash.     Review of patient's allergies indicates:  No Known Allergies  Past Medical History:   Diagnosis Date    Alcoholic fatty liver     CHF (congestive heart failure) 02/20/2024    EF 25%    COPD (chronic obstructive pulmonary disease)     Coronary artery disease involving coronary bypass graft of native heart without angina pectoris 01/01/2022    Dyslipidemia 12/16/2023    Essential (primary) hypertension 01/01/2022    Expressive aphasia 05/05/2023    GERD with esophagitis 2018    EGD    Hemiparesis affecting right side as late effect of cerebrovascular accident     ROSEMARIE (obstructive sleep apnea)     Pulmonary hypertension     Stroke     right hand contracted    Tricuspid regurgitation     Type 2 diabetes mellitus      Past Surgical History:   Procedure Laterality Date    CARDIAC SURGERY      CABG    CORONARY ANGIOPLASTY WITH STENT PLACEMENT      bare metal stent    HEMIARTHROPLASTY OF HIP Right 01/02/2022    Procedure: HEMIARTHROPLASTY, HIP;  Surgeon: Ramses Chua MD;  Location: [facility];  Service: Orthopedics;  Laterality: Right;    LAPAROSCOPIC CHOLECYSTECTOMY  03/09/2024    Dr. Bennett    LAPAROSCOPIC CHOLECYSTECTOMY WITH  CHOLANGIOGRAPHY N/A 3/9/2024    Procedure: CHOLECYSTECTOMY, LAPAROSCOPIC, WITH CHOLANGIOGRAM;  Surgeon: Karson Bennett DO;  Location: [facility] OR;  Service: General;  Laterality: N/A;    RIGHT HEART CATHETERIZATION Right 02/22/2024    Procedure: INSERTION, CATHETER, RIGHT HEART;  Surgeon: Ruiz Pacheco MD;  Location: [facility] CATH LAB;  Service: Cardiology;  Laterality: Right;     Family History   Problem Relation Name Age of Onset    Diabetes Mother      Heart disease Mother      Hypertension Mother       Social History     Tobacco Use    Smoking status: Former     Current packs/day: 0.50     Types: Cigarettes    Smokeless tobacco: Current     Types: Chew   Substance Use Topics    Alcohol use: Not Currently     Comment: 1 quart    Drug use: Never     Review of Systems   Constitutional:  Negative for fever.   Respiratory:  Negative for cough.    Cardiovascular:  Negative for chest pain.   Gastrointestinal:  Positive for hematochezia. Negative for abdominal pain, anorexia, diarrhea, hematemesis, hemorrhoids, nausea, rectal pain and vomiting.   Genitourinary:  Negative for hematuria.   Skin:  Negative for rash.   Neurological:  Negative for headaches.   All other systems reviewed and are negative.      Physical Exam     Initial Vitals [05/06/24 1046]   BP Pulse Resp Temp SpO2   127/63 102 18 98.3 °F (36.8 °C) 98 %      MAP       --         Physical Exam    Vitals reviewed.  Constitutional: He appears well-developed and well-nourished.   Neck: Neck supple.   Cardiovascular:  Normal rate and regular rhythm.           Pulmonary/Chest: Breath sounds normal.   Abdominal: Abdomen is soft. Bowel sounds are normal. He exhibits no distension and no mass. There is no abdominal tenderness. There is no rebound and no guarding.   Genitourinary: Rectum:      External hemorrhoid present.      Genitourinary Comments: Chaperone:  Jeanne Boucher CNA  No active bleeding       Musculoskeletal:         General: Normal range of  motion.      Cervical back: Neck supple.     Neurological: He is alert and oriented to person, place, and time. He has normal strength. GCS score is 15. GCS eye subscore is 4. GCS verbal subscore is 5. GCS motor subscore is 6.   Skin: Skin is warm and dry. Capillary refill takes less than 2 seconds.   Psychiatric: He has a normal mood and affect.         Medical Screening Exam   See Full Note    ED Course   Procedures  Labs Reviewed   COMPREHENSIVE METABOLIC PANEL - Abnormal; Notable for the following components:       Result Value    Potassium 3.2 (*)     Chloride 94 (*)     Glucose 134 (*)     Total Protein 8.6 (*)     Globulin 4.6 (*)     Alk Phos 191 (*)     All other components within normal limits   CBC WITH DIFFERENTIAL - Abnormal; Notable for the following components:    MCH 25.8 (*)     MCHC 31.8 (*)     RDW 18.9 (*)     MPV 9.3 (*)     Neutrophils % 76.1 (*)     Lymphocytes % 14.9 (*)     Monocytes % 6.9 (*)     All other components within normal limits   PROTIME-INR - Normal   APTT - Normal   CBC W/ AUTO DIFFERENTIAL    Narrative:     The following orders were created for panel order CBC auto differential.  Procedure                               Abnormality         Status                     ---------                               -----------         ------                     CBC with Differential[[id number]]       Abnormal            Final result                 Please view results for these tests on the individual orders.   EXTRA TUBES    Narrative:     The following orders were created for panel order EXTRA TUBES.  Procedure                               Abnormality         Status                     ---------                               -----------         ------                     Light Green Top Hold[[id number]]                            In process                 Gold Top Hold[[id number]]                                   In process                   Please view results for these tests on the  individual orders.   LIGHT GREEN TOP HOLD   GOLD TOP HOLD          Imaging Results    None          Medications   potassium chloride SA CR tablet 40 mEq (has no administration in time range)     Medical Decision Making  53-year-old male presents to the emergency department to be evaluated because he saw a small amount of bright red blood in his stool earlier this morning.  Denies any abdominal pain, fever, chills, shortness of breath, fatigue.  Labs ordered and reviewed  Patient has had no bleeding in the emergency department  Potassium administered  I instructed the patient to take his potassium as directed and follow-up with his primary care provider to have his potassium rechecked  Diagnosis: Hypokalemia, external hemorrhoids  Prescribed hydrocortisone cream    Amount and/or Complexity of Data Reviewed  Labs: ordered.    Risk  Prescription drug management.                                      Clinical Impression:   Final diagnoses:  [K64.9] Hemorrhoids, unspecified hemorrhoid type (Primary)  [E87.6] Hypokalemia        ED Disposition Condition    Discharge Stable          ED Prescriptions       Medication Sig Dispense Start Date End Date Auth. Provider    hydrocortisone 1 % cream Apply topically 2 (two) times daily. 15 g 5/6/2024 -- Matilda Cowart FNP          Follow-up Information    None          Matilda Cowart FNP  05/06/24 5284

## 2024-05-17 LAB
OHS QRS DURATION: 102 MS
OHS QTC CALCULATION: 462 MS

## 2024-05-21 LAB
OHS QRS DURATION: 98 MS
OHS QTC CALCULATION: 430 MS

## 2024-05-29 ENCOUNTER — EXTERNAL HOME HEALTH (OUTPATIENT)
Dept: HOME HEALTH SERVICES | Facility: HOSPITAL | Age: 54
End: 2024-05-29
Payer: MEDICAID

## 2024-06-17 ENCOUNTER — OFFICE VISIT (OUTPATIENT)
Dept: CARDIOLOGY | Facility: CLINIC | Age: 54
End: 2024-06-17
Payer: MEDICAID

## 2024-06-17 VITALS — DIASTOLIC BLOOD PRESSURE: 68 MMHG | OXYGEN SATURATION: 98 % | HEART RATE: 95 BPM | SYSTOLIC BLOOD PRESSURE: 116 MMHG

## 2024-06-17 DIAGNOSIS — I50.9 CONGESTIVE HEART FAILURE, UNSPECIFIED HF CHRONICITY, UNSPECIFIED HEART FAILURE TYPE: Primary | ICD-10-CM

## 2024-06-17 DIAGNOSIS — I47.29 NSVT (NONSUSTAINED VENTRICULAR TACHYCARDIA): ICD-10-CM

## 2024-06-17 DIAGNOSIS — L03.115 CELLULITIS OF RIGHT LOWER EXTREMITY: ICD-10-CM

## 2024-06-17 DIAGNOSIS — I25.810 CORONARY ARTERY DISEASE INVOLVING CORONARY BYPASS GRAFT OF NATIVE HEART WITHOUT ANGINA PECTORIS: ICD-10-CM

## 2024-06-17 PROCEDURE — 4010F ACE/ARB THERAPY RXD/TAKEN: CPT | Mod: CPTII,,, | Performed by: NURSE PRACTITIONER

## 2024-06-17 PROCEDURE — 3078F DIAST BP <80 MM HG: CPT | Mod: CPTII,,, | Performed by: NURSE PRACTITIONER

## 2024-06-17 PROCEDURE — 93010 ELECTROCARDIOGRAM REPORT: CPT | Mod: S$PBB,,, | Performed by: STUDENT IN AN ORGANIZED HEALTH CARE EDUCATION/TRAINING PROGRAM

## 2024-06-17 PROCEDURE — 99999 PR PBB SHADOW E&M-EST. PATIENT-LVL IV: CPT | Mod: PBBFAC,,, | Performed by: NURSE PRACTITIONER

## 2024-06-17 PROCEDURE — 93005 ELECTROCARDIOGRAM TRACING: CPT | Mod: PBBFAC | Performed by: STUDENT IN AN ORGANIZED HEALTH CARE EDUCATION/TRAINING PROGRAM

## 2024-06-17 PROCEDURE — 3044F HG A1C LEVEL LT 7.0%: CPT | Mod: CPTII,,, | Performed by: NURSE PRACTITIONER

## 2024-06-17 PROCEDURE — 99214 OFFICE O/P EST MOD 30 MIN: CPT | Mod: PBBFAC | Performed by: NURSE PRACTITIONER

## 2024-06-17 PROCEDURE — 1159F MED LIST DOCD IN RCRD: CPT | Mod: CPTII,,, | Performed by: NURSE PRACTITIONER

## 2024-06-17 PROCEDURE — 99214 OFFICE O/P EST MOD 30 MIN: CPT | Mod: S$PBB,,, | Performed by: NURSE PRACTITIONER

## 2024-06-17 PROCEDURE — 3074F SYST BP LT 130 MM HG: CPT | Mod: CPTII,,, | Performed by: NURSE PRACTITIONER

## 2024-06-17 RX ORDER — DOXYCYCLINE 100 MG/1
100 CAPSULE ORAL 2 TIMES DAILY
Qty: 28 CAPSULE | Refills: 0 | Status: SHIPPED | OUTPATIENT
Start: 2024-06-17 | End: 2024-07-01

## 2024-06-17 NOTE — PROGRESS NOTES
PCP: Walker Smith MD    Referring Provider:     Subjective:   Karin Carrera is a 53 y.o. male with hx of CAD s/p CABG, ischemic cardiomyopathy (EF 20%), CVA, with expressive aphasia and right arm weakness,  HTN, GERD, ETOH abuse, and nicotine abuse who presents for follow up    6/17/24 - RLE is swollen and red. Appears to be cellulitis. Otherwise, pt is doing well. Denies chest pain, SOB, orthopnea or PND. Pt states he is taking his medication as  prescribed.     4/16/24 - Patient with recent back to back hospital admission for CHF and cholecystitis. S/p cholecystectomy. Also underwent IV diuresis with lasix gtt. He was discharged GDMT for discharge - entresto 24/mg bid, digoxin mcg qd, jardiance 10mg qd, torsemide 20mg bid, amiodarone 200mg qd     11/16/23 - Doing well. Denies any complaints. Medication list updated (brought med labels).      5/8/23 - Patient is doing well. Appears compensated. Has aphasia from prior CVA. Did not bring his meds. Reports taking 9 pills and does not want to start any new pills.     2/2023- Previously a patient of Dr. Paez; last seen 2015 - lost to follow up.    Patient was admitted to the hospital in 12/2022 for decompensated HF and was discharged after IV diuresis  Today he is here in a wheelchair; has expressive aphasia.   He lives in a NH; denies any symptoms. He does not want to start any new medications. Expresses disappointment and frustration  I explained to him that he needs to be on HF GDMT if he wants to avoid further hospital admissions.     Fhx: non-contributary  Shx: Smoker, ETOH use *3-4 drinks /day, unknown drug use    EKG 2/7/23 - Sinus tachy with PVC, LVH, non-spec ST abnol  ECHO - 01/01/22  · The left ventricle is severely enlarged with mild eccentric hypertrophy and severely decreased systolic function.  · The estimated ejection fraction is 20%.  · There are segmental left ventricular wall motion abnormalities.  · Left ventricular diastolic dysfunction.  ·  Atrial fibrillation not observed.  · Normal right ventricular size.  · Mild mitral regurgitation.  · Normal central venous pressure (3 mmHg).    [facility] 2017 - Severe 2VCAD - occuled LAD (LIMA is atretic as well as LAD), LCX patent, RCA  with patent SVG to PDA and patent SVG to Diagonal      Lab Results   Component Value Date     2024    K 3.2 (L) 2024    CL 94 (L) 2024    CO2 32 2024    BUN 13 2024    CREATININE 1.05 2024    CALCIUM 9.5 2024    ANIONGAP 13 2024    EGFRNONAA 126 2022       Lab Results   Component Value Date    CHOL 106 2024     Lab Results   Component Value Date    HDL 35 (L) 2024     Lab Results   Component Value Date    LDLCALC 53 2024     Lab Results   Component Value Date    TRIG 89 2024     Lab Results   Component Value Date    CHOLHDL 3.0 2024       Lab Results   Component Value Date    WBC 6.84 2024    HGB 14.9 2024    HCT 46.8 2024    MCV 81.0 2024     2024           Current Outpatient Medications:     albuterol (PROVENTIL/VENTOLIN HFA) 90 mcg/actuation inhaler, SMARTSI-2 Puff(s) By Mouth Every 4 Hours PRN, Disp: 18 g, Rfl: 6    amiodarone (PACERONE) 200 MG Tab, Take 1 tablet (200 mg total) by mouth once daily., Disp: 30 tablet, Rfl: 11    amLODIPine (NORVASC) 10 MG tablet, Take 10 mg by mouth once daily., Disp: , Rfl:     aspirin (ECOTRIN) 81 MG EC tablet, Take 1 tablet (81 mg total) by mouth once daily., Disp: 90 tablet, Rfl: 1    atorvastatin (LIPITOR) 40 MG tablet, Take 1 tablet (40 mg total) by mouth every evening., Disp: 90 tablet, Rfl: 1    budesonide-formoterol 160-4.5 mcg (SYMBICORT) 160-4.5 mcg/actuation HFAA, Inhale 2 puffs into the lungs every 12 (twelve) hours. Controller (Patient not taking: Reported on 2024), Disp: 1 g, Rfl: 0    digoxin (LANOXIN) 125 mcg tablet, Take 1 tablet (0.125 mg total) by mouth once daily., Disp: 30 tablet, Rfl: 11     docusate sodium (COLACE) 100 MG capsule, Take 1 capsule (100 mg total) by mouth 2 (two) times daily., Disp: 28 capsule, Rfl: 0    empagliflozin (JARDIANCE) 10 mg tablet, Take 1 tablet (10 mg total) by mouth once daily., Disp: 30 tablet, Rfl: 1    furosemide (LASIX) 40 MG tablet, Take 1 tablet (40 mg total) by mouth daily as needed (for swelling)., Disp: 30 tablet, Rfl: 1    hydrocortisone 1 % cream, Apply topically 2 (two) times daily., Disp: 15 g, Rfl: 0    insulin aspart U-100 (NOVOLOG) 100 unit/mL injection, Inject 0-5 Units into the skin 3 (three) times daily before meals., Disp: 10 mL, Rfl: 1    insulin detemir U-100, Levemir, (LEVEMIR FLEXTOUCH U100 INSULIN) 100 unit/mL (3 mL) InPn pen, Inject 10 Units into the skin every evening., Disp: 36 mL, Rfl: 0    metoprolol succinate (TOPROL-XL) 25 MG 24 hr tablet, Take 0.5 tablets (12.5 mg total) by mouth once daily. Take 1/2 tablet by mouth daily, Disp: 30 tablet, Rfl: 1    oxyCODONE (ROXICODONE) 5 MG immediate release tablet, Take 1 tablet (5 mg total) by mouth every 6 (six) hours as needed for Pain., Disp: 12 tablet, Rfl: 0    pantoprazole (PROTONIX) 40 MG tablet, Take 1 tablet (40 mg total) by mouth once daily., Disp: 90 tablet, Rfl: 1    potassium chloride (KLOR-CON) 10 MEQ TbSR, Take 10 mEq by mouth once daily., Disp: , Rfl:     promethazine (PHENERGAN) 25 MG tablet, Take 1 tablet (25 mg total) by mouth every 6 (six) hours as needed for Nausea., Disp: 15 tablet, Rfl: 0    sacubitriL-valsartan (ENTRESTO) 49-51 mg per tablet, Take 1 tablet by mouth 2 (two) times daily., Disp: 180 tablet, Rfl: 3    THERMOTABS 287-180-15 mg Tab, Take 1 tablet by mouth once daily., Disp: 90 tablet, Rfl: 0    torsemide (DEMADEX) 20 MG Tab, Take 1 tablet (20 mg total) by mouth 2 (two) times a day., Disp: 60 tablet, Rfl: 11    traZODone (DESYREL) 50 MG tablet, Take 2 tablets (100 mg total) by mouth nightly as needed for Insomnia., Disp: 60 tablet, Rfl: 0    TRUE METRIX GLUCOSE TEST  STRIP Strp, USE TO CHECK BLOOD SUGAR AS DIRECTED, Disp: 200 each, Rfl: 1    TRUEPLUS LANCETS 33 gauge Misc, 1 lancet  by Misc.(Non-Drug; Combo Route) route once daily. use as directed, Disp: 200 each, Rfl: 2    zolpidem (AMBIEN) 5 MG Tab, Take 1 tablet (5 mg total) by mouth nightly as needed (for insomnia)., Disp: 30 tablet, Rfl: 1    Review of Systems   Respiratory:  Negative for cough and shortness of breath.    Cardiovascular:  Negative for chest pain, palpitations, orthopnea, claudication, leg swelling and PND.   Skin:         Redness to RLE         Objective:   There were no vitals taken for this visit.    Physical Exam  Vitals reviewed.   Constitutional:       General: He is not in acute distress.     Appearance: Normal appearance.   Cardiovascular:      Rate and Rhythm: Normal rate and regular rhythm.      Pulses: Normal pulses.      Heart sounds: Normal heart sounds.   Pulmonary:      Breath sounds: Normal breath sounds.   Musculoskeletal:      Comments: Swelling and redness to RLE   Neurological:      Mental Status: He is alert and oriented to person, place, and time. Mental status is at baseline.           Assessment:     No diagnosis found.          Plan:   Coronary artery disease involving coronary bypass graft of native heart without angina pectoris  Severe 2VCAD - occuled LAD (LIMA is atretic as well as LAD), LCX patent, RCA  with patent SVG to PDA and patent SVG to Diagonal  - On aspirin and statin therapy  - LAD is atretic as well as LIMA     End stage heart failure  Ischemic cardiomyopathy; EF 20%, NYHA III , Stage C/D  GDMT - increase entresto 49/51mg qd, digoxin 0.125mcg, jardiance 10mg qd  Euvolemic on torsemide 20mg bid  Patient refused ICD        NSVT (nonsustained ventricular tachycardia)  On amio 200m gqd    Cellulitis of right lower extremity  Swelling and redness to RLE suspicious for cellulitis  Start doxycycline 100mg bid x 14 days    Follow up with Dr. Ceron in 3  months

## 2024-06-17 NOTE — ASSESSMENT & PLAN NOTE
- swelling and redness to RLE suspicious for cellulitis  - start doxycycline 100mg bid x 14 days

## 2024-06-18 ENCOUNTER — TELEPHONE (OUTPATIENT)
Dept: CARDIOLOGY | Facility: CLINIC | Age: 54
End: 2024-06-18
Payer: MEDICAID

## 2024-06-18 NOTE — TELEPHONE ENCOUNTER
----- Message from ISADORA Garcia sent at 6/18/2024  9:51 AM CDT -----  Please call and let him know his labs are good. Thank you!! :)

## 2024-06-21 LAB
OHS QRS DURATION: 102 MS
OHS QTC CALCULATION: 442 MS

## 2024-06-24 PROBLEM — J96.01 ACUTE HYPOXEMIC RESPIRATORY FAILURE: Status: RESOLVED | Noted: 2024-03-20 | Resolved: 2024-06-24

## 2024-07-01 PROBLEM — A41.9 SEPSIS WITHOUT ACUTE ORGAN DYSFUNCTION: Status: RESOLVED | Noted: 2024-03-20 | Resolved: 2024-07-01

## 2024-07-02 RX ORDER — ZOLPIDEM TARTRATE 5 MG/1
TABLET ORAL
Qty: 30 TABLET | Refills: 1 | Status: SHIPPED | OUTPATIENT
Start: 2024-07-02

## 2024-07-02 RX ORDER — FUROSEMIDE 40 MG/1
40 TABLET ORAL
Qty: 30 TABLET | Refills: 1 | Status: SHIPPED | OUTPATIENT
Start: 2024-07-02

## 2024-07-08 RX ORDER — ONDANSETRON 4 MG/1
4 TABLET, FILM COATED ORAL DAILY PRN
Qty: 30 TABLET | Refills: 0 | Status: SHIPPED | OUTPATIENT
Start: 2024-07-08

## 2024-07-17 ENCOUNTER — OFFICE VISIT (OUTPATIENT)
Dept: FAMILY MEDICINE | Facility: CLINIC | Age: 54
End: 2024-07-17
Payer: MEDICAID

## 2024-07-17 ENCOUNTER — APPOINTMENT (OUTPATIENT)
Dept: RADIOLOGY | Facility: CLINIC | Age: 54
End: 2024-07-17
Attending: INTERNAL MEDICINE
Payer: MEDICAID

## 2024-07-17 VITALS
OXYGEN SATURATION: 97 % | RESPIRATION RATE: 18 BRPM | SYSTOLIC BLOOD PRESSURE: 122 MMHG | HEIGHT: 72 IN | DIASTOLIC BLOOD PRESSURE: 77 MMHG | HEART RATE: 96 BPM | BODY MASS INDEX: 25.09 KG/M2 | TEMPERATURE: 98 F

## 2024-07-17 DIAGNOSIS — M79.671 RIGHT FOOT PAIN: ICD-10-CM

## 2024-07-17 DIAGNOSIS — M79.671 RIGHT FOOT PAIN: Primary | ICD-10-CM

## 2024-07-17 DIAGNOSIS — Z12.11 SCREENING FOR COLON CANCER: ICD-10-CM

## 2024-07-17 DIAGNOSIS — E13.9 DIABETES MELLITUS OF OTHER TYPE WITHOUT COMPLICATION, UNSPECIFIED WHETHER LONG TERM INSULIN USE: ICD-10-CM

## 2024-07-17 LAB
CREAT UR-MCNC: 133 MG/DL (ref 39–259)
HCV AB SER QL: NORMAL
MICROALBUMIN UR-MCNC: 2 MG/DL (ref 0–2.8)
MICROALBUMIN/CREAT RATIO PNL UR: 15 MG/G (ref 0–30)
PROT UR-MCNC: 24.3 MG/DL (ref 0–11.9)

## 2024-07-17 PROCEDURE — 3008F BODY MASS INDEX DOCD: CPT | Mod: CPTII,,, | Performed by: INTERNAL MEDICINE

## 2024-07-17 PROCEDURE — 3061F NEG MICROALBUMINURIA REV: CPT | Mod: CPTII,,, | Performed by: INTERNAL MEDICINE

## 2024-07-17 PROCEDURE — 1159F MED LIST DOCD IN RCRD: CPT | Mod: CPTII,,, | Performed by: INTERNAL MEDICINE

## 2024-07-17 PROCEDURE — 3066F NEPHROPATHY DOC TX: CPT | Mod: CPTII,,, | Performed by: INTERNAL MEDICINE

## 2024-07-17 PROCEDURE — 82043 UR ALBUMIN QUANTITATIVE: CPT | Mod: ,,, | Performed by: CLINICAL MEDICAL LABORATORY

## 2024-07-17 PROCEDURE — 3078F DIAST BP <80 MM HG: CPT | Mod: CPTII,,, | Performed by: INTERNAL MEDICINE

## 2024-07-17 PROCEDURE — 3044F HG A1C LEVEL LT 7.0%: CPT | Mod: CPTII,,, | Performed by: INTERNAL MEDICINE

## 2024-07-17 PROCEDURE — 86803 HEPATITIS C AB TEST: CPT | Mod: ,,, | Performed by: CLINICAL MEDICAL LABORATORY

## 2024-07-17 PROCEDURE — 73600 X-RAY EXAM OF ANKLE: CPT | Mod: TC,RHCUB,RT | Performed by: INTERNAL MEDICINE

## 2024-07-17 PROCEDURE — 4010F ACE/ARB THERAPY RXD/TAKEN: CPT | Mod: CPTII,,, | Performed by: INTERNAL MEDICINE

## 2024-07-17 PROCEDURE — 96372 THER/PROPH/DIAG INJ SC/IM: CPT | Mod: ,,, | Performed by: INTERNAL MEDICINE

## 2024-07-17 PROCEDURE — 82570 ASSAY OF URINE CREATININE: CPT | Mod: ,,, | Performed by: CLINICAL MEDICAL LABORATORY

## 2024-07-17 PROCEDURE — 73620 X-RAY EXAM OF FOOT: CPT | Mod: TC,RHCUB,RT | Performed by: INTERNAL MEDICINE

## 2024-07-17 PROCEDURE — 84156 ASSAY OF PROTEIN URINE: CPT | Mod: ,,, | Performed by: CLINICAL MEDICAL LABORATORY

## 2024-07-17 PROCEDURE — 99214 OFFICE O/P EST MOD 30 MIN: CPT | Mod: 25,,, | Performed by: INTERNAL MEDICINE

## 2024-07-17 PROCEDURE — 3074F SYST BP LT 130 MM HG: CPT | Mod: CPTII,,, | Performed by: INTERNAL MEDICINE

## 2024-07-17 RX ORDER — KETOROLAC TROMETHAMINE 30 MG/ML
15 INJECTION, SOLUTION INTRAMUSCULAR; INTRAVENOUS
Status: COMPLETED | OUTPATIENT
Start: 2024-07-17 | End: 2024-07-17

## 2024-07-17 RX ORDER — ONDANSETRON 4 MG/1
4 TABLET, FILM COATED ORAL DAILY PRN
Qty: 30 TABLET | Refills: 0 | Status: SHIPPED | OUTPATIENT
Start: 2024-07-17

## 2024-07-17 RX ADMIN — KETOROLAC TROMETHAMINE 15 MG: 30 INJECTION, SOLUTION INTRAMUSCULAR; INTRAVENOUS at 10:07

## 2024-07-18 ENCOUNTER — TELEPHONE (OUTPATIENT)
Dept: FAMILY MEDICINE | Facility: CLINIC | Age: 54
End: 2024-07-18
Payer: MEDICAID

## 2024-07-18 NOTE — TELEPHONE ENCOUNTER
----- Message from Walker Smith MD sent at 7/17/2024  5:11 PM CDT -----  Need to see in  1 week please  abnl results     0832 Pt is scheduled for 08/28/24

## 2024-07-22 PROBLEM — M79.671 RIGHT FOOT PAIN: Status: ACTIVE | Noted: 2024-07-22

## 2024-07-22 PROBLEM — Z12.11 SCREENING FOR COLON CANCER: Status: ACTIVE | Noted: 2024-07-22

## 2024-07-22 NOTE — PROGRESS NOTES
Subjective:       Patient ID: Karin Carrera is a 53 y.o. male.    Chief Complaint: Congestive Heart Failure (Follow up for CHF) and Health Maintenance (Pt will have diabetes urine screening and hep c screening done today; will schedule foot exam for pt; colonoscopy will also be scheduled for pt. )    HPI  .  Patient has a history of a CVA with expressive aphasia.  Patient also is wheelchair-bound.  Patient does complain of chronic nausea he complains of moderate pain in the right foot in the right ankle.  Patient also has history of chronic hypertension.  He rates the pain a 7 out of 10 in his right leg.  He also has type 2 diabetes mellitus requiring Levemir.  Refill Levemir and also the Zofran nausea    Current Medications:    Current Outpatient Medications:     albuterol (PROVENTIL/VENTOLIN HFA) 90 mcg/actuation inhaler, SMARTSI-2 Puff(s) By Mouth Every 4 Hours PRN, Disp: 18 g, Rfl: 6    amiodarone (PACERONE) 200 MG Tab, Take 1 tablet (200 mg total) by mouth once daily., Disp: 30 tablet, Rfl: 11    amLODIPine (NORVASC) 10 MG tablet, Take 10 mg by mouth once daily., Disp: , Rfl:     aspirin (ECOTRIN) 81 MG EC tablet, Take 1 tablet (81 mg total) by mouth once daily., Disp: 90 tablet, Rfl: 1    atorvastatin (LIPITOR) 40 MG tablet, Take 1 tablet (40 mg total) by mouth every evening., Disp: 90 tablet, Rfl: 1    digoxin (LANOXIN) 125 mcg tablet, Take 1 tablet (0.125 mg total) by mouth once daily., Disp: 30 tablet, Rfl: 11    empagliflozin (JARDIANCE) 10 mg tablet, Take 1 tablet (10 mg total) by mouth once daily., Disp: 30 tablet, Rfl: 1    furosemide (LASIX) 40 MG tablet, TAKE ONE TABLET BY MOUTH DAILY AS NEEDED FOR SWELLING, Disp: 30 tablet, Rfl: 1    insulin detemir U-100, Levemir, (LEVEMIR FLEXTOUCH U100 INSULIN) 100 unit/mL (3 mL) InPn pen, Inject 10 Units into the skin every evening., Disp: 36 mL, Rfl: 0    metoprolol succinate (TOPROL-XL) 25 MG 24 hr tablet, Take 0.5 tablets (12.5 mg total) by mouth once  daily. Take 1/2 tablet by mouth daily, Disp: 30 tablet, Rfl: 1    pantoprazole (PROTONIX) 40 MG tablet, Take 1 tablet (40 mg total) by mouth once daily., Disp: 90 tablet, Rfl: 1    potassium chloride (KLOR-CON) 10 MEQ TbSR, Take 10 mEq by mouth once daily., Disp: , Rfl:     sacubitriL-valsartan (ENTRESTO) 49-51 mg per tablet, Take 1 tablet by mouth 2 (two) times daily., Disp: 180 tablet, Rfl: 3    torsemide (DEMADEX) 20 MG Tab, Take 1 tablet (20 mg total) by mouth 2 (two) times a day., Disp: 60 tablet, Rfl: 11    traZODone (DESYREL) 50 MG tablet, Take 2 tablets (100 mg total) by mouth nightly as needed for Insomnia., Disp: 60 tablet, Rfl: 0    TRUE METRIX GLUCOSE TEST STRIP Strp, USE TO CHECK BLOOD SUGAR AS DIRECTED, Disp: 200 each, Rfl: 1    TRUEPLUS LANCETS 33 gauge Misc, 1 lancet  by Misc.(Non-Drug; Combo Route) route once daily. use as directed, Disp: 200 each, Rfl: 2    zolpidem (AMBIEN) 5 MG Tab, TAKE ONE TABLET BY MOUTH AT NIGHT AS NEEDED FOR INSOMNIA, Disp: 30 tablet, Rfl: 1    ondansetron (ZOFRAN) 4 MG tablet, Take 1 tablet (4 mg total) by mouth daily as needed for Nausea., Disp: 30 tablet, Rfl: 0           ROS  Twelve point system reviewed, unremarkable except for stated above in HPI.        Objective:         Vitals:    07/17/24 0912   BP: 122/77   BP Location: Left arm   Patient Position: Sitting   BP Method: Large (Automatic)   Pulse: 96   Resp: 18   Temp: 97.8 °F (36.6 °C)   TempSrc: Tympanic   SpO2: 97%   Height: 6' (1.829 m)        Physical Exam     Patient is awake alert oriented person place and  Lungs are clear to auscultation bilaterally no crackles or wheezes   Cardiovascular S1-S2 regular rate and rhythm no murmurs rubs or gallops   Abdomen is soft positive bowel sounds nontender, extremities no clubbing cyanosis edema  Neuro no focal neurological deficits  Skin warm and dry.     Last Labs:     Office Visit on 07/17/2024   Component Date Value    Creatinine, Urine 07/17/2024 133      Microalbumin 07/17/2024 2.0     Microalbumin/Creatinine * 07/17/2024 15.0     Protein, Urine 07/17/2024 24.3 (H)     Hepatitis C Ab 07/17/2024 Non-Reactive        Last Imaging:  X-Ray Foot 2 View Right  Narrative: EXAMINATION:  XR ANKLE 2 VIEW RIGHT; XR FOOT 2 VIEW RIGHT    CLINICAL HISTORY:  Pain in right foot    COMPARISON:  20 May 2019    TECHNIQUE:  XR ANKLE 2 VIEW RIGHT; XR FOOT 2 VIEW RIGHT    FINDINGS:  No evidence of fracture seen.  The alignment of the joints appears normal.  Mild ankle, midfoot and 1st metatarsophalangeal joint degenerative change is present.  No soft tissue abnormality is seen.  Impression: Osteoarthrosis as described above.    Electronically signed by: Addison Garzon  Date:    07/17/2024  Time:    10:37  X-Ray Ankle 2 View Right  Narrative: EXAMINATION:  XR ANKLE 2 VIEW RIGHT; XR FOOT 2 VIEW RIGHT    CLINICAL HISTORY:  Pain in right foot    COMPARISON:  20 May 2019    TECHNIQUE:  XR ANKLE 2 VIEW RIGHT; XR FOOT 2 VIEW RIGHT    FINDINGS:  No evidence of fracture seen.  The alignment of the joints appears normal.  Mild ankle, midfoot and 1st metatarsophalangeal joint degenerative change is present.  No soft tissue abnormality is seen.  Impression: Osteoarthrosis as described above.    Electronically signed by: Addison Garzon  Date:    07/17/2024  Time:    10:37         **Labs and x-rays personally reviewed by me    ** reviewed           Assessment & Plan:       1. Right foot pain  -     ketorolac injection 15 mg  -     X-Ray Foot 2 View Right; Future; Expected date: 07/17/2024  -     X-Ray Ankle 2 View Right; Future; Expected date: 07/17/2024    2. Diabetes mellitus of other type without complication, unspecified whether long term insulin use  -     Microalbumin/Creatinine Ratio, Urine; Future; Expected date: 07/17/2024  -     Protein, Random Urine; Future; Expected date: 07/17/2024  -     Hepatitis C Antibody; Future; Expected date: 07/17/2024  -     Ambulatory referral/consult to  Podiatry; Future; Expected date: 07/24/2024    3. Screening for colon cancer  -     Colonoscopy; Future; Expected date: 07/17/2024    Other orders  -     ondansetron (ZOFRAN) 4 MG tablet; Take 1 tablet (4 mg total) by mouth daily as needed for Nausea.  Dispense: 30 tablet; Refill: 0            Walker Smith MD

## 2024-08-12 ENCOUNTER — EXTERNAL HOME HEALTH (OUTPATIENT)
Dept: HOME HEALTH SERVICES | Facility: HOSPITAL | Age: 54
End: 2024-08-12
Payer: MEDICAID

## 2024-08-28 ENCOUNTER — OFFICE VISIT (OUTPATIENT)
Dept: FAMILY MEDICINE | Facility: CLINIC | Age: 54
End: 2024-08-28
Payer: MEDICAID

## 2024-08-28 VITALS
HEIGHT: 72 IN | RESPIRATION RATE: 18 BRPM | HEART RATE: 95 BPM | TEMPERATURE: 98 F | SYSTOLIC BLOOD PRESSURE: 103 MMHG | BODY MASS INDEX: 25.09 KG/M2 | OXYGEN SATURATION: 97 % | DIASTOLIC BLOOD PRESSURE: 71 MMHG

## 2024-08-28 DIAGNOSIS — R11.0 NAUSEA: ICD-10-CM

## 2024-08-28 DIAGNOSIS — I10 ESSENTIAL (PRIMARY) HYPERTENSION: Primary | ICD-10-CM

## 2024-08-28 PROCEDURE — 3008F BODY MASS INDEX DOCD: CPT | Mod: CPTII,,, | Performed by: INTERNAL MEDICINE

## 2024-08-28 PROCEDURE — 4010F ACE/ARB THERAPY RXD/TAKEN: CPT | Mod: CPTII,,, | Performed by: INTERNAL MEDICINE

## 2024-08-28 PROCEDURE — 3078F DIAST BP <80 MM HG: CPT | Mod: CPTII,,, | Performed by: INTERNAL MEDICINE

## 2024-08-28 PROCEDURE — 3061F NEG MICROALBUMINURIA REV: CPT | Mod: CPTII,,, | Performed by: INTERNAL MEDICINE

## 2024-08-28 PROCEDURE — 1159F MED LIST DOCD IN RCRD: CPT | Mod: CPTII,,, | Performed by: INTERNAL MEDICINE

## 2024-08-28 PROCEDURE — 3074F SYST BP LT 130 MM HG: CPT | Mod: CPTII,,, | Performed by: INTERNAL MEDICINE

## 2024-08-28 PROCEDURE — 99213 OFFICE O/P EST LOW 20 MIN: CPT | Mod: ,,, | Performed by: INTERNAL MEDICINE

## 2024-08-28 PROCEDURE — 3044F HG A1C LEVEL LT 7.0%: CPT | Mod: CPTII,,, | Performed by: INTERNAL MEDICINE

## 2024-08-28 PROCEDURE — 3066F NEPHROPATHY DOC TX: CPT | Mod: CPTII,,, | Performed by: INTERNAL MEDICINE

## 2024-08-28 RX ORDER — ONDANSETRON 4 MG/1
4 TABLET, FILM COATED ORAL DAILY PRN
Qty: 60 TABLET | Refills: 6 | Status: SHIPPED | OUTPATIENT
Start: 2024-08-28

## 2024-09-02 PROBLEM — R11.0 NAUSEA: Status: ACTIVE | Noted: 2024-09-02

## 2024-09-02 NOTE — PROGRESS NOTES
Subjective:       Patient ID: Karin Carrera is a 53 y.o. male.    Chief Complaint: Foot Pain (3 month fu ) and Health Maintenance (Care gaps UTD )    HPI  .  Patient presents with chronic hypertension blood pressure is stable he is also complain of moderate to severe nausea recess and he is requesting Zofran  Current Medications:    Current Outpatient Medications:     albuterol (PROVENTIL/VENTOLIN HFA) 90 mcg/actuation inhaler, SMARTSI-2 Puff(s) By Mouth Every 4 Hours PRN, Disp: 18 g, Rfl: 6    amiodarone (PACERONE) 200 MG Tab, Take 1 tablet (200 mg total) by mouth once daily., Disp: 30 tablet, Rfl: 11    amLODIPine (NORVASC) 10 MG tablet, Take 10 mg by mouth once daily., Disp: , Rfl:     aspirin (ECOTRIN) 81 MG EC tablet, Take 1 tablet (81 mg total) by mouth once daily., Disp: 90 tablet, Rfl: 1    atorvastatin (LIPITOR) 40 MG tablet, Take 1 tablet (40 mg total) by mouth every evening., Disp: 90 tablet, Rfl: 1    digoxin (LANOXIN) 125 mcg tablet, Take 1 tablet (0.125 mg total) by mouth once daily., Disp: 30 tablet, Rfl: 11    empagliflozin (JARDIANCE) 10 mg tablet, Take 1 tablet (10 mg total) by mouth once daily., Disp: 30 tablet, Rfl: 1    furosemide (LASIX) 40 MG tablet, TAKE ONE TABLET BY MOUTH DAILY AS NEEDED FOR SWELLING, Disp: 30 tablet, Rfl: 1    insulin detemir U-100, Levemir, (LEVEMIR FLEXTOUCH U100 INSULIN) 100 unit/mL (3 mL) InPn pen, Inject 10 Units into the skin every evening., Disp: 36 mL, Rfl: 0    metoprolol succinate (TOPROL-XL) 25 MG 24 hr tablet, Take 0.5 tablets (12.5 mg total) by mouth once daily. Take 1/2 tablet by mouth daily, Disp: 30 tablet, Rfl: 1    pantoprazole (PROTONIX) 40 MG tablet, Take 1 tablet (40 mg total) by mouth once daily., Disp: 90 tablet, Rfl: 1    potassium chloride (KLOR-CON) 10 MEQ TbSR, Take 10 mEq by mouth once daily., Disp: , Rfl:     sacubitriL-valsartan (ENTRESTO) 49-51 mg per tablet, Take 1 tablet by mouth 2 (two) times daily., Disp: 180 tablet, Rfl: 3     torsemide (DEMADEX) 20 MG Tab, Take 1 tablet (20 mg total) by mouth 2 (two) times a day., Disp: 60 tablet, Rfl: 11    traZODone (DESYREL) 50 MG tablet, Take 2 tablets (100 mg total) by mouth nightly as needed for Insomnia., Disp: 60 tablet, Rfl: 0    TRUE METRIX GLUCOSE TEST STRIP Strp, USE TO CHECK BLOOD SUGAR AS DIRECTED, Disp: 200 each, Rfl: 1    TRUEPLUS LANCETS 33 gauge Misc, 1 lancet  by Misc.(Non-Drug; Combo Route) route once daily. use as directed, Disp: 200 each, Rfl: 2    zolpidem (AMBIEN) 5 MG Tab, TAKE ONE TABLET BY MOUTH AT NIGHT AS NEEDED FOR INSOMNIA, Disp: 30 tablet, Rfl: 1    ondansetron (ZOFRAN) 4 MG tablet, Take 1 tablet (4 mg total) by mouth daily as needed for Nausea., Disp: 60 tablet, Rfl: 6           ROS  Twelve point system reviewed, unremarkable except for stated above in HPI.        Objective:         Vitals:    08/28/24 0956   BP: 103/71   BP Location: Left arm   Patient Position: Sitting   BP Method: Large (Automatic)   Pulse: 95   Resp: 18   Temp: 97.5 °F (36.4 °C)   TempSrc: Temporal   SpO2: 97%   Weight: Comment: wheelchair   Height: 6' (1.829 m)        Physical Exam     Patient is awake alert oriented person place and  Lungs are clear to auscultation bilaterally no crackles or wheezes   Cardiovascular S1-S2 regular rate and rhythm no murmurs rubs or gallops   Abdomen is soft positive bowel sounds nontender, extremities no clubbing cyanosis edema  Neuro no focal neurological deficits  Skin warm and dry.     Last Labs:     No visits with results within 1 Month(s) from this visit.   Latest known visit with results is:   Office Visit on 07/17/2024   Component Date Value    Creatinine, Urine 07/17/2024 133     Microalbumin 07/17/2024 2.0     Microalbumin/Creatinine * 07/17/2024 15.0     Protein, Urine 07/17/2024 24.3 (H)     Hepatitis C Ab 07/17/2024 Non-Reactive        Last Imaging:  X-Ray Foot 2 View Right  Narrative: EXAMINATION:  XR ANKLE 2 VIEW RIGHT; XR FOOT 2 VIEW RIGHT    CLINICAL  HISTORY:  Pain in right foot    COMPARISON:  20 May 2019    TECHNIQUE:  XR ANKLE 2 VIEW RIGHT; XR FOOT 2 VIEW RIGHT    FINDINGS:  No evidence of fracture seen.  The alignment of the joints appears normal.  Mild ankle, midfoot and 1st metatarsophalangeal joint degenerative change is present.  No soft tissue abnormality is seen.  Impression: Osteoarthrosis as described above.    Electronically signed by: Addison Garzon  Date:    07/17/2024  Time:    10:37  X-Ray Ankle 2 View Right  Narrative: EXAMINATION:  XR ANKLE 2 VIEW RIGHT; XR FOOT 2 VIEW RIGHT    CLINICAL HISTORY:  Pain in right foot    COMPARISON:  20 May 2019    TECHNIQUE:  XR ANKLE 2 VIEW RIGHT; XR FOOT 2 VIEW RIGHT    FINDINGS:  No evidence of fracture seen.  The alignment of the joints appears normal.  Mild ankle, midfoot and 1st metatarsophalangeal joint degenerative change is present.  No soft tissue abnormality is seen.  Impression: Osteoarthrosis as described above.    Electronically signed by: Addison Garzon  Date:    07/17/2024  Time:    10:37         **Labs and x-rays personally reviewed by me    ** reviewed           Assessment & Plan:       1. Essential (primary) hypertension  BP has been stable  2. Nausea    Other orders  -     ondansetron (ZOFRAN) 4 MG tablet; Take 1 tablet (4 mg total) by mouth daily as needed for Nausea.  Dispense: 60 tablet; Refill: 6            Walker Smith MD

## 2024-10-07 RX ORDER — ATORVASTATIN CALCIUM 40 MG/1
40 TABLET, FILM COATED ORAL NIGHTLY
Qty: 90 TABLET | Refills: 1 | Status: SHIPPED | OUTPATIENT
Start: 2024-10-07

## 2024-10-08 RX ORDER — ZOLPIDEM TARTRATE 5 MG/1
TABLET ORAL
Qty: 30 TABLET | Refills: 1 | Status: SHIPPED | OUTPATIENT
Start: 2024-10-08

## 2024-10-20 NOTE — ASSESSMENT & PLAN NOTE
Acute hypoxic respiratory failure due to many etiologies :  Pulmonary edema 2/2 HFrEF  COPD  Pneumonia>consolidation  -now c/f pt developing ARDS  -cont broad spectrum iv abx  -cont aggressive diuersis  -cont inhalers  -oxygen support, currently on HFNC  -Pulmonary and crtical care team consulted, appreicate recs.   3/30 now euvolemic.  We will test for home oxygen  3/31-home oxygen eval, euvolemic  4/2 patient has been optimized still requiring supplemental oxygen.  We will discharge with home O2  Much improved.  PCP follow up   no

## 2024-10-21 RX ORDER — INSULIN DETEMIR 100 [IU]/ML
INJECTION, SOLUTION SUBCUTANEOUS
Qty: 36 ML | Refills: 0 | Status: SHIPPED | OUTPATIENT
Start: 2024-10-21

## 2024-11-09 ENCOUNTER — HOSPITAL ENCOUNTER (EMERGENCY)
Facility: HOSPITAL | Age: 54
Discharge: HOME | End: 2024-11-09
Payer: MEDICAID

## 2024-11-09 VITALS
WEIGHT: 199.94 LBS | RESPIRATION RATE: 17 BRPM | DIASTOLIC BLOOD PRESSURE: 65 MMHG | TEMPERATURE: 98 F | OXYGEN SATURATION: 97 % | HEART RATE: 82 BPM | BODY MASS INDEX: 27.12 KG/M2 | SYSTOLIC BLOOD PRESSURE: 103 MMHG

## 2024-11-09 DIAGNOSIS — R79.89 ELEVATED LACTIC ACID LEVEL: ICD-10-CM

## 2024-11-09 DIAGNOSIS — E87.6 HYPOKALEMIA: ICD-10-CM

## 2024-11-09 DIAGNOSIS — R11.2 NAUSEA AND VOMITING, UNSPECIFIED VOMITING TYPE: ICD-10-CM

## 2024-11-09 DIAGNOSIS — A08.4 VIRAL GASTROENTERITIS: Primary | ICD-10-CM

## 2024-11-09 DIAGNOSIS — R10.84 GENERALIZED ABDOMINAL PAIN: ICD-10-CM

## 2024-11-09 DIAGNOSIS — Z95.1 HX OF CABG: ICD-10-CM

## 2024-11-09 LAB
ALBUMIN SERPL BCP-MCNC: 3.9 G/DL (ref 3.5–5)
ALBUMIN/GLOB SERPL: 1 {RATIO}
ALP SERPL-CCNC: 128 U/L (ref 45–115)
ALT SERPL W P-5'-P-CCNC: 52 U/L (ref 16–61)
ANION GAP SERPL CALCULATED.3IONS-SCNC: 9 MMOL/L (ref 7–16)
AST SERPL W P-5'-P-CCNC: 46 U/L (ref 15–37)
BASOPHILS # BLD AUTO: 0.04 K/UL (ref 0–0.2)
BASOPHILS NFR BLD AUTO: 0.7 % (ref 0–1)
BILIRUB SERPL-MCNC: 0.9 MG/DL (ref ?–1.2)
BILIRUB UR QL STRIP: NEGATIVE
BUN SERPL-MCNC: 7 MG/DL (ref 7–18)
BUN/CREAT SERPL: 7 (ref 6–20)
CALCIUM SERPL-MCNC: 9 MG/DL (ref 8.5–10.1)
CHLORIDE SERPL-SCNC: 90 MMOL/L (ref 98–107)
CLARITY UR: CLEAR
CO2 SERPL-SCNC: 30 MMOL/L (ref 21–32)
COLOR UR: COLORLESS
CREAT SERPL-MCNC: 1 MG/DL (ref 0.7–1.3)
DIFFERENTIAL METHOD BLD: ABNORMAL
EGFR (NO RACE VARIABLE) (RUSH/TITUS): 90 ML/MIN/1.73M2
EOSINOPHIL # BLD AUTO: 0.01 K/UL (ref 0–0.5)
EOSINOPHIL NFR BLD AUTO: 0.2 % (ref 1–4)
ERYTHROCYTE [DISTWIDTH] IN BLOOD BY AUTOMATED COUNT: 13.5 % (ref 11.5–14.5)
GLOBULIN SER-MCNC: 3.9 G/DL (ref 2–4)
GLUCOSE SERPL-MCNC: 148 MG/DL (ref 74–106)
GLUCOSE SERPL-MCNC: 261 MG/DL (ref 70–105)
GLUCOSE UR STRIP-MCNC: >1000 MG/DL
HCT VFR BLD AUTO: 47.1 % (ref 40–54)
HGB BLD-MCNC: 16.3 G/DL (ref 13.5–18)
IMM GRANULOCYTES # BLD AUTO: 0.04 K/UL (ref 0–0.04)
IMM GRANULOCYTES NFR BLD: 0.7 % (ref 0–0.4)
KETONES UR STRIP-SCNC: NEGATIVE MG/DL
LACTATE SERPL-SCNC: 2 MMOL/L (ref 0.4–2)
LACTATE SERPL-SCNC: 3 MMOL/L (ref 0.4–2)
LEUKOCYTE ESTERASE UR QL STRIP: NEGATIVE
LIPASE SERPL-CCNC: 32 U/L (ref 16–77)
LYMPHOCYTES # BLD AUTO: 0.58 K/UL (ref 1–4.8)
LYMPHOCYTES NFR BLD AUTO: 10.5 % (ref 27–41)
MCH RBC QN AUTO: 29.6 PG (ref 27–31)
MCHC RBC AUTO-ENTMCNC: 34.6 G/DL (ref 32–36)
MCV RBC AUTO: 85.5 FL (ref 80–96)
MONOCYTES # BLD AUTO: 0.67 K/UL (ref 0–0.8)
MONOCYTES NFR BLD AUTO: 12.1 % (ref 2–6)
MPC BLD CALC-MCNC: 9.5 FL (ref 9.4–12.4)
NEUTROPHILS # BLD AUTO: 4.19 K/UL (ref 1.8–7.7)
NEUTROPHILS NFR BLD AUTO: 75.8 % (ref 53–65)
NITRITE UR QL STRIP: NEGATIVE
NRBC # BLD AUTO: 0 X10E3/UL
NRBC, AUTO (.00): 0 %
NT-PROBNP SERPL-MCNC: 784 PG/ML (ref 1–125)
PH UR STRIP: 6 PH UNITS
PLATELET # BLD AUTO: 225 K/UL (ref 150–400)
POTASSIUM SERPL-SCNC: 3.3 MMOL/L (ref 3.5–5.1)
PROT SERPL-MCNC: 7.8 G/DL (ref 6.4–8.2)
PROT UR QL STRIP: NEGATIVE
RBC # BLD AUTO: 5.51 M/UL (ref 4.6–6.2)
RBC # UR STRIP: NEGATIVE /UL
SODIUM SERPL-SCNC: 126 MMOL/L (ref 136–145)
SP GR UR STRIP: 1.01
UROBILINOGEN UR STRIP-ACNC: NORMAL MG/DL
WBC # BLD AUTO: 5.53 K/UL (ref 4.5–11)

## 2024-11-09 PROCEDURE — 83880 ASSAY OF NATRIURETIC PEPTIDE: CPT | Performed by: NURSE PRACTITIONER

## 2024-11-09 PROCEDURE — 82962 GLUCOSE BLOOD TEST: CPT

## 2024-11-09 PROCEDURE — 63600175 PHARM REV CODE 636 W HCPCS: Performed by: NURSE PRACTITIONER

## 2024-11-09 PROCEDURE — 99285 EMERGENCY DEPT VISIT HI MDM: CPT | Mod: 25

## 2024-11-09 PROCEDURE — 25500020 PHARM REV CODE 255: Performed by: NURSE PRACTITIONER

## 2024-11-09 PROCEDURE — 25000003 PHARM REV CODE 250: Performed by: NURSE PRACTITIONER

## 2024-11-09 PROCEDURE — 81003 URINALYSIS AUTO W/O SCOPE: CPT | Performed by: NURSE PRACTITIONER

## 2024-11-09 PROCEDURE — 93005 ELECTROCARDIOGRAM TRACING: CPT

## 2024-11-09 PROCEDURE — 83605 ASSAY OF LACTIC ACID: CPT | Performed by: NURSE PRACTITIONER

## 2024-11-09 PROCEDURE — 96374 THER/PROPH/DIAG INJ IV PUSH: CPT | Mod: 59

## 2024-11-09 PROCEDURE — 83690 ASSAY OF LIPASE: CPT | Performed by: NURSE PRACTITIONER

## 2024-11-09 PROCEDURE — 80053 COMPREHEN METABOLIC PANEL: CPT | Performed by: NURSE PRACTITIONER

## 2024-11-09 PROCEDURE — 85025 COMPLETE CBC W/AUTO DIFF WBC: CPT | Performed by: NURSE PRACTITIONER

## 2024-11-09 PROCEDURE — 36415 COLL VENOUS BLD VENIPUNCTURE: CPT | Performed by: NURSE PRACTITIONER

## 2024-11-09 RX ORDER — POTASSIUM CHLORIDE 20 MEQ/1
40 TABLET, EXTENDED RELEASE ORAL ONCE
Status: COMPLETED | OUTPATIENT
Start: 2024-11-09 | End: 2024-11-09

## 2024-11-09 RX ORDER — ONDANSETRON HYDROCHLORIDE 2 MG/ML
4 INJECTION, SOLUTION INTRAVENOUS
Status: COMPLETED | OUTPATIENT
Start: 2024-11-09 | End: 2024-11-09

## 2024-11-09 RX ORDER — ONDANSETRON 4 MG/1
4 TABLET, FILM COATED ORAL EVERY 8 HOURS PRN
Qty: 4 TABLET | Refills: 0 | Status: SHIPPED | OUTPATIENT
Start: 2024-11-09

## 2024-11-09 RX ORDER — IOPAMIDOL 755 MG/ML
100 INJECTION, SOLUTION INTRAVASCULAR
Status: COMPLETED | OUTPATIENT
Start: 2024-11-09 | End: 2024-11-09

## 2024-11-09 RX ADMIN — POTASSIUM CHLORIDE 40 MEQ: 1500 TABLET, EXTENDED RELEASE ORAL at 03:11

## 2024-11-09 RX ADMIN — IOPAMIDOL 100 ML: 755 INJECTION, SOLUTION INTRAVENOUS at 03:11

## 2024-11-09 RX ADMIN — ONDANSETRON 4 MG: 2 INJECTION INTRAMUSCULAR; INTRAVENOUS at 01:11

## 2024-11-09 NOTE — ED TRIAGE NOTES
Patient presents to ED via EMS from home with c/o nausea, vomiting, diarrhea and generalized abd. Pain that started this morning.

## 2024-11-09 NOTE — ED NOTES
[employer] form submitted at this time.      Confirmation #: [id number]  Submission Date / Time: Nov 9, 2024 5:27 PM

## 2024-11-09 NOTE — ED PROVIDER NOTES
Encounter Date: 11/9/2024       History     Chief Complaint   Patient presents with    Abdominal Pain    Nausea    Vomiting    Diarrhea     54 y/o male with PMH of CAD s/p CABG, ischemic cardiomyopathy (EF 20%), CVA with expressive aphasia and right arm weakness,  HTN, GERD, ETOH abuse, and nicotine abuse who presented to ED with c/o N/V/D and dizziness.  Difficult to obtain detailed review of systems due to aphasia.  Endorses some increased shortness of breath.  Denies chest pain.  Unable to tell me how many times he has vomited.    The history is provided by the patient. History limited by: Expressive aphasia.     Review of patient's allergies indicates:  No Known Allergies  Past Medical History:   Diagnosis Date    Alcoholic fatty liver     CHF (congestive heart failure) 02/20/2024    EF 25%    COPD (chronic obstructive pulmonary disease)     Coronary artery disease involving coronary bypass graft of native heart without angina pectoris 01/01/2022    Dyslipidemia 12/16/2023    Essential (primary) hypertension 01/01/2022    Expressive aphasia 05/05/2023    GERD with esophagitis 2018    EGD    Hemiparesis affecting right side as late effect of cerebrovascular accident     ROSEMARIE (obstructive sleep apnea)     Pulmonary hypertension     Stroke     right hand contracted    Tricuspid regurgitation     Type 2 diabetes mellitus      Past Surgical History:   Procedure Laterality Date    CARDIAC SURGERY      CABG    CORONARY ANGIOPLASTY WITH STENT PLACEMENT      bare metal stent    HEMIARTHROPLASTY OF HIP Right 01/02/2022    Procedure: HEMIARTHROPLASTY, HIP;  Surgeon: Ramses Chua MD;  Location: [facility];  Service: Orthopedics;  Laterality: Right;    LAPAROSCOPIC CHOLECYSTECTOMY  03/09/2024    Dr. Bennett    LAPAROSCOPIC CHOLECYSTECTOMY WITH CHOLANGIOGRAPHY N/A 3/9/2024    Procedure: CHOLECYSTECTOMY, LAPAROSCOPIC, WITH CHOLANGIOGRAM;  Surgeon: Karson Bennett DO;  Location: [facility] OR;  Service: General;   Laterality: N/A;    RIGHT HEART CATHETERIZATION Right 02/22/2024    Procedure: INSERTION, CATHETER, RIGHT HEART;  Surgeon: Ruiz Pacheco MD;  Location: [facility] CATH LAB;  Service: Cardiology;  Laterality: Right;     Family History   Problem Relation Name Age of Onset    Diabetes Mother      Heart disease Mother      Hypertension Mother       Social History     Tobacco Use    Smoking status: Former     Current packs/day: 0.50     Types: Cigarettes    Smokeless tobacco: Current     Types: Chew   Substance Use Topics    Alcohol use: Not Currently     Comment: 1 quart    Drug use: Never     Review of Systems   Constitutional:  Negative for chills and fever.   HENT: Negative.     Eyes: Negative.    Respiratory:  Positive for shortness of breath.    Cardiovascular:  Negative for chest pain.   Gastrointestinal:  Positive for abdominal pain, diarrhea, nausea and vomiting.   Genitourinary:  Negative for dysuria.   Neurological:  Positive for light-headedness.       Physical Exam     Initial Vitals [11/09/24 1243]   BP Pulse Resp Temp SpO2   103/65 82 17 98.2 °F (36.8 °C) 97 %      MAP       --         Physical Exam    Constitutional: Vital signs are normal. No distress.   HENT:   Right Ear: Hearing normal.   Left Ear: Hearing normal.   Nose: Nose normal. Mouth/Throat: Uvula is midline, oropharynx is clear and moist and mucous membranes are normal.   Eyes: Conjunctivae and EOM are normal. Pupils are equal, round, and reactive to light.   Neck: Neck supple.   Cardiovascular:  Normal rate and regular rhythm.           Pulmonary/Chest: Effort normal and breath sounds normal.   Abdominal: Abdomen is soft and flat. Bowel sounds are normal. There is generalized abdominal tenderness.   Musculoskeletal:      Cervical back: Neck supple.      Right lower leg: No edema.      Left lower leg: No edema.     Lymphadenopathy:     He has no cervical adenopathy.   Neurological: He is alert.   Skin: Skin is warm and dry. Capillary refill  takes less than 2 seconds.         Medical Screening Exam   See Full Note    ED Course   Procedures  Labs Reviewed   COMPREHENSIVE METABOLIC PANEL - Abnormal       Result Value    Sodium 126 (*)     Potassium 3.3 (*)     Chloride 90 (*)     CO2 30      Anion Gap 9      Glucose 148 (*)     BUN 7      Creatinine 1.00      BUN/Creatinine Ratio 7      Calcium 9.0      Total Protein 7.8      Albumin 3.9      Globulin 3.9      A/G Ratio 1.0      Bilirubin, Total 0.9      Alk Phos 128 (*)     ALT 52      AST 46 (*)     eGFR 90     URINALYSIS, REFLEX TO URINE CULTURE - Abnormal    Color, UA Colorless      Clarity, UA Clear      pH, UA 6.0      Leukocytes, UA Negative      Nitrites, UA Negative      Protein, UA Negative      Glucose, UA >1000 (*)     Ketones, UA Negative      Urobilinogen, UA Normal      Bilirubin, UA Negative      Blood, UA Negative      Specific Gravity, UA 1.014     LACTIC ACID, PLASMA - Abnormal    Lactic Acid 3.0 (*)    CBC WITH DIFFERENTIAL - Abnormal    WBC 5.53      RBC 5.51      Hemoglobin 16.3      Hematocrit 47.1      MCV 85.5      MCH 29.6      MCHC 34.6      RDW 13.5      Platelet Count 225      MPV 9.5      Neutrophils % 75.8 (*)     Lymphocytes % 10.5 (*)     Monocytes % 12.1 (*)     Eosinophils % 0.2 (*)     Basophils % 0.7      Immature Granulocytes % 0.7 (*)     nRBC, Auto 0.0      Neutrophils, Abs 4.19      Lymphocytes, Absolute 0.58 (*)     Monocytes, Absolute 0.67      Eosinophils, Absolute 0.01      Basophils, Absolute 0.04      Immature Granulocytes, Absolute 0.04      nRBC, Absolute 0.00      Diff Type Auto     NT-PRO NATRIURETIC PEPTIDE - Abnormal    ProBNP 784 (*)    POCT GLUCOSE MONITORING CONTINUOUS - Abnormal    POC Glucose 261 (*)    LIPASE - Normal    Lipase 32     LACTIC ACID, PLASMA - Normal    Lactic Acid 2.0     CBC W/ AUTO DIFFERENTIAL    Narrative:     The following orders were created for panel order CBC W/ AUTO DIFFERENTIAL.  Procedure                                Abnormality         Status                     ---------                               -----------         ------                     CBC with Differential[[id number]]       Abnormal            Final result                 Please view results for these tests on the individual orders.   POCT GLUCOSE MONITORING CONTINUOUS          Imaging Results              CT Abdomen Pelvis With IV Contrast NO Oral Contrast (Final result)  Result time 11/09/24 16:54:22      Final result by Zora Oneill MD (11/09/24 16:54:22)                   Impression:      1. No etiology noted for abdominal pain.  2. Mild diffuse fatty infiltration of the liver and small hypodensity too small to characterize in the left lobe of the liver.  3. Fatty replacement of the pancreas noted.  4. Mild aneurysmal dilation of the distal abdominal aorta and significant vascular calcification.  5. Sigmoid diverticulosis but no evidence of diverticulitis.      Electronically signed by: Zora Oneill  Date:    11/09/2024  Time:    16:54               Narrative:    EXAMINATION:  CT ABDOMEN PELVIS WITH IV CONTRAST    CLINICAL HISTORY:  Abdominal pain, acute, nonlocalized;    TECHNIQUE:  Low dose axial images, sagittal and coronal reformations were obtained from the lung bases to the pubic symphysis following the IV administration of 100 mL of Isovue 370 .  Oral contrast was not administered.    COMPARISON:  Prior CT abdomen and pelvis without IV contrast dated 03/19/2024    FINDINGS:  Abdomen:    - Lower thorax:Mild reticulation in both lung bases, nonspecific and minimal subsegmental atelectasis in the lingula.  The heart is of normal size with no pericardial fluid.  Extensive coronary artery calcification is noted.  Partial imaging of a left axillary node is identified.    - Lung bases: No infiltrates and no nodules.    - Liver: Subcentimeter tiny hypodensity present in the left lobe of the liver too small to characterize.  Not visualized on the  noncontrast enhanced prior exam, but could have been present.  Mild fatty infiltration diffusely noted throughout the liver.    - Gallbladder: Prior cholecystectomy noted with surgical clips in the gallbladder fossa.  The previously noted fluid collection in the gallbladder fossa is no longer present.    - Bile Ducts: No evidence of intra or extra hepatic biliary ductal dilation.    - Spleen: Negative.    - Kidneys: No mass or hydronephrosis.    - Adrenals: Unremarkable.    - Pancreas: Fatty replacement of the pancreas is noted.    - Retroperitoneum:  No significant adenopathy.    - Vascular: Significant vascular calcification of the abdominal aorta and branch vessels with minimal aneurysmal dilation of the distal aorta just prior to bifurcation measuring 2.3 cm in diameter with the more proximal aorta measuring 2.1 cm..    - Abdominal wall:  Unremarkable.    Pelvis:    No pelvic mass, adenopathy, or free fluid.    Bowel/Mesentery: There is moderate fluid debris air level present within the stomach.  Assume recent ingestion of oral content.  Correlate clinically.  There are occasional sigmoid diverticula but no evidence of diverticulitis.    Otherwise, no evidence for bowel obstruction.  No free air.  The appendix is identified and is normal.    Bones:  No acute osseous abnormality and no suspicious lytic or blastic lesion.  Right hip prosthesis with subsequent artifact limiting visualization to the pelvis but study remains diagnostic.                                       X-Ray Chest AP Portable (Final result)  Result time 11/09/24 15:18:24      Final result by Zora Oniell MD (11/09/24 15:18:24)                   Impression:      1. Interval near resolution of the right pleural effusion with trace effusion remaining.  Stable left costophrenic angle blunting, possibly scarring/pleural thickening  2. Interval resolution of bilateral airspace opacities.  No new opacities noted.  No acute  abnormality.      Electronically signed by: Zora Oneill  Date:    11/09/2024  Time:    15:18               Narrative:    EXAMINATION:  XR CHEST AP PORTABLE    CLINICAL HISTORY:  CHF;.    TECHNIQUE:  Single frontal portable view of the chest was performed.    COMPARISON:  03/26/2024 portable chest    FINDINGS:  Support devices: None    There has been interval improvement in the airspace opacities with only minimal atelectasis remaining in the left base.  There is stable blunting left costophrenic angle with thickening along the left pleural space.  This finding is stable compared to chest x-ray dating back to 2019..  There has been interval near resolution of the small to moderate size right pleural effusion with minimal pleural effusion remaining.  No pneumothorax.  Trachea is midline.    The cardiac silhouette is normal in size. Surgical clips along the left hilum and mediastinum are again noted.  A radiopaque stent is again seen along the course of the left coronary artery.    Bones are intact.  Midline sternotomy wires remain aligned and intact.                                       X-Ray Abdomen Flat And Erect (Final result)  Result time 11/09/24 15:42:21      Final result by Oumar Villagran MD (11/09/24 15:42:21)                   Impression:      Nonobstructive bowel gas pattern.    Small bilateral pleural effusions.    Advanced vascular calcifications.      Electronically signed by: Oumar Villagran MD  Date:    11/09/2024  Time:    15:42               Narrative:    EXAMINATION:  XR ABDOMEN FLAT AND ERECT    CLINICAL HISTORY:  Abdominal Pain;    TECHNIQUE:  Flat and erect AP views of the abdomen were performed.    COMPARISON:  08/21/2018.    FINDINGS:  There are partially visualized median sternotomy changes.  There are postop changes in the right upper abdominal quadrant.  There are changes of total right hip arthroplasty.    There are small bilateral pleural effusions.  No airspace opacities  identified.    The stomach is nondistended.  The distribution of the bowel gas pattern is nonobstructive.  There are no differential air-fluid levels.  There is no evidence of pneumatosis.  No portal venous air is identified.  There is no gross pneumoperitoneum.    There are advanced vascular calcifications.                                       Medications   ondansetron injection 4 mg (4 mg Intravenous Given 11/9/24 1322)   potassium chloride SA CR tablet 40 mEq (40 mEq Oral Given 11/9/24 1529)   iopamidoL (ISOVUE-370) injection 100 mL (100 mLs Intravenous Given 11/9/24 1533)     Medical Decision Making  Generalized abdominal pain with nausea vomiting diarrhea.  PMH significant for stroke with residual right-sided weakness and aphasia, CAD s/p CABG, systolic heart failure EF 20%.  Initial workup with elevated lactic acid.  CT abdomen/pelvis ordered for further evaluation.  White count normal, pro  (previously 3400), chest x-ray with no acute changes concerning for heart failure exacerbation.  Potassium 3.3, replace with p.o. 40 mEq.  Creatinine normal, no evidence of significant dehydration.  Likely viral gastroenteritis, awaiting CT abdomen.  If no acute findings, will discharge home.    Amount and/or Complexity of Data Reviewed  Labs: ordered. Decision-making details documented in ED Course.  Radiology: ordered. Decision-making details documented in ED Course.    Risk  Prescription drug management.               ED Course as of 11/09/24 1705   Sat Nov 09, 2024   1627 CT Abdomen Pelvis With IV Contrast NO Oral Contrast [AM]   1641  (was 3400 seven months ago).  Chest x-ray stable no evidence of acute heart failure.  Repeat lactate normal.  Awaiting CT abdomen results. [AM]   1701 CT abdomen pelvis with no acute findings.  We will discharge home with instructions to follow up in ED with any worsening symptoms. [AM]      ED Course User Index  [AM] Samantha Rhodes, ISADORA                            Clinical Impression:   Final diagnoses:  [Z95.1] Hx of CABG  [E87.6] Hypokalemia  [R11.2] Nausea and vomiting, unspecified vomiting type  [R10.84] Generalized abdominal pain  [R79.89] Elevated lactic acid level  [A08.4] Viral gastroenteritis (Primary)        ED Disposition Condition    Discharge Stable          ED Prescriptions       Medication Sig Dispense Start Date End Date Auth. Provider    ondansetron (ZOFRAN) 4 MG tablet Take 1 tablet (4 mg total) by mouth every 8 (eight) hours as needed for Nausea. 4 tablet 11/9/2024 -- Samantha Rhodes FNP          Follow-up Information       Follow up With Specialties Details Why Contact Info    [facility] - Emergency Department Emergency Medicine  As needed, If symptoms worsen [address] [place] [id number] [phone]    Walker Smith MD Internal Medicine, Family Medicine, Hospitalist On 11/11/2024  [address] [facility] [address]  [phone]               Samantha Rhodes FNP  11/09/24 2751

## 2024-11-11 LAB
OHS QRS DURATION: 110 MS
OHS QTC CALCULATION: 501 MS

## 2024-11-18 ENCOUNTER — OFFICE VISIT (OUTPATIENT)
Dept: FAMILY MEDICINE | Facility: CLINIC | Age: 54
End: 2024-11-18
Payer: MEDICAID

## 2024-11-18 VITALS
OXYGEN SATURATION: 97 % | DIASTOLIC BLOOD PRESSURE: 77 MMHG | SYSTOLIC BLOOD PRESSURE: 131 MMHG | TEMPERATURE: 97 F | RESPIRATION RATE: 18 BRPM | HEART RATE: 105 BPM | HEIGHT: 72 IN | BODY MASS INDEX: 27.12 KG/M2

## 2024-11-18 DIAGNOSIS — I10 ESSENTIAL (PRIMARY) HYPERTENSION: Primary | ICD-10-CM

## 2024-11-18 LAB
ANION GAP SERPL CALCULATED.3IONS-SCNC: 14 MMOL/L (ref 7–16)
BUN SERPL-MCNC: 10 MG/DL (ref 8–26)
BUN/CREAT SERPL: 12 (ref 6–20)
CALCIUM SERPL-MCNC: 9.3 MG/DL (ref 8.4–10.2)
CHLORIDE SERPL-SCNC: 100 MMOL/L (ref 98–107)
CO2 SERPL-SCNC: 26 MMOL/L (ref 22–29)
CREAT SERPL-MCNC: 0.81 MG/DL (ref 0.72–1.25)
EGFR (NO RACE VARIABLE) (RUSH/TITUS): 105 ML/MIN/1.73M2
EST. AVERAGE GLUCOSE BLD GHB EST-MCNC: 134 MG/DL
GLUCOSE SERPL-MCNC: 135 MG/DL (ref 74–100)
HBA1C MFR BLD HPLC: 6.3 %
POTASSIUM SERPL-SCNC: 4.6 MMOL/L (ref 3.5–5.1)
SODIUM SERPL-SCNC: 135 MMOL/L (ref 136–145)

## 2024-11-18 PROCEDURE — 3075F SYST BP GE 130 - 139MM HG: CPT | Mod: CPTII,,, | Performed by: INTERNAL MEDICINE

## 2024-11-18 PROCEDURE — 3066F NEPHROPATHY DOC TX: CPT | Mod: CPTII,,, | Performed by: INTERNAL MEDICINE

## 2024-11-18 PROCEDURE — 83036 HEMOGLOBIN GLYCOSYLATED A1C: CPT | Mod: ,,, | Performed by: CLINICAL MEDICAL LABORATORY

## 2024-11-18 PROCEDURE — 3078F DIAST BP <80 MM HG: CPT | Mod: CPTII,,, | Performed by: INTERNAL MEDICINE

## 2024-11-18 PROCEDURE — 3061F NEG MICROALBUMINURIA REV: CPT | Mod: CPTII,,, | Performed by: INTERNAL MEDICINE

## 2024-11-18 PROCEDURE — 3008F BODY MASS INDEX DOCD: CPT | Mod: CPTII,,, | Performed by: INTERNAL MEDICINE

## 2024-11-18 PROCEDURE — 1159F MED LIST DOCD IN RCRD: CPT | Mod: CPTII,,, | Performed by: INTERNAL MEDICINE

## 2024-11-18 PROCEDURE — 4010F ACE/ARB THERAPY RXD/TAKEN: CPT | Mod: CPTII,,, | Performed by: INTERNAL MEDICINE

## 2024-11-18 PROCEDURE — 3044F HG A1C LEVEL LT 7.0%: CPT | Mod: CPTII,,, | Performed by: INTERNAL MEDICINE

## 2024-11-18 PROCEDURE — 99214 OFFICE O/P EST MOD 30 MIN: CPT | Mod: ,,, | Performed by: INTERNAL MEDICINE

## 2024-11-18 PROCEDURE — 80048 BASIC METABOLIC PNL TOTAL CA: CPT | Mod: ,,, | Performed by: CLINICAL MEDICAL LABORATORY

## 2024-11-18 RX ORDER — IBUPROFEN 200 MG
1 TABLET ORAL DAILY
Qty: 90 PATCH | Refills: 1 | Status: SHIPPED | OUTPATIENT
Start: 2024-11-18

## 2024-11-18 RX ORDER — METOPROLOL SUCCINATE 25 MG/1
12.5 TABLET, EXTENDED RELEASE ORAL DAILY
Qty: 30 TABLET | Refills: 1 | Status: SHIPPED | OUTPATIENT
Start: 2024-11-18

## 2024-11-18 RX ORDER — DIGOXIN 125 MCG
0.12 TABLET ORAL DAILY
Qty: 90 TABLET | Refills: 1 | Status: SHIPPED | OUTPATIENT
Start: 2024-11-18

## 2024-11-18 RX ORDER — ONDANSETRON 4 MG/1
4 TABLET, FILM COATED ORAL 3 TIMES DAILY PRN
Qty: 30 TABLET | Refills: 3 | Status: SHIPPED | OUTPATIENT
Start: 2024-11-18

## 2024-12-26 DIAGNOSIS — G47.00 INSOMNIA, UNSPECIFIED TYPE: Primary | ICD-10-CM

## 2024-12-30 RX ORDER — ZOLPIDEM TARTRATE 5 MG/1
TABLET ORAL
Qty: 30 TABLET | Refills: 1 | Status: SHIPPED | OUTPATIENT
Start: 2024-12-30

## 2025-01-07 RX ORDER — PANTOPRAZOLE SODIUM 40 MG/1
40 TABLET, DELAYED RELEASE ORAL
Qty: 90 TABLET | Refills: 1 | Status: SHIPPED | OUTPATIENT
Start: 2025-01-07

## 2025-01-12 RX ORDER — INSULIN DETEMIR 100 [IU]/ML
10 INJECTION, SOLUTION SUBCUTANEOUS NIGHTLY
Qty: 10 ML | Refills: 2 | Status: SHIPPED | OUTPATIENT
Start: 2025-01-12

## 2025-02-18 ENCOUNTER — OFFICE VISIT (OUTPATIENT)
Dept: FAMILY MEDICINE | Facility: CLINIC | Age: 55
End: 2025-02-18
Payer: MEDICAID

## 2025-02-18 VITALS
WEIGHT: 199 LBS | HEART RATE: 104 BPM | RESPIRATION RATE: 18 BRPM | OXYGEN SATURATION: 98 % | DIASTOLIC BLOOD PRESSURE: 79 MMHG | SYSTOLIC BLOOD PRESSURE: 124 MMHG | BODY MASS INDEX: 26.95 KG/M2 | HEIGHT: 72 IN | TEMPERATURE: 98 F

## 2025-02-18 DIAGNOSIS — E13.9 DIABETES MELLITUS OF OTHER TYPE WITHOUT COMPLICATION, UNSPECIFIED WHETHER LONG TERM INSULIN USE: Primary | ICD-10-CM

## 2025-02-18 RX ORDER — INSULIN DETEMIR 100 [IU]/ML
10 INJECTION, SOLUTION SUBCUTANEOUS NIGHTLY
Qty: 10 ML | Refills: 2 | Status: SHIPPED | OUTPATIENT
Start: 2025-02-18

## 2025-02-21 NOTE — PROGRESS NOTES
Subjective:       Patient ID: Karin Carrera is a 54 y.o. male.    Chief Complaint: Medication Refill    History of Present Illness    CHIEF COMPLAINT:  Patient presents today for follow up.    MEDICAL HISTORY:  He has a history of stroke.    DIABETES:  He takes insulin 10 units daily. A1C was 6.3 three months ago.       Patient does not endorse chest pain or shortness for breath.  Current Medications:  Current Medications[1]           ROS  Twelve point system reviewed, unremarkable except for stated above in HPI.        Objective:         Vitals:    02/18/25 1108   BP: 124/79   BP Location: Right arm   Patient Position: Sitting   Pulse: 104   Resp: 18   Temp: 98.2 °F (36.8 °C)   TempSrc: Temporal   SpO2: 98%   Weight: 90.3 kg (199 lb)   Height: 6' (1.829 m)        Physical Exam     Patient is awake alert oriented person place and  Lungs are clear to auscultation bilaterally no crackles or wheezes   Cardiovascular S1-S2 regular rate and rhythm no murmurs rubs or gallops   Abdomen is soft positive bowel sounds nontender, extremities no clubbing cyanosis edema  Neuro no focal neurological deficits  Skin warm and dry.     Last Labs:     No visits with results within 1 Month(s) from this visit.   Latest known visit with results is:   Office Visit on 11/18/2024   Component Date Value    Sodium 11/18/2024 135 (L)     Potassium 11/18/2024 4.6     Chloride 11/18/2024 100     CO2 11/18/2024 26     Anion Gap 11/18/2024 14     Glucose 11/18/2024 135 (H)     BUN 11/18/2024 10     Creatinine 11/18/2024 0.81     BUN/Creatinine Ratio 11/18/2024 12     Calcium 11/18/2024 9.3     eGFR 11/18/2024 105     Hemoglobin A1C 11/18/2024 6.3     Estimated Average Glucose 11/18/2024 134        Last Imaging:  CT Abdomen Pelvis With IV Contrast NO Oral Contrast  Narrative: EXAMINATION:  CT ABDOMEN PELVIS WITH IV CONTRAST    CLINICAL HISTORY:  Abdominal pain, acute, nonlocalized;    TECHNIQUE:  Low dose axial images, sagittal and coronal  reformations were obtained from the lung bases to the pubic symphysis following the IV administration of 100 mL of Isovue 370 .  Oral contrast was not administered.    COMPARISON:  Prior CT abdomen and pelvis without IV contrast dated 03/19/2024    FINDINGS:  Abdomen:    - Lower thorax:Mild reticulation in both lung bases, nonspecific and minimal subsegmental atelectasis in the lingula.  The heart is of normal size with no pericardial fluid.  Extensive coronary artery calcification is noted.  Partial imaging of a left axillary node is identified.    - Lung bases: No infiltrates and no nodules.    - Liver: Subcentimeter tiny hypodensity present in the left lobe of the liver too small to characterize.  Not visualized on the noncontrast enhanced prior exam, but could have been present.  Mild fatty infiltration diffusely noted throughout the liver.    - Gallbladder: Prior cholecystectomy noted with surgical clips in the gallbladder fossa.  The previously noted fluid collection in the gallbladder fossa is no longer present.    - Bile Ducts: No evidence of intra or extra hepatic biliary ductal dilation.    - Spleen: Negative.    - Kidneys: No mass or hydronephrosis.    - Adrenals: Unremarkable.    - Pancreas: Fatty replacement of the pancreas is noted.    - Retroperitoneum:  No significant adenopathy.    - Vascular: Significant vascular calcification of the abdominal aorta and branch vessels with minimal aneurysmal dilation of the distal aorta just prior to bifurcation measuring 2.3 cm in diameter with the more proximal aorta measuring 2.1 cm..    - Abdominal wall:  Unremarkable.    Pelvis:    No pelvic mass, adenopathy, or free fluid.    Bowel/Mesentery: There is moderate fluid debris air level present within the stomach.  Assume recent ingestion of oral content.  Correlate clinically.  There are occasional sigmoid diverticula but no evidence of diverticulitis.    Otherwise, no evidence for bowel obstruction.  No free  air.  The appendix is identified and is normal.    Bones:  No acute osseous abnormality and no suspicious lytic or blastic lesion.  Right hip prosthesis with subsequent artifact limiting visualization to the pelvis but study remains diagnostic.  Impression: 1. No etiology noted for abdominal pain.  2. Mild diffuse fatty infiltration of the liver and small hypodensity too small to characterize in the left lobe of the liver.  3. Fatty replacement of the pancreas noted.  4. Mild aneurysmal dilation of the distal abdominal aorta and significant vascular calcification.  5. Sigmoid diverticulosis but no evidence of diverticulitis.    Electronically signed by: Zora Oneill  Date:    11/09/2024  Time:    16:54  X-Ray Abdomen Flat And Erect  Narrative: EXAMINATION:  XR ABDOMEN FLAT AND ERECT    CLINICAL HISTORY:  Abdominal Pain;    TECHNIQUE:  Flat and erect AP views of the abdomen were performed.    COMPARISON:  08/21/2018.    FINDINGS:  There are partially visualized median sternotomy changes.  There are postop changes in the right upper abdominal quadrant.  There are changes of total right hip arthroplasty.    There are small bilateral pleural effusions.  No airspace opacities identified.    The stomach is nondistended.  The distribution of the bowel gas pattern is nonobstructive.  There are no differential air-fluid levels.  There is no evidence of pneumatosis.  No portal venous air is identified.  There is no gross pneumoperitoneum.    There are advanced vascular calcifications.  Impression: Nonobstructive bowel gas pattern.    Small bilateral pleural effusions.    Advanced vascular calcifications.    Electronically signed by: Oumar Villagran MD  Date:    11/09/2024  Time:    15:42  X-Ray Chest AP Portable  Narrative: EXAMINATION:  XR CHEST AP PORTABLE    CLINICAL HISTORY:  CHF;.    TECHNIQUE:  Single frontal portable view of the chest was performed.    COMPARISON:  03/26/2024 portable chest    FINDINGS:  Support  devices: None    There has been interval improvement in the airspace opacities with only minimal atelectasis remaining in the left base.  There is stable blunting left costophrenic angle with thickening along the left pleural space.  This finding is stable compared to chest x-ray dating back to 2019..  There has been interval near resolution of the small to moderate size right pleural effusion with minimal pleural effusion remaining.  No pneumothorax.  Trachea is midline.    The cardiac silhouette is normal in size. Surgical clips along the left hilum and mediastinum are again noted.  A radiopaque stent is again seen along the course of the left coronary artery.    Bones are intact.  Midline sternotomy wires remain aligned and intact.  Impression: 1. Interval near resolution of the right pleural effusion with trace effusion remaining.  Stable left costophrenic angle blunting, possibly scarring/pleural thickening  2. Interval resolution of bilateral airspace opacities.  No new opacities noted.  No acute abnormality.    Electronically signed by: Zora Oneill  Date:    11/09/2024  Time:    15:18         **Labs and x-rays personally reviewed by me    ** reviewed           Assessment & Plan:   Assessment & Plan    IMPRESSION:  - Reviewed patient's diabetes management; A1C from 3 months ago was 6.3, indicating good glycemic control  - Assessed patient's history of stroke  - Performed physical exam, including respiratory assessment    DIABETES:  - Continued insulin at current dose of 10 units daily.  - Ordered glucose test.  - Decided against ordering HbA1c test due to recent good result of 6.3 three months ago, indicating good glycemic control.  - Assessed that the patient's diabetes management is going well.  - Planned to refill the patient's insulin prescription.    STROKE HISTORY:  - Acknowledged the patient's history of stroke.    OTHER INSTRUCTIONS:  - Performed physical exam and found the patient to be in  good condition, both in appearance and speech.    FOLLOW UP:  - Scheduled follow-up visit in July.           1. Diabetes mellitus of other type without complication, unspecified whether long term insulin use  -     Ambulatory referral/consult to Podiatry; Future; Expected date: 2025    Other orders  -     insulin detemir U-100 (LEVEMIR U-100 INSULIN) 100 unit/mL injection; Inject 10 Units into the skin every evening.  Dispense: 10 mL; Refill: 2            Walker Smith MD  This note was generated with the assistance of ambient listening technology. Verbal consent was obtained by the patient and accompanying visitor(s) for the recording of patient appointment to facilitate this note. I attest to having reviewed and edited the generated note for accuracy, though some syntax or spelling errors may persist. Please contact the author of this note for any clarification.            [1]   Current Outpatient Medications:     albuterol (PROVENTIL/VENTOLIN HFA) 90 mcg/actuation inhaler, SMARTSI-2 Puff(s) By Mouth Every 4 Hours PRN, Disp: 18 g, Rfl: 6    amiodarone (PACERONE) 200 MG Tab, Take 1 tablet (200 mg total) by mouth once daily., Disp: 30 tablet, Rfl: 11    amLODIPine (NORVASC) 10 MG tablet, Take 10 mg by mouth once daily., Disp: , Rfl:     aspirin (ECOTRIN) 81 MG EC tablet, Take 1 tablet (81 mg total) by mouth once daily., Disp: 90 tablet, Rfl: 1    atorvastatin (LIPITOR) 40 MG tablet, TAKE 1 TABLET BY MOUTH EVERY EVENING, Disp: 90 tablet, Rfl: 1    digoxin (LANOXIN) 125 mcg tablet, Take 1 tablet (0.125 mg total) by mouth once daily., Disp: 90 tablet, Rfl: 1    empagliflozin (JARDIANCE) 10 mg tablet, Take 1 tablet (10 mg total) by mouth once daily., Disp: 30 tablet, Rfl: 1    furosemide (LASIX) 40 MG tablet, TAKE ONE TABLET BY MOUTH DAILY AS NEEDED FOR SWELLING, Disp: 30 tablet, Rfl: 1    LEVEMIR FLEXPEN 100 unit/mL (3 mL) InPn pen, INJECT 10 UNITS SUB-Q EVERY EVENING KEEP REFRIGERATED, Disp: 36 mL, Rfl:  0    metoprolol succinate (TOPROL-XL) 25 MG 24 hr tablet, Take 0.5 tablets (12.5 mg total) by mouth once daily. Take 1/2 tablet by mouth daily, Disp: 30 tablet, Rfl: 1    nicotine (NICODERM CQ) 14 mg/24 hr, Place 1 patch onto the skin once daily., Disp: 90 patch, Rfl: 1    ondansetron (ZOFRAN) 4 MG tablet, Take 1 tablet (4 mg total) by mouth 3 (three) times daily as needed for Nausea., Disp: 30 tablet, Rfl: 3    pantoprazole (PROTONIX) 40 MG tablet, TAKE 1 TABLET BY MOUTH ONCE DAILY, Disp: 90 tablet, Rfl: 1    potassium chloride (KLOR-CON) 10 MEQ TbSR, Take 10 mEq by mouth once daily., Disp: , Rfl:     sacubitriL-valsartan (ENTRESTO) 49-51 mg per tablet, Take 1 tablet by mouth 2 (two) times daily., Disp: 180 tablet, Rfl: 3    torsemide (DEMADEX) 20 MG Tab, Take 1 tablet (20 mg total) by mouth 2 (two) times a day., Disp: 60 tablet, Rfl: 11    traZODone (DESYREL) 50 MG tablet, Take 2 tablets (100 mg total) by mouth nightly as needed for Insomnia., Disp: 60 tablet, Rfl: 0    TRUE METRIX GLUCOSE TEST STRIP Strp, USE TO CHECK BLOOD SUGAR AS DIRECTED, Disp: 200 each, Rfl: 1    TRUEPLUS LANCETS 33 gauge Misc, 1 lancet  by Misc.(Non-Drug; Combo Route) route once daily. use as directed, Disp: 200 each, Rfl: 2    zolpidem (AMBIEN) 5 MG Tab, TAKE ONE TABLET BY MOUTH AT NIGHT AS NEEDED FOR INSOMNIA, Disp: 30 tablet, Rfl: 1    insulin detemir U-100 (LEVEMIR U-100 INSULIN) 100 unit/mL injection, Inject 10 Units into the skin every evening., Disp: 10 mL, Rfl: 2

## 2025-03-16 ENCOUNTER — HOSPITAL ENCOUNTER (EMERGENCY)
Facility: HOSPITAL | Age: 55
Discharge: HOME | End: 2025-03-17
Attending: EMERGENCY MEDICINE
Payer: MEDICAID

## 2025-03-16 DIAGNOSIS — R11.2 NAUSEA AND VOMITING, UNSPECIFIED VOMITING TYPE: Primary | ICD-10-CM

## 2025-03-16 DIAGNOSIS — R53.1 GENERAL WEAKNESS: ICD-10-CM

## 2025-03-16 DIAGNOSIS — R73.9 HYPERGLYCEMIA: ICD-10-CM

## 2025-03-16 LAB
ALBUMIN SERPL BCP-MCNC: 3.7 G/DL (ref 3.5–5)
ALBUMIN/GLOB SERPL: 1 {RATIO}
ALP SERPL-CCNC: 136 U/L (ref 40–150)
ALT SERPL W P-5'-P-CCNC: 26 U/L
ANION GAP SERPL CALCULATED.3IONS-SCNC: 21 MMOL/L (ref 7–16)
AST SERPL W P-5'-P-CCNC: 39 U/L (ref 11–45)
BASOPHILS # BLD AUTO: 0.06 K/UL (ref 0–0.2)
BASOPHILS NFR BLD AUTO: 0.8 % (ref 0–1)
BILIRUB SERPL-MCNC: 0.4 MG/DL
BILIRUB UR QL STRIP: NEGATIVE
BUN SERPL-MCNC: 9 MG/DL (ref 8–26)
BUN/CREAT SERPL: 7 (ref 6–20)
CALCIUM SERPL-MCNC: 8.7 MG/DL (ref 8.4–10.2)
CHLORIDE SERPL-SCNC: 96 MMOL/L (ref 98–107)
CLARITY UR: CLEAR
CO2 SERPL-SCNC: 17 MMOL/L (ref 22–29)
COLOR UR: ABNORMAL
CREAT SERPL-MCNC: 1.25 MG/DL (ref 0.72–1.25)
DIFFERENTIAL METHOD BLD: ABNORMAL
EGFR (NO RACE VARIABLE) (RUSH/TITUS): 68 ML/MIN/1.73M2
EOSINOPHIL # BLD AUTO: 0.07 K/UL (ref 0–0.5)
EOSINOPHIL NFR BLD AUTO: 1 % (ref 1–4)
ERYTHROCYTE [DISTWIDTH] IN BLOOD BY AUTOMATED COUNT: 14.2 % (ref 11.5–14.5)
ETHANOL SERPL-MCNC: 20 MG/DL
GLOBULIN SER-MCNC: 3.8 G/DL (ref 2–4)
GLUCOSE SERPL-MCNC: 180 MG/DL (ref 70–105)
GLUCOSE SERPL-MCNC: 207 MG/DL (ref 74–100)
GLUCOSE UR STRIP-MCNC: >1000 MG/DL
HCO3 UR-SCNC: 21.9 MMOL/L (ref 24–28)
HCT VFR BLD AUTO: 47.3 % (ref 40–54)
HCT VFR BLD CALC: 54 % (ref 35–51)
HGB BLD-MCNC: 15.7 G/DL (ref 13.5–18)
IMM GRANULOCYTES # BLD AUTO: 0.07 K/UL (ref 0–0.04)
IMM GRANULOCYTES NFR BLD: 1 % (ref 0–0.4)
INFLUENZA A MOLECULAR (OHS): NEGATIVE
INFLUENZA B MOLECULAR (OHS): NEGATIVE
KETONES UR STRIP-SCNC: NEGATIVE MG/DL
LDH SERPL L TO P-CCNC: 3.7 MMOL/L (ref 0.3–1.2)
LEUKOCYTE ESTERASE UR QL STRIP: NEGATIVE
LIPASE SERPL-CCNC: 18 U/L
LYMPHOCYTES # BLD AUTO: 0.86 K/UL (ref 1–4.8)
LYMPHOCYTES NFR BLD AUTO: 12 % (ref 27–41)
MAGNESIUM SERPL-MCNC: 2 MG/DL (ref 1.6–2.6)
MCH RBC QN AUTO: 28.2 PG (ref 27–31)
MCHC RBC AUTO-ENTMCNC: 33.2 G/DL (ref 32–36)
MCV RBC AUTO: 84.9 FL (ref 80–96)
MONOCYTES # BLD AUTO: 0.6 K/UL (ref 0–0.8)
MONOCYTES NFR BLD AUTO: 8.4 % (ref 2–6)
MPC BLD CALC-MCNC: 9.5 FL (ref 9.4–12.4)
NEUTROPHILS # BLD AUTO: 5.52 K/UL (ref 1.8–7.7)
NEUTROPHILS NFR BLD AUTO: 76.8 % (ref 53–65)
NITRITE UR QL STRIP: NEGATIVE
NRBC # BLD AUTO: 0 X10E3/UL
NRBC, AUTO (.00): 0 %
PCO2 BLDA: 37 MMHG (ref 41–51)
PH SMN: 7.38 [PH] (ref 7.32–7.42)
PH UR STRIP: 5 PH UNITS
PLATELET # BLD AUTO: 240 K/UL (ref 150–400)
PO2 BLDA: 40 MMHG (ref 25–40)
POC BASE EXCESS: -2.7 MMOL/L (ref -2–3)
POC CO2: 23 MMOL/L
POC IONIZED CALCIUM: 1.03 MMOL/L (ref 1.15–1.35)
POC SATURATED O2: 74 % (ref 40–70)
POCT GLUCOSE: 221 MG/DL (ref 60–95)
POTASSIUM BLD-SCNC: 3.8 MMOL/L (ref 3.4–4.5)
POTASSIUM SERPL-SCNC: 4.1 MMOL/L (ref 3.5–5.1)
PROT SERPL-MCNC: 7.5 G/DL (ref 6.4–8.3)
PROT UR QL STRIP: NEGATIVE
RBC # BLD AUTO: 5.57 M/UL (ref 4.6–6.2)
RBC # UR STRIP: NEGATIVE /UL
SARS-COV-2 RDRP RESP QL NAA+PROBE: NEGATIVE
SODIUM BLD-SCNC: 129 MMOL/L (ref 136–145)
SODIUM SERPL-SCNC: 130 MMOL/L (ref 136–145)
SP GR UR STRIP: 1.01
UROBILINOGEN UR STRIP-ACNC: NORMAL MG/DL
WBC # BLD AUTO: 7.18 K/UL (ref 4.5–11)

## 2025-03-16 PROCEDURE — 82077 ASSAY SPEC XCP UR&BREATH IA: CPT | Performed by: EMERGENCY MEDICINE

## 2025-03-16 PROCEDURE — 81003 URINALYSIS AUTO W/O SCOPE: CPT | Performed by: EMERGENCY MEDICINE

## 2025-03-16 PROCEDURE — 85014 HEMATOCRIT: CPT

## 2025-03-16 PROCEDURE — 82962 GLUCOSE BLOOD TEST: CPT

## 2025-03-16 PROCEDURE — 87502 INFLUENZA DNA AMP PROBE: CPT | Performed by: EMERGENCY MEDICINE

## 2025-03-16 PROCEDURE — 82803 BLOOD GASES ANY COMBINATION: CPT

## 2025-03-16 PROCEDURE — 80307 DRUG TEST PRSMV CHEM ANLYZR: CPT | Performed by: EMERGENCY MEDICINE

## 2025-03-16 PROCEDURE — 83735 ASSAY OF MAGNESIUM: CPT | Performed by: EMERGENCY MEDICINE

## 2025-03-16 PROCEDURE — 83605 ASSAY OF LACTIC ACID: CPT

## 2025-03-16 PROCEDURE — 99284 EMERGENCY DEPT VISIT MOD MDM: CPT | Mod: 25

## 2025-03-16 PROCEDURE — 84132 ASSAY OF SERUM POTASSIUM: CPT

## 2025-03-16 PROCEDURE — 84295 ASSAY OF SERUM SODIUM: CPT

## 2025-03-16 PROCEDURE — 87635 SARS-COV-2 COVID-19 AMP PRB: CPT | Performed by: EMERGENCY MEDICINE

## 2025-03-16 PROCEDURE — 82947 ASSAY GLUCOSE BLOOD QUANT: CPT

## 2025-03-16 PROCEDURE — 85025 COMPLETE CBC W/AUTO DIFF WBC: CPT | Performed by: EMERGENCY MEDICINE

## 2025-03-16 PROCEDURE — 83690 ASSAY OF LIPASE: CPT | Performed by: EMERGENCY MEDICINE

## 2025-03-16 PROCEDURE — 80053 COMPREHEN METABOLIC PANEL: CPT | Performed by: EMERGENCY MEDICINE

## 2025-03-16 PROCEDURE — 81002 URINALYSIS NONAUTO W/O SCOPE: CPT | Performed by: EMERGENCY MEDICINE

## 2025-03-16 PROCEDURE — 82330 ASSAY OF CALCIUM: CPT

## 2025-03-17 VITALS
BODY MASS INDEX: 26.82 KG/M2 | HEART RATE: 91 BPM | WEIGHT: 198 LBS | SYSTOLIC BLOOD PRESSURE: 122 MMHG | OXYGEN SATURATION: 96 % | RESPIRATION RATE: 20 BRPM | TEMPERATURE: 98 F | DIASTOLIC BLOOD PRESSURE: 63 MMHG | HEIGHT: 72 IN

## 2025-03-17 LAB
AMPHET UR QL SCN: NEGATIVE
BARBITURATES UR QL SCN: NEGATIVE
BENZODIAZ METAB UR QL SCN: NEGATIVE
CANNABINOIDS UR QL SCN: NEGATIVE
COCAINE UR QL SCN: NEGATIVE
KETONES UR STRIP-SCNC: NEGATIVE MG/DL
OPIATES UR QL SCN: NEGATIVE
PCP UR QL SCN: NEGATIVE

## 2025-03-17 PROCEDURE — 96374 THER/PROPH/DIAG INJ IV PUSH: CPT

## 2025-03-17 PROCEDURE — 96361 HYDRATE IV INFUSION ADD-ON: CPT

## 2025-03-17 PROCEDURE — 25000003 PHARM REV CODE 250: Mod: UD | Performed by: EMERGENCY MEDICINE

## 2025-03-17 PROCEDURE — 63600175 PHARM REV CODE 636 W HCPCS: Mod: UD | Performed by: EMERGENCY MEDICINE

## 2025-03-17 RX ORDER — PROMETHAZINE HYDROCHLORIDE 25 MG/1
25 TABLET ORAL EVERY 6 HOURS PRN
Qty: 15 TABLET | Refills: 0 | Status: SHIPPED | OUTPATIENT
Start: 2025-03-17

## 2025-03-17 RX ORDER — ONDANSETRON HYDROCHLORIDE 2 MG/ML
4 INJECTION, SOLUTION INTRAVENOUS
Status: COMPLETED | OUTPATIENT
Start: 2025-03-17 | End: 2025-03-17

## 2025-03-17 RX ADMIN — ONDANSETRON 4 MG: 2 INJECTION INTRAMUSCULAR; INTRAVENOUS at 01:03

## 2025-03-17 RX ADMIN — SODIUM CHLORIDE 1000 ML: 9 INJECTION, SOLUTION INTRAVENOUS at 12:03

## 2025-03-17 NOTE — ED PROVIDER NOTES
"Encounter Date: 3/16/2025       History     Chief Complaint   Patient presents with    Nausea    Vomiting    Abdominal Pain     EMS from home for c/o of n/v/abd pain. Reports being out of his "diabetic medication"     Patient complains of nausea and epigastric abdominal discomfort.  Patient arrives by EMS.  History per EMS and per the patient.  Patient reports he has been drinking beer but that has not been taking his insulin.  He has been out of needles to give his insulin.  History also per Selisa his significant other.  The pharmacy says they will have insulin needles later today/Monday per his significant other.  He has been in DKA previously.  Also patient has suffers from chronic weakness of the right arm in the right leg from a prior stroke.  No new neurologic changes.  Denies prior pancreatitis.  No associated diarrhea.  No fever.  No chest pain.      Review of patient's allergies indicates:  No Known Allergies  Past Medical History:   Diagnosis Date    Alcoholic fatty liver     CHF (congestive heart failure) 02/20/2024    EF 25%    COPD (chronic obstructive pulmonary disease)     Coronary artery disease involving coronary bypass graft of native heart without angina pectoris 01/01/2022    Dyslipidemia 12/16/2023    Essential (primary) hypertension 01/01/2022    Expressive aphasia 05/05/2023    GERD with esophagitis 2018    EGD    Hemiparesis affecting right side as late effect of cerebrovascular accident     ROSEMARIE (obstructive sleep apnea)     Pulmonary hypertension     Stroke     right hand contracted    Tricuspid regurgitation     Type 2 diabetes mellitus      Past Surgical History:   Procedure Laterality Date    CARDIAC SURGERY      CABG    CORONARY ANGIOPLASTY WITH STENT PLACEMENT      bare metal stent    HEMIARTHROPLASTY OF HIP Right 01/02/2022    Procedure: HEMIARTHROPLASTY, HIP;  Surgeon: Ramses Chua MD;  Location: [facility];  Service: Orthopedics;  Laterality: Right;    LAPAROSCOPIC " CHOLECYSTECTOMY  03/09/2024    Dr. Bennett    LAPAROSCOPIC CHOLECYSTECTOMY WITH CHOLANGIOGRAPHY N/A 3/9/2024    Procedure: CHOLECYSTECTOMY, LAPAROSCOPIC, WITH CHOLANGIOGRAM;  Surgeon: Karson Bennett DO;  Location: [facility] OR;  Service: General;  Laterality: N/A;    RIGHT HEART CATHETERIZATION Right 02/22/2024    Procedure: INSERTION, CATHETER, RIGHT HEART;  Surgeon: Ruiz Pacheco MD;  Location: [facility] CATH LAB;  Service: Cardiology;  Laterality: Right;     Family History   Problem Relation Name Age of Onset    Diabetes Mother      Heart disease Mother      Hypertension Mother       Social History[1]  Review of Systems   Constitutional:  Negative for fever.   HENT:  Negative for sore throat.    Respiratory:  Negative for shortness of breath.    Cardiovascular:  Negative for chest pain.   Gastrointestinal:  Positive for abdominal pain, nausea and vomiting.   Genitourinary:  Negative for dysuria.   Musculoskeletal:  Negative for back pain.   Skin:  Negative for rash.   Neurological:  Negative for weakness.   Hematological:  Does not bruise/bleed easily.       Physical Exam     Initial Vitals [03/16/25 2208]   BP Pulse Resp Temp SpO2   (!) 115/55 82 20 97.6 °F (36.4 °C) 99 %      MAP       --         Physical Exam    Nursing note and vitals reviewed.  Constitutional: He appears well-developed and well-nourished.   HENT:   Head: Normocephalic and atraumatic.   Eyes: EOM are normal. Pupils are equal, round, and reactive to light.   Neck: Neck supple. No thyromegaly present. No JVD present.   Normal range of motion.  Cardiovascular:  Normal rate, regular rhythm, normal heart sounds and intact distal pulses.           No murmur heard.  Pulmonary/Chest: Breath sounds normal. No stridor. No respiratory distress. He has no wheezes.   Abdominal: Abdomen is soft. Bowel sounds are normal. He exhibits no distension. There is no abdominal tenderness.   Musculoskeletal:         General: No tenderness or edema. Normal  range of motion.      Cervical back: Normal range of motion and neck supple.     Lymphadenopathy:     He has no cervical adenopathy.   Neurological: He is alert and oriented to person, place, and time. He has normal strength. No cranial nerve deficit or sensory deficit. GCS score is 15. GCS eye subscore is 4. GCS verbal subscore is 5. GCS motor subscore is 6.   Skin: Skin is warm and dry. Capillary refill takes less than 2 seconds. No rash noted.   Psychiatric: He has a normal mood and affect.         Medical Screening Exam   See Full Note    ED Course   Procedures  Labs Reviewed   COMPREHENSIVE METABOLIC PANEL - Abnormal       Result Value    Sodium 130 (*)     Potassium 4.1      Chloride 96 (*)     CO2 17 (*)     Anion Gap 21 (*)     Glucose 207 (*)     BUN 9      Creatinine 1.25      BUN/Creatinine Ratio 7      Calcium 8.7      Total Protein 7.5      Albumin 3.7      Globulin 3.8      A/G Ratio 1.0      Bilirubin, Total 0.4      Alk Phos 136      ALT 26      AST 39      eGFR 68     ALCOHOL,MEDICAL (ETHANOL) - Abnormal    Ethanol 20 (*)    URINALYSIS, REFLEX TO URINE CULTURE - Abnormal    Color, UA Light Yellow      Clarity, UA Clear      pH, UA 5.0      Leukocytes, UA Negative      Nitrites, UA Negative      Protein, UA Negative      Glucose, UA >1000 (*)     Ketones, UA Negative      Urobilinogen, UA Normal      Bilirubin, UA Negative      Blood, UA Negative      Specific Gravity, UA 1.012     CBC WITH DIFFERENTIAL - Abnormal    WBC 7.18      RBC 5.57      Hemoglobin 15.7      Hematocrit 47.3      MCV 84.9      MCH 28.2      MCHC 33.2      RDW 14.2      Platelet Count 240      MPV 9.5      Neutrophils % 76.8 (*)     Lymphocytes % 12.0 (*)     Monocytes % 8.4 (*)     Eosinophils % 1.0      Basophils % 0.8      Immature Granulocytes % 1.0 (*)     nRBC, Auto 0.0      Neutrophils, Abs 5.52      Lymphocytes, Absolute 0.86 (*)     Monocytes, Absolute 0.60      Eosinophils, Absolute 0.07      Basophils, Absolute 0.06       Immature Granulocytes, Absolute 0.07 (*)     nRBC, Absolute 0.00      Diff Type Auto     POCT GLUCOSE MONITORING CONTINUOUS - Abnormal    POC Glucose 180 (*)    INFLUENZA A & B BY MOLECULAR - Normal    INFLUENZA A MOLECULAR Negative      INFLUENZA B MOLECULAR  Negative     DRUG SCREEN, URINE (BEAKER) - Normal    Barbiturates, Urine Negative      Benzodiazepine, Urine Negative      Opiates, Urine Negative      Phencyclidine, Urine Negative      Amphetamine, Urine Negative      Cannabinoid, Urine Negative      Cocaine, Urine Negative      Narrative:     This screen includes the following classes of drugs at the listed cut-off:    Benzodiazepines 200 ng/ml  Cocaine metabolite 300 ng/ml  Opiates 2000 ng/ml  Barbiturates 200 ng/ml  Amphetamines 500 ng/ml  Marijuana metabs (THC) 50 ng/ml  Phencyclidine (PCP) 25 ng/ml    This is a screening test. If results do not correlate with clinical presentation, then a confirmatory send out test is advised.   KETONES URINE - Normal    Ketones, UA Negative     MAGNESIUM - Normal    Magnesium 2.0     LIPASE - Normal    Lipase 18     SARS-COV-2 RNA AMPLIFICATION, QUAL - Normal    SARS COV-2 Molecular Negative      Narrative:     Negative SARS-CoV results should not be used as the sole basis for treatment or patient management decisions; negative results should be considered in the context of a patient's recent exposures, history and the presene of clinical signs and symptoms consistent with COVID-19.  Negative results should be treated as presumptive and confirmed by molecular assay, if necessary for patient management.   CBC W/ AUTO DIFFERENTIAL    Narrative:     The following orders were created for panel order CBC auto differential.  Procedure                               Abnormality         Status                     ---------                               -----------         ------                     CBC with Differential[[id number]]       Abnormal            Final result                  Please view results for these tests on the individual orders.          Imaging Results              X-Ray Chest 1 View (In process)                      Medications   sodium chloride 0.9% bolus 1,000 mL 1,000 mL (has no administration in time range)     Medical Decision Making  Amount and/or Complexity of Data Reviewed  Labs: ordered.  Radiology: ordered.               ED Course as of 03/17/25 0033   Mon Mar 17, 2025   0031 Patient currently asymptomatic.  Abdominal exam benign.  White count normal.  PH 7.32.  No ketones in serum or urine.  Presentation not consistent with DKA or acute surgical abnormality [PK]      ED Course User Index  [PK] Jose Cruz Bergman MD                           Clinical Impression:   Final diagnoses:  [R53.1] General weakness  [R11.2] Nausea and vomiting, unspecified vomiting type (Primary)  [R73.9] Hyperglycemia        ED Disposition Condition    Discharge Stable          ED Prescriptions       Medication Sig Dispense Start Date End Date Auth. Provider    promethazine (PHENERGAN) 25 MG tablet Take 1 tablet (25 mg total) by mouth every 6 (six) hours as needed for Nausea. 15 tablet 3/17/2025 -- Jose Cruz Bergman MD          Follow-up Information    None              [1]   Social History  Tobacco Use    Smoking status: Former     Current packs/day: 0.50     Types: Cigarettes    Smokeless tobacco: Current     Types: Chew   Substance Use Topics    Alcohol use: Not Currently     Comment: 1 quart    Drug use: Never        Jose Cruz Bergman MD  03/17/25 0033

## 2025-03-17 NOTE — DISCHARGE INSTRUCTIONS
Use Phenergan or previously prescribed Zofran as needed for nausea    Start taking your insulin again today after you get the needles that you expect to be at the pharmacy today.  Follow up with primary care.    Return the ER if symptoms worsen or new symptoms develop

## 2025-03-20 DIAGNOSIS — G47.00 INSOMNIA, UNSPECIFIED TYPE: ICD-10-CM

## 2025-03-20 RX ORDER — PANTOPRAZOLE SODIUM 40 MG/1
40 TABLET, DELAYED RELEASE ORAL DAILY
Qty: 90 TABLET | Refills: 1 | Status: SHIPPED | OUTPATIENT
Start: 2025-03-20

## 2025-03-20 RX ORDER — INSULIN DETEMIR 100 [IU]/ML
10 INJECTION, SOLUTION SUBCUTANEOUS NIGHTLY
Qty: 10 ML | Refills: 2 | Status: SHIPPED | OUTPATIENT
Start: 2025-03-20

## 2025-03-20 RX ORDER — IBUPROFEN 200 MG
1 TABLET ORAL DAILY
Qty: 90 PATCH | Refills: 1 | Status: SHIPPED | OUTPATIENT
Start: 2025-03-20

## 2025-03-20 RX ORDER — METOPROLOL SUCCINATE 25 MG/1
12.5 TABLET, EXTENDED RELEASE ORAL DAILY
Qty: 30 TABLET | Refills: 1 | Status: SHIPPED | OUTPATIENT
Start: 2025-03-20

## 2025-03-20 RX ORDER — ATORVASTATIN CALCIUM 40 MG/1
40 TABLET, FILM COATED ORAL NIGHTLY
Qty: 90 TABLET | Refills: 1 | Status: SHIPPED | OUTPATIENT
Start: 2025-03-20

## 2025-03-20 RX ORDER — DIGOXIN 125 MCG
0.12 TABLET ORAL DAILY
Qty: 90 TABLET | Refills: 1 | Status: SHIPPED | OUTPATIENT
Start: 2025-03-20

## 2025-03-21 DIAGNOSIS — G47.00 INSOMNIA, UNSPECIFIED TYPE: ICD-10-CM

## 2025-03-24 RX ORDER — ZOLPIDEM TARTRATE 5 MG/1
TABLET ORAL
Qty: 30 TABLET | Refills: 1 | Status: SHIPPED | OUTPATIENT
Start: 2025-03-24

## 2025-05-01 RX ORDER — SACUBITRIL AND VALSARTAN 49; 51 MG/1; MG/1
1 TABLET, FILM COATED ORAL 2 TIMES DAILY
Qty: 180 TABLET | Refills: 1 | Status: SHIPPED | OUTPATIENT
Start: 2025-05-01

## 2025-05-01 RX ORDER — AMIODARONE HYDROCHLORIDE 200 MG/1
200 TABLET ORAL DAILY
Qty: 30 TABLET | Refills: 2 | Status: SHIPPED | OUTPATIENT
Start: 2025-05-01

## 2025-05-01 RX ORDER — TORSEMIDE 20 MG/1
20 TABLET ORAL 2 TIMES DAILY
Qty: 60 TABLET | Refills: 2 | Status: SHIPPED | OUTPATIENT
Start: 2025-05-01

## 2025-05-26 DIAGNOSIS — G47.00 INSOMNIA, UNSPECIFIED TYPE: ICD-10-CM

## 2025-05-27 RX ORDER — EMPAGLIFLOZIN 10 MG/1
10 TABLET, FILM COATED ORAL
Qty: 30 TABLET | Refills: 1 | Status: SHIPPED | OUTPATIENT
Start: 2025-05-27

## 2025-05-27 RX ORDER — ZOLPIDEM TARTRATE 5 MG/1
TABLET ORAL
Qty: 30 TABLET | Refills: 1 | Status: SHIPPED | OUTPATIENT
Start: 2025-05-27

## 2025-06-04 RX ORDER — INSULIN GLARGINE 100 [IU]/ML
10 INJECTION, SOLUTION SUBCUTANEOUS NIGHTLY
Qty: 3 ML | Refills: 3 | Status: SHIPPED | OUTPATIENT
Start: 2025-06-04

## 2025-07-03 ENCOUNTER — TELEPHONE (OUTPATIENT)
Dept: FAMILY MEDICINE | Facility: CLINIC | Age: 55
End: 2025-07-03
Payer: MEDICAID

## 2025-07-03 NOTE — TELEPHONE ENCOUNTER
Contacted by pt SANDRO Barrientos concerning pt medication refills.  SO stated that she needed refills on pt medications, and that a PA was needed on his insulin.  Pt pharmacy contacted and was informed that medications were available for pickup including his Lantus insulin.  PA needed on Levemir.  SO contacted and informed that medications were ready, and that pharmacist stated that pt would be fined taking Lantus as ordered while waiting on PA for Levemir due to medications being interchangable.  SO voiced understanding............Dasha Pinto RN     ----- Message from Sally sent at 7/2/2025  9:45 AM CDT -----  Regarding: REFILL  Patient need refills on his Entresto 49/51 MG    Torsemide Tab 20MG   Zolpidem 5MG tab     Sure  Comfort insulin syrings 6x31 [place]  short1/4 100 ct please send to [facility] pharmacy on [address] patient can be reached [phone] Leslie is his Assistant

## 2025-07-23 DIAGNOSIS — G47.00 INSOMNIA, UNSPECIFIED TYPE: ICD-10-CM

## 2025-07-23 RX ORDER — ZOLPIDEM TARTRATE 5 MG/1
TABLET ORAL
Qty: 30 TABLET | Refills: 1 | Status: SHIPPED | OUTPATIENT
Start: 2025-07-23

## 2025-07-30 ENCOUNTER — OFFICE VISIT (OUTPATIENT)
Dept: FAMILY MEDICINE | Facility: CLINIC | Age: 55
End: 2025-07-30
Payer: MEDICAID

## 2025-07-30 VITALS
OXYGEN SATURATION: 97 % | SYSTOLIC BLOOD PRESSURE: 103 MMHG | BODY MASS INDEX: 26.95 KG/M2 | RESPIRATION RATE: 17 BRPM | TEMPERATURE: 98 F | WEIGHT: 199 LBS | DIASTOLIC BLOOD PRESSURE: 63 MMHG | HEART RATE: 81 BPM | HEIGHT: 72 IN

## 2025-07-30 DIAGNOSIS — E13.9 DIABETES MELLITUS OF OTHER TYPE WITHOUT COMPLICATION, UNSPECIFIED WHETHER LONG TERM INSULIN USE: ICD-10-CM

## 2025-07-30 DIAGNOSIS — Z13.220 SCREENING FOR LIPID DISORDERS: Primary | ICD-10-CM

## 2025-07-30 DIAGNOSIS — I10 ESSENTIAL (PRIMARY) HYPERTENSION: ICD-10-CM

## 2025-07-30 DIAGNOSIS — G47.00 INSOMNIA, UNSPECIFIED TYPE: ICD-10-CM

## 2025-07-30 DIAGNOSIS — K21.9 GASTROESOPHAGEAL REFLUX DISEASE, UNSPECIFIED WHETHER ESOPHAGITIS PRESENT: ICD-10-CM

## 2025-07-30 DIAGNOSIS — L03.818 CELLULITIS OF OTHER SPECIFIED SITE: ICD-10-CM

## 2025-07-30 DIAGNOSIS — E78.5 DYSLIPIDEMIA: ICD-10-CM

## 2025-07-30 DIAGNOSIS — I50.9 CONGESTIVE HEART FAILURE, UNSPECIFIED HF CHRONICITY, UNSPECIFIED HEART FAILURE TYPE: ICD-10-CM

## 2025-07-30 LAB
CHOLEST SERPL-MCNC: 150 MG/DL
CHOLEST/HDLC SERPL: 3.8 {RATIO}
EST. AVERAGE GLUCOSE BLD GHB EST-MCNC: 137 MG/DL
HBA1C MFR BLD HPLC: 6.4 %
HDLC SERPL-MCNC: 40 MG/DL (ref 35–60)
LDLC SERPL CALC-MCNC: 89 MG/DL
LDLC/HDLC SERPL: 2.2 {RATIO}
NONHDLC SERPL-MCNC: 110 MG/DL
TRIGL SERPL-MCNC: 103 MG/DL (ref 34–140)
VLDLC SERPL-MCNC: 21 MG/DL

## 2025-07-30 PROCEDURE — 3008F BODY MASS INDEX DOCD: CPT | Mod: CPTII,,, | Performed by: INTERNAL MEDICINE

## 2025-07-30 PROCEDURE — 4010F ACE/ARB THERAPY RXD/TAKEN: CPT | Mod: CPTII,,, | Performed by: INTERNAL MEDICINE

## 2025-07-30 PROCEDURE — 3078F DIAST BP <80 MM HG: CPT | Mod: CPTII,,, | Performed by: INTERNAL MEDICINE

## 2025-07-30 PROCEDURE — 99214 OFFICE O/P EST MOD 30 MIN: CPT | Mod: ,,, | Performed by: INTERNAL MEDICINE

## 2025-07-30 PROCEDURE — 1159F MED LIST DOCD IN RCRD: CPT | Mod: CPTII,,, | Performed by: INTERNAL MEDICINE

## 2025-07-30 PROCEDURE — 3074F SYST BP LT 130 MM HG: CPT | Mod: CPTII,,, | Performed by: INTERNAL MEDICINE

## 2025-07-30 PROCEDURE — 83036 HEMOGLOBIN GLYCOSYLATED A1C: CPT | Mod: ,,, | Performed by: CLINICAL MEDICAL LABORATORY

## 2025-07-30 PROCEDURE — 80061 LIPID PANEL: CPT | Mod: ,,, | Performed by: CLINICAL MEDICAL LABORATORY

## 2025-07-30 RX ORDER — ATORVASTATIN CALCIUM 40 MG/1
40 TABLET, FILM COATED ORAL NIGHTLY
Qty: 90 TABLET | Refills: 1 | Status: SHIPPED | OUTPATIENT
Start: 2025-07-30

## 2025-07-30 RX ORDER — INSULIN GLARGINE 100 [IU]/ML
10 INJECTION, SOLUTION SUBCUTANEOUS NIGHTLY
Qty: 3 ML | Refills: 3 | Status: SHIPPED | OUTPATIENT
Start: 2025-07-30

## 2025-07-30 RX ORDER — SULFAMETHOXAZOLE AND TRIMETHOPRIM 800; 160 MG/1; MG/1
1 TABLET ORAL 2 TIMES DAILY
Qty: 14 TABLET | Refills: 0 | Status: SHIPPED | OUTPATIENT
Start: 2025-07-30

## 2025-07-30 RX ORDER — PANTOPRAZOLE SODIUM 40 MG/1
40 TABLET, DELAYED RELEASE ORAL DAILY
Qty: 90 TABLET | Refills: 1 | Status: SHIPPED | OUTPATIENT
Start: 2025-07-30

## 2025-07-30 RX ORDER — ZOLPIDEM TARTRATE 5 MG/1
5 TABLET ORAL NIGHTLY PRN
Qty: 30 TABLET | Refills: 1 | Status: SHIPPED | OUTPATIENT
Start: 2025-07-30

## 2025-07-30 RX ORDER — SULFAMETHOXAZOLE AND TRIMETHOPRIM 800; 160 MG/1; MG/1
1 TABLET ORAL 2 TIMES DAILY
COMMUNITY
End: 2025-07-30 | Stop reason: SDUPTHER

## 2025-07-30 RX ORDER — AMLODIPINE BESYLATE 10 MG/1
10 TABLET ORAL DAILY
Qty: 90 TABLET | Refills: 1 | Status: SHIPPED | OUTPATIENT
Start: 2025-07-30

## 2025-07-30 RX ORDER — SACUBITRIL AND VALSARTAN 49; 51 MG/1; MG/1
1 TABLET, FILM COATED ORAL 2 TIMES DAILY
Qty: 180 TABLET | Refills: 1 | Status: SHIPPED | OUTPATIENT
Start: 2025-07-30

## 2025-07-30 RX ORDER — MUPIROCIN 20 MG/G
1 OINTMENT TOPICAL 3 TIMES DAILY
COMMUNITY
End: 2025-07-30 | Stop reason: SDUPTHER

## 2025-07-30 RX ORDER — POTASSIUM CHLORIDE 750 MG/1
10 TABLET, EXTENDED RELEASE ORAL DAILY
Qty: 90 TABLET | Refills: 1 | Status: SHIPPED | OUTPATIENT
Start: 2025-07-30

## 2025-07-30 RX ORDER — AMIODARONE HYDROCHLORIDE 200 MG/1
200 TABLET ORAL DAILY
Qty: 30 TABLET | Refills: 2 | Status: SHIPPED | OUTPATIENT
Start: 2025-07-30

## 2025-07-30 RX ORDER — TORSEMIDE 20 MG/1
20 TABLET ORAL 2 TIMES DAILY
Qty: 60 TABLET | Refills: 2 | Status: SHIPPED | OUTPATIENT
Start: 2025-07-30

## 2025-07-30 RX ORDER — DIGOXIN 125 MCG
0.12 TABLET ORAL DAILY
Qty: 90 TABLET | Refills: 1 | Status: SHIPPED | OUTPATIENT
Start: 2025-07-30

## 2025-07-30 RX ORDER — MUPIROCIN 20 MG/G
1 OINTMENT TOPICAL 2 TIMES DAILY
Qty: 15 G | Refills: 0 | Status: SHIPPED | OUTPATIENT
Start: 2025-07-30

## 2025-07-30 NOTE — PROGRESS NOTES
Subjective:       Patient ID: Karin Carrera is a 54 y.o. male.    Chief Complaint: Medication Refill and Health Maintenance (Foot Exam Never done/Diabetic Eye Exam Never done/Lipid Panel due on 02/27/2025/Hemoglobin A1c due on 05/18/2025/Diabetes Urine Screening due on 07/17/2025 )    History of Present Illness    CHIEF COMPLAINT:  Patient presents today for a toenail issue.    TOENAIL PROBLEM:  His toenail has been detached for three weeks. The affected toe appears infected and is painful.    MEDICAL HISTORY:  His past medical history is significant for atrial fibrillation, diabetes, and GERD.    MEDICATIONS:  He takes multiple medications for blood pressure, heart health, and cholesterol management. He is currently on Ambien for sleep and continues his ongoing medication regimen for chronic conditions including atrial fibrillation and diabetes.         Current Medications:  Current Medications[1]           ROS  Twelve point system reviewed, unremarkable except for stated above in HPI.        Objective:         Vitals:    07/30/25 0942   BP: 103/63   BP Location: Left arm   Patient Position: Sitting   Pulse: 81   Resp: 17   Temp: 97.5 °F (36.4 °C)   TempSrc: Temporal   SpO2: 97%   Weight: 90.3 kg (199 lb)   Height: 6' (1.829 m)        Physical Exam     Patient is awake alert oriented person place and  Lungs are clear to auscultation bilaterally no crackles or wheezes   Cardiovascular S1-S2 regular rate and rhythm no murmurs rubs or gallops   Abdomen is soft positive bowel sounds nontender, extremities no clubbing cyanosis edema  Neuro no focal neurological deficits  Skin warm and dry.     Last Labs:     No visits with results within 1 Month(s) from this visit.   Latest known visit with results is:   Admission on 03/16/2025, Discharged on 03/17/2025   Component Date Value    Sodium 03/16/2025 130 (L)     Potassium 03/16/2025 4.1     Chloride 03/16/2025 96 (L)     CO2 03/16/2025 17 (L)     Anion Gap 03/16/2025  21 (H)     Glucose 03/16/2025 207 (H)     BUN 03/16/2025 9     Creatinine 03/16/2025 1.25     BUN/Creatinine Ratio 03/16/2025 7     Calcium 03/16/2025 8.7     Total Protein 03/16/2025 7.5     Albumin 03/16/2025 3.7     Globulin 03/16/2025 3.8     A/G Ratio 03/16/2025 1.0     Bilirubin, Total 03/16/2025 0.4     Alk Phos 03/16/2025 136     ALT 03/16/2025 26     AST 03/16/2025 39     eGFR 03/16/2025 68     Ethanol 03/16/2025 20 (H)     Color, UA 03/16/2025 Light Yellow     Clarity, UA 03/16/2025 Clear     pH, UA 03/16/2025 5.0     Leukocytes, UA 03/16/2025 Negative     Nitrites, UA 03/16/2025 Negative     Protein, UA 03/16/2025 Negative     Glucose, UA 03/16/2025 >1000 (A)     Ketones, UA 03/16/2025 Negative     Urobilinogen, UA 03/16/2025 Normal     Bilirubin, UA 03/16/2025 Negative     Blood, UA 03/16/2025 Negative     Specific Gravity, UA 03/16/2025 1.012     Barbiturates, Urine 03/16/2025 Negative     Benzodiazepine, Urine 03/16/2025 Negative     Opiates, Urine 03/16/2025 Negative     Phencyclidine, Urine 03/16/2025 Negative     Amphetamine, Urine 03/16/2025 Negative     Cannabinoid, Urine 03/16/2025 Negative     Cocaine, Urine 03/16/2025 Negative     Ketones, UA 03/16/2025 Negative     Magnesium 03/16/2025 2.0     Lipase 03/16/2025 18     WBC 03/16/2025 7.18     RBC 03/16/2025 5.57     Hemoglobin 03/16/2025 15.7     Hematocrit 03/16/2025 47.3     MCV 03/16/2025 84.9     MCH 03/16/2025 28.2     MCHC 03/16/2025 33.2     RDW 03/16/2025 14.2     Platelet Count 03/16/2025 240     MPV 03/16/2025 9.5     Neutrophils % 03/16/2025 76.8 (H)     Lymphocytes % 03/16/2025 12.0 (L)     Monocytes % 03/16/2025 8.4 (H)     Eosinophils % 03/16/2025 1.0     Basophils % 03/16/2025 0.8     Immature Granulocytes % 03/16/2025 1.0 (H)     nRBC, Auto 03/16/2025 0.0     Neutrophils, Abs 03/16/2025 5.52     Lymphocytes, Absolute 03/16/2025 0.86 (L)     Monocytes, Absolute 03/16/2025 0.60     Eosinophils, Absolute 03/16/2025 0.07      Basophils, Absolute 03/16/2025 0.06     Immature Granulocytes, A* 03/16/2025 0.07 (H)     nRBC, Absolute 03/16/2025 0.00     Diff Type 03/16/2025 Auto     SARS COV-2 Molecular 03/16/2025 Negative     INFLUENZA A MOLECULAR 03/16/2025 Negative     INFLUENZA B MOLECULAR  03/16/2025 Negative     POC PH 03/16/2025 7.38     POC PCO2 03/16/2025 37 (L)     POC PO2 03/16/2025 40     POC Sodium 03/16/2025 129 (L)     POC Potassium 03/16/2025 3.8     POC Ionized Calcium 03/16/2025 1.03 (L)     POCT Glucose 03/16/2025 221 (H)     POC Lactate 03/16/2025 3.7 (H)     POC Hematocrit 03/16/2025 54 (H)     POC HCO3 03/16/2025 21.9 (L)     POC CO2 03/16/2025 23.0     POC Base Excess 03/16/2025 -2.7 (L)     POC SATURATED O2 03/16/2025 74 (H)     POC Glucose 03/16/2025 180 (H)        Last Imaging:  X-Ray Chest 1 View  Narrative: EXAMINATION:  XR CHEST, 1 VIEW    CLINICAL HISTORY:  Generalized weakness    TECHNIQUE:  Single frontal view of the chest was performed.    COMPARISON:  CXRs 11/09/2024, 03/26/2024, 01/25/2023, 01/01/2022.    FINDINGS:  Mild chronic stable cardiomegaly.    CABG.    Left-sided coronary stents.    Pulmonary vessels appear unremarkable.    Atherosclerotic calcification of aortic arch.    Mild bilateral chronic pleural thickening greater on left side.    Mild chronic fibrosis at lateral side of left mid-lower lung.    Mild chronic elevation of right diaphragm.    Cholecystectomy.  Impression: 1.  No acute abnormality or significant interval change.    2.  CAD treatment changes.    3.  Chronic findings as described.    Electronically signed by: Walker Aponte  Date:    03/17/2025  Time:    07:09         **Labs and x-rays personally reviewed by me    ** reviewed           Assessment & Plan:   Assessment & Plan    E13.9 Diabetes mellitus of other type without complication, unspecified whether long term insulin use  Z13.220 Screening for lipid disorders  G47.00 Insomnia, unspecified type    IMPRESSION:  - Assessed toenail  injury, noting potential infection after 3 weeks.  - Considered history of a-fib, diabetes, and GERD in medication management.  - Evaluated need for medication refills.    E13.9 DIABETES MELLITUS OF OTHER TYPE WITHOUT COMPLICATION, UNSPECIFIED WHETHER LONG TERM INSULIN USE:  - Started oral antibiotic and Bactrim ointment for 1 week (apply ointment to affected toenail twice daily).  - Ordered bloodwork.  - Referred to podiatrist; patient to follow up when their office contacts for appointment.    Z13.220 SCREENING FOR LIPID DISORDERS:  - Continued medications for cholesterol.  - Ordered bloodwork.    G47.00 INSOMNIA, UNSPECIFIED TYPE:  - Continued Ambien for sleep.           1. Screening for lipid disorders  -     Lipid Panel; Future; Expected date: 07/30/2025    2. Diabetes mellitus of other type without complication, unspecified whether long term insulin use  -     Microalbumin/creatinine urine ratio; Future; Expected date: 07/30/2025  -     Hemoglobin A1C; Future; Expected date: 07/30/2025  -     empagliflozin (JARDIANCE) 10 mg tablet; Take 1 tablet (10 mg total) by mouth once daily.  Dispense: 30 tablet; Refill: 1  -     insulin glargine U-100, Lantus, (LANTUS SOLOSTAR U-100 INSULIN) 100 unit/mL (3 mL) InPn pen; Inject 10 Units into the skin every evening.  Dispense: 3 mL; Refill: 3  -     Protein/Creatinine Ratio, Urine; Future; Expected date: 07/30/2025  -     Ambulatory referral/consult to Podiatry; Future; Expected date: 08/06/2025    3. Insomnia, unspecified type  -     zolpidem (AMBIEN) 5 MG Tab; Take 1 tablet (5 mg total) by mouth nightly as needed (prn).  Dispense: 30 tablet; Refill: 1    4. Essential (primary) hypertension  -     amiodarone (PACERONE) 200 MG Tab; Take 1 tablet (200 mg total) by mouth once daily.  Dispense: 30 tablet; Refill: 2  -     amLODIPine (NORVASC) 10 MG tablet; Take 1 tablet (10 mg total) by mouth once daily.  Dispense: 90 tablet; Refill: 1  -     potassium chloride (KLOR-CON) 10  MEQ TbSR; Take 1 tablet (10 mEq total) by mouth once daily.  Dispense: 90 tablet; Refill: 1  -     torsemide (DEMADEX) 20 MG Tab; Take 1 tablet (20 mg total) by mouth 2 (two) times a day.  Dispense: 60 tablet; Refill: 2    5. Dyslipidemia  -     atorvastatin (LIPITOR) 40 MG tablet; Take 1 tablet (40 mg total) by mouth every evening.  Dispense: 90 tablet; Refill: 1    6. Congestive heart failure, unspecified HF chronicity, unspecified heart failure type  -     digoxin (LANOXIN) 125 mcg tablet; Take 1 tablet (0.125 mg total) by mouth once daily.  Dispense: 90 tablet; Refill: 1  -     potassium chloride (KLOR-CON) 10 MEQ TbSR; Take 1 tablet (10 mEq total) by mouth once daily.  Dispense: 90 tablet; Refill: 1  -     sacubitriL-valsartan (ENTRESTO) 49-51 mg per tablet; Take 1 tablet by mouth 2 (two) times daily.  Dispense: 180 tablet; Refill: 1  -     torsemide (DEMADEX) 20 MG Tab; Take 1 tablet (20 mg total) by mouth 2 (two) times a day.  Dispense: 60 tablet; Refill: 2    7. Gastroesophageal reflux disease, unspecified whether esophagitis present  -     pantoprazole (PROTONIX) 40 MG tablet; Take 1 tablet (40 mg total) by mouth once daily.  Dispense: 90 tablet; Refill: 1    8. Cellulitis left  great toe   -     sulfamethoxazole-trimethoprim 800-160mg (BACTRIM DS) 800-160 mg Tab; Take 1 tablet by mouth 2 (two) times daily.  Dispense: 14 tablet; Refill: 0  -     mupirocin (BACTROBAN) 2 % ointment; Apply 1 g topically 2 (two) times daily.  Dispense: 15 g; Refill: 0            Walker Smith MD  This note was generated with the assistance of ambient listening technology. Verbal consent was obtained by the patient and accompanying visitor(s) for the recording of patient appointment to facilitate this note. I attest to having reviewed and edited the generated note for accuracy, though some syntax or spelling errors may persist. Please contact the author of this note for any clarification.            [1]   Current  Outpatient Medications:     albuterol (PROVENTIL/VENTOLIN HFA) 90 mcg/actuation inhaler, SMARTSI-2 Puff(s) By Mouth Every 4 Hours PRN, Disp: 18 g, Rfl: 6    aspirin (ECOTRIN) 81 MG EC tablet, Take 1 tablet (81 mg total) by mouth once daily., Disp: 90 tablet, Rfl: 1    furosemide (LASIX) 40 MG tablet, TAKE ONE TABLET BY MOUTH DAILY AS NEEDED FOR SWELLING, Disp: 30 tablet, Rfl: 1    insulin detemir U-100 (LEVEMIR U-100 INSULIN) 100 unit/mL injection, Inject 10 Units into the skin every evening., Disp: 10 mL, Rfl: 2    LEVEMIR FLEXPEN 100 unit/mL (3 mL) InPn pen, INJECT 10 UNITS SUB-Q EVERY EVENING KEEP REFRIGERATED, Disp: 36 mL, Rfl: 0    metoprolol succinate (TOPROL-XL) 25 MG 24 hr tablet, Take 0.5 tablets (12.5 mg total) by mouth once daily. Take 1/2 tablet by mouth daily, Disp: 30 tablet, Rfl: 1    nicotine (NICODERM CQ) 14 mg/24 hr, Place 1 patch onto the skin once daily., Disp: 90 patch, Rfl: 1    ondansetron (ZOFRAN) 4 MG tablet, Take 1 tablet (4 mg total) by mouth 3 (three) times daily as needed for Nausea., Disp: 30 tablet, Rfl: 3    promethazine (PHENERGAN) 25 MG tablet, Take 1 tablet (25 mg total) by mouth every 6 (six) hours as needed for Nausea., Disp: 15 tablet, Rfl: 0    TRUE METRIX GLUCOSE TEST STRIP Strp, USE TO CHECK BLOOD SUGAR AS DIRECTED, Disp: 200 each, Rfl: 1    TRUEPLUS LANCETS 33 gauge Misc, 1 lancet  by Misc.(Non-Drug; Combo Route) route once daily. use as directed, Disp: 200 each, Rfl: 2    amiodarone (PACERONE) 200 MG Tab, Take 1 tablet (200 mg total) by mouth once daily., Disp: 30 tablet, Rfl: 2    amLODIPine (NORVASC) 10 MG tablet, Take 1 tablet (10 mg total) by mouth once daily., Disp: 90 tablet, Rfl: 1    atorvastatin (LIPITOR) 40 MG tablet, Take 1 tablet (40 mg total) by mouth every evening., Disp: 90 tablet, Rfl: 1    digoxin (LANOXIN) 125 mcg tablet, Take 1 tablet (0.125 mg total) by mouth once daily., Disp: 90 tablet, Rfl: 1    empagliflozin (JARDIANCE) 10 mg tablet, Take 1 tablet  (10 mg total) by mouth once daily., Disp: 30 tablet, Rfl: 1    insulin glargine U-100, Lantus, (LANTUS SOLOSTAR U-100 INSULIN) 100 unit/mL (3 mL) InPn pen, Inject 10 Units into the skin every evening., Disp: 3 mL, Rfl: 3    mupirocin (BACTROBAN) 2 % ointment, Apply 1 g topically 2 (two) times daily., Disp: 15 g, Rfl: 0    pantoprazole (PROTONIX) 40 MG tablet, Take 1 tablet (40 mg total) by mouth once daily., Disp: 90 tablet, Rfl: 1    potassium chloride (KLOR-CON) 10 MEQ TbSR, Take 1 tablet (10 mEq total) by mouth once daily., Disp: 90 tablet, Rfl: 1    sacubitriL-valsartan (ENTRESTO) 49-51 mg per tablet, Take 1 tablet by mouth 2 (two) times daily., Disp: 180 tablet, Rfl: 1    sulfamethoxazole-trimethoprim 800-160mg (BACTRIM DS) 800-160 mg Tab, Take 1 tablet by mouth 2 (two) times daily., Disp: 14 tablet, Rfl: 0    torsemide (DEMADEX) 20 MG Tab, Take 1 tablet (20 mg total) by mouth 2 (two) times a day., Disp: 60 tablet, Rfl: 2    traZODone (DESYREL) 50 MG tablet, Take 2 tablets (100 mg total) by mouth nightly as needed for Insomnia., Disp: 60 tablet, Rfl: 0    zolpidem (AMBIEN) 5 MG Tab, Take 1 tablet (5 mg total) by mouth nightly as needed (prn)., Disp: 30 tablet, Rfl: 1

## 2025-08-21 RX ORDER — METOPROLOL SUCCINATE 25 MG/1
12.5 TABLET, EXTENDED RELEASE ORAL DAILY
Qty: 30 TABLET | Refills: 1 | Status: SHIPPED | OUTPATIENT
Start: 2025-08-21

## (undated) DEVICE — GLOVE PROTEXIS PI SYN SURG 8.0

## (undated) DEVICE — BAG TISS RETRV MONARCH 10MM

## (undated) DEVICE — ETCO2 NC MICROSTR FEM ST ADLT

## (undated) DEVICE — CATH IV INTROCAN 18G X 1 1/4

## (undated) DEVICE — DRESSING TRANS 4X4 TEGADERM

## (undated) DEVICE — DECANTER FLUID TRNSF WHITE 9IN

## (undated) DEVICE — GOWN NONREINF SET-IN SLV 2XL

## (undated) DEVICE — IRRIGATOR INTERPULSE HANDPIECE SET

## (undated) DEVICE — GOWN SURGICAL STERILE LEVEL 3 / XX-LARGE

## (undated) DEVICE — APPLIER CLIP ENDO MED/LG 10MM

## (undated) DEVICE — BAG RECTANGLE RBBRBND 30X36IN

## (undated) DEVICE — SOL NACL IRR 3000ML

## (undated) DEVICE — FILM IOBAN ANTIMICROBL 60X60CM

## (undated) DEVICE — CHLORAPREP 10.5 ML APPLICATOR

## (undated) DEVICE — GLOVE 8.5 PROTEXIS PI BLUE

## (undated) DEVICE — GLOVE SENSICARE PI SLT 6.5

## (undated) DEVICE — SUTURE TICRON 5 HOS-14 BLUE 30IN

## (undated) DEVICE — GLOVE SURGICAL PROTEXIS PI BLUE SIZE 6.0

## (undated) DEVICE — CLIPPER BLADE MOD 4406 (CAREF)

## (undated) DEVICE — SOL IRRIGATION SALINE 0.9% 1000ML BOTTLE

## (undated) DEVICE — CDS HIP TOTAL

## (undated) DEVICE — GLOVE SURGICAL PROTEXIS PI CLASSIC SIZE 7.0

## (undated) DEVICE — SET EXT CATH NONDEHP .8 6.5IN

## (undated) DEVICE — SUT 0 VICRYL / UR6 (J603)

## (undated) DEVICE — GLOVE SENSICARE PI GRN 6.5

## (undated) DEVICE — GLOVE SURGICAL PROTEXIS PI BLUE SIZE 8.5

## (undated) DEVICE — GLOVE 6.5 PROTEXIS PI BLUE

## (undated) DEVICE — CANNULA LAP SEAL Z THRD 5X100

## (undated) DEVICE — SOL IRRIGATION SALINE 3000ML BAG

## (undated) DEVICE — STAPLER SKIN PROXIMATE PLUS MD 35 REG DISP

## (undated) DEVICE — INTRODUCER KIT MICRO 4FR

## (undated) DEVICE — Device

## (undated) DEVICE — PROTECTOR ULNAR NERVE FOAM

## (undated) DEVICE — SET EXTENSION CLEARLINK 2INJ

## (undated) DEVICE — APPLICATOR CHLORAPREP HI-LITE TINTED ORANGE 26ML

## (undated) DEVICE — NED VARIE INSUFFLAT 14GX150

## (undated) DEVICE — SUT MONOCYRL 4-0 PS2 UND

## (undated) DEVICE — GLOVE SENSICARE PI SURG 8

## (undated) DEVICE — GLOVE SURGICAL PROTEXIS PI CLASSIC SIZE 8.0

## (undated) DEVICE — GLOVE SURGICAL PROTEXIS PI CLASSIC SIZE 6.5

## (undated) DEVICE — CATH EXAMINE CHOLGM PERC INTRO

## (undated) DEVICE — KIT IV START WITH PREVANTICS

## (undated) DEVICE — SUTURE VICRYL 2-0 CT-1 27"

## (undated) DEVICE — SUTURE VICRYL 1 CP UD 27IN

## (undated) DEVICE — HEMOSTAT SURGICEL 4X8IN

## (undated) DEVICE — DRESSING AQUACEL A/G ADVANTAGE ANTIMICROBIAL 6 X 6IN

## (undated) DEVICE — TROCAR ENDO Z THREAD KII 5X100

## (undated) DEVICE — KIT PROCEDURE STER INLET CLOSU

## (undated) DEVICE — WARMER BLUE HEAT SCOPE 3-12MM

## (undated) DEVICE — SOL NACL IRR 1000ML BTL

## (undated) DEVICE — STRIP MEDI WND CLSR 1/2X4IN

## (undated) DEVICE — ELECTRODE LAPSCP L HOOK 36CM

## (undated) DEVICE — IRRIGATOR ENDOSCOPY DISP.

## (undated) DEVICE — SET IV PRIMARY

## (undated) DEVICE — TROCAR KII FIOS ZTHREAD 11X100

## (undated) DEVICE — INTRODUCER CATH 7F 11CML

## (undated) DEVICE — CATH SWAN GANZ STND 7FR

## (undated) DEVICE — COVER PROBE US GEL BAND

## (undated) DEVICE — SPONGE GAUZE 4X4 12 PLY STL AMD 10/TRAY

## (undated) DEVICE — TOWEL OR DISP STRL BLUE 4/PK

## (undated) DEVICE — GLOVE SENSICARE PI SURG 6.5

## (undated) DEVICE — ADHESIVE MASTISOL VIAL 48/BX